# Patient Record
Sex: MALE | Race: BLACK OR AFRICAN AMERICAN | Employment: OTHER | URBAN - METROPOLITAN AREA
[De-identification: names, ages, dates, MRNs, and addresses within clinical notes are randomized per-mention and may not be internally consistent; named-entity substitution may affect disease eponyms.]

---

## 2017-01-13 DIAGNOSIS — R35.0 URINARY FREQUENCY: ICD-10-CM

## 2017-01-16 RX ORDER — OXYBUTYNIN CHLORIDE 5 MG/1
TABLET ORAL
Qty: 75 TAB | Refills: 10 | Status: SHIPPED | OUTPATIENT
Start: 2017-01-16 | End: 2018-02-09 | Stop reason: SDUPTHER

## 2017-04-11 ENCOUNTER — HOSPITAL ENCOUNTER (OUTPATIENT)
Dept: LAB | Age: 82
Discharge: HOME | End: 2017-04-11
Payer: MEDICARE

## 2017-04-11 ENCOUNTER — OFFICE VISIT (OUTPATIENT)
Dept: UROLOGY | Age: 82
End: 2017-04-11

## 2017-04-11 VITALS
DIASTOLIC BLOOD PRESSURE: 64 MMHG | SYSTOLIC BLOOD PRESSURE: 130 MMHG | HEART RATE: 84 BPM | OXYGEN SATURATION: 98 % | WEIGHT: 159 LBS | BODY MASS INDEX: 21.07 KG/M2 | HEIGHT: 73 IN

## 2017-04-11 DIAGNOSIS — C61 MALIGNANT NEOPLASM OF PROSTATE (HCC): ICD-10-CM

## 2017-04-11 DIAGNOSIS — C61 MALIGNANT NEOPLASM OF PROSTATE (HCC): Primary | ICD-10-CM

## 2017-04-11 DIAGNOSIS — R31.29 MICROSCOPIC HEMATURIA: ICD-10-CM

## 2017-04-11 LAB
BILIRUB UR QL STRIP: NORMAL
GLUCOSE UR-MCNC: NORMAL MG/DL
KETONES P FAST UR STRIP-MCNC: NEGATIVE MG/DL
PH UR STRIP: 6 [PH] (ref 4.6–8)
PROT UR QL STRIP: NORMAL MG/DL
PSA SERPL-MCNC: 0.2 NG/ML (ref 0–4)
SP GR UR STRIP: 1.02 (ref 1–1.03)
UA UROBILINOGEN AMB POC: NORMAL (ref 0.2–1)
URINALYSIS CLARITY POC: CLEAR
URINALYSIS COLOR POC: YELLOW
URINE BLOOD POC: NEGATIVE
URINE LEUKOCYTES POC: NEGATIVE
URINE NITRITES POC: NEGATIVE

## 2017-04-11 PROCEDURE — 88112 CYTOPATH CELL ENHANCE TECH: CPT | Performed by: UROLOGY

## 2017-04-11 PROCEDURE — 84153 ASSAY OF PSA TOTAL: CPT | Performed by: UROLOGY

## 2017-04-11 NOTE — PROGRESS NOTES
Swatisusannayun Lama    Chief Complaint   Patient presents with    Prostate Cancer    Microscopic Hematuria       History and Physical    The patient is an [de-identified] -American male who is post radiation therapy early 21 years ago for intracapsular prostate cancer. PSA has been followed and has been unremarkable. The patient did have microhematuria and underwent bladder biopsy previously which disclosed no evidence of malignancy. The patient has seen no blood in his urine and does not have any urinary  obstructive symptoms. The patient has had urinary frequency and takes oxybutynin 3 times a day. He does take 1 at bedtime but continues to have nocturia ×1. It is not an inconvenience to him. bowel function is good. The patient has sustained weight loss. This apparently has been quite variable but the patient weighed 180 pounds at last visit and today weighs 159 pounds this is not something the patient is trying to accomplish. The patient states that his appetite simply seems to be reduced. The patient does consume caffeinated soft drinks. The patient is a diabetic and does take insulin but the dose has come down significantly associated with weight loss.     Past Medical History:   Diagnosis Date    Diabetes ([address]     Hypertension     Malignant neoplasm of prostate ([address] 1998     Patient Active Problem List   Diagnosis Code    Malignant neoplasm of prostate ([address] C61    Malignant neoplasm of prostate ([address] C61    Hypoglycemia E16.2    History of prostate cancer Z85.46    Urinary frequency R35.0     Past Surgical History:   Procedure Laterality Date    BIOPSY OF PROSTATE,INCISIONAL  4/98    MA EXT BEAM RADIATION AS PRIMARY THERAPY TO PROSTATE ONLY  4/30-6/29/98     Current Outpatient Prescriptions   Medication Sig Dispense Refill    oxybutynin (DITROPAN) 5 mg tablet TAKE 1 TABLET BY MOUTH THREE TIMES DAILY (GENERIC FOR DITROPAN) 75 Tab 10    atorvastatin (LIPITOR) 40 mg tablet Take 1 Tab by mouth nightly.  hydrochlorothiazide 12.5 mg cap 12.5 mg, lisinopril 5 mg tab 10 mg Take  by mouth daily.  montelukast (SINGULAIR) 10 mg tablet Take 10 mg by mouth daily.  PRAVASTATIN SODIUM (PRAVASTATIN PO) Take  by mouth.  BRIMONIDINE TARTRATE/TIMOLOL (COMBIGAN OP) Apply  to eye.  BRINZOLAMIDE (AZOPT OP) Apply  to eye.  TRAVOPROST (TRAVATAN Z OP) Apply  to eye.  valsartan (DIOVAN) 80 mg tablet Take  by mouth daily.  SIMVASTATIN PO Take  by mouth.  aspirin delayed-release 81 mg tablet Take  by mouth daily.  insulin (NOVOLIN 70/30) 100 unit/mL (70-30) injection by SubCUTAneous route once. Allergies   Allergen Reactions    Iodinated Contrast Media - Oral And Iv Dye Hives     Faint, shaking     Social History     Social History    Marital status: SINGLE     Spouse name: N/A    Number of children: N/A    Years of education: N/A     Occupational History    Not on file.      Social History Main Topics    Smoking status: Current Some Day Smoker     Packs/day: 0.10     Years: 22.00     Types: Cigarettes    Smokeless tobacco: Never Used    Alcohol use No    Drug use: No    Sexual activity: Not on file     Other Topics Concern    Not on file     Social History Narrative    ** Merged History Encounter **           Family History   Problem Relation Age of Onset    Diabetes Other     Hypertension Other              Visit Vitals    Ht 6' 1\" (1.854 m)     Physical        Gen: WDWN adult NAD  Head  : normocephalic,  Normal ROM; eyes without normal pupils, EOMs, no masses;  conjunctiva normal  Neck: normal movement,  no evident mass,  No evident adenopathy, trachea midline,    Abd :bowel sounds normal, no masses, tenderness, organomegaly  Flanks     -    Extremities- no edema, arthritis, deformity, swelling  Psych- oriented, no evident anxiety, no cognitive impairment evident    Urine is negative except for 2+ protein  psa is drawn                  Impression/ PLAN  Distant history of prostate cancer with undetectable PSAs over an extended period of time following radiation  Unexplained weight loss    Plan:  The patient does continue to smoke perhaps one fourth of a pack of cigarettes per day and is advised that this can be an appetite suppressor  The patient has had no history of alcohol abuse in the past and is a current nondrinker but has consumed alcohol when he was a younger man. We have talked about a glass of wine or a small amount of mel prior to a meal in order to help with appetite. We have also talked about using Ensure or milk shakes and perhaps converting over to regular Pepsi instead of diet Pepsi. The patient is able to monitor his blood sugars. The present time, Megace is not a consideration. The patient does advise me that he did check with his primary care physician with the weight loss and did undergo a thorough evaluation for occult malignancy. I will do a urine cytology on today's specimen    The patient will continue with his oxybutynin                This visit exceeded 25 minutes and >50% was counselling  The patient understands the discussion and plan    PLEASE NOTE:      This document has been produced using voice recognition software.   Unrecognized errors in transcription may be present    Shahram Duque MD

## 2017-04-11 NOTE — PATIENT INSTRUCTIONS
Prostate-Specific Antigen (PSA) Test: About This Test  What is it? A prostate-specific antigen (PSA) test measures the amount of PSA in your blood. PSA is released by a man's prostate gland into his blood. A high PSA level may mean that you have an enlargement, infection, or cancer of the prostate. Why is this test done? You may have this test to:  · Check for prostate cancer. · Watch prostate cancer and see if treatment is working. How can you prepare for the test?  Do not ejaculate during the 2 days before your PSA blood test, either during sex or masturbation. What happens before the test?  Tell your doctor if you have had a:  · Test to look at your bladder (cystoscopy) in the past several weeks. · Prostate biopsy in the past several weeks. · Prostate infection or urinary tract infection that has not gone away. · Tube (catheter) inserted into your bladder to drain urine recently. What happens during the test?  A health professional takes a sample of your blood. What happens after the test?  You can go back to your usual activities right away. When should you call for help? Watch closely for changes in your health, and be sure to contact your doctor if you have any questions about this test.  Follow-up care is a key part of your treatment and safety. Be sure to make and go to all appointments, and call your doctor if you are having problems. It's also a good idea to keep a list of the medicines you take. Ask your doctor when you can expect to have your test results. Where can you learn more? Go to http://petty-timothy.info/. Enter Y339 in the search box to learn more about \"Prostate-Specific Antigen (PSA) Test: About This Test.\"  Current as of: July 26, 2016  Content Version: 11.2  © 8318-6593 [employer] Connect. Care instructions adapted under license by EverPresent (which disclaims liability or warranty for this information).  If you have questions about a medical condition or this instruction, always ask your healthcare professional. [place] [address] any warranty or liability for your use of this information.

## 2017-04-11 NOTE — PROGRESS NOTES
Mr. Kam Alaniz has a reminder for a \"due or due soon\" health maintenance. I have asked that he contact his primary care provider for follow-up on this health maintenance. RBV. Per Dr. Dedra Claude lab drawn in office today for PSA for prostate cancer.

## 2017-04-11 NOTE — MR AVS SNAPSHOT
Visit Information Date & Time Provider Department Dept. Phone Encounter #  
 4/11/2017  9:30 AM Shahram Duque, [facility] [phone] [id number] Upcoming Health Maintenance Date Due DTaP/Tdap/Td series (1 - Tdap) 1/7/1956 ZOSTER VACCINE AGE 60> 1/7/1997 GLAUCOMA SCREENING Q2Y 1/7/2002 Pneumococcal 65+ High/Highest Risk (1 of 2 - PCV13) 1/7/2002 MEDICARE YEARLY EXAM 1/7/2002 INFLUENZA AGE 9 TO ADULT 8/1/2016 Allergies as of 4/11/2017  Review Complete On: 4/11/2017 By: Ashanti Evans LPN Severity Noted Reaction Type Reactions Iodinated Contrast Media - Oral And Iv Dye   Side Effect Hives Faint, shaking Current Immunizations  Reviewed on 5/5/2016 Name Date Influenza Vaccine 10/1/2015 Not reviewed this visit You Were Diagnosed With   
  
 Codes Comments Malignant neoplasm of prostate ([facility]    -  Primary ICD-10-CM: Q56 ICD-9-CM: 069 Vitals BP Pulse Height(growth percentile) Weight(growth percentile) SpO2 BMI  
 130/64 (BP 1 Location: Left arm, BP Patient Position: Sitting) 84 6' 1\" (1.854 m) 159 lb (72.1 kg) 98% 20.98 kg/m2 Smoking Status Current Some Day Smoker Vitals History BMI and BSA Data Body Mass Index Body Surface Area  
 20.98 kg/m 2 1.93 m 2 Preferred Pharmacy Pharmacy Name Phone [id number] [address] [phone] Your Updated Medication List  
  
   
This list is accurate as of: 4/11/17  9:55 AM.  Always use your most recent med list.  
  
  
  
  
 aspirin delayed-release 81 mg tablet Take  by mouth daily. atorvastatin 40 mg tablet Commonly known as:  LIPITOR Take 1 Tab by mouth nightly. AZOPT OP Apply  to eye. COMBIGAN OP Apply  to eye. DIOVAN 80 mg tablet Generic drug:  valsartan Take  by mouth daily. hydrochlorothiazide 12.5 mg cap 12.5 mg, lisinopril 5 mg tab 10 mg Take  by mouth daily. montelukast 10 mg tablet Commonly known as:  SINGULAIR Take 10 mg by mouth daily. NovoLIN 70/30 100 unit/mL (70-30) injection Generic drug:  insulin NPH/insulin regular  
by SubCUTAneous route once. oxybutynin 5 mg tablet Commonly known as:  DITROPAN  
TAKE 1 TABLET BY MOUTH THREE TIMES DAILY ([facility]) PRAVASTATIN PO Take  by mouth. SIMVASTATIN PO Take  by mouth. TRAVATAN Z OP Apply  to eye. We Performed the Following AMB POC URINALYSIS DIP STICK AUTO W/O MICRO [48448 CPT(R)] [place] COLLECTION VENOUS BLOOD,VENIPUNCTURE [id number] CPT(R)] To-Do List   
 04/11/2017 Lab:  PROSTATE SPECIFIC AG (PSA) Patient Instructions Prostate-Specific Antigen (PSA) Test: About This Test 
What is it? A prostate-specific antigen (PSA) test measures the amount of PSA in your blood. PSA is released by a man's prostate gland into his blood. A high PSA level may mean that you have an enlargement, infection, or cancer of the prostate. Why is this test done? You may have this test to: · Check for prostate cancer. · Watch prostate cancer and see if treatment is working. How can you prepare for the test? 
Do not ejaculate during the 2 days before your PSA blood test, either during sex or masturbation. What happens before the test? 
Tell your doctor if you have had a: 
· Test to look at your bladder (cystoscopy) in the past several weeks. · Prostate biopsy in the past several weeks. · Prostate infection or urinary tract infection that has not gone away. · Tube (catheter) inserted into your bladder to drain urine recently. What happens during the test? 
A health professional takes a sample of your blood. What happens after the test? 
You can go back to your usual activities right away. When should you call for help? Watch closely for changes in your health, and be sure to contact your doctor if you have any questions about this test. 
Follow-up care is a key part of your treatment and safety. Be sure to make and go to all appointments, and call your doctor if you are having problems. It's also a good idea to keep a list of the medicines you take. Ask your doctor when you can expect to have your test results. Where can you learn more? Go to http://petty-timothy.info/. Enter W735 in the search box to learn more about \"Prostate-Specific Antigen (PSA) Test: About This Test.\" Current as of: July 26, 2016 Content Version: 11.2 © 5544-4820 Auto I.D.. Care instructions adapted under license by NeuralStem (which disclaims liability or warranty for this information). If you have questions about a medical condition or this instruction, always ask your healthcare professional. [address] any warranty or liability for your use of this information. Introducing [facility]! [employer] introduces [email or web address] patient portal. Now you can access parts of your medical record, email your doctor's office, and request medication refills online. 1. In your internet browser, go to [email or web address] 2. Click on the First Time User? Click Here link in the Sign In box. You will see the New Member Sign Up page. 3. Enter your [email or web address] Access Code exactly as it appears below. You will not need to use this code after youve completed the sign-up process. If you do not sign up before the expiration date, you must request a new code. · [email or web address] Access Code: [email or web address] Expires: 7/10/2017  9:16 AM 
 
4. Enter the last four digits of your Social Security Number (xxxx) and Date of Birth (mm/dd/yyyy) as indicated and click Submit. You will be taken to the next sign-up page. 5. Create a [email or web address] ID.  This will be your [email or web address] login ID and cannot be changed, so think of one that is secure and easy to remember. 6. Create a Admify password. You can change your password at any time. 7. Enter your Password Reset Question and Answer. This can be used at a later time if you forget your password. 8. Enter your e-mail address. You will receive e-mail notification when new information is available in [address] 9. Click Sign Up. You can now view and download portions of your medical record. 10. Click the Download Summary menu link to download a portable copy of your medical information. If you have questions, please visit the Frequently Asked Questions section of the Admify website. Remember, Admify is NOT to be used for urgent needs. For medical emergencies, dial 911. Now available from your iPhone and Android! Please provide this summary of care documentation to your next provider. Your primary care clinician is listed as Neha Lozano. If you have any questions after today's visit, please call [phone].

## 2017-07-19 ENCOUNTER — HOSPITAL ENCOUNTER (OUTPATIENT)
Dept: GENERAL RADIOLOGY | Age: 82
Discharge: HOME | End: 2017-07-19
Payer: MEDICARE

## 2017-07-19 DIAGNOSIS — R63.4 ABNORMAL WEIGHT LOSS: ICD-10-CM

## 2017-07-19 PROCEDURE — 71020 XR CHEST PA LAT: CPT

## 2017-07-25 ENCOUNTER — HOSPITAL ENCOUNTER (OUTPATIENT)
Dept: CT IMAGING | Age: 82
Discharge: HOME | End: 2017-07-25
Attending: FAMILY MEDICINE
Payer: MEDICARE

## 2017-07-25 DIAGNOSIS — R63.4 LOSS OF WEIGHT: ICD-10-CM

## 2017-07-25 PROCEDURE — 74176 CT ABD & PELVIS W/O CONTRAST: CPT

## 2017-08-29 ENCOUNTER — HOSPITAL ENCOUNTER (OUTPATIENT)
Dept: LAB | Age: 82
Discharge: HOME | End: 2017-08-29
Payer: MEDICARE

## 2017-08-29 ENCOUNTER — OFFICE VISIT (OUTPATIENT)
Dept: ONCOLOGY | Age: 82
End: 2017-08-29

## 2017-08-29 ENCOUNTER — HOSPITAL ENCOUNTER (OUTPATIENT)
Dept: ONCOLOGY | Age: 82
Discharge: HOME | End: 2017-08-29

## 2017-08-29 VITALS
TEMPERATURE: 98.1 F | HEART RATE: 91 BPM | BODY MASS INDEX: 19.13 KG/M2 | WEIGHT: 145 LBS | DIASTOLIC BLOOD PRESSURE: 68 MMHG | SYSTOLIC BLOOD PRESSURE: 133 MMHG

## 2017-08-29 DIAGNOSIS — D72.9 NEUTROPHILIC LEUKOCYTOSIS: ICD-10-CM

## 2017-08-29 DIAGNOSIS — C61 MALIGNANT NEOPLASM OF PROSTATE (HCC): ICD-10-CM

## 2017-08-29 DIAGNOSIS — D75.839 THROMBOCYTOSIS: ICD-10-CM

## 2017-08-29 DIAGNOSIS — D72.9 NEUTROPHILIC LEUKOCYTOSIS: Primary | ICD-10-CM

## 2017-08-29 LAB
BASO+EOS+MONOS # BLD AUTO: 1.4 K/UL (ref 0–2.3)
BASO+EOS+MONOS # BLD AUTO: 6 % (ref 0.1–17)
COMMENT , 490046: NORMAL
DIFFERENTIAL METHOD BLD: ABNORMAL
ERYTHROCYTE [DISTWIDTH] IN BLOOD BY AUTOMATED COUNT: 17.6 % (ref 11.5–14.5)
FLOW INTERPRETATION: NORMAL
HCT VFR BLD AUTO: 36.3 % (ref 36–48)
HGB BLD-MCNC: 11.7 G/DL (ref 12–16)
LYMPHOCYTES # BLD: 2.7 K/UL (ref 1.1–5.9)
LYMPHOCYTES NFR BLD: 10 % (ref 14–44)
MCH RBC QN AUTO: 25.1 PG (ref 25–35)
MCHC RBC AUTO-ENTMCNC: 32.2 G/DL (ref 31–37)
MCV RBC AUTO: 77.9 FL (ref 78–102)
NEUTS SEG # BLD: 22.2 K/UL (ref 1.8–9.5)
NEUTS SEG NFR BLD: 84 % (ref 40–70)
PATHOLOGIST NAME: NORMAL
PLATELET # BLD AUTO: 765 K/UL (ref 135–420)
PSA SERPL-MCNC: 0.2 NG/ML (ref 0–4)
RBC # BLD AUTO: 4.66 M/UL (ref 4.1–5.1)
SPECIMEN SOURCE: NORMAL
SPECIMEN SOURCE: NORMAL
WBC # BLD AUTO: 26.3 K/UL (ref 4.5–13)

## 2017-08-29 PROCEDURE — 80053 COMPREHEN METABOLIC PANEL: CPT | Performed by: INTERNAL MEDICINE

## 2017-08-29 PROCEDURE — 88185 FLOWCYTOMETRY/TC ADD-ON: CPT | Performed by: UROLOGY

## 2017-08-29 PROCEDURE — 84165 PROTEIN E-PHORESIS SERUM: CPT | Performed by: INTERNAL MEDICINE

## 2017-08-29 PROCEDURE — 84153 ASSAY OF PSA TOTAL: CPT | Performed by: INTERNAL MEDICINE

## 2017-08-29 PROCEDURE — 36415 COLL VENOUS BLD VENIPUNCTURE: CPT | Performed by: INTERNAL MEDICINE

## 2017-08-29 PROCEDURE — 88184 FLOWCYTOMETRY/ TC 1 MARKER: CPT | Performed by: UROLOGY

## 2017-08-29 NOTE — PATIENT INSTRUCTIONS
Prostate Cancer: Care Instructions  Your Care Instructions    The prostate gland is a small, walnut-shaped organ that lies just below a man's bladder. It surrounds the urethra, the tube that carries urine from the bladder out of the body through the penis. Prostate cancer is the abnormal growth of cells in the prostate gland. Prostate cancer cells can spread within the prostate, to nearby lymph nodes and other tissues, and to other parts of the body. When the cancer hasn't spread outside the prostate, it is called localized prostate cancer. With localized prostate cancer, your options depend on how likely it is that your cancer will grow. Tests will show if your cancer is likely to grow. · Low-risk cancer isn't likely to grow right away. If your cancer is low-risk, you can choose active surveillance. This means your cancer will be watched closely by your doctor with regular checkups and tests to see if the cancer grows. This choice allows you to delay having surgery or radiation, often for many years. If the cancer grows very slowly, you may never need treatment. · Medium-risk cancer is more likely to grow. Some men with this type of cancer may be able to choose active surveillance. Others may need to choose surgery or radiation. · High-risk cancer is most likely to grow. If you have high-risk cancer, you will likely need to choose surgery or radiation. If your cancer has already spread outside the prostate or to other parts of the body, then you may have other treatments, like chemotherapy or hormone therapy. If you are over age [de-identified] or have other serious health problems, like heart disease, you may choose not to have treatments to cure your cancer. Instead, you can just have treatments to manage your symptoms. This is called watchful waiting. Finding out that you have cancer is scary. You may feel many emotions and may need some help coping. Seek out family, friends, and counselors for support.  You also can do things at home to make yourself feel better while you go through treatment. Call the [facility]illa ([phone]) or visit its website at 7[address] Localocracy. AdNear for more information. Follow-up care is a key part of your treatment and safety. Be sure to make and go to all appointments, and call your doctor if you are having problems. It's also a good idea to know your test results and keep a list of the medicines you take. How can you care for yourself at home? · Your doctor will talk to you about your treatment options. You may need to learn more about each of them before you can decide which treatment is best for you. · Take your medicines exactly as prescribed. Call your doctor if you think you are having a problem with your medicine. You will get more details on the specific medicines your doctor prescribes. · Eat healthy food. If you do not feel like eating, try to eat food that has protein and extra calories to keep up your strength and prevent weight loss. Drink liquid meal replacements for extra calories and protein. Try to eat your main meal early. · Take steps to control your stress and workload. Learn relaxation techniques. ¨ Share your feelings. Stress and tension affect our emotions. By expressing your feelings to others, you may be able to understand and cope with them. ¨ Consider joining a support group. Talking about a problem with your spouse, a good friend, or other people with similar problems is a good way to reduce tension and stress. ¨ Express yourself through art. Try writing, crafts, dance, or art to relieve stress. Some dance, writing, or art groups may be available just for people who have cancer. ¨ Be kind to your body and mind. Getting enough sleep, eating a healthy diet, and taking time to do things you enjoy can contribute to an overall feeling of balance in your life and can help reduce stress. ¨ Get help if you need it.  Discuss your concerns with your doctor or counselor. · Get some physical activity every day, but do not get too tired. Keep doing the hobbies you enjoy as your energy allows. · If you are vomiting or have diarrhea:  ¨ Drink plenty of fluids (enough so that your urine is light yellow or clear like water) to prevent dehydration. Choose water and other caffeine-free clear liquids. If you have kidney, heart, or liver disease and have to limit fluids, talk with your doctor before you increase the amount of fluids you drink. ¨ When you are able to eat, try clear soups, mild foods, and liquids until all symptoms are gone for 12 to 48 hours. Jell-O, dry toast, crackers, and cooked cereal are also good choices. · If you have not already done so, prepare a list of advance directives. Advance directives are instructions to your doctor and family members about what kind of care you want if you become unable to speak or express yourself. When should you call for help? Call your doctor now or seek immediate medical care if:  · You cannot urinate. · You have symptoms of a urinary infection. For example:  ¨ You have blood or pus in your urine. ¨ You have pain in your back just below your rib cage. This is called flank pain. ¨ You have a fever, chills, or body aches. ¨ It hurts to urinate. ¨ You have groin or belly pain. · You have pain in your back or hips. · Your pain is not controlled. · You are vomiting or nauseated. Watch closely for changes in your health, and be sure to contact your doctor if:  · You have pain when you ejaculate. · You have trouble starting or controlling your urine. Where can you learn more? Go to http://petty-timothy.info/. Enter V141 in the search box to learn more about \"Prostate Cancer: Care Instructions. \"  Current as of: July 26, 2016  Content Version: 11.3  © 4151-6388 Problemsolutions24.  Care instructions adapted under license by RackWare (which disclaims liability or warranty for this information). If you have questions about a medical condition or this instruction, always ask your healthcare professional. [address] any warranty or liability for your use of this information. Complete Blood Count (CBC): About This Test  What is it? A complete blood count (CBC) is a blood test that gives important information about your blood cells, especially red blood cells, white blood cells, and platelets. Why is this test done? A CBC may be done as part of a regular physical exam. There are many other reasons that a doctor may want this blood test, including to:  · Find the cause of symptoms such as fatigue, weakness, fever, bruising, or weight loss. · Find anemia or an infection. · See how much blood has been lost if there is bleeding. · Diagnose diseases of the blood, such as leukemia or polycythemia. How can you prepare for the test?  You do not need to do anything before having this test.  What happens during the test?  The health professional taking a sample of your blood will:  · Wrap an elastic band around your upper arm. This makes the veins below the band larger so it is easier to put a needle into the vein. · Clean the needle site with alcohol. · Put the needle into the vein. · Attach a tube to the needle to fill it with blood. · Remove the band from your arm when enough blood is collected. · Put a gauze pad or cotton ball over the needle site as the needle is removed. · Put pressure on the site and then put on a bandage. If this blood test is done on a baby, a heel stick may be done instead of a blood draw from a vein. What happens after the test?  · You will probably be able to go home right away. · You can go back to your usual activities right away. Follow-up care is a key part of your treatment and safety. Be sure to make and go to all appointments, and call your doctor if you are having problems.  It's also a good idea to keep a list of the medicines you take. Ask your doctor when you can expect to have your test results. Where can you learn more? Go to http://petty-timothy.info/. Enter F768 in the search box to learn more about \"Complete Blood Count (CBC): About This Test.\"  Current as of: October 14, 2016  Content Version: 11.3  © 5224-2546 Theorem. Care instructions adapted under license by Movatu (which disclaims liability or warranty for this information). If you have questions about a medical condition or this instruction, always ask your healthcare professional. [place] [address] any warranty or liability for your use of this information.

## 2017-08-29 NOTE — PROGRESS NOTES
Hematology/Oncology Consultation Note    Name: Bryon Cap  Date: 2017  : 1935    PCP: Paula Yanes MD       Mr. Morena Carballo  is a [de-identified] y.o. -American man who was referred for evaluation of having an elevated WBC count. Subjective:     Chief complaint: Elevated WBC count    History of present illness:  Mr. Morena Carballo is an 65-year-old -American man who states that over the past few months he has lost over 10 pounds. He has not been trying to lose weight. He reports that he has not experienced any unexplained fevers or night sweats. On 2017 a CBC revealed that he had an elevated WBC count at 32.2 with a normal hemoglobin of 12.6 g/dL and hematocrit of 38%. His platelet count was elevated at 601,000 but is differential WBC count revealed that his neutrophils were markedly elevated at 86%. Based on these abnormal blood test results he was referred for evaluation. The patient denies having any unexplained bruising or bleeding. Additionally he has not experienced any evidence of blood clots in his lower extremities or lungs. He has no complaints of abdominal pain or discomfort. The patient reports that he does have a history of prostate cancer but has not experienced any evidence of recurrent disease. He reports that he was diagnosed in  and was treated with radiation therapy. .    Past Medical History:   Diagnosis Date    Diabetes ([facility]     Hypertension     Malignant neoplasm of prostate ([facility]        Allergies   Allergen Reactions    Iodinated Contrast- Oral And Iv Dye Hives     Faint, shaking       Past Surgical History:   Procedure Laterality Date    BIOPSY OF PROSTATE,INCISIONAL      UT EXT BEAM RADIATION AS PRIMARY THERAPY TO PROSTATE ONLY  -98       Social History     Social History    Marital status: SINGLE     Spouse name: N/A    Number of children: N/A    Years of education: N/A     Occupational History    Not on file.      Social History Main Topics    Smoking status: Current Some Day Smoker     Packs/day: 0.10     Years: 22.00     Types: Cigarettes    Smokeless tobacco: Never Used    Alcohol use No    Drug use: No    Sexual activity: Not on file     Other Topics Concern    Not on file     Social History Narrative    ** Merged History Encounter **            Family History   Problem Relation Age of Onset    Diabetes Other     Hypertension Other        Current Outpatient Prescriptions   Medication Sig Dispense Refill    oxybutynin (DITROPAN) 5 mg tablet TAKE 1 TABLET BY MOUTH THREE TIMES DAILY (GENERIC FOR DITROPAN) 75 Tab 10    atorvastatin (LIPITOR) 40 mg tablet Take 1 Tab by mouth nightly.  hydrochlorothiazide 12.5 mg cap 12.5 mg, lisinopril 5 mg tab 10 mg Take  by mouth daily.  montelukast (SINGULAIR) 10 mg tablet Take 10 mg by mouth daily.  PRAVASTATIN SODIUM (PRAVASTATIN PO) Take  by mouth.  BRIMONIDINE TARTRATE/TIMOLOL (COMBIGAN OP) Apply  to eye.  BRINZOLAMIDE (AZOPT OP) Apply  to eye.  TRAVOPROST (TRAVATAN Z OP) Apply  to eye.  valsartan (DIOVAN) 80 mg tablet Take  by mouth daily.  SIMVASTATIN PO Take  by mouth.  aspirin delayed-release 81 mg tablet Take  by mouth daily.  insulin (NOVOLIN 70/30) 100 unit/mL (70-30) injection by SubCUTAneous route once. Review of Systems    General ROS:The patient has no complaints and there is no physical distress evident. However, the patient reports losing at least 10 pounds or more in the past 3 months which was unintentional weight loss. Psychological ROS: patient denies having any psychological symptoms such as hallucinations, depression or anxiety. Ophthalmic ROS:the patient denies having any visual impairment or eye discomfort. ENT ROS: there are no abnormalities reported. Allergy and Immunology ROS:the patient denies having any seasonal allergies or allergies to medications other than those already outlined above.   Hematological and Lymphatic ROS: the patient denies having any bruising, bleeding or lymphadenopathy. Endocrine ROS: the patient denies having any heat or cold intolerance. There is no history of diabetes or thyroid disorders. Breast ROS: the patient denies having any history of breast mass, nipple discharge, or lumps. Respiratory ROS:the patient denies having any cough, shortness of breath, or dyspnea on exertion. Cardiovascular ROS: there are no complaints of chest pain, palpitations, chest pounding, or dyspnea on exertion. Gastrointestinal ROS: the patient denies having nausea, emesis, diarrhea, constipation, or blood in the stool. Genito-Urinary ROS: the patient denies having urinary urgency, frequency, or dysuria. Musculoskeletal ROS: with the exception of mild arthralgias the patient has no other musculoskeletal complaints. Neurological ROS: the patient denies having any numbness, tingling, or neurologic deficits. Dermatological ROS:patient denies having any unexplained rash, skin ulcerations, or hives. Objective:     Visit Vitals    /68 (BP 1 Location: Right arm, BP Patient Position: Sitting)    Pulse 91    Temp 98.1 °F (36.7 °C) (Oral)    Wt 65.8 kg (145 lb)    BMI 19.13 kg/m2        Physical Exam:   Gen. Appearance: the patient is in no acute distress. Skin: There is no evidence of bruise or rash. HEENT: The head is normocephalic and atraumatic. The conjunctiva and sclera are clear. Pupils are equal, round, reactive to light, and accommodation. The extraocular movements are intact. ENT reveals no oral mucosal lesions or ulcerations. Neck: Supple without lymphadenopathy or thyromegaly. Lungs: Clear to auscultation and percussion; there are no wheezes or rhonchi. Heart: Regular rate and rhythm; there are no murmurs, gallops, or rubs. Abdomen: Bowel sounds are present and normal.  There is no guarding, tenderness, or hepatosplenomegaly. Extremities:  There is no clubbing, cyanosis, or edema. Neurologic: There are no focal neurologic deficits. Lymphatics: There is no palpable peripheral lymphadenopathy. Lab data: The CBC dated 7/17/2017 showed a WBC count of 32.2, hemoglobin 12.6 g/dL, hematocrit 38%, platelet count 000,808, and the neutrophils were 86% with 5% lymphocytes. Assessment:   Neutrophilic leukocytosis: I have explained to the patient that the diagnostic differential includes a reactive leukocytosis with overproduction of neutrophils, chronic leukemoid reaction, or progressive development of chronic myeloid leukemia. Thrombocytosis: The platelet count was elevated at 601,000 and therefore I am concerned that he may have an underlying myeloproliferative disorder. The diagnostic differential includes an evolving essential thrombocytosis. Prostate cancer: The patient has a remote history of prostate cancer and at this point there is a remote possibility that he may have bone marrow involvement with prostate cancer cells contributing to his neutrophilic leukocytosis. Plan:   Neutrophilic leukocytosis: At this time I will order a comprehensive metabolic panel, immunophenotyping profile, and serum protein electrophoresis to have rule out any evidence of an evolving lympho-plasmacytic leukemia versus chronic myeloid leukemia. Thrombocytosis: At this time I will order a DNA analysis for mutations in the JAK2. Prostate cancer: At this time I will check his PSA level since he does have a history of prostate cancer to rule out any evidence that he may have progressive metastatic prostate cancer causing a reactive neutrophilic leukocytosis with bone involvement. I will have the patient return to clinic in 2 weeks to review lab data and to discuss potential options of management.     Orders Placed This Encounter    COMPLETE CBC & AUTO DIFF WBC    [place] CBC [place]     Standing Status:   Future     Number of Occurrences:   1     Standing Expiration Date: 7/9/7167    METABOLIC PANEL, COMPREHENSIVE     Standing Status:   Future     Standing Expiration Date:   8/30/2018    SPEP     Standing Status:   Future     Standing Expiration Date:   8/30/2018    PROSTATE SPECIFIC AG     Standing Status:   Future     Standing Expiration Date:   8/30/2018    IMMUNOPHENOTYPING PROFILE     Standing Status:   Future     Standing Expiration Date:   8/30/2018     Order Specific Question:   Specimen type     Answer:   Blood [2]           Nunu Najera MD  8/29/2017      Please note: This document has been produced using voice recognition software. Unrecognized errors in transcription may be present.

## 2017-08-29 NOTE — PROGRESS NOTES
Critical Results for WBC were noted and reviewed. The patient is new to the practice and is being evaluated for the elevated WBC count.  Will continue to monitor,

## 2017-08-29 NOTE — MR AVS SNAPSHOT
Visit Information Date & Time Provider Department Dept. Phone Encounter #  
 8/29/2017 10:00 AM [facility] Office [phone] [id number] Follow-up Instructions Return in about 2 weeks (around 9/12/2017). Your Appointments 9/12/2017  1:30 PM  
Office Visit with Chichi Bird MD  
[place] [address] [address]) Appt Note: RESULTS  
 [place] [address] [facility] [address] [facility] [address]  
[phone]  
  
   
 [place] [address] 4/10/2018  9:15 AM  
Office Visit with Yulissa Polk MD  
[facility] [address]) Appt Note: PSA  
 [address] [place] [address] [address]  
[phone] [address] [place] [address] Upcoming Health Maintenance Date Due DTaP/Tdap/Td series (1 - Tdap) 1/25/1958 ZOSTER VACCINE AGE 60> 11/25/1996 GLAUCOMA SCREENING Q2Y 1/25/2002 Pneumococcal 65+ High/Highest Risk (1 of 2 - PCV13) 1/25/2002 MEDICARE YEARLY EXAM 1/25/2002 INFLUENZA AGE 9 TO ADULT 8/1/2017 Allergies as of 8/29/2017  Review Complete On: 7/25/2017 By: Elvie Townsend Severity Noted Reaction Type Reactions Iodinated Contrast- Oral And Iv Dye   Side Effect Hives Faint, shaking Current Immunizations  Reviewed on 5/5/2016 Name Date Influenza Vaccine 10/1/2015 Not reviewed this visit You Were Diagnosed With   
  
 Codes Comments Neutrophilic leukocytosis    -  Primary ICD-10-CM: D72.9 ICD-9-CM: 288.8 Malignant neoplasm of prostate [facility])     ICD-10-CM: G03 ICD-9-CM: 707 Vitals Smoking Status Current Some Day Smoker Preferred Pharmacy Pharmacy Name Phone [id number] [address] [phone] Your Updated Medication List  
  
   
 This list is accurate as of: 8/29/17 10:44 AM.  Always use your most recent med list.  
  
  
  
  
 aspirin delayed-release 81 mg tablet Take  by mouth daily. atorvastatin 40 mg tablet Commonly known as:  LIPITOR Take 1 Tab by mouth nightly. AZOPT OP Apply  to eye. COMBIGAN OP Apply  to eye. DIOVAN 80 mg tablet Generic drug:  valsartan Take  by mouth daily. hydrochlorothiazide 12.5 mg cap 12.5 mg, lisinopril 5 mg tab 10 mg Take  by mouth daily. montelukast 10 mg tablet Commonly known as:  SINGULAIR Take 10 mg by mouth daily. NovoLIN 70/30 100 unit/mL (70-30) injection Generic drug:  insulin NPH/insulin regular  
by SubCUTAneous route once. oxybutynin 5 mg tablet Commonly known as:  DITROPAN  
TAKE 1 TABLET BY MOUTH THREE TIMES DAILY ([facility]) PRAVASTATIN PO Take  by mouth. SIMVASTATIN PO Take  by mouth. TRAVATAN Z OP Apply  to eye. We Performed the Following COMPLETE CBC & AUTO DIFF WBC [51334 CPT(R)] Follow-up Instructions Return in about 2 weeks (around 9/12/2017). To-Do List   
 08/29/2017 Lab:  CBC WITH 3 PART DIFF Patient Instructions Prostate Cancer: Care Instructions Your Care Instructions The prostate gland is a small, walnut-shaped organ that lies just below a man's bladder. It surrounds the urethra, the tube that carries urine from the bladder out of the body through the penis. Prostate cancer is the abnormal growth of cells in the prostate gland. Prostate cancer cells can spread within the prostate, to nearby lymph nodes and other tissues, and to other parts of the body. When the cancer hasn't spread outside the prostate, it is called localized prostate cancer. With localized prostate cancer, your options depend on how likely it is that your cancer will grow. Tests will show if your cancer is likely to grow. · Low-risk cancer isn't likely to grow right away. If your cancer is low-risk, you can choose active surveillance. This means your cancer will be watched closely by your doctor with regular checkups and tests to see if the cancer grows. This choice allows you to delay having surgery or radiation, often for many years. If the cancer grows very slowly, you may never need treatment. · Medium-risk cancer is more likely to grow. Some men with this type of cancer may be able to choose active surveillance. Others may need to choose surgery or radiation. · High-risk cancer is most likely to grow. If you have high-risk cancer, you will likely need to choose surgery or radiation. If your cancer has already spread outside the prostate or to other parts of the body, then you may have other treatments, like chemotherapy or hormone therapy. If you are over age [de-identified] or have other serious health problems, like heart disease, you may choose not to have treatments to cure your cancer. Instead, you can just have treatments to manage your symptoms. This is called watchful waiting. Finding out that you have cancer is scary. You may feel many emotions and may need some help coping. Seek out family, friends, and counselors for support. You also can do things at home to make yourself feel better while you go through treatment. Call the [employer] (6-141.269.1939) or visit its website at [employer] [employer]. org for more information. Follow-up care is a key part of your treatment and safety. Be sure to make and go to all appointments, and call your doctor if you are having problems. It's also a good idea to know your test results and keep a list of the medicines you take. How can you care for yourself at home? · Your doctor will talk to you about your treatment options. You may need to learn more about each of them before you can decide which treatment is best for you. · Take your medicines exactly as prescribed. Call your doctor if you think you are having a problem with your medicine. You will get more details on the specific medicines your doctor prescribes. · Eat healthy food. If you do not feel like eating, try to eat food that has protein and extra calories to keep up your strength and prevent weight loss. Drink liquid meal replacements for extra calories and protein. Try to eat your main meal early. · Take steps to control your stress and workload. Learn relaxation techniques. ¨ Share your feelings. Stress and tension affect our emotions. By expressing your feelings to others, you may be able to understand and cope with them. ¨ Consider joining a support group. Talking about a problem with your spouse, a good friend, or other people with similar problems is a good way to reduce tension and stress. ¨ Express yourself through art. Try writing, crafts, dance, or art to relieve stress. Some dance, writing, or art groups may be available just for people who have cancer. ¨ Be kind to your body and mind. Getting enough sleep, eating a healthy diet, and taking time to do things you enjoy can contribute to an overall feeling of balance in your life and can help reduce stress. ¨ Get help if you need it. Discuss your concerns with your doctor or counselor. · Get some physical activity every day, but do not get too tired. Keep doing the hobbies you enjoy as your energy allows. · If you are vomiting or have diarrhea: ¨ Drink plenty of fluids (enough so that your urine is light yellow or clear like water) to prevent dehydration. Choose water and other caffeine-free clear liquids. If you have kidney, heart, or liver disease and have to limit fluids, talk with your doctor before you increase the amount of fluids you drink. ¨ When you are able to eat, try clear soups, mild foods, and liquids until all symptoms are gone for 12 to 48 hours.  Jell-O, dry toast, crackers, and cooked cereal are also good choices. · If you have not already done so, prepare a list of advance directives. Advance directives are instructions to your doctor and family members about what kind of care you want if you become unable to speak or express yourself. When should you call for help? Call your doctor now or seek immediate medical care if: 
· You cannot urinate. · You have symptoms of a urinary infection. For example: ¨ You have blood or pus in your urine. ¨ You have pain in your back just below your rib cage. This is called flank pain. ¨ You have a fever, chills, or body aches. ¨ It hurts to urinate. ¨ You have groin or belly pain. · You have pain in your back or hips. · Your pain is not controlled. · You are vomiting or nauseated. Watch closely for changes in your health, and be sure to contact your doctor if: 
· You have pain when you ejaculate. · You have trouble starting or controlling your urine. Where can you learn more? Go to http://petty-timothy.info/. Enter V141 in the search box to learn more about \"Prostate Cancer: Care Instructions. \" Current as of: July 26, 2016 Content Version: 11.3 © 2890-4198 [employer]. Care instructions adapted under license by [employer] (which disclaims liability or warranty for this information). If you have questions about a medical condition or this instruction, always ask your healthcare professional. [place] [address] any warranty or liability for your use of this information. Complete Blood Count (CBC): About This Test 
What is it? A complete blood count (CBC) is a blood test that gives important information about your blood cells, especially red blood cells, white blood cells, and platelets. Why is this test done? A CBC may be done as part of a regular physical exam. There are many other reasons that a doctor may want this blood test, including to: · Find the cause of symptoms such as fatigue, weakness, fever, bruising, or weight loss. · Find anemia or an infection. · See how much blood has been lost if there is bleeding. · Diagnose diseases of the blood, such as leukemia or polycythemia. How can you prepare for the test? 
You do not need to do anything before having this test. 
What happens during the test? 
The health professional taking a sample of your blood will: · Wrap an elastic band around your upper arm. This makes the veins below the band larger so it is easier to put a needle into the vein. · Clean the needle site with alcohol. · Put the needle into the vein. · Attach a tube to the needle to fill it with blood. · Remove the band from your arm when enough blood is collected. · Put a gauze pad or cotton ball over the needle site as the needle is removed. · Put pressure on the site and then put on a bandage. If this blood test is done on a baby, a heel stick may be done instead of a blood draw from a vein. What happens after the test? 
· You will probably be able to go home right away. · You can go back to your usual activities right away. Follow-up care is a key part of your treatment and safety. Be sure to make and go to all appointments, and call your doctor if you are having problems. It's also a good idea to keep a list of the medicines you take. Ask your doctor when you can expect to have your test results. Where can you learn more? Go to http://petty-timothy.info/. Enter T676 in the search box to learn more about \"Complete Blood Count (CBC): About This Test.\" Current as of: October 14, 2016 Content Version: 11.3 © 6956-7150 [employer]. Care instructions adapted under license by [employer] (which disclaims liability or warranty for this information).  If you have questions about a medical condition or this instruction, always ask your healthcare professional. [employer] disclaims any warranty or liability for your use of this information. Introducing [facility] & HE[facility] SERVICES! Margie Bowie introduces [email or web address] patient portal. Now you can access parts of your medical record, email your doctor's office, and request medication refills online. 1. In your internet browser, go to [email or web address] 2. Click on the First Time User? Click Here link in the Sign In box. You will see the New Member Sign Up page. 3. Enter your [email or web address] Access Code exactly as it appears below. You will not need to use this code after youve completed the sign-up process. If you do not sign up before the expiration date, you must request a new code. · [email or web address] Access Code: [email or web address] Expires: 10/17/2017  9:32 AM 
 
4. Enter the last four digits of your Social Security Number (xxxx) and Date of Birth (mm/dd/yyyy) as indicated and click Submit. You will be taken to the next sign-up page. 5. Create a [email or web address] ID. This will be your [email or web address] login ID and cannot be changed, so think of one that is secure and easy to remember. 6. Create a [email or web address] password. You can change your password at any time. 7. Enter your Password Reset Question and Answer. This can be used at a later time if you forget your password. 8. Enter your e-mail address. You will receive e-mail notification when new information is available in [address] 9. Click Sign Up. You can now view and download portions of your medical record. 10. Click the Download Summary menu link to download a portable copy of your medical information. If you have questions, please visit the Frequently Asked Questions section of the [email or web address] website. Remember, [email or web address] is NOT to be used for urgent needs. For medical emergencies, dial 911. Now available from your iPhone and Android! Please provide this summary of care documentation to your next provider. Your primary care clinician is listed as Ever Bowens. If you have any questions after today's visit, please call [phone].

## 2017-08-30 LAB
ALBUMIN SERPL ELPH-MCNC: 3.1 G/DL (ref 2.9–4.4)
ALBUMIN SERPL-MCNC: 3.2 G/DL (ref 3.4–5)
ALBUMIN/GLOB SERPL: 0.7 {RATIO} (ref 0.7–1.7)
ALBUMIN/GLOB SERPL: 0.7 {RATIO} (ref 0.8–1.7)
ALP SERPL-CCNC: 95 U/L (ref 45–117)
ALPHA1 GLOB SERPL ELPH-MCNC: 0.3 G/DL (ref 0–0.4)
ALPHA2 GLOB SERPL ELPH-MCNC: 0.9 G/DL (ref 0.4–1)
ALT SERPL-CCNC: 12 U/L (ref 16–61)
ANION GAP SERPL CALC-SCNC: 8 MMOL/L (ref 3–18)
AST SERPL-CCNC: 18 U/L (ref 15–37)
B-GLOBULIN SERPL ELPH-MCNC: 1.4 G/DL (ref 0.7–1.3)
BILIRUB SERPL-MCNC: 1 MG/DL (ref 0.2–1)
BUN SERPL-MCNC: 16 MG/DL (ref 7–18)
BUN/CREAT SERPL: 15 (ref 12–20)
CALCIUM SERPL-MCNC: 9.1 MG/DL (ref 8.5–10.1)
CHLORIDE SERPL-SCNC: 102 MMOL/L (ref 100–108)
CO2 SERPL-SCNC: 31 MMOL/L (ref 21–32)
CREAT SERPL-MCNC: 1.04 MG/DL (ref 0.6–1.3)
GAMMA GLOB SERPL ELPH-MCNC: 1.5 G/DL (ref 0.4–1.8)
GLOBULIN SER CALC-MCNC: 4.2 G/DL (ref 2.2–3.9)
GLOBULIN SER CALC-MCNC: 4.6 G/DL (ref 2–4)
GLUCOSE SERPL-MCNC: 29 MG/DL (ref 50–100)
M PROTEIN SERPL ELPH-MCNC: 0.7 G/DL
POTASSIUM SERPL-SCNC: 3.9 MMOL/L (ref 3.5–5.5)
PROT SERPL-MCNC: 7.3 G/DL (ref 6–8.5)
PROT SERPL-MCNC: 7.8 G/DL (ref 6.4–8.2)
SODIUM SERPL-SCNC: 141 MMOL/L (ref 136–145)

## 2017-09-01 ENCOUNTER — HOSPITAL ENCOUNTER (OUTPATIENT)
Dept: LAB | Age: 82
Discharge: HOME | End: 2017-09-01
Payer: MEDICARE

## 2017-09-01 DIAGNOSIS — D75.839 THROMBOCYTOSIS: ICD-10-CM

## 2017-09-01 PROCEDURE — 81270 JAK2 GENE: CPT | Performed by: INTERNAL MEDICINE

## 2017-09-01 PROCEDURE — 36415 COLL VENOUS BLD VENIPUNCTURE: CPT | Performed by: INTERNAL MEDICINE

## 2017-09-05 LAB
BACKGROUND: 489207: NORMAL
DIRECTOR REVIEW: 489204: NORMAL
JAK2 P.V617F BLD/T QL: NORMAL

## 2017-09-12 ENCOUNTER — HOSPITAL ENCOUNTER (OUTPATIENT)
Dept: LAB | Age: 82
Discharge: HOME | End: 2017-09-12
Payer: MEDICARE

## 2017-09-12 ENCOUNTER — HOSPITAL ENCOUNTER (OUTPATIENT)
Dept: ONCOLOGY | Age: 82
Discharge: HOME | End: 2017-09-12

## 2017-09-12 ENCOUNTER — OFFICE VISIT (OUTPATIENT)
Dept: ONCOLOGY | Age: 82
End: 2017-09-12

## 2017-09-12 VITALS
BODY MASS INDEX: 19.32 KG/M2 | HEART RATE: 100 BPM | TEMPERATURE: 97.4 F | SYSTOLIC BLOOD PRESSURE: 152 MMHG | DIASTOLIC BLOOD PRESSURE: 76 MMHG | WEIGHT: 146.4 LBS

## 2017-09-12 DIAGNOSIS — D72.9 NEUTROPHILIC LEUKOCYTOSIS: ICD-10-CM

## 2017-09-12 DIAGNOSIS — D47.2 MONOCLONAL GAMMOPATHY: ICD-10-CM

## 2017-09-12 DIAGNOSIS — D72.9 NEUTROPHILIC LEUKOCYTOSIS: Primary | ICD-10-CM

## 2017-09-12 LAB
BASO+EOS+MONOS # BLD AUTO: 2.4 K/UL (ref 0–2.3)
BASO+EOS+MONOS # BLD AUTO: 9 % (ref 0.1–17)
DIFFERENTIAL METHOD BLD: ABNORMAL
ERYTHROCYTE [DISTWIDTH] IN BLOOD BY AUTOMATED COUNT: 18.4 % (ref 11.5–14.5)
HCT VFR BLD AUTO: 36.9 % (ref 36–48)
HGB BLD-MCNC: 12.1 G/DL (ref 12–16)
LYMPHOCYTES # BLD: 1.2 K/UL (ref 1.1–5.9)
LYMPHOCYTES NFR BLD: 5 % (ref 14–44)
MCH RBC QN AUTO: 25.9 PG (ref 25–35)
MCHC RBC AUTO-ENTMCNC: 32.8 G/DL (ref 31–37)
MCV RBC AUTO: 79 FL (ref 78–102)
NEUTS SEG # BLD: 21.8 K/UL (ref 1.8–9.5)
NEUTS SEG NFR BLD: 86 % (ref 40–70)
PLATELET # BLD AUTO: 669 K/UL (ref 135–420)
RBC # BLD AUTO: 4.67 M/UL (ref 4.1–5.1)
WBC # BLD AUTO: 25.4 K/UL (ref 4.5–13)

## 2017-09-12 PROCEDURE — 83883 ASSAY NEPHELOMETRY NOT SPEC: CPT | Performed by: INTERNAL MEDICINE

## 2017-09-12 PROCEDURE — 82784 ASSAY IGA/IGD/IGG/IGM EACH: CPT | Performed by: INTERNAL MEDICINE

## 2017-09-12 PROCEDURE — 82232 ASSAY OF BETA-2 PROTEIN: CPT | Performed by: INTERNAL MEDICINE

## 2017-09-12 PROCEDURE — 36415 COLL VENOUS BLD VENIPUNCTURE: CPT | Performed by: INTERNAL MEDICINE

## 2017-09-12 NOTE — PATIENT INSTRUCTIONS
Learning About Monoclonal Gammopathy of Unknown Significance (MGUS)  What is MGUS? Monoclonal gammopathy of unknown significance (MGUS) is a blood condition. The blood is made of many kinds of cells, including red blood cells, platelets, and white blood cells. With MGUS, a type of white blood cell called a plasma cell makes too much of the \"M\" (for \"monoclonal\") protein in the blood. Most people with MGUS are fine for many years. MGUS seldom causes symptoms or major health problems. In rare cases, MGUS may turn into multiple myeloma. This is a cancer of plasma cells in the blood that can cause bone weakness. Some people with MGUS may also have a higher risk for osteoporosis, in which bones become thin and weak. MGUS is sometimes seen in younger people, but is more common in people as they get older. It's seen most often in those over the age of 79. How is MGUS diagnosed? MGUS is often found by chance when blood tests are done for other reasons. If you have high levels of a certain protein in your blood, your doctor may order more tests. Blood tests can help identify the protein. The protein is sometimes found in the urine, so you may get a urine test too. Other tests may be done if your doctor thinks you might have a medical problem. In some cases, a bone marrow biopsy may be done to rule out a problem like multiple myeloma. How is it treated? Most people with MGUS don't need any treatment. But you may need regular physical exams, blood tests, and urine tests to make sure that MGUS isn't progressing to a medical problem. Follow-up care is a key part of your treatment and safety. Be sure to make and go to all appointments, and call your doctor if you are having problems. It's also a good idea to know your test results and keep a list of the medicines you take. Where can you learn more? Go to http://petty-timothy.info/.   Enter A917 in the search box to learn more about \"Learning About Monoclonal Gammopathy of Unknown Significance (MGUS). \"  Current as of: October 13, 2016  Content Version: 11.3  © 6893-3966 [employer], Incorporated. Care instructions adapted under license by [employer] (which disclaims liability or warranty for this information). If you have questions about a medical condition or this instruction, always ask your healthcare professional. [address] any warranty or liability for your use of this information.

## 2017-09-12 NOTE — MR AVS SNAPSHOT
Visit Information Date & Time Provider Department Dept. Phone Encounter #  
 9/12/2017  1:30 PM Dia Wein[address] Office [phone] [id number] Follow-up Instructions Return in about 3 weeks (around 10/3/2017). Your Appointments 10/3/2017  1:15 PM  
Office Visit with MD Maylin Weiner [address] [place] [address] [facility] Appt Note: 24 HR URINE & SKELETAL SURVEY [place] [id number] [address] [address]  
[phone]  
  
   
 [facility] [address] 4/10/2018  9:15 AM  
Office Visit with Thai Tarango MD  
[place] [facility]) Appt Note: PSA  
 [address] [address] [facility] [address]  
[phone] [address] 600 [facility] [address] Upcoming Health Maintenance Date Due DTaP/Tdap/Td series (1 - Tdap) 1/25/1958 ZOSTER VACCINE AGE 60> 11/25/1996 GLAUCOMA SCREENING Q2Y 1/25/2002 Pneumococcal 65+ High/Highest Risk (1 of 2 - PCV13) 1/25/2002 MEDICARE YEARLY EXAM 1/25/2002 INFLUENZA AGE 9 TO ADULT 8/1/2017 Allergies as of 9/12/2017  Review Complete On: 8/29/2017 By: Cheral Pap Severity Noted Reaction Type Reactions Iodinated Contrast- Oral And Iv Dye   Side Effect Hives Faint, shaking Current Immunizations  Reviewed on 5/5/2016 Name Date Influenza Vaccine 10/1/2015 Not reviewed this visit You Were Diagnosed With   
  
 Codes Comments Neutrophilic leukocytosis    -  Primary ICD-10-CM: D72.9 ICD-9-CM: 288.8 Monoclonal gammopathy     ICD-10-CM: D47.2 ICD-9-CM: 273.1 Vitals BP Pulse Temp Weight(growth percentile) BMI Smoking Status 152/76 (BP 1 Location: Left arm, BP Patient Position: Sitting) 100 97.4 °F (36.3 °C) (Oral) 146 lb 6.4 oz (66.4 kg) 19.32 kg/m2 Current Some Day Smoker BMI and BSA Data Body Mass Index Body Surface Area 19.32 kg/m 2 1.85 m 2 Preferred Pharmacy Pharmacy Name Phone [id number] - [place], [address] [place] [address] Uri Montenegro. [phone] Your Updated Medication List  
  
   
This list is accurate as of: 9/12/17  2:29 PM.  Always use your most recent med list.  
  
  
  
  
 aspirin delayed-release 81 mg tablet Take  by mouth daily. atorvastatin 40 mg tablet Commonly known as:  LIPITOR Take 1 Tab by mouth nightly. AZOPT OP Apply  to eye. COMBIGAN OP Apply  to eye. DIOVAN 80 mg tablet Generic drug:  valsartan Take  by mouth daily. hydrochlorothiazide 12.5 mg cap 12.5 mg, lisinopril 5 mg tab 10 mg Take  by mouth daily. montelukast 10 mg tablet Commonly known as:  SINGULAIR Take 10 mg by mouth daily. NovoLIN 70/30 100 unit/mL (70-30) injection Generic drug:  insulin NPH/insulin regular  
by SubCUTAneous route once. oxybutynin 5 mg tablet Commonly known as:  DITROPAN  
TAKE 1 TABLET BY MOUTH THREE TIMES DAILY ([address] [facility]) PRAVASTATIN PO Take  by mouth. SIMVASTATIN PO Take  by mouth. TRAVATAN Z OP Apply  to eye. We Performed the Following COMPLETE CBC & AUTO DIFF WBC [54146 CPT(R)] COMPLETE CBC & AUTO DIFF WBC [96498 CPT(R)] Follow-up Instructions Return in about 3 weeks (around 10/3/2017). To-Do List   
 09/12/2017 Lab:  CBC WITH 3 PART DIFF   
  
 09/12/2017 Lab:  CBC WITH 3 PART DIFF Patient Instructions Learning About Monoclonal Gammopathy of Unknown Significance (MGUS) What is MGUS? Monoclonal gammopathy of unknown significance (MGUS) is a blood condition. The blood is made of many kinds of cells, including red blood cells, platelets, and white blood cells. With MGUS, a type of white blood cell called a plasma cell makes too much of the \"M\" (for \"monoclonal\") protein in the blood. Most people with MGUS are fine for many years. MGUS seldom causes symptoms or major health problems. In rare cases, MGUS may turn into multiple myeloma. This is a cancer of plasma cells in the blood that can cause bone weakness. Some people with MGUS may also have a higher risk for osteoporosis, in which bones become thin and weak. MGUS is sometimes seen in younger people, but is more common in people as they get older. It's seen most often in those over the age of 79. How is MGUS diagnosed? MGUS is often found by chance when blood tests are done for other reasons. If you have high levels of a certain protein in your blood, your doctor may order more tests. Blood tests can help identify the protein. The protein is sometimes found in the urine, so you may get a urine test too. Other tests may be done if your doctor thinks you might have a medical problem. In some cases, a bone marrow biopsy may be done to rule out a problem like multiple myeloma. How is it treated? Most people with MGUS don't need any treatment. But you may need regular physical exams, blood tests, and urine tests to make sure that MGUS isn't progressing to a medical problem. Follow-up care is a key part of your treatment and safety. Be sure to make and go to all appointments, and call your doctor if you are having problems. It's also a good idea to know your test results and keep a list of the medicines you take. Where can you learn more? Go to http://petty-timothy.info/. Enter A917 in the search box to learn more about \"Learning About Monoclonal Gammopathy of Unknown Significance (MGUS). \" 
Current as of: October 13, 2016 Content Version: 11.3 © 5331-5452 [employer]. Care instructions adapted under license by [employer] (which disclaims liability or warranty for this information).  If you have questions about a medical condition or this instruction, always ask your healthcare professional. [address] any warranty or liability for your use of this information. Introducing [facility]! Irma Whitney introduces [employer] patient portal. Now you can access parts of your medical record, email your doctor's office, and request medication refills online. 1. In your internet browser, go to [email or web address] 2. Click on the First Time User? Click Here link in the Sign In box. You will see the New Member Sign Up page. 3. Enter your [employer] Access Code exactly as it appears below. You will not need to use this code after youve completed the sign-up process. If you do not sign up before the expiration date, you must request a new code. · [employer] Access Code: [email or web address] Expires: 10/17/2017  9:32 AM 
 
4. Enter the last four digits of your Social Security Number (xxxx) and Date of Birth (mm/dd/yyyy) as indicated and click Submit. You will be taken to the next sign-up page. 5. Create a [employer] ID. This will be your [employer] login ID and cannot be changed, so think of one that is secure and easy to remember. 6. Create a [employer] password. You can change your password at any time. 7. Enter your Password Reset Question and Answer. This can be used at a later time if you forget your password. 8. Enter your e-mail address. You will receive e-mail notification when new information is available in [address] 9. Click Sign Up. You can now view and download portions of your medical record. 10. Click the Download Summary menu link to download a portable copy of your medical information. If you have questions, please visit the Frequently Asked Questions section of the [employer] website. Remember, [employer] is NOT to be used for urgent needs. For medical emergencies, dial 911. Now available from your iPhone and Android! Please provide this summary of care documentation to your next provider. Your primary care clinician is listed as Jenifer Mishra. If you have any questions after today's visit, please call [phone].

## 2017-09-12 NOTE — PROGRESS NOTES
Critical Results for WBC were noted and reviewed. The patient is under evaluation for possible plasma cell dyscrasia. Will continue to monitor.

## 2017-09-12 NOTE — PROGRESS NOTES
Hematology/medical oncology progress note    9/12/2017  Sharif Patricia  YOB: 1935    PCP: Dr. Harshad Ledbetter    Diagnosis: Monoclonal gammopathy with associated leukocytosis, anemia,and thrombocytosis    Mr. Claudette Suggs is an [de-identified] man who is undergoing an evaluation for unexplained thrombocytosis. I have explained to the patient that his JA K2 mutation analysis was negative. On 8/29/2017 at his initial visit is WBC count was elevated at 26.3, he had anemia with hemoglobin of 11.7, and a platelet count of 326,197. Flow cytometry did not reveal any immunophenotypic abnormalities in his circulating leukocytes. However there was an inverted T-cell CD4/CD8 ratio. The serum protein electrophoresis revealed an abnormal monoclonal paraprotein measuring 0.7 g/dL. Based on these findings I have recommended that we schedule the patient for bone marrow biopsy to rule out any evidence of multiple myeloma or plasma cell leukemia. Additionally a skeletal survey will be ordered along with serum free light chains. A 24-hour urine collection for urine protein electrophoresis and urine immunofixation. A serum immunofixation will be requested as well. I have reviewed the risk, benefits, and side effects of the bone marrow biopsy procedure with the patient. He will be referred to the interventional radiologist to have a CT-guided bone marrow biopsy completed within the next 2-3 weeks. The patient had his questions answered to his satisfaction. I will plan to see him back in clinic in 3 weeks. Total time 25 minutes, greater than 50% of the time was in counseling and coordination of care. Jus Fernández MD, Olinda Larose

## 2017-09-13 LAB
B2 MICROGLOB SERPL-MCNC: 2.5 MG/L (ref 0.6–2.4)
KAPPA LC FREE SER-MCNC: 118.3 MG/L (ref 3.3–19.4)
KAPPA LC FREE/LAMBDA FREE SER: 4.73 {RATIO} (ref 0.26–1.65)
LAMBDA LC FREE SERPL-MCNC: 25 MG/L (ref 5.7–26.3)

## 2017-09-15 ENCOUNTER — HOSPITAL ENCOUNTER (OUTPATIENT)
Dept: LAB | Age: 82
Discharge: HOME | End: 2017-09-15
Payer: MEDICARE

## 2017-09-15 LAB
COLLECT DURATION TIME UR: 24 HR
CREAT 24H CL BSA ADJ PNL UR+SERPL: 61 ML/MIN
CREAT SERPL-MCNC: 1.03 MG/DL
CREAT SERPL-MCNC: 1.03 MG/DL (ref 0.6–1.3)
CREAT UR-MCNC: 151 MG/DL (ref 30–125)
IGA SERPL-MCNC: 692 MG/DL (ref 61–437)
IGG SERPL-MCNC: 1599 MG/DL (ref 700–1600)
IGM SERPL-MCNC: 85 MG/DL (ref 15–143)
PROT 24H UR-MRATE: 267 MG/24HR
PROT PATTERN SERPL IFE-IMP: ABNORMAL
SPECIMEN VOL ?TM UR: 650 ML
SPECIMEN VOL ?TM UR: 650 ML

## 2017-09-15 PROCEDURE — 82575 CREATININE CLEARANCE TEST: CPT | Performed by: INTERNAL MEDICINE

## 2017-09-15 PROCEDURE — 84156 ASSAY OF PROTEIN URINE: CPT | Performed by: INTERNAL MEDICINE

## 2017-09-15 PROCEDURE — 36415 COLL VENOUS BLD VENIPUNCTURE: CPT | Performed by: INTERNAL MEDICINE

## 2017-09-15 PROCEDURE — 86335 IMMUNFIX E-PHORSIS/URINE/CSF: CPT | Performed by: INTERNAL MEDICINE

## 2017-09-18 LAB
ALBUMIN 24H MFR UR ELPH: 23.9 %
ALPHA1 GLOB 24H MFR UR ELPH: 4.7 %
ALPHA2 GLOB 24H MFR UR ELPH: 17.4 %
B-GLOBULIN MFR UR ELPH: 26.7 %
COLLECT DURATION TIME UR: 24 HR
GAMMA GLOB 24H MFR UR ELPH: 27.3 %
INTERPRETATION UR IFE-IMP: ABNORMAL
M PROTEIN 24H MFR UR ELPH: 8.6 %
M PROTEIN 24H UR ELPH-MRATE: 22 MG/24 HR
NOTE, 149533: ABNORMAL
PROT 24H UR-MRATE: 258 MG/24 HR (ref 30–150)
PROT UR-MCNC: 39.7 MG/DL
SPECIMEN VOL ?TM UR: 650 ML

## 2017-09-19 ENCOUNTER — HOSPITAL ENCOUNTER (OUTPATIENT)
Dept: GENERAL RADIOLOGY | Age: 82
Discharge: HOME | End: 2017-09-19
Attending: INTERNAL MEDICINE
Payer: MEDICARE

## 2017-09-19 DIAGNOSIS — C90.00 MYELOMA KIDNEY (HCC): ICD-10-CM

## 2017-09-19 DIAGNOSIS — N08 MYELOMA KIDNEY (HCC): ICD-10-CM

## 2017-09-19 PROCEDURE — 77075 RADEX OSSEOUS SURVEY COMPL: CPT

## 2017-10-03 ENCOUNTER — OFFICE VISIT (OUTPATIENT)
Dept: ONCOLOGY | Age: 82
End: 2017-10-03

## 2017-10-03 ENCOUNTER — HOSPITAL ENCOUNTER (OUTPATIENT)
Dept: ONCOLOGY | Age: 82
Discharge: HOME | End: 2017-10-03

## 2017-10-03 VITALS
SYSTOLIC BLOOD PRESSURE: 132 MMHG | TEMPERATURE: 97.9 F | DIASTOLIC BLOOD PRESSURE: 70 MMHG | HEART RATE: 74 BPM | HEIGHT: 73 IN

## 2017-10-03 DIAGNOSIS — D72.9 NEUTROPHILIC LEUKOCYTOSIS: Primary | ICD-10-CM

## 2017-10-03 DIAGNOSIS — D72.9 NEUTROPHILIC LEUKOCYTOSIS: ICD-10-CM

## 2017-10-03 NOTE — PATIENT INSTRUCTIONS
Learning About Monoclonal Gammopathy of Unknown Significance (MGUS)  What is MGUS? Monoclonal gammopathy of unknown significance (MGUS) is a blood condition. The blood is made of many kinds of cells, including red blood cells, platelets, and white blood cells. With MGUS, a type of white blood cell called a plasma cell makes too much of the \"M\" (for \"monoclonal\") protein in the blood. Most people with MGUS are fine for many years. MGUS seldom causes symptoms or major health problems. In rare cases, MGUS may turn into multiple myeloma. This is a cancer of plasma cells in the blood that can cause bone weakness. Some people with MGUS may also have a higher risk for osteoporosis, in which bones become thin and weak. MGUS is sometimes seen in younger people, but is more common in people as they get older. It's seen most often in those over the age of 79. How is MGUS diagnosed? MGUS is often found by chance when blood tests are done for other reasons. If you have high levels of a certain protein in your blood, your doctor may order more tests. Blood tests can help identify the protein. The protein is sometimes found in the urine, so you may get a urine test too. Other tests may be done if your doctor thinks you might have a medical problem. In some cases, a bone marrow biopsy may be done to rule out a problem like multiple myeloma. How is it treated? Most people with MGUS don't need any treatment. But you may need regular physical exams, blood tests, and urine tests to make sure that MGUS isn't progressing to a medical problem. Follow-up care is a key part of your treatment and safety. Be sure to make and go to all appointments, and call your doctor if you are having problems. It's also a good idea to know your test results and keep a list of the medicines you take. Where can you learn more? Go to http://petty-timothy.info/.   Enter A917 in the search box to learn more about \"Learning About Monoclonal Gammopathy of Unknown Significance (MGUS). \"  Current as of: October 13, 2016  Content Version: 11.3  © 2497-6876 [employer], Incorporated. Care instructions adapted under license by [employer] (which disclaims liability or warranty for this information). If you have questions about a medical condition or this instruction, always ask your healthcare professional. [address] any warranty or liability for your use of this information.

## 2017-10-03 NOTE — MR AVS SNAPSHOT
Visit Information Date & Time Provider Department Dept. Phone Encounter #  
 10/3/2017  1:15 PM Maria Fernanda [address] Office [phone] [id number] Follow-up Instructions Return in about 3 weeks (around 10/24/2017). Your Appointments 10/24/2017  3:15 PM  
Office Visit with MD Maylin Tavarez 77 [facility] Appt Note: BONE MARROW RESULTS  
 [facility] 9938 [facility] [address] [facility] [address]  
[phone]  
  
   
 [facility] 9938 [facility] 83 Rosa Ramsey 4/10/2018  9:15 AM  
Office Visit with Jamilah Hoffman MD  
[facility] Appt Note: PSA  
 [address] [address]  
[phone] [address] 600 [facility] [address] Upcoming Health Maintenance Date Due DTaP/Tdap/Td series (1 - Tdap) 1/25/1958 ZOSTER VACCINE AGE 60> 11/25/1996 GLAUCOMA SCREENING Q2Y 1/25/2002 Pneumococcal 65+ High/Highest Risk (1 of 2 - PCV13) 1/25/2002 MEDICARE YEARLY EXAM 1/25/2002 INFLUENZA AGE 9 TO ADULT 8/1/2017 Allergies as of 10/3/2017  Review Complete On: 8/29/2017 By: Kash Hdz Severity Noted Reaction Type Reactions Iodinated Contrast- Oral And Iv Dye   Side Effect Hives Faint, shaking Current Immunizations  Reviewed on 5/5/2016 Name Date Influenza Vaccine 10/1/2015 Not reviewed this visit You Were Diagnosed With   
  
 Codes Comments Neutrophilic leukocytosis    -  Primary ICD-10-CM: D72.9 ICD-9-CM: 146. 8 Vitals BP Pulse Temp Height(growth percentile) Smoking Status 132/70 74 97.9 °F (36.6 °C) 6' 1\" (1.854 m) Current Some Day Smoker Vitals History Preferred Pharmacy Pharmacy Name Phone [id number] - [address] [place]. [phone] Your Updated Medication List  
  
   
 This list is accurate as of: 10/3/17  1:51 PM.  Always use your most recent med list.  
  
  
  
  
 aspirin delayed-release 81 mg tablet Take  by mouth daily. atorvastatin 40 mg tablet Commonly known as:  LIPITOR Take 1 Tab by mouth nightly. AZOPT OP Apply  to eye. COMBIGAN OP Apply  to eye. DIOVAN 80 mg tablet Generic drug:  valsartan Take  by mouth daily. hydrochlorothiazide 12.5 mg cap 12.5 mg, lisinopril 5 mg tab 10 mg Take  by mouth daily. montelukast 10 mg tablet Commonly known as:  SINGULAIR Take 10 mg by mouth daily. NovoLIN 70/30 100 unit/mL (70-30) injection Generic drug:  insulin NPH/insulin regular  
by SubCUTAneous route once. oxybutynin 5 mg tablet Commonly known as:  DITROPAN  
TAKE 1 TABLET BY MOUTH THREE TIMES DAILY ([address] [facility]) PRAVASTATIN PO Take  by mouth. SIMVASTATIN PO Take  by mouth. TRAVATAN Z OP Apply  to eye. We Performed the Following COMPLETE CBC & AUTO DIFF WBC [35873 CPT(R)] Follow-up Instructions Return in about 3 weeks (around 10/24/2017). To-Do List   
 10/03/2017 Lab:  CBC WITH 3 PART DIFF   
  
 10/18/2017 Imaging:  IR BX BONE MARROW Patient Instructions Learning About Monoclonal Gammopathy of Unknown Significance (MGUS) What is MGUS? Monoclonal gammopathy of unknown significance (MGUS) is a blood condition. The blood is made of many kinds of cells, including red blood cells, platelets, and white blood cells. With MGUS, a type of white blood cell called a plasma cell makes too much of the \"M\" (for \"monoclonal\") protein in the blood. Most people with MGUS are fine for many years. MGUS seldom causes symptoms or major health problems. In rare cases, MGUS may turn into multiple myeloma. This is a cancer of plasma cells in the blood that can cause bone weakness.  Some people with MGUS may also have a higher risk for osteoporosis, in which bones become thin and weak. MGUS is sometimes seen in younger people, but is more common in people as they get older. It's seen most often in those over the age of 79. How is MGUS diagnosed? MGUS is often found by chance when blood tests are done for other reasons. If you have high levels of a certain protein in your blood, your doctor may order more tests. Blood tests can help identify the protein. The protein is sometimes found in the urine, so you may get a urine test too. Other tests may be done if your doctor thinks you might have a medical problem. In some cases, a bone marrow biopsy may be done to rule out a problem like multiple myeloma. How is it treated? Most people with MGUS don't need any treatment. But you may need regular physical exams, blood tests, and urine tests to make sure that MGUS isn't progressing to a medical problem. Follow-up care is a key part of your treatment and safety. Be sure to make and go to all appointments, and call your doctor if you are having problems. It's also a good idea to know your test results and keep a list of the medicines you take. Where can you learn more? Go to http://petty-timothy.info/. Enter A917 in the search box to learn more about \"Learning About Monoclonal Gammopathy of Unknown Significance (MGUS). \" 
Current as of: October 13, 2016 Content Version: 11.3 © 5538-4231 [employer], Incorporated. Care instructions adapted under license by [employer] (which disclaims liability or warranty for this information). If you have questions about a medical condition or this instruction, always ask your healthcare professional. [place] [address] any warranty or liability for your use of this information. Please provide this summary of care documentation to your next provider. Your primary care clinician is listed as Mack Garcia.  If you have any questions after today's visit, please call [phone].

## 2017-10-03 NOTE — PROGRESS NOTES
Hematology/medical oncology progress note    10/3/2017  Heber Sanchez  YOB: 1935    PCP: Dr. Genoveva Moreno    Diagnosis: Monoclonal gammopathy with associated leukocytosis, anemia,and thrombocytosis    Mr. Nick Myrick is an 74-year-old man who is undergoing an evaluation for unexplained thrombocytosis. I have explained to the patient that his JA K2 mutation analysis was negative. On 8/29/2017 at his initial visit is WBC count was elevated at 26.3, he had anemia with hemoglobin of 11.7, and a platelet count of 898,194. Flow cytometry did not reveal any immunophenotypic abnormalities in his circulating leukocytes. However there was an inverted T-cell CD4/CD8 ratio. The serum protein electrophoresis revealed an abnormal monoclonal paraprotein measuring 0.7 g/dL. Based on these findings I have recommended that we schedule the patient for bone marrow biopsy to rule out any evidence of multiple myeloma or plasma cell leukemia. Additionally a skeletal survey was completed and this shows no evidence of myelomatous involvement of the bones. The 24-hour urine collection did show an 8.6% monoclonal spike with 22 mg of monoclonal paraprotein over the 24-hour period the immunofixation from the M spike of the urine shows IgG monoclonal protein with kappa light chain specificity and IgA monoclonal protein with kappa light chain specificity. The patient will now be referred to the interventional radiologist to have a bone marrow biopsy completed. I have reviewed the risk, benefits, and side effects of the bone marrow biopsy procedure with the patient. He will be referred to the interventional radiologist to have a CT-guided bone marrow biopsy completed within the next 2-3 weeks. The patient had his questions answered to his satisfaction. I will plan to see him back in clinic in 3 weeks. Total time 25 minutes, greater than 50% of the time was in counseling and coordination of care. Jus Correa MD, [place]

## 2017-10-11 ENCOUNTER — HOSPITAL ENCOUNTER (OUTPATIENT)
Dept: INTERVENTIONAL RADIOLOGY/VASCULAR | Age: 82
Discharge: HOME | End: 2017-10-11
Attending: INTERNAL MEDICINE | Admitting: RADIOLOGY
Payer: MEDICARE

## 2017-10-11 VITALS
WEIGHT: 143 LBS | HEART RATE: 74 BPM | DIASTOLIC BLOOD PRESSURE: 85 MMHG | SYSTOLIC BLOOD PRESSURE: 121 MMHG | OXYGEN SATURATION: 100 % | HEIGHT: 73 IN | RESPIRATION RATE: 22 BRPM | BODY MASS INDEX: 18.95 KG/M2 | TEMPERATURE: 98.5 F

## 2017-10-11 DIAGNOSIS — D72.9 NEUTROPHILIC LEUKOCYTOSIS: ICD-10-CM

## 2017-10-11 LAB
ANION GAP SERPL CALC-SCNC: 6 MMOL/L (ref 3–18)
APTT PPP: 30.8 SEC (ref 23–36.4)
BASOPHILS # BLD: 0 K/UL (ref 0–0.06)
BASOPHILS NFR BLD: 0 % (ref 0–3)
BUN SERPL-MCNC: 15 MG/DL (ref 7–18)
BUN/CREAT SERPL: 15 (ref 12–20)
CALCIUM SERPL-MCNC: 8.5 MG/DL (ref 8.5–10.1)
CHLORIDE SERPL-SCNC: 102 MMOL/L (ref 100–108)
CO2 SERPL-SCNC: 29 MMOL/L (ref 21–32)
CREAT SERPL-MCNC: 1.03 MG/DL (ref 0.6–1.3)
DIFFERENTIAL METHOD BLD: ABNORMAL
EOSINOPHIL # BLD: 0.2 K/UL (ref 0–0.4)
EOSINOPHIL NFR BLD: 1 % (ref 0–5)
ERYTHROCYTE [DISTWIDTH] IN BLOOD BY AUTOMATED COUNT: 17.5 % (ref 11.6–14.5)
GLUCOSE SERPL-MCNC: 270 MG/DL (ref 74–99)
HCT VFR BLD AUTO: 33.9 % (ref 36–48)
HGB BLD-MCNC: 11.1 G/DL (ref 13–16)
INR PPP: 1.2 (ref 0.8–1.2)
LYMPHOCYTES # BLD: 1 K/UL (ref 0.8–3.5)
LYMPHOCYTES NFR BLD: 5 % (ref 20–51)
MCH RBC QN AUTO: 25.2 PG (ref 24–34)
MCHC RBC AUTO-ENTMCNC: 32.7 G/DL (ref 31–37)
MCV RBC AUTO: 77 FL (ref 74–97)
METAMYELOCYTES NFR BLD MANUAL: 2 %
MONOCYTES # BLD: 0.4 K/UL (ref 0–1)
MONOCYTES NFR BLD: 2 % (ref 2–9)
NEUTS BAND NFR BLD MANUAL: 3 % (ref 0–5)
NEUTS SEG # BLD: 17.3 K/UL (ref 1.8–8)
NEUTS SEG NFR BLD: 87 % (ref 42–75)
NRBC BLD-RTO: 1 PER 100 WBC
PLATELET # BLD AUTO: 526 K/UL (ref 135–420)
PLATELET COMMENTS,PCOM: ABNORMAL
PMV BLD AUTO: 10.6 FL (ref 9.2–11.8)
POTASSIUM SERPL-SCNC: 3.4 MMOL/L (ref 3.5–5.5)
PROTHROMBIN TIME: 14.8 SEC (ref 11.5–15.2)
RBC # BLD AUTO: 4.4 M/UL (ref 4.7–5.5)
RBC MORPH BLD: ABNORMAL
SODIUM SERPL-SCNC: 137 MMOL/L (ref 136–145)
WBC # BLD AUTO: 19.2 K/UL (ref 4.6–13.2)

## 2017-10-11 PROCEDURE — 88342 IMHCHEM/IMCYTCHM 1ST ANTB: CPT | Performed by: INTERNAL MEDICINE

## 2017-10-11 PROCEDURE — 85610 PROTHROMBIN TIME: CPT | Performed by: INTERNAL MEDICINE

## 2017-10-11 PROCEDURE — 77030028872 HC BN BIOP NDL ON CNTRL TY TELE -C

## 2017-10-11 PROCEDURE — 74011250636 HC RX REV CODE- 250/636: Performed by: RADIOLOGY

## 2017-10-11 PROCEDURE — 88264 CHROMOSOME ANALYSIS 20-25: CPT | Performed by: INTERNAL MEDICINE

## 2017-10-11 PROCEDURE — 74011000250 HC RX REV CODE- 250

## 2017-10-11 PROCEDURE — 77030022017 HC DRSG HEMO QCLOT ZMED -A

## 2017-10-11 PROCEDURE — 88305 TISSUE EXAM BY PATHOLOGIST: CPT | Performed by: INTERNAL MEDICINE

## 2017-10-11 PROCEDURE — 38221 DX BONE MARROW BIOPSIES: CPT

## 2017-10-11 PROCEDURE — 88313 SPECIAL STAINS GROUP 2: CPT | Performed by: INTERNAL MEDICINE

## 2017-10-11 PROCEDURE — 85730 THROMBOPLASTIN TIME PARTIAL: CPT | Performed by: INTERNAL MEDICINE

## 2017-10-11 PROCEDURE — 88185 FLOWCYTOMETRY/TC ADD-ON: CPT | Performed by: INTERNAL MEDICINE

## 2017-10-11 PROCEDURE — 80048 BASIC METABOLIC PNL TOTAL CA: CPT | Performed by: INTERNAL MEDICINE

## 2017-10-11 PROCEDURE — 88184 FLOWCYTOMETRY/ TC 1 MARKER: CPT | Performed by: INTERNAL MEDICINE

## 2017-10-11 PROCEDURE — 85025 COMPLETE CBC W/AUTO DIFF WBC: CPT | Performed by: INTERNAL MEDICINE

## 2017-10-11 PROCEDURE — 77030003666 HC NDL SPINAL BD -A

## 2017-10-11 PROCEDURE — 88311 DECALCIFY TISSUE: CPT | Performed by: INTERNAL MEDICINE

## 2017-10-11 PROCEDURE — 88237 TISSUE CULTURE BONE MARROW: CPT | Performed by: INTERNAL MEDICINE

## 2017-10-11 RX ORDER — LIDOCAINE HYDROCHLORIDE 10 MG/ML
30 INJECTION, SOLUTION EPIDURAL; INFILTRATION; INTRACAUDAL; PERINEURAL ONCE
Status: COMPLETED | OUTPATIENT
Start: 2017-10-11 | End: 2017-10-11

## 2017-10-11 RX ORDER — LIDOCAINE HYDROCHLORIDE 10 MG/ML
INJECTION, SOLUTION EPIDURAL; INFILTRATION; INTRACAUDAL; PERINEURAL
Status: COMPLETED
Start: 2017-10-11 | End: 2017-10-11

## 2017-10-11 RX ORDER — FENTANYL CITRATE 50 UG/ML
50 INJECTION, SOLUTION INTRAMUSCULAR; INTRAVENOUS
Status: DISCONTINUED | OUTPATIENT
Start: 2017-10-11 | End: 2017-10-11

## 2017-10-11 RX ORDER — SODIUM CHLORIDE 0.9 % (FLUSH) 0.9 %
5-10 SYRINGE (ML) INJECTION AS NEEDED
Status: DISCONTINUED | OUTPATIENT
Start: 2017-10-11 | End: 2017-10-11 | Stop reason: HOSPADM

## 2017-10-11 RX ORDER — NALOXONE HYDROCHLORIDE 0.4 MG/ML
0.1 INJECTION, SOLUTION INTRAMUSCULAR; INTRAVENOUS; SUBCUTANEOUS
Status: DISCONTINUED | OUTPATIENT
Start: 2017-10-11 | End: 2017-10-11 | Stop reason: HOSPADM

## 2017-10-11 RX ORDER — FLUMAZENIL 0.1 MG/ML
0.2 INJECTION INTRAVENOUS
Status: DISCONTINUED | OUTPATIENT
Start: 2017-10-11 | End: 2017-10-11 | Stop reason: HOSPADM

## 2017-10-11 RX ORDER — SODIUM CHLORIDE 9 MG/ML
20 INJECTION, SOLUTION INTRAVENOUS CONTINUOUS
Status: DISCONTINUED | OUTPATIENT
Start: 2017-10-11 | End: 2017-10-11 | Stop reason: HOSPADM

## 2017-10-11 RX ORDER — MIDAZOLAM HYDROCHLORIDE 1 MG/ML
1 INJECTION, SOLUTION INTRAMUSCULAR; INTRAVENOUS
Status: DISCONTINUED | OUTPATIENT
Start: 2017-10-11 | End: 2017-10-11

## 2017-10-11 RX ORDER — SODIUM CHLORIDE 0.9 % (FLUSH) 0.9 %
5-10 SYRINGE (ML) INJECTION EVERY 8 HOURS
Status: DISCONTINUED | OUTPATIENT
Start: 2017-10-11 | End: 2017-10-11 | Stop reason: HOSPADM

## 2017-10-11 RX ADMIN — LIDOCAINE HYDROCHLORIDE 30 ML: 10 INJECTION, SOLUTION EPIDURAL; INFILTRATION; INTRACAUDAL; PERINEURAL at 11:48

## 2017-10-11 RX ADMIN — MIDAZOLAM HYDROCHLORIDE 1 MG: 1 INJECTION, SOLUTION INTRAMUSCULAR; INTRAVENOUS at 11:39

## 2017-10-11 RX ADMIN — FENTANYL CITRATE 50 MCG: 50 INJECTION INTRAMUSCULAR; INTRAVENOUS at 11:39

## 2017-10-11 RX ADMIN — FENTANYL CITRATE 50 MCG: 50 INJECTION INTRAMUSCULAR; INTRAVENOUS at 11:44

## 2017-10-11 RX ADMIN — MIDAZOLAM HYDROCHLORIDE 1 MG: 1 INJECTION, SOLUTION INTRAMUSCULAR; INTRAVENOUS at 11:44

## 2017-10-11 NOTE — PROGRESS NOTES
TRANSFER - OUT REPORT:    Verbal report given to Kian Diaz RN(name) on [place]  being transferred to [facility](unit) for routine post - op       Report consisted of patients Situation, Background, Assessment and   Recommendations(SBAR). Information from the following report(s) SBAR, Kardex and MAR was reviewed with the receiving nurse. Lines:       Opportunity for questions and clarification was provided.       Patient transported with:   Leartieste Boutique

## 2017-10-11 NOTE — DISCHARGE INSTRUCTIONS
Bone Marrow Aspiration and Biopsy: What to Expect at Home  Your Recovery  The biopsy site may feel sore for several days. It can help to walk, take pain medicine, and put ice packs on the site. You will probably be able to return to work and your usual activities the day after the procedure. Your doctor or nurse will call you with the results of your test.  This care sheet gives you a general idea about how long it will take for you to recover. But each person recovers at a different pace. Follow the steps below to get better as quickly as possible. How can you care for yourself at home? Activity  · Rest when you feel tired. Getting enough sleep will help you recover. · You may drive when you are no longer taking pain pills and can quickly move your foot from the gas pedal to the brake. You must also be able to sit comfortably for a long period of time, even if you do not plan to go far. You might get caught in traffic. · Most people are able to return to work the day after the procedure. Medicines  · Your doctor will tell you if and when you can restart your medicines. He or she will also give you instructions about taking any new medicines. · If you take blood thinners, such as warfarin (Coumadin), clopidogrel (Plavix), or aspirin, be sure to talk to your doctor. He or she will tell you if and when to start taking those medicines again. Make sure that you understand exactly what your doctor wants you to do. · Be safe with medicines. Take pain medicines exactly as directed. ¨ If the doctor gave you a prescription medicine for pain, take it as prescribed. ¨ If you are not taking a prescription pain medicine, take an over-the-counter medicine such as acetaminophen (Tylenol), ibuprofen (Advil, Motrin), or naproxen (Aleve). Read and follow all instructions on the label. ¨ Do not take two or more pain medicines at the same time unless the doctor told you to.  Many pain medicines have acetaminophen, which is Tylenol. Too much acetaminophen (Tylenol) can be harmful. · If you think your pain medicine is making you sick to your stomach:  ¨ Take your medicine after meals (unless your doctor has told you not to). ¨ Ask your doctor for a different pain medicine. · If your doctor prescribed antibiotics, take them as directed. Do not stop taking them just because you feel better. Ice  · Put ice or a cold pack on the biopsy site for 10 to 20 minutes at a time. Put a thin cloth between the ice and your skin. Follow-up care is a key part of your treatment and safety. Be sure to make and go to all appointments, and call your doctor if you are having problems. It's also a good idea to know your test results and keep a list of the medicines you take. When should you call for help? Call 911 anytime you think you may need emergency care. For example, call if:  · You passed out (lost consciousness). Call your doctor now or seek immediate medical care if:  · You have signs of infection, such as:  ¨ Increased pain, swelling, warmth, or redness. ¨ Red streaks leading from the biopsy site. ¨ Pus draining from the biopsy site. ¨ Swollen lymph nodes in your neck, armpits, or groin. ¨ A fever. Watch closely for any changes in your health, and be sure to contact your doctor if:  · You are not getting better as expected. Where can you learn more? Go to http://petty-timothy.info/. Enter E148 in the search box to learn more about \"Bone Marrow Aspiration and Biopsy: What to Expect at Home. \"  Current as of: October 14, 2016  Content Version: 11.3  © 6284-4366 [employer]. Care instructions adapted under license by [employer] (which disclaims liability or warranty for this information).  If you have questions about a medical condition or this instruction, always ask your healthcare professional. [place] [address] any warranty or liability for your use of this information.

## 2017-10-11 NOTE — IP AVS SNAPSHOT
Tom Jung 
 
 
 [address] Patient: Kenya Miramontes MRN: [id number] XLJ:[birth year] You are allergic to the following Allergen Reactions Iodinated Contrast- Oral And Iv Dye Hives Faint, shaking Recent Documentation Height Weight BMI Smoking Status 1.854 m 64.9 kg 18.87 kg/m2 Current Some Day Smoker Emergency Contacts Name Discharge Info Relation Home Work Mobile Marisel Bower DISCHARGE CAREGIVER [3] Daughter [21]   [phone] Marisel Bower DISCHARGE CAREGIVER [3] Child [2] [phone] About your hospitalization You were admitted on:  October 11, 2017 You last received care in the:  [facility] You were discharged on:  October 11, 2017 Unit phone number:  [phone] Why you were hospitalized Your primary diagnosis was:  Not on File Providers Seen During Your Hospitalizations Provider Role Specialty Primary office phone Madhavi Carson MD Attending Provider Oncology [phone] Your Primary Care Physician (PCP) Primary Care Physician Office Phone Office Fax Omer Landin [phone] Follow-up Information None Your Appointments Tuesday October 24, 2017  3:15 PM EDT Office Visit with Madhavi Carson MD  
[place] [address] ([address] [place] [address]  
[phone] Current Discharge Medication List  
  
ASK your doctor about these medications Dose & Instructions Dispensing Information Comments Morning Noon Evening Bedtime  
 aspirin delayed-release 81 mg tablet Your last dose was: Your next dose is: Take  by mouth daily. Refills:  0  
     
   
   
   
  
 atorvastatin 40 mg tablet Commonly known as:  LIPITOR Your last dose was: Your next dose is:    
   
   
 Dose:  1 Tab Take 1 Tab by mouth nightly. Refills:  0 AZOPT OP Your last dose was: Your next dose is:    
   
   
 Apply  to eye. Refills:  0  
     
   
   
   
  
 COMBIGAN OP Your last dose was: Your next dose is:    
   
   
 Apply  to eye. Refills:  0  
     
   
   
   
  
 DIOVAN 80 mg tablet Generic drug:  valsartan Your last dose was: Your next dose is: Take  by mouth daily. Refills:  0  
     
   
   
   
  
 hydrochlorothiazide 12.5 mg cap 12.5 mg, lisinopril 5 mg tab 10 mg Your last dose was: Your next dose is: Take  by mouth daily. Refills:  0  
     
   
   
   
  
 montelukast 10 mg tablet Commonly known as:  SINGULAIR Your last dose was: Your next dose is:    
   
   
 Dose:  10 mg Take 10 mg by mouth daily. Refills:  0 NovoLIN 70/30 100 unit/mL (70-30) injection Generic drug:  insulin NPH/insulin regular Your last dose was: Your next dose is:    
   
   
 by SubCUTAneous route once. Refills:  0  
     
   
   
   
  
 oxybutynin 5 mg tablet Commonly known as:  SQJRVHNH Your last dose was: Your next dose is: TAKE 1 TABLET BY MOUTH THREE TIMES DAILY ([facility]) Quantity:  75 Tab Refills:  10 PRAVASTATIN PO Your last dose was: Your next dose is: Take  by mouth. Refills:  0 SIMVASTATIN PO Your last dose was: Your next dose is: Take  by mouth. Refills:  0  
     
   
   
   
  
 TRAVATAN Z OP Your last dose was: Your next dose is:    
   
   
 Apply  to eye. Refills:  0 Discharge Instructions Bone Marrow Aspiration and Biopsy: What to Expect at [facility] Your Recovery The biopsy site may feel sore for several days. It can help to walk, take pain medicine, and put ice packs on the site. You will probably be able to return to work and your usual activities the day after the procedure. Your doctor or nurse will call you with the results of your test. 
This care sheet gives you a general idea about how long it will take for you to recover. But each person recovers at a different pace. Follow the steps below to get better as quickly as possible. How can you care for yourself at home? Activity · Rest when you feel tired. Getting enough sleep will help you recover. · You may drive when you are no longer taking pain pills and can quickly move your foot from the gas pedal to the brake. You must also be able to sit comfortably for a long period of time, even if you do not plan to go far. You might get caught in traffic. · Most people are able to return to work the day after the procedure. Medicines · Your doctor will tell you if and when you can restart your medicines. He or she will also give you instructions about taking any new medicines. · If you take blood thinners, such as warfarin (Coumadin), clopidogrel (Plavix), or aspirin, be sure to talk to your doctor. He or she will tell you if and when to start taking those medicines again. Make sure that you understand exactly what your doctor wants you to do. · Be safe with medicines. Take pain medicines exactly as directed. ¨ If the doctor gave you a prescription medicine for pain, take it as prescribed. ¨ If you are not taking a prescription pain medicine, take an over-the-counter medicine such as acetaminophen (Tylenol), ibuprofen (Advil, Motrin), or naproxen (Aleve). Read and follow all instructions on the label. ¨ Do not take two or more pain medicines at the same time unless the doctor told you to. Many pain medicines have acetaminophen, which is Tylenol. Too much acetaminophen (Tylenol) can be harmful. · If you think your pain medicine is making you sick to your stomach: 
¨ Take your medicine after meals (unless your doctor has told you not to). ¨ Ask your doctor for a different pain medicine. · If your doctor prescribed antibiotics, take them as directed. Do not stop taking them just because you feel better. Ice · Put ice or a cold pack on the biopsy site for 10 to 20 minutes at a time. Put a thin cloth between the ice and your skin. Follow-up care is a key part of your treatment and safety. Be sure to make and go to all appointments, and call your doctor if you are having problems. It's also a good idea to know your test results and keep a list of the medicines you take. When should you call for help? Call 911 anytime you think you may need emergency care. For example, call if: 
· You passed out (lost consciousness). Call your doctor now or seek immediate medical care if: 
· You have signs of infection, such as: 
¨ Increased pain, swelling, warmth, or redness. ¨ Red streaks leading from the biopsy site. ¨ Pus draining from the biopsy site. ¨ Swollen lymph nodes in your neck, armpits, or groin. ¨ A fever. Watch closely for any changes in your health, and be sure to contact your doctor if: 
· You are not getting better as expected. Where can you learn more? Go to http://petty-timothy.info/. Enter E148 in the search box to learn more about \"Bone Marrow Aspiration and Biopsy: What to Expect at Home. \" Current as of: October 14, 2016 Content Version: 11.3 © 5066-7672 [employer]. Care instructions adapted under license by [employer] (which disclaims liability or warranty for this information). If you have questions about a medical condition or this instruction, always ask your healthcare professional. [place] [address] any warranty or liability for your use of this information. Discharge Orders None General Information Please provide this summary of care documentation to your next provider. Patient Signature:  ____________________________________________________________ Date:  ____________________________________________________________  
  
Daryn Mais Provider Signature:  ____________________________________________________________ Date:  ____________________________________________________________

## 2017-10-11 NOTE — PROGRESS NOTES
Cath holding summary    Patient escorted to cath holding from waiting area ambulatory, alert and oriented x 4, voicing no complaints. Changed into gown and placed on monitor. NPO since MN. Lab results, med rec and H&P reviewed on chart. PIV x 1 inserted without difficulty. Family to bedside.

## 2017-10-11 NOTE — H&P
OUTPATIENT HISTORY AND PHYSICAL      Today 10/11/2017     Indication/Symptoms:   Sabiha Page is a [de-identified] y.o. male with a history of neutrophilic leukocytosis who presents for an image-guided bone marrow biopsy with moderate sedation. Patient has been NPO since midnight and takes no blood thinning medications (except ASA) that has been d/c'd for 5 days per patient. Current Meds:    Prior to Admission medications    Medication Sig Start Date End Date Taking? Authorizing Provider   oxybutynin (DITROPAN) 5 mg tablet TAKE 1 TABLET BY MOUTH THREE TIMES DAILY ([facility]) 1/16/17  Yes Wild Klein MD   atorvastatin (LIPITOR) 40 mg tablet Take 1 Tab by mouth nightly. 2/29/16  Yes Historical Provider   hydrochlorothiazide 12.5 mg cap 12.5 mg, lisinopril 5 mg tab 10 mg Take  by mouth daily. Yes Historical Provider   montelukast (SINGULAIR) 10 mg tablet Take 10 mg by mouth daily. Yes Historical Provider   PRAVASTATIN SODIUM (PRAVASTATIN PO) Take  by mouth. Yes Phys MD Cris   BRIMONIDINE TARTRATE/TIMOLOL (COMBIGAN OP) Apply  to eye. Yes Phys MD Cris   BRINZOLAMIDE (AZOPT OP) Apply  to eye. Yes Blanca Lynch MD   TRAVOPROST (TRAVATAN Z OP) Apply  to eye. Yes Blanca Lynch MD   valsartan (DIOVAN) 80 mg tablet Take  by mouth daily. Yes Historical Provider   SIMVASTATIN PO Take  by mouth. Yes Historical Provider   aspirin delayed-release 81 mg tablet Take  by mouth daily. Yes Historical Provider   insulin (NOVOLIN 70/30) 100 unit/mL (70-30) injection by SubCUTAneous route once. Yes Historical Provider       Allergies:       Allergies   Allergen Reactions    Iodinated Contrast- Oral And Iv Dye Hives     Faint, shaking       Comorbid Conditions:    Past Medical History:   Diagnosis Date    Diabetes ([address]     Hypertension     Malignant neoplasm of prostate ([facility] 1998          Past Surgical History:   Procedure Laterality Date    BIOPSY OF PROSTATE,INCISIONAL  4/98    ME EXT BEAM RADIATION AS PRIMARY THERAPY TO PROSTATE ONLY  4/30-6/29/98     Data:    Visit Vitals    /61    Pulse 74    Temp 98.5 °F (36.9 °C)    Resp 14    Ht 6' 1\" (1.854 m)    Wt 64.9 kg (143 lb)    SpO2 100%    BMI 18.87 kg/m2   :  Recent Labs      10/11/17   1050   PLT  526*     Recent Labs      10/11/17   1050   INR  1.2   APTT  30.8       The H & P and/or progress notes and any available imaging were reviewed. The risks, indications and possible alternatives to the procedure, including doing nothing, were discussed and informed consent was obtained. Physical Exam:      Mental status:   Alert and oriented. Examination specific to the procedure proposed to be performed and any co morbid conditions:   Mallampati classification 2 ,  ASA 2   Heart:   Regular rate. Lungs:   Normal respiratory effort. CTAB. No wheezes, rales or rhonchi. The patient is an appropriate candidate to undergo the planned procedure and sedation.     [place], [place]

## 2017-10-24 ENCOUNTER — OFFICE VISIT (OUTPATIENT)
Dept: ONCOLOGY | Age: 82
End: 2017-10-24

## 2017-10-24 VITALS
WEIGHT: 147 LBS | DIASTOLIC BLOOD PRESSURE: 73 MMHG | BODY MASS INDEX: 19.39 KG/M2 | TEMPERATURE: 98.1 F | HEART RATE: 99 BPM | SYSTOLIC BLOOD PRESSURE: 147 MMHG

## 2017-10-24 DIAGNOSIS — D47.2 MONOCLONAL GAMMOPATHY: Primary | ICD-10-CM

## 2017-10-24 DIAGNOSIS — D75.839 THROMBOCYTOSIS: ICD-10-CM

## 2017-10-24 NOTE — PROGRESS NOTES
Hematology/medical oncology progress note    10/24/2017  Umm Don  YOB: 1935    PCP: Dr. Chanelle Deshpande    Diagnosis: Monoclonal gammopathy with associated leukocytosis, anemia,and thrombocytosis    Mr. Keri Mueller is an 82-year-old man who is undergoing an evaluation for unexplained thrombocytosis. I have explained to the patient that his JA K2 mutation analysis was negative. On 8/29/2017 at his initial visit is WBC count was elevated at 26.3, he had anemia with hemoglobin of 11.7, and a platelet count of 643,663. Flow cytometry did not reveal any immunophenotypic abnormalities in his circulating leukocytes. However there was an inverted T-cell CD4/CD8 ratio. The serum protein electrophoresis revealed an abnormal monoclonal paraprotein measuring 0.7 g/dL. Based on these findings I have recommended that we schedule the patient for bone marrow biopsy to rule out any evidence of multiple myeloma or plasma cell leukemia. The bone marrow biopsy was completed on 10/11/2017 and this showed 45% monoclonal plasma cells. Iron was present in the bone marrow. There were no blast detected. He did have anemia, neutrophilia, and thrombocytosis appearing in the bone marrow due to the marked hypocellularity of the bone marrow with evidence of granulocytic hyperplasia and mild dysplasia carrier polices. Flow cytometry from the bone marrow detected monoclonal plasma cells and a mildly decreased T-cell ratio. The cytogenetic study from the bone marrow showed that the patient had a normal karyotype 55, XY. The 24-hour urine test which was done on 9/18/2017 showed both an IgG and IgA monoclonal paraprotein. The M spike in the urine showed that he produced 22 mg of monoclonal paraprotein over the 24 hour urine collection. On 913 his free kappa light chain was 118 mg/dL and the free lambda light chains were normal at 25 mg/dL. The ratio of kappa to lambda was 4.73 with a reference range of 0.26-1.65.   I have explained to the patient that the findings are consistent with a monoclonal gammopathy of undetermined significance. The skeletal survey that was done on 9/18/2017 showed no evidence of osteolytic lesions in the bone. The patient and his daughter had their questions answered to their satisfaction. No therapeutic intervention is warranted. I will see the patient back at 3 month intervals for reassessment. Total time 25 minutes, greater than 50% of the time was in counseling and coordination of care. Jus Plasencia MD, [address]

## 2017-10-24 NOTE — MR AVS SNAPSHOT
Visit Information Date & Time Provider Department Dept. Phone Encounter #  
 10/24/2017  3:15 PM Glendon Hammans, [address] Office [phone] [id number] Follow-up Instructions Return in about 3 months (around 1/24/2018). Your Appointments 1/23/2018  3:15 PM  
Office Visit with Glendon Hammans, MD  
Maylin Eli ([address]) Appt Note: 3 mo fu  
 [facility] [address] [facility] [address] [facility] [address]  
[phone]  
  
   
 [facility] [address] 4/10/2018  9:15 AM  
Office Visit with Quyen Russo MD  
[facility] [address]) Appt Note: PSA  
 [address] Christophe A [address] [address]  
[phone] [address] [place] [id number] Upcoming Health Maintenance Date Due DTaP/Tdap/Td series (1 - Tdap) 1/25/1958 ZOSTER VACCINE AGE 60> 11/25/1996 GLAUCOMA SCREENING Q2Y 1/25/2002 Pneumococcal 65+ High/Highest Risk (1 of 2 - PCV13) 1/25/2002 MEDICARE YEARLY EXAM 1/25/2002 INFLUENZA AGE 9 TO ADULT 8/1/2017 Allergies as of 10/24/2017  Review Complete On: 10/11/2017 By: Sneha Hare Severity Noted Reaction Type Reactions Iodinated Contrast- Oral And Iv Dye   Side Effect Hives Faint, shaking Current Immunizations  Reviewed on 5/5/2016 Name Date Influenza Vaccine 10/1/2015 Not reviewed this visit You Were Diagnosed With   
  
 Codes Comments Monoclonal gammopathy    -  Primary ICD-10-CM: H21.8 ICD-9-CM: 273.1 Thrombocytosis ([facility] [address]     ICD-10-CM: D47.3 ICD-9-CM: 238.71 Vitals BP Pulse Temp Weight(growth percentile) BMI Smoking Status 147/73 (BP 1 Location: Left arm, BP Patient Position: Sitting) 99 98.1 °F (36.7 °C) (Oral) 147 lb (66.7 kg) 19.39 kg/m2 Current Some Day Smoker BMI and BSA Data  Body Mass Index Body Surface Area  
 19.39 kg/m 2 1.85 m 2  
  
  
 Preferred Pharmacy Pharmacy Name Phone [id number] - [place], [address] [phone] Your Updated Medication List  
  
   
This list is accurate as of: 10/24/17  3:41 PM.  Always use your most recent med list.  
  
  
  
  
 aspirin delayed-release 81 mg tablet Take  by mouth daily. atorvastatin 40 mg tablet Commonly known as:  LIPITOR Take 1 Tab by mouth nightly. AZOPT OP Apply  to eye. COMBIGAN OP Apply  to eye. DIOVAN 80 mg tablet Generic drug:  valsartan Take  by mouth daily. hydrochlorothiazide 12.5 mg cap 12.5 mg, lisinopril 5 mg tab 10 mg Take  by mouth daily. montelukast 10 mg tablet Commonly known as:  SINGULAIR Take 10 mg by mouth daily. NovoLIN 70/30 100 unit/mL (70-30) injection Generic drug:  insulin NPH/insulin regular  
by SubCUTAneous route once. oxybutynin 5 mg tablet Commonly known as:  DITROPAN  
TAKE 1 TABLET BY MOUTH THREE TIMES DAILY ([facility]) PRAVASTATIN PO Take  by mouth. SIMVASTATIN PO Take  by mouth. TRAVATAN Z OP Apply  to eye. Follow-up Instructions Return in about 3 months (around 1/24/2018). Patient Instructions Learning About Monoclonal Gammopathy of Unknown Significance (MGUS) What is MGUS? Monoclonal gammopathy of unknown significance (MGUS) is a blood condition. The blood is made of many kinds of cells, including red blood cells, platelets, and white blood cells. With MGUS, a type of white blood cell called a plasma cell makes too much of the \"M\" (for \"monoclonal\") protein in the blood. Most people with MGUS are fine for many years. MGUS seldom causes symptoms or major health problems. In rare cases, MGUS may turn into multiple myeloma. This is a cancer of plasma cells in the blood that can cause bone weakness.  Some people with MGUS may also have a higher risk for osteoporosis, in which bones become thin and weak. MGUS is sometimes seen in younger people, but is more common in people as they get older. It's seen most often in those over the age of 79. How is MGUS diagnosed? MGUS is often found by chance when blood tests are done for other reasons. If you have high levels of a certain protein in your blood, your doctor may order more tests. Blood tests can help identify the protein. The protein is sometimes found in the urine, so you may get a urine test too. Other tests may be done if your doctor thinks you might have a medical problem. In some cases, a bone marrow biopsy may be done to rule out a problem like multiple myeloma. How is it treated? Most people with MGUS don't need any treatment. But you may need regular physical exams, blood tests, and urine tests to make sure that MGUS isn't progressing to a medical problem. Follow-up care is a key part of your treatment and safety. Be sure to make and go to all appointments, and call your doctor if you are having problems. It's also a good idea to know your test results and keep a list of the medicines you take. Where can you learn more? Go to http://petty-timothy.info/. Enter A917 in the search box to learn more about \"Learning About Monoclonal Gammopathy of Unknown Significance (MGUS). \" 
Current as of: October 13, 2016 Content Version: 11.3 © 2946-1721 [employer]. Care instructions adapted under license by [employer] (which disclaims liability or warranty for this information). If you have questions about a medical condition or this instruction, always ask your healthcare professional. [address] any warranty or liability for your use of this information. Please provide this summary of care documentation to your next provider. Your primary care clinician is listed as Lucio Goode.  If you have any questions after today's visit, please call [phone].

## 2017-10-24 NOTE — PATIENT INSTRUCTIONS
Learning About Monoclonal Gammopathy of Unknown Significance (MGUS)  What is MGUS? Monoclonal gammopathy of unknown significance (MGUS) is a blood condition. The blood is made of many kinds of cells, including red blood cells, platelets, and white blood cells. With MGUS, a type of white blood cell called a plasma cell makes too much of the \"M\" (for \"monoclonal\") protein in the blood. Most people with MGUS are fine for many years. MGUS seldom causes symptoms or major health problems. In rare cases, MGUS may turn into multiple myeloma. This is a cancer of plasma cells in the blood that can cause bone weakness. Some people with MGUS may also have a higher risk for osteoporosis, in which bones become thin and weak. MGUS is sometimes seen in younger people, but is more common in people as they get older. It's seen most often in those over the age of 79. How is MGUS diagnosed? MGUS is often found by chance when blood tests are done for other reasons. If you have high levels of a certain protein in your blood, your doctor may order more tests. Blood tests can help identify the protein. The protein is sometimes found in the urine, so you may get a urine test too. Other tests may be done if your doctor thinks you might have a medical problem. In some cases, a bone marrow biopsy may be done to rule out a problem like multiple myeloma. How is it treated? Most people with MGUS don't need any treatment. But you may need regular physical exams, blood tests, and urine tests to make sure that MGUS isn't progressing to a medical problem. Follow-up care is a key part of your treatment and safety. Be sure to make and go to all appointments, and call your doctor if you are having problems. It's also a good idea to know your test results and keep a list of the medicines you take. Where can you learn more? Go to http://petty-timothy.info/.   Enter A917 in the search box to learn more about \"Learning About Monoclonal Gammopathy of Unknown Significance (MGUS). \"  Current as of: October 13, 2016  Content Version: 11.3  © 1536-7243 [employer]. Care instructions adapted under license by [employer] (which disclaims liability or warranty for this information). If you have questions about a medical condition or this instruction, always ask your healthcare professional. [address] any warranty or liability for your use of this information.

## 2018-01-01 ENCOUNTER — HOSPITAL ENCOUNTER (OUTPATIENT)
Dept: LAB | Age: 83
Discharge: HOME | End: 2018-12-04
Payer: MEDICARE

## 2018-01-01 ENCOUNTER — HOSPITAL ENCOUNTER (OUTPATIENT)
Dept: LAB | Age: 83
Discharge: HOME | End: 2018-12-11
Payer: MEDICARE

## 2018-01-01 ENCOUNTER — HOSPITAL ENCOUNTER (OUTPATIENT)
Dept: LAB | Age: 83
Discharge: HOME | End: 2018-10-23
Payer: MEDICARE

## 2018-01-01 ENCOUNTER — HOSPITAL ENCOUNTER (OUTPATIENT)
Dept: INFUSION THERAPY | Age: 83
Discharge: HOME | End: 2018-12-13
Payer: MEDICARE

## 2018-01-01 ENCOUNTER — HOSPITAL ENCOUNTER (OUTPATIENT)
Dept: ONCOLOGY | Age: 83
Discharge: HOME | End: 2018-10-23

## 2018-01-01 ENCOUNTER — OFFICE VISIT (OUTPATIENT)
Dept: ONCOLOGY | Age: 83
End: 2018-01-01

## 2018-01-01 ENCOUNTER — CLINICAL SUPPORT (OUTPATIENT)
Dept: ONCOLOGY | Age: 83
End: 2018-01-01

## 2018-01-01 ENCOUNTER — HOSPITAL ENCOUNTER (OUTPATIENT)
Dept: ONCOLOGY | Age: 83
Discharge: HOME | End: 2018-12-04

## 2018-01-01 ENCOUNTER — HOSPITAL ENCOUNTER (OUTPATIENT)
Dept: INFUSION THERAPY | Age: 83
Discharge: HOME | End: 2018-12-14
Payer: MEDICARE

## 2018-01-01 ENCOUNTER — HOSPITAL ENCOUNTER (OUTPATIENT)
Dept: ONCOLOGY | Age: 83
Discharge: HOME | End: 2018-12-11

## 2018-01-01 VITALS
SYSTOLIC BLOOD PRESSURE: 115 MMHG | OXYGEN SATURATION: 100 % | TEMPERATURE: 98.3 F | RESPIRATION RATE: 18 BRPM | HEART RATE: 98 BPM | DIASTOLIC BLOOD PRESSURE: 63 MMHG

## 2018-01-01 VITALS
BODY MASS INDEX: 16.83 KG/M2 | WEIGHT: 127 LBS | HEIGHT: 73 IN | TEMPERATURE: 98.1 F | DIASTOLIC BLOOD PRESSURE: 79 MMHG | RESPIRATION RATE: 18 BRPM | SYSTOLIC BLOOD PRESSURE: 131 MMHG | HEART RATE: 113 BPM

## 2018-01-01 VITALS
DIASTOLIC BLOOD PRESSURE: 70 MMHG | TEMPERATURE: 98.7 F | HEART RATE: 75 BPM | OXYGEN SATURATION: 99 % | SYSTOLIC BLOOD PRESSURE: 121 MMHG | RESPIRATION RATE: 18 BRPM

## 2018-01-01 VITALS
SYSTOLIC BLOOD PRESSURE: 153 MMHG | BODY MASS INDEX: 17.55 KG/M2 | HEIGHT: 73 IN | OXYGEN SATURATION: 99 % | DIASTOLIC BLOOD PRESSURE: 80 MMHG | TEMPERATURE: 98.3 F | HEART RATE: 112 BPM | WEIGHT: 132.4 LBS

## 2018-01-01 DIAGNOSIS — D47.2 MONOCLONAL GAMMOPATHY: ICD-10-CM

## 2018-01-01 DIAGNOSIS — D75.839 THROMBOCYTOSIS: ICD-10-CM

## 2018-01-01 DIAGNOSIS — D50.8 IRON DEFICIENCY ANEMIA SECONDARY TO INADEQUATE DIETARY IRON INTAKE: ICD-10-CM

## 2018-01-01 DIAGNOSIS — D47.2 MONOCLONAL GAMMOPATHY: Primary | ICD-10-CM

## 2018-01-01 DIAGNOSIS — C61 MALIGNANT NEOPLASM OF PROSTATE (HCC): ICD-10-CM

## 2018-01-01 DIAGNOSIS — D72.9 NEUTROPHILIC LEUKOCYTOSIS: ICD-10-CM

## 2018-01-01 LAB
ABO + RH BLD: NORMAL
ALBUMIN SERPL ELPH-MCNC: 2.9 G/DL (ref 2.9–4.4)
ALBUMIN SERPL ELPH-MCNC: 2.9 G/DL (ref 2.9–4.4)
ALBUMIN SERPL-MCNC: 2.8 G/DL (ref 3.4–5)
ALBUMIN SERPL-MCNC: 2.9 G/DL (ref 3.4–5)
ALBUMIN/GLOB SERPL: 0.5 {RATIO} (ref 0.8–1.7)
ALBUMIN/GLOB SERPL: 0.6 {RATIO} (ref 0.7–1.7)
ALBUMIN/GLOB SERPL: 0.6 {RATIO} (ref 0.8–1.7)
ALBUMIN/GLOB SERPL: 0.7 {RATIO} (ref 0.7–1.7)
ALP SERPL-CCNC: 110 U/L (ref 45–117)
ALP SERPL-CCNC: 162 U/L (ref 45–117)
ALPHA1 GLOB SERPL ELPH-MCNC: 0.2 G/DL (ref 0–0.4)
ALPHA1 GLOB SERPL ELPH-MCNC: 0.3 G/DL (ref 0–0.4)
ALPHA2 GLOB SERPL ELPH-MCNC: 0.9 G/DL (ref 0.4–1)
ALPHA2 GLOB SERPL ELPH-MCNC: 1.1 G/DL (ref 0.4–1)
ALT SERPL-CCNC: 17 U/L (ref 16–61)
ALT SERPL-CCNC: 35 U/L (ref 16–61)
ANION GAP SERPL CALC-SCNC: 7 MMOL/L (ref 3–18)
ANION GAP SERPL CALC-SCNC: 8 MMOL/L (ref 3–18)
AST SERPL-CCNC: 23 U/L (ref 15–37)
AST SERPL-CCNC: 49 U/L (ref 15–37)
B-GLOBULIN SERPL ELPH-MCNC: 1.5 G/DL (ref 0.7–1.3)
B-GLOBULIN SERPL ELPH-MCNC: 1.7 G/DL (ref 0.7–1.3)
BACKGROUND: 489207: NORMAL
BASO+EOS+MONOS # BLD AUTO: 0.6 K/UL (ref 0–2.3)
BASO+EOS+MONOS # BLD AUTO: 14 % (ref 0.1–17)
BASOPHILS # BLD: 0 K/UL (ref 0–0.1)
BASOPHILS # BLD: 0.2 K/UL (ref 0–0.06)
BASOPHILS NFR BLD: 0 % (ref 0–2)
BASOPHILS NFR BLD: 1 % (ref 0–3)
BILIRUB SERPL-MCNC: 0.7 MG/DL (ref 0.2–1)
BILIRUB SERPL-MCNC: 0.8 MG/DL (ref 0.2–1)
BLD PROD TYP BPU: NORMAL
BLOOD GROUP ANTIBODIES SERPL: NORMAL
BPU ID: NORMAL
BUN SERPL-MCNC: 19 MG/DL (ref 7–18)
BUN SERPL-MCNC: 21 MG/DL (ref 7–18)
BUN/CREAT SERPL: 20 (ref 12–20)
BUN/CREAT SERPL: 26 (ref 12–20)
CALCIUM SERPL-MCNC: 8.2 MG/DL (ref 8.5–10.1)
CALCIUM SERPL-MCNC: 8.5 MG/DL (ref 8.5–10.1)
CHLORIDE SERPL-SCNC: 102 MMOL/L (ref 100–108)
CHLORIDE SERPL-SCNC: 104 MMOL/L (ref 100–108)
CO2 SERPL-SCNC: 27 MMOL/L (ref 21–32)
CO2 SERPL-SCNC: 31 MMOL/L (ref 21–32)
COMMENT , 490046: NORMAL
CREAT SERPL-MCNC: 0.8 MG/DL (ref 0.6–1.3)
CREAT SERPL-MCNC: 0.95 MG/DL (ref 0.6–1.3)
CROSSMATCH RESULT,%XM: NORMAL
CROSSMATCH RESULT,%XM: NORMAL
DIFFERENTIAL METHOD BLD: ABNORMAL
DIRECTOR REVIEW: 489204: NORMAL
EOSINOPHIL # BLD: 0.1 K/UL (ref 0–0.4)
EOSINOPHIL # BLD: 0.2 K/UL (ref 0–0.4)
EOSINOPHIL NFR BLD: 1 % (ref 0–5)
EOSINOPHIL NFR BLD: 4 % (ref 0–5)
ERYTHROCYTE [DISTWIDTH] IN BLOOD BY AUTOMATED COUNT: 18.7 % (ref 11.6–14.5)
ERYTHROCYTE [DISTWIDTH] IN BLOOD BY AUTOMATED COUNT: 25.4 % (ref 11.5–14.5)
ERYTHROCYTE [DISTWIDTH] IN BLOOD BY AUTOMATED COUNT: 25.7 % (ref 11.6–14.5)
FERRITIN SERPL-MCNC: 1044 NG/ML (ref 8–388)
FERRITIN SERPL-MCNC: 1712 NG/ML (ref 8–388)
FLOW INTERPRETATION: NORMAL
GAMMA GLOB SERPL ELPH-MCNC: 1.8 G/DL (ref 0.4–1.8)
GAMMA GLOB SERPL ELPH-MCNC: 2.1 G/DL (ref 0.4–1.8)
GLOBULIN SER CALC-MCNC: 4.4 G/DL (ref 2.2–3.9)
GLOBULIN SER CALC-MCNC: 4.9 G/DL (ref 2–4)
GLOBULIN SER CALC-MCNC: 5.1 G/DL (ref 2.2–3.9)
GLOBULIN SER CALC-MCNC: 5.3 G/DL (ref 2–4)
GLUCOSE SERPL-MCNC: 164 MG/DL (ref 74–99)
GLUCOSE SERPL-MCNC: 271 MG/DL (ref 74–99)
HCT VFR BLD AUTO: 27.6 % (ref 36–48)
HCT VFR BLD AUTO: 30.3 % (ref 36–48)
HCT VFR BLD AUTO: 37.4 % (ref 36–48)
HGB BLD-MCNC: 11.9 G/DL (ref 13–16)
HGB BLD-MCNC: 9 G/DL (ref 12–16)
HGB BLD-MCNC: 9.8 G/DL (ref 13–16)
IRON SATN MFR SERPL: 36 %
IRON SERPL-MCNC: 50 UG/DL (ref 50–175)
IRON SERPL-MCNC: 81 UG/DL (ref 50–175)
JAK2 P.V617F BLD/T QL: NORMAL
LYMPHOCYTES # BLD: 0.7 K/UL (ref 0.8–3.5)
LYMPHOCYTES # BLD: 1 K/UL (ref 1.1–5.9)
LYMPHOCYTES # BLD: 1.2 K/UL (ref 0.9–3.6)
LYMPHOCYTES NFR BLD: 24 % (ref 14–44)
LYMPHOCYTES NFR BLD: 3 % (ref 20–51)
LYMPHOCYTES NFR BLD: 34 % (ref 21–52)
M PROTEIN SERPL ELPH-MCNC: 0.6 G/DL
M PROTEIN SERPL ELPH-MCNC: 0.9 G/DL
MCH RBC QN AUTO: 26.1 PG (ref 24–34)
MCH RBC QN AUTO: 27.9 PG (ref 24–34)
MCH RBC QN AUTO: 28.8 PG (ref 25–35)
MCHC RBC AUTO-ENTMCNC: 31.8 G/DL (ref 31–37)
MCHC RBC AUTO-ENTMCNC: 32.3 G/DL (ref 31–37)
MCHC RBC AUTO-ENTMCNC: 32.6 G/DL (ref 31–37)
MCV RBC AUTO: 82 FL (ref 74–97)
MCV RBC AUTO: 86.3 FL (ref 74–97)
MCV RBC AUTO: 88.2 FL (ref 78–102)
MONOCYTES # BLD: 0 K/UL (ref 0–1)
MONOCYTES # BLD: 0.4 K/UL (ref 0.05–1.2)
MONOCYTES NFR BLD: 0 % (ref 2–9)
MONOCYTES NFR BLD: 10 % (ref 3–10)
NEUTS BAND NFR BLD MANUAL: 1 % (ref 0–5)
NEUTS SEG # BLD: 1.8 K/UL (ref 1.8–8)
NEUTS SEG # BLD: 2.7 K/UL (ref 1.8–9.5)
NEUTS SEG # BLD: 23.2 K/UL (ref 1.8–8)
NEUTS SEG NFR BLD: 52 % (ref 40–73)
NEUTS SEG NFR BLD: 63 % (ref 40–70)
NEUTS SEG NFR BLD: 94 % (ref 42–75)
PLATELET # BLD AUTO: 1027 K/UL (ref 135–420)
PLATELET # BLD AUTO: 14 K/UL (ref 135–420)
PLATELET # BLD AUTO: 17 K/UL (ref 135–420)
PLATELET # BLD AUTO: 20 K/UL (ref 135–420)
PLATELET # BLD AUTO: 30 K/UL (ref 135–420)
PLATELET COMMENTS,PCOM: ABNORMAL
PMV BLD AUTO: 9.6 FL (ref 9.2–11.8)
POTASSIUM SERPL-SCNC: 3.6 MMOL/L (ref 3.5–5.5)
POTASSIUM SERPL-SCNC: 3.9 MMOL/L (ref 3.5–5.5)
PROT SERPL-MCNC: 7.3 G/DL (ref 6–8.5)
PROT SERPL-MCNC: 7.7 G/DL (ref 6.4–8.2)
PROT SERPL-MCNC: 8 G/DL (ref 6–8.5)
PROT SERPL-MCNC: 8.2 G/DL (ref 6.4–8.2)
PSA SERPL-MCNC: 0.1 NG/ML (ref 0–4)
RBC # BLD AUTO: 3.13 M/UL (ref 4.1–5.1)
RBC # BLD AUTO: 3.51 M/UL (ref 4.7–5.5)
RBC # BLD AUTO: 4.56 M/UL (ref 4.7–5.5)
RBC MORPH BLD: ABNORMAL
SODIUM SERPL-SCNC: 139 MMOL/L (ref 136–145)
SODIUM SERPL-SCNC: 140 MMOL/L (ref 136–145)
SPECIMEN EXP DATE BLD: NORMAL
SPECIMEN SOURCE: NORMAL
STATUS OF UNIT,%ST: NORMAL
TIBC SERPL-MCNC: 228 UG/DL (ref 250–450)
UNIT DIVISION, %UDIV: 0
WBC # BLD AUTO: 24.3 K/UL (ref 4.6–13.2)
WBC # BLD AUTO: 3.4 K/UL (ref 4.6–13.2)
WBC # BLD AUTO: 4.3 K/UL (ref 4.5–13)

## 2018-01-01 PROCEDURE — 80053 COMPREHEN METABOLIC PANEL: CPT

## 2018-01-01 PROCEDURE — 82728 ASSAY OF FERRITIN: CPT | Performed by: INTERNAL MEDICINE

## 2018-01-01 PROCEDURE — 88185 FLOWCYTOMETRY/TC ADD-ON: CPT

## 2018-01-01 PROCEDURE — 83540 ASSAY OF IRON: CPT

## 2018-01-01 PROCEDURE — 84165 PROTEIN E-PHORESIS SERUM: CPT

## 2018-01-01 PROCEDURE — 74011250637 HC RX REV CODE- 250/637: Performed by: INTERNAL MEDICINE

## 2018-01-01 PROCEDURE — 85049 AUTOMATED PLATELET COUNT: CPT

## 2018-01-01 PROCEDURE — 82728 ASSAY OF FERRITIN: CPT

## 2018-01-01 PROCEDURE — 36430 TRANSFUSION BLD/BLD COMPNT: CPT

## 2018-01-01 PROCEDURE — 36415 COLL VENOUS BLD VENIPUNCTURE: CPT

## 2018-01-01 PROCEDURE — 84165 PROTEIN E-PHORESIS SERUM: CPT | Performed by: INTERNAL MEDICINE

## 2018-01-01 PROCEDURE — 86900 BLOOD TYPING SEROLOGIC ABO: CPT

## 2018-01-01 PROCEDURE — 84153 ASSAY OF PSA TOTAL: CPT | Performed by: INTERNAL MEDICINE

## 2018-01-01 PROCEDURE — 83540 ASSAY OF IRON: CPT | Performed by: INTERNAL MEDICINE

## 2018-01-01 PROCEDURE — P9035 PLATELET PHERES LEUKOREDUCED: HCPCS

## 2018-01-01 PROCEDURE — 88184 FLOWCYTOMETRY/ TC 1 MARKER: CPT

## 2018-01-01 PROCEDURE — 86920 COMPATIBILITY TEST SPIN: CPT

## 2018-01-01 PROCEDURE — 80053 COMPREHEN METABOLIC PANEL: CPT | Performed by: INTERNAL MEDICINE

## 2018-01-01 PROCEDURE — 36415 COLL VENOUS BLD VENIPUNCTURE: CPT | Performed by: INTERNAL MEDICINE

## 2018-01-01 RX ORDER — SODIUM CHLORIDE 0.9 % (FLUSH) 0.9 %
10-40 SYRINGE (ML) INJECTION AS NEEDED
Status: DISCONTINUED | OUTPATIENT
Start: 2018-01-01 | End: 2018-01-01 | Stop reason: HOSPADM

## 2018-01-01 RX ORDER — SODIUM CHLORIDE 9 MG/ML
250 INJECTION, SOLUTION INTRAVENOUS AS NEEDED
Status: DISCONTINUED | OUTPATIENT
Start: 2018-01-01 | End: 2018-01-01 | Stop reason: HOSPADM

## 2018-01-01 RX ORDER — ACETAMINOPHEN 325 MG/1
650 TABLET ORAL ONCE
Status: COMPLETED | OUTPATIENT
Start: 2018-01-01 | End: 2018-01-01

## 2018-01-01 RX ORDER — DIPHENHYDRAMINE HCL 25 MG
25 CAPSULE ORAL ONCE
Status: COMPLETED | OUTPATIENT
Start: 2018-01-01 | End: 2018-01-01

## 2018-01-01 RX ORDER — HYDROXYUREA 500 MG/1
500 CAPSULE ORAL 2 TIMES DAILY
Qty: 60 CAP | Refills: 6 | Status: SHIPPED | OUTPATIENT
Start: 2018-01-01 | End: 2019-01-01 | Stop reason: SDUPTHER

## 2018-01-01 RX ORDER — SODIUM CHLORIDE 9 MG/ML
250 INJECTION, SOLUTION INTRAVENOUS AS NEEDED
Status: CANCELLED | OUTPATIENT
Start: 2018-01-01

## 2018-01-01 RX ADMIN — DIPHENHYDRAMINE HYDROCHLORIDE 25 MG: 25 CAPSULE ORAL at 10:28

## 2018-01-01 RX ADMIN — ACETAMINOPHEN 650 MG: 325 TABLET ORAL at 10:28

## 2018-01-01 RX ADMIN — Medication 10 ML: at 12:41

## 2018-01-23 ENCOUNTER — HOSPITAL ENCOUNTER (OUTPATIENT)
Dept: ONCOLOGY | Age: 83
Discharge: HOME | End: 2018-01-23

## 2018-01-23 ENCOUNTER — OFFICE VISIT (OUTPATIENT)
Dept: ONCOLOGY | Age: 83
End: 2018-01-23

## 2018-01-23 ENCOUNTER — HOSPITAL ENCOUNTER (OUTPATIENT)
Dept: LAB | Age: 83
Discharge: HOME | End: 2018-01-23
Payer: MEDICARE

## 2018-01-23 VITALS
BODY MASS INDEX: 19.13 KG/M2 | SYSTOLIC BLOOD PRESSURE: 147 MMHG | WEIGHT: 145 LBS | DIASTOLIC BLOOD PRESSURE: 73 MMHG | TEMPERATURE: 98.3 F | HEART RATE: 97 BPM

## 2018-01-23 DIAGNOSIS — D50.8 IRON DEFICIENCY ANEMIA SECONDARY TO INADEQUATE DIETARY IRON INTAKE: ICD-10-CM

## 2018-01-23 DIAGNOSIS — D47.2 MONOCLONAL GAMMOPATHY: ICD-10-CM

## 2018-01-23 DIAGNOSIS — C61 MALIGNANT NEOPLASM OF PROSTATE (HCC): ICD-10-CM

## 2018-01-23 DIAGNOSIS — D72.9 NEUTROPHILIC LEUKOCYTOSIS: Primary | ICD-10-CM

## 2018-01-23 DIAGNOSIS — D75.839 THROMBOCYTOSIS: ICD-10-CM

## 2018-01-23 DIAGNOSIS — D72.9 NEUTROPHILIC LEUKOCYTOSIS: ICD-10-CM

## 2018-01-23 LAB — PLATELET # BLD AUTO: 652 K/UL (ref 135–420)

## 2018-01-23 PROCEDURE — 84153 ASSAY OF PSA TOTAL: CPT | Performed by: INTERNAL MEDICINE

## 2018-01-23 PROCEDURE — 36415 COLL VENOUS BLD VENIPUNCTURE: CPT | Performed by: INTERNAL MEDICINE

## 2018-01-23 PROCEDURE — 83540 ASSAY OF IRON: CPT | Performed by: INTERNAL MEDICINE

## 2018-01-23 PROCEDURE — 82728 ASSAY OF FERRITIN: CPT | Performed by: INTERNAL MEDICINE

## 2018-01-23 PROCEDURE — 80053 COMPREHEN METABOLIC PANEL: CPT | Performed by: INTERNAL MEDICINE

## 2018-01-23 PROCEDURE — 84165 PROTEIN E-PHORESIS SERUM: CPT | Performed by: INTERNAL MEDICINE

## 2018-01-23 NOTE — PROGRESS NOTES
Hematology/Oncology  Progress Note    Name: Radha Patricia  Date: 2018  : 1935    Vincent Camejo MD     Mr. Gary Currie is a [de-identified] y.o.  male who was seen for follow-up relating to his MGUS, neutrophilic leukocytosis and anemia. Current therapy: Active surveillance    Subjective:     Mr. Gary Currie is an 61-year-old -American man who has MGUS. He also has chronic neutrophilic leukocytosis with flow cytometry revealing no immunophenotypic aberrancy in the leukocyte populations. The patient has not experienced any unexplained fevers, night sweats, weight loss. Overall, the patient reports that he has been doing reasonably well since he was last seen in clinic. Past medical history, family history, and social history: these were reviewed and remains unchanged. Past Medical History:   Diagnosis Date    Diabetes ([facility] [address]     Hypertension     Malignant neoplasm of prostate ([facility] [address]      Past Surgical History:   Procedure Laterality Date    BIOPSY OF PROSTATE,INCISIONAL      [place] EXT BEAM RADIATION AS PRIMARY THERAPY TO PROSTATE ONLY  -98     Social History     Social History    Marital status: SINGLE     Spouse name: N/A    Number of children: N/A    Years of education: N/A     Occupational History    Not on file.      Social History Main Topics    Smoking status: Current Some Day Smoker     Packs/day: 0.10     Years: 22.00     Types: Cigarettes    Smokeless tobacco: Never Used    Alcohol use No    Drug use: No    Sexual activity: Not on file     Other Topics Concern    Not on file     Social History Narrative    ** Merged History Encounter **          Family History   Problem Relation Age of Onset    Diabetes Other     Hypertension Other      Current Outpatient Prescriptions   Medication Sig Dispense Refill    oxybutynin (DITROPAN) 5 mg tablet TAKE 1 TABLET BY MOUTH THREE TIMES DAILY (GENERIC FOR DITROPAN) 75 Tab 10    atorvastatin (LIPITOR) 40 mg tablet Take 1 Tab by mouth nightly.  hydrochlorothiazide 12.5 mg cap 12.5 mg, lisinopril 5 mg tab 10 mg Take  by mouth daily.  montelukast (SINGULAIR) 10 mg tablet Take 10 mg by mouth daily.  PRAVASTATIN SODIUM (PRAVASTATIN PO) Take  by mouth.  BRIMONIDINE TARTRATE/TIMOLOL (COMBIGAN OP) Apply  to eye.  BRINZOLAMIDE (AZOPT OP) Apply  to eye.  TRAVOPROST (TRAVATAN Z OP) Apply  to eye.  valsartan (DIOVAN) 80 mg tablet Take  by mouth daily.  SIMVASTATIN PO Take  by mouth.  aspirin delayed-release 81 mg tablet Take  by mouth daily.  insulin (NOVOLIN 70/30) 100 unit/mL (70-30) injection by SubCUTAneous route once. Review of Systems  Constitutional: The patient has no acute distress or discomfort. HEENT: The patient denies recent head trauma, eye pain, blurred vision,  hearing deficit, oropharyngeal mucosal pain or lesions, and the patient denies throat pain or discomfort. Lymphatics: The patient denies palpable peripheral lymphadenopathy. Hematologic: The patient denies having bruising, bleeding, or progressive fatigue. Respiratory: Patient denies having shortness of breath, cough, sputum production, fever, or dyspnea on exertion. Cardiovascular: The patient denies having leg pain, leg swelling, heart palpitations, chest permit, chest pain, or lightheadedness. The patient denies having dyspnea on exertion. Gastrointestinal: The patient denies having nausea, emesis, or diarrhea. The patient denies having any hematemesis or blood in the stool. Genitourinary: Patient denies having urinary urgency, frequency, or dysuria. The patient denies having blood in the urine. Psychological: The patient denies having symptoms of nervousness, anxiety, depression, or thoughts of harming self. Skin: Patient denies having skin rashes, skin, ulcerations, or unexplained itching or pruritus. Musculoskeletal: The patient denies having pain in the joints or bones.       Objective:     Visit Vitals    BP 147/73    Pulse 97    Temp 98.3 °F (36.8 °C)    Wt 65.8 kg (145 lb)    BMI 19.13 kg/m2     ECOG PS=0  Physical Exam:   Gen. Appearance: The patient is in no acute distress. Skin: There is no bruise or rash. HEENT: The exam is unremarkable. Neck: Supple without lymphadenopathy or thyromegaly. Lungs: Clear to auscultation and percussion; there are no wheezes or rhonchi. Heart: Regular rate and rhythm; there are no murmurs, gallops, or rubs. Abdomen: Bowel sounds are present and normal.  There is no guarding, tenderness, or hepatosplenomegaly. Extremities: There is no clubbing, cyanosis, or edema. Neurologic: There are no focal neurologic deficits. Lymphatics: There is no palpable peripheral lymphadenopathy. Musculoskeletal: The patient has full range of motion at all joints. There is no evidence of joint deformity or effusions. There is no focal joint tenderness. Psychological/psychiatric: There is no clinical evidence of anxiety, depression, or melancholy. Lab data:      Results for orders placed or performed during the hospital encounter of 09/12/17   CBC WITH 3 PART DIFF     Status: Abnormal   Result Value Ref Range Status    WBC 25.4 (HH) 4.5 - 13.0 K/uL Final     Comment: CALLED TO AND CORRECTLY REPEATED BY:  CRITICAL RESULTS VERIFIED AND GIVEN TO ALYSIA AT 1433. GI      RBC 4.67 4.10 - 5.10 M/uL Final    HGB 12.1 12.0 - 16.0 g/dL Final    HCT 36.9 36 - 48 % Final    MCV 79.0 78 - 102 FL Final    MCH 25.9 25.0 - 35.0 PG Final    MCHC 32.8 31 - 37 g/dL Final    RDW 18.4 (H) 11.5 - 14.5 % Final    NEUTROPHILS 86 (H) 40 - 70 % Final    MIXED CELLS 9 0.1 - 17 % Final    LYMPHOCYTES 5 (L) 14 - 44 % Final    ABS. NEUTROPHILS 21.8 (H) 1.8 - 9.5 K/UL Final    ABS. MIXED CELLS 2.4 (H) 0.0 - 2.3 K/uL Final    ABS. LYMPHOCYTES 1.2 1.1 - 5.9 K/UL Final     Comment: Test performed at [address]. Results Reviewed by Medical Director.     DF AUTOMATED   Final           Assessment: 1. Neutrophilic leukocytosis    2. Monoclonal gammopathy    3. Thrombocytosis ([address]    4. Malignant neoplasm of prostate ([facility] [address]      Plan:   Neutrophilic leukocytosis: A prior immunophenotyping profile showed no immunophenotypic aberrancy in the leukocyte populations. The test did show evidence of a monoclonal plasma cell population which was subsequently confirmed by bone marrow biopsy and SPEP. Monoclonal gammopathy of undetermined significance: The bone marrow biopsy done on 10/11/2017 showed 45% monoclonal plasma cells consistent with MGUS. The omental fixation confirmed an IgG a kappa M spike production. We will continue to monitor this at 3 month intervals. Thrombocytosis: The current platelet count is 050,225. I have explained to the patient that the [address] [place] [address] mutation analysis was negative. If his platelet count exceeds 800,000 he will be started on hydroxyurea or anagrelide in the future. Malignant neoplasm of the prostate: The patient has a history of prostate cancer. His PSA test as of 8/29/2017 was 0.2 ng/mL. I will recheck his PSA level at this time. Follow-up in 3 months  Orders Placed This Encounter    COMPLETE CBC & AUTO DIFF WBC    InHouse CBC (DP7 Digital)     Standing Status:   Future     Number of Occurrences:   1     Standing Expiration Date:   1/30/2018       Karissa Caceres MD  1/23/2018      Please note: This document has been produced using voice recognition software. Unrecognized errors in transcription may be present.

## 2018-01-23 NOTE — PATIENT INSTRUCTIONS
Learning About Monoclonal Gammopathy of Unknown Significance (MGUS)  What is MGUS? Monoclonal gammopathy of unknown significance (MGUS) is a blood condition. The blood is made of many kinds of cells, including red blood cells, platelets, and white blood cells. With MGUS, a type of white blood cell called a plasma cell makes too much of the \"M\" (for \"monoclonal\") protein in the blood. Most people with MGUS are fine for many years. MGUS seldom causes symptoms or major health problems. In rare cases, MGUS may turn into multiple myeloma. This is a cancer of plasma cells in the blood that can cause bone weakness. Some people with MGUS may also have a higher risk for osteoporosis, in which bones become thin and weak. MGUS is sometimes seen in younger people, but is more common in people as they get older. It's seen most often in those over the age of 79. How is MGUS diagnosed? MGUS is often found by chance when blood tests are done for other reasons. If you have high levels of a certain protein in your blood, your doctor may order more tests. Blood tests can help identify the protein. The protein is sometimes found in the urine, so you may get a urine test too. Other tests may be done if your doctor thinks you might have a medical problem. In some cases, a bone marrow biopsy may be done to rule out a problem like multiple myeloma. How is it treated? Most people with MGUS don't need any treatment. But you may need regular physical exams, blood tests, and urine tests to make sure that MGUS isn't progressing to a medical problem. Follow-up care is a key part of your treatment and safety. Be sure to make and go to all appointments, and call your doctor if you are having problems. It's also a good idea to know your test results and keep a list of the medicines you take. Where can you learn more? Go to http://petty-timothy.info/.   Enter A917 in the search box to learn more about \"Learning About Monoclonal Gammopathy of Unknown Significance (MGUS). \"  Current as of: October 13, 2016  Content Version: 11.4  © 5525-0475 Healthwise, Incorporated. Care instructions adapted under license by [employer] (which disclaims liability or warranty for this information). If you have questions about a medical condition or this instruction, always ask your healthcare professional. [address] any warranty or liability for your use of this information.

## 2018-01-23 NOTE — MR AVS SNAPSHOT
[address] [facility] [address] [facility] [address] 
[phone] Patient: Darwin Palacio MRN: [id number] FQW:[id number] Visit Information Date & Time Provider Department Dept. Phone Encounter #  
 1/23/2018  3:15 PM [facility] Office [phone] [id number] Follow-up Instructions Return in about 3 months (around 4/23/2018). Your Appointments 4/10/2018  9:15 AM  
Office Visit with Lawyer Radu MD  
[facility] [address] [facility]) Appt Note: PSA  
 [address] [address]  
[phone] [address] [address]  
  
    
 4/24/2018  1:00 PM  
Office Visit with Cami Garces MD  
[facility] ([address] [facility]) Appt Note: 3 mo fu  
 [facility] [address] [facility] [address]  
[phone]  
  
   
 [facility] [address] Upcoming Health Maintenance Date Due DTaP/Tdap/Td series (1 - Tdap) 1/25/1956 ZOSTER VACCINE AGE 60> 11/25/1996 GLAUCOMA SCREENING Q2Y 1/25/2002 Pneumococcal 65+ High/Highest Risk (1 of 2 - PCV13) 1/25/2002 MEDICARE YEARLY EXAM 1/25/2002 Influenza Age 5 to Adult 8/1/2017 Allergies as of 1/23/2018  Review Complete On: 10/25/2017 By: Cami Garces MD  
  
 Severity Noted Reaction Type Reactions Iodinated Contrast- Oral And Iv Dye   Side Effect Hives Faint, shaking Current Immunizations  Reviewed on 5/5/2016 Name Date Influenza Vaccine 10/1/2015 Not reviewed this visit You Were Diagnosed With   
  
 Codes Comments Neutrophilic leukocytosis    -  Primary ICD-10-CM: D72.9 ICD-9-CM: 288.8 Monoclonal gammopathy     ICD-10-CM: D47.2 ICD-9-CM: 273.1 Thrombocytosis ([facility] [address]     ICD-10-CM: D47.3 ICD-9-CM: 238.71 Malignant neoplasm of prostate [facility])     ICD-10-CM: Z33 ICD-9-CM: 237 Iron deficiency anemia secondary to inadequate dietary iron intake     ICD-10-CM: D50.8 ICD-9-CM: 280.1 Vitals BP Pulse Temp Weight(growth percentile) BMI Smoking Status 147/73 97 98.3 °F (36.8 °C) 145 lb (65.8 kg) 19.13 kg/m2 Current Some Day Smoker BMI and BSA Data Body Mass Index Body Surface Area  
 19.13 kg/m 2 1.84 m 2 Preferred Pharmacy Pharmacy Name Phone [id number], [address] [place], [place] [address]. [phone] Your Updated Medication List  
  
   
This list is accurate as of: 1/23/18  4:07 PM.  Always use your most recent med list.  
  
  
  
  
 aspirin delayed-release 81 mg tablet Take  by mouth daily. atorvastatin 40 mg tablet Commonly known as:  LIPITOR Take 1 Tab by mouth nightly. AZOPT OP Apply  to eye. COMBIGAN OP Apply  to eye. DIOVAN 80 mg tablet Generic drug:  valsartan Take  by mouth daily. hydrochlorothiazide 12.5 mg cap 12.5 mg, lisinopril 5 mg tab 10 mg Take  by mouth daily. montelukast 10 mg tablet Commonly known as:  SINGULAIR Take 10 mg by mouth daily. NovoLIN 70/30 100 unit/mL (70-30) injection Generic drug:  insulin NPH/insulin regular  
by SubCUTAneous route once. oxybutynin 5 mg tablet Commonly known as:  DITROPAN  
TAKE 1 TABLET BY MOUTH THREE TIMES DAILY ([facility]) PRAVASTATIN PO Take  by mouth. SIMVASTATIN PO Take  by mouth. TRAVATAN Z OP Apply  to eye. We Performed the Following COMPLETE CBC & AUTO DIFF WBC [68131 CPT(R)] Follow-up Instructions Return in about 3 months (around 4/23/2018). To-Do List   
 01/23/2018 Lab:  CBC WITH 3 PART DIFF Patient Instructions Learning About Monoclonal Gammopathy of Unknown Significance (MGUS) What is MGUS? Monoclonal gammopathy of unknown significance (MGUS) is a blood condition. The blood is made of many kinds of cells, including red blood cells, platelets, and white blood cells. With MGUS, a type of white blood cell called a plasma cell makes too much of the \"M\" (for \"monoclonal\") protein in the blood. Most people with MGUS are fine for many years. MGUS seldom causes symptoms or major health problems. In rare cases, MGUS may turn into multiple myeloma. This is a cancer of plasma cells in the blood that can cause bone weakness. Some people with MGUS may also have a higher risk for osteoporosis, in which bones become thin and weak. MGUS is sometimes seen in younger people, but is more common in people as they get older. It's seen most often in those over the age of 79. How is MGUS diagnosed? MGUS is often found by chance when blood tests are done for other reasons. If you have high levels of a certain protein in your blood, your doctor may order more tests. Blood tests can help identify the protein. The protein is sometimes found in the urine, so you may get a urine test too. Other tests may be done if your doctor thinks you might have a medical problem. In some cases, a bone marrow biopsy may be done to rule out a problem like multiple myeloma. How is it treated? Most people with MGUS don't need any treatment. But you may need regular physical exams, blood tests, and urine tests to make sure that MGUS isn't progressing to a medical problem. Follow-up care is a key part of your treatment and safety. Be sure to make and go to all appointments, and call your doctor if you are having problems. It's also a good idea to know your test results and keep a list of the medicines you take. Where can you learn more? Go to http://petty-timothy.info/. Enter A917 in the search box to learn more about \"Learning About Monoclonal Gammopathy of Unknown Significance (MGUS). \" 
Current as of: October 13, 2016 Content Version: 11.4 © 5524-8076 Healthwise, Incorporated. Care instructions adapted under license by [employer] (which disclaims liability or warranty for this information). If you have questions about a medical condition or this instruction, always ask your healthcare professional. [address] any warranty or liability for your use of this information. Please provide this summary of care documentation to your next provider. Your primary care clinician is listed as Nolberto Fountain. If you have any questions after today's visit, please call [phone].

## 2018-01-24 LAB
ALBUMIN SERPL-MCNC: 3.1 G/DL (ref 3.4–5)
ALBUMIN/GLOB SERPL: 0.8 {RATIO} (ref 0.8–1.7)
ALP SERPL-CCNC: 76 U/L (ref 45–117)
ALT SERPL-CCNC: 13 U/L (ref 16–61)
ANION GAP SERPL CALC-SCNC: 13 MMOL/L (ref 3–18)
AST SERPL-CCNC: 21 U/L (ref 15–37)
BASO+EOS+MONOS # BLD AUTO: 1.4 K/UL (ref 0–2.3)
BASO+EOS+MONOS # BLD AUTO: 5 % (ref 0.1–17)
BILIRUB SERPL-MCNC: 0.9 MG/DL (ref 0.2–1)
BUN SERPL-MCNC: 21 MG/DL (ref 7–18)
BUN/CREAT SERPL: 22 (ref 12–20)
CALCIUM SERPL-MCNC: 8.6 MG/DL (ref 8.5–10.1)
CHLORIDE SERPL-SCNC: 104 MMOL/L (ref 100–108)
CO2 SERPL-SCNC: 23 MMOL/L (ref 21–32)
CREAT SERPL-MCNC: 0.95 MG/DL (ref 0.6–1.3)
DIFFERENTIAL METHOD BLD: ABNORMAL
ERYTHROCYTE [DISTWIDTH] IN BLOOD BY AUTOMATED COUNT: 18.3 % (ref 11.5–14.5)
FERRITIN SERPL-MCNC: 855 NG/ML (ref 8–388)
GLOBULIN SER CALC-MCNC: 3.9 G/DL (ref 2–4)
GLUCOSE SERPL-MCNC: 231 MG/DL (ref 74–99)
HCT VFR BLD AUTO: 33.6 % (ref 36–48)
HGB BLD-MCNC: 10.8 G/DL (ref 12–16)
IRON SATN MFR SERPL: 23 %
IRON SERPL-MCNC: 54 UG/DL (ref 50–175)
LYMPHOCYTES # BLD: 2.2 K/UL (ref 1.1–5.9)
LYMPHOCYTES NFR BLD: 8 % (ref 14–44)
MCH RBC QN AUTO: 25.3 PG (ref 25–35)
MCHC RBC AUTO-ENTMCNC: 32.1 G/DL (ref 31–37)
MCV RBC AUTO: 78.7 FL (ref 78–102)
NEUTS SEG # BLD: 22.8 K/UL (ref 1.8–9.5)
NEUTS SEG NFR BLD: 86 % (ref 40–70)
POTASSIUM SERPL-SCNC: 3.6 MMOL/L (ref 3.5–5.5)
PROT SERPL-MCNC: 7 G/DL (ref 6.4–8.2)
PSA SERPL-MCNC: 0.2 NG/ML (ref 0–4)
RBC # BLD AUTO: 4.27 M/UL (ref 4.1–5.1)
SODIUM SERPL-SCNC: 140 MMOL/L (ref 136–145)
TIBC SERPL-MCNC: 230 UG/DL (ref 250–450)
WBC # BLD AUTO: 26.4 K/UL (ref 4.5–13)

## 2018-01-25 LAB
ALBUMIN SERPL ELPH-MCNC: 3.2 G/DL (ref 2.9–4.4)
ALBUMIN/GLOB SERPL: 0.8 {RATIO} (ref 0.7–1.7)
ALPHA1 GLOB SERPL ELPH-MCNC: 0.3 G/DL (ref 0–0.4)
ALPHA2 GLOB SERPL ELPH-MCNC: 0.8 G/DL (ref 0.4–1)
B-GLOBULIN SERPL ELPH-MCNC: 1.3 G/DL (ref 0.7–1.3)
GAMMA GLOB SERPL ELPH-MCNC: 1.4 G/DL (ref 0.4–1.8)
GLOBULIN SER CALC-MCNC: 3.8 G/DL (ref 2.2–3.9)
M PROTEIN SERPL ELPH-MCNC: 0.7 G/DL
PROT SERPL-MCNC: 7 G/DL (ref 6–8.5)

## 2018-02-09 DIAGNOSIS — R35.0 URINARY FREQUENCY: ICD-10-CM

## 2018-02-09 RX ORDER — OXYBUTYNIN CHLORIDE 5 MG/1
5 TABLET ORAL 3 TIMES DAILY
Qty: 90 TAB | Refills: 10 | Status: SHIPPED | OUTPATIENT
Start: 2018-02-09 | End: 2019-01-01 | Stop reason: SDUPTHER

## 2018-02-09 NOTE — TELEPHONE ENCOUNTER
[facility] Per Dr. Alberto Frausto Oxybutynin 5 mg one three times daily #90 with 10 refills sent to pharmacy.

## 2018-04-09 ENCOUNTER — OFFICE VISIT (OUTPATIENT)
Dept: UROLOGY | Age: 83
End: 2018-04-09

## 2018-04-09 VITALS
DIASTOLIC BLOOD PRESSURE: 66 MMHG | HEIGHT: 73 IN | OXYGEN SATURATION: 98 % | BODY MASS INDEX: 18.16 KG/M2 | SYSTOLIC BLOOD PRESSURE: 127 MMHG | HEART RATE: 80 BPM | WEIGHT: 137 LBS

## 2018-04-09 DIAGNOSIS — D72.829 LEUKOCYTOSIS, UNSPECIFIED TYPE: ICD-10-CM

## 2018-04-09 DIAGNOSIS — C61 MALIGNANT NEOPLASM OF PROSTATE (HCC): Primary | ICD-10-CM

## 2018-04-09 DIAGNOSIS — R63.4 WEIGHT LOSS: ICD-10-CM

## 2018-04-09 LAB
BILIRUB UR QL STRIP: NORMAL
GLUCOSE UR-MCNC: NEGATIVE MG/DL
KETONES P FAST UR STRIP-MCNC: NEGATIVE MG/DL
PH UR STRIP: 5.5 [PH] (ref 4.6–8)
PROT UR QL STRIP: NORMAL
SP GR UR STRIP: 1.03 (ref 1–1.03)
UA UROBILINOGEN AMB POC: NORMAL (ref 0.2–1)
URINALYSIS CLARITY POC: CLEAR
URINALYSIS COLOR POC: YELLOW
URINE BLOOD POC: NORMAL
URINE LEUKOCYTES POC: NEGATIVE
URINE NITRITES POC: NEGATIVE

## 2018-04-09 NOTE — PROGRESS NOTES
Mr. Anand Gant has a reminder for a \"due or due soon\" health maintenance. I have asked that he contact his primary care provider for follow-up on this health maintenance.

## 2018-04-09 NOTE — PROGRESS NOTES
Chief Complaint   Patient presents with    Prostate Cancer       HISTORY OF PRESENT ILLNESS:  Hermes Holt is a 82 y.o. male who presents today in follow-up to prostate cancer which she is at least 20 years post radiation therapy. His PSAs have all been low in the PSA this year and Dr. Jim Gardner office was 0.2. He is urinating fine and having no significant issues with that. His biggest problem is an elevated white count and weight loss which is being evaluated by Dr. Jim Gardner. At this point in time there is no clear-cut diagnosis for the either the  weight loss or the elevated white count. A bone scan did not show any lytic lesion so there is no worry for prostate cancer and a bone marrow biopsy was performed by Dr. Jim Gardner and show no no evidence of either leukemia or multiple myeloma. ROS noted on the chart. Past Medical History:   Diagnosis Date    Diabetes ([facility] [address]     Hypertension     Malignant neoplasm of prostate ([facility] [address] 1998       Past Surgical History:   Procedure Laterality Date    BIOPSY OF PROSTATE,INCISIONAL  4/98    CT EXT BEAM RADIATION AS PRIMARY THERAPY TO PROSTATE ONLY  4/30-6/29/98       Social History   Substance Use Topics    Smoking status: Current Some Day Smoker     Packs/day: 0.10     Years: 22.00     Types: Cigarettes    Smokeless tobacco: Never Used    Alcohol use No       Allergies   Allergen Reactions    Iodinated Contrast- Oral And Iv Dye Hives     Faint, shaking       Family History   Problem Relation Age of Onset    Diabetes Other     Hypertension Other        Current Outpatient Prescriptions   Medication Sig Dispense Refill    oxybutynin (DITROPAN) 5 mg tablet Take 1 Tab by mouth three (3) times daily. 90 Tab 10    atorvastatin (LIPITOR) 40 mg tablet Take 1 Tab by mouth nightly.  hydrochlorothiazide 12.5 mg cap 12.5 mg, lisinopril 5 mg tab 10 mg Take  by mouth daily.  BRIMONIDINE TARTRATE/TIMOLOL (COMBIGAN OP) Apply  to eye.       BRINZOLAMIDE (AZOPT OP) Apply  to eye.  TRAVOPROST (TRAVATAN Z OP) Apply  to eye.  aspirin delayed-release 81 mg tablet Take  by mouth daily.  insulin (NOVOLIN 70/30) 100 unit/mL (70-30) injection by SubCUTAneous route once.  montelukast (SINGULAIR) 10 mg tablet Take 10 mg by mouth daily.  PRAVASTATIN SODIUM (PRAVASTATIN PO) Take  by mouth.  valsartan (DIOVAN) 80 mg tablet Take  by mouth daily.  SIMVASTATIN PO Take  by mouth. PHYSICAL EXAMINATION:   Visit Vitals    /66 (BP 1 Location: Right arm, BP Patient Position: Sitting)    Pulse 80    Ht 6' 1\" (1.854 m)    Wt 137 lb (62.1 kg)    SpO2 98%    BMI 18.07 kg/m2     Constitutional: WDWN, Pleasant and appropriate affect, No acute distress. Last year in this office his weight was recorded as 159 pounds and today is 137. CV:  No peripheral swelling noted  Respiratory: No respiratory distress or difficulties  Abdomen:  No abdominal masses or tenderness. No CVA tenderness. No inguinal hernias noted.  Male:    Deferred today. I am still reading that material gave but I only put in 7 hours of overtime on so skin: No jaundice. Neuro/Psych:  Alert and oriented x 3, affect appropriate. Lymphatic:   No enlarged inguinal lymph nodes. Results for orders placed or performed in visit on 04/09/18   AMB POC URINALYSIS DIP STICK AUTO W/O MICRO   Result Value Ref Range    Color (UA POC) Yellow     Clarity (UA POC) Clear     Glucose (UA POC) Negative Negative    Bilirubin (UA POC) 1+ Negative    Ketones (UA POC) Negative Negative    Specific gravity (UA POC) 1.030 1.001 - 1.035    Blood (UA POC) Trace Negative    pH (UA POC) 5.5 4.6 - 8.0    Protein (UA POC) 1+ Negative    Urobilinogen (UA POC) 1 mg/dL 0.2 - 1    Nitrites (UA POC) Negative Negative    Leukocyte esterase (UA POC) Negative Negative         REVIEW OF LABS AND IMAGING:     Imaging Report Reviewed? YES     Images Reviewed?       NO           Other Lab Data Reviewed? YES         ASSESSMENT:     ICD-10-CM ICD-9-CM    1. Malignant neoplasm of prostate (HCC) C61 185 AMB POC URINALYSIS DIP STICK AUTO W/O MICRO   2. Leukocytosis, unspecified type D72.829 288.60    3. Weight loss R63.4 783.21             PLAN / DISCUSSION: : From a healthcare situation, I think the PSA and the prostate cancer are inactive at this time. I told him that I thought what ever was causing his weight loss and elevated white count would be something that Dr. Earl Reyes hopefully will be able to figure out. We will see him back in a year. The patient expresses understanding and agreement of the discussion and plan. Reid Espinoza MD on 4/9/2018         Please note: This document has been produced using voice recognition software. Unrecognized errors in transcription may be present.

## 2018-04-09 NOTE — MR AVS SNAPSHOT
[address] [facility] A [address] [address] 
[phone] Patient: Kaylan Smalls MRN: [id number] Visit Information Date & Time Provider Department Dept. Phone Encounter #  
 4/9/2018  9:30 AM Leonarda Valles [place] [facility] [phone] [id number] Your Appointments 4/24/2018  1:00 PM  
Office Visit with MD Maylin Weiner [facility] Appt Note: 3 mo fu  
 [place] [address] [address]  
[phone]  
  
   
 [place] [address] [facility] [address]  
  
    
 4/8/2019 10:00 AM  
[facility] Appt Note: check up [address] [facility] A [address] [address]  
[phone] [address] [address] Upcoming Health Maintenance Date Due DTaP/Tdap/Td series (1 - Tdap) 1/25/1958 ZOSTER VACCINE AGE 60> 11/25/1996 GLAUCOMA SCREENING Q2Y 1/25/2002 Pneumococcal 65+ High/Highest Risk (1 of 2 - PCV13) 1/25/2002 Influenza Age 5 to Adult 8/1/2017 MEDICARE YEARLY EXAM 3/14/2018 Allergies as of 4/9/2018  Review Complete On: 4/9/2018 By: Thai Tarango MD  
  
 Severity Noted Reaction Type Reactions Iodinated Contrast- Oral And Iv Dye   Side Effect Hives Faint, shaking Current Immunizations  Reviewed on 5/5/2016 Name Date Influenza Vaccine 10/1/2015 Not reviewed this visit You Were Diagnosed With   
  
 Codes Comments Malignant neoplasm of prostate ([facility] [address]    -  Primary ICD-10-CM: Z05 ICD-9-CM: 136 Vitals BP Pulse Height(growth percentile) Weight(growth percentile) SpO2 BMI  
 127/66 (BP 1 Location: Right arm, BP Patient Position: Sitting) 80 6' 1\" (1.854 m) 137 lb (62.1 kg) 98% 18.07 kg/m2 Smoking Status Current Some Day Smoker Vitals History BMI and BSA Data Body Mass Index Body Surface Area 18.07 kg/m 2 1.79 m 2 Preferred Pharmacy Pharmacy Name Phone [id number] [phone] Your Updated Medication List  
  
   
This list is accurate as of 4/9/18  9:56 AM.  Always use your most recent med list.  
  
  
  
  
 aspirin delayed-release 81 mg tablet Take  by mouth daily. atorvastatin 40 mg tablet Commonly known as:  LIPITOR Take 1 Tab by mouth nightly. AZOPT OP Apply  to eye. COMBIGAN OP Apply  to eye. DIOVAN 80 mg tablet Generic drug:  valsartan Take  by mouth daily. hydrochlorothiazide 12.5 mg cap 12.5 mg, lisinopril 5 mg tab 10 mg Take  by mouth daily. montelukast 10 mg tablet Commonly known as:  SINGULAIR Take 10 mg by mouth daily. NovoLIN 70/30 U-100 Insulin 100 unit/mL (70-30) injection Generic drug:  insulin NPH/insulin regular  
by SubCUTAneous route once. oxybutynin 5 mg tablet Commonly known as:  BOEDSHNT Take 1 Tab by mouth three (3) times daily. PRAVASTATIN PO Take  by mouth. SIMVASTATIN PO Take  by mouth. TRAVATAN Z OP Apply  to eye. We Performed the Following AMB POC URINALYSIS DIP STICK AUTO W/O MICRO [47996 CPT(R)] Patient Instructions Prostate-Specific Antigen (PSA) Test: About This Test 
What is it? A prostate-specific antigen (PSA) test measures the amount of PSA in your blood. PSA is released by a man's prostate gland into his blood. A high PSA level may mean that you have an enlargement, infection, or cancer of the prostate. Why is this test done? You may have this test to: · Check for prostate cancer. · Watch prostate cancer and see if treatment is working. How can you prepare for the test? 
Do not ejaculate during the 2 days before your PSA blood test, either during sex or masturbation. What happens before the test? 
Tell your doctor if you have had a: 
· Test to look at your bladder (cystoscopy) in the past several weeks. · Prostate biopsy in the past several weeks. · Prostate infection or urinary tract infection that has not gone away. · Tube (catheter) inserted into your bladder to drain urine recently. What happens during the test? 
A health professional takes a sample of your blood. What happens after the test? 
You can go back to your usual activities right away. When should you call for help? Watch closely for changes in your health, and be sure to contact your doctor if you have any questions about this test. 
Follow-up care is a key part of your treatment and safety. Be sure to make and go to all appointments, and call your doctor if you are having problems. It's also a good idea to keep a list of the medicines you take. Ask your doctor when you can expect to have your test results. Where can you learn more? Go to http://petty-timothy.info/. Enter H650 in the search box to learn more about \"Prostate-Specific Antigen (PSA) Test: About This Test.\" Current as of: May 12, 2017 Content Version: 11.4 © 5646-3379 Healthwise, Inc[employer]. Care instructions adapted under license by [employer] (which disclaims liability or warranty for this information). If you have questions about a medical condition or this instruction, always ask your healthcare professional. [address] any warranty or liability for your use of this information. Please provide this summary of care documentation to your next provider. Your primary care clinician is listed as Estephania Winn. If you have any questions after today's visit, please call [phone].

## 2018-04-09 NOTE — PATIENT INSTRUCTIONS
Prostate-Specific Antigen (PSA) Test: About This Test  What is it? A prostate-specific antigen (PSA) test measures the amount of PSA in your blood. PSA is released by a man's prostate gland into his blood. A high PSA level may mean that you have an enlargement, infection, or cancer of the prostate. Why is this test done? You may have this test to:  · Check for prostate cancer. · Watch prostate cancer and see if treatment is working. How can you prepare for the test?  Do not ejaculate during the 2 days before your PSA blood test, either during sex or masturbation. What happens before the test?  Tell your doctor if you have had a:  · Test to look at your bladder (cystoscopy) in the past several weeks. · Prostate biopsy in the past several weeks. · Prostate infection or urinary tract infection that has not gone away. · Tube (catheter) inserted into your bladder to drain urine recently. What happens during the test?  A health professional takes a sample of your blood. What happens after the test?  You can go back to your usual activities right away. When should you call for help? Watch closely for changes in your health, and be sure to contact your doctor if you have any questions about this test.  Follow-up care is a key part of your treatment and safety. Be sure to make and go to all appointments, and call your doctor if you are having problems. It's also a good idea to keep a list of the medicines you take. Ask your doctor when you can expect to have your test results. Where can you learn more? Go to http://petty-timothy.info/. Enter X504 in the search box to learn more about \"Prostate-Specific Antigen (PSA) Test: About This Test.\"  Current as of: May 12, 2017  Content Version: 11.4  © 8125-6502 [employer]. Care instructions adapted under license by [employer] (which disclaims liability or warranty for this information).  If you have questions about a medical condition or this instruction, always ask your healthcare professional. [place] [address] any warranty or liability for your use of this information.

## 2018-04-24 ENCOUNTER — OFFICE VISIT (OUTPATIENT)
Dept: ONCOLOGY | Age: 83
End: 2018-04-24

## 2018-04-24 ENCOUNTER — HOSPITAL ENCOUNTER (OUTPATIENT)
Dept: LAB | Age: 83
Discharge: HOME | End: 2018-04-24
Payer: MEDICARE

## 2018-04-24 ENCOUNTER — HOSPITAL ENCOUNTER (OUTPATIENT)
Dept: ONCOLOGY | Age: 83
Discharge: HOME | End: 2018-04-24

## 2018-04-24 VITALS
HEART RATE: 88 BPM | BODY MASS INDEX: 18.34 KG/M2 | SYSTOLIC BLOOD PRESSURE: 129 MMHG | TEMPERATURE: 98.1 F | WEIGHT: 139 LBS | DIASTOLIC BLOOD PRESSURE: 72 MMHG

## 2018-04-24 DIAGNOSIS — D72.9 NEUTROPHILIC LEUKOCYTOSIS: ICD-10-CM

## 2018-04-24 DIAGNOSIS — D47.2 MONOCLONAL GAMMOPATHY: ICD-10-CM

## 2018-04-24 DIAGNOSIS — C61 MALIGNANT NEOPLASM OF PROSTATE (HCC): ICD-10-CM

## 2018-04-24 DIAGNOSIS — D75.839 THROMBOCYTOSIS: ICD-10-CM

## 2018-04-24 DIAGNOSIS — D47.2 MONOCLONAL GAMMOPATHY: Primary | ICD-10-CM

## 2018-04-24 LAB
ALBUMIN SERPL-MCNC: 3 G/DL (ref 3.4–5)
ALBUMIN/GLOB SERPL: 0.7 {RATIO} (ref 0.8–1.7)
ALP SERPL-CCNC: 91 U/L (ref 45–117)
ALT SERPL-CCNC: 20 U/L (ref 16–61)
ANION GAP SERPL CALC-SCNC: 7 MMOL/L (ref 3–18)
AST SERPL-CCNC: 34 U/L (ref 15–37)
BASO+EOS+MONOS # BLD AUTO: 1.1 K/UL (ref 0–2.3)
BASO+EOS+MONOS # BLD AUTO: 6 % (ref 0.1–17)
BILIRUB SERPL-MCNC: 0.8 MG/DL (ref 0.2–1)
BUN SERPL-MCNC: 14 MG/DL (ref 7–18)
BUN/CREAT SERPL: 18 (ref 12–20)
CALCIUM SERPL-MCNC: 8.1 MG/DL (ref 8.5–10.1)
CHLORIDE SERPL-SCNC: 103 MMOL/L (ref 100–108)
CO2 SERPL-SCNC: 30 MMOL/L (ref 21–32)
CREAT SERPL-MCNC: 0.8 MG/DL (ref 0.6–1.3)
DIFFERENTIAL METHOD BLD: ABNORMAL
ERYTHROCYTE [DISTWIDTH] IN BLOOD BY AUTOMATED COUNT: 18 % (ref 11.5–14.5)
GLOBULIN SER CALC-MCNC: 4.6 G/DL (ref 2–4)
GLUCOSE SERPL-MCNC: 117 MG/DL (ref 74–99)
HCT VFR BLD AUTO: 36.8 % (ref 36–48)
HGB BLD-MCNC: 11.7 G/DL (ref 12–16)
LYMPHOCYTES # BLD: 1.5 K/UL (ref 1.1–5.9)
LYMPHOCYTES NFR BLD: 8 % (ref 14–44)
MCH RBC QN AUTO: 24.6 PG (ref 25–35)
MCHC RBC AUTO-ENTMCNC: 31.8 G/DL (ref 31–37)
MCV RBC AUTO: 77.5 FL (ref 78–102)
NEUTS SEG # BLD: 15.9 K/UL (ref 1.8–9.5)
NEUTS SEG NFR BLD: 86 % (ref 40–70)
PLATELET # BLD AUTO: 545 K/UL (ref 135–420)
POTASSIUM SERPL-SCNC: 3.5 MMOL/L (ref 3.5–5.5)
PROT SERPL-MCNC: 7.6 G/DL (ref 6.4–8.2)
PSA SERPL-MCNC: 0.1 NG/ML (ref 0–4)
RBC # BLD AUTO: 4.75 M/UL (ref 4.1–5.1)
SODIUM SERPL-SCNC: 140 MMOL/L (ref 136–145)
WBC # BLD AUTO: 18.5 K/UL (ref 4.5–13)

## 2018-04-24 PROCEDURE — 36415 COLL VENOUS BLD VENIPUNCTURE: CPT | Performed by: INTERNAL MEDICINE

## 2018-04-24 PROCEDURE — 84165 PROTEIN E-PHORESIS SERUM: CPT | Performed by: INTERNAL MEDICINE

## 2018-04-24 PROCEDURE — 80053 COMPREHEN METABOLIC PANEL: CPT | Performed by: INTERNAL MEDICINE

## 2018-04-24 PROCEDURE — 84153 ASSAY OF PSA TOTAL: CPT | Performed by: INTERNAL MEDICINE

## 2018-04-24 PROCEDURE — 82784 ASSAY IGA/IGD/IGG/IGM EACH: CPT | Performed by: INTERNAL MEDICINE

## 2018-04-24 NOTE — PATIENT INSTRUCTIONS
Learning About Monoclonal Gammopathy of Unknown Significance (MGUS)  What is MGUS? Monoclonal gammopathy of unknown significance (MGUS) is a blood condition. The blood is made of many kinds of cells, including red blood cells, platelets, and white blood cells. With MGUS, a type of white blood cell called a plasma cell makes too much of the \"M\" (for \"monoclonal\") protein in the blood. Most people with MGUS are fine for many years. MGUS seldom causes symptoms or major health problems. In rare cases, MGUS may turn into multiple myeloma. This is a cancer of plasma cells in the blood that can cause bone weakness. Some people with MGUS may also have a higher risk for osteoporosis, in which bones become thin and weak. MGUS is sometimes seen in younger people, but is more common in people as they get older. It's seen most often in those over the age of 79. How is MGUS diagnosed? MGUS is often found by chance when blood tests are done for other reasons. If you have high levels of a certain protein in your blood, your doctor may order more tests. Blood tests can help identify the protein. The protein is sometimes found in the urine, so you may get a urine test too. Other tests may be done if your doctor thinks you might have a medical problem. In some cases, a bone marrow biopsy may be done to rule out a problem like multiple myeloma. How is it treated? Most people with MGUS don't need any treatment. But you may need regular physical exams, blood tests, and urine tests to make sure that MGUS isn't progressing to a medical problem. Follow-up care is a key part of your treatment and safety. Be sure to make and go to all appointments, and call your doctor if you are having problems. It's also a good idea to know your test results and keep a list of the medicines you take. Where can you learn more? Go to http://petty-timothy.info/.   Enter A917 in the search box to learn more about \"Learning About Monoclonal Gammopathy of Unknown Significance (MGUS). \"  Current as of: October 13, 2016  Content Version: 11.4  © 1110-1023 [employer]. Care instructions adapted under license by [employer] (which disclaims liability or warranty for this information). If you have questions about a medical condition or this instruction, always ask your healthcare professional. [address] any warranty or liability for your use of this information. Prostate Cancer: Care Instructions  Your Care Instructions    The prostate gland is a small, walnut-shaped organ that lies just below a man's bladder. It surrounds the urethra, the tube that carries urine from the bladder out of the body through the penis. Prostate cancer is the abnormal growth of cells in the prostate gland. Prostate cancer cells can spread within the prostate, to nearby lymph nodes and other tissues, and to other parts of the body. When the cancer hasn't spread outside the prostate, it is called localized prostate cancer. With localized prostate cancer, your options depend on how likely it is that your cancer will grow. Tests will show if your cancer is likely to grow. · Low-risk cancer isn't likely to grow right away. If your cancer is low-risk, you can choose active surveillance. This means your cancer will be watched closely by your doctor with regular checkups and tests to see if the cancer grows. This choice allows you to delay having surgery or radiation, often for many years. If the cancer grows very slowly, you may never need treatment. · Medium-risk cancer is more likely to grow. Some men with this type of cancer may be able to choose active surveillance. Others may need to choose surgery or radiation. · High-risk cancer is most likely to grow. If you have high-risk cancer, you will likely need to choose surgery or radiation.   If your cancer has already spread outside the prostate or to other parts of the body, then you may have other treatments, like chemotherapy or hormone therapy. If you are over age [de-identified] or have other serious health problems, like heart disease, you may choose not to have treatments to cure your cancer. Instead, you can just have treatments to manage your symptoms. This is called watchful waiting. Finding out that you have cancer is scary. You may feel many emotions and may need some help coping. Seek out family, friends, and counselors for support. You also can do things at home to make yourself feel better while you go through treatment. Call the [employer] [place] [facility] ([phone]) or visit its website at [address]ader for more information. Follow-up care is a key part of your treatment and safety. Be sure to make and go to all appointments, and call your doctor if you are having problems. It's also a good idea to know your test results and keep a list of the medicines you take. How can you care for yourself at home? · Your doctor will talk to you about your treatment options. You may need to learn more about each of them before you can decide which treatment is best for you. · Take your medicines exactly as prescribed. Call your doctor if you think you are having a problem with your medicine. You will get more details on the specific medicines your doctor prescribes. · Eat healthy food. If you do not feel like eating, try to eat food that has protein and extra calories to keep up your strength and prevent weight loss. Drink liquid meal replacements for extra calories and protein. Try to eat your main meal early. · Take steps to control your stress and workload. Learn relaxation techniques. ¨ Share your feelings. Stress and tension affect our emotions. By expressing your feelings to others, you may be able to understand and cope with them. ¨ Consider joining a support group.  Talking about a problem with your spouse, a good friend, or other people with similar problems is a good way to reduce tension and stress. ¨ Express yourself through art. Try writing, crafts, dance, or art to relieve stress. Some dance, writing, or art groups may be available just for people who have cancer. ¨ Be kind to your body and mind. Getting enough sleep, eating a healthy diet, and taking time to do things you enjoy can contribute to an overall feeling of balance in your life and can help reduce stress. ¨ Get help if you need it. Discuss your concerns with your doctor or counselor. · Get some physical activity every day, but do not get too tired. Keep doing the hobbies you enjoy as your energy allows. · If you are vomiting or have diarrhea:  ¨ Drink plenty of fluids (enough so that your urine is light yellow or clear like water) to prevent dehydration. Choose water and other caffeine-free clear liquids. If you have kidney, heart, or liver disease and have to limit fluids, talk with your doctor before you increase the amount of fluids you drink. ¨ When you are able to eat, try clear soups, mild foods, and liquids until all symptoms are gone for 12 to 48 hours. Jell-O, dry toast, crackers, and cooked cereal are also good choices. · If you have not already done so, prepare a list of advance directives. Advance directives are instructions to your doctor and family members about what kind of care you want if you become unable to speak or express yourself. When should you call for help? Call 911 anytime you think you may need emergency care. For example, call if:  ? · You passed out (lost consciousness). ?Call your doctor now or seek immediate medical care if:  ? · You have new or worse pain. ? · You have new symptoms, such as a cough, belly pain, vomiting, diarrhea, or a rash. ? · You have symptoms of a urinary tract infection. For example:  ¨ You have blood or pus in your urine. ¨ You have pain in your back just below your rib cage. This is called flank pain.   ¨ You have a fever, chills, or body aches.  ¨ It hurts to urinate. ¨ You have groin or belly pain. ? Watch closely for changes in your health, and be sure to contact your doctor if:  ? · You have swollen glands in your armpits, groin, or neck. ? · You have trouble controlling your urine. ? · You do not get better as expected. Where can you learn more? Go to http://petty-timothy.info/. Enter V141 in the search box to learn more about \"Prostate Cancer: Care Instructions. \"  Current as of: May 12, 2017  Content Version: 11.4  © 7273-4091 [employer]. Care instructions adapted under license by [employer] (which disclaims liability or warranty for this information). If you have questions about a medical condition or this instruction, always ask your healthcare professional. [address] any warranty or liability for your use of this information.

## 2018-04-24 NOTE — MR AVS SNAPSHOT
[address] [facility] [address] [facility] [address] 
[phone] Patient: Kehinde Salmon MRN: [id number] WMU:[birth year] Visit Information Date & Time Provider Department Dept. Phone Encounter #  
 4/24/2018  1:00 PM Christian Wright 71 Office [id number] Follow-up Instructions Return in about 3 months (around 7/24/2018). Your Appointments 7/24/2018 10:30 AM  
Office Visit with MD Maylin Wright 77 [address] [facility]) Appt Note: 3 month check [facility] [address] [facility] [address]  
[phone]  
  
   
 [facility] [address] [facility] [address]  
  
    
 4/8/2019 10:00 AM  
Office Visit with MD DAIJA Hubbard [facility] 39 [address] [facility]) Appt Note: check up [address] [facility] [address] [facility] [address] [phone] [address] [facility] [address] Upcoming Health Maintenance Date Due DTaP/Tdap/Td series (1 - Tdap) 1/25/1958 ZOSTER VACCINE AGE 60> 11/25/1996 GLAUCOMA SCREENING Q2Y 1/25/2002 Pneumococcal 65+ High/Highest Risk (1 of 2 - PCV13) 1/25/2002 Influenza Age 5 to Adult 8/1/2017 MEDICARE YEARLY EXAM 3/14/2018 Allergies as of 4/24/2018  Review Complete On: 4/24/2018 By: Linwood Luna MD  
  
 Severity Noted Reaction Type Reactions Iodinated Contrast- Oral And Iv Dye   Side Effect Hives Faint, shaking Current Immunizations  Reviewed on 5/5/2016 Name Date Influenza Vaccine 10/1/2015 Not reviewed this visit You Were Diagnosed With   
  
 Codes Comments Monoclonal gammopathy    -  Primary ICD-10-CM: M21.3 ICD-9-CM: 273.1 Neutrophilic leukocytosis     ICD-10-CM: D72.9 ICD-9-CM: 225. 8 Thrombocytosis ([facility] [address]     ICD-10-CM: D47.3 ICD-9-CM: 238.71 Malignant neoplasm of prostate [facility])     ICD-10-CM: J93 ICD-9-CM: 543 Vitals BP Pulse Temp Weight(growth percentile) BMI Smoking Status 129/72 88 98.1 °F (36.7 °C) (Oral) 139 lb (63 kg) 18.34 kg/m2 Current Some Day Smoker Vitals History BMI and BSA Data Body Mass Index Body Surface Area  
 18.34 kg/m 2 1.8 m 2 Preferred Pharmacy Pharmacy Name Phone [facility][id number] Gonzales [phone] Your Updated Medication List  
  
   
This list is accurate as of 4/24/18  1:50 PM.  Always use your most recent med list.  
  
  
  
  
 aspirin delayed-release 81 mg tablet Take  by mouth daily. atorvastatin 40 mg tablet Commonly known as:  LIPITOR Take 1 Tab by mouth nightly. AZOPT OP Apply  to eye. COMBIGAN OP Apply  to eye. DIOVAN 80 mg tablet Generic drug:  valsartan Take  by mouth daily. hydrochlorothiazide 12.5 mg cap 12.5 mg, lisinopril 5 mg tab 10 mg Take  by mouth daily. montelukast 10 mg tablet Commonly known as:  SINGULAIR Take 10 mg by mouth daily. NovoLIN 70/30 U-100 Insulin 100 unit/mL (70-30) injection Generic drug:  insulin NPH/insulin regular  
by SubCUTAneous route once. oxybutynin 5 mg tablet Commonly known as:  DHDHYFQD Take 1 Tab by mouth three (3) times daily. PRAVASTATIN PO Take  by mouth. SIMVASTATIN PO Take  by mouth. TRAVATAN Z OP Apply  to eye. We Performed the Following COMPLETE CBC & AUTO DIFF WBC [25018 CPT(R)] Follow-up Instructions Return in about 3 months (around 7/24/2018). To-Do List   
 04/24/2018 Lab:  CBC WITH 3 PART DIFF Patient Instructions Learning About Monoclonal Gammopathy of Unknown Significance (MGUS) What is MGUS? Monoclonal gammopathy of unknown significance (MGUS) is a blood condition. The blood is made of many kinds of cells, including red blood cells, platelets, and white blood cells.  With MGUS, a type of white blood cell called a plasma cell makes too much of the \"M\" (for \"monoclonal\") protein in the blood. Most people with MGUS are fine for many years. MGUS seldom causes symptoms or major health problems. In rare cases, MGUS may turn into multiple myeloma. This is a cancer of plasma cells in the blood that can cause bone weakness. Some people with MGUS may also have a higher risk for osteoporosis, in which bones become thin and weak. MGUS is sometimes seen in younger people, but is more common in people as they get older. It's seen most often in those over the age of 79. How is MGUS diagnosed? MGUS is often found by chance when blood tests are done for other reasons. If you have high levels of a certain protein in your blood, your doctor may order more tests. Blood tests can help identify the protein. The protein is sometimes found in the urine, so you may get a urine test too. Other tests may be done if your doctor thinks you might have a medical problem. In some cases, a bone marrow biopsy may be done to rule out a problem like multiple myeloma. How is it treated? Most people with MGUS don't need any treatment. But you may need regular physical exams, blood tests, and urine tests to make sure that MGUS isn't progressing to a medical problem. Follow-up care is a key part of your treatment and safety. Be sure to make and go to all appointments, and call your doctor if you are having problems. It's also a good idea to know your test results and keep a list of the medicines you take. Where can you learn more? Go to http://petty-timothy.info/. Enter A917 in the search box to learn more about \"Learning About Monoclonal Gammopathy of Unknown Significance (MGUS). \" 
Current as of: October 13, 2016 Content Version: 11.4 © 1617-1644 [employer]wise, [employer].  Care instructions adapted under license by [employer] (which disclaims liability or warranty for this information). If you have questions about a medical condition or this instruction, always ask your healthcare professional. [address] any warranty or liability for your use of this information. Prostate Cancer: Care Instructions Your Care Instructions The prostate gland is a small, walnut-shaped organ that lies just below a man's bladder. It surrounds the urethra, the tube that carries urine from the bladder out of the body through the penis. Prostate cancer is the abnormal growth of cells in the prostate gland. Prostate cancer cells can spread within the prostate, to nearby lymph nodes and other tissues, and to other parts of the body. When the cancer hasn't spread outside the prostate, it is called localized prostate cancer. With localized prostate cancer, your options depend on how likely it is that your cancer will grow. Tests will show if your cancer is likely to grow. · Low-risk cancer isn't likely to grow right away. If your cancer is low-risk, you can choose active surveillance. This means your cancer will be watched closely by your doctor with regular checkups and tests to see if the cancer grows. This choice allows you to delay having surgery or radiation, often for many years. If the cancer grows very slowly, you may never need treatment. · Medium-risk cancer is more likely to grow. Some men with this type of cancer may be able to choose active surveillance. Others may need to choose surgery or radiation. · High-risk cancer is most likely to grow. If you have high-risk cancer, you will likely need to choose surgery or radiation. If your cancer has already spread outside the prostate or to other parts of the body, then you may have other treatments, like chemotherapy or hormone therapy. If you are over age [de-identified] or have other serious health problems, like heart disease, you may choose not to have treatments to cure your cancer. Instead, you can just have treatments to manage your symptoms. This is called watchful waiting. Finding out that you have cancer is scary. You may feel many emotions and may need some help coping. Seek out family, friends, and counselors for support. You also can do things at home to make yourself feel better while you go through treatment. Call the [employer] [facility] [facility] ([phone]) or visit its website at [address] for more information. Follow-up care is a key part of your treatment and safety. Be sure to make and go to all appointments, and call your doctor if you are having problems. It's also a good idea to know your test results and keep a list of the medicines you take. How can you care for yourself at home? · Your doctor will talk to you about your treatment options. You may need to learn more about each of them before you can decide which treatment is best for you. · Take your medicines exactly as prescribed. Call your doctor if you think you are having a problem with your medicine. You will get more details on the specific medicines your doctor prescribes. · Eat healthy food. If you do not feel like eating, try to eat food that has protein and extra calories to keep up your strength and prevent weight loss. Drink liquid meal replacements for extra calories and protein. Try to eat your main meal early. · Take steps to control your stress and workload. Learn relaxation techniques. ¨ Share your feelings. Stress and tension affect our emotions. By expressing your feelings to others, you may be able to understand and cope with them. ¨ Consider joining a support group. Talking about a problem with your spouse, a good friend, or other people with similar problems is a good way to reduce tension and stress. ¨ Express yourself through art. Try writing, crafts, dance, or art to relieve stress. Some dance, writing, or art groups may be available just for people who have cancer. ¨ Be kind to your body and mind. Getting enough sleep, eating a healthy diet, and taking time to do things you enjoy can contribute to an overall feeling of balance in your life and can help reduce stress. ¨ Get help if you need it. Discuss your concerns with your doctor or counselor. · Get some physical activity every day, but do not get too tired. Keep doing the hobbies you enjoy as your energy allows. · If you are vomiting or have diarrhea: ¨ Drink plenty of fluids (enough so that your urine is light yellow or clear like water) to prevent dehydration. Choose water and other caffeine-free clear liquids. If you have kidney, heart, or liver disease and have to limit fluids, talk with your doctor before you increase the amount of fluids you drink. ¨ When you are able to eat, try clear soups, mild foods, and liquids until all symptoms are gone for 12 to 48 hours. Jell-O, dry toast, crackers, and cooked cereal are also good choices. · If you have not already done so, prepare a list of advance directives. Advance directives are instructions to your doctor and family members about what kind of care you want if you become unable to speak or express yourself. When should you call for help? Call 911 anytime you think you may need emergency care. For example, call if: 
? · You passed out (lost consciousness). ?Call your doctor now or seek immediate medical care if: 
? · You have new or worse pain. ? · You have new symptoms, such as a cough, belly pain, vomiting, diarrhea, or a rash. ? · You have symptoms of a urinary tract infection. For example: ¨ You have blood or pus in your urine. ¨ You have pain in your back just below your rib cage. This is called flank pain. ¨ You have a fever, chills, or body aches. ¨ It hurts to urinate. ¨ You have groin or belly pain. ? Watch closely for changes in your health, and be sure to contact your doctor if: 
? · You have swollen glands in your armpits, groin, or neck. ? · You have trouble controlling your urine. ? · You do not get better as expected. Where can you learn more? Go to http://petty-timothy.info/. Enter V141 in the search box to learn more about \"Prostate Cancer: Care Instructions. \" Current as of: May 12, 2017 Content Version: 11.4 © 8250-7203 [employer]. Care instructions adapted under license by [employer] (which disclaims liability or warranty for this information). If you have questions about a medical condition or this instruction, always ask your healthcare professional. [address] any warranty or liability for your use of this information. Please provide this summary of care documentation to your next provider. Your primary care clinician is listed as Evan Tong. If you have any questions after today's visit, please call [phone].

## 2018-04-24 NOTE — PROGRESS NOTES
Hematology/Oncology  Progress Note    Name: Milvia Hutchins  Date: 2018  : 1935    Arabella Cano MD     Mr. Colin Sierra is a 82 y.o.  male who was seen for follow-up relating to his MGUS, neutrophilic leukocytosis and anemia. Current therapy: Active surveillance    Subjective:     Mr. Colin Sierra is an 82-year-old -American man who has MGUS. He also has chronic neutrophilic leukocytosis with flow cytometry revealing no immunophenotypic aberrancy in the leukocyte populations. The patient has not experienced any unexplained fevers, night sweats, weight loss. Overall, the patient reports that he has been doing reasonably well since he was last seen in clinic. Past medical history, family history, and social history: these were reviewed and remains unchanged. Past Medical History:   Diagnosis Date    Diabetes ([facility]     Hypertension     Malignant neoplasm of prostate ([facility]      Past Surgical History:   Procedure Laterality Date    BIOPSY OF PROSTATE,INCISIONAL      FL EXT BEAM RADIATION AS PRIMARY THERAPY TO PROSTATE ONLY  -98     Social History     Social History    Marital status: SINGLE     Spouse name: N/A    Number of children: N/A    Years of education: N/A     Occupational History    Not on file. Social History Main Topics    Smoking status: Current Some Day Smoker     Packs/day: 0.10     Years: 22.00     Types: Cigarettes    Smokeless tobacco: Never Used    Alcohol use No    Drug use: No    Sexual activity: Not on file     Other Topics Concern    Not on file     Social History Narrative    ** Merged History Encounter **          Family History   Problem Relation Age of Onset    Diabetes Other     Hypertension Other      Current Outpatient Prescriptions   Medication Sig Dispense Refill    oxybutynin (DITROPAN) 5 mg tablet Take 1 Tab by mouth three (3) times daily. 90 Tab 10    atorvastatin (LIPITOR) 40 mg tablet Take 1 Tab by mouth nightly.       hydrochlorothiazide 12.5 mg cap 12.5 mg, lisinopril 5 mg tab 10 mg Take  by mouth daily.  montelukast (SINGULAIR) 10 mg tablet Take 10 mg by mouth daily.  PRAVASTATIN SODIUM (PRAVASTATIN PO) Take  by mouth.  BRIMONIDINE TARTRATE/TIMOLOL (COMBIGAN OP) Apply  to eye.  BRINZOLAMIDE (AZOPT OP) Apply  to eye.  TRAVOPROST (TRAVATAN Z OP) Apply  to eye.  valsartan (DIOVAN) 80 mg tablet Take  by mouth daily.  SIMVASTATIN PO Take  by mouth.  aspirin delayed-release 81 mg tablet Take  by mouth daily.  insulin (NOVOLIN 70/30) 100 unit/mL (70-30) injection by SubCUTAneous route once. Review of Systems  Constitutional: The patient has no acute distress or discomfort. HEENT: The patient denies recent head trauma, eye pain, blurred vision,  hearing deficit, oropharyngeal mucosal pain or lesions, and the patient denies throat pain or discomfort. Lymphatics: The patient denies palpable peripheral lymphadenopathy. Hematologic: The patient denies having bruising, bleeding, or progressive fatigue. Respiratory: Patient denies having shortness of breath, cough, sputum production, fever, or dyspnea on exertion. Cardiovascular: The patient denies having leg pain, leg swelling, heart palpitations, chest permit, chest pain, or lightheadedness. The patient denies having dyspnea on exertion. Gastrointestinal: The patient denies having nausea, emesis, or diarrhea. The patient denies having any hematemesis or blood in the stool. Genitourinary: Patient denies having urinary urgency, frequency, or dysuria. The patient denies having blood in the urine. Psychological: The patient denies having symptoms of nervousness, anxiety, depression, or thoughts of harming self. Skin: Patient denies having skin rashes, skin, ulcerations, or unexplained itching or pruritus. Musculoskeletal: The patient denies having pain in the joints or bones. Objective:      There were no vitals taken for this visit. ECOG PS=0  Physical Exam:   Gen. Appearance: The patient is in no acute distress. Skin: There is no bruise or rash. HEENT: The exam is unremarkable. Neck: Supple without lymphadenopathy or thyromegaly. Lungs: Clear to auscultation and percussion; there are no wheezes or rhonchi. Heart: Regular rate and rhythm; there are no murmurs, gallops, or rubs. Abdomen: Bowel sounds are present and normal.  There is no guarding, tenderness, or hepatosplenomegaly. Extremities: There is no clubbing, cyanosis, or edema. Neurologic: There are no focal neurologic deficits. Lymphatics: There is no palpable peripheral lymphadenopathy. Musculoskeletal: The patient has full range of motion at all joints. There is no evidence of joint deformity or effusions. There is no focal joint tenderness. Psychological/psychiatric: There is no clinical evidence of anxiety, depression, or melancholy. Lab data:      Results for orders placed or performed during the hospital encounter of 01/23/18   CBC WITH 3 PART DIFF     Status: Abnormal   Result Value Ref Range Status    WBC 26.4 (HH) 4.5 - 13.0 K/uL Final     Comment: CALLED TO AND CORRECTLY REPEATED BY:  CRITICAL RESULT VERIFIED AND GIVEN TO MAYRBETH AT 1640. GI      RBC 4.27 4. 10 - 5.10 M/uL Final    HGB 10.8 (L) 12.0 - 16.0 g/dL Final    HCT 33.6 (L) 36 - 48 % Final    MCV 78.7 78 - 102 FL Final    MCH 25.3 25.0 - 35.0 PG Final    MCHC 32.1 31 - 37 g/dL Final    RDW 18.3 (H) 11.5 - 14.5 % Final    NEUTROPHILS 86 (H) 40 - 70 % Final    MIXED CELLS 5 0.1 - 17 % Final    LYMPHOCYTES 8 (L) 14 - 44 % Final    ABS. NEUTROPHILS 22.8 (H) 1.8 - 9.5 K/UL Final    ABS. MIXED CELLS 1.4 0.0 - 2.3 K/uL Final    ABS. LYMPHOCYTES 2.2 1.1 - 5.9 K/UL Final     Comment: Test performed at [address]. Results Reviewed by Medical Director. DF AUTOMATED   Final           Assessment:     1. Monoclonal gammopathy    2. Neutrophilic leukocytosis    3.  Thrombocytosis ([facility]    4. Malignant neoplasm of [facility] ([facility]      Plan:   Neutrophilic leukocytosis: A prior immunophenotyping profile showed no immunophenotypic aberrancy in the leukocyte populations. The test did show evidence of a monoclonal plasma cell population which was subsequently confirmed by bone marrow biopsy and SPEP. Monoclonal gammopathy of undetermined significance: The bone marrow biopsy done on 10/11/2017 showed 45% monoclonal plasma cells consistent with MGUS. The omental fixation confirmed an IgG a kappa M spike production. We will continue to monitor this at 3 month intervals. Thrombocytosis: The current platelet count is 321,599. I have explained to the patient that the [address] [place] [address] mutation analysis was negative. If his platelet count exceeds 800,000 he will be started on hydroxyurea or anagrelide in the future. Malignant neoplasm of the prostate: The patient has a history of prostate cancer. His PSA test as of 8/29/2017 was 0.2 ng/mL. I will recheck his PSA level at this time. Follow-up in 3 months  Orders Placed This Encounter    METABOLIC PANEL, COMPREHENSIVE     Standing Status:   Future     Standing Expiration Date:   4/25/2019    SPEP     Standing Status:   Future     Standing Expiration Date:   4/25/2019    PROSTATE SPECIFIC AG     Standing Status:   Future     Standing Expiration Date:   4/25/2019    IMMUNOGLOBULINS, G/A/M, QT. Standing Status:   Future     Standing Expiration Date:   4/25/2019       Ivonne Proctor MD  4/24/2018      Please note: This document has been produced using voice recognition software. Unrecognized errors in transcription may be present.

## 2018-04-25 LAB
ALBUMIN SERPL ELPH-MCNC: 3.1 G/DL (ref 2.9–4.4)
ALBUMIN/GLOB SERPL: 0.8 {RATIO} (ref 0.7–1.7)
ALPHA1 GLOB SERPL ELPH-MCNC: 0.2 G/DL (ref 0–0.4)
ALPHA2 GLOB SERPL ELPH-MCNC: 0.8 G/DL (ref 0.4–1)
B-GLOBULIN SERPL ELPH-MCNC: 1.4 G/DL (ref 0.7–1.3)
GAMMA GLOB SERPL ELPH-MCNC: 1.6 G/DL (ref 0.4–1.8)
GLOBULIN SER CALC-MCNC: 4 G/DL (ref 2.2–3.9)
IGA SERPL-MCNC: 748 MG/DL (ref 61–437)
IGG SERPL-MCNC: 1632 MG/DL (ref 700–1600)
IGM SERPL-MCNC: 91 MG/DL (ref 15–143)
M PROTEIN SERPL ELPH-MCNC: 0.8 G/DL
PROT SERPL-MCNC: 7.1 G/DL (ref 6–8.5)

## 2018-04-26 ENCOUNTER — HOSPITAL ENCOUNTER (OUTPATIENT)
Dept: LAB | Age: 83
Discharge: HOME | End: 2018-04-26
Payer: MEDICARE

## 2018-04-26 DIAGNOSIS — I10 ESSENTIAL HYPERTENSION, BENIGN: ICD-10-CM

## 2018-04-26 DIAGNOSIS — Z00.00 ROUTINE GENERAL MEDICAL EXAMINATION AT A HEALTH CARE FACILITY: ICD-10-CM

## 2018-04-26 DIAGNOSIS — E78.00 PURE HYPERCHOLESTEROLEMIA: ICD-10-CM

## 2018-04-26 LAB
ALBUMIN SERPL-MCNC: 2.9 G/DL (ref 3.4–5)
ALBUMIN/GLOB SERPL: 0.7 {RATIO} (ref 0.8–1.7)
ALP SERPL-CCNC: 94 U/L (ref 45–117)
ALT SERPL-CCNC: 21 U/L (ref 16–61)
ANION GAP SERPL CALC-SCNC: 8 MMOL/L (ref 3–18)
AST SERPL-CCNC: 32 U/L (ref 15–37)
BASOPHILS # BLD: 0 K/UL (ref 0–0.06)
BASOPHILS NFR BLD: 0 % (ref 0–3)
BILIRUB SERPL-MCNC: 1 MG/DL (ref 0.2–1)
BUN SERPL-MCNC: 13 MG/DL (ref 7–18)
BUN/CREAT SERPL: 14 (ref 12–20)
CALCIUM SERPL-MCNC: 8.4 MG/DL (ref 8.5–10.1)
CHLORIDE SERPL-SCNC: 100 MMOL/L (ref 100–108)
CHOLEST SERPL-MCNC: 81 MG/DL
CO2 SERPL-SCNC: 29 MMOL/L (ref 21–32)
CREAT SERPL-MCNC: 0.9 MG/DL (ref 0.6–1.3)
DIFFERENTIAL METHOD BLD: ABNORMAL
EOSINOPHIL # BLD: 0.2 K/UL (ref 0–0.4)
EOSINOPHIL NFR BLD: 1 % (ref 0–5)
ERYTHROCYTE [DISTWIDTH] IN BLOOD BY AUTOMATED COUNT: 18.4 % (ref 11.6–14.5)
GLOBULIN SER CALC-MCNC: 4.4 G/DL (ref 2–4)
GLUCOSE SERPL-MCNC: 232 MG/DL (ref 74–99)
HCT VFR BLD AUTO: 35.4 % (ref 36–48)
HDLC SERPL-MCNC: 30 MG/DL (ref 40–60)
HDLC SERPL: 2.7 {RATIO} (ref 0–5)
HGB BLD-MCNC: 11.8 G/DL (ref 13–16)
LDLC SERPL CALC-MCNC: 38.2 MG/DL (ref 0–100)
LIPID PROFILE,FLP: ABNORMAL
LYMPHOCYTES # BLD: 2 K/UL (ref 0.8–3.5)
LYMPHOCYTES NFR BLD: 9 % (ref 20–51)
MCH RBC QN AUTO: 24.6 PG (ref 24–34)
MCHC RBC AUTO-ENTMCNC: 33.3 G/DL (ref 31–37)
MCV RBC AUTO: 73.8 FL (ref 74–97)
MONOCYTES # BLD: 0.4 K/UL (ref 0–1)
MONOCYTES NFR BLD: 2 % (ref 2–9)
NEUTS BAND NFR BLD MANUAL: 2 % (ref 0–5)
NEUTS SEG # BLD: 19.3 K/UL (ref 1.8–8)
NEUTS SEG NFR BLD: 86 % (ref 42–75)
NRBC BLD-RTO: 2 PER 100 WBC
PLATELET # BLD AUTO: 586 K/UL (ref 135–420)
PLATELET COMMENTS,PCOM: ABNORMAL
PMV BLD AUTO: 10.1 FL (ref 9.2–11.8)
POTASSIUM SERPL-SCNC: 3.7 MMOL/L (ref 3.5–5.5)
PROT SERPL-MCNC: 7.3 G/DL (ref 6.4–8.2)
RBC # BLD AUTO: 4.8 M/UL (ref 4.7–5.5)
RBC MORPH BLD: ABNORMAL
SODIUM SERPL-SCNC: 137 MMOL/L (ref 136–145)
TRIGL SERPL-MCNC: 64 MG/DL (ref ?–150)
VLDLC SERPL CALC-MCNC: 12.8 MG/DL
WBC # BLD AUTO: 21.9 K/UL (ref 4.6–13.2)

## 2018-04-26 PROCEDURE — 36415 COLL VENOUS BLD VENIPUNCTURE: CPT | Performed by: FAMILY MEDICINE

## 2018-04-26 PROCEDURE — 80061 LIPID PANEL: CPT | Performed by: FAMILY MEDICINE

## 2018-04-26 PROCEDURE — 80053 COMPREHEN METABOLIC PANEL: CPT | Performed by: FAMILY MEDICINE

## 2018-04-26 PROCEDURE — 85025 COMPLETE CBC W/AUTO DIFF WBC: CPT | Performed by: FAMILY MEDICINE

## 2018-07-16 ENCOUNTER — HOSPITAL ENCOUNTER (EMERGENCY)
Age: 83
Discharge: HOME | End: 2018-07-16
Attending: EMERGENCY MEDICINE
Payer: MEDICARE

## 2018-07-16 ENCOUNTER — APPOINTMENT (OUTPATIENT)
Dept: CT IMAGING | Age: 83
End: 2018-07-16
Attending: EMERGENCY MEDICINE
Payer: MEDICARE

## 2018-07-16 VITALS
RESPIRATION RATE: 18 BRPM | BODY MASS INDEX: 18.95 KG/M2 | SYSTOLIC BLOOD PRESSURE: 125 MMHG | HEART RATE: 80 BPM | DIASTOLIC BLOOD PRESSURE: 70 MMHG | OXYGEN SATURATION: 100 % | HEIGHT: 73 IN | TEMPERATURE: 98 F | WEIGHT: 143 LBS

## 2018-07-16 DIAGNOSIS — E16.2 HYPOGLYCEMIA: Primary | ICD-10-CM

## 2018-07-16 DIAGNOSIS — N30.01 ACUTE CYSTITIS WITH HEMATURIA: ICD-10-CM

## 2018-07-16 LAB
ANION GAP SERPL CALC-SCNC: 8 MMOL/L (ref 3–18)
APPEARANCE UR: ABNORMAL
BACTERIA URNS QL MICRO: ABNORMAL /HPF
BASOPHILS # BLD: 1.2 K/UL (ref 0–0.06)
BASOPHILS NFR BLD: 4 % (ref 0–3)
BILIRUB UR QL: NEGATIVE
BUN SERPL-MCNC: 17 MG/DL (ref 7–18)
BUN/CREAT SERPL: 19 (ref 12–20)
CALCIUM SERPL-MCNC: 6.7 MG/DL (ref 8.5–10.1)
CHLORIDE SERPL-SCNC: 101 MMOL/L (ref 100–108)
CO2 SERPL-SCNC: 28 MMOL/L (ref 21–32)
COLOR UR: YELLOW
CREAT SERPL-MCNC: 0.91 MG/DL (ref 0.6–1.3)
DIFFERENTIAL METHOD BLD: ABNORMAL
EOSINOPHIL # BLD: 0.3 K/UL (ref 0–0.4)
EOSINOPHIL NFR BLD: 1 % (ref 0–5)
EPITH CASTS URNS QL MICRO: ABNORMAL /LPF (ref 0–5)
ERYTHROCYTE [DISTWIDTH] IN BLOOD BY AUTOMATED COUNT: 18.3 % (ref 11.6–14.5)
GLUCOSE BLD STRIP.AUTO-MCNC: 109 MG/DL (ref 70–110)
GLUCOSE BLD STRIP.AUTO-MCNC: 126 MG/DL (ref 70–110)
GLUCOSE BLD STRIP.AUTO-MCNC: 304 MG/DL (ref 70–110)
GLUCOSE SERPL-MCNC: 137 MG/DL (ref 74–99)
GLUCOSE UR STRIP.AUTO-MCNC: NEGATIVE MG/DL
HCT VFR BLD AUTO: 32.4 % (ref 36–48)
HGB BLD-MCNC: 10.8 G/DL (ref 13–16)
HGB UR QL STRIP: ABNORMAL
HYALINE CASTS URNS QL MICRO: ABNORMAL /LPF (ref 0–2)
KETONES UR QL STRIP.AUTO: NEGATIVE MG/DL
LEUKOCYTE ESTERASE UR QL STRIP.AUTO: ABNORMAL
LYMPHOCYTES # BLD: 1.8 K/UL (ref 0.8–3.5)
LYMPHOCYTES NFR BLD: 6 % (ref 20–51)
MCH RBC QN AUTO: 24.5 PG (ref 24–34)
MCHC RBC AUTO-ENTMCNC: 33.3 G/DL (ref 31–37)
MCV RBC AUTO: 73.6 FL (ref 74–97)
METAMYELOCYTES NFR BLD MANUAL: 1 %
MONOCYTES # BLD: 0.9 K/UL (ref 0–1)
MONOCYTES NFR BLD: 3 % (ref 2–9)
NEUTS BAND NFR BLD MANUAL: 3 % (ref 0–5)
NEUTS SEG # BLD: 25.7 K/UL (ref 1.8–8)
NEUTS SEG NFR BLD: 82 % (ref 42–75)
NITRITE UR QL STRIP.AUTO: NEGATIVE
PH UR STRIP: 5.5 [PH] (ref 5–8)
PLATELET # BLD AUTO: 562 K/UL (ref 135–420)
PLATELET COMMENTS,PCOM: ABNORMAL
PMV BLD AUTO: 9.3 FL (ref 9.2–11.8)
POTASSIUM SERPL-SCNC: 3.3 MMOL/L (ref 3.5–5.5)
PROT UR STRIP-MCNC: 30 MG/DL
RBC # BLD AUTO: 4.4 M/UL (ref 4.7–5.5)
RBC #/AREA URNS HPF: ABNORMAL /HPF (ref 0–5)
RBC MORPH BLD: ABNORMAL
SODIUM SERPL-SCNC: 137 MMOL/L (ref 136–145)
SP GR UR REFRACTOMETRY: 1.02 (ref 1–1.03)
UROBILINOGEN UR QL STRIP.AUTO: 1 EU/DL (ref 0.2–1)
WBC # BLD AUTO: 30.2 K/UL (ref 4.6–13.2)
WBC URNS QL MICRO: ABNORMAL /HPF (ref 0–4)

## 2018-07-16 PROCEDURE — 99284 EMERGENCY DEPT VISIT MOD MDM: CPT

## 2018-07-16 PROCEDURE — 85025 COMPLETE CBC W/AUTO DIFF WBC: CPT | Performed by: EMERGENCY MEDICINE

## 2018-07-16 PROCEDURE — 80048 BASIC METABOLIC PNL TOTAL CA: CPT | Performed by: EMERGENCY MEDICINE

## 2018-07-16 PROCEDURE — 81001 URINALYSIS AUTO W/SCOPE: CPT | Performed by: EMERGENCY MEDICINE

## 2018-07-16 PROCEDURE — 82962 GLUCOSE BLOOD TEST: CPT

## 2018-07-16 PROCEDURE — 70450 CT HEAD/BRAIN W/O DYE: CPT

## 2018-07-16 RX ORDER — CEPHALEXIN 500 MG/1
500 CAPSULE ORAL 3 TIMES DAILY
Qty: 30 CAP | Refills: 0 | Status: SHIPPED | OUTPATIENT
Start: 2018-07-16 | End: 2018-07-26

## 2018-07-16 NOTE — DISCHARGE INSTRUCTIONS
Hypoglycemia: Care Instructions  Your Care Instructions    Hypoglycemia means that your blood sugar is low and your body is not getting enough fuel. Some people get low blood sugar from not eating often enough. Some medicines to treat diabetes can cause low blood sugar. People who have had surgery on their stomachs or intestines may get hypoglycemia. Problems with the pancreas, kidneys, or liver also can cause low blood sugar. A snack or drink with sugar in it will raise your blood sugar and should ease your symptoms right away. Your doctor may recommend that you change or stop your medicines until you can get your blood sugar levels under control. In the long run, you may need to change your diet and eating habits so that you get enough fuel for your body throughout the day. Follow-up care is a key part of your treatment and safety. Be sure to make and go to all appointments, and call your doctor if you are having problems. It's also a good idea to know your test results and keep a list of the medicines you take. How can you care for yourself at home? · Learn to recognize the early signs of low blood sugar. Signs include:  ¨ Nausea. ¨ Hunger. ¨ Feeling nervous, irritable, or shaky. ¨ Cold, clammy, wet skin. ¨ Sweating (when you are not exercising). ¨ A fast heartbeat. ¨ Numbness or tingling of the fingertips or lips. · If you feel an episode of low blood sugar coming on, drink fruit juice or sugared (not diet) soda, or eat sugar in the form of candy, cubes, or tablets. [place] are another [employer]. · Eat small, frequent meals so that you do not get too hungry between meals. · Balance extra exercise with eating more. · Keep a written record of your low blood sugar episodes, including when you last ate and what you ate, so that you can learn what causes your blood sugar to drop.   · Make sure your family, friends, and coworkers know the symptoms of low blood sugar and know what to do to get your sugar level up. · Wear medical alert jewelry that lists your condition. You can buy this at most drugstores. When should you call for help? Call 911 anytime you think you may need emergency care. For example, call if:    · You passed out (lost consciousness).     · You are confused or cannot think clearly.     · Your blood sugar is very high or very low.    Watch closely for changes in your health, and be sure to contact your doctor if:    · Your blood sugar stays outside the level your doctor set for you.     · You have any problems. Where can you learn more? Go to http://petty-timothy.info/. Enter L832 in the search box to learn more about \"Hypoglycemia: Care Instructions. \"  Current as of: December 7, 2017  Content Version: 11.7  © 9778-1017 [employer]. Care instructions adapted under license by [employer] (which disclaims liability or warranty for this information). If you have questions about a medical condition or this instruction, always ask your healthcare professional. [place] [address] any warranty or liability for your use of this information. Urinary Tract Infections in Men: Care Instructions  Your Care Instructions    A urinary tract infection, or UTI, is a general term for an infection anywhere between the kidneys and the tip of the penis. UTIs can also be a result of a prostate problem. Most cause pain or burning when you urinate. Most UTIs are caused by bacteria and can be cured with antibiotics. It is important to complete your treatment so that the infection does not get worse. Follow-up care is a key part of your treatment and safety. Be sure to make and go to all appointments, and call your doctor if you are having problems. It's also a good idea to know your test results and keep a list of the medicines you take. How can you care for yourself at home? · Take your antibiotics as prescribed.  Do not stop taking them just because you feel better. You need to take the full course of antibiotics. · Take your medicines exactly as prescribed. Your doctor may have prescribed a medicine, such as phenazopyridine (Pyridium), to help relieve pain when you urinate. This turns your urine orange. You may stop taking it when your symptoms get better. But be sure to take all of your antibiotics, which treat the infection. · Drink extra water for the next day or two. This will help make the urine less concentrated and help wash out the bacteria causing the infection. (If you have kidney, heart, or liver disease and have to limit your fluids, talk with your doctor before you increase your fluid intake.)  · Avoid drinks that are carbonated or have caffeine. They can irritate the bladder. · Urinate often. Try to empty your bladder each time. · To relieve pain, take a hot bath or lay a heating pad (set on low) over your lower belly or genital area. Never go to sleep with a heating pad in place. To help prevent UTIs  · Drink plenty of fluids, enough so that your urine is light yellow or clear like water. If you have kidney, heart, or liver disease and have to limit fluids, talk with your doctor before you increase the amount of fluids you drink. · Urinate when you have the urge. Do not hold your urine for a long time. Urinate before you go to sleep. · Keep your penis clean. Catheter care  If you have a drainage tube (catheter) in place, the following steps will help you care for it. · Always wash your hands before and after touching your catheter. · Check the area around the urethra for inflammation or signs of infection. Signs of infection include irritated, swollen, red, or tender skin, or pus around the catheter. · Clean the area around the catheter with soap and water two times a day. Dry with a clean towel afterward. · Do not apply powder or lotion to the skin around the catheter.   To empty the urine collection bag  · Wash your hands with soap and water. · Without touching the drain spout, remove the spout from its sleeve at the bottom of the collection bag. Open the valve on the spout. · Let the urine flow out of the bag and into the toilet or a container. Do not let the tubing or drain spout touch anything. · After you empty the bag, clean the end of the drain spout with tissue and water. Close the valve and put the drain spout back into its sleeve at the bottom of the collection bag. · Wash your hands with soap and water. When should you call for help? Call your doctor now or seek immediate medical care if:    · Symptoms such as a fever, chills, nausea, or vomiting get worse or happen for the first time.     · You have new pain in your back just below your rib cage. This is called flank pain.     · There is new blood or pus in your urine.     · You are not able to take or keep down your antibiotics.    Watch closely for changes in your health, and be sure to contact your doctor if:    · You are not getting better after taking an antibiotic for 2 days.     · Your symptoms go away but then come back. Where can you learn more? Go to http://petty-timothy.info/. Enter G473 in the search box to learn more about \"Urinary Tract Infections in Men: Care Instructions. \"  Current as of: May 12, 2017  Content Version: 11.7  © 8567-4663 [employer]. Care instructions adapted under license by [employer] (which disclaims liability or warranty for this information). If you have questions about a medical condition or this instruction, always ask your healthcare professional. [place] [address] any warranty or liability for your use of this information.

## 2018-07-16 NOTE — ED TRIAGE NOTES
Received patient into a hallway bed. Patient had a hypogylcemic event. EMS administer 25gm 50% dextrose via IV.

## 2018-07-16 NOTE — ED PROVIDER NOTES
EMERGENCY DEPARTMENT HISTORY AND PHYSICAL EXAM    3:03 AM      Date: 7/16/2018  Patient Name: Ed Kohli    History of Presenting Illness     Chief Complaint   Patient presents with    Low Blood Sugar         History Provided By: Patient    Chief Complaint: fall and head injury  Duration:  Minutes  Timing:  Acute  Location: head  Quality: n/a  Severity: N/A  Modifying Factors: none  Associated Symptoms: loss of appetite and weight change      Additional History (Context): Ed Kohli is a 82 y.o. male with diabetes and hypertension who presents with his daughter c/o low blood sugar at home and fall with head injury tonight. Pt states he fell out of his chair hitting his head. There was no LOC. Takes aspirin daily. Takes novolin 70/30. Per the daughter he has been eating less and she has noticed he has been losing weight. While presenting the pt states he feels fine. Denies CP, SOB, nausea, vomiting, dysuria, frequency, fever, chills, diarrhea, weakness, numbness, or any other associated sx. No other complaints or concerns. PCP: Gonzales Caicedo MD    Current Outpatient Prescriptions   Medication Sig Dispense Refill    cephALEXin (KEFLEX) 500 mg capsule Take 1 Cap by mouth three (3) times daily for 10 days. 30 Cap 0    oxybutynin (DITROPAN) 5 mg tablet Take 1 Tab by mouth three (3) times daily. 90 Tab 10    atorvastatin (LIPITOR) 40 mg tablet Take 1 Tab by mouth nightly.  hydrochlorothiazide 12.5 mg cap 12.5 mg, lisinopril 5 mg tab 10 mg Take  by mouth daily.  montelukast (SINGULAIR) 10 mg tablet Take 10 mg by mouth daily.  PRAVASTATIN SODIUM (PRAVASTATIN PO) Take  by mouth.  BRIMONIDINE TARTRATE/TIMOLOL (COMBIGAN OP) Apply  to eye.  BRINZOLAMIDE (AZOPT OP) Apply  to eye.  TRAVOPROST (TRAVATAN Z OP) Apply  to eye.  valsartan (DIOVAN) 80 mg tablet Take  by mouth daily.  SIMVASTATIN PO Take  by mouth.       aspirin delayed-release 81 mg tablet Take  by mouth daily.      insulin (NOVOLIN 70/30) 100 unit/mL (70-30) injection by SubCUTAneous route once. Past History     Past Medical History:  Past Medical History:   Diagnosis Date    [facility] [address]     Hypertension     Malignant neoplasm of prostate ([facility] [address] 1998       Past Surgical History:  Past Surgical History:   Procedure Laterality Date    BIOPSY OF PROSTATE,INCISIONAL  4/98    MT EXT BEAM RADIATION AS PRIMARY THERAPY TO PROSTATE ONLY  4/30-6/29/98       Family History:  Family History   Problem Relation Age of Onset    Diabetes Other     Hypertension Other        Social History:  Social History   Substance Use Topics    Smoking status: Current Some Day Smoker     Packs/day: 0.10     Years: 22.00     Types: Cigarettes    Smokeless tobacco: Never Used    Alcohol use No       Allergies: Allergies   Allergen Reactions    Iodinated Contrast- Oral And Iv Dye Hives     Faint, shaking         Review of Systems     Review of Systems   Constitutional: Positive for appetite change and unexpected weight change. Negative for chills, fatigue and fever. HENT: Negative. Negative for sore throat. Eyes: Negative. Respiratory: Negative for cough and shortness of breath. Cardiovascular: Negative for chest pain and palpitations. Gastrointestinal: Negative for abdominal pain, nausea and vomiting. Genitourinary: Negative for dysuria. Musculoskeletal: Negative. Skin: Negative. Neurological: Negative for dizziness, weakness, light-headedness, numbness and headaches. Psychiatric/Behavioral: Negative. All other systems reviewed and are negative. Visit Vitals    /74 (BP 1 Location: Right arm, BP Patient Position: At rest)    Pulse 67    Temp 96.3 °F (35.7 °C)    Resp 18    Ht 6' 1\" (1.854 m)    Wt 64.9 kg (143 lb)    SpO2 98%    BMI 18.87 kg/m2           Physical Exam / Medical Decision Making   I am the first provider for this patient.     I reviewed the vital signs, available nursing notes, past medical history, past surgical history, family history and social history. Vital Signs-Reviewed the patient's vital signs. Physical exam:  General:  Well-developed, well-nourished, very thin male strong smell of urine nontoxic nondiaphoretic. Head:  Normocephalic ecchymosis and contusion of RIGHT upper eyebrow, RIGHT inferior orbital margin no laceration . Eyes:  Pupils midrange extraocular movements intact. No pallor or conjunctival injection. Nose:  No rhinorrhea, inspection grossly normal.  Abrasion without septal hematoma or pain on palpation nasal bridge  Ears:  Grossly normal to inspection, no discharge. Mouth:  Mucous membranes moist, no appreciable intraoral lesion. Neck/Back:  Trachea midline, no asymmetry. Chest:  Grossly normal inspection, symmetric chest rise. Pulmonary:  Clear to auscultation bilaterally no wheezes rhonchi or rales. Cardiovascular:  S1-S2 no murmurs rubs or gallops. Abdomen: Soft, nontender, nondistended no guarding rebound or peritoneal signs. No suprapubic tenderness no CVA tenderness  Extremities:  Grossly normal to inspection, peripheral pulses intact    Neurologic:  Alert and oriented no appreciable focal neurologic deficit     Skin:  Warm and dry  Psychiatric:  Grossly normal mood and affect  Nursing note reviewed, vital signs reviewed. ED course:  Patient presents with low blood sugar, admits that he does not eat very well, he takes 7030 fairly large dose in the morning, he was fed here    Here he is afebrile and not tachycardic saturation normal on room air    Labs unremarkable    Urinalysis consistent with infection    Repeat blood sugar acceptable    CT head negative    Patient's presentation, history, physical exam and laboratory evaluations were reviewed.   At this time patient was felt to be stable for outpatient management and follow with primary care/specialist.  Patient was instructed to return to the emergency department with any concerns. Disposition:    Discharged home      Portions of this chart were created with Dragon medical speech to text program.   Unrecognized errors may be present. Diagnostic Study Results     Labs -  Recent Results (from the past 12 hour(s))   GLUCOSE, POC    Collection Time: 07/16/18  1:23 AM   Result Value Ref Range    Glucose (POC) 109 70 - 110 mg/dL   GLUCOSE, POC    Collection Time: 07/16/18  2:30 AM   Result Value Ref Range    Glucose (POC) 126 (H) 70 - 110 mg/dL   CBC WITH AUTOMATED DIFF    Collection Time: 07/16/18  2:38 AM   Result Value Ref Range    WBC 30.2 (H) 4.6 - 13.2 K/uL    RBC 4.40 (L) 4.70 - 5.50 M/uL    HGB 10.8 (L) 13.0 - 16.0 g/dL    HCT 32.4 (L) 36.0 - 48.0 %    MCV 73.6 (L) 74.0 - 97.0 FL    MCH 24.5 24.0 - 34.0 PG    MCHC 33.3 31.0 - 37.0 g/dL    RDW 18.3 (H) 11.6 - 14.5 %    PLATELET 424 (H) 882 - 420 K/uL    MPV 9.3 9.2 - 11.8 FL    NEUTROPHILS 82 (H) 42 - 75 %    BAND NEUTROPHILS 3 0 - 5 %    LYMPHOCYTES 6 (L) 20 - 51 %    MONOCYTES 3 2 - 9 %    EOSINOPHILS 1 0 - 5 %    BASOPHILS 4 (H) 0 - 3 %    METAMYELOCYTES 1 (H) 0 %    ABS. NEUTROPHILS 25.7 (H) 1.8 - 8.0 K/UL    ABS. LYMPHOCYTES 1.8 0.8 - 3.5 K/UL    ABS. MONOCYTES 0.9 0 - 1.0 K/UL    ABS. EOSINOPHILS 0.3 0.0 - 0.4 K/UL    ABS.  BASOPHILS 1.2 (H) 0.0 - 0.06 K/UL    DF MANUAL      PLATELET COMMENTS Increased Platelets      RBC COMMENTS ANISOCYTOSIS  1+        RBC COMMENTS POLYCHROMASIA  1+        RBC COMMENTS MICROCYTOSIS  1+       METABOLIC PANEL, BASIC    Collection Time: 07/16/18  2:38 AM   Result Value Ref Range    Sodium 137 136 - 145 mmol/L    Potassium 3.3 (L) 3.5 - 5.5 mmol/L    Chloride 101 100 - 108 mmol/L    CO2 28 21 - 32 mmol/L    Anion gap 8 3.0 - 18 mmol/L    Glucose 137 (H) 74 - 99 mg/dL    BUN 17 7.0 - 18 MG/DL    Creatinine 0.91 0.6 - 1.3 MG/DL    BUN/Creatinine ratio 19 12 - 20      GFR est AA >60 >60 ml/min/1.73m2    GFR est non-AA >60 >60 ml/min/1.73m2    Calcium 6.7 (L) 8.5 - 10.1 MG/DL   URINALYSIS W/ RFLX MICROSCOPIC    Collection Time: 07/16/18  3:35 AM   Result Value Ref Range    Color YELLOW      Appearance CLOUDY      Specific gravity 1.017 1.005 - 1.030      pH (UA) 5.5 5.0 - 8.0      Protein 30 (A) NEG mg/dL    Glucose NEGATIVE  NEG mg/dL    Ketone NEGATIVE  NEG mg/dL    Bilirubin NEGATIVE  NEG      Blood TRACE (A) NEG      Urobilinogen 1.0 0.2 - 1.0 EU/dL    Nitrites NEGATIVE  NEG      Leukocyte Esterase MODERATE (A) NEG     URINE MICROSCOPIC ONLY    Collection Time: 07/16/18  3:35 AM   Result Value Ref Range    WBC TOO NUMEROUS TO COUNT 0 - 4 /hpf    RBC 0 to 3 0 - 5 /hpf    Epithelial cells FEW 0 - 5 /lpf    Bacteria 2+ (A) NEG /hpf    Hyaline cast 0 to 3 0 - 2 /lpf   GLUCOSE, POC    Collection Time: 07/16/18  4:40 AM   Result Value Ref Range    Glucose (POC) 304 (H) 70 - 110 mg/dL       Radiologic Studies -   CT HEAD WO CONT   Final Result          Diagnosis     Clinical Impression:   1. Hypoglycemia    2. Acute cystitis with hematuria            Follow-up Information     Follow up With Details Comments Contact Wendy Aguilar MD Call Follow up. [address] [facility] EM[facility]ENC[facility]PT  If symptoms worsen, As needed [address] [place] [id number]  [phone]           Patient's Medications   Start Taking    CEPHALEXIN (KEFLEX) 500 MG CAPSULE    Take 1 Cap by mouth three (3) times daily for 10 days. Continue Taking    ASPIRIN DELAYED-RELEASE 81 MG TABLET    Take  by mouth daily. ATORVASTATIN (LIPITOR) 40 MG TABLET    Take 1 Tab by mouth nightly. BRIMONIDINE TARTRATE/TIMOLOL (COMBIGAN OP)    Apply  to eye. BRINZOLAMIDE (AZOPT OP)    Apply  to eye. HYDROCHLOROTHIAZIDE 12.5 MG CAP 12.5 MG, LISINOPRIL 5 MG TAB 10 MG    Take  by mouth daily. INSULIN (NOVOLIN 70/30) 100 UNIT/ML (70-30) INJECTION    by SubCUTAneous route once. MONTELUKAST (SINGULAIR) 10 MG TABLET    Take 10 mg by mouth daily. OXYBUTYNIN (DITROPAN) 5 MG TABLET    Take 1 Tab by mouth three (3) times daily. PRAVASTATIN SODIUM (PRAVASTATIN PO)    Take  by mouth. SIMVASTATIN PO    Take  by mouth. TRAVOPROST (TRAVATAN Z OP)    Apply  to eye. VALSARTAN (DIOVAN) 80 MG TABLET    Take  by mouth daily. These Medications have changed    No medications on file   Stop Taking    No medications on file     _______________________________    Attestations:  [address] acting as a scribe for and in the presence of Edwyna Lesches, MD      July 16, 2018 at 3:03 AM       Provider Attestation:      I personally performed the services described in the documentation, reviewed the documentation, as recorded by the scribe in my presence, and it accurately and completely records my words and actions.  July 16, 2018 at 3:03 AM - Edwyna Lesches, MD    _______________________________

## 2018-07-17 ENCOUNTER — HOME HEALTH ADMISSION (OUTPATIENT)
Dept: HOME HEALTH SERVICES | Facility: HOME HEALTH | Age: 83
End: 2018-07-17
Payer: MEDICARE

## 2018-07-17 ENCOUNTER — HOSPITAL ENCOUNTER (EMERGENCY)
Age: 83
Discharge: HOME | End: 2018-07-17
Attending: EMERGENCY MEDICINE
Payer: MEDICARE

## 2018-07-17 VITALS
OXYGEN SATURATION: 100 % | HEART RATE: 72 BPM | DIASTOLIC BLOOD PRESSURE: 65 MMHG | TEMPERATURE: 97.5 F | SYSTOLIC BLOOD PRESSURE: 135 MMHG | WEIGHT: 133 LBS | BODY MASS INDEX: 17.55 KG/M2 | RESPIRATION RATE: 14 BRPM

## 2018-07-17 DIAGNOSIS — R41.3 MEMORY LOSS: ICD-10-CM

## 2018-07-17 DIAGNOSIS — E16.2 HYPOGLYCEMIA: ICD-10-CM

## 2018-07-17 DIAGNOSIS — D72.829 LEUKOCYTOSIS, UNSPECIFIED TYPE: Primary | ICD-10-CM

## 2018-07-17 LAB
ALBUMIN SERPL-MCNC: 2.7 G/DL (ref 3.4–5)
ALBUMIN/GLOB SERPL: 0.6 {RATIO} (ref 0.8–1.7)
ALP SERPL-CCNC: 95 U/L (ref 45–117)
ALT SERPL-CCNC: 18 U/L (ref 16–61)
ANION GAP SERPL CALC-SCNC: 7 MMOL/L (ref 3–18)
AST SERPL-CCNC: 38 U/L (ref 15–37)
BASOPHILS # BLD: 0 K/UL (ref 0–0.06)
BASOPHILS NFR BLD: 0 % (ref 0–3)
BILIRUB SERPL-MCNC: 0.7 MG/DL (ref 0.2–1)
BUN SERPL-MCNC: 20 MG/DL (ref 7–18)
BUN/CREAT SERPL: 20 (ref 12–20)
CALCIUM SERPL-MCNC: 6.7 MG/DL (ref 8.5–10.1)
CHLORIDE SERPL-SCNC: 103 MMOL/L (ref 100–108)
CO2 SERPL-SCNC: 29 MMOL/L (ref 21–32)
CREAT SERPL-MCNC: 1 MG/DL (ref 0.6–1.3)
DIFFERENTIAL METHOD BLD: ABNORMAL
EOSINOPHIL # BLD: 0.3 K/UL (ref 0–0.4)
EOSINOPHIL NFR BLD: 1 % (ref 0–5)
ERYTHROCYTE [DISTWIDTH] IN BLOOD BY AUTOMATED COUNT: 18.6 % (ref 11.6–14.5)
GLOBULIN SER CALC-MCNC: 4.6 G/DL (ref 2–4)
GLUCOSE BLD STRIP.AUTO-MCNC: 155 MG/DL (ref 70–110)
GLUCOSE BLD STRIP.AUTO-MCNC: 66 MG/DL (ref 70–110)
GLUCOSE BLD STRIP.AUTO-MCNC: 67 MG/DL (ref 70–110)
GLUCOSE SERPL-MCNC: 56 MG/DL (ref 74–99)
HCT VFR BLD AUTO: 30.2 % (ref 36–48)
HGB BLD-MCNC: 9.9 G/DL (ref 13–16)
LACTATE BLD-SCNC: 1.7 MMOL/L (ref 0.4–2)
LYMPHOCYTES # BLD: 1.5 K/UL (ref 0.8–3.5)
LYMPHOCYTES NFR BLD: 5 % (ref 20–51)
MCH RBC QN AUTO: 24.4 PG (ref 24–34)
MCHC RBC AUTO-ENTMCNC: 32.8 G/DL (ref 31–37)
MCV RBC AUTO: 74.4 FL (ref 74–97)
METAMYELOCYTES NFR BLD MANUAL: 3 %
MONOCYTES # BLD: 0.9 K/UL (ref 0–1)
MONOCYTES NFR BLD: 3 % (ref 2–9)
MYELOCYTES NFR BLD MANUAL: 1 %
NEUTS BAND NFR BLD MANUAL: 3 % (ref 0–5)
NEUTS SEG # BLD: 25.7 K/UL (ref 1.8–8)
NEUTS SEG NFR BLD: 84 % (ref 42–75)
PLATELET # BLD AUTO: 503 K/UL (ref 135–420)
PLATELET COMMENTS,PCOM: ABNORMAL
PMV BLD AUTO: 9.3 FL (ref 9.2–11.8)
POTASSIUM SERPL-SCNC: 3.3 MMOL/L (ref 3.5–5.5)
PROT SERPL-MCNC: 7.3 G/DL (ref 6.4–8.2)
RBC # BLD AUTO: 4.06 M/UL (ref 4.7–5.5)
RBC MORPH BLD: ABNORMAL
SODIUM SERPL-SCNC: 139 MMOL/L (ref 136–145)
WBC # BLD AUTO: 29.5 K/UL (ref 4.6–13.2)

## 2018-07-17 PROCEDURE — 85025 COMPLETE CBC W/AUTO DIFF WBC: CPT | Performed by: EMERGENCY MEDICINE

## 2018-07-17 PROCEDURE — 80053 COMPREHEN METABOLIC PANEL: CPT | Performed by: EMERGENCY MEDICINE

## 2018-07-17 PROCEDURE — 99285 EMERGENCY DEPT VISIT HI MDM: CPT

## 2018-07-17 PROCEDURE — 87040 BLOOD CULTURE FOR BACTERIA: CPT | Performed by: EMERGENCY MEDICINE

## 2018-07-17 PROCEDURE — 82962 GLUCOSE BLOOD TEST: CPT

## 2018-07-17 PROCEDURE — 83605 ASSAY OF LACTIC ACID: CPT

## 2018-07-17 NOTE — DIABETES MGMT
Diabetes Patient/Family Education Record    Factors That  May Influence Patients Ability  to Learn or  Comply with Recommendations   []   Language barrier    []   Cultural needs   []   Motivation    []   Cognitive limitation    []   Physical   []   Education    []   Physiological factors   []   Hearing/vision/speaking impairment   []   Mu-ism beliefs    []   Financial factors   []  Other:   [x]  No factors identified at this time. Person Instructed:   [x]   Patient   []   Family   []  Other     Preference for Learning:   [x]   Verbal   []   Written   []  Demonstration     Level of Comprehension & Competence:    []  Good                                      [x] Fair                                     []  Poor                             []  Needs Reinforcement   [x]  Teachback completed    Education Component: Pt has had multiple episodes of hypoglycemia recently. Pt reports he did not eat enough while taking his 70/30 insulin. Pt reports he has contacted his PCP to make any needed insulin adjustments. Pt encouraged to check glucose more often as he may not feel the symptoms of hypoglycemia.     [x]  Medication management, including how to administer insulin (if appropriate) and potential medication interactions    [x]  Nutritional management    []  Exercise   [x]  Signs, symptoms, and treatment of hyperglycemia and hypoglycemia   [x] Prevention, recognition and treatment of hyperglycemia and hypoglycemia   [x]  Importance of blood glucose monitoring and how to obtain a blood glucose meter    []  Instruction on use of the blood glucose meter   []  Discuss the importance of HbA1C monitoring    []  Sick day guidelines   []  Proper use and disposal of lancets, needles, syringes or insulin pens (if appropriate)   []  Potential long-term complications (retinopathy, kidney disease, neuropathy, foot care)   [x] Information about whom to contact in case of emergency or for more information [x]  Goal:  Patient/family will demonstrate understanding of Diabetes Self Management Skills by: 7/24/18  Plan for post-discharge education or self-management support:    [] Outpatient class schedule provided            [] Patient Declined    [] Scheduled for outpatient classes (date) _______     Nolberto Tesfaye, [address], CDE  pgr [phone]

## 2018-07-17 NOTE — ED PROVIDER NOTES
EMERGENCY DEPARTMENT HISTORY AND PHYSICAL EXAM    11:10 AM      Date: 7/17/2018  Patient Name: Kenya Miramontes    History of Presenting Illness     Chief Complaint   Patient presents with    Low Blood Sugar         History Provided By: Patient and Patient's Daughter    Chief Complaint: hypoglycemia  Duration: measured today  Timing:  Improving  Location: endocrine  Quality: low  Severity: measured at 17 by EMS  Modifying Factors: improved with D50  Associated Symptoms: none reported      Additional History (Context): Kenya Miramontes is a 82 y.o. male with diabetes and hypertension who presents with hypoglycemia after he took his insulin at about 6:30 AM and did not eat. He was seen for he same complaint 2 days ago and received a CT of his head and antibiotics for a UTI. EMS reports glucose of 17 when they arrived at his home and gave D50. Daughter reports recent weight loss and reduced appetite. No other symptoms or concerns were expressed. PCP: Niyah Arambula MD    Current Outpatient Prescriptions   Medication Sig Dispense Refill    cephALEXin (KEFLEX) 500 mg capsule Take 1 Cap by mouth three (3) times daily for 10 days. 30 Cap 0    oxybutynin (DITROPAN) 5 mg tablet Take 1 Tab by mouth three (3) times daily. 90 Tab 10    atorvastatin (LIPITOR) 40 mg tablet Take 1 Tab by mouth nightly.  hydrochlorothiazide 12.5 mg cap 12.5 mg, lisinopril 5 mg tab 10 mg Take  by mouth daily.  montelukast (SINGULAIR) 10 mg tablet Take 10 mg by mouth daily.  PRAVASTATIN SODIUM (PRAVASTATIN PO) Take  by mouth.  BRIMONIDINE TARTRATE/TIMOLOL (COMBIGAN OP) Apply  to eye.  BRINZOLAMIDE (AZOPT OP) Apply  to eye.  TRAVOPROST (TRAVATAN Z OP) Apply  to eye.  valsartan (DIOVAN) 80 mg tablet Take  by mouth daily.  SIMVASTATIN PO Take  by mouth.  aspirin delayed-release 81 mg tablet Take  by mouth daily.          Past History     Past Medical History:  Past Medical History:   Diagnosis Date    [facility]     Hypertension     Malignant neoplasm of prostate ([facility] 1998       Past Surgical History:  Past Surgical History:   Procedure Laterality Date    BIOPSY OF PROSTATE,INCISIONAL  4/98    [place] EXT BEAM RADIATION AS PRIMARY THERAPY TO PROSTATE ONLY  4/30-6/29/98       Family History:  Family History   Problem Relation Age of Onset    Diabetes Other     Hypertension Other        Social History:  Social History   Substance Use Topics    Smoking status: Current Some Day Smoker     Packs/day: 0.10     Years: 22.00     Types: Cigarettes    Smokeless tobacco: Never Used    Alcohol use No       Allergies: Allergies   Allergen Reactions    Iodinated Contrast- Oral And Iv Dye Hives     Faint, shaking         Review of Systems       Review of Systems   Constitutional: Negative for activity change, fatigue and fever. HENT: Negative for congestion and rhinorrhea. Eyes: Negative for visual disturbance. Respiratory: Negative for shortness of breath. Cardiovascular: Negative for chest pain and palpitations. Gastrointestinal: Negative for abdominal pain, diarrhea, nausea and vomiting. Endocrine:        Positive for hypoglycemia   Genitourinary: Negative for dysuria and hematuria. Musculoskeletal: Negative for back pain. Skin: Negative for rash. Neurological: Positive for syncope. Negative for dizziness, weakness and light-headedness. All other systems reviewed and are negative. Physical Exam     Visit Vitals    /65    Pulse 72    Temp 97.5 °F (36.4 °C)    Resp 14    Wt 60.3 kg (133 lb)    SpO2 100%    BMI 17.55 kg/m2         Physical Exam   Constitutional: He is oriented to person, place, and time. He appears well-developed. No distress. Eating comfortably during exam  thin   HENT:   Head: Normocephalic. Head is with abrasion (right side of face).    Right Ear: External ear normal.   Left Ear: External ear normal.   Nose: Nose normal.   Mouth/Throat: Oropharynx is clear and moist.   Eyes: Conjunctivae and EOM are normal. Pupils are equal, round, and reactive to light. No scleral icterus. Neck: Normal range of motion. Neck supple. No JVD present. No tracheal deviation present. No thyromegaly present. Cardiovascular: Normal rate, regular rhythm, normal heart sounds and intact distal pulses. Exam reveals no gallop and no friction rub. No murmur heard. Pulmonary/Chest: Effort normal and breath sounds normal. He exhibits no tenderness. Abdominal: Soft. Bowel sounds are normal. He exhibits no distension. There is no tenderness. There is no rebound and no guarding. Musculoskeletal: Normal range of motion. He exhibits no edema or tenderness. Lymphadenopathy:     He has no cervical adenopathy. Neurological: He is alert and oriented to person, place, and time. No cranial nerve deficit. Coordination normal.   No sensory loss, Gait normal, Motor 5/5   Skin: Skin is warm and dry. Psychiatric: He has a normal mood and affect. His behavior is normal. Judgment and thought content normal.   Nursing note and vitals reviewed.         Diagnostic Study Results     Labs -  Recent Results (from the past 12 hour(s))   GLUCOSE, POC    Collection Time: 07/17/18  9:51 AM   Result Value Ref Range    Glucose (POC) 67 (L) 70 - 110 mg/dL   GLUCOSE, POC    Collection Time: 07/17/18 10:47 AM   Result Value Ref Range    Glucose (POC) 66 (L) 70 - 110 mg/dL   CBC WITH AUTOMATED DIFF    Collection Time: 07/17/18 11:40 AM   Result Value Ref Range    WBC 29.5 (H) 4.6 - 13.2 K/uL    RBC 4.06 (L) 4.70 - 5.50 M/uL    HGB 9.9 (L) 13.0 - 16.0 g/dL    HCT 30.2 (L) 36.0 - 48.0 %    MCV 74.4 74.0 - 97.0 FL    MCH 24.4 24.0 - 34.0 PG    MCHC 32.8 31.0 - 37.0 g/dL    RDW 18.6 (H) 11.6 - 14.5 %    PLATELET 819 (H) 725 - 420 K/uL    MPV 9.3 9.2 - 11.8 FL    NEUTROPHILS 84 (H) 42 - 75 %    BAND NEUTROPHILS 3 0 - 5 %    LYMPHOCYTES 5 (L) 20 - 51 %    MONOCYTES 3 2 - 9 %    EOSINOPHILS 1 0 - 5 %    BASOPHILS 0 0 - 3 %    METAMYELOCYTES 3 (H) 0 %    MYELOCYTES 1 (H) 0 %    ABS. NEUTROPHILS 25.7 (H) 1.8 - 8.0 K/UL    ABS. LYMPHOCYTES 1.5 0.8 - 3.5 K/UL    ABS. MONOCYTES 0.9 0 - 1.0 K/UL    ABS. EOSINOPHILS 0.3 0.0 - 0.4 K/UL    ABS. BASOPHILS 0.0 0.0 - 0.06 K/UL    DF MANUAL      PLATELET COMMENTS Increased Platelets      RBC COMMENTS ANISOCYTOSIS  2+        RBC COMMENTS MICROCYTOSIS  1+        RBC COMMENTS BASOPHILIC STIPPLING  OCCASIONAL       METABOLIC PANEL, COMPREHENSIVE    Collection Time: 07/17/18 11:40 AM   Result Value Ref Range    Sodium 139 136 - 145 mmol/L    Potassium 3.3 (L) 3.5 - 5.5 mmol/L    Chloride 103 100 - 108 mmol/L    CO2 29 21 - 32 mmol/L    Anion gap 7 3.0 - 18 mmol/L    Glucose 56 (L) 74 - 99 mg/dL    BUN 20 (H) 7.0 - 18 MG/DL    Creatinine 1.00 0.6 - 1.3 MG/DL    BUN/Creatinine ratio 20 12 - 20      GFR est AA >60 >60 ml/min/1.73m2    GFR est non-AA >60 >60 ml/min/1.73m2    Calcium 6.7 (L) 8.5 - 10.1 MG/DL    Bilirubin, total 0.7 0.2 - 1.0 MG/DL    ALT (SGPT) 18 16 - 61 U/L    AST (SGOT) 38 (H) 15 - 37 U/L    Alk. phosphatase 95 45 - 117 U/L    Protein, total 7.3 6.4 - 8.2 g/dL    Albumin 2.7 (L) 3.4 - 5.0 g/dL    Globulin 4.6 (H) 2.0 - 4.0 g/dL    A-G Ratio 0.6 (L) 0.8 - 1.7     GLUCOSE, POC    Collection Time: 07/17/18  2:34 PM   Result Value Ref Range    Glucose (POC) 155 (H) 70 - 110 mg/dL   POC LACTIC ACID    Collection Time: 07/17/18  2:49 PM   Result Value Ref Range    Lactic Acid (POC) 1.7 0.4 - 2.0 mmol/L       Radiologic Studies -   No orders to display         Medical Decision Making   I am the first provider for this patient. I reviewed the vital signs, available nursing notes, past medical history, past surgical history, family history and social history. Vital Signs-Reviewed the patient's vital signs.     Pulse Oximetry Analysis -  100% on room air (Interpretation)     Records Reviewed: Nursing Notes and Old Medical Records (Time of Review: 11:10 AM)    ED Course: Progress Notes, Reevaluation, and Consults:    Provider Notes (Medical Decision Making):   82year old male with history of prostate cancer, hypertension and diabetes who presents with recent recurrent episodes of hypoglycemia. He took his morning insulin and was unresponsive this morning with blood glucose of 17. He fell Sunday and was seen here with reassuring CT and UTI, for which he was prescribed Keflex. Daughter notes recent weight loss and reduced appetite. Concern that patient may be overdosing his insulin at home.     1:21 PM  Diabetes educator in ED speaking to patient in the ED.     2:31 PM  Spoke with patient and daughters. His WBC is elevated, but patient has chronic blood disorder, notes that he missed his last appointment. Daughters are concerned that patient has been unable to use insulin appropriately and is resistant to leaving his home. Family would prefer admission. 4:02 PM    has spoken with family and provided referral and arranged for a home health visit tomorrow to evaluate his home situation. They have been advised to discontinue insulin entirely and check blood sugar twice per day. Diagnosis     Clinical Impression:   1. Leukocytosis, unspecified type    2. Hypoglycemia    3. Memory loss        Disposition: Discharge    Follow-up Information     Follow up With Details Comments Contact Info    Rosa Casas MD Call in 2 days ED visit follow-up [address] [facility] EMERGENCY DEPT Go to As needed, If symptoms worsen [address] [address]  [phone]           Discharge Medication List as of 7/17/2018  4:30 PM      CONTINUE these medications which have NOT CHANGED    Details   cephALEXin (KEFLEX) 500 mg capsule Take 1 Cap by mouth three (3) times daily for 10 days. , Print, Disp-30 Cap, R-0      oxybutynin (DITROPAN) 5 mg tablet Take 1 Tab by mouth three (3) times daily. , Normal, Disp-90 Tab, R-10 atorvastatin (LIPITOR) 40 mg tablet Take 1 Tab by mouth nightly., Historical Med      hydrochlorothiazide 12.5 mg cap 12.5 mg, lisinopril 5 mg tab 10 mg Take  by mouth daily. , Historical Med      montelukast (SINGULAIR) 10 mg tablet Take 10 mg by mouth daily. , Historical Med      PRAVASTATIN SODIUM (PRAVASTATIN PO) Take  by mouth., Historical Med      BRIMONIDINE TARTRATE/TIMOLOL (COMBIGAN OP) Apply  to eye., Historical Med      BRINZOLAMIDE (AZOPT OP) Apply  to eye., Historical Med      TRAVOPROST (TRAVATAN Z OP) Apply  to eye., Historical Med      valsartan (DIOVAN) 80 mg tablet Take  by mouth daily. , Historical Med      SIMVASTATIN PO Take  by mouth., Historical Med      aspirin delayed-release 81 mg tablet Take  by mouth daily. , Historical Med         STOP taking these medications       insulin (NOVOLIN 70/30) 100 unit/mL (70-30) injection Comments:   Reason for Stopping:             _______________________________    Attestations:  Scribe Attestation     Morteza Blackmon acting as a scribe for and in the presence of Chani Urena MD      July 17, 2018 at 11:10 AM       Provider Attestation:      I personally performed the services described in the documentation, reviewed the documentation, as recorded by the scribe in my presence, and it accurately and completely records my words and actions.  July 17, 2018 at 11:10 AM - Chani Urena MD    _______________________________

## 2018-07-17 NOTE — HOME CARE
[facility] received [facility] referral for DM - noted patient has discharged home. Processing referral - MARIA R Vega LPN

## 2018-07-17 NOTE — ED TRIAGE NOTES
Patient arrived from home c/o hypoglycemia. Patient initial blood sugar 17 per medics. Blood sugar after D50 went up to 260. Upon arrival in ER patient blood sugar 62. Patient alert and oriented x4 Patient unsure how much insulin he took.

## 2018-07-17 NOTE — PROGRESS NOTES
This  spoke with daughters Tacos Ablert [phone] and Wm Pederson [phone] concerning their father who has had a couple of hypoglycemic reactions. He still drives his own car. There is a roommate who lives with the patient. [facility] signed for [facility] for diabetes. Discussed some of the care that may need to happen in the near future and steps for assisted living. [facility] signed and on chart. Referral in system and staff called for [facility].

## 2018-07-23 ENCOUNTER — HOME CARE VISIT (OUTPATIENT)
Dept: HOME HEALTH SERVICES | Facility: HOME HEALTH | Age: 83
End: 2018-07-23

## 2018-07-23 LAB
BACTERIA SPEC CULT: NORMAL
BACTERIA SPEC CULT: NORMAL
SERVICE CMNT-IMP: NORMAL
SERVICE CMNT-IMP: NORMAL

## 2018-07-24 ENCOUNTER — HOSPITAL ENCOUNTER (OUTPATIENT)
Dept: LAB | Age: 83
Discharge: HOME | End: 2018-07-24
Payer: MEDICARE

## 2018-07-24 ENCOUNTER — OFFICE VISIT (OUTPATIENT)
Dept: ONCOLOGY | Age: 83
End: 2018-07-24

## 2018-07-24 ENCOUNTER — HOME CARE VISIT (OUTPATIENT)
Dept: HOME HEALTH SERVICES | Facility: HOME HEALTH | Age: 83
End: 2018-07-24

## 2018-07-24 ENCOUNTER — HOSPITAL ENCOUNTER (OUTPATIENT)
Dept: ONCOLOGY | Age: 83
Discharge: HOME | End: 2018-07-24

## 2018-07-24 VITALS
HEART RATE: 80 BPM | BODY MASS INDEX: 18.73 KG/M2 | WEIGHT: 142 LBS | DIASTOLIC BLOOD PRESSURE: 65 MMHG | SYSTOLIC BLOOD PRESSURE: 128 MMHG | TEMPERATURE: 98.3 F

## 2018-07-24 DIAGNOSIS — C61 MALIGNANT NEOPLASM OF PROSTATE (HCC): ICD-10-CM

## 2018-07-24 DIAGNOSIS — D47.2 MONOCLONAL GAMMOPATHY: Primary | ICD-10-CM

## 2018-07-24 DIAGNOSIS — D50.8 IRON DEFICIENCY ANEMIA SECONDARY TO INADEQUATE DIETARY IRON INTAKE: ICD-10-CM

## 2018-07-24 DIAGNOSIS — D72.9 NEUTROPHILIC LEUKOCYTOSIS: ICD-10-CM

## 2018-07-24 DIAGNOSIS — D75.839 THROMBOCYTOSIS: ICD-10-CM

## 2018-07-24 DIAGNOSIS — D47.2 MONOCLONAL GAMMOPATHY: ICD-10-CM

## 2018-07-24 LAB
ALBUMIN SERPL-MCNC: 2.7 G/DL (ref 3.4–5)
ALBUMIN/GLOB SERPL: 0.6 {RATIO} (ref 0.8–1.7)
ALP SERPL-CCNC: 93 U/L (ref 45–117)
ALT SERPL-CCNC: 18 U/L (ref 16–61)
ANION GAP SERPL CALC-SCNC: 8 MMOL/L (ref 3–18)
AST SERPL-CCNC: 27 U/L (ref 15–37)
BASO+EOS+MONOS # BLD AUTO: 13 % (ref 0.1–17)
BASO+EOS+MONOS # BLD AUTO: 2.5 K/UL (ref 0–2.3)
BILIRUB SERPL-MCNC: 0.5 MG/DL (ref 0.2–1)
BUN SERPL-MCNC: 19 MG/DL (ref 7–18)
BUN/CREAT SERPL: 18 (ref 12–20)
CALCIUM SERPL-MCNC: 6.6 MG/DL (ref 8.5–10.1)
CHLORIDE SERPL-SCNC: 102 MMOL/L (ref 100–108)
CO2 SERPL-SCNC: 28 MMOL/L (ref 21–32)
CREAT SERPL-MCNC: 1.08 MG/DL (ref 0.6–1.3)
DIFFERENTIAL METHOD BLD: ABNORMAL
ERYTHROCYTE [DISTWIDTH] IN BLOOD BY AUTOMATED COUNT: 19 % (ref 11.5–14.5)
ERYTHROCYTE [SEDIMENTATION RATE] IN BLOOD: 68 MM/HR (ref 0–20)
FERRITIN SERPL-MCNC: 1299 NG/ML (ref 8–388)
GLOBULIN SER CALC-MCNC: 4.6 G/DL (ref 2–4)
GLUCOSE SERPL-MCNC: 186 MG/DL (ref 74–99)
HCT VFR BLD AUTO: 30.8 % (ref 36–48)
HGB BLD-MCNC: 10.3 G/DL (ref 12–16)
IRON SATN MFR SERPL: 29 %
IRON SERPL-MCNC: 61 UG/DL (ref 50–175)
LYMPHOCYTES # BLD: 1.3 K/UL (ref 1.1–5.9)
LYMPHOCYTES NFR BLD: 6 % (ref 14–44)
MCH RBC QN AUTO: 25.7 PG (ref 25–35)
MCHC RBC AUTO-ENTMCNC: 33.4 G/DL (ref 31–37)
MCV RBC AUTO: 76.8 FL (ref 78–102)
NEUTS SEG # BLD: 15.8 K/UL (ref 1.8–9.5)
NEUTS SEG NFR BLD: 81 % (ref 40–70)
PLATELET # BLD AUTO: 548 K/UL (ref 135–420)
POTASSIUM SERPL-SCNC: 3.8 MMOL/L (ref 3.5–5.5)
PROT SERPL-MCNC: 7.3 G/DL (ref 6.4–8.2)
PSA SERPL-MCNC: 0.1 NG/ML (ref 0–4)
RBC # BLD AUTO: 4.01 M/UL (ref 4.1–5.1)
SODIUM SERPL-SCNC: 138 MMOL/L (ref 136–145)
TIBC SERPL-MCNC: 210 UG/DL (ref 250–450)
WBC # BLD AUTO: 19.6 K/UL (ref 4.5–13)

## 2018-07-24 PROCEDURE — 80053 COMPREHEN METABOLIC PANEL: CPT | Performed by: INTERNAL MEDICINE

## 2018-07-24 PROCEDURE — 85652 RBC SED RATE AUTOMATED: CPT | Performed by: INTERNAL MEDICINE

## 2018-07-24 PROCEDURE — 84165 PROTEIN E-PHORESIS SERUM: CPT | Performed by: INTERNAL MEDICINE

## 2018-07-24 PROCEDURE — 83540 ASSAY OF IRON: CPT | Performed by: INTERNAL MEDICINE

## 2018-07-24 PROCEDURE — 84153 ASSAY OF PSA TOTAL: CPT | Performed by: INTERNAL MEDICINE

## 2018-07-24 PROCEDURE — 82728 ASSAY OF FERRITIN: CPT | Performed by: INTERNAL MEDICINE

## 2018-07-24 NOTE — PROGRESS NOTES
There  Hematology/Oncology  Progress Note    Name: Jenny Junior  Date: 2018  : 1935    PCP: Lawson Wong MD     Mr. Liz Myrick is a 82 y.o.  male who was seen for follow-up relating to his MGUS, neutrophilic leukocytosis and anemia. Current therapy: Active surveillance    Subjective:     Mr. Liz Myrick is an 79-year-old -American man who has MGUS. He also has chronic neutrophilic leukocytosis with flow cytometry revealing no immunophenotypic aberrancy in the leukocyte populations. The patient has not experienced any unexplained fevers, night sweats, weight loss. Overall, the patient reports that he has been doing reasonably well since he was last seen in clinic. Past medical history, family history, and social history: these were reviewed and remains unchanged. Past Medical History:   Diagnosis Date    [facility] ([facility]     Hypertension     Malignant neoplasm of prostate ([facility]      Past Surgical History:   Procedure Laterality Date    BIOPSY OF PROSTATE,INCISIONAL      [place] EXT BEAM RADIATION AS PRIMARY THERAPY TO PROSTATE ONLY  -98     Social History     Social History    Marital status: SINGLE     Spouse name: N/A    Number of children: N/A    Years of education: N/A     Occupational History    Not on file. Social History Main Topics    Smoking status: Current Some Day Smoker     Packs/day: 0.10     Years: 22.00     Types: Cigarettes    Smokeless tobacco: Never Used    Alcohol use No    Drug use: No    Sexual activity: Not on file     Other Topics Concern    Not on file     Social History Narrative    ** Merged History Encounter **          Family History   Problem Relation Age of Onset    Diabetes Other     Hypertension Other      Current Outpatient Prescriptions   Medication Sig Dispense Refill    cephALEXin (KEFLEX) 500 mg capsule Take 1 Cap by mouth three (3) times daily for 10 days.  30 Cap 0    oxybutynin (DITROPAN) 5 mg tablet Take 1 Tab by mouth three (3) times daily. 90 Tab 10    atorvastatin (LIPITOR) 40 mg tablet Take 1 Tab by mouth nightly.  hydrochlorothiazide 12.5 mg cap 12.5 mg, lisinopril 5 mg tab 10 mg Take  by mouth daily.  montelukast (SINGULAIR) 10 mg tablet Take 10 mg by mouth daily.  PRAVASTATIN SODIUM (PRAVASTATIN PO) Take  by mouth.  BRIMONIDINE TARTRATE/TIMOLOL (COMBIGAN OP) Apply  to eye.  BRINZOLAMIDE (AZOPT OP) Apply  to eye.  TRAVOPROST (TRAVATAN Z OP) Apply  to eye.  valsartan (DIOVAN) 80 mg tablet Take  by mouth daily.  SIMVASTATIN PO Take  by mouth.  aspirin delayed-release 81 mg tablet Take  by mouth daily. Review of Systems  Constitutional: The patient has no acute distress or discomfort. HEENT: The patient denies recent head trauma, eye pain, blurred vision,  hearing deficit, oropharyngeal mucosal pain or lesions, and the patient denies throat pain or discomfort. Lymphatics: The patient denies palpable peripheral lymphadenopathy. Hematologic: The patient denies having bruising, bleeding, or progressive fatigue. Respiratory: Patient denies having shortness of breath, cough, sputum production, fever, or dyspnea on exertion. Cardiovascular: The patient denies having leg pain, leg swelling, heart palpitations, chest permit, chest pain, or lightheadedness. The patient denies having dyspnea on exertion. Gastrointestinal: The patient denies having nausea, emesis, or diarrhea. The patient denies having any hematemesis or blood in the stool. Genitourinary: Patient denies having urinary urgency, frequency, or dysuria. The patient denies having blood in the urine. Psychological: The patient denies having symptoms of nervousness, anxiety, depression, or thoughts of harming self. Skin: Patient denies having skin rashes, skin, ulcerations, or unexplained itching or pruritus. Musculoskeletal: The patient denies having pain in the joints or bones.       Objective:     Visit Vitals    /65    Pulse 80    Temp 98.3 °F (36.8 °C) (Oral)    Wt 64.4 kg (142 lb)    BMI 18.73 kg/m2     ECOG PS=0  Physical Exam:   Gen. Appearance: The patient is in no acute distress. Skin: There is no bruise or rash. HEENT: The exam is unremarkable. Neck: Supple without lymphadenopathy or thyromegaly. Lungs: Clear to auscultation and percussion; there are no wheezes or rhonchi. Heart: Regular rate and rhythm; there are no murmurs, gallops, or rubs. Abdomen: Bowel sounds are present and normal.  There is no guarding, tenderness, or hepatosplenomegaly. Extremities: There is no clubbing, cyanosis, or edema. Neurologic: There are no focal neurologic deficits. Lymphatics: There is no palpable peripheral lymphadenopathy. Musculoskeletal: The patient has full range of motion at all joints. There is no evidence of joint deformity or effusions. There is no focal joint tenderness. Psychological/psychiatric: There is no clinical evidence of anxiety, depression, or melancholy. Lab data:      Results for orders placed or performed during the hospital encounter of 07/24/18   CBC WITH 3 PART DIFF     Status: Abnormal   Result Value Ref Range Status    WBC 19.6 (H) 4.5 - 13.0 K/uL Final    RBC 4.01 (L) 4.10 - 5.10 M/uL Final    HGB 10.3 (L) 12.0 - 16.0 g/dL Final    HCT 30.8 (L) 36 - 48 % Final    MCV 76.8 (L) 78 - 102 FL Final    MCH 25.7 25.0 - 35.0 PG Final    MCHC 33.4 31 - 37 g/dL Final    RDW 19.0 (H) 11.5 - 14.5 % Final    NEUTROPHILS 81 (H) 40 - 70 % Final    MIXED CELLS 13 0.1 - 17 % Final    LYMPHOCYTES 6 (L) 14 - 44 % Final    ABS. NEUTROPHILS 15.8 (H) 1.8 - 9.5 K/UL Final    ABS. MIXED CELLS 2.5 (H) 0.0 - 2.3 K/uL Final    ABS. LYMPHOCYTES 1.3 1.1 - 5.9 K/UL Final     Comment: Test performed at [address]. Results Reviewed by Medical Director. DF AUTOMATED   Final           Assessment:     1. Monoclonal gammopathy    2. Malignant neoplasm of prostate ([facility]    3. Iron deficiency anemia secondary to inadequate dietary iron intake    4. Neutrophilic leukocytosis    5. Thrombocytosis ([address]      Plan:   Neutrophilic leukocytosis: A prior immunophenotyping profile showed no immunophenotypic aberrancy in the leukocyte populations. The test did show evidence of a monoclonal plasma cell population which was subsequently confirmed by bone marrow biopsy and SPEP. The neutrophil count remains elevated at 81%. The total WBC count is 19.6    Monoclonal gammopathy of undetermined significance: The bone marrow biopsy done on 10/11/2017 showed 4.5% monoclonal plasma cells consistent with MGUS. The immunofixation confirmed an IgG a kappa M spike production. The most recent M spike from 4/24/2018 was 0.8 g/dL. We will continue to monitor this at 3 month intervals. Thrombocytosis: The current platelet count is 288,758. I have explained to the patient that the [address] [place] [address] mutation analysis was negative. If his platelet count exceeds 800,000 he will be started on hydroxyurea or anagrelide in the future. Malignant neoplasm of the prostate: The patient has a history of prostate cancer. His PSA test as of 4/24/2018 was 0.1 ng/mL. I will recheck his PSA level at this time.     Follow-up in 3 months  Orders Placed This Encounter    COMPLETE CBC & AUTO DIFF WBC    InHouse CBC ([place])     Standing Status:   Future     Number of Occurrences:   1     Standing Expiration Date:   5/82/6503    METABOLIC PANEL, COMPREHENSIVE     Standing Status:   Future     Standing Expiration Date:   7/25/2019    FERRITIN     Standing Status:   Future     Standing Expiration Date:   7/25/2019    IRON PROFILE     Standing Status:   Future     Standing Expiration Date:   7/25/2019    SED RATE (ESR)     Standing Status:   Future     Standing Expiration Date:   7/25/2019    SPEP     Standing Status:   Future     Standing Expiration Date:   7/25/2019    PROSTATE SPECIFIC AG     Standing Status:   Future Standing Expiration Date:   7/25/2019       Jamar Mon MD  7/24/2018      Please note: This document has been produced using voice recognition software. Unrecognized errors in transcription may be present.

## 2018-07-24 NOTE — PATIENT INSTRUCTIONS
Learning About Monoclonal Gammopathy of Unknown Significance (MGUS) What is MGUS? Monoclonal gammopathy of unknown significance (MGUS) is a blood condition. The blood is made of many kinds of cells, including red blood cells, platelets, and white blood cells. With MGUS, a type of white blood cell called a plasma cell makes too much of the \"M\" (for \"monoclonal\") protein in the blood. Most people with MGUS are fine for many years. MGUS seldom causes symptoms or major health problems. In rare cases, MGUS may turn into multiple myeloma. This is a cancer of plasma cells in the blood that can cause bone weakness. Some people with MGUS may also have a higher risk for osteoporosis, in which bones become thin and weak. MGUS is sometimes seen in younger people, but is more common in people as they get older. It's seen most often in those over the age of 79. How is MGUS diagnosed? MGUS is often found by chance when blood tests are done for other reasons. If you have high levels of a certain protein in your blood, your doctor may order more tests. Blood tests can help identify the protein. The protein is sometimes found in the urine, so you may get a urine test too. Other tests may be done if your doctor thinks you might have a medical problem. In some cases, a bone marrow biopsy may be done to rule out a problem like multiple myeloma. How is it treated? Most people with MGUS don't need any treatment. But you may need regular physical exams, blood tests, and urine tests to make sure that MGUS isn't progressing to a medical problem. Follow-up care is a key part of your treatment and safety. Be sure to make and go to all appointments, and call your doctor if you are having problems. It's also a good idea to know your test results and keep a list of the medicines you take. Where can you learn more? Go to http://petty-timothy.info/.  
Enter A917 in the search box to learn more about \"Learning About Monoclonal Gammopathy of Unknown Significance (MGUS). \" 
Current as of: October 9, 2017 Content Version: 11.7 © 7657-3312 [employer]. Care instructions adapted under license by [employer] (which disclaims liability or warranty for this information). If you have questions about a medical condition or this instruction, always ask your healthcare professional. [address] any warranty or liability for your use of this information.

## 2018-07-24 NOTE — MR AVS SNAPSHOT
[address] [facility] [address] [facility] [address] 
[phone] Patient: Brittany Palma MRN: [id number] YNA:[birth year] Visit Information Date & Time Provider Department Dept. Phone Encounter #  
 7/24/2018 10:30 AM MD [facility]  [phone] [id number] Follow-up Instructions Return in about 3 months (around 10/24/2018). Your Appointments 10/23/2018 10:30 AM  
Office Visit with MD Pal [facility]  [facility]) Appt Note: OV  
 [facility] [address] [facility] [address]  
[phone]  
  
   
 [facility] [address] [facility] [address]  
  
    
 4/8/2019 10:00 AM  
Office Visit with Leopoldo Bue, MD [address] [facility] Appt Note: check up [address] [address] [facility] [id number] [phone] [address] [facility] [address] Upcoming Health Maintenance Date Due DTaP/Tdap/Td series (1 - Tdap) 1/25/1958 ZOSTER VACCINE AGE 60> 11/25/1996 GLAUCOMA SCREENING Q2Y 1/25/2002 Pneumococcal 65+ High/Highest Risk (1 of 2 - PCV13) 1/25/2002 MEDICARE YEARLY EXAM 3/14/2018 Influenza Age 5 to Adult 8/1/2018 Allergies as of 7/24/2018  Review Complete On: 7/24/2018 By: Tommy Kimble MD  
  
 Severity Noted Reaction Type Reactions Iodinated Contrast- Oral And Iv Dye   Side Effect Hives Faint, shaking Current Immunizations  Reviewed on 5/5/2016 Name Date Influenza Vaccine 10/1/2015 Not reviewed this visit You Were Diagnosed With   
  
 Codes Comments Monoclonal gammopathy    -  Primary ICD-10-CM: M36.5 ICD-9-CM: 273.1 Malignant neoplasm of prostate [facility])     ICD-10-CM: C09 ICD-9-CM: 938 Iron deficiency anemia secondary to inadequate dietary iron intake     ICD-10-CM: D50.8 ICD-9-CM: 280.1 Neutrophilic leukocytosis     ICD-10-CM: D72.9 ICD-9-CM: 759. 8 Thrombocytosis ([address]     ICD-10-CM: D47.3 ICD-9-CM: 238.71 Vitals BP Pulse Temp Weight(growth percentile) BMI Smoking Status 128/65 80 98.3 °F (36.8 °C) (Oral) 142 lb (64.4 kg) 18.73 kg/m2 Current Some Day Smoker BMI and BSA Data Body Mass Index Body Surface Area 18.73 kg/m 2 1.82 m 2 Preferred Pharmacy Pharmacy Name Phone [id number] [phone] Your Updated Medication List  
  
   
This list is accurate as of 7/24/18 11:06 AM.  Always use your most recent med list.  
  
  
  
  
 aspirin delayed-release 81 mg tablet Take  by mouth daily. atorvastatin 40 mg tablet Commonly known as:  LIPITOR Take 1 Tab by mouth nightly. AZOPT OP Apply  to eye. cephALEXin 500 mg capsule Commonly known as:  Alena Charles Take 1 Cap by mouth three (3) times daily for 10 days. COMBIGAN OP Apply  to eye. DIOVAN 80 mg tablet Generic drug:  valsartan Take  by mouth daily. hydrochlorothiazide 12.5 mg cap 12.5 mg, lisinopril 5 mg tab 10 mg Take  by mouth daily. montelukast 10 mg tablet Commonly known as:  SINGULAIR Take 10 mg by mouth daily. oxybutynin 5 mg tablet Commonly known as:  MFWJUBSJ Take 1 Tab by mouth three (3) times daily. PRAVASTATIN PO Take  by mouth. SIMVASTATIN PO Take  by mouth. TRAVATAN Z OP Apply  to eye. We Performed the Following COMPLETE CBC & AUTO DIFF WBC [67472 CPT(R)] Follow-up Instructions Return in about 3 months (around 10/24/2018). To-Do List   
 07/24/2018 Lab:  CBC WITH 3 PART DIFF Patient Instructions Learning About Monoclonal Gammopathy of Unknown Significance (MGUS) What is MGUS? Monoclonal gammopathy of unknown significance (MGUS) is a blood condition. The blood is made of many kinds of cells, including red blood cells, platelets, and white blood cells. With MGUS, a type of white blood cell called a plasma cell makes too much of the \"M\" (for \"monoclonal\") protein in the blood. Most people with MGUS are fine for many years. MGUS seldom causes symptoms or major health problems. In rare cases, MGUS may turn into multiple myeloma. This is a cancer of plasma cells in the blood that can cause bone weakness. Some people with MGUS may also have a higher risk for osteoporosis, in which bones become thin and weak. MGUS is sometimes seen in younger people, but is more common in people as they get older. It's seen most often in those over the age of 79. How is MGUS diagnosed? MGUS is often found by chance when blood tests are done for other reasons. If you have high levels of a certain protein in your blood, your doctor may order more tests. Blood tests can help identify the protein. The protein is sometimes found in the urine, so you may get a urine test too. Other tests may be done if your doctor thinks you might have a medical problem. In some cases, a bone marrow biopsy may be done to rule out a problem like multiple myeloma. How is it treated? Most people with MGUS don't need any treatment. But you may need regular physical exams, blood tests, and urine tests to make sure that MGUS isn't progressing to a medical problem. Follow-up care is a key part of your treatment and safety. Be sure to make and go to all appointments, and call your doctor if you are having problems. It's also a good idea to know your test results and keep a list of the medicines you take. Where can you learn more? Go to http://petty-timothy.info/. Enter A917 in the search box to learn more about \"Learning About Monoclonal Gammopathy of Unknown Significance (MGUS). \" 
Current as of: October 9, 2017 Content Version: 11.7 © 6192-8472 [employer]. Care instructions adapted under license by [employer] (which disclaims liability or warranty for this information). If you have questions about a medical condition or this instruction, always ask your healthcare professional. [address] any warranty or liability for your use of this information. Please provide this summary of care documentation to your next provider. Your primary care clinician is listed as Milvia Poe. If you have any questions after today's visit, please call [phone].

## 2018-07-25 LAB
ALBUMIN SERPL ELPH-MCNC: 2.8 G/DL (ref 2.9–4.4)
ALBUMIN/GLOB SERPL: 0.7 {RATIO} (ref 0.7–1.7)
ALPHA1 GLOB SERPL ELPH-MCNC: 0.3 G/DL (ref 0–0.4)
ALPHA2 GLOB SERPL ELPH-MCNC: 0.9 G/DL (ref 0.4–1)
B-GLOBULIN SERPL ELPH-MCNC: 1.3 G/DL (ref 0.7–1.3)
GAMMA GLOB SERPL ELPH-MCNC: 1.6 G/DL (ref 0.4–1.8)
GLOBULIN SER CALC-MCNC: 4.1 G/DL (ref 2.2–3.9)
M PROTEIN SERPL ELPH-MCNC: 0.8 G/DL
PROT SERPL-MCNC: 6.9 G/DL (ref 6–8.5)

## 2018-07-27 DIAGNOSIS — C61 MALIGNANT NEOPLASM OF PROSTATE (HCC): Primary | ICD-10-CM

## 2018-07-27 DIAGNOSIS — D47.2 MONOCLONAL GAMMOPATHY: ICD-10-CM

## 2018-07-27 RX ORDER — CALCIUM CARBONATE 500(1250)
1 TABLET ORAL DAILY
Qty: 90 TAB | Refills: 2 | Status: SHIPPED | OUTPATIENT
Start: 2018-07-27 | End: 2019-01-01 | Stop reason: SDUPTHER

## 2018-10-23 NOTE — PATIENT INSTRUCTIONS
Complete Blood Count (CBC): About This Test 
What is it? A complete blood count (CBC) is a blood test that gives important information about your blood cells, especially red blood cells, white blood cells, and platelets. Why is this test done? A CBC may be done as part of a regular physical exam. There are many other reasons that a doctor may want this blood test, including to: · Find the cause of symptoms such as fatigue, weakness, fever, bruising, or weight loss. · Find anemia or an infection. · See how much blood has been lost if there is bleeding. · Diagnose diseases of the blood, such as leukemia or polycythemia. How can you prepare for the test? 
You do not need to do anything before having this test. 
What happens during the test? 
The health professional taking a sample of your blood will: · Wrap an elastic band around your upper arm. This makes the veins below the band larger so it is easier to put a needle into the vein. · Clean the needle site with alcohol. · Put the needle into the vein. · Attach a tube to the needle to fill it with blood. · Remove the band from your arm when enough blood is collected. · Put a gauze pad or cotton ball over the needle site as the needle is removed. · Put pressure on the site and then put on a bandage. If this blood test is done on a baby, a heel stick may be done instead of a blood draw from a vein. What happens after the test? 
· You will probably be able to go home right away. · You can go back to your usual activities right away. Follow-up care is a key part of your treatment and safety. Be sure to make and go to all appointments, and call your doctor if you are having problems. It's also a good idea to keep a list of the medicines you take. Ask your doctor when you can expect to have your test results. Where can you learn more? Go to http://petty-timothy.info/. Enter W284 in the search box to learn more about \"Complete Blood Count (CBC): About This Test.\" Current as of: June 26, 2018 Content Version: 11.8 © 9774-5210 [employer]. Care instructions adapted under license by [employer] (which disclaims liability or warranty for this information). If you have questions about a medical condition or this instruction, always ask your healthcare professional. [place] [address] any warranty or liability for your use of this information.

## 2018-10-23 NOTE — PROGRESS NOTES
There Hematology/Oncology  Progress Note Name: Rut Heller Date: 2018 : 1935 PCP: Bhupendra Silva MD  
 
Mr. Patrick Armstrong is a 82 y.o.  male who was seen for follow-up relating to his MGUS, neutrophilic leukocytosis and anemia. Current therapy: Active surveillance Subjective:  
 
Mr. Patrick Armstrong is an 82-year-old -American man who has MGUS. He also has chronic neutrophilic leukocytosis with flow cytometry revealing no immunophenotypic aberrancy in the leukocyte populations. The patient has not experienced any unexplained fevers, night sweats, weight loss. Overall, the patient reports that he has been doing reasonably well since he was last seen in clinic. Past medical history, family history, and social history: these were reviewed and remains unchanged. Past Medical History:  
Diagnosis Date  Diabetes ([facility]  Hypertension  Malignant neoplasm of prostate ([facility]  Past Surgical History:  
Procedure Laterality Date  BIOPSY OF PROSTATE,INCISIONAL    MO EXT BEAM RADIATION AS PRIMARY THERAPY TO PROSTATE ONLY  -98 Social History Socioeconomic History  Marital status: SINGLE Spouse name: Not on file  Number of children: Not on file  Years of education: Not on file  Highest education level: Not on file Social Needs  Financial resource strain: Not on file  Food insecurity - worry: Not on file  Food insecurity - inability: Not on file  Transportation needs - medical: Not on file  Transportation needs - non-medical: Not on file Occupational History  Not on file Tobacco Use  Smoking status: Current Some Day Smoker Packs/day: 0.10 Years: 22.00 Pack years: 2.20 Types: Cigarettes  Smokeless tobacco: Never Used Substance and Sexual Activity  Alcohol use: No  
  Alcohol/week: 0.0 oz  Drug use: No  
 Sexual activity: Not on file Other Topics Concern  Not on file Social History Narrative ** Merged History Encounter ** Family History Problem Relation Age of Onset  Diabetes Other  Hypertension Other Current Outpatient Medications Medication Sig Dispense Refill  calcium carbonate (CALCIUM 500) 500 mg calcium (1,250 mg) tablet Take 1 Tab by mouth daily. 90 Tab 2  
 oxybutynin (DITROPAN) 5 mg tablet Take 1 Tab by mouth three (3) times daily. 90 Tab 10  
 atorvastatin (LIPITOR) 40 mg tablet Take 1 Tab by mouth nightly.  hydrochlorothiazide 12.5 mg cap 12.5 mg, lisinopril 5 mg tab 10 mg Take  by mouth daily.  montelukast (SINGULAIR) 10 mg tablet Take 10 mg by mouth daily.  PRAVASTATIN SODIUM (PRAVASTATIN PO) Take  by mouth.  BRIMONIDINE TARTRATE/TIMOLOL (COMBIGAN OP) Apply  to eye.  BRINZOLAMIDE (AZOPT OP) Apply  to eye.  TRAVOPROST (TRAVATAN Z OP) Apply  to eye.  valsartan (DIOVAN) 80 mg tablet Take  by mouth daily.  SIMVASTATIN PO Take  by mouth.  aspirin delayed-release 81 mg tablet Take  by mouth daily. Review of Systems Constitutional: The patient has no acute distress or discomfort. HEENT: The patient denies recent head trauma, eye pain, blurred vision,  hearing deficit, oropharyngeal mucosal pain or lesions, and the patient denies throat pain or discomfort. Lymphatics: The patient denies palpable peripheral lymphadenopathy. Hematologic: The patient denies having bruising, bleeding, or progressive fatigue. Respiratory: Patient denies having shortness of breath, cough, sputum production, fever, or dyspnea on exertion. Cardiovascular: The patient denies having leg pain, leg swelling, heart palpitations, chest permit, chest pain, or lightheadedness. The patient denies having dyspnea on exertion. Gastrointestinal: The patient denies having nausea, emesis, or diarrhea. The patient denies having any hematemesis or blood in the stool. Genitourinary: Patient denies having urinary urgency, frequency, or dysuria. The patient denies having blood in the urine. Psychological: The patient denies having symptoms of nervousness, anxiety, depression, or thoughts of harming self. Skin: Patient denies having skin rashes, skin, ulcerations, or unexplained itching or pruritus. Musculoskeletal: The patient denies having pain in the joints or bones. Objective:  
 
Visit Vitals /79 Pulse (!) 113 Temp 98.1 °F (36.7 °C) (Oral) Resp 18 Ht 6' 1\" (1.854 m) Wt 57.6 kg (127 lb) BMI 16.76 kg/m² ECOG PS=0 Physical Exam:  
Gen. Appearance: The patient is in no acute distress. Skin: There is no bruise or rash. HEENT: The exam is unremarkable. Neck: Supple without lymphadenopathy or thyromegaly. Lungs: Clear to auscultation and percussion; there are no wheezes or rhonchi. Heart: Regular rate and rhythm; there are no murmurs, gallops, or rubs. Abdomen: Bowel sounds are present and normal.  There is no guarding, tenderness, or hepatosplenomegaly. Extremities: There is no clubbing, cyanosis, or edema. Neurologic: There are no focal neurologic deficits. Lymphatics: There is no palpable peripheral lymphadenopathy. Musculoskeletal: The patient has full range of motion at all joints. There is no evidence of joint deformity or effusions. There is no focal joint tenderness. Psychological/psychiatric: There is no clinical evidence of anxiety, depression, or melancholy. Lab data: 
   
Results for orders placed or performed during the hospital encounter of 07/24/18 CBC WITH 3 PART DIFF     Status: Abnormal  
Result Value Ref Range Status  WBC 19.6 (H) 4.5 - 13.0 K/uL Final  
 RBC 4.01 (L) 4.10 - 5.10 M/uL Final  
 HGB 10.3 (L) 12.0 - 16.0 g/dL Final  
 HCT 30.8 (L) 36 - 48 % Final  
 MCV 76.8 (L) 78 - 102 FL Final  
 MCH 25.7 25.0 - 35.0 PG Final  
 MCHC 33.4 31 - 37 g/dL Final  
 RDW 19.0 (H) 11.5 - 14.5 % Final  
 NEUTROPHILS 81 (H) 40 - 70 % Final  
 MIXED CELLS 13 0.1 - 17 % Final  
 LYMPHOCYTES 6 (L) 14 - 44 % Final  
 ABS. NEUTROPHILS 15.8 (H) 1.8 - 9.5 K/UL Final  
 ABS. MIXED CELLS 2.5 (H) 0.0 - 2.3 K/uL Final  
 ABS. LYMPHOCYTES 1.3 1.1 - 5.9 K/UL Final  
  Comment: Test performed at [place] [address] Location. Results Reviewed by Medical Director. DF AUTOMATED   Final  
 
 
   
Assessment:  
 
1. Malignant neoplasm of prostate ([facility] [address] 2. Iron deficiency anemia secondary to inadequate dietary iron intake Plan:  
Neutrophilic leukocytosis: A prior immunophenotyping profile showed no immunophenotypic aberrancy in the leukocyte populations. The test did show evidence of a monoclonal plasma cell population which was subsequently confirmed by bone marrow biopsy and SPEP. The neutrophil count remains elevated at 81%. The total WBC count is 19.6 Monoclonal gammopathy of undetermined significance: The bone marrow biopsy done on 10/11/2017 showed 4.5% monoclonal plasma cells consistent with MGUS. The immunofixation confirmed an IgG a kappa M spike production. The most recent M spike from 7/24/2018 was 0.8 g/dL. We will continue to monitor this at 3 month intervals. Thrombocytosis: The current platelet count is [phone]. I have explained to the patient that the [address] [place] [address] mutation analysis was negative. Since his platelet count exceeds 800,000 we will start him on hydroxyurea 500 mg 1 tablet p.o. twice daily. I will recheck his numbers again in 6 weeks. Malignant neoplasm of the prostate: The patient has a history of prostate cancer. His PSA test as of 4/24/2018 was 0.1 ng/mL. I will recheck his PSA level at this time. Follow-up in 3 months Orders Placed This Encounter  COMPLETE CBC & AUTO DIFF WBC  InHouse CBC ([employer]) Standing Status:   Future Number of Occurrences:   1 Standing Expiration Date:   10/30/2018 Rosie Ramirez MD 
7/24/2018 Please note: This document has been produced using voice recognition software. Unrecognized errors in transcription may be present.

## 2018-12-04 NOTE — PROGRESS NOTES
There Hematology/Oncology  Progress Note Name: Thor Bowens Date: 2018 : 1935 PCP: Altagracia España MD  
 
Mr. Jenni Velazquez is a 82 y.o.  male who was seen for follow-up relating to his MGUS, neutrophilic leukocytosis and anemia. Current therapy: Active surveillance Subjective:  
 
Mr. Jenni Velazquez is an 68-year-old -American man who has MGUS. He also has chronic neutrophilic leukocytosis with flow cytometry revealing no immunophenotypic aberrancy in the leukocyte populations. The patient has not experienced any unexplained fevers, night sweats, weight loss. Overall, the patient reports that he has been doing reasonably well since he was last seen in clinic. Past medical history, family history, and social history: these were reviewed and remains unchanged. Past Medical History:  
Diagnosis Date  Diabetes ([facility]  Hypertension  Malignant neoplasm of prostate ([facility] [address]  Past Surgical History:  
Procedure Laterality Date  BIOPSY OF PROSTATE,INCISIONAL    ID EXT BEAM RADIATION AS PRIMARY THERAPY TO PROSTATE ONLY  -98 Social History Socioeconomic History  Marital status: SINGLE Spouse name: Not on file  Number of children: Not on file  Years of education: Not on file  Highest education level: Not on file Social Needs  Financial resource strain: Not on file  Food insecurity - worry: Not on file  Food insecurity - inability: Not on file  Transportation needs - medical: Not on file  Transportation needs - non-medical: Not on file Occupational History  Not on file Tobacco Use  Smoking status: Current Some Day Smoker Packs/day: 0.10 Years: 22.00 Pack years: 2.20 Types: Cigarettes  Smokeless tobacco: Never Used Substance and Sexual Activity  Alcohol use: No  
  Alcohol/week: 0.0 oz  Drug use: No  
 Sexual activity: Not on file Other Topics Concern  Not on file Social History Narrative ** Merged History Encounter ** Family History Problem Relation Age of Onset  Diabetes Other  Hypertension Other Current Outpatient Medications Medication Sig Dispense Refill  hydroxyurea (HYDREA) 500 mg capsule Take 1 Cap by mouth two (2) times a day for 90 days. 60 Cap 6  calcium carbonate (CALCIUM 500) 500 mg calcium (1,250 mg) tablet Take 1 Tab by mouth daily. 90 Tab 2  
 oxybutynin (DITROPAN) 5 mg tablet Take 1 Tab by mouth three (3) times daily. 90 Tab 10  
 atorvastatin (LIPITOR) 40 mg tablet Take 1 Tab by mouth nightly.  hydrochlorothiazide 12.5 mg cap 12.5 mg, lisinopril 5 mg tab 10 mg Take  by mouth daily.  montelukast (SINGULAIR) 10 mg tablet Take 10 mg by mouth daily.  PRAVASTATIN SODIUM (PRAVASTATIN PO) Take  by mouth.  BRIMONIDINE TARTRATE/TIMOLOL (COMBIGAN OP) Apply  to eye.  BRINZOLAMIDE (AZOPT OP) Apply  to eye.  TRAVOPROST (TRAVATAN Z OP) Apply  to eye.  valsartan (DIOVAN) 80 mg tablet Take  by mouth daily.  SIMVASTATIN PO Take  by mouth.  aspirin delayed-release 81 mg tablet Take  by mouth daily. Review of Systems Constitutional: The patient has no acute distress or discomfort. HEENT: The patient denies recent head trauma, eye pain, blurred vision,  hearing deficit, oropharyngeal mucosal pain or lesions, and the patient denies throat pain or discomfort. Lymphatics: The patient denies palpable peripheral lymphadenopathy. Hematologic: The patient denies having bruising, bleeding, or progressive fatigue. Respiratory: Patient denies having shortness of breath, cough, sputum production, fever, or dyspnea on exertion. Cardiovascular: The patient denies having leg pain, leg swelling, heart palpitations, chest permit, chest pain, or lightheadedness. The patient denies having dyspnea on exertion. Gastrointestinal: The patient denies having nausea, emesis, or diarrhea. The patient denies having any hematemesis or blood in the stool. Genitourinary: Patient denies having urinary urgency, frequency, or dysuria. The patient denies having blood in the urine. Psychological: The patient denies having symptoms of nervousness, anxiety, depression, or thoughts of harming self. Skin: Patient denies having skin rashes, skin, ulcerations, or unexplained itching or pruritus. Musculoskeletal: The patient denies having pain in the joints or bones. Objective:  
 
Visit Vitals /80 Pulse (!) 112 Temp 98.3 °F (36.8 °C) Ht 6' 1\" (1.854 m) Wt 60.1 kg (132 lb 6.4 oz) SpO2 99% BMI 17.47 kg/m² ECOG PS=0 Physical Exam:  
Gen. Appearance: The patient is in no acute distress. Skin: There is no bruise or rash. HEENT: The exam is unremarkable. Neck: Supple without lymphadenopathy or thyromegaly. Lungs: Clear to auscultation and percussion; there are no wheezes or rhonchi. Heart: Regular rate and rhythm; there are no murmurs, gallops, or rubs. Abdomen: Bowel sounds are present and normal.  There is no guarding, tenderness, or hepatosplenomegaly. Extremities: There is no clubbing, cyanosis, or edema. Neurologic: There are no focal neurologic deficits. Lymphatics: There is no palpable peripheral lymphadenopathy. Musculoskeletal: The patient has full range of motion at all joints. There is no evidence of joint deformity or effusions. There is no focal joint tenderness. Psychological/psychiatric: There is no clinical evidence of anxiety, depression, or melancholy. Lab data: 
   
Results for orders placed or performed during the hospital encounter of 07/24/18 CBC WITH 3 PART DIFF     Status: Abnormal  
Result Value Ref Range Status  WBC 19.6 (H) 4.5 - 13.0 K/uL Final  
 RBC 4.01 (L) 4.10 - 5.10 M/uL Final  
 HGB 10.3 (L) 12.0 - 16.0 g/dL Final  
 HCT 30.8 (L) 36 - 48 % Final  
 MCV 76.8 (L) 78 - 102 FL Final  
 MCH 25.7 25.0 - 35.0 PG Final  
 MCHC 33.4 31 - 37 g/dL Final  
 RDW 19.0 (H) 11.5 - 14.5 % Final  
 NEUTROPHILS 81 (H) 40 - 70 % Final  
 MIXED CELLS 13 0.1 - 17 % Final  
 LYMPHOCYTES 6 (L) 14 - 44 % Final  
 ABS. NEUTROPHILS 15.8 (H) 1.8 - 9.5 K/UL Final  
 ABS. MIXED CELLS 2.5 (H) 0.0 - 2.3 K/uL Final  
 ABS. LYMPHOCYTES 1.3 1.1 - 5.9 K/UL Final  
  Comment: Test performed at [place] [address] Location. Results Reviewed by Medical Director. DF AUTOMATED   Final  
 
 
   
Assessment:  
 
1. Monoclonal gammopathy 2. Malignant neoplasm of prostate ([facility] [address] 3. Iron deficiency anemia secondary to inadequate dietary iron intake 4. Neutrophilic leukocytosis 5. [facility] Plan:  
Neutrophilic leukocytosis: A prior immunophenotyping profile showed no immunophenotypic aberrancy in the leukocyte populations. The test did show evidence of a monoclonal plasma cell population which was subsequently confirmed by bone marrow biopsy and SPEP. The neutrophil count is currently 35.3% of the total WBC count. Monoclonal gammopathy of undetermined significance: The bone marrow biopsy done on 10/11/2017 showed 4.5% monoclonal plasma cells consistent with MGUS. The immunofixation confirmed an IgG a kappa M spike production. The most recent M spike from 7/24/2018 was 0.8 g/dL. A follow-up SPEP on 10/23/2018 showed a value of 0.9 g/dL for the monoclonal paraprotein level we will continue to monitor this at 3 month intervals. Thrombocytosis: The current platelet count is 16,908. However the previous count from October 2018 was 1,132,000. I have explained to the patient that the [address] [place] [address] mutation analysis was negative. At this time we will discontinue the hydroxyurea. We will recheck the platelets in the presence of citrate and EDTA. Malignant neoplasm of the prostate: The patient has a history of prostate cancer. His PSA test as of 10/23/2018 was 0.1 ng/mL. I will recheck his PSA level at this time. Follow-up in 3 months Orders Placed This Encounter  COMPLETE CBC & AUTO DIFF WBC  InHouse CBC ([employer]) Standing Status:   Future Number of Occurrences:   1 Standing Expiration Date:   12/11/2018  METABOLIC PANEL, COMPREHENSIVE Standing Status:   Future Standing Expiration Date:   12/5/2019  
 IRON PROFILE Standing Status:   Future Standing Expiration Date:   12/5/2019  FERRITIN Standing Status:   Future Standing Expiration Date:   12/5/2019  SPEP Standing Status:   Future Standing Expiration Date:   12/5/2019  PLATELET COUNT Standing Status:   Future Standing Expiration Date:   12/5/2019 Jamie Lopez MD 
12/4/2018 Please note: This document has been produced using voice recognition software. Unrecognized errors in transcription may be present.

## 2018-12-04 NOTE — PATIENT INSTRUCTIONS
Learning About Monoclonal Gammopathy of Unknown Significance (MGUS) What is MGUS? Monoclonal gammopathy of unknown significance (MGUS) is a blood condition. The blood is made of many kinds of cells, including red blood cells, platelets, and white blood cells. With MGUS, a type of white blood cell called a plasma cell makes too much of the \"M\" (for \"monoclonal\") protein in the blood. Most people with MGUS are fine for many years. MGUS seldom causes symptoms or major health problems. In rare cases, MGUS may turn into multiple myeloma. This is a cancer of plasma cells in the blood that can cause bone weakness. Some people with MGUS may also have a higher risk for osteoporosis, in which bones become thin and weak. MGUS is sometimes seen in younger people, but is more common in people as they get older. It's seen most often in those over the age of 79. How is MGUS diagnosed? MGUS is often found by chance when blood tests are done for other reasons. If you have high levels of a certain protein in your blood, your doctor may order more tests. Blood tests can help identify the protein. The protein is sometimes found in the urine, so you may get a urine test too. Other tests may be done if your doctor thinks you might have a medical problem. In some cases, a bone marrow biopsy may be done to rule out a problem like multiple myeloma. How is it treated? Most people with MGUS don't need any treatment. But you may need regular physical exams, blood tests, and urine tests to make sure that MGUS isn't progressing to a medical problem. Follow-up care is a key part of your treatment and safety. Be sure to make and go to all appointments, and call your doctor if you are having problems. It's also a good idea to know your test results and keep a list of the medicines you take. Where can you learn more? Go to http://petty-timothy.info/. Enter A917 in the search box to learn more about \"Learning About Monoclonal Gammopathy of Unknown Significance (MGUS). \" 
Current as of: May 7, 2018 Content Version: 11.8 © 8275-7393 [employer]. Care instructions adapted under license by [employer] (which disclaims liability or warranty for this information). If you have questions about a medical condition or this instruction, always ask your healthcare professional. [place] [address] any warranty or liability for your use of this information.

## 2018-12-05 NOTE — PROGRESS NOTES
Results reviewed. Patient was advised to stop taking hydroxyurea. Platelets being rechecked using Citrate and EDTA.

## 2018-12-13 NOTE — PROGRESS NOTES
[facility] Lab Visit:    Donell Taveras  1935  [id number]    1056:  Pt arrived ambulatory. Labs drawn peripherally and sent for processing. Departed [facility] ambulatory and in no distress.     Visit Vitals  /63 (BP 1 Location: Left arm, BP Patient Position: Sitting)   Pulse 98   Temp 98.3 °F (36.8 °C)   Resp 18   SpO2 100%

## 2018-12-14 NOTE — PROGRESS NOTES
[facility] Progress Note    Date: 2018    Name: Isac Orozco    MRN: [id number]         : 1935    2 units PLTs    Mr. Lizbeth Weir arrived to [facility] at 1010 accompanied by two family memebers. Mr. Lizbeth Weir was assessed and education was provided. Discussed risks and benefits of blood product transfusion with patient, including risk of transfusion reaction and disease transmission. Patient verbalized understanding and signed consent placed on chart. Mr. Trinidad Benedict vitals were reviewed. Visit Vitals  /70 (BP 1 Location: Left arm, BP Patient Position: Sitting)   Pulse 75   Temp 98.7 °F (37.1 °C)   Resp 18   SpO2 99%       22g IV inserted in patient's Right forearm  x1 attempt. Positive for blood return and flushes without difficulty. Normal saline initiated at [facility]. Pre-medications (Tylenol 650 mg and Benadryl 25 mg) were administered as ordered. First unit of two ordered units of PLTs initiated @ 1105. Fifteen minutes into infusion, VS stable and pt denied c/o SOB, itching/hives, lip/tongue/facial swelling, CP or other complaints and units was complete at 1120. Second unit of two ordered units of PRBCs initiated at 1210  Fifteen minutes into infusion, VS stable and pt denied c/o SOB, itching/hives, lip/tongue/facial swelling, CP or other complaints. . Fifteen minutes later, VS stable and pt denied complaints and unit was completed at 1240. Patient Vitals for the past 12 hrs:   Temp Pulse Resp BP SpO2   18 1240 98.7 °F (37.1 °C) 75 18 121/70 99 %   18 1225 98.6 °F (37 °C) 96 18 117/64 99 %   18 1207 97.8 °F (36.6 °C) 81 18 113/61 99 %   18 1120 98.6 °F (37 °C) 82 18 117/56 99 %   18 1059 98.7 °F (37.1 °C) 76 18 116/64 100 %   18 1011 98 °F (36.7 °C) 92 18 121/70 100 %       Mr. Lizbeth Weir tolerated infusion without complaints. IV removed. No irritation, bleeding, or hematoma noted at site. Gauze and coban applied to site.     Patient declined to stay for observation. No signs of allergic reaction noted. Discharge instructions reviewed with pt. Pt instructed to report SOB, CP, elevated temp, back pain, or other symptoms of transfusion reaction to MD or ED. Pt verbalized understanding. Patient armband removed and shredded    Mr. Laith Macias was discharged from [place] [address] in stable condition at [phone]. He has no further appointments with [facility] at this time.      Thor Courser, RN  December 14, 2018

## 2019-01-01 ENCOUNTER — HOSPITAL ENCOUNTER (OUTPATIENT)
Dept: LAB | Age: 84
Discharge: HOME | End: 2019-07-01

## 2019-01-01 ENCOUNTER — HOME CARE VISIT (OUTPATIENT)
Dept: HOME HEALTH SERVICES | Facility: HOME HEALTH | Age: 84
End: 2019-01-01
Payer: MEDICARE

## 2019-01-01 ENCOUNTER — TELEPHONE (OUTPATIENT)
Dept: PALLATIVE CARE | Age: 84
End: 2019-01-01

## 2019-01-01 ENCOUNTER — HOME CARE VISIT (OUTPATIENT)
Dept: SCHEDULING | Facility: HOME HEALTH | Age: 84
End: 2019-01-01
Payer: MEDICARE

## 2019-01-01 ENCOUNTER — OFFICE VISIT (OUTPATIENT)
Dept: CARDIOLOGY CLINIC | Age: 84
End: 2019-01-01

## 2019-01-01 ENCOUNTER — APPOINTMENT (OUTPATIENT)
Dept: GENERAL RADIOLOGY | Age: 84
DRG: 812 | End: 2019-01-01
Attending: EMERGENCY MEDICINE
Payer: MEDICARE

## 2019-01-01 ENCOUNTER — HOSPITAL ENCOUNTER (INPATIENT)
Age: 84
LOS: 3 days | Discharge: HOME | DRG: 812 | End: 2019-09-12
Attending: EMERGENCY MEDICINE | Admitting: INTERNAL MEDICINE
Payer: MEDICARE

## 2019-01-01 ENCOUNTER — APPOINTMENT (OUTPATIENT)
Dept: GENERAL RADIOLOGY | Age: 84
DRG: 378 | End: 2019-01-01
Attending: PHYSICIAN ASSISTANT
Payer: MEDICARE

## 2019-01-01 ENCOUNTER — HOME CARE VISIT (OUTPATIENT)
Dept: HOSPICE | Facility: HOSPICE | Age: 84
End: 2019-01-01
Payer: MEDICARE

## 2019-01-01 ENCOUNTER — APPOINTMENT (OUTPATIENT)
Dept: ULTRASOUND IMAGING | Age: 84
DRG: 871 | End: 2019-01-01
Attending: INTERNAL MEDICINE
Payer: MEDICARE

## 2019-01-01 ENCOUNTER — APPOINTMENT (OUTPATIENT)
Dept: NON INVASIVE DIAGNOSTICS | Age: 84
DRG: 871 | End: 2019-01-01
Attending: INTERNAL MEDICINE
Payer: MEDICARE

## 2019-01-01 ENCOUNTER — OFFICE VISIT (OUTPATIENT)
Dept: ONCOLOGY | Age: 84
End: 2019-01-01

## 2019-01-01 ENCOUNTER — APPOINTMENT (OUTPATIENT)
Dept: GENERAL RADIOLOGY | Age: 84
DRG: 637 | End: 2019-01-01
Attending: EMERGENCY MEDICINE
Payer: MEDICARE

## 2019-01-01 ENCOUNTER — HOME VISIT (OUTPATIENT)
Dept: PALLATIVE CARE | Age: 84
End: 2019-01-01

## 2019-01-01 ENCOUNTER — PATIENT OUTREACH (OUTPATIENT)
Dept: CASE MANAGEMENT | Age: 84
End: 2019-01-01

## 2019-01-01 ENCOUNTER — HOSPITAL ENCOUNTER (INPATIENT)
Age: 84
LOS: 2 days | Discharge: HOME HEALTH | DRG: 812 | End: 2019-08-29
Attending: EMERGENCY MEDICINE | Admitting: INTERNAL MEDICINE
Payer: MEDICARE

## 2019-01-01 ENCOUNTER — APPOINTMENT (OUTPATIENT)
Dept: NON INVASIVE DIAGNOSTICS | Age: 84
DRG: 637 | End: 2019-01-01
Attending: INTERNAL MEDICINE
Payer: MEDICARE

## 2019-01-01 ENCOUNTER — HOSPITAL ENCOUNTER (OUTPATIENT)
Dept: LAB | Age: 84
Discharge: HOME | End: 2019-03-27
Payer: MEDICARE

## 2019-01-01 ENCOUNTER — HOSPITAL ENCOUNTER (OUTPATIENT)
Dept: LAB | Age: 84
Discharge: HOME | End: 2019-06-29
Payer: MEDICARE

## 2019-01-01 ENCOUNTER — TELEPHONE (OUTPATIENT)
Dept: ONCOLOGY | Age: 84
End: 2019-01-01

## 2019-01-01 ENCOUNTER — HOSPITAL ENCOUNTER (OUTPATIENT)
Dept: ONCOLOGY | Age: 84
Discharge: HOME | End: 2019-03-27

## 2019-01-01 ENCOUNTER — HOME HEALTH ADMISSION (OUTPATIENT)
Dept: HOME HEALTH SERVICES | Facility: HOME HEALTH | Age: 84
End: 2019-01-01
Payer: MEDICARE

## 2019-01-01 ENCOUNTER — HOSPITAL ENCOUNTER (OUTPATIENT)
Dept: LAB | Age: 84
Discharge: HOME | End: 2019-06-24
Payer: MEDICARE

## 2019-01-01 ENCOUNTER — HOSPITAL ENCOUNTER (OUTPATIENT)
Dept: LAB | Age: 84
Discharge: HOME | End: 2019-06-30
Payer: COMMERCIAL

## 2019-01-01 ENCOUNTER — HOSPITAL ENCOUNTER (OUTPATIENT)
Dept: INFUSION THERAPY | Age: 84
Discharge: HOME | End: 2019-07-09
Payer: COMMERCIAL

## 2019-01-01 ENCOUNTER — HOSPITAL ENCOUNTER (INPATIENT)
Age: 84
LOS: 2 days | Discharge: HOME | DRG: 378 | End: 2019-05-15
Attending: EMERGENCY MEDICINE | Admitting: INTERNAL MEDICINE
Payer: MEDICARE

## 2019-01-01 ENCOUNTER — HOSPITAL ENCOUNTER (OUTPATIENT)
Dept: INFUSION THERAPY | Age: 84
Discharge: HOME | End: 2019-07-10
Payer: COMMERCIAL

## 2019-01-01 ENCOUNTER — HOSPITAL ENCOUNTER (INPATIENT)
Age: 84
LOS: 9 days | Discharge: HOME HEALTH | DRG: 871 | End: 2019-06-18
Attending: EMERGENCY MEDICINE | Admitting: HOSPITALIST
Payer: MEDICARE

## 2019-01-01 ENCOUNTER — HOSPITAL ENCOUNTER (EMERGENCY)
Age: 84
Discharge: HOME | End: 2019-06-04
Attending: EMERGENCY MEDICINE
Payer: MEDICARE

## 2019-01-01 ENCOUNTER — HOSPITAL ENCOUNTER (OUTPATIENT)
Dept: LAB | Age: 84
Discharge: HOME | End: 2019-07-05
Payer: MEDICARE

## 2019-01-01 ENCOUNTER — HOSPITAL ENCOUNTER (EMERGENCY)
Age: 84
Discharge: HOME | End: 2019-03-22
Attending: EMERGENCY MEDICINE
Payer: MEDICARE

## 2019-01-01 ENCOUNTER — HOSPITAL ENCOUNTER (OUTPATIENT)
Dept: INFUSION THERAPY | Age: 84
Discharge: HOME | End: 2019-09-06
Payer: MEDICARE

## 2019-01-01 ENCOUNTER — HOSPICE ADMISSION (OUTPATIENT)
Dept: HOSPICE | Facility: HOSPICE | Age: 84
End: 2019-01-01
Payer: MEDICARE

## 2019-01-01 ENCOUNTER — APPOINTMENT (OUTPATIENT)
Dept: GENERAL RADIOLOGY | Age: 84
DRG: 871 | End: 2019-01-01
Attending: HOSPITALIST
Payer: MEDICARE

## 2019-01-01 ENCOUNTER — HOSPITAL ENCOUNTER (OUTPATIENT)
Dept: LAB | Age: 84
Discharge: HOME | End: 2019-07-08
Payer: MEDICARE

## 2019-01-01 ENCOUNTER — HOSPITAL ENCOUNTER (OUTPATIENT)
Dept: ONCOLOGY | Age: 84
Discharge: HOME | End: 2019-07-16

## 2019-01-01 ENCOUNTER — HOSPITAL ENCOUNTER (OUTPATIENT)
Dept: INFUSION THERAPY | Age: 84
Discharge: HOME | End: 2019-09-17
Payer: MEDICARE

## 2019-01-01 ENCOUNTER — APPOINTMENT (OUTPATIENT)
Dept: GENERAL RADIOLOGY | Age: 84
DRG: 812 | End: 2019-01-01
Attending: PHYSICIAN ASSISTANT
Payer: MEDICARE

## 2019-01-01 ENCOUNTER — HOSPITAL ENCOUNTER (OUTPATIENT)
Dept: LAB | Age: 84
Discharge: HOME | End: 2019-07-12
Payer: MEDICARE

## 2019-01-01 ENCOUNTER — HOSPITAL ENCOUNTER (OUTPATIENT)
Dept: LAB | Age: 84
Discharge: HOME | End: 2019-07-16
Payer: MEDICARE

## 2019-01-01 ENCOUNTER — HOSPITAL ENCOUNTER (OUTPATIENT)
Dept: LAB | Age: 84
Discharge: HOME | End: 2019-06-26
Payer: MEDICARE

## 2019-01-01 ENCOUNTER — HOSPICE ADMISSION (OUTPATIENT)
Dept: HOSPICE | Facility: HOSPICE | Age: 84
End: 2019-01-01

## 2019-01-01 ENCOUNTER — APPOINTMENT (OUTPATIENT)
Dept: VASCULAR SURGERY | Age: 84
DRG: 812 | End: 2019-01-01
Attending: INTERNAL MEDICINE
Payer: MEDICARE

## 2019-01-01 ENCOUNTER — HOSPITAL ENCOUNTER (OUTPATIENT)
Dept: LAB | Age: 84
Discharge: HOME | End: 2019-07-03
Payer: MEDICARE

## 2019-01-01 ENCOUNTER — APPOINTMENT (OUTPATIENT)
Dept: CT IMAGING | Age: 84
DRG: 871 | End: 2019-01-01
Attending: NURSE PRACTITIONER
Payer: MEDICARE

## 2019-01-01 ENCOUNTER — HOSPITAL ENCOUNTER (OUTPATIENT)
Dept: INFUSION THERAPY | Age: 84
Discharge: HOME | End: 2019-09-26
Payer: MEDICARE

## 2019-01-01 ENCOUNTER — HOSPITAL ENCOUNTER (INPATIENT)
Age: 84
LOS: 3 days | Discharge: NURSING, REHAB OR LONG-TERM CARE | DRG: 637 | End: 2019-02-13
Attending: EMERGENCY MEDICINE | Admitting: INTERNAL MEDICINE
Payer: MEDICARE

## 2019-01-01 ENCOUNTER — DOCUMENTATION ONLY (OUTPATIENT)
Dept: ONCOLOGY | Age: 84
End: 2019-01-01

## 2019-01-01 ENCOUNTER — APPOINTMENT (OUTPATIENT)
Dept: NUCLEAR MEDICINE | Age: 84
DRG: 871 | End: 2019-01-01
Attending: INTERNAL MEDICINE
Payer: MEDICARE

## 2019-01-01 ENCOUNTER — HOME CARE VISIT (OUTPATIENT)
Dept: HOME HEALTH SERVICES | Facility: HOME HEALTH | Age: 84
End: 2019-01-01

## 2019-01-01 ENCOUNTER — APPOINTMENT (OUTPATIENT)
Dept: CT IMAGING | Age: 84
DRG: 812 | End: 2019-01-01
Attending: NURSE PRACTITIONER
Payer: MEDICARE

## 2019-01-01 ENCOUNTER — DOCUMENTATION ONLY (OUTPATIENT)
Dept: PALLATIVE CARE | Age: 84
End: 2019-01-01

## 2019-01-01 VITALS
SYSTOLIC BLOOD PRESSURE: 104 MMHG | DIASTOLIC BLOOD PRESSURE: 60 MMHG | HEART RATE: 76 BPM | OXYGEN SATURATION: 95 % | RESPIRATION RATE: 12 BRPM

## 2019-01-01 VITALS
HEART RATE: 75 BPM | RESPIRATION RATE: 18 BRPM | OXYGEN SATURATION: 99 % | SYSTOLIC BLOOD PRESSURE: 118 MMHG | DIASTOLIC BLOOD PRESSURE: 70 MMHG | TEMPERATURE: 98.4 F

## 2019-01-01 VITALS
TEMPERATURE: 98.7 F | HEART RATE: 92 BPM | SYSTOLIC BLOOD PRESSURE: 104 MMHG | DIASTOLIC BLOOD PRESSURE: 60 MMHG | RESPIRATION RATE: 16 BRPM

## 2019-01-01 VITALS
OXYGEN SATURATION: 97 % | HEART RATE: 98 BPM | SYSTOLIC BLOOD PRESSURE: 120 MMHG | TEMPERATURE: 98.5 F | DIASTOLIC BLOOD PRESSURE: 60 MMHG

## 2019-01-01 VITALS
TEMPERATURE: 98.9 F | RESPIRATION RATE: 16 BRPM | DIASTOLIC BLOOD PRESSURE: 78 MMHG | OXYGEN SATURATION: 98 % | HEART RATE: 79 BPM | SYSTOLIC BLOOD PRESSURE: 134 MMHG

## 2019-01-01 VITALS
RESPIRATION RATE: 14 BRPM | TEMPERATURE: 98.3 F | SYSTOLIC BLOOD PRESSURE: 118 MMHG | DIASTOLIC BLOOD PRESSURE: 70 MMHG | OXYGEN SATURATION: 91 % | HEART RATE: 83 BPM

## 2019-01-01 VITALS
SYSTOLIC BLOOD PRESSURE: 140 MMHG | HEART RATE: 79 BPM | OXYGEN SATURATION: 98 % | DIASTOLIC BLOOD PRESSURE: 83 MMHG | TEMPERATURE: 98.2 F

## 2019-01-01 VITALS
DIASTOLIC BLOOD PRESSURE: 68 MMHG | RESPIRATION RATE: 22 BRPM | SYSTOLIC BLOOD PRESSURE: 125 MMHG | OXYGEN SATURATION: 96 % | HEART RATE: 81 BPM | TEMPERATURE: 97.3 F

## 2019-01-01 VITALS
OXYGEN SATURATION: 98 % | TEMPERATURE: 98.2 F | SYSTOLIC BLOOD PRESSURE: 116 MMHG | DIASTOLIC BLOOD PRESSURE: 78 MMHG | RESPIRATION RATE: 18 BRPM | HEART RATE: 70 BPM

## 2019-01-01 VITALS
DIASTOLIC BLOOD PRESSURE: 50 MMHG | SYSTOLIC BLOOD PRESSURE: 100 MMHG | OXYGEN SATURATION: 97 % | TEMPERATURE: 97 F | RESPIRATION RATE: 20 BRPM | HEART RATE: 113 BPM

## 2019-01-01 VITALS
RESPIRATION RATE: 18 BRPM | SYSTOLIC BLOOD PRESSURE: 114 MMHG | HEART RATE: 100 BPM | DIASTOLIC BLOOD PRESSURE: 60 MMHG | TEMPERATURE: 100.2 F

## 2019-01-01 VITALS
RESPIRATION RATE: 18 BRPM | OXYGEN SATURATION: 97 % | DIASTOLIC BLOOD PRESSURE: 64 MMHG | SYSTOLIC BLOOD PRESSURE: 126 MMHG | HEART RATE: 72 BPM | TEMPERATURE: 98.4 F

## 2019-01-01 VITALS
DIASTOLIC BLOOD PRESSURE: 76 MMHG | RESPIRATION RATE: 17 BRPM | HEART RATE: 88 BPM | TEMPERATURE: 97.8 F | SYSTOLIC BLOOD PRESSURE: 110 MMHG | OXYGEN SATURATION: 96 %

## 2019-01-01 VITALS
OXYGEN SATURATION: 98 % | HEART RATE: 89 BPM | DIASTOLIC BLOOD PRESSURE: 62 MMHG | TEMPERATURE: 97.3 F | RESPIRATION RATE: 17 BRPM | SYSTOLIC BLOOD PRESSURE: 125 MMHG

## 2019-01-01 VITALS — SYSTOLIC BLOOD PRESSURE: 142 MMHG | DIASTOLIC BLOOD PRESSURE: 88 MMHG | OXYGEN SATURATION: 96 % | HEART RATE: 79 BPM

## 2019-01-01 VITALS
HEART RATE: 101 BPM | DIASTOLIC BLOOD PRESSURE: 79 MMHG | SYSTOLIC BLOOD PRESSURE: 151 MMHG | RESPIRATION RATE: 16 BRPM | TEMPERATURE: 97.4 F | OXYGEN SATURATION: 100 %

## 2019-01-01 VITALS
OXYGEN SATURATION: 97 % | DIASTOLIC BLOOD PRESSURE: 60 MMHG | HEART RATE: 82 BPM | SYSTOLIC BLOOD PRESSURE: 105 MMHG | TEMPERATURE: 98.8 F

## 2019-01-01 VITALS
HEIGHT: 73 IN | HEART RATE: 107 BPM | TEMPERATURE: 99.2 F | DIASTOLIC BLOOD PRESSURE: 83 MMHG | BODY MASS INDEX: 20.41 KG/M2 | OXYGEN SATURATION: 100 % | RESPIRATION RATE: 14 BRPM | WEIGHT: 154 LBS | SYSTOLIC BLOOD PRESSURE: 143 MMHG

## 2019-01-01 VITALS
SYSTOLIC BLOOD PRESSURE: 169 MMHG | BODY MASS INDEX: 23.22 KG/M2 | WEIGHT: 175.2 LBS | RESPIRATION RATE: 17 BRPM | DIASTOLIC BLOOD PRESSURE: 76 MMHG | TEMPERATURE: 96.5 F | OXYGEN SATURATION: 95 % | HEIGHT: 73 IN | HEART RATE: 90 BPM

## 2019-01-01 VITALS
TEMPERATURE: 98.7 F | OXYGEN SATURATION: 95 % | DIASTOLIC BLOOD PRESSURE: 81 MMHG | SYSTOLIC BLOOD PRESSURE: 126 MMHG | RESPIRATION RATE: 16 BRPM | HEART RATE: 79 BPM

## 2019-01-01 VITALS
OXYGEN SATURATION: 97 % | SYSTOLIC BLOOD PRESSURE: 117 MMHG | DIASTOLIC BLOOD PRESSURE: 60 MMHG | RESPIRATION RATE: 18 BRPM | HEART RATE: 96 BPM

## 2019-01-01 VITALS — SYSTOLIC BLOOD PRESSURE: 100 MMHG | DIASTOLIC BLOOD PRESSURE: 59 MMHG | TEMPERATURE: 98.6 F | HEART RATE: 76 BPM

## 2019-01-01 VITALS
RESPIRATION RATE: 16 BRPM | HEART RATE: 71 BPM | DIASTOLIC BLOOD PRESSURE: 68 MMHG | TEMPERATURE: 96.8 F | SYSTOLIC BLOOD PRESSURE: 116 MMHG | OXYGEN SATURATION: 99 %

## 2019-01-01 VITALS
OXYGEN SATURATION: 98 % | TEMPERATURE: 98.3 F | RESPIRATION RATE: 18 BRPM | HEART RATE: 67 BPM | SYSTOLIC BLOOD PRESSURE: 132 MMHG | DIASTOLIC BLOOD PRESSURE: 78 MMHG

## 2019-01-01 VITALS
RESPIRATION RATE: 18 BRPM | SYSTOLIC BLOOD PRESSURE: 90 MMHG | TEMPERATURE: 98.5 F | HEART RATE: 99 BPM | OXYGEN SATURATION: 93 % | DIASTOLIC BLOOD PRESSURE: 50 MMHG

## 2019-01-01 VITALS
HEART RATE: 58 BPM | OXYGEN SATURATION: 100 % | TEMPERATURE: 97.8 F | DIASTOLIC BLOOD PRESSURE: 67 MMHG | SYSTOLIC BLOOD PRESSURE: 104 MMHG

## 2019-01-01 VITALS
SYSTOLIC BLOOD PRESSURE: 123 MMHG | HEIGHT: 73 IN | OXYGEN SATURATION: 95 % | TEMPERATURE: 97.7 F | HEART RATE: 92 BPM | WEIGHT: 127.4 LBS | BODY MASS INDEX: 16.89 KG/M2 | RESPIRATION RATE: 18 BRPM | DIASTOLIC BLOOD PRESSURE: 79 MMHG

## 2019-01-01 VITALS
HEART RATE: 86 BPM | RESPIRATION RATE: 18 BRPM | OXYGEN SATURATION: 99 % | TEMPERATURE: 97.8 F | DIASTOLIC BLOOD PRESSURE: 70 MMHG | SYSTOLIC BLOOD PRESSURE: 106 MMHG

## 2019-01-01 VITALS
SYSTOLIC BLOOD PRESSURE: 118 MMHG | TEMPERATURE: 99.5 F | HEART RATE: 71 BPM | DIASTOLIC BLOOD PRESSURE: 70 MMHG | OXYGEN SATURATION: 98 %

## 2019-01-01 VITALS
SYSTOLIC BLOOD PRESSURE: 158 MMHG | OXYGEN SATURATION: 93 % | DIASTOLIC BLOOD PRESSURE: 66 MMHG | HEART RATE: 113 BPM | TEMPERATURE: 99.7 F | RESPIRATION RATE: 18 BRPM

## 2019-01-01 VITALS — TEMPERATURE: 98.2 F

## 2019-01-01 VITALS
HEART RATE: 75 BPM | SYSTOLIC BLOOD PRESSURE: 129 MMHG | OXYGEN SATURATION: 97 % | DIASTOLIC BLOOD PRESSURE: 79 MMHG | RESPIRATION RATE: 20 BRPM | TEMPERATURE: 99.5 F

## 2019-01-01 VITALS
TEMPERATURE: 97.3 F | DIASTOLIC BLOOD PRESSURE: 60 MMHG | HEIGHT: 73 IN | RESPIRATION RATE: 20 BRPM | SYSTOLIC BLOOD PRESSURE: 134 MMHG | BODY MASS INDEX: 19.75 KG/M2 | OXYGEN SATURATION: 99 % | WEIGHT: 149 LBS | HEART RATE: 99 BPM

## 2019-01-01 VITALS
SYSTOLIC BLOOD PRESSURE: 104 MMHG | HEART RATE: 80 BPM | HEIGHT: 73 IN | WEIGHT: 144 LBS | BODY MASS INDEX: 19.09 KG/M2 | OXYGEN SATURATION: 98 % | DIASTOLIC BLOOD PRESSURE: 62 MMHG

## 2019-01-01 VITALS
HEART RATE: 79 BPM | OXYGEN SATURATION: 98 % | DIASTOLIC BLOOD PRESSURE: 80 MMHG | SYSTOLIC BLOOD PRESSURE: 140 MMHG | TEMPERATURE: 98.2 F

## 2019-01-01 VITALS
RESPIRATION RATE: 18 BRPM | SYSTOLIC BLOOD PRESSURE: 90 MMHG | HEART RATE: 99 BPM | HEART RATE: 70 BPM | SYSTOLIC BLOOD PRESSURE: 114 MMHG | DIASTOLIC BLOOD PRESSURE: 66 MMHG | DIASTOLIC BLOOD PRESSURE: 50 MMHG | OXYGEN SATURATION: 93 % | RESPIRATION RATE: 20 BRPM | OXYGEN SATURATION: 98 % | TEMPERATURE: 98 F | TEMPERATURE: 98.5 F

## 2019-01-01 VITALS
DIASTOLIC BLOOD PRESSURE: 51 MMHG | HEART RATE: 83 BPM | SYSTOLIC BLOOD PRESSURE: 102 MMHG | TEMPERATURE: 98.3 F | RESPIRATION RATE: 20 BRPM

## 2019-01-01 VITALS
OXYGEN SATURATION: 99 % | HEART RATE: 61 BPM | TEMPERATURE: 97.7 F | DIASTOLIC BLOOD PRESSURE: 60 MMHG | SYSTOLIC BLOOD PRESSURE: 118 MMHG | RESPIRATION RATE: 16 BRPM

## 2019-01-01 VITALS
OXYGEN SATURATION: 96 % | SYSTOLIC BLOOD PRESSURE: 90 MMHG | BODY MASS INDEX: 18.02 KG/M2 | WEIGHT: 136 LBS | HEART RATE: 87 BPM | DIASTOLIC BLOOD PRESSURE: 50 MMHG | HEIGHT: 73 IN

## 2019-01-01 VITALS
OXYGEN SATURATION: 96 % | DIASTOLIC BLOOD PRESSURE: 76 MMHG | SYSTOLIC BLOOD PRESSURE: 133 MMHG | TEMPERATURE: 97.4 F | RESPIRATION RATE: 18 BRPM | HEART RATE: 81 BPM

## 2019-01-01 VITALS
TEMPERATURE: 97.8 F | SYSTOLIC BLOOD PRESSURE: 118 MMHG | DIASTOLIC BLOOD PRESSURE: 64 MMHG | OXYGEN SATURATION: 97 % | HEART RATE: 72 BPM | RESPIRATION RATE: 16 BRPM

## 2019-01-01 VITALS
HEART RATE: 78 BPM | TEMPERATURE: 97.8 F | OXYGEN SATURATION: 93 % | RESPIRATION RATE: 18 BRPM | SYSTOLIC BLOOD PRESSURE: 112 MMHG | DIASTOLIC BLOOD PRESSURE: 65 MMHG

## 2019-01-01 VITALS
DIASTOLIC BLOOD PRESSURE: 60 MMHG | SYSTOLIC BLOOD PRESSURE: 100 MMHG | OXYGEN SATURATION: 98 % | HEART RATE: 65 BPM | TEMPERATURE: 98.1 F | RESPIRATION RATE: 18 BRPM

## 2019-01-01 VITALS
DIASTOLIC BLOOD PRESSURE: 86 MMHG | TEMPERATURE: 96.8 F | BODY MASS INDEX: 18.69 KG/M2 | HEART RATE: 78 BPM | HEIGHT: 73 IN | WEIGHT: 141 LBS | RESPIRATION RATE: 18 BRPM | SYSTOLIC BLOOD PRESSURE: 132 MMHG

## 2019-01-01 VITALS
TEMPERATURE: 98.9 F | DIASTOLIC BLOOD PRESSURE: 57 MMHG | WEIGHT: 154 LBS | OXYGEN SATURATION: 96 % | RESPIRATION RATE: 16 BRPM | HEART RATE: 81 BPM | SYSTOLIC BLOOD PRESSURE: 115 MMHG | BODY MASS INDEX: 20.32 KG/M2

## 2019-01-01 VITALS
DIASTOLIC BLOOD PRESSURE: 62 MMHG | OXYGEN SATURATION: 93 % | HEART RATE: 72 BPM | TEMPERATURE: 97.7 F | SYSTOLIC BLOOD PRESSURE: 100 MMHG | RESPIRATION RATE: 14 BRPM

## 2019-01-01 VITALS — OXYGEN SATURATION: 97 % | SYSTOLIC BLOOD PRESSURE: 103 MMHG | HEART RATE: 92 BPM | DIASTOLIC BLOOD PRESSURE: 70 MMHG

## 2019-01-01 VITALS
SYSTOLIC BLOOD PRESSURE: 99 MMHG | HEART RATE: 100 BPM | TEMPERATURE: 97.9 F | DIASTOLIC BLOOD PRESSURE: 58 MMHG | OXYGEN SATURATION: 95 %

## 2019-01-01 VITALS
HEART RATE: 62 BPM | TEMPERATURE: 98 F | SYSTOLIC BLOOD PRESSURE: 118 MMHG | RESPIRATION RATE: 18 BRPM | DIASTOLIC BLOOD PRESSURE: 70 MMHG | OXYGEN SATURATION: 99 %

## 2019-01-01 VITALS
OXYGEN SATURATION: 97 % | HEART RATE: 72 BPM | SYSTOLIC BLOOD PRESSURE: 126 MMHG | TEMPERATURE: 97.2 F | RESPIRATION RATE: 18 BRPM | DIASTOLIC BLOOD PRESSURE: 64 MMHG

## 2019-01-01 VITALS
OXYGEN SATURATION: 99 % | DIASTOLIC BLOOD PRESSURE: 74 MMHG | RESPIRATION RATE: 16 BRPM | SYSTOLIC BLOOD PRESSURE: 128 MMHG | HEART RATE: 109 BPM | TEMPERATURE: 97.7 F

## 2019-01-01 VITALS
DIASTOLIC BLOOD PRESSURE: 60 MMHG | SYSTOLIC BLOOD PRESSURE: 110 MMHG | TEMPERATURE: 98 F | HEART RATE: 80 BPM | RESPIRATION RATE: 16 BRPM

## 2019-01-01 VITALS
OXYGEN SATURATION: 96 % | HEART RATE: 93 BPM | DIASTOLIC BLOOD PRESSURE: 72 MMHG | TEMPERATURE: 98.9 F | RESPIRATION RATE: 18 BRPM | SYSTOLIC BLOOD PRESSURE: 128 MMHG

## 2019-01-01 VITALS
HEART RATE: 74 BPM | OXYGEN SATURATION: 98 % | DIASTOLIC BLOOD PRESSURE: 72 MMHG | RESPIRATION RATE: 16 BRPM | SYSTOLIC BLOOD PRESSURE: 120 MMHG

## 2019-01-01 VITALS
HEART RATE: 81 BPM | SYSTOLIC BLOOD PRESSURE: 116 MMHG | TEMPERATURE: 98.6 F | DIASTOLIC BLOOD PRESSURE: 68 MMHG | OXYGEN SATURATION: 100 %

## 2019-01-01 VITALS
TEMPERATURE: 96.3 F | HEART RATE: 78 BPM | DIASTOLIC BLOOD PRESSURE: 70 MMHG | RESPIRATION RATE: 16 BRPM | SYSTOLIC BLOOD PRESSURE: 122 MMHG

## 2019-01-01 VITALS
DIASTOLIC BLOOD PRESSURE: 60 MMHG | RESPIRATION RATE: 16 BRPM | OXYGEN SATURATION: 99 % | SYSTOLIC BLOOD PRESSURE: 116 MMHG | HEART RATE: 84 BPM | TEMPERATURE: 97.9 F

## 2019-01-01 VITALS
TEMPERATURE: 97.5 F | DIASTOLIC BLOOD PRESSURE: 60 MMHG | SYSTOLIC BLOOD PRESSURE: 118 MMHG | OXYGEN SATURATION: 98 % | RESPIRATION RATE: 18 BRPM | HEART RATE: 72 BPM

## 2019-01-01 VITALS
SYSTOLIC BLOOD PRESSURE: 120 MMHG | TEMPERATURE: 98.3 F | RESPIRATION RATE: 16 BRPM | WEIGHT: 154 LBS | OXYGEN SATURATION: 99 % | DIASTOLIC BLOOD PRESSURE: 68 MMHG | BODY MASS INDEX: 20.32 KG/M2 | HEART RATE: 79 BPM

## 2019-01-01 VITALS
RESPIRATION RATE: 18 BRPM | DIASTOLIC BLOOD PRESSURE: 68 MMHG | OXYGEN SATURATION: 98 % | HEART RATE: 70 BPM | SYSTOLIC BLOOD PRESSURE: 118 MMHG | TEMPERATURE: 97.8 F

## 2019-01-01 VITALS — TEMPERATURE: 98.2 F | HEART RATE: 82 BPM | DIASTOLIC BLOOD PRESSURE: 52 MMHG | SYSTOLIC BLOOD PRESSURE: 82 MMHG

## 2019-01-01 VITALS
RESPIRATION RATE: 16 BRPM | OXYGEN SATURATION: 98 % | HEART RATE: 70 BPM | DIASTOLIC BLOOD PRESSURE: 88 MMHG | SYSTOLIC BLOOD PRESSURE: 145 MMHG | TEMPERATURE: 98.5 F

## 2019-01-01 VITALS
TEMPERATURE: 97.8 F | RESPIRATION RATE: 18 BRPM | HEART RATE: 92 BPM | DIASTOLIC BLOOD PRESSURE: 60 MMHG | SYSTOLIC BLOOD PRESSURE: 112 MMHG

## 2019-01-01 VITALS
HEART RATE: 88 BPM | SYSTOLIC BLOOD PRESSURE: 130 MMHG | RESPIRATION RATE: 18 BRPM | TEMPERATURE: 98.3 F | OXYGEN SATURATION: 97 % | DIASTOLIC BLOOD PRESSURE: 80 MMHG

## 2019-01-01 VITALS
DIASTOLIC BLOOD PRESSURE: 70 MMHG | RESPIRATION RATE: 18 BRPM | OXYGEN SATURATION: 91 % | SYSTOLIC BLOOD PRESSURE: 118 MMHG | TEMPERATURE: 98.3 F | HEART RATE: 83 BPM

## 2019-01-01 VITALS
HEART RATE: 88 BPM | DIASTOLIC BLOOD PRESSURE: 60 MMHG | RESPIRATION RATE: 18 BRPM | SYSTOLIC BLOOD PRESSURE: 105 MMHG | TEMPERATURE: 98.3 F

## 2019-01-01 VITALS
DIASTOLIC BLOOD PRESSURE: 65 MMHG | TEMPERATURE: 98.6 F | SYSTOLIC BLOOD PRESSURE: 112 MMHG | OXYGEN SATURATION: 91 % | RESPIRATION RATE: 18 BRPM | HEART RATE: 67 BPM

## 2019-01-01 VITALS
TEMPERATURE: 97.9 F | SYSTOLIC BLOOD PRESSURE: 105 MMHG | OXYGEN SATURATION: 98 % | HEART RATE: 85 BPM | DIASTOLIC BLOOD PRESSURE: 60 MMHG

## 2019-01-01 VITALS
SYSTOLIC BLOOD PRESSURE: 112 MMHG | TEMPERATURE: 98 F | HEART RATE: 76 BPM | DIASTOLIC BLOOD PRESSURE: 86 MMHG | OXYGEN SATURATION: 98 % | RESPIRATION RATE: 20 BRPM

## 2019-01-01 VITALS
SYSTOLIC BLOOD PRESSURE: 112 MMHG | TEMPERATURE: 98.7 F | DIASTOLIC BLOOD PRESSURE: 60 MMHG | HEART RATE: 71 BPM | RESPIRATION RATE: 21 BRPM | OXYGEN SATURATION: 92 %

## 2019-01-01 VITALS
HEART RATE: 64 BPM | OXYGEN SATURATION: 100 % | TEMPERATURE: 97.8 F | SYSTOLIC BLOOD PRESSURE: 118 MMHG | BODY MASS INDEX: 23.11 KG/M2 | HEIGHT: 73 IN | DIASTOLIC BLOOD PRESSURE: 69 MMHG

## 2019-01-01 VITALS — SYSTOLIC BLOOD PRESSURE: 140 MMHG | DIASTOLIC BLOOD PRESSURE: 8 MMHG | HEART RATE: 80 BPM | OXYGEN SATURATION: 98 %

## 2019-01-01 VITALS
RESPIRATION RATE: 18 BRPM | TEMPERATURE: 97.7 F | OXYGEN SATURATION: 93 % | HEART RATE: 72 BPM | DIASTOLIC BLOOD PRESSURE: 62 MMHG | SYSTOLIC BLOOD PRESSURE: 100 MMHG

## 2019-01-01 VITALS
DIASTOLIC BLOOD PRESSURE: 77 MMHG | OXYGEN SATURATION: 97 % | SYSTOLIC BLOOD PRESSURE: 134 MMHG | HEART RATE: 103 BPM | TEMPERATURE: 99.1 F | RESPIRATION RATE: 18 BRPM

## 2019-01-01 VITALS
HEART RATE: 63 BPM | TEMPERATURE: 98.7 F | SYSTOLIC BLOOD PRESSURE: 118 MMHG | DIASTOLIC BLOOD PRESSURE: 60 MMHG | OXYGEN SATURATION: 98 %

## 2019-01-01 VITALS
RESPIRATION RATE: 18 BRPM | OXYGEN SATURATION: 97 % | SYSTOLIC BLOOD PRESSURE: 130 MMHG | TEMPERATURE: 98 F | HEART RATE: 76 BPM | DIASTOLIC BLOOD PRESSURE: 68 MMHG

## 2019-01-01 VITALS
HEIGHT: 73 IN | BODY MASS INDEX: 20.28 KG/M2 | RESPIRATION RATE: 19 BRPM | OXYGEN SATURATION: 98 % | TEMPERATURE: 99.6 F | WEIGHT: 153 LBS | HEART RATE: 73 BPM | DIASTOLIC BLOOD PRESSURE: 72 MMHG | SYSTOLIC BLOOD PRESSURE: 142 MMHG

## 2019-01-01 VITALS
OXYGEN SATURATION: 99 % | TEMPERATURE: 98.8 F | RESPIRATION RATE: 16 BRPM | DIASTOLIC BLOOD PRESSURE: 64 MMHG | HEART RATE: 88 BPM | SYSTOLIC BLOOD PRESSURE: 108 MMHG

## 2019-01-01 VITALS
OXYGEN SATURATION: 96 % | HEART RATE: 107 BPM | TEMPERATURE: 100.5 F | RESPIRATION RATE: 16 BRPM | DIASTOLIC BLOOD PRESSURE: 76 MMHG | SYSTOLIC BLOOD PRESSURE: 130 MMHG

## 2019-01-01 VITALS
RESPIRATION RATE: 16 BRPM | HEART RATE: 107 BPM | SYSTOLIC BLOOD PRESSURE: 130 MMHG | DIASTOLIC BLOOD PRESSURE: 76 MMHG | TEMPERATURE: 100.5 F | OXYGEN SATURATION: 96 %

## 2019-01-01 VITALS
TEMPERATURE: 98.8 F | SYSTOLIC BLOOD PRESSURE: 140 MMHG | HEART RATE: 130 BPM | RESPIRATION RATE: 20 BRPM | DIASTOLIC BLOOD PRESSURE: 58 MMHG | OXYGEN SATURATION: 88 %

## 2019-01-01 VITALS
SYSTOLIC BLOOD PRESSURE: 118 MMHG | RESPIRATION RATE: 16 BRPM | TEMPERATURE: 96.7 F | DIASTOLIC BLOOD PRESSURE: 62 MMHG | HEART RATE: 64 BPM

## 2019-01-01 VITALS
RESPIRATION RATE: 16 BRPM | DIASTOLIC BLOOD PRESSURE: 67 MMHG | HEART RATE: 90 BPM | OXYGEN SATURATION: 97 % | TEMPERATURE: 99.1 F | SYSTOLIC BLOOD PRESSURE: 144 MMHG

## 2019-01-01 VITALS
TEMPERATURE: 98 F | HEART RATE: 72 BPM | RESPIRATION RATE: 18 BRPM | OXYGEN SATURATION: 98 % | SYSTOLIC BLOOD PRESSURE: 110 MMHG | DIASTOLIC BLOOD PRESSURE: 84 MMHG

## 2019-01-01 VITALS
TEMPERATURE: 98.6 F | HEART RATE: 87 BPM | RESPIRATION RATE: 18 BRPM | DIASTOLIC BLOOD PRESSURE: 62 MMHG | SYSTOLIC BLOOD PRESSURE: 130 MMHG | OXYGEN SATURATION: 87 %

## 2019-01-01 VITALS
DIASTOLIC BLOOD PRESSURE: 74 MMHG | TEMPERATURE: 98.9 F | OXYGEN SATURATION: 98 % | SYSTOLIC BLOOD PRESSURE: 122 MMHG | HEART RATE: 118 BPM | RESPIRATION RATE: 18 BRPM

## 2019-01-01 VITALS — DIASTOLIC BLOOD PRESSURE: 60 MMHG | OXYGEN SATURATION: 98 % | SYSTOLIC BLOOD PRESSURE: 100 MMHG | HEART RATE: 66 BPM

## 2019-01-01 VITALS
DIASTOLIC BLOOD PRESSURE: 82 MMHG | SYSTOLIC BLOOD PRESSURE: 139 MMHG | TEMPERATURE: 98.2 F | OXYGEN SATURATION: 98 % | HEART RATE: 85 BPM

## 2019-01-01 VITALS
HEART RATE: 94 BPM | SYSTOLIC BLOOD PRESSURE: 100 MMHG | TEMPERATURE: 98 F | OXYGEN SATURATION: 94 % | DIASTOLIC BLOOD PRESSURE: 80 MMHG

## 2019-01-01 VITALS
TEMPERATURE: 98.7 F | RESPIRATION RATE: 16 BRPM | SYSTOLIC BLOOD PRESSURE: 104 MMHG | HEART RATE: 92 BPM | DIASTOLIC BLOOD PRESSURE: 60 MMHG

## 2019-01-01 VITALS
BODY MASS INDEX: 18.61 KG/M2 | WEIGHT: 140.4 LBS | OXYGEN SATURATION: 93 % | SYSTOLIC BLOOD PRESSURE: 116 MMHG | HEIGHT: 73 IN | DIASTOLIC BLOOD PRESSURE: 70 MMHG | RESPIRATION RATE: 18 BRPM | HEART RATE: 98 BPM | TEMPERATURE: 96.2 F

## 2019-01-01 VITALS
HEART RATE: 87 BPM | OXYGEN SATURATION: 94 % | TEMPERATURE: 97.6 F | SYSTOLIC BLOOD PRESSURE: 140 MMHG | RESPIRATION RATE: 18 BRPM | DIASTOLIC BLOOD PRESSURE: 70 MMHG

## 2019-01-01 VITALS
OXYGEN SATURATION: 95 % | TEMPERATURE: 98 F | HEART RATE: 91 BPM | SYSTOLIC BLOOD PRESSURE: 136 MMHG | DIASTOLIC BLOOD PRESSURE: 70 MMHG | RESPIRATION RATE: 16 BRPM

## 2019-01-01 VITALS
DIASTOLIC BLOOD PRESSURE: 65 MMHG | OXYGEN SATURATION: 97 % | TEMPERATURE: 98.4 F | RESPIRATION RATE: 16 BRPM | HEART RATE: 64 BPM | SYSTOLIC BLOOD PRESSURE: 135 MMHG

## 2019-01-01 VITALS
SYSTOLIC BLOOD PRESSURE: 109 MMHG | OXYGEN SATURATION: 96 % | DIASTOLIC BLOOD PRESSURE: 74 MMHG | TEMPERATURE: 46.4 F | HEART RATE: 113 BPM

## 2019-01-01 VITALS
TEMPERATURE: 98.5 F | RESPIRATION RATE: 16 BRPM | OXYGEN SATURATION: 96 % | HEART RATE: 92 BPM | DIASTOLIC BLOOD PRESSURE: 80 MMHG | SYSTOLIC BLOOD PRESSURE: 121 MMHG

## 2019-01-01 VITALS
TEMPERATURE: 98.9 F | SYSTOLIC BLOOD PRESSURE: 122 MMHG | OXYGEN SATURATION: 98 % | DIASTOLIC BLOOD PRESSURE: 74 MMHG | RESPIRATION RATE: 16 BRPM | HEART RATE: 118 BPM

## 2019-01-01 DIAGNOSIS — D47.2 MGUS (MONOCLONAL GAMMOPATHY OF UNKNOWN SIGNIFICANCE): Chronic | ICD-10-CM

## 2019-01-01 DIAGNOSIS — Z71.89 GOALS OF CARE, COUNSELING/DISCUSSION: ICD-10-CM

## 2019-01-01 DIAGNOSIS — I48.0 PAF (PAROXYSMAL ATRIAL FIBRILLATION) (HCC): Primary | ICD-10-CM

## 2019-01-01 DIAGNOSIS — L97.409: ICD-10-CM

## 2019-01-01 DIAGNOSIS — Z63.6 CAREGIVER BURDEN: Primary | ICD-10-CM
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
## 2019-01-01 DIAGNOSIS — D64.9 ANEMIA, UNSPECIFIED TYPE: Primary | ICD-10-CM

## 2019-01-01 DIAGNOSIS — D75.839 THROMBOCYTOSIS: ICD-10-CM

## 2019-01-01 DIAGNOSIS — I10 ESSENTIAL HYPERTENSION: ICD-10-CM

## 2019-01-01 DIAGNOSIS — E11.00 DIABETIC HYPEROSMOLAR NON-KETOTIC STATE (HCC): Primary | ICD-10-CM

## 2019-01-01 DIAGNOSIS — D46.9 MYELODYSPLASTIC SYNDROME (HCC): Primary | ICD-10-CM

## 2019-01-01 DIAGNOSIS — C61 MALIGNANT NEOPLASM OF PROSTATE (HCC): ICD-10-CM

## 2019-01-01 DIAGNOSIS — Z63.6 CAREGIVER BURDEN: ICD-10-CM

## 2019-01-01 DIAGNOSIS — T14.8XXA ABRASION: Primary | ICD-10-CM

## 2019-01-01 DIAGNOSIS — R23.3 EASY BRUISABILITY: ICD-10-CM

## 2019-01-01 DIAGNOSIS — D47.2 MGUS (MONOCLONAL GAMMOPATHY OF UNKNOWN SIGNIFICANCE): Primary | ICD-10-CM

## 2019-01-01 DIAGNOSIS — R50.9 FEVER OF UNKNOWN ORIGIN: ICD-10-CM

## 2019-01-01 DIAGNOSIS — D50.8 IRON DEFICIENCY ANEMIA SECONDARY TO INADEQUATE DIETARY IRON INTAKE: ICD-10-CM

## 2019-01-01 DIAGNOSIS — D69.6 THROMBOCYTOPENIA (HCC): ICD-10-CM

## 2019-01-01 DIAGNOSIS — R53.81 DEBILITY: ICD-10-CM

## 2019-01-01 DIAGNOSIS — I48.91 ATRIAL FIBRILLATION, UNSPECIFIED TYPE (HCC): ICD-10-CM

## 2019-01-01 DIAGNOSIS — E16.2 HYPOGLYCEMIA: ICD-10-CM

## 2019-01-01 DIAGNOSIS — D72.9 NEUTROPHILIC LEUKOCYTOSIS: Primary | ICD-10-CM

## 2019-01-01 DIAGNOSIS — R35.0 URINARY FREQUENCY: ICD-10-CM

## 2019-01-01 DIAGNOSIS — R41.82 ALTERED MENTAL STATUS, UNSPECIFIED ALTERED MENTAL STATUS TYPE: ICD-10-CM

## 2019-01-01 DIAGNOSIS — Z51.81 MONITORING FOR ANTICOAGULANT USE: ICD-10-CM

## 2019-01-01 DIAGNOSIS — I21.4 NSTEMI (NON-ST ELEVATED MYOCARDIAL INFARCTION) (HCC): Primary | ICD-10-CM

## 2019-01-01 DIAGNOSIS — C61 MALIGNANT NEOPLASM OF PROSTATE (HCC): Chronic | ICD-10-CM

## 2019-01-01 DIAGNOSIS — E88.09 HYPOALBUMINEMIA: ICD-10-CM

## 2019-01-01 DIAGNOSIS — D46.9 MDS (MYELODYSPLASTIC SYNDROME) (HCC): Primary | ICD-10-CM

## 2019-01-01 DIAGNOSIS — D47.2 MONOCLONAL GAMMOPATHY: Primary | ICD-10-CM

## 2019-01-01 DIAGNOSIS — R60.0 EDEMA OF UPPER EXTREMITY: Primary | ICD-10-CM

## 2019-01-01 DIAGNOSIS — D47.2 MGUS (MONOCLONAL GAMMOPATHY OF UNKNOWN SIGNIFICANCE): ICD-10-CM

## 2019-01-01 DIAGNOSIS — D69.6 THROMBOCYTOPENIA (HCC): Primary | ICD-10-CM

## 2019-01-01 DIAGNOSIS — R53.83 LETHARGY: ICD-10-CM

## 2019-01-01 DIAGNOSIS — D72.9 NEUTROPHILIC LEUKOCYTOSIS: ICD-10-CM

## 2019-01-01 DIAGNOSIS — R60.0 EDEMA OF UPPER EXTREMITY: ICD-10-CM

## 2019-01-01 DIAGNOSIS — D64.9 ACUTE ON CHRONIC ANEMIA: Primary | ICD-10-CM

## 2019-01-01 DIAGNOSIS — R53.82 CHRONIC FATIGUE: Primary | ICD-10-CM

## 2019-01-01 DIAGNOSIS — D47.2 MONOCLONAL GAMMOPATHY: ICD-10-CM

## 2019-01-01 DIAGNOSIS — D64.9 SYMPTOMATIC ANEMIA: ICD-10-CM

## 2019-01-01 DIAGNOSIS — A41.9 SEPSIS, DUE TO UNSPECIFIED ORGANISM: ICD-10-CM

## 2019-01-01 DIAGNOSIS — E88.09 HYPOALBUMINEMIA: Chronic | ICD-10-CM

## 2019-01-01 DIAGNOSIS — D46.9 MYELODYSPLASTIC SYNDROME (HCC): ICD-10-CM

## 2019-01-01 DIAGNOSIS — R41.89 COGNITIVE IMPAIRMENT: ICD-10-CM

## 2019-01-01 DIAGNOSIS — C61 MALIGNANT NEOPLASM OF PROSTATE (HCC): Primary | ICD-10-CM

## 2019-01-01 DIAGNOSIS — D64.9 ANEMIA, UNSPECIFIED TYPE: ICD-10-CM

## 2019-01-01 DIAGNOSIS — D64.9 CHRONIC ANEMIA: ICD-10-CM

## 2019-01-01 DIAGNOSIS — E78.5 DYSLIPIDEMIA: ICD-10-CM

## 2019-01-01 DIAGNOSIS — E11.8 CONTROLLED TYPE 2 DIABETES MELLITUS WITH COMPLICATION, WITH LONG-TERM CURRENT USE OF INSULIN (HCC): ICD-10-CM

## 2019-01-01 DIAGNOSIS — R06.00 DYSPNEA, UNSPECIFIED TYPE: ICD-10-CM

## 2019-01-01 DIAGNOSIS — L89.159 PRESSURE INJURY OF SKIN OF SACRAL REGION, UNSPECIFIED INJURY STAGE: ICD-10-CM

## 2019-01-01 DIAGNOSIS — L98.429 ULCER OF SACRAL REGION, STAGE 4 (HCC): Primary | ICD-10-CM

## 2019-01-01 DIAGNOSIS — S81.809A OPEN WOUND OF LOWER EXTREMITY, UNSPECIFIED LATERALITY, INITIAL ENCOUNTER: ICD-10-CM

## 2019-01-01 DIAGNOSIS — C61 PROSTATE CANCER (HCC): ICD-10-CM

## 2019-01-01 DIAGNOSIS — I48.91 NEW ONSET A-FIB (HCC): Primary | ICD-10-CM

## 2019-01-01 DIAGNOSIS — Z79.4 CONTROLLED TYPE 2 DIABETES MELLITUS WITH COMPLICATION, WITH LONG-TERM CURRENT USE OF INSULIN (HCC): ICD-10-CM

## 2019-01-01 DIAGNOSIS — R73.9 HYPERGLYCEMIA: Primary | ICD-10-CM

## 2019-01-01 DIAGNOSIS — J18.9 COMMUNITY ACQUIRED PNEUMONIA, UNSPECIFIED LATERALITY: ICD-10-CM

## 2019-01-01 DIAGNOSIS — Z79.01 MONITORING FOR ANTICOAGULANT USE: ICD-10-CM

## 2019-01-01 DIAGNOSIS — R53.83 FATIGUE, UNSPECIFIED TYPE: ICD-10-CM

## 2019-01-01 DIAGNOSIS — R55 NEAR SYNCOPE: ICD-10-CM

## 2019-01-01 LAB
ABO + RH BLD: NORMAL
ALBUMIN SERPL ELPH-MCNC: 2 G/DL (ref 2.9–4.4)
ALBUMIN SERPL ELPH-MCNC: 2.1 G/DL (ref 2.9–4.4)
ALBUMIN SERPL ELPH-MCNC: 2.2 G/DL (ref 2.9–4.4)
ALBUMIN SERPL-MCNC: 1.4 G/DL (ref 3.5–5)
ALBUMIN SERPL-MCNC: 1.7 G/DL (ref 3.4–5)
ALBUMIN SERPL-MCNC: 1.7 G/DL (ref 3.4–5)
ALBUMIN SERPL-MCNC: 1.7 G/DL (ref 3.5–5)
ALBUMIN SERPL-MCNC: 1.8 G/DL (ref 3.5–5)
ALBUMIN SERPL-MCNC: 1.9 G/DL (ref 3.4–5)
ALBUMIN SERPL-MCNC: 1.9 G/DL (ref 3.5–5)
ALBUMIN SERPL-MCNC: 2 G/DL (ref 3.4–5)
ALBUMIN SERPL-MCNC: 2 G/DL (ref 3.4–5)
ALBUMIN/GLOB SERPL: 0.3 {RATIO} (ref 0.8–1.7)
ALBUMIN/GLOB SERPL: 0.3 {RATIO} (ref 0.8–1.7)
ALBUMIN/GLOB SERPL: 0.3 {RATIO} (ref 1.1–2.2)
ALBUMIN/GLOB SERPL: 0.3 {RATIO} (ref 1.1–2.2)
ALBUMIN/GLOB SERPL: 0.4 {RATIO} (ref 0.7–1.7)
ALBUMIN/GLOB SERPL: 0.4 {RATIO} (ref 0.8–1.7)
ALBUMIN/GLOB SERPL: 0.4 {RATIO} (ref 1.1–2.2)
ALBUMIN/GLOB SERPL: 0.4 {RATIO} (ref 1.1–2.2)
ALBUMIN/GLOB SERPL: 0.5 {RATIO} (ref 0.7–1.7)
ALBUMIN/GLOB SERPL: 0.5 {RATIO} (ref 0.7–1.7)
ALP BONE CFR SERPL: 38 % (ref 12–68)
ALP INTEST CFR SERPL: 4 % (ref 0–18)
ALP LIVER CFR SERPL: 58 % (ref 13–88)
ALP SERPL-CCNC: 103 U/L (ref 45–117)
ALP SERPL-CCNC: 103 U/L (ref 45–117)
ALP SERPL-CCNC: 119 U/L (ref 45–117)
ALP SERPL-CCNC: 68 U/L (ref 45–117)
ALP SERPL-CCNC: 69 U/L (ref 45–117)
ALP SERPL-CCNC: 76 U/L (ref 45–117)
ALP SERPL-CCNC: 82 U/L (ref 45–117)
ALP SERPL-CCNC: 90 U/L (ref 45–117)
ALP SERPL-CCNC: 91 U/L (ref 45–117)
ALP SERPL-CCNC: 98 U/L (ref 45–117)
ALP SERPL-CCNC: 99 IU/L (ref 39–117)
ALPHA1 GLOB SERPL ELPH-MCNC: 0.2 G/DL (ref 0–0.4)
ALPHA1 GLOB SERPL ELPH-MCNC: 0.2 G/DL (ref 0–0.4)
ALPHA1 GLOB SERPL ELPH-MCNC: 0.3 G/DL (ref 0–0.4)
ALPHA2 GLOB SERPL ELPH-MCNC: 0.7 G/DL (ref 0.4–1)
ALPHA2 GLOB SERPL ELPH-MCNC: 0.8 G/DL (ref 0.4–1)
ALPHA2 GLOB SERPL ELPH-MCNC: 0.9 G/DL (ref 0.4–1)
ALT SERPL-CCNC: 10 U/L (ref 16–61)
ALT SERPL-CCNC: 15 U/L (ref 16–61)
ALT SERPL-CCNC: 20 U/L (ref 12–78)
ALT SERPL-CCNC: 20 U/L (ref 16–61)
ALT SERPL-CCNC: 20 U/L (ref 16–61)
ALT SERPL-CCNC: 25 U/L (ref 12–78)
ALT SERPL-CCNC: 25 U/L (ref 12–78)
ALT SERPL-CCNC: 25 U/L (ref 16–61)
ALT SERPL-CCNC: 29 U/L (ref 12–78)
ALT SERPL-CCNC: 40 U/L (ref 16–61)
ANA SER QL: POSITIVE
ANION GAP SERPL CALC-SCNC: 11 MMOL/L (ref 3–18)
ANION GAP SERPL CALC-SCNC: 16 MMOL/L (ref 3–18)
ANION GAP SERPL CALC-SCNC: 22 MMOL/L (ref 3–18)
ANION GAP SERPL CALC-SCNC: 22 MMOL/L (ref 3–18)
ANION GAP SERPL CALC-SCNC: 3 MMOL/L (ref 3–18)
ANION GAP SERPL CALC-SCNC: 3 MMOL/L (ref 3–18)
ANION GAP SERPL CALC-SCNC: 3 MMOL/L (ref 5–15)
ANION GAP SERPL CALC-SCNC: 4 MMOL/L (ref 5–15)
ANION GAP SERPL CALC-SCNC: 5 MMOL/L (ref 3–18)
ANION GAP SERPL CALC-SCNC: 5 MMOL/L (ref 5–15)
ANION GAP SERPL CALC-SCNC: 5 MMOL/L (ref 5–15)
ANION GAP SERPL CALC-SCNC: 6 MMOL/L (ref 3–18)
ANION GAP SERPL CALC-SCNC: 6 MMOL/L (ref 5–15)
ANION GAP SERPL CALC-SCNC: 6 MMOL/L (ref 5–15)
ANION GAP SERPL CALC-SCNC: 9 MMOL/L (ref 3–18)
ANTI-COMPLEMENT (C3B,C3D): NORMAL
APPEARANCE UR: ABNORMAL
APPEARANCE UR: ABNORMAL
APPEARANCE UR: CLEAR
APPEARANCE UR: CLEAR
APTT PPP: 102.5 SEC (ref 23–36.4)
APTT PPP: 29.2 SEC (ref 23–36.4)
APTT PPP: 33.2 SEC (ref 23–36.4)
AST SERPL-CCNC: 19 U/L (ref 15–37)
AST SERPL-CCNC: 22 U/L (ref 15–37)
AST SERPL-CCNC: 32 U/L (ref 15–37)
AST SERPL-CCNC: 35 U/L (ref 15–37)
AST SERPL-CCNC: 43 U/L (ref 15–37)
AST SERPL-CCNC: 44 U/L (ref 15–37)
AST SERPL-CCNC: 45 U/L (ref 15–37)
AST SERPL-CCNC: 46 U/L (ref 15–37)
AST SERPL-CCNC: 52 U/L (ref 15–37)
AST SERPL-CCNC: 71 U/L (ref 15–37)
ATRIAL RATE: 102 BPM
ATRIAL RATE: 105 BPM
ATRIAL RATE: 105 BPM
ATRIAL RATE: 111 BPM
ATRIAL RATE: 227 BPM
ATRIAL RATE: 278 BPM
ATRIAL RATE: 94 BPM
ATRIAL RATE: 98 BPM
B-GLOBULIN SERPL ELPH-MCNC: 1.3 G/DL (ref 0.7–1.3)
B-GLOBULIN SERPL ELPH-MCNC: 1.5 G/DL (ref 0.7–1.3)
B-GLOBULIN SERPL ELPH-MCNC: 1.5 G/DL (ref 0.7–1.3)
BACTERIA SPEC CULT: ABNORMAL
BACTERIA SPEC CULT: NORMAL
BACTERIA UR CULT: ABNORMAL
BACTERIA URNS QL MICRO: ABNORMAL /HPF
BACTERIA URNS QL MICRO: NEGATIVE /HPF
BASO+EOS+MONOS # BLD AUTO: 0.4 K/UL (ref 0–2.3)
BASO+EOS+MONOS # BLD AUTO: 0.5 K/UL (ref 0–2.3)
BASO+EOS+MONOS NFR BLD AUTO: 2 % (ref 0.1–17)
BASO+EOS+MONOS NFR BLD AUTO: 6 % (ref 0.1–17)
BASOPHILS # BLD: 0 K/UL (ref 0–0.06)
BASOPHILS # BLD: 0 K/UL (ref 0–0.1)
BASOPHILS # BLD: 0.1 K/UL (ref 0–0.06)
BASOPHILS # BLD: 0.1 K/UL (ref 0–0.06)
BASOPHILS # BLD: 0.1 K/UL (ref 0–0.1)
BASOPHILS # BLD: 0.2 K/UL (ref 0–0.06)
BASOPHILS # BLD: 0.5 K/UL (ref 0–0.06)
BASOPHILS NFR BLD: 0 % (ref 0–1)
BASOPHILS NFR BLD: 0 % (ref 0–1)
BASOPHILS NFR BLD: 0 % (ref 0–2)
BASOPHILS NFR BLD: 0 % (ref 0–3)
BASOPHILS NFR BLD: 1 % (ref 0–1)
BASOPHILS NFR BLD: 1 % (ref 0–3)
BASOPHILS NFR BLD: 1 % (ref 0–3)
BASOPHILS NFR BLD: 2 % (ref 0–3)
BASOPHILS NFR BLD: 2 % (ref 0–3)
BASOPHILS NFR BLD: 3 % (ref 0–2)
BASOPHILS NFR BLD: 6 % (ref 0–3)
BILIRUB SERPL-MCNC: 0.2 MG/DL (ref 0.2–1)
BILIRUB SERPL-MCNC: 0.5 MG/DL (ref 0.2–1)
BILIRUB SERPL-MCNC: 0.6 MG/DL (ref 0.2–1)
BILIRUB SERPL-MCNC: 0.7 MG/DL (ref 0.2–1)
BILIRUB SERPL-MCNC: 1.3 MG/DL (ref 0.2–1)
BILIRUB SERPL-MCNC: 1.4 MG/DL (ref 0.2–1)
BILIRUB SERPL-MCNC: 1.5 MG/DL (ref 0.2–1)
BILIRUB SERPL-MCNC: 1.7 MG/DL (ref 0.2–1)
BILIRUB UR QL: NEGATIVE
BLD GP AB SCN SERPL QL ELUTION: NORMAL
BLD PROD TYP BPU: NORMAL
BLOOD GROUP ANTIBODIES SERPL: NORMAL
BNP SERPL-MCNC: 1519 PG/ML (ref 0–1800)
BPU ID: NORMAL
BUN SERPL-MCNC: 19 MG/DL (ref 7–18)
BUN SERPL-MCNC: 21 MG/DL (ref 7–18)
BUN SERPL-MCNC: 22 MG/DL (ref 6–20)
BUN SERPL-MCNC: 22 MG/DL (ref 7–18)
BUN SERPL-MCNC: 23 MG/DL (ref 7–18)
BUN SERPL-MCNC: 24 MG/DL (ref 6–20)
BUN SERPL-MCNC: 24 MG/DL (ref 7–18)
BUN SERPL-MCNC: 25 MG/DL (ref 6–20)
BUN SERPL-MCNC: 25 MG/DL (ref 7–18)
BUN SERPL-MCNC: 25 MG/DL (ref 7–18)
BUN SERPL-MCNC: 26 MG/DL (ref 7–18)
BUN SERPL-MCNC: 27 MG/DL (ref 6–20)
BUN SERPL-MCNC: 28 MG/DL (ref 7–18)
BUN SERPL-MCNC: 28 MG/DL (ref 7–18)
BUN SERPL-MCNC: 30 MG/DL (ref 6–20)
BUN SERPL-MCNC: 34 MG/DL (ref 7–18)
BUN SERPL-MCNC: 34 MG/DL (ref 7–18)
BUN SERPL-MCNC: 36 MG/DL (ref 7–18)
BUN SERPL-MCNC: 38 MG/DL (ref 6–20)
BUN SERPL-MCNC: 38 MG/DL (ref 7–18)
BUN SERPL-MCNC: 41 MG/DL (ref 7–18)
BUN SERPL-MCNC: 41 MG/DL (ref 7–18)
BUN SERPL-MCNC: 43 MG/DL (ref 7–18)
BUN/CREAT SERPL: 20 (ref 12–20)
BUN/CREAT SERPL: 23 (ref 12–20)
BUN/CREAT SERPL: 23 (ref 12–20)
BUN/CREAT SERPL: 24 (ref 12–20)
BUN/CREAT SERPL: 26 (ref 12–20)
BUN/CREAT SERPL: 26 (ref 12–20)
BUN/CREAT SERPL: 27 (ref 12–20)
BUN/CREAT SERPL: 28 (ref 12–20)
BUN/CREAT SERPL: 29 (ref 12–20)
BUN/CREAT SERPL: 34 (ref 12–20)
BUN/CREAT SERPL: 36 (ref 12–20)
BUN/CREAT SERPL: 37 (ref 12–20)
BUN/CREAT SERPL: 38 (ref 12–20)
BUN/CREAT SERPL: 39 (ref 12–20)
BUN/CREAT SERPL: 40 (ref 12–20)
BUN/CREAT SERPL: 43 (ref 12–20)
BUN/CREAT SERPL: 44 (ref 12–20)
BUN/CREAT SERPL: 44 (ref 12–20)
BUN/CREAT SERPL: 45 (ref 12–20)
BUN/CREAT SERPL: 47 (ref 12–20)
BUN/CREAT SERPL: 54 (ref 12–20)
CALCIUM SERPL-MCNC: 7.1 MG/DL (ref 8.5–10.1)
CALCIUM SERPL-MCNC: 7.3 MG/DL (ref 8.5–10.1)
CALCIUM SERPL-MCNC: 7.5 MG/DL (ref 8.5–10.1)
CALCIUM SERPL-MCNC: 7.6 MG/DL (ref 8.5–10.1)
CALCIUM SERPL-MCNC: 7.7 MG/DL (ref 8.5–10.1)
CALCIUM SERPL-MCNC: 7.8 MG/DL (ref 8.5–10.1)
CALCIUM SERPL-MCNC: 7.8 MG/DL (ref 8.5–10.1)
CALCIUM SERPL-MCNC: 7.9 MG/DL (ref 8.5–10.1)
CALCIUM SERPL-MCNC: 8 MG/DL (ref 8.5–10.1)
CALCIUM SERPL-MCNC: 8.2 MG/DL (ref 8.5–10.1)
CALCIUM SERPL-MCNC: 8.4 MG/DL (ref 8.5–10.1)
CALCIUM SERPL-MCNC: 8.5 MG/DL (ref 8.5–10.1)
CALCIUM SERPL-MCNC: 8.8 MG/DL (ref 8.5–10.1)
CALCIUM SERPL-MCNC: 8.8 MG/DL (ref 8.5–10.1)
CALCIUM SERPL-MCNC: 8.9 MG/DL (ref 8.5–10.1)
CALCULATED P AXIS, ECG09: 82 DEGREES
CALCULATED R AXIS, ECG10: 50 DEGREES
CALCULATED R AXIS, ECG10: 52 DEGREES
CALCULATED R AXIS, ECG10: 58 DEGREES
CALCULATED R AXIS, ECG10: 62 DEGREES
CALCULATED R AXIS, ECG10: 70 DEGREES
CALCULATED R AXIS, ECG10: 80 DEGREES
CALCULATED R AXIS, ECG10: 81 DEGREES
CALCULATED R AXIS, ECG10: 83 DEGREES
CALCULATED T AXIS, ECG11: 12 DEGREES
CALCULATED T AXIS, ECG11: 66 DEGREES
CALCULATED T AXIS, ECG11: 66 DEGREES
CALCULATED T AXIS, ECG11: 74 DEGREES
CALCULATED T AXIS, ECG11: 78 DEGREES
CALCULATED T AXIS, ECG11: 80 DEGREES
CALCULATED T AXIS, ECG11: 83 DEGREES
CALCULATED T AXIS, ECG11: 93 DEGREES
CALLED TO:,BCALL1: NORMAL
CALLED TO:,BCALL2: NORMAL
CC UR VC: NORMAL
CHLORIDE SERPL-SCNC: 100 MMOL/L (ref 100–108)
CHLORIDE SERPL-SCNC: 104 MMOL/L (ref 100–108)
CHLORIDE SERPL-SCNC: 105 MMOL/L (ref 100–108)
CHLORIDE SERPL-SCNC: 105 MMOL/L (ref 97–108)
CHLORIDE SERPL-SCNC: 106 MMOL/L (ref 100–108)
CHLORIDE SERPL-SCNC: 106 MMOL/L (ref 97–108)
CHLORIDE SERPL-SCNC: 107 MMOL/L (ref 100–108)
CHLORIDE SERPL-SCNC: 107 MMOL/L (ref 100–108)
CHLORIDE SERPL-SCNC: 107 MMOL/L (ref 97–108)
CHLORIDE SERPL-SCNC: 109 MMOL/L (ref 97–108)
CHLORIDE SERPL-SCNC: 109 MMOL/L (ref 97–108)
CHLORIDE SERPL-SCNC: 110 MMOL/L (ref 100–108)
CHLORIDE SERPL-SCNC: 110 MMOL/L (ref 97–108)
CHLORIDE SERPL-SCNC: 111 MMOL/L (ref 100–108)
CHLORIDE SERPL-SCNC: 112 MMOL/L (ref 100–108)
CHLORIDE SERPL-SCNC: 112 MMOL/L (ref 100–108)
CHOLEST SERPL-MCNC: 108 MG/DL
CK MB CFR SERPL CALC: 1.3 % (ref 0–4)
CK MB CFR SERPL CALC: 2.7 % (ref 0–4)
CK MB CFR SERPL CALC: ABNORMAL % (ref 0–4)
CK MB SERPL-MCNC: 1.6 NG/ML (ref 5–25)
CK MB SERPL-MCNC: 2.6 NG/ML (ref 5–25)
CK MB SERPL-MCNC: <1 NG/ML (ref 5–25)
CK SERPL-CCNC: 119 U/L (ref 39–308)
CK SERPL-CCNC: 38 U/L (ref 39–308)
CK SERPL-CCNC: 98 U/L (ref 39–308)
CO2 SERPL-SCNC: 16 MMOL/L (ref 21–32)
CO2 SERPL-SCNC: 18 MMOL/L (ref 21–32)
CO2 SERPL-SCNC: 21 MMOL/L (ref 21–32)
CO2 SERPL-SCNC: 23 MMOL/L (ref 21–32)
CO2 SERPL-SCNC: 24 MMOL/L (ref 21–32)
CO2 SERPL-SCNC: 25 MMOL/L (ref 21–32)
CO2 SERPL-SCNC: 26 MMOL/L (ref 21–32)
CO2 SERPL-SCNC: 26 MMOL/L (ref 21–32)
CO2 SERPL-SCNC: 27 MMOL/L (ref 21–32)
CO2 SERPL-SCNC: 28 MMOL/L (ref 21–32)
CO2 SERPL-SCNC: 29 MMOL/L (ref 21–32)
CO2 SERPL-SCNC: 31 MMOL/L (ref 21–32)
CO2 SERPL-SCNC: 33 MMOL/L (ref 21–32)
COLOR UR: ABNORMAL
COLOR UR: ABNORMAL
COLOR UR: YELLOW
COLOR UR: YELLOW
COMMENT, HOLDF: NORMAL
CREAT SERPL-MCNC: 0.54 MG/DL (ref 0.7–1.3)
CREAT SERPL-MCNC: 0.58 MG/DL (ref 0.6–1.3)
CREAT SERPL-MCNC: 0.59 MG/DL (ref 0.6–1.3)
CREAT SERPL-MCNC: 0.62 MG/DL (ref 0.7–1.3)
CREAT SERPL-MCNC: 0.68 MG/DL (ref 0.7–1.3)
CREAT SERPL-MCNC: 0.7 MG/DL (ref 0.6–1.3)
CREAT SERPL-MCNC: 0.76 MG/DL (ref 0.6–1.3)
CREAT SERPL-MCNC: 0.8 MG/DL (ref 0.6–1.3)
CREAT SERPL-MCNC: 0.81 MG/DL (ref 0.7–1.3)
CREAT SERPL-MCNC: 0.81 MG/DL (ref 0.7–1.3)
CREAT SERPL-MCNC: 0.84 MG/DL (ref 0.7–1.3)
CREAT SERPL-MCNC: 0.9 MG/DL (ref 0.6–1.3)
CREAT SERPL-MCNC: 0.9 MG/DL (ref 0.6–1.3)
CREAT SERPL-MCNC: 0.91 MG/DL (ref 0.6–1.3)
CREAT SERPL-MCNC: 0.96 MG/DL (ref 0.6–1.3)
CREAT SERPL-MCNC: 1 MG/DL (ref 0.6–1.3)
CREAT SERPL-MCNC: 1 MG/DL (ref 0.6–1.3)
CREAT SERPL-MCNC: 1.03 MG/DL (ref 0.6–1.3)
CREAT SERPL-MCNC: 1.04 MG/DL (ref 0.6–1.3)
CREAT SERPL-MCNC: 1.07 MG/DL (ref 0.6–1.3)
CREAT SERPL-MCNC: 1.07 MG/DL (ref 0.6–1.3)
CREAT SERPL-MCNC: 1.09 MG/DL (ref 0.6–1.3)
CREAT SERPL-MCNC: 1.19 MG/DL (ref 0.6–1.3)
CROSSMATCH RESULT,%XM: NORMAL
DAT IGG-SP REAG RBC QL: NORMAL
DAT POLY-SP REAG RBC QL: NORMAL
DIAGNOSIS, 93000: NORMAL
DIFFERENTIAL METHOD BLD: ABNORMAL
ECHO AO ROOT DIAM: 3.18 CM
ECHO LA AREA 4C: 15.8 CM2
ECHO LA AREA 4C: 21.2 CM2
ECHO LA VOL 2C: 34.79 ML (ref 18–58)
ECHO LA VOL 2C: 38.58 ML (ref 18–58)
ECHO LA VOL 4C: 41.69 ML (ref 18–58)
ECHO LA VOL 4C: 59.21 ML (ref 18–58)
ECHO LA VOL BP: 42.69 ML (ref 18–58)
ECHO LA VOL BP: 60.7 ML (ref 18–58)
ECHO LA VOL/BSA BIPLANE: 24.82 ML/M2 (ref 16–28)
ECHO LA VOL/BSA BIPLANE: 32.25 ML/M2 (ref 16–28)
ECHO LA VOLUME INDEX A2C: 20.23 ML/M2 (ref 16–28)
ECHO LA VOLUME INDEX A2C: 20.5 ML/M2 (ref 16–28)
ECHO LA VOLUME INDEX A4C: 24.24 ML/M2 (ref 16–28)
ECHO LA VOLUME INDEX A4C: 31.45 ML/M2 (ref 16–28)
ECHO LV INTERNAL DIMENSION DIASTOLIC: 3.56 CM (ref 4.2–5.9)
ECHO LV INTERNAL DIMENSION DIASTOLIC: 3.83 CM (ref 4.2–5.9)
ECHO LV INTERNAL DIMENSION SYSTOLIC: 2.42 CM
ECHO LV INTERNAL DIMENSION SYSTOLIC: 2.9 CM
ECHO LV IVSD: 0.95 CM (ref 0.6–1)
ECHO LV IVSD: 0.96 CM (ref 0.6–1)
ECHO LV MASS 2D: 123.3 G (ref 88–224)
ECHO LV MASS 2D: 148.6 G (ref 88–224)
ECHO LV MASS INDEX 2D: 71.7 G/M2 (ref 49–115)
ECHO LV MASS INDEX 2D: 78.9 G/M2 (ref 49–115)
ECHO LV POSTERIOR WALL DIASTOLIC: 1.09 CM (ref 0.6–1)
ECHO LV POSTERIOR WALL DIASTOLIC: 1.18 CM (ref 0.6–1)
ECHO LVOT DIAM: 1.98 CM
ECHO LVOT PEAK GRADIENT: 1.5 MMHG
ECHO LVOT PEAK VELOCITY: 60.32 CM/S
ECHO LVOT VTI: 13.15 CM
ECHO MV AREA PISA: 0 CM2
ECHO MV EROA PISA: 0 CM2
ECHO MV REGURGITANT PEAK GRADIENT: 89.8 MMHG
ECHO MV REGURGITANT PEAK VELOCITY: 473.71 CM/S
ECHO MV REGURGITANT RADIUS PISA: 0.31 CM
ECHO MV REGURGITANT VOLUME: 6.52 CC
ECHO MV REGURGITANT VTIA: 148.58 CM
ECHO PULMONARY ARTERY SYSTOLIC PRESSURE (PASP): 25 MMHG
ECHO PULMONARY ARTERY SYSTOLIC PRESSURE (PASP): 33 MMHG
ECHO TV REGURGITANT MAX VELOCITY: 236.02 CM/S
ECHO TV REGURGITANT MAX VELOCITY: 251.11 CM/S
ECHO TV REGURGITANT PEAK GRADIENT: 22.3 MMHG
ECHO TV REGURGITANT PEAK GRADIENT: 25.2 MMHG
EOSINOPHIL # BLD: 0 K/UL (ref 0–0.4)
EOSINOPHIL # BLD: 0.1 K/UL (ref 0–0.4)
EOSINOPHIL # BLD: 0.2 K/UL (ref 0–0.4)
EOSINOPHIL # BLD: 0.3 K/UL (ref 0–0.4)
EOSINOPHIL # BLD: 0.4 K/UL (ref 0–0.4)
EOSINOPHIL # BLD: 0.4 K/UL (ref 0–0.4)
EOSINOPHIL NFR BLD: 0 % (ref 0–5)
EOSINOPHIL NFR BLD: 0 % (ref 0–7)
EOSINOPHIL NFR BLD: 1 % (ref 0–5)
EOSINOPHIL NFR BLD: 1 % (ref 0–7)
EOSINOPHIL NFR BLD: 1 % (ref 0–7)
EOSINOPHIL NFR BLD: 2 % (ref 0–5)
EOSINOPHIL NFR BLD: 2 % (ref 0–7)
EOSINOPHIL NFR BLD: 3 % (ref 0–5)
EOSINOPHIL NFR BLD: 3 % (ref 0–5)
EOSINOPHIL NFR BLD: 5 % (ref 0–5)
EPITH CASTS URNS QL MICRO: ABNORMAL /LPF
EPITH CASTS URNS QL MICRO: ABNORMAL /LPF (ref 0–5)
EPITH CASTS URNS QL MICRO: ABNORMAL /LPF (ref 0–5)
ERYTHROCYTE [DISTWIDTH] IN BLOOD BY AUTOMATED COUNT: 17.3 % (ref 11.6–14.5)
ERYTHROCYTE [DISTWIDTH] IN BLOOD BY AUTOMATED COUNT: 17.4 % (ref 11.6–14.5)
ERYTHROCYTE [DISTWIDTH] IN BLOOD BY AUTOMATED COUNT: 17.5 % (ref 11.6–14.5)
ERYTHROCYTE [DISTWIDTH] IN BLOOD BY AUTOMATED COUNT: 17.5 % (ref 11.6–14.5)
ERYTHROCYTE [DISTWIDTH] IN BLOOD BY AUTOMATED COUNT: 19.6 % (ref 11.5–14.5)
ERYTHROCYTE [DISTWIDTH] IN BLOOD BY AUTOMATED COUNT: 20 % (ref 11.5–14.5)
ERYTHROCYTE [DISTWIDTH] IN BLOOD BY AUTOMATED COUNT: 20.7 % (ref 11.5–14.5)
ERYTHROCYTE [DISTWIDTH] IN BLOOD BY AUTOMATED COUNT: 21 % (ref 11.5–14.5)
ERYTHROCYTE [DISTWIDTH] IN BLOOD BY AUTOMATED COUNT: 21.1 % (ref 11.5–14.5)
ERYTHROCYTE [DISTWIDTH] IN BLOOD BY AUTOMATED COUNT: 21.2 % (ref 11.5–14.5)
ERYTHROCYTE [DISTWIDTH] IN BLOOD BY AUTOMATED COUNT: 21.5 % (ref 11.5–14.5)
ERYTHROCYTE [DISTWIDTH] IN BLOOD BY AUTOMATED COUNT: 21.5 % (ref 11.6–14.5)
ERYTHROCYTE [DISTWIDTH] IN BLOOD BY AUTOMATED COUNT: 21.6 % (ref 11.5–14.5)
ERYTHROCYTE [DISTWIDTH] IN BLOOD BY AUTOMATED COUNT: 21.6 % (ref 11.5–14.5)
ERYTHROCYTE [DISTWIDTH] IN BLOOD BY AUTOMATED COUNT: 21.7 % (ref 11.5–14.5)
ERYTHROCYTE [DISTWIDTH] IN BLOOD BY AUTOMATED COUNT: 21.7 % (ref 11.5–14.5)
ERYTHROCYTE [DISTWIDTH] IN BLOOD BY AUTOMATED COUNT: 21.9 % (ref 11.5–14.5)
ERYTHROCYTE [DISTWIDTH] IN BLOOD BY AUTOMATED COUNT: 22.6 % (ref 11.5–14.5)
ERYTHROCYTE [DISTWIDTH] IN BLOOD BY AUTOMATED COUNT: 23.6 % (ref 11.6–14.5)
ERYTHROCYTE [DISTWIDTH] IN BLOOD BY AUTOMATED COUNT: 24.5 % (ref 11.6–14.5)
ERYTHROCYTE [DISTWIDTH] IN BLOOD BY AUTOMATED COUNT: 25 % (ref 11.6–14.5)
ERYTHROCYTE [DISTWIDTH] IN BLOOD BY AUTOMATED COUNT: 25.5 % (ref 11.6–14.5)
ERYTHROCYTE [DISTWIDTH] IN BLOOD BY AUTOMATED COUNT: 25.9 % (ref 11.6–14.5)
ERYTHROCYTE [DISTWIDTH] IN BLOOD BY AUTOMATED COUNT: 26.6 % (ref 11.6–14.5)
ERYTHROCYTE [DISTWIDTH] IN BLOOD BY AUTOMATED COUNT: 26.8 % (ref 11.6–14.5)
ERYTHROCYTE [DISTWIDTH] IN BLOOD BY AUTOMATED COUNT: 27.1 % (ref 11.6–14.5)
ERYTHROCYTE [DISTWIDTH] IN BLOOD BY AUTOMATED COUNT: 27.3 % (ref 11.6–14.5)
ERYTHROCYTE [DISTWIDTH] IN BLOOD BY AUTOMATED COUNT: 27.8 % (ref 11.6–14.5)
ERYTHROCYTE [DISTWIDTH] IN BLOOD BY AUTOMATED COUNT: 28 % (ref 11.6–14.5)
ERYTHROCYTE [DISTWIDTH] IN BLOOD BY AUTOMATED COUNT: 28.4 % (ref 11.6–14.5)
ERYTHROCYTE [DISTWIDTH] IN BLOOD BY AUTOMATED COUNT: 28.5 % (ref 11.6–14.5)
ERYTHROCYTE [DISTWIDTH] IN BLOOD BY AUTOMATED COUNT: 28.6 % (ref 11.6–14.5)
ERYTHROCYTE [DISTWIDTH] IN BLOOD BY AUTOMATED COUNT: 28.6 % (ref 11.6–14.5)
ERYTHROCYTE [DISTWIDTH] IN BLOOD BY AUTOMATED COUNT: 28.7 % (ref 11.6–14.5)
ERYTHROCYTE [DISTWIDTH] IN BLOOD BY AUTOMATED COUNT: 28.7 % (ref 11.6–14.5)
ERYTHROCYTE [DISTWIDTH] IN BLOOD BY AUTOMATED COUNT: 29.4 % (ref 11.6–14.5)
EST. AVERAGE GLUCOSE BLD GHB EST-MCNC: 108 MG/DL
EST. AVERAGE GLUCOSE BLD GHB EST-MCNC: 151 MG/DL
EST. AVERAGE GLUCOSE BLD GHB EST-MCNC: 189 MG/DL
FERRITIN SERPL-MCNC: 2797 NG/ML (ref 26–388)
FERRITIN SERPL-MCNC: 492 NG/ML (ref 8–388)
FERRITIN SERPL-MCNC: 923 NG/ML (ref 8–388)
FERRITIN SERPL-MCNC: 942 NG/ML (ref 8–388)
FLUAV AG NPH QL IA: NEGATIVE
FLUBV AG NOSE QL IA: NEGATIVE
FOLATE SERPL-MCNC: 40.1 NG/ML (ref 5–21)
FOLATE SERPL-MCNC: >20 NG/ML (ref 3.1–17.5)
FOLATE SERPL-MCNC: >20 NG/ML (ref 3.1–17.5)
GAMMA GLOB SERPL ELPH-MCNC: 1.6 G/DL (ref 0.4–1.8)
GAMMA GLOB SERPL ELPH-MCNC: 1.8 G/DL (ref 0.4–1.8)
GAMMA GLOB SERPL ELPH-MCNC: 2.5 G/DL (ref 0.4–1.8)
GLOBULIN SER CALC-MCNC: 4 G/DL (ref 2.2–3.9)
GLOBULIN SER CALC-MCNC: 4.4 G/DL (ref 2.2–3.9)
GLOBULIN SER CALC-MCNC: 4.6 G/DL (ref 2–4)
GLOBULIN SER CALC-MCNC: 4.7 G/DL (ref 2–4)
GLOBULIN SER CALC-MCNC: 4.9 G/DL (ref 2.2–3.9)
GLOBULIN SER CALC-MCNC: 4.9 G/DL (ref 2–4)
GLOBULIN SER CALC-MCNC: 5 G/DL (ref 2–4)
GLOBULIN SER CALC-MCNC: 5 G/DL (ref 2–4)
GLOBULIN SER CALC-MCNC: 5.1 G/DL (ref 2–4)
GLOBULIN SER CALC-MCNC: 5.3 G/DL (ref 2–4)
GLOBULIN SER CALC-MCNC: 5.5 G/DL (ref 2–4)
GLOBULIN SER CALC-MCNC: 5.5 G/DL (ref 2–4)
GLOBULIN SER CALC-MCNC: 5.7 G/DL (ref 2–4)
GLUCOSE BLD STRIP.AUTO-MCNC: 100 MG/DL (ref 65–100)
GLUCOSE BLD STRIP.AUTO-MCNC: 107 MG/DL (ref 70–110)
GLUCOSE BLD STRIP.AUTO-MCNC: 112 MG/DL (ref 65–100)
GLUCOSE BLD STRIP.AUTO-MCNC: 112 MG/DL (ref 65–100)
GLUCOSE BLD STRIP.AUTO-MCNC: 116 MG/DL (ref 70–110)
GLUCOSE BLD STRIP.AUTO-MCNC: 116 MG/DL (ref 70–110)
GLUCOSE BLD STRIP.AUTO-MCNC: 118 MG/DL (ref 65–100)
GLUCOSE BLD STRIP.AUTO-MCNC: 126 MG/DL (ref 70–110)
GLUCOSE BLD STRIP.AUTO-MCNC: 129 MG/DL (ref 70–110)
GLUCOSE BLD STRIP.AUTO-MCNC: 131 MG/DL (ref 70–110)
GLUCOSE BLD STRIP.AUTO-MCNC: 134 MG/DL (ref 70–110)
GLUCOSE BLD STRIP.AUTO-MCNC: 138 MG/DL (ref 70–110)
GLUCOSE BLD STRIP.AUTO-MCNC: 146 MG/DL (ref 65–100)
GLUCOSE BLD STRIP.AUTO-MCNC: 146 MG/DL (ref 65–100)
GLUCOSE BLD STRIP.AUTO-MCNC: 152 MG/DL (ref 70–110)
GLUCOSE BLD STRIP.AUTO-MCNC: 153 MG/DL (ref 65–100)
GLUCOSE BLD STRIP.AUTO-MCNC: 162 MG/DL (ref 70–110)
GLUCOSE BLD STRIP.AUTO-MCNC: 162 MG/DL (ref 70–110)
GLUCOSE BLD STRIP.AUTO-MCNC: 163 MG/DL (ref 70–110)
GLUCOSE BLD STRIP.AUTO-MCNC: 166 MG/DL (ref 65–100)
GLUCOSE BLD STRIP.AUTO-MCNC: 166 MG/DL (ref 70–110)
GLUCOSE BLD STRIP.AUTO-MCNC: 167 MG/DL (ref 70–110)
GLUCOSE BLD STRIP.AUTO-MCNC: 168 MG/DL (ref 70–110)
GLUCOSE BLD STRIP.AUTO-MCNC: 174 MG/DL (ref 65–100)
GLUCOSE BLD STRIP.AUTO-MCNC: 186 MG/DL (ref 70–110)
GLUCOSE BLD STRIP.AUTO-MCNC: 188 MG/DL (ref 70–110)
GLUCOSE BLD STRIP.AUTO-MCNC: 191 MG/DL (ref 70–110)
GLUCOSE BLD STRIP.AUTO-MCNC: 192 MG/DL (ref 70–110)
GLUCOSE BLD STRIP.AUTO-MCNC: 195 MG/DL (ref 70–110)
GLUCOSE BLD STRIP.AUTO-MCNC: 195 MG/DL (ref 70–110)
GLUCOSE BLD STRIP.AUTO-MCNC: 202 MG/DL (ref 65–100)
GLUCOSE BLD STRIP.AUTO-MCNC: 208 MG/DL (ref 70–110)
GLUCOSE BLD STRIP.AUTO-MCNC: 208 MG/DL (ref 70–110)
GLUCOSE BLD STRIP.AUTO-MCNC: 210 MG/DL (ref 70–110)
GLUCOSE BLD STRIP.AUTO-MCNC: 213 MG/DL (ref 65–100)
GLUCOSE BLD STRIP.AUTO-MCNC: 213 MG/DL (ref 70–110)
GLUCOSE BLD STRIP.AUTO-MCNC: 214 MG/DL (ref 70–110)
GLUCOSE BLD STRIP.AUTO-MCNC: 215 MG/DL (ref 70–110)
GLUCOSE BLD STRIP.AUTO-MCNC: 219 MG/DL (ref 70–110)
GLUCOSE BLD STRIP.AUTO-MCNC: 219 MG/DL (ref 70–110)
GLUCOSE BLD STRIP.AUTO-MCNC: 234 MG/DL (ref 65–100)
GLUCOSE BLD STRIP.AUTO-MCNC: 236 MG/DL (ref 70–110)
GLUCOSE BLD STRIP.AUTO-MCNC: 239 MG/DL (ref 65–100)
GLUCOSE BLD STRIP.AUTO-MCNC: 245 MG/DL (ref 70–110)
GLUCOSE BLD STRIP.AUTO-MCNC: 246 MG/DL (ref 70–110)
GLUCOSE BLD STRIP.AUTO-MCNC: 248 MG/DL (ref 70–110)
GLUCOSE BLD STRIP.AUTO-MCNC: 251 MG/DL (ref 70–110)
GLUCOSE BLD STRIP.AUTO-MCNC: 255 MG/DL (ref 70–110)
GLUCOSE BLD STRIP.AUTO-MCNC: 262 MG/DL (ref 70–110)
GLUCOSE BLD STRIP.AUTO-MCNC: 266 MG/DL (ref 70–110)
GLUCOSE BLD STRIP.AUTO-MCNC: 266 MG/DL (ref 70–110)
GLUCOSE BLD STRIP.AUTO-MCNC: 270 MG/DL (ref 70–110)
GLUCOSE BLD STRIP.AUTO-MCNC: 272 MG/DL (ref 70–110)
GLUCOSE BLD STRIP.AUTO-MCNC: 282 MG/DL (ref 70–110)
GLUCOSE BLD STRIP.AUTO-MCNC: 284 MG/DL (ref 70–110)
GLUCOSE BLD STRIP.AUTO-MCNC: 284 MG/DL (ref 70–110)
GLUCOSE BLD STRIP.AUTO-MCNC: 285 MG/DL (ref 70–110)
GLUCOSE BLD STRIP.AUTO-MCNC: 288 MG/DL (ref 70–110)
GLUCOSE BLD STRIP.AUTO-MCNC: 305 MG/DL (ref 70–110)
GLUCOSE BLD STRIP.AUTO-MCNC: 307 MG/DL (ref 70–110)
GLUCOSE BLD STRIP.AUTO-MCNC: 310 MG/DL (ref 65–100)
GLUCOSE BLD STRIP.AUTO-MCNC: 314 MG/DL (ref 70–110)
GLUCOSE BLD STRIP.AUTO-MCNC: 322 MG/DL (ref 70–110)
GLUCOSE BLD STRIP.AUTO-MCNC: 326 MG/DL (ref 65–100)
GLUCOSE BLD STRIP.AUTO-MCNC: 327 MG/DL (ref 70–110)
GLUCOSE BLD STRIP.AUTO-MCNC: 331 MG/DL (ref 70–110)
GLUCOSE BLD STRIP.AUTO-MCNC: 338 MG/DL (ref 70–110)
GLUCOSE BLD STRIP.AUTO-MCNC: 35 MG/DL (ref 70–110)
GLUCOSE BLD STRIP.AUTO-MCNC: 353 MG/DL (ref 65–100)
GLUCOSE BLD STRIP.AUTO-MCNC: 355 MG/DL (ref 70–110)
GLUCOSE BLD STRIP.AUTO-MCNC: 359 MG/DL (ref 70–110)
GLUCOSE BLD STRIP.AUTO-MCNC: 368 MG/DL (ref 70–110)
GLUCOSE BLD STRIP.AUTO-MCNC: 37 MG/DL (ref 70–110)
GLUCOSE BLD STRIP.AUTO-MCNC: 372 MG/DL (ref 65–100)
GLUCOSE BLD STRIP.AUTO-MCNC: 375 MG/DL (ref 65–100)
GLUCOSE BLD STRIP.AUTO-MCNC: 394 MG/DL (ref 70–110)
GLUCOSE BLD STRIP.AUTO-MCNC: 407 MG/DL (ref 70–110)
GLUCOSE BLD STRIP.AUTO-MCNC: 411 MG/DL (ref 70–110)
GLUCOSE BLD STRIP.AUTO-MCNC: 419 MG/DL (ref 70–110)
GLUCOSE BLD STRIP.AUTO-MCNC: 422 MG/DL (ref 70–110)
GLUCOSE BLD STRIP.AUTO-MCNC: 428 MG/DL (ref 70–110)
GLUCOSE BLD STRIP.AUTO-MCNC: 434 MG/DL (ref 65–100)
GLUCOSE BLD STRIP.AUTO-MCNC: 434 MG/DL (ref 70–110)
GLUCOSE BLD STRIP.AUTO-MCNC: 435 MG/DL (ref 70–110)
GLUCOSE BLD STRIP.AUTO-MCNC: 435 MG/DL (ref 70–110)
GLUCOSE BLD STRIP.AUTO-MCNC: 441 MG/DL (ref 70–110)
GLUCOSE BLD STRIP.AUTO-MCNC: 451 MG/DL (ref 65–100)
GLUCOSE BLD STRIP.AUTO-MCNC: 474 MG/DL (ref 70–110)
GLUCOSE BLD STRIP.AUTO-MCNC: 474 MG/DL (ref 70–110)
GLUCOSE BLD STRIP.AUTO-MCNC: 513 MG/DL (ref 65–100)
GLUCOSE BLD STRIP.AUTO-MCNC: 518 MG/DL (ref 65–100)
GLUCOSE BLD STRIP.AUTO-MCNC: 529 MG/DL (ref 65–100)
GLUCOSE BLD STRIP.AUTO-MCNC: 529 MG/DL (ref 65–100)
GLUCOSE BLD STRIP.AUTO-MCNC: 70 MG/DL (ref 70–110)
GLUCOSE BLD STRIP.AUTO-MCNC: 82 MG/DL (ref 65–100)
GLUCOSE BLD STRIP.AUTO-MCNC: 84 MG/DL (ref 70–110)
GLUCOSE BLD STRIP.AUTO-MCNC: 91 MG/DL (ref 65–100)
GLUCOSE BLD STRIP.AUTO-MCNC: 98 MG/DL (ref 70–110)
GLUCOSE BLD STRIP.AUTO-MCNC: <30 MG/DL (ref 70–110)
GLUCOSE BLD STRIP.AUTO-MCNC: >600 MG/DL (ref 70–110)
GLUCOSE BLD STRIP.AUTO-MCNC: >600 MG/DL (ref 70–110)
GLUCOSE SERPL-MCNC: 108 MG/DL (ref 74–99)
GLUCOSE SERPL-MCNC: 133 MG/DL (ref 65–100)
GLUCOSE SERPL-MCNC: 152 MG/DL (ref 74–99)
GLUCOSE SERPL-MCNC: 169 MG/DL (ref 65–100)
GLUCOSE SERPL-MCNC: 169 MG/DL (ref 74–99)
GLUCOSE SERPL-MCNC: 174 MG/DL (ref 74–99)
GLUCOSE SERPL-MCNC: 182 MG/DL (ref 74–99)
GLUCOSE SERPL-MCNC: 185 MG/DL (ref 74–99)
GLUCOSE SERPL-MCNC: 195 MG/DL (ref 74–99)
GLUCOSE SERPL-MCNC: 208 MG/DL (ref 65–100)
GLUCOSE SERPL-MCNC: 231 MG/DL (ref 74–99)
GLUCOSE SERPL-MCNC: 236 MG/DL (ref 74–99)
GLUCOSE SERPL-MCNC: 244 MG/DL (ref 74–99)
GLUCOSE SERPL-MCNC: 245 MG/DL (ref 65–100)
GLUCOSE SERPL-MCNC: 253 MG/DL (ref 65–100)
GLUCOSE SERPL-MCNC: 277 MG/DL (ref 74–99)
GLUCOSE SERPL-MCNC: 421 MG/DL (ref 74–99)
GLUCOSE SERPL-MCNC: 423 MG/DL (ref 74–99)
GLUCOSE SERPL-MCNC: 636 MG/DL (ref 74–99)
GLUCOSE SERPL-MCNC: 641 MG/DL (ref 74–99)
GLUCOSE SERPL-MCNC: 88 MG/DL (ref 74–99)
GLUCOSE SERPL-MCNC: 90 MG/DL (ref 65–100)
GLUCOSE SERPL-MCNC: 97 MG/DL (ref 74–99)
GLUCOSE UR STRIP.AUTO-MCNC: 100 MG/DL
GLUCOSE UR STRIP.AUTO-MCNC: 100 MG/DL
GLUCOSE UR STRIP.AUTO-MCNC: 250 MG/DL
GLUCOSE UR STRIP.AUTO-MCNC: NEGATIVE MG/DL
GRAM STN SPEC: ABNORMAL
HAPTOGLOB SERPL-MCNC: 192 MG/DL (ref 30–200)
HBA1C MFR BLD: 5.4 % (ref 4.2–5.6)
HBA1C MFR BLD: 6.9 % (ref 4.2–5.6)
HBA1C MFR BLD: 8.2 % (ref 4.2–6.3)
HCT VFR BLD AUTO: 15.9 % (ref 36.6–50.3)
HCT VFR BLD AUTO: 17.8 % (ref 36–48)
HCT VFR BLD AUTO: 19.7 % (ref 36.6–50.3)
HCT VFR BLD AUTO: 19.7 % (ref 36–48)
HCT VFR BLD AUTO: 20 % (ref 36–48)
HCT VFR BLD AUTO: 20.4 % (ref 36–48)
HCT VFR BLD AUTO: 20.4 % (ref 36–48)
HCT VFR BLD AUTO: 20.5 % (ref 36–48)
HCT VFR BLD AUTO: 21.6 % (ref 36–48)
HCT VFR BLD AUTO: 22 % (ref 36.6–50.3)
HCT VFR BLD AUTO: 22.5 % (ref 36–48)
HCT VFR BLD AUTO: 22.8 % (ref 36–48)
HCT VFR BLD AUTO: 22.9 % (ref 36–48)
HCT VFR BLD AUTO: 23 % (ref 36.6–50.3)
HCT VFR BLD AUTO: 23.1 % (ref 36–48)
HCT VFR BLD AUTO: 23.3 % (ref 36.6–50.3)
HCT VFR BLD AUTO: 23.6 % (ref 36–48)
HCT VFR BLD AUTO: 23.9 % (ref 36.6–50.3)
HCT VFR BLD AUTO: 23.9 % (ref 36–48)
HCT VFR BLD AUTO: 24.4 % (ref 36–48)
HCT VFR BLD AUTO: 24.5 % (ref 36.6–50.3)
HCT VFR BLD AUTO: 24.5 % (ref 36–48)
HCT VFR BLD AUTO: 24.5 % (ref 36–48)
HCT VFR BLD AUTO: 25 % (ref 36.6–50.3)
HCT VFR BLD AUTO: 25 % (ref 36.6–50.3)
HCT VFR BLD AUTO: 25.2 % (ref 36–48)
HCT VFR BLD AUTO: 26 % (ref 36–48)
HCT VFR BLD AUTO: 26.4 % (ref 36.6–50.3)
HCT VFR BLD AUTO: 26.5 % (ref 36–48)
HCT VFR BLD AUTO: 26.6 % (ref 36–48)
HCT VFR BLD AUTO: 27.1 % (ref 36–48)
HCT VFR BLD AUTO: 27.2 % (ref 36.6–50.3)
HCT VFR BLD AUTO: 27.6 % (ref 36.6–50.3)
HCT VFR BLD AUTO: 29 % (ref 36–48)
HCT VFR BLD AUTO: 29.7 % (ref 36–48)
HCT VFR BLD AUTO: 30.7 % (ref 36–48)
HCT VFR BLD AUTO: 32 % (ref 36–48)
HCT VFR BLD AUTO: 32.1 % (ref 36–48)
HCT VFR BLD AUTO: 32.4 % (ref 36–48)
HCT VFR BLD AUTO: 32.4 % (ref 36–48)
HCT VFR BLD AUTO: 33.3 % (ref 36–48)
HCT VFR BLD AUTO: 33.5 % (ref 36–48)
HCT VFR BLD AUTO: 33.6 % (ref 36–48)
HCT VFR BLD AUTO: 35.7 % (ref 36–48)
HDLC SERPL-MCNC: 48 MG/DL
HDLC SERPL: 2.3 {RATIO} (ref 0–5)
HEMOCCULT STL QL: NEGATIVE
HEMOCCULT STL QL: NEGATIVE
HEMOCCULT STL QL: POSITIVE
HGB BLD-MCNC: 10 G/DL (ref 12–16)
HGB BLD-MCNC: 10 G/DL (ref 13–16)
HGB BLD-MCNC: 10.4 G/DL (ref 13–16)
HGB BLD-MCNC: 10.6 G/DL (ref 12–16)
HGB BLD-MCNC: 10.6 G/DL (ref 13–16)
HGB BLD-MCNC: 10.6 G/DL (ref 13–16)
HGB BLD-MCNC: 11.2 G/DL (ref 13–16)
HGB BLD-MCNC: 5 G/DL (ref 12.1–17)
HGB BLD-MCNC: 5.5 G/DL (ref 13–16)
HGB BLD-MCNC: 6.2 G/DL (ref 12.1–17)
HGB BLD-MCNC: 6.3 G/DL (ref 13–16)
HGB BLD-MCNC: 6.4 G/DL (ref 13–16)
HGB BLD-MCNC: 6.5 G/DL (ref 13–16)
HGB BLD-MCNC: 6.6 G/DL (ref 13–16)
HGB BLD-MCNC: 6.7 G/DL (ref 13–16)
HGB BLD-MCNC: 6.8 G/DL (ref 13–16)
HGB BLD-MCNC: 7 G/DL (ref 13–16)
HGB BLD-MCNC: 7.1 G/DL (ref 12.1–17)
HGB BLD-MCNC: 7.2 G/DL (ref 12.1–17)
HGB BLD-MCNC: 7.2 G/DL (ref 12.1–17)
HGB BLD-MCNC: 7.2 G/DL (ref 13–16)
HGB BLD-MCNC: 7.2 G/DL (ref 13–16)
HGB BLD-MCNC: 7.3 G/DL (ref 13–16)
HGB BLD-MCNC: 7.4 G/DL (ref 13–16)
HGB BLD-MCNC: 7.5 G/DL (ref 13–16)
HGB BLD-MCNC: 7.6 G/DL (ref 12.1–17)
HGB BLD-MCNC: 7.6 G/DL (ref 13–16)
HGB BLD-MCNC: 7.6 G/DL (ref 13–16)
HGB BLD-MCNC: 7.7 G/DL (ref 12.1–17)
HGB BLD-MCNC: 7.7 G/DL (ref 12.1–17)
HGB BLD-MCNC: 7.8 G/DL (ref 13–16)
HGB BLD-MCNC: 7.9 G/DL (ref 12.1–17)
HGB BLD-MCNC: 8 G/DL (ref 13–16)
HGB BLD-MCNC: 8.2 G/DL (ref 12.1–17)
HGB BLD-MCNC: 8.3 G/DL (ref 13–16)
HGB BLD-MCNC: 8.4 G/DL (ref 12.1–17)
HGB BLD-MCNC: 8.5 G/DL (ref 12.1–17)
HGB BLD-MCNC: 8.5 G/DL (ref 13–16)
HGB BLD-MCNC: 8.6 G/DL (ref 13–16)
HGB BLD-MCNC: 8.8 G/DL (ref 13–16)
HGB BLD-MCNC: 9.3 G/DL (ref 13–16)
HGB BLD-MCNC: 9.4 G/DL (ref 13–16)
HGB BLD-MCNC: 9.4 G/DL (ref 13–16)
HGB BLD-MCNC: 9.8 G/DL (ref 13–16)
HGB UR QL STRIP: ABNORMAL
HGB UR QL STRIP: ABNORMAL
HGB UR QL STRIP: NEGATIVE
HGB UR QL STRIP: NEGATIVE
HYALINE CASTS URNS QL MICRO: ABNORMAL /LPF (ref 0–2)
HYALINE CASTS URNS QL MICRO: ABNORMAL /LPF (ref 0–5)
IMM GRANULOCYTES # BLD AUTO: 0 K/UL
IMM GRANULOCYTES # BLD AUTO: 0 K/UL (ref 0–0.04)
IMM GRANULOCYTES # BLD AUTO: 0.1 K/UL (ref 0–0.04)
IMM GRANULOCYTES # BLD AUTO: 0.5 K/UL (ref 0–0.04)
IMM GRANULOCYTES NFR BLD AUTO: 0 %
IMM GRANULOCYTES NFR BLD AUTO: 0 % (ref 0–0.5)
IMM GRANULOCYTES NFR BLD AUTO: 1 % (ref 0–0.5)
IMM GRANULOCYTES NFR BLD AUTO: 5 % (ref 0–0.5)
INR PPP: 1.2 (ref 0.8–1.2)
IRON SATN MFR SERPL: 11 %
IRON SATN MFR SERPL: 20 %
IRON SATN MFR SERPL: 21 %
IRON SATN MFR SERPL: 22 % (ref 20–50)
IRON SATN MFR SERPL: 26 %
IRON SERPL-MCNC: 14 UG/DL (ref 50–175)
IRON SERPL-MCNC: 25 UG/DL (ref 50–175)
IRON SERPL-MCNC: 30 UG/DL (ref 50–175)
IRON SERPL-MCNC: 31 UG/DL (ref 35–150)
IRON SERPL-MCNC: 48 UG/DL (ref 50–175)
KETONES UR QL STRIP.AUTO: 15 MG/DL
KETONES UR QL STRIP.AUTO: ABNORMAL MG/DL
KETONES UR QL STRIP.AUTO: NEGATIVE MG/DL
KETONES UR QL STRIP.AUTO: NEGATIVE MG/DL
LACTATE BLD-SCNC: 2.92 MMOL/L (ref 0.4–2)
LACTATE SERPL-SCNC: 1.6 MMOL/L (ref 0.4–2)
LACTATE SERPL-SCNC: 3 MMOL/L (ref 0.4–2)
LACTATE SERPL-SCNC: 3.9 MMOL/L (ref 0.4–2)
LACTATE SERPL-SCNC: 6.6 MMOL/L (ref 0.4–2)
LDH SERPL L TO P-CCNC: 397 U/L (ref 85–241)
LDLC SERPL CALC-MCNC: 45.6 MG/DL (ref 0–100)
LEUKOCYTE ESTERASE UR QL STRIP.AUTO: ABNORMAL
LEUKOCYTE ESTERASE UR QL STRIP.AUTO: NEGATIVE
LIPID PROFILE,FLP: NORMAL
LYMPHOCYTES # BLD: 0 K/UL (ref 0.8–3.5)
LYMPHOCYTES # BLD: 0.1 K/UL (ref 0.8–3.5)
LYMPHOCYTES # BLD: 0.3 K/UL (ref 0.8–3.5)
LYMPHOCYTES # BLD: 0.3 K/UL (ref 0.9–3.6)
LYMPHOCYTES # BLD: 0.3 K/UL (ref 0.9–3.6)
LYMPHOCYTES # BLD: 0.4 K/UL (ref 0.8–3.5)
LYMPHOCYTES # BLD: 0.4 K/UL (ref 0.8–3.5)
LYMPHOCYTES # BLD: 0.4 K/UL (ref 0.9–3.6)
LYMPHOCYTES # BLD: 0.5 K/UL (ref 0.8–3.5)
LYMPHOCYTES # BLD: 0.5 K/UL (ref 0.8–3.5)
LYMPHOCYTES # BLD: 0.5 K/UL (ref 0.9–3.6)
LYMPHOCYTES # BLD: 0.5 K/UL (ref 1.1–5.9)
LYMPHOCYTES # BLD: 0.6 K/UL (ref 0.8–3.5)
LYMPHOCYTES # BLD: 0.6 K/UL (ref 0.9–3.6)
LYMPHOCYTES # BLD: 0.7 K/UL (ref 0.8–3.5)
LYMPHOCYTES # BLD: 0.7 K/UL (ref 0.9–3.6)
LYMPHOCYTES # BLD: 0.8 K/UL (ref 0.8–3.5)
LYMPHOCYTES # BLD: 0.8 K/UL (ref 0.8–3.5)
LYMPHOCYTES # BLD: 0.8 K/UL (ref 0.9–3.6)
LYMPHOCYTES # BLD: 0.9 K/UL (ref 0.8–3.5)
LYMPHOCYTES # BLD: 1 K/UL (ref 0.8–3.5)
LYMPHOCYTES # BLD: 1 K/UL (ref 1.1–5.9)
LYMPHOCYTES NFR BLD: 0 % (ref 20–51)
LYMPHOCYTES NFR BLD: 1 % (ref 20–51)
LYMPHOCYTES NFR BLD: 11 % (ref 21–52)
LYMPHOCYTES NFR BLD: 12 % (ref 20–51)
LYMPHOCYTES NFR BLD: 13 % (ref 12–49)
LYMPHOCYTES NFR BLD: 13 % (ref 21–52)
LYMPHOCYTES NFR BLD: 2 % (ref 12–49)
LYMPHOCYTES NFR BLD: 3 % (ref 12–49)
LYMPHOCYTES NFR BLD: 3 % (ref 12–49)
LYMPHOCYTES NFR BLD: 3 % (ref 20–51)
LYMPHOCYTES NFR BLD: 3 % (ref 21–52)
LYMPHOCYTES NFR BLD: 4 % (ref 12–49)
LYMPHOCYTES NFR BLD: 4 % (ref 12–49)
LYMPHOCYTES NFR BLD: 4 % (ref 20–51)
LYMPHOCYTES NFR BLD: 4 % (ref 21–52)
LYMPHOCYTES NFR BLD: 5 % (ref 20–51)
LYMPHOCYTES NFR BLD: 5 % (ref 21–52)
LYMPHOCYTES NFR BLD: 6 % (ref 21–52)
LYMPHOCYTES NFR BLD: 7 % (ref 12–49)
LYMPHOCYTES NFR BLD: 7 % (ref 14–44)
LYMPHOCYTES NFR BLD: 7 % (ref 14–44)
LYMPHOCYTES NFR BLD: 7 % (ref 20–51)
LYMPHOCYTES NFR BLD: 8 % (ref 12–49)
LYMPHOCYTES NFR BLD: 8 % (ref 21–52)
LYMPHOCYTES NFR BLD: 9 % (ref 20–51)
M PROTEIN SERPL ELPH-MCNC: 0.6 G/DL
M PROTEIN SERPL ELPH-MCNC: 0.7 G/DL
M PROTEIN SERPL ELPH-MCNC: ABNORMAL G/DL
MAGNESIUM SERPL-MCNC: 1.5 MG/DL (ref 1.6–2.6)
MAGNESIUM SERPL-MCNC: 1.6 MG/DL (ref 1.6–2.4)
MAGNESIUM SERPL-MCNC: 1.7 MG/DL (ref 1.6–2.4)
MAGNESIUM SERPL-MCNC: 1.9 MG/DL (ref 1.6–2.4)
MAGNESIUM SERPL-MCNC: 2 MG/DL (ref 1.6–2.4)
MAGNESIUM SERPL-MCNC: 2 MG/DL (ref 1.6–2.6)
MAGNESIUM SERPL-MCNC: 2 MG/DL (ref 1.6–2.6)
MAGNESIUM SERPL-MCNC: 2.1 MG/DL (ref 1.6–2.4)
MAGNESIUM SERPL-MCNC: 2.2 MG/DL (ref 1.6–2.6)
MCH RBC QN AUTO: 27 PG (ref 24–34)
MCH RBC QN AUTO: 27.2 PG (ref 24–34)
MCH RBC QN AUTO: 27.7 PG (ref 24–34)
MCH RBC QN AUTO: 27.9 PG (ref 24–34)
MCH RBC QN AUTO: 28.2 PG (ref 24–34)
MCH RBC QN AUTO: 28.4 PG (ref 25–35)
MCH RBC QN AUTO: 31.9 PG (ref 26–34)
MCH RBC QN AUTO: 32 PG (ref 26–34)
MCH RBC QN AUTO: 32.4 PG (ref 26–34)
MCH RBC QN AUTO: 32.7 PG (ref 26–34)
MCH RBC QN AUTO: 32.8 PG (ref 24–34)
MCH RBC QN AUTO: 32.8 PG (ref 24–34)
MCH RBC QN AUTO: 32.9 PG (ref 24–34)
MCH RBC QN AUTO: 33.1 PG (ref 24–34)
MCH RBC QN AUTO: 33.2 PG (ref 24–34)
MCH RBC QN AUTO: 33.3 PG (ref 26–34)
MCH RBC QN AUTO: 33.4 PG (ref 25–35)
MCH RBC QN AUTO: 33.5 PG (ref 24–34)
MCH RBC QN AUTO: 33.5 PG (ref 26–34)
MCH RBC QN AUTO: 33.5 PG (ref 26–34)
MCH RBC QN AUTO: 33.6 PG (ref 24–34)
MCH RBC QN AUTO: 33.6 PG (ref 26–34)
MCH RBC QN AUTO: 33.7 PG (ref 24–34)
MCH RBC QN AUTO: 33.8 PG (ref 24–34)
MCH RBC QN AUTO: 33.8 PG (ref 24–34)
MCH RBC QN AUTO: 33.9 PG (ref 24–34)
MCH RBC QN AUTO: 34.1 PG (ref 24–34)
MCH RBC QN AUTO: 34.2 PG (ref 26–34)
MCH RBC QN AUTO: 34.8 PG (ref 24–34)
MCH RBC QN AUTO: 35 PG (ref 24–34)
MCH RBC QN AUTO: 35 PG (ref 24–34)
MCH RBC QN AUTO: 35.1 PG (ref 24–34)
MCH RBC QN AUTO: 35.1 PG (ref 24–34)
MCH RBC QN AUTO: 35.4 PG (ref 24–34)
MCH RBC QN AUTO: 35.6 PG (ref 24–34)
MCHC RBC AUTO-ENTMCNC: 29.4 G/DL (ref 31–37)
MCHC RBC AUTO-ENTMCNC: 30.2 G/DL (ref 31–37)
MCHC RBC AUTO-ENTMCNC: 30.4 G/DL (ref 30–36.5)
MCHC RBC AUTO-ENTMCNC: 30.7 G/DL (ref 31–37)
MCHC RBC AUTO-ENTMCNC: 30.8 G/DL (ref 30–36.5)
MCHC RBC AUTO-ENTMCNC: 30.9 G/DL (ref 30–36.5)
MCHC RBC AUTO-ENTMCNC: 30.9 G/DL (ref 30–36.5)
MCHC RBC AUTO-ENTMCNC: 30.9 G/DL (ref 31–37)
MCHC RBC AUTO-ENTMCNC: 31 G/DL (ref 31–37)
MCHC RBC AUTO-ENTMCNC: 31.1 G/DL (ref 30–36.5)
MCHC RBC AUTO-ENTMCNC: 31.1 G/DL (ref 31–37)
MCHC RBC AUTO-ENTMCNC: 31.2 G/DL (ref 31–37)
MCHC RBC AUTO-ENTMCNC: 31.3 G/DL (ref 30–36.5)
MCHC RBC AUTO-ENTMCNC: 31.4 G/DL (ref 30–36.5)
MCHC RBC AUTO-ENTMCNC: 31.4 G/DL (ref 31–37)
MCHC RBC AUTO-ENTMCNC: 31.4 G/DL (ref 31–37)
MCHC RBC AUTO-ENTMCNC: 31.5 G/DL (ref 30–36.5)
MCHC RBC AUTO-ENTMCNC: 31.5 G/DL (ref 31–37)
MCHC RBC AUTO-ENTMCNC: 31.6 G/DL (ref 31–37)
MCHC RBC AUTO-ENTMCNC: 31.6 G/DL (ref 31–37)
MCHC RBC AUTO-ENTMCNC: 31.7 G/DL (ref 31–37)
MCHC RBC AUTO-ENTMCNC: 31.8 G/DL (ref 31–37)
MCHC RBC AUTO-ENTMCNC: 31.8 G/DL (ref 31–37)
MCHC RBC AUTO-ENTMCNC: 31.9 G/DL (ref 31–37)
MCHC RBC AUTO-ENTMCNC: 31.9 G/DL (ref 31–37)
MCHC RBC AUTO-ENTMCNC: 32 G/DL (ref 31–37)
MCHC RBC AUTO-ENTMCNC: 32.1 G/DL (ref 31–37)
MCHC RBC AUTO-ENTMCNC: 32.1 G/DL (ref 31–37)
MCHC RBC AUTO-ENTMCNC: 32.2 G/DL (ref 30–36.5)
MCHC RBC AUTO-ENTMCNC: 32.2 G/DL (ref 30–36.5)
MCHC RBC AUTO-ENTMCNC: 32.3 G/DL (ref 30–36.5)
MCHC RBC AUTO-ENTMCNC: 32.4 G/DL (ref 31–37)
MCHC RBC AUTO-ENTMCNC: 32.4 G/DL (ref 31–37)
MCHC RBC AUTO-ENTMCNC: 32.7 G/DL (ref 31–37)
MCHC RBC AUTO-ENTMCNC: 33.1 G/DL (ref 31–37)
MCHC RBC AUTO-ENTMCNC: 33.2 G/DL (ref 31–37)
MCHC RBC AUTO-ENTMCNC: 33.3 G/DL (ref 31–37)
MCV RBC AUTO: 100.5 FL (ref 80–99)
MCV RBC AUTO: 101.3 FL (ref 74–97)
MCV RBC AUTO: 102.2 FL (ref 74–97)
MCV RBC AUTO: 102.3 FL (ref 74–97)
MCV RBC AUTO: 102.7 FL (ref 74–97)
MCV RBC AUTO: 103.2 FL (ref 74–97)
MCV RBC AUTO: 103.4 FL (ref 80–99)
MCV RBC AUTO: 103.6 FL (ref 74–97)
MCV RBC AUTO: 104.2 FL (ref 74–97)
MCV RBC AUTO: 104.7 FL (ref 74–97)
MCV RBC AUTO: 105.9 FL (ref 80–99)
MCV RBC AUTO: 105.9 FL (ref 80–99)
MCV RBC AUTO: 106 FL (ref 78–102)
MCV RBC AUTO: 106 FL (ref 80–99)
MCV RBC AUTO: 106.2 FL (ref 80–99)
MCV RBC AUTO: 106.3 FL (ref 74–97)
MCV RBC AUTO: 107 FL (ref 74–97)
MCV RBC AUTO: 107.5 FL (ref 74–97)
MCV RBC AUTO: 107.5 FL (ref 80–99)
MCV RBC AUTO: 107.8 FL (ref 80–99)
MCV RBC AUTO: 108 FL (ref 74–97)
MCV RBC AUTO: 108.6 FL (ref 74–97)
MCV RBC AUTO: 108.7 FL (ref 80–99)
MCV RBC AUTO: 108.8 FL (ref 74–97)
MCV RBC AUTO: 109.1 FL (ref 74–97)
MCV RBC AUTO: 109.2 FL (ref 74–97)
MCV RBC AUTO: 109.4 FL (ref 74–97)
MCV RBC AUTO: 109.6 FL (ref 80–99)
MCV RBC AUTO: 112.5 FL (ref 74–97)
MCV RBC AUTO: 112.5 FL (ref 74–97)
MCV RBC AUTO: 112.7 FL (ref 74–97)
MCV RBC AUTO: 113 FL (ref 74–97)
MCV RBC AUTO: 86.9 FL (ref 74–97)
MCV RBC AUTO: 88 FL (ref 74–97)
MCV RBC AUTO: 88.2 FL (ref 74–97)
MCV RBC AUTO: 89.9 FL (ref 74–97)
MCV RBC AUTO: 92 FL (ref 74–97)
MCV RBC AUTO: 92 FL (ref 78–102)
MCV RBC AUTO: 99.2 FL (ref 80–99)
METAMYELOCYTES NFR BLD MANUAL: 1 %
MONOCYTES # BLD: 0 K/UL (ref 0.05–1.2)
MONOCYTES # BLD: 0.1 K/UL (ref 0.05–1.2)
MONOCYTES # BLD: 0.1 K/UL (ref 0–1)
MONOCYTES # BLD: 0.1 K/UL (ref 0–1)
MONOCYTES # BLD: 0.2 K/UL (ref 0.05–1.2)
MONOCYTES # BLD: 0.2 K/UL (ref 0–1)
MONOCYTES # BLD: 0.3 K/UL (ref 0.05–1.2)
MONOCYTES # BLD: 0.3 K/UL (ref 0.05–1.2)
MONOCYTES # BLD: 0.3 K/UL (ref 0–1)
MONOCYTES # BLD: 0.3 K/UL (ref 0–1)
MONOCYTES # BLD: 0.4 K/UL (ref 0–1)
MONOCYTES # BLD: 0.5 K/UL (ref 0–1)
MONOCYTES # BLD: 0.5 K/UL (ref 0–1)
MONOCYTES # BLD: 0.6 K/UL (ref 0–1)
MONOCYTES # BLD: 1.3 K/UL (ref 0–1)
MONOCYTES NFR BLD: 1 % (ref 2–9)
MONOCYTES NFR BLD: 1 % (ref 2–9)
MONOCYTES NFR BLD: 1 % (ref 3–10)
MONOCYTES NFR BLD: 2 % (ref 2–9)
MONOCYTES NFR BLD: 2 % (ref 2–9)
MONOCYTES NFR BLD: 2 % (ref 3–10)
MONOCYTES NFR BLD: 2 % (ref 5–13)
MONOCYTES NFR BLD: 3 % (ref 2–9)
MONOCYTES NFR BLD: 3 % (ref 3–10)
MONOCYTES NFR BLD: 3 % (ref 5–13)
MONOCYTES NFR BLD: 4 % (ref 2–9)
MONOCYTES NFR BLD: 4 % (ref 2–9)
MONOCYTES NFR BLD: 4 % (ref 5–13)
MONOCYTES NFR BLD: 5 % (ref 2–9)
MONOCYTES NFR BLD: 5 % (ref 5–13)
MONOCYTES NFR BLD: 7 % (ref 2–9)
MONOCYTES NFR BLD: 8 % (ref 2–9)
MUCOUS THREADS URNS QL MICRO: ABNORMAL /LPF
NEUTS BAND NFR BLD MANUAL: 1 % (ref 0–5)
NEUTS BAND NFR BLD MANUAL: 12 % (ref 0–5)
NEUTS BAND NFR BLD MANUAL: 2 % (ref 0–5)
NEUTS BAND NFR BLD MANUAL: 4 % (ref 0–5)
NEUTS BAND NFR BLD MANUAL: 4 % (ref 0–5)
NEUTS BAND NFR BLD MANUAL: 6 % (ref 0–5)
NEUTS BAND NFR BLD MANUAL: 8 % (ref 0–5)
NEUTS SEG # BLD: 10.4 K/UL (ref 1.8–8)
NEUTS SEG # BLD: 10.4 K/UL (ref 1.8–8)
NEUTS SEG # BLD: 10.6 K/UL (ref 1.8–8)
NEUTS SEG # BLD: 11.3 K/UL (ref 1.8–8)
NEUTS SEG # BLD: 11.5 K/UL (ref 1.8–8)
NEUTS SEG # BLD: 12.1 K/UL (ref 1.8–8)
NEUTS SEG # BLD: 12.2 K/UL (ref 1.8–8)
NEUTS SEG # BLD: 13.4 K/UL (ref 1.8–8)
NEUTS SEG # BLD: 13.4 K/UL (ref 1.8–8)
NEUTS SEG # BLD: 14 K/UL (ref 1.8–8)
NEUTS SEG # BLD: 14.2 K/UL (ref 1.8–9.5)
NEUTS SEG # BLD: 3.3 K/UL (ref 1.8–8)
NEUTS SEG # BLD: 4.8 K/UL (ref 1.8–8)
NEUTS SEG # BLD: 6 K/UL (ref 1.8–8)
NEUTS SEG # BLD: 6.2 K/UL (ref 1.8–8)
NEUTS SEG # BLD: 6.4 K/UL (ref 1.8–8)
NEUTS SEG # BLD: 6.4 K/UL (ref 1.8–8)
NEUTS SEG # BLD: 6.6 K/UL (ref 1.8–8)
NEUTS SEG # BLD: 6.7 K/UL (ref 1.8–9.5)
NEUTS SEG # BLD: 6.8 K/UL (ref 1.8–8)
NEUTS SEG # BLD: 7.1 K/UL (ref 1.8–8)
NEUTS SEG # BLD: 7.4 K/UL (ref 1.8–8)
NEUTS SEG # BLD: 7.8 K/UL (ref 1.8–8)
NEUTS SEG # BLD: 8 K/UL (ref 1.8–8)
NEUTS SEG # BLD: 9.2 K/UL (ref 1.8–8)
NEUTS SEG # BLD: 9.3 K/UL (ref 1.8–8)
NEUTS SEG NFR BLD: 75 % (ref 42–75)
NEUTS SEG NFR BLD: 76 % (ref 40–73)
NEUTS SEG NFR BLD: 78 % (ref 42–75)
NEUTS SEG NFR BLD: 79 % (ref 42–75)
NEUTS SEG NFR BLD: 80 % (ref 42–75)
NEUTS SEG NFR BLD: 81 % (ref 32–75)
NEUTS SEG NFR BLD: 81 % (ref 42–75)
NEUTS SEG NFR BLD: 83 % (ref 32–75)
NEUTS SEG NFR BLD: 85 % (ref 40–73)
NEUTS SEG NFR BLD: 86 % (ref 32–75)
NEUTS SEG NFR BLD: 87 % (ref 40–70)
NEUTS SEG NFR BLD: 88 % (ref 32–75)
NEUTS SEG NFR BLD: 88 % (ref 42–75)
NEUTS SEG NFR BLD: 90 % (ref 40–73)
NEUTS SEG NFR BLD: 91 % (ref 40–70)
NEUTS SEG NFR BLD: 91 % (ref 40–73)
NEUTS SEG NFR BLD: 92 % (ref 40–73)
NEUTS SEG NFR BLD: 92 % (ref 42–75)
NEUTS SEG NFR BLD: 92 % (ref 42–75)
NEUTS SEG NFR BLD: 93 % (ref 32–75)
NEUTS SEG NFR BLD: 93 % (ref 40–73)
NEUTS SEG NFR BLD: 93 % (ref 40–73)
NEUTS SEG NFR BLD: 93 % (ref 42–75)
NEUTS SEG NFR BLD: 94 % (ref 32–75)
NEUTS SEG NFR BLD: 94 % (ref 32–75)
NEUTS SEG NFR BLD: 94 % (ref 40–73)
NEUTS SEG NFR BLD: 95 % (ref 32–75)
NEUTS SEG NFR BLD: 96 % (ref 42–75)
NITRITE UR QL STRIP.AUTO: NEGATIVE
NRBC # BLD: 0 K/UL (ref 0–0.01)
NRBC # BLD: 0.02 K/UL (ref 0–0.01)
NRBC # BLD: 0.03 K/UL (ref 0–0.01)
NRBC # BLD: 0.04 K/UL (ref 0–0.01)
NRBC # BLD: 0.06 K/UL (ref 0–0.01)
NRBC BLD-RTO: 0 PER 100 WBC
NRBC BLD-RTO: 0.2 PER 100 WBC
NRBC BLD-RTO: 0.3 PER 100 WBC
NRBC BLD-RTO: 0.4 PER 100 WBC
NRBC BLD-RTO: 0.5 PER 100 WBC
P-R INTERVAL, ECG05: 132 MS
PH UR STRIP: 5 [PH] (ref 5–8)
PH UR STRIP: 6 [PH] (ref 5–8)
PH UR STRIP: 6 [PH] (ref 5–8)
PH UR STRIP: 7 [PH] (ref 5–8)
PHOSPHATE SERPL-MCNC: 1.7 MG/DL (ref 2.6–4.7)
PHOSPHATE SERPL-MCNC: 1.8 MG/DL (ref 2.6–4.7)
PHOSPHATE SERPL-MCNC: 2 MG/DL (ref 2.6–4.7)
PHOSPHATE SERPL-MCNC: 2.1 MG/DL (ref 2.5–4.9)
PHOSPHATE SERPL-MCNC: 2.4 MG/DL (ref 2.5–4.9)
PHOSPHATE SERPL-MCNC: 2.4 MG/DL (ref 2.6–4.7)
PLATELET # BLD AUTO: 117 K/UL (ref 150–400)
PLATELET # BLD AUTO: 12 K/UL (ref 135–420)
PLATELET # BLD AUTO: 124 K/UL (ref 150–400)
PLATELET # BLD AUTO: 128 K/UL (ref 150–400)
PLATELET # BLD AUTO: 133 K/UL (ref 150–400)
PLATELET # BLD AUTO: 139 K/UL (ref 150–400)
PLATELET # BLD AUTO: 139 K/UL (ref 150–400)
PLATELET # BLD AUTO: 142 K/UL (ref 150–400)
PLATELET # BLD AUTO: 146 K/UL (ref 150–400)
PLATELET # BLD AUTO: 150 K/UL (ref 150–400)
PLATELET # BLD AUTO: 179 K/UL (ref 150–400)
PLATELET # BLD AUTO: 18 K/UL (ref 135–420)
PLATELET # BLD AUTO: 18 K/UL (ref 135–420)
PLATELET # BLD AUTO: 19 K/UL (ref 135–420)
PLATELET # BLD AUTO: 19 K/UL (ref 135–420)
PLATELET # BLD AUTO: 20 K/UL (ref 135–420)
PLATELET # BLD AUTO: 207 K/UL (ref 150–400)
PLATELET # BLD AUTO: 21 K/UL (ref 135–420)
PLATELET # BLD AUTO: 21 K/UL (ref 135–420)
PLATELET # BLD AUTO: 23 K/UL (ref 135–420)
PLATELET # BLD AUTO: 237 K/UL (ref 135–420)
PLATELET # BLD AUTO: 24 K/UL (ref 135–420)
PLATELET # BLD AUTO: 24 K/UL (ref 135–420)
PLATELET # BLD AUTO: 275 K/UL (ref 135–420)
PLATELET # BLD AUTO: 30 K/UL (ref 135–420)
PLATELET # BLD AUTO: 32 K/UL (ref 135–420)
PLATELET # BLD AUTO: 32 K/UL (ref 135–420)
PLATELET # BLD AUTO: 330 K/UL (ref 135–420)
PLATELET # BLD AUTO: 343 K/UL (ref 135–420)
PLATELET # BLD AUTO: 35 K/UL (ref 135–420)
PLATELET # BLD AUTO: 37 K/UL (ref 135–420)
PLATELET # BLD AUTO: 41 K/UL (ref 135–420)
PLATELET # BLD AUTO: 42 K/UL (ref 135–420)
PLATELET # BLD AUTO: 42 K/UL (ref 135–420)
PLATELET # BLD AUTO: 472 K/UL (ref 135–420)
PLATELET # BLD AUTO: 52 K/UL (ref 135–420)
PLATELET # BLD AUTO: 588 K/UL (ref 140–440)
PLATELET # BLD AUTO: 79 K/UL (ref 135–420)
PLATELET # BLD AUTO: 99 K/UL (ref 140–440)
PLATELET COMMENTS,PCOM: ABNORMAL
PMV BLD AUTO: 10.2 FL (ref 8.9–12.9)
PMV BLD AUTO: 10.3 FL (ref 8.9–12.9)
PMV BLD AUTO: 10.4 FL (ref 8.9–12.9)
PMV BLD AUTO: 10.6 FL (ref 8.9–12.9)
PMV BLD AUTO: 12.4 FL (ref 9.2–11.8)
PMV BLD AUTO: 9.2 FL (ref 8.9–12.9)
PMV BLD AUTO: 9.2 FL (ref 9.2–11.8)
PMV BLD AUTO: 9.2 FL (ref 9.2–11.8)
PMV BLD AUTO: 9.3 FL (ref 9.2–11.8)
PMV BLD AUTO: 9.4 FL (ref 9.2–11.8)
PMV BLD AUTO: 9.5 FL (ref 8.9–12.9)
PMV BLD AUTO: 9.5 FL (ref 9.2–11.8)
PMV BLD AUTO: 9.7 FL (ref 8.9–12.9)
PMV BLD AUTO: 9.7 FL (ref 8.9–12.9)
POTASSIUM SERPL-SCNC: 3.2 MMOL/L (ref 3.5–5.5)
POTASSIUM SERPL-SCNC: 3.4 MMOL/L (ref 3.5–5.5)
POTASSIUM SERPL-SCNC: 3.4 MMOL/L (ref 3.5–5.5)
POTASSIUM SERPL-SCNC: 3.5 MMOL/L (ref 3.5–5.5)
POTASSIUM SERPL-SCNC: 3.5 MMOL/L (ref 3.5–5.5)
POTASSIUM SERPL-SCNC: 3.6 MMOL/L (ref 3.5–5.5)
POTASSIUM SERPL-SCNC: 3.7 MMOL/L (ref 3.5–5.1)
POTASSIUM SERPL-SCNC: 3.7 MMOL/L (ref 3.5–5.1)
POTASSIUM SERPL-SCNC: 3.8 MMOL/L (ref 3.5–5.1)
POTASSIUM SERPL-SCNC: 3.8 MMOL/L (ref 3.5–5.5)
POTASSIUM SERPL-SCNC: 3.8 MMOL/L (ref 3.5–5.5)
POTASSIUM SERPL-SCNC: 3.9 MMOL/L (ref 3.5–5.5)
POTASSIUM SERPL-SCNC: 4 MMOL/L (ref 3.5–5.1)
POTASSIUM SERPL-SCNC: 4 MMOL/L (ref 3.5–5.5)
POTASSIUM SERPL-SCNC: 4.1 MMOL/L (ref 3.5–5.1)
POTASSIUM SERPL-SCNC: 4.1 MMOL/L (ref 3.5–5.5)
POTASSIUM SERPL-SCNC: 4.3 MMOL/L (ref 3.5–5.5)
POTASSIUM SERPL-SCNC: 4.5 MMOL/L (ref 3.5–5.5)
POTASSIUM SERPL-SCNC: 4.7 MMOL/L (ref 3.5–5.5)
POTASSIUM SERPL-SCNC: 5 MMOL/L (ref 3.5–5.5)
POTASSIUM SERPL-SCNC: 5.2 MMOL/L (ref 3.5–5.1)
PREALB SERPL-MCNC: 10.9 MG/DL (ref 20–40)
PROT SERPL-MCNC: 6 G/DL (ref 6.4–8.2)
PROT SERPL-MCNC: 6 G/DL (ref 6–8.5)
PROT SERPL-MCNC: 6.4 G/DL (ref 6.4–8.2)
PROT SERPL-MCNC: 6.5 G/DL (ref 6–8.5)
PROT SERPL-MCNC: 6.8 G/DL (ref 6.4–8.2)
PROT SERPL-MCNC: 6.9 G/DL (ref 6.4–8.2)
PROT SERPL-MCNC: 6.9 G/DL (ref 6.4–8.2)
PROT SERPL-MCNC: 7 G/DL (ref 6.4–8.2)
PROT SERPL-MCNC: 7 G/DL (ref 6.4–8.2)
PROT SERPL-MCNC: 7.1 G/DL (ref 6–8.5)
PROT SERPL-MCNC: 7.4 G/DL (ref 6.4–8.2)
PROT SERPL-MCNC: 7.5 G/DL (ref 6.4–8.2)
PROT SERPL-MCNC: 7.6 G/DL (ref 6.4–8.2)
PROT UR STRIP-MCNC: 100 MG/DL
PROT UR STRIP-MCNC: 30 MG/DL
PROT UR STRIP-MCNC: ABNORMAL MG/DL
PROT UR STRIP-MCNC: ABNORMAL MG/DL
PROTHROMBIN TIME: 15.1 SEC (ref 11.5–15.2)
PSA SERPL-MCNC: 0 NG/ML (ref 0.01–4)
PSA SERPL-MCNC: 0.1 NG/ML (ref 0–4)
Q-T INTERVAL, ECG07: 300 MS
Q-T INTERVAL, ECG07: 314 MS
Q-T INTERVAL, ECG07: 340 MS
Q-T INTERVAL, ECG07: 358 MS
Q-T INTERVAL, ECG07: 364 MS
Q-T INTERVAL, ECG07: 370 MS
Q-T INTERVAL, ECG07: 382 MS
Q-T INTERVAL, ECG07: 398 MS
QRS DURATION, ECG06: 56 MS
QRS DURATION, ECG06: 70 MS
QRS DURATION, ECG06: 74 MS
QRS DURATION, ECG06: 74 MS
QRS DURATION, ECG06: 80 MS
QRS DURATION, ECG06: 86 MS
QTC CALCULATION (BEZET), ECG08: 404 MS
QTC CALCULATION (BEZET), ECG08: 440 MS
QTC CALCULATION (BEZET), ECG08: 442 MS
QTC CALCULATION (BEZET), ECG08: 443 MS
QTC CALCULATION (BEZET), ECG08: 445 MS
QTC CALCULATION (BEZET), ECG08: 447 MS
QTC CALCULATION (BEZET), ECG08: 450 MS
QTC CALCULATION (BEZET), ECG08: 455 MS
RBC # BLD AUTO: 1.5 M/UL (ref 4.1–5.7)
RBC # BLD AUTO: 1.63 M/UL (ref 4.7–5.5)
RBC # BLD AUTO: 1.8 M/UL (ref 4.7–5.5)
RBC # BLD AUTO: 1.86 M/UL (ref 4.1–5.7)
RBC # BLD AUTO: 1.92 M/UL (ref 4.7–5.5)
RBC # BLD AUTO: 1.97 M/UL (ref 4.7–5.5)
RBC # BLD AUTO: 1.98 M/UL (ref 4.7–5.5)
RBC # BLD AUTO: 2 M/UL (ref 4.7–5.5)
RBC # BLD AUTO: 2.05 M/UL (ref 4.7–5.5)
RBC # BLD AUTO: 2.14 M/UL (ref 4.1–5.7)
RBC # BLD AUTO: 2.17 M/UL (ref 4.7–5.5)
RBC # BLD AUTO: 2.19 M/UL (ref 4.1–5.7)
RBC # BLD AUTO: 2.2 M/UL (ref 4.1–5.7)
RBC # BLD AUTO: 2.23 M/UL (ref 4.7–5.5)
RBC # BLD AUTO: 2.24 M/UL (ref 4.7–5.5)
RBC # BLD AUTO: 2.25 M/UL (ref 4.1–5.7)
RBC # BLD AUTO: 2.26 M/UL (ref 4.7–5.5)
RBC # BLD AUTO: 2.28 M/UL (ref 4.1–5.7)
RBC # BLD AUTO: 2.28 M/UL (ref 4.7–5.5)
RBC # BLD AUTO: 2.29 M/UL (ref 4.7–5.5)
RBC # BLD AUTO: 2.3 M/UL (ref 4.1–5.7)
RBC # BLD AUTO: 2.45 M/UL (ref 4.1–5.7)
RBC # BLD AUTO: 2.47 M/UL (ref 4.1–5.7)
RBC # BLD AUTO: 2.52 M/UL (ref 4.7–5.5)
RBC # BLD AUTO: 2.53 M/UL (ref 4.7–5.5)
RBC # BLD AUTO: 2.6 M/UL (ref 4.7–5.5)
RBC # BLD AUTO: 2.6 M/UL (ref 4.7–5.5)
RBC # BLD AUTO: 2.63 M/UL (ref 4.1–5.7)
RBC # BLD AUTO: 2.64 M/UL (ref 4.7–5.5)
RBC # BLD AUTO: 2.67 M/UL (ref 4.7–5.5)
RBC # BLD AUTO: 2.99 M/UL (ref 4.7–5.5)
RBC # BLD AUTO: 3.17 M/UL (ref 4.1–5.1)
RBC # BLD AUTO: 3.17 M/UL (ref 4.7–5.5)
RBC # BLD AUTO: 3.48 M/UL (ref 4.7–5.5)
RBC # BLD AUTO: 3.52 M/UL (ref 4.1–5.1)
RBC # BLD AUTO: 3.68 M/UL (ref 4.7–5.5)
RBC # BLD AUTO: 3.8 M/UL (ref 4.7–5.5)
RBC # BLD AUTO: 3.83 M/UL (ref 4.7–5.5)
RBC # BLD AUTO: 3.97 M/UL (ref 4.7–5.5)
RBC #/AREA URNS HPF: ABNORMAL /HPF (ref 0–5)
RBC #/AREA URNS HPF: NEGATIVE /HPF (ref 0–5)
RBC MORPH BLD: ABNORMAL
RETICS # AUTO: 0.02 M/UL (ref 0.03–0.1)
RETICS # AUTO: 0.04 M/UL (ref 0.03–0.1)
RETICS/RBC NFR AUTO: 1.5 % (ref 0.7–2.1)
RETICS/RBC NFR AUTO: 1.7 % (ref 0.7–2.1)
RETICS/RBC NFR AUTO: 7.6 % (ref 0.5–2.3)
SAMPLES BEING HELD,HOLD: NORMAL
SERVICE CMNT-IMP: ABNORMAL
SERVICE CMNT-IMP: NORMAL
SODIUM SERPL-SCNC: 136 MMOL/L (ref 136–145)
SODIUM SERPL-SCNC: 137 MMOL/L (ref 136–145)
SODIUM SERPL-SCNC: 138 MMOL/L (ref 136–145)
SODIUM SERPL-SCNC: 139 MMOL/L (ref 136–145)
SODIUM SERPL-SCNC: 140 MMOL/L (ref 136–145)
SODIUM SERPL-SCNC: 141 MMOL/L (ref 136–145)
SODIUM SERPL-SCNC: 142 MMOL/L (ref 136–145)
SODIUM SERPL-SCNC: 143 MMOL/L (ref 136–145)
SODIUM SERPL-SCNC: 144 MMOL/L (ref 136–145)
SP GR UR REFRACTOMETRY: 1.02 (ref 1–1.03)
SP GR UR REFRACTOMETRY: 1.03 (ref 1–1.03)
SPECIMEN EXP DATE BLD: NORMAL
STATUS OF UNIT,%ST: NORMAL
T3FREE SERPL-MCNC: 0.7 PG/ML (ref 2.18–3.98)
T4 FREE SERPL-MCNC: 1 NG/DL (ref 0.7–1.5)
T4 FREE SERPL-MCNC: 1.1 NG/DL (ref 0.7–1.5)
TIBC SERPL-MCNC: 118 UG/DL (ref 250–450)
TIBC SERPL-MCNC: 132 UG/DL (ref 250–450)
TIBC SERPL-MCNC: 140 UG/DL (ref 250–450)
TIBC SERPL-MCNC: 149 UG/DL (ref 250–450)
TIBC SERPL-MCNC: 182 UG/DL (ref 250–450)
TRIGL SERPL-MCNC: 72 MG/DL (ref ?–150)
TROPONIN I SERPL-MCNC: 0.02 NG/ML (ref 0–0.04)
TROPONIN I SERPL-MCNC: 0.02 NG/ML (ref 0–0.04)
TROPONIN I SERPL-MCNC: 0.05 NG/ML
TROPONIN I SERPL-MCNC: 0.45 NG/ML (ref 0–0.04)
TROPONIN I SERPL-MCNC: 0.52 NG/ML (ref 0–0.04)
TROPONIN I SERPL-MCNC: <0.05 NG/ML
TSH SERPL DL<=0.05 MIU/L-ACNC: 0.15 UIU/ML (ref 0.36–3.74)
TSH SERPL DL<=0.05 MIU/L-ACNC: 0.27 UIU/ML (ref 0.36–3.74)
TSH SERPL DL<=0.05 MIU/L-ACNC: 1.91 UIU/ML (ref 0.36–3.74)
UA: UC IF INDICATED,UAUC: ABNORMAL
UNIT DIVISION, %UDIV: 0
UROBILINOGEN UR QL STRIP.AUTO: 1 EU/DL (ref 0.2–1)
VANCOMYCIN TROUGH SERPL-MCNC: 14.5 UG/ML (ref 10–20)
VENTRICULAR RATE, ECG03: 102 BPM
VENTRICULAR RATE, ECG03: 109 BPM
VENTRICULAR RATE, ECG03: 121 BPM
VENTRICULAR RATE, ECG03: 76 BPM
VENTRICULAR RATE, ECG03: 80 BPM
VENTRICULAR RATE, ECG03: 91 BPM
VENTRICULAR RATE, ECG03: 92 BPM
VENTRICULAR RATE, ECG03: 92 BPM
VIT B12 SERPL-MCNC: 1252 PG/ML (ref 193–986)
VIT B12 SERPL-MCNC: 1366 PG/ML (ref 211–911)
VIT B12 SERPL-MCNC: 1503 PG/ML (ref 211–911)
VLDLC SERPL CALC-MCNC: 14.4 MG/DL
WBC # BLD AUTO: 10 K/UL (ref 4.1–11.1)
WBC # BLD AUTO: 10 K/UL (ref 4.6–13.2)
WBC # BLD AUTO: 10.8 K/UL (ref 4.1–11.1)
WBC # BLD AUTO: 11 K/UL (ref 4.1–11.1)
WBC # BLD AUTO: 11 K/UL (ref 4.1–11.1)
WBC # BLD AUTO: 11.8 K/UL (ref 4.6–13.2)
WBC # BLD AUTO: 11.8 K/UL (ref 4.6–13.2)
WBC # BLD AUTO: 12.5 K/UL (ref 4.6–13.2)
WBC # BLD AUTO: 12.6 K/UL (ref 4.6–13.2)
WBC # BLD AUTO: 12.8 K/UL (ref 4.1–11.1)
WBC # BLD AUTO: 12.9 K/UL (ref 4.6–13.2)
WBC # BLD AUTO: 13.2 K/UL (ref 4.1–11.1)
WBC # BLD AUTO: 13.4 K/UL (ref 4.6–13.2)
WBC # BLD AUTO: 13.8 K/UL (ref 4.6–13.2)
WBC # BLD AUTO: 14.7 K/UL (ref 4.6–13.2)
WBC # BLD AUTO: 15.6 K/UL (ref 4.5–13)
WBC # BLD AUTO: 15.8 K/UL (ref 4.6–13.2)
WBC # BLD AUTO: 4.2 K/UL (ref 4.6–13.2)
WBC # BLD AUTO: 6 K/UL (ref 4.6–13.2)
WBC # BLD AUTO: 6.1 K/UL (ref 4.6–13.2)
WBC # BLD AUTO: 6.7 K/UL (ref 4.6–13.2)
WBC # BLD AUTO: 7 K/UL (ref 4.6–13.2)
WBC # BLD AUTO: 7.2 K/UL (ref 4.1–11.1)
WBC # BLD AUTO: 7.2 K/UL (ref 4.6–13.2)
WBC # BLD AUTO: 7.3 K/UL (ref 4.6–13.2)
WBC # BLD AUTO: 7.5 K/UL (ref 4.6–13.2)
WBC # BLD AUTO: 7.7 K/UL (ref 4.5–13)
WBC # BLD AUTO: 7.8 K/UL (ref 4.1–11.1)
WBC # BLD AUTO: 7.8 K/UL (ref 4.6–13.2)
WBC # BLD AUTO: 7.9 K/UL (ref 4.1–11.1)
WBC # BLD AUTO: 8 K/UL (ref 4.6–13.2)
WBC # BLD AUTO: 8 K/UL (ref 4.6–13.2)
WBC # BLD AUTO: 8.8 K/UL (ref 4.6–13.2)
WBC # BLD AUTO: 8.8 K/UL (ref 4.6–13.2)
WBC # BLD AUTO: 9 K/UL (ref 4.1–11.1)
WBC # BLD AUTO: 9.7 K/UL (ref 4.1–11.1)
WBC # BLD AUTO: 9.8 K/UL (ref 4.6–13.2)
WBC MORPH BLD: ABNORMAL
WBC MORPH BLD: ABNORMAL
WBC URNS QL MICRO: ABNORMAL /HPF (ref 0–4)
WBC URNS QL MICRO: ABNORMAL /HPF (ref 0–5)

## 2019-01-01 PROCEDURE — G0152 HHCP-SERV OF OT,EA 15 MIN: HCPCS

## 2019-01-01 PROCEDURE — 3331090002 HH PPS REVENUE DEBIT

## 2019-01-01 PROCEDURE — 74011250637 HC RX REV CODE- 250/637: Performed by: INTERNAL MEDICINE

## 2019-01-01 PROCEDURE — 85025 COMPLETE CBC W/AUTO DIFF WBC: CPT

## 2019-01-01 PROCEDURE — A4216 STERILE WATER/SALINE, 10 ML: HCPCS

## 2019-01-01 PROCEDURE — 36415 COLL VENOUS BLD VENIPUNCTURE: CPT

## 2019-01-01 PROCEDURE — 3331090003 HH PPS REVENUE ADJ

## 2019-01-01 PROCEDURE — 74011250636 HC RX REV CODE- 250/636: Performed by: INTERNAL MEDICINE

## 2019-01-01 PROCEDURE — 3331090001 HH PPS REVENUE CREDIT

## 2019-01-01 PROCEDURE — A6260 WOUND CLEANSER ANY TYPE/SIZE: HCPCS

## 2019-01-01 PROCEDURE — 97116 GAIT TRAINING THERAPY: CPT

## 2019-01-01 PROCEDURE — 85045 AUTOMATED RETICULOCYTE COUNT: CPT

## 2019-01-01 PROCEDURE — P9016 RBC LEUKOCYTES REDUCED: HCPCS

## 2019-01-01 PROCEDURE — A6258 TRANSPARENT FILM >16<=48 IN: HCPCS

## 2019-01-01 PROCEDURE — G0158 HHC OT ASSISTANT EA 15: HCPCS

## 2019-01-01 PROCEDURE — 80048 BASIC METABOLIC PNL TOTAL CA: CPT

## 2019-01-01 PROCEDURE — 74011000250 HC RX REV CODE- 250: Performed by: INTERNAL MEDICINE

## 2019-01-01 PROCEDURE — A4452 WATERPROOF TAPE: HCPCS

## 2019-01-01 PROCEDURE — 74011250637 HC RX REV CODE- 250/637: Performed by: HOSPITALIST

## 2019-01-01 PROCEDURE — G0299 HHS/HOSPICE OF RN EA 15 MIN: HCPCS

## 2019-01-01 PROCEDURE — 83036 HEMOGLOBIN GLYCOSYLATED A1C: CPT

## 2019-01-01 PROCEDURE — 97530 THERAPEUTIC ACTIVITIES: CPT

## 2019-01-01 PROCEDURE — 93306 TTE W/DOPPLER COMPLETE: CPT

## 2019-01-01 PROCEDURE — 74011636637 HC RX REV CODE- 636/637: Performed by: INTERNAL MEDICINE

## 2019-01-01 PROCEDURE — 83735 ASSAY OF MAGNESIUM: CPT

## 2019-01-01 PROCEDURE — 30233N1 TRANSFUSION OF NONAUTOLOGOUS RED BLOOD CELLS INTO PERIPHERAL VEIN, PERCUTANEOUS APPROACH: ICD-10-PCS | Performed by: INTERNAL MEDICINE

## 2019-01-01 PROCEDURE — 36430 TRANSFUSION BLD/BLD COMPNT: CPT

## 2019-01-01 PROCEDURE — A6446 CONFORM BAND S W>=3" <5"/YD: HCPCS

## 2019-01-01 PROCEDURE — 80053 COMPREHEN METABOLIC PANEL: CPT

## 2019-01-01 PROCEDURE — 65660000000 HC RM CCU STEPDOWN

## 2019-01-01 PROCEDURE — 74011250636 HC RX REV CODE- 250/636: Performed by: EMERGENCY MEDICINE

## 2019-01-01 PROCEDURE — 77030029211 HC GEL MEDIH TU INLC -B

## 2019-01-01 PROCEDURE — 77030011256 HC DRSG MEPILEX <16IN NO BORD MOLN -A

## 2019-01-01 PROCEDURE — 83880 ASSAY OF NATRIURETIC PEPTIDE: CPT

## 2019-01-01 PROCEDURE — G0300 HHS/HOSPICE OF LPN EA 15 MIN: HCPCS

## 2019-01-01 PROCEDURE — 77030010545

## 2019-01-01 PROCEDURE — 77030019934 HC DRSG VAC ASST KCON -B

## 2019-01-01 PROCEDURE — 82962 GLUCOSE BLOOD TEST: CPT

## 2019-01-01 PROCEDURE — 84134 ASSAY OF PREALBUMIN: CPT

## 2019-01-01 PROCEDURE — 84481 FREE ASSAY (FT-3): CPT

## 2019-01-01 PROCEDURE — 74011000258 HC RX REV CODE- 258: Performed by: HOSPITALIST

## 2019-01-01 PROCEDURE — 85027 COMPLETE CBC AUTOMATED: CPT

## 2019-01-01 PROCEDURE — 86923 COMPATIBILITY TEST ELECTRIC: CPT

## 2019-01-01 PROCEDURE — 81001 URINALYSIS AUTO W/SCOPE: CPT

## 2019-01-01 PROCEDURE — 77030037878 HC DRSG MEPILEX >48IN BORD MOLN -B

## 2019-01-01 PROCEDURE — 74011000258 HC RX REV CODE- 258: Performed by: PHYSICIAN ASSISTANT

## 2019-01-01 PROCEDURE — 94761 N-INVAS EAR/PLS OXIMETRY MLT: CPT

## 2019-01-01 PROCEDURE — 99284 EMERGENCY DEPT VISIT MOD MDM: CPT

## 2019-01-01 PROCEDURE — 82728 ASSAY OF FERRITIN: CPT

## 2019-01-01 PROCEDURE — 74011250637 HC RX REV CODE- 250/637: Performed by: EMERGENCY MEDICINE

## 2019-01-01 PROCEDURE — A6216 NON-STERILE GAUZE<=16 SQ IN: HCPCS

## 2019-01-01 PROCEDURE — 88365 INSITU HYBRIDIZATION (FISH): CPT

## 2019-01-01 PROCEDURE — 74011250636 HC RX REV CODE- 250/636: Performed by: NURSE PRACTITIONER

## 2019-01-01 PROCEDURE — A6212 FOAM DRG <=16 SQ IN W/BORDER: HCPCS

## 2019-01-01 PROCEDURE — 74011250636 HC RX REV CODE- 250/636: Performed by: RADIOLOGY

## 2019-01-01 PROCEDURE — G0155 HHCP-SVS OF CSW,EA 15 MIN: HCPCS

## 2019-01-01 PROCEDURE — 76770 US EXAM ABDO BACK WALL COMP: CPT

## 2019-01-01 PROCEDURE — 93005 ELECTROCARDIOGRAM TRACING: CPT

## 2019-01-01 PROCEDURE — 82550 ASSAY OF CK (CPK): CPT

## 2019-01-01 PROCEDURE — 85730 THROMBOPLASTIN TIME PARTIAL: CPT

## 2019-01-01 PROCEDURE — 30233R1 TRANSFUSION OF NONAUTOLOGOUS PLATELETS INTO PERIPHERAL VEIN, PERCUTANEOUS APPROACH: ICD-10-PCS | Performed by: INTERNAL MEDICINE

## 2019-01-01 PROCEDURE — 77030037875 HC DRSG MEPILEX <16IN BORD MOLN -A

## 2019-01-01 PROCEDURE — G0156 HHCP-SVS OF AIDE,EA 15 MIN: HCPCS

## 2019-01-01 PROCEDURE — 74011000250 HC RX REV CODE- 250: Performed by: PHYSICIAN ASSISTANT

## 2019-01-01 PROCEDURE — 84484 ASSAY OF TROPONIN QUANT: CPT

## 2019-01-01 PROCEDURE — 88184 FLOWCYTOMETRY/ TC 1 MARKER: CPT

## 2019-01-01 PROCEDURE — 97110 THERAPEUTIC EXERCISES: CPT

## 2019-01-01 PROCEDURE — 88311 DECALCIFY TISSUE: CPT

## 2019-01-01 PROCEDURE — 74011250637 HC RX REV CODE- 250/637: Performed by: NURSE PRACTITIONER

## 2019-01-01 PROCEDURE — 96360 HYDRATION IV INFUSION INIT: CPT

## 2019-01-01 PROCEDURE — 76450000000

## 2019-01-01 PROCEDURE — 86900 BLOOD TYPING SEROLOGIC ABO: CPT

## 2019-01-01 PROCEDURE — 74011000250 HC RX REV CODE- 250

## 2019-01-01 PROCEDURE — 96374 THER/PROPH/DIAG INJ IV PUSH: CPT

## 2019-01-01 PROCEDURE — 85018 HEMOGLOBIN: CPT

## 2019-01-01 PROCEDURE — 86880 COOMBS TEST DIRECT: CPT

## 2019-01-01 PROCEDURE — 80202 ASSAY OF VANCOMYCIN: CPT

## 2019-01-01 PROCEDURE — 83605 ASSAY OF LACTIC ACID: CPT

## 2019-01-01 PROCEDURE — 80061 LIPID PANEL: CPT

## 2019-01-01 PROCEDURE — 94760 N-INVAS EAR/PLS OXIMETRY 1: CPT

## 2019-01-01 PROCEDURE — 88341 IMHCHEM/IMCYTCHM EA ADD ANTB: CPT

## 2019-01-01 PROCEDURE — 84155 ASSAY OF PROTEIN SERUM: CPT

## 2019-01-01 PROCEDURE — 84439 ASSAY OF FREE THYROXINE: CPT

## 2019-01-01 PROCEDURE — 74011636637 HC RX REV CODE- 636/637: Performed by: HOSPITALIST

## 2019-01-01 PROCEDURE — 97165 OT EVAL LOW COMPLEX 30 MIN: CPT

## 2019-01-01 PROCEDURE — 51798 US URINE CAPACITY MEASURE: CPT

## 2019-01-01 PROCEDURE — 74011636637 HC RX REV CODE- 636/637: Performed by: NURSE PRACTITIONER

## 2019-01-01 PROCEDURE — E0700 SAFETY EQUIPMENT: HCPCS

## 2019-01-01 PROCEDURE — 87070 CULTURE OTHR SPECIMN AEROBIC: CPT

## 2019-01-01 PROCEDURE — 87040 BLOOD CULTURE FOR BACTERIA: CPT

## 2019-01-01 PROCEDURE — 3331090004 HSPC SERVICE INTENSITY ADD-ON

## 2019-01-01 PROCEDURE — 77030011255 HC DSG AQUACEL AG BMS -A

## 2019-01-01 PROCEDURE — 99218 HC RM OBSERVATION: CPT

## 2019-01-01 PROCEDURE — 74011250636 HC RX REV CODE- 250/636: Performed by: PHYSICIAN ASSISTANT

## 2019-01-01 PROCEDURE — 74011250636 HC RX REV CODE- 250/636: Performed by: HOSPITALIST

## 2019-01-01 PROCEDURE — A6223 GAUZE >16<=48 NO W/SAL W/O B: HCPCS

## 2019-01-01 PROCEDURE — G0151 HHCP-SERV OF PT,EA 15 MIN: HCPCS

## 2019-01-01 PROCEDURE — 77030012935 HC DRSG AQUACEL BMS -B

## 2019-01-01 PROCEDURE — 84443 ASSAY THYROID STIM HORMONE: CPT

## 2019-01-01 PROCEDURE — 99283 EMERGENCY DEPT VISIT LOW MDM: CPT

## 2019-01-01 PROCEDURE — 84100 ASSAY OF PHOSPHORUS: CPT

## 2019-01-01 PROCEDURE — G0157 HHC PT ASSISTANT EA 15: HCPCS

## 2019-01-01 PROCEDURE — A6250 SKIN SEAL PROTECT MOISTURIZR: HCPCS

## 2019-01-01 PROCEDURE — 82040 ASSAY OF SERUM ALBUMIN: CPT

## 2019-01-01 PROCEDURE — 83010 ASSAY OF HAPTOGLOBIN QUANT: CPT

## 2019-01-01 PROCEDURE — 65660000004 HC RM CVT STEPDOWN

## 2019-01-01 PROCEDURE — 77030010547 HC BG URIN W/UMETER -A

## 2019-01-01 PROCEDURE — 87086 URINE CULTURE/COLONY COUNT: CPT

## 2019-01-01 PROCEDURE — 86850 RBC ANTIBODY SCREEN: CPT

## 2019-01-01 PROCEDURE — 0651 HSPC ROUTINE HOME CARE

## 2019-01-01 PROCEDURE — A4349 DISPOSABLE MALE EXTERNAL CAT: HCPCS

## 2019-01-01 PROCEDURE — 88374 M/PHMTRC ALYS ISHQUANT/SEMIQ: CPT

## 2019-01-01 PROCEDURE — A6248 HYDROGEL DRSG GEL FILLER: HCPCS

## 2019-01-01 PROCEDURE — 83540 ASSAY OF IRON: CPT

## 2019-01-01 PROCEDURE — A6196 ALGINATE DRESSING <=16 SQ IN: HCPCS

## 2019-01-01 PROCEDURE — 97161 PT EVAL LOW COMPLEX 20 MIN: CPT

## 2019-01-01 PROCEDURE — A6237 HYDROCOLLD DRG <=16 IN W/BDR: HCPCS

## 2019-01-01 PROCEDURE — 77030013169 SET IV BLD ICUM -A

## 2019-01-01 PROCEDURE — 74011000250 HC RX REV CODE- 250: Performed by: EMERGENCY MEDICINE

## 2019-01-01 PROCEDURE — 81479 UNLISTED MOLECULAR PATHOLOGY: CPT

## 2019-01-01 PROCEDURE — 92610 EVALUATE SWALLOWING FUNCTION: CPT

## 2019-01-01 PROCEDURE — 97162 PT EVAL MOD COMPLEX 30 MIN: CPT

## 2019-01-01 PROCEDURE — 82272 OCCULT BLD FECES 1-3 TESTS: CPT

## 2019-01-01 PROCEDURE — 88364 INSITU HYBRIDIZATION (FISH): CPT

## 2019-01-01 PROCEDURE — A6197 ALGINATE DRSG >16 <=48 SQ IN: HCPCS

## 2019-01-01 PROCEDURE — 88313 SPECIAL STAINS GROUP 2: CPT

## 2019-01-01 PROCEDURE — 82607 VITAMIN B-12: CPT

## 2019-01-01 PROCEDURE — A6457 TUBULAR DRESSING: HCPCS

## 2019-01-01 PROCEDURE — 77030020186 HC BOOT HL PROTCT SAGE -B

## 2019-01-01 PROCEDURE — 84165 PROTEIN E-PHORESIS SERUM: CPT

## 2019-01-01 PROCEDURE — 77030028872 HC BN BIOP NDL ON CNTRL TY TELE -C

## 2019-01-01 PROCEDURE — A6413 ADHESIVE BANDAGE, FIRST-AID: HCPCS

## 2019-01-01 PROCEDURE — 96361 HYDRATE IV INFUSION ADD-ON: CPT

## 2019-01-01 PROCEDURE — 74011250637 HC RX REV CODE- 250/637: Performed by: PHYSICIAN ASSISTANT

## 2019-01-01 PROCEDURE — 85610 PROTHROMBIN TIME: CPT

## 2019-01-01 PROCEDURE — 71045 X-RAY EXAM CHEST 1 VIEW: CPT

## 2019-01-01 PROCEDURE — 97166 OT EVAL MOD COMPLEX 45 MIN: CPT

## 2019-01-01 PROCEDURE — 3336500001 HSPC ELECTION

## 2019-01-01 PROCEDURE — 86860 RBC ANTIBODY ELUTION: CPT

## 2019-01-01 PROCEDURE — 77012 CT SCAN FOR NEEDLE BIOPSY: CPT

## 2019-01-01 PROCEDURE — 71250 CT THORAX DX C-: CPT

## 2019-01-01 PROCEDURE — 74011000250 HC RX REV CODE- 250: Performed by: HOSPITALIST

## 2019-01-01 PROCEDURE — 99285 EMERGENCY DEPT VISIT HI MDM: CPT

## 2019-01-01 PROCEDURE — 400013 HH SOC

## 2019-01-01 PROCEDURE — A6402 STERILE GAUZE <= 16 SQ IN: HCPCS

## 2019-01-01 PROCEDURE — 84153 ASSAY OF PSA TOTAL: CPT

## 2019-01-01 PROCEDURE — 88305 TISSUE EXAM BY PATHOLOGIST: CPT

## 2019-01-01 PROCEDURE — 87804 INFLUENZA ASSAY W/OPTIC: CPT

## 2019-01-01 PROCEDURE — A5120 SKIN BARRIER, WIPE OR SWAB: HCPCS

## 2019-01-01 PROCEDURE — 87077 CULTURE AEROBIC IDENTIFY: CPT

## 2019-01-01 PROCEDURE — A4927 NON-STERILE GLOVES: HCPCS

## 2019-01-01 PROCEDURE — 88185 FLOWCYTOMETRY/TC ADD-ON: CPT

## 2019-01-01 PROCEDURE — 93970 EXTREMITY STUDY: CPT

## 2019-01-01 PROCEDURE — A4357 BEDSIDE DRAINAGE BAG: HCPCS

## 2019-01-01 PROCEDURE — 88342 IMHCHEM/IMCYTCHM 1ST ANTB: CPT

## 2019-01-01 PROCEDURE — 73060999999 HC MISC LAB CHARGE

## 2019-01-01 PROCEDURE — 77030014115

## 2019-01-01 PROCEDURE — 65270000029 HC RM PRIVATE

## 2019-01-01 PROCEDURE — A9537 TC99M MEBROFENIN: HCPCS

## 2019-01-01 PROCEDURE — 71046 X-RAY EXAM CHEST 2 VIEWS: CPT

## 2019-01-01 PROCEDURE — A6252 ABSORPT DRG >16 <=48 W/O BDR: HCPCS

## 2019-01-01 PROCEDURE — 07DR3ZX EXTRACTION OF ILIAC BONE MARROW, PERCUTANEOUS APPROACH, DIAGNOSTIC: ICD-10-PCS | Performed by: RADIOLOGY

## 2019-01-01 PROCEDURE — C9113 INJ PANTOPRAZOLE SODIUM, VIA: HCPCS | Performed by: INTERNAL MEDICINE

## 2019-01-01 PROCEDURE — 87186 SC STD MICRODIL/AGAR DIL: CPT

## 2019-01-01 PROCEDURE — 82746 ASSAY OF FOLIC ACID SERUM: CPT

## 2019-01-01 PROCEDURE — 74011636637 HC RX REV CODE- 636/637: Performed by: EMERGENCY MEDICINE

## 2019-01-01 PROCEDURE — 86038 ANTINUCLEAR ANTIBODIES: CPT

## 2019-01-01 PROCEDURE — 92526 ORAL FUNCTION THERAPY: CPT

## 2019-01-01 PROCEDURE — G0153 HHCP-SVS OF S/L PATH,EA 15MN: HCPCS

## 2019-01-01 PROCEDURE — 99152 MOD SED SAME PHYS/QHP 5/>YRS: CPT

## 2019-01-01 PROCEDURE — 99211 OFF/OP EST MAY X REQ PHY/QHP: CPT

## 2019-01-01 PROCEDURE — 70450 CT HEAD/BRAIN W/O DYE: CPT

## 2019-01-01 PROCEDURE — 77030037870 HC GLD SHT PREVALON SAGE -B

## 2019-01-01 PROCEDURE — 97535 SELF CARE MNGMENT TRAINING: CPT

## 2019-01-01 PROCEDURE — 96372 THER/PROPH/DIAG INJ SC/IM: CPT

## 2019-01-01 PROCEDURE — 84080 ASSAY ALKALINE PHOSPHATASES: CPT

## 2019-01-01 PROCEDURE — 74011000258 HC RX REV CODE- 258: Performed by: INTERNAL MEDICINE

## 2019-01-01 PROCEDURE — A9270 NON-COVERED ITEM OR SERVICE: HCPCS

## 2019-01-01 PROCEDURE — 400016 HH ROC

## 2019-01-01 PROCEDURE — 74011636637 HC RX REV CODE- 636/637: Performed by: PHYSICIAN ASSISTANT

## 2019-01-01 PROCEDURE — 77010033678 HC OXYGEN DAILY

## 2019-01-01 PROCEDURE — 83615 LACTATE (LD) (LDH) ENZYME: CPT

## 2019-01-01 PROCEDURE — HOSPICE MEDICATION HC HH HOSPICE MEDICATION

## 2019-01-01 PROCEDURE — 93308 TTE F-UP OR LMTD: CPT

## 2019-01-01 PROCEDURE — P9035 PLATELET PHERES LEUKOREDUCED: HCPCS

## 2019-01-01 RX ORDER — BRIMONIDINE TARTRATE 2 MG/ML
1 SOLUTION/ DROPS OPHTHALMIC 2 TIMES DAILY
Status: DISCONTINUED | OUTPATIENT
Start: 2019-01-01 | End: 2019-01-01 | Stop reason: HOSPADM

## 2019-01-01 RX ORDER — DEXTROSE 50 % IN WATER (D50W) INTRAVENOUS SYRINGE
25-50 AS NEEDED
Status: DISCONTINUED | OUTPATIENT
Start: 2019-01-01 | End: 2019-01-01 | Stop reason: HOSPADM

## 2019-01-01 RX ORDER — SODIUM CHLORIDE 9 MG/ML
250 INJECTION, SOLUTION INTRAVENOUS AS NEEDED
Status: DISPENSED | OUTPATIENT
Start: 2019-01-01 | End: 2019-01-01

## 2019-01-01 RX ORDER — LANOLIN ALCOHOL/MO/W.PET/CERES
325 CREAM (GRAM) TOPICAL
Qty: 30 TAB | Refills: 3 | Status: SHIPPED | OUTPATIENT
Start: 2019-01-01

## 2019-01-01 RX ORDER — INSULIN GLARGINE 100 [IU]/ML
10 INJECTION, SOLUTION SUBCUTANEOUS DAILY
Status: DISCONTINUED | OUTPATIENT
Start: 2019-01-01 | End: 2019-01-01

## 2019-01-01 RX ORDER — DORZOLAMIDE HCL 20 MG/ML
1 SOLUTION/ DROPS OPHTHALMIC 3 TIMES DAILY
Status: DISCONTINUED | OUTPATIENT
Start: 2019-01-01 | End: 2019-01-01 | Stop reason: HOSPADM

## 2019-01-01 RX ORDER — INSULIN LISPRO 100 [IU]/ML
INJECTION, SOLUTION INTRAVENOUS; SUBCUTANEOUS
Qty: 1 VIAL | Refills: 0 | Status: SHIPPED
Start: 2019-01-01 | End: 2019-01-01

## 2019-01-01 RX ORDER — SODIUM CHLORIDE 9 MG/ML
50 INJECTION, SOLUTION INTRAVENOUS CONTINUOUS
Status: DISCONTINUED | OUTPATIENT
Start: 2019-01-01 | End: 2019-01-01 | Stop reason: SDUPTHER

## 2019-01-01 RX ORDER — ACETAMINOPHEN 325 MG/1
650 TABLET ORAL ONCE
Status: COMPLETED | OUTPATIENT
Start: 2019-01-01 | End: 2019-01-01

## 2019-01-01 RX ORDER — INSULIN LISPRO 100 [IU]/ML
INJECTION, SOLUTION INTRAVENOUS; SUBCUTANEOUS
Status: DISCONTINUED | OUTPATIENT
Start: 2019-01-01 | End: 2019-01-01 | Stop reason: HOSPADM

## 2019-01-01 RX ORDER — INSULIN GLARGINE 100 [IU]/ML
20 INJECTION, SOLUTION SUBCUTANEOUS DAILY
Status: DISCONTINUED | OUTPATIENT
Start: 2019-01-01 | End: 2019-01-01

## 2019-01-01 RX ORDER — INSULIN GLARGINE 100 [IU]/ML
5 INJECTION, SOLUTION SUBCUTANEOUS DAILY
Status: DISCONTINUED | OUTPATIENT
Start: 2019-01-01 | End: 2019-01-01 | Stop reason: HOSPADM

## 2019-01-01 RX ORDER — INSULIN GLARGINE 100 [IU]/ML
10 INJECTION, SOLUTION SUBCUTANEOUS DAILY
Qty: 10 ML | Refills: 0 | Status: SHIPPED
Start: 2019-01-01 | End: 2019-01-01 | Stop reason: SDUPTHER

## 2019-01-01 RX ORDER — SODIUM CHLORIDE 0.9 % (FLUSH) 0.9 %
5-40 SYRINGE (ML) INJECTION AS NEEDED
Status: DISCONTINUED | OUTPATIENT
Start: 2019-01-01 | End: 2019-01-01 | Stop reason: HOSPADM

## 2019-01-01 RX ORDER — LATANOPROST 50 UG/ML
1 SOLUTION/ DROPS OPHTHALMIC 2 TIMES DAILY
Status: DISCONTINUED | OUTPATIENT
Start: 2019-01-01 | End: 2019-01-01 | Stop reason: HOSPADM

## 2019-01-01 RX ORDER — ATORVASTATIN CALCIUM 40 MG/1
40 TABLET, FILM COATED ORAL
Status: DISCONTINUED | OUTPATIENT
Start: 2019-01-01 | End: 2019-01-01 | Stop reason: HOSPADM

## 2019-01-01 RX ORDER — ONDANSETRON 2 MG/ML
4 INJECTION INTRAMUSCULAR; INTRAVENOUS
Status: DISCONTINUED | OUTPATIENT
Start: 2019-01-01 | End: 2019-01-01 | Stop reason: HOSPADM

## 2019-01-01 RX ORDER — VANCOMYCIN/0.9 % SOD CHLORIDE 1.5G/250ML
1500 PLASTIC BAG, INJECTION (ML) INTRAVENOUS ONCE
Status: COMPLETED | OUTPATIENT
Start: 2019-01-01 | End: 2019-01-01

## 2019-01-01 RX ORDER — INSULIN LISPRO 100 [IU]/ML
12 INJECTION, SOLUTION INTRAVENOUS; SUBCUTANEOUS ONCE
Status: COMPLETED | OUTPATIENT
Start: 2019-01-01 | End: 2019-01-01

## 2019-01-01 RX ORDER — THERA TABS 400 MCG
1 TAB ORAL DAILY
Status: DISCONTINUED | OUTPATIENT
Start: 2019-01-01 | End: 2019-01-01 | Stop reason: HOSPADM

## 2019-01-01 RX ORDER — MAGNESIUM SULFATE 100 %
4 CRYSTALS MISCELLANEOUS AS NEEDED
Status: DISCONTINUED | OUTPATIENT
Start: 2019-01-01 | End: 2019-01-01 | Stop reason: HOSPADM

## 2019-01-01 RX ORDER — OXYBUTYNIN CHLORIDE 5 MG/1
5 TABLET ORAL 3 TIMES DAILY
Status: DISCONTINUED | OUTPATIENT
Start: 2019-01-01 | End: 2019-01-01 | Stop reason: HOSPADM

## 2019-01-01 RX ORDER — MAGNESIUM SULFATE 100 %
16 CRYSTALS MISCELLANEOUS AS NEEDED
Status: DISCONTINUED | OUTPATIENT
Start: 2019-01-01 | End: 2019-01-01 | Stop reason: HOSPADM

## 2019-01-01 RX ORDER — INSULIN LISPRO 100 [IU]/ML
INJECTION, SOLUTION INTRAVENOUS; SUBCUTANEOUS
Qty: 1 VIAL | Refills: 1 | Status: SHIPPED
Start: 2019-01-01 | End: 2019-01-01 | Stop reason: SDUPTHER

## 2019-01-01 RX ORDER — INSULIN LISPRO 100 [IU]/ML
INJECTION, SOLUTION INTRAVENOUS; SUBCUTANEOUS
Status: DISCONTINUED | OUTPATIENT
Start: 2019-01-01 | End: 2019-01-01

## 2019-01-01 RX ORDER — ASPIRIN 81 MG/1
81 TABLET ORAL DAILY
Status: DISCONTINUED | OUTPATIENT
Start: 2019-01-01 | End: 2019-01-01 | Stop reason: HOSPADM

## 2019-01-01 RX ORDER — SODIUM CHLORIDE 9 MG/ML
250 INJECTION, SOLUTION INTRAVENOUS AS NEEDED
Status: DISCONTINUED | OUTPATIENT
Start: 2019-01-01 | End: 2019-01-01 | Stop reason: HOSPADM

## 2019-01-01 RX ORDER — SODIUM CHLORIDE 0.9 % (FLUSH) 0.9 %
5-40 SYRINGE (ML) INJECTION EVERY 8 HOURS
Status: DISCONTINUED | OUTPATIENT
Start: 2019-01-01 | End: 2019-01-01 | Stop reason: HOSPADM

## 2019-01-01 RX ORDER — SULFAMETHOXAZOLE AND TRIMETHOPRIM 800; 160 MG/1; MG/1
2 TABLET ORAL
Status: COMPLETED | OUTPATIENT
Start: 2019-01-01 | End: 2019-01-01

## 2019-01-01 RX ORDER — INSULIN GLARGINE 100 [IU]/ML
13 INJECTION, SOLUTION SUBCUTANEOUS DAILY
Qty: 10 ML | Refills: 0 | Status: SHIPPED | OUTPATIENT
Start: 2019-01-01 | End: 2019-01-01 | Stop reason: SDUPTHER

## 2019-01-01 RX ORDER — SULFAMETHOXAZOLE AND TRIMETHOPRIM 800; 160 MG/1; MG/1
TABLET ORAL
Status: DISCONTINUED
Start: 2019-01-01 | End: 2019-01-01 | Stop reason: HOSPADM

## 2019-01-01 RX ORDER — SODIUM CHLORIDE 9 MG/ML
250 INJECTION, SOLUTION INTRAVENOUS AS NEEDED
Status: DISCONTINUED | OUTPATIENT
Start: 2019-01-01 | End: 2019-01-01

## 2019-01-01 RX ORDER — TIMOLOL MALEATE 5 MG/ML
1 SOLUTION/ DROPS OPHTHALMIC 2 TIMES DAILY
Status: DISCONTINUED | OUTPATIENT
Start: 2019-01-01 | End: 2019-01-01 | Stop reason: HOSPADM

## 2019-01-01 RX ORDER — METOPROLOL TARTRATE 25 MG/1
25 TABLET, FILM COATED ORAL 2 TIMES DAILY
Status: DISCONTINUED | OUTPATIENT
Start: 2019-01-01 | End: 2019-01-01 | Stop reason: HOSPADM

## 2019-01-01 RX ORDER — SODIUM CHLORIDE 0.9 % (FLUSH) 0.9 %
5-10 SYRINGE (ML) INJECTION AS NEEDED
Status: DISCONTINUED | OUTPATIENT
Start: 2019-01-01 | End: 2019-01-01 | Stop reason: SDUPTHER

## 2019-01-01 RX ORDER — INSULIN LISPRO 100 [IU]/ML
10 INJECTION, SOLUTION INTRAVENOUS; SUBCUTANEOUS ONCE
Status: COMPLETED | OUTPATIENT
Start: 2019-01-01 | End: 2019-01-01

## 2019-01-01 RX ORDER — POTASSIUM CHLORIDE 20 MEQ/1
40 TABLET, EXTENDED RELEASE ORAL 3 TIMES DAILY
Status: COMPLETED | OUTPATIENT
Start: 2019-01-01 | End: 2019-01-01

## 2019-01-01 RX ORDER — INSULIN GLARGINE 100 [IU]/ML
5 INJECTION, SOLUTION SUBCUTANEOUS DAILY
Status: DISCONTINUED | OUTPATIENT
Start: 2019-01-01 | End: 2019-01-01

## 2019-01-01 RX ORDER — BRINZOLAMIDE 10 MG/ML
1 SUSPENSION/ DROPS OPHTHALMIC 2 TIMES DAILY
Qty: 5 ML | Refills: 0 | Status: SHIPPED
Start: 2019-01-01

## 2019-01-01 RX ORDER — METOPROLOL TARTRATE 50 MG/1
50 TABLET ORAL EVERY 12 HOURS
Status: DISCONTINUED | OUTPATIENT
Start: 2019-01-01 | End: 2019-01-01

## 2019-01-01 RX ORDER — ASPIRIN 325 MG
325 TABLET ORAL
Status: COMPLETED | OUTPATIENT
Start: 2019-01-01 | End: 2019-01-01

## 2019-01-01 RX ORDER — LORAZEPAM 2 MG/ML
1 INJECTION INTRAMUSCULAR
Status: DISCONTINUED | OUTPATIENT
Start: 2019-01-01 | End: 2019-01-01

## 2019-01-01 RX ORDER — INSULIN GLARGINE 100 [IU]/ML
15 INJECTION, SOLUTION SUBCUTANEOUS DAILY
Qty: 10 ML | Refills: 0 | Status: SHIPPED | OUTPATIENT
Start: 2019-01-01 | End: 2019-01-01

## 2019-01-01 RX ORDER — INSULIN GLARGINE 100 [IU]/ML
15 INJECTION, SOLUTION SUBCUTANEOUS DAILY
Status: DISCONTINUED | OUTPATIENT
Start: 2019-01-01 | End: 2019-01-01 | Stop reason: HOSPADM

## 2019-01-01 RX ORDER — INSULIN GLARGINE 100 [IU]/ML
2 INJECTION, SOLUTION SUBCUTANEOUS ONCE
Status: COMPLETED | OUTPATIENT
Start: 2019-01-01 | End: 2019-01-01

## 2019-01-01 RX ORDER — CALCIUM CARBONATE 500(1250)
500 TABLET ORAL DAILY
Status: DISCONTINUED | OUTPATIENT
Start: 2019-01-01 | End: 2019-01-01 | Stop reason: HOSPADM

## 2019-01-01 RX ORDER — INSULIN LISPRO 100 [IU]/ML
INJECTION, SOLUTION INTRAVENOUS; SUBCUTANEOUS
Qty: 1 VIAL | Refills: 0 | Status: SHIPPED | OUTPATIENT
Start: 2019-01-01 | End: 2019-01-01

## 2019-01-01 RX ORDER — METOPROLOL TARTRATE 25 MG/1
25 TABLET, FILM COATED ORAL EVERY 12 HOURS
Status: DISCONTINUED | OUTPATIENT
Start: 2019-01-01 | End: 2019-01-01 | Stop reason: HOSPADM

## 2019-01-01 RX ORDER — LEVOFLOXACIN 5 MG/ML
750 INJECTION, SOLUTION INTRAVENOUS EVERY 24 HOURS
Status: DISCONTINUED | OUTPATIENT
Start: 2019-01-01 | End: 2019-01-01

## 2019-01-01 RX ORDER — NALOXONE HYDROCHLORIDE 0.4 MG/ML
0.4 INJECTION, SOLUTION INTRAMUSCULAR; INTRAVENOUS; SUBCUTANEOUS AS NEEDED
Status: DISCONTINUED | OUTPATIENT
Start: 2019-01-01 | End: 2019-01-01 | Stop reason: HOSPADM

## 2019-01-01 RX ORDER — DORZOLAMIDE HCL 20 MG/ML
1 SOLUTION/ DROPS OPHTHALMIC 2 TIMES DAILY
Status: DISCONTINUED | OUTPATIENT
Start: 2019-01-01 | End: 2019-01-01 | Stop reason: HOSPADM

## 2019-01-01 RX ORDER — OXYBUTYNIN CHLORIDE 5 MG/1
TABLET ORAL
Qty: 90 TAB | Refills: 8 | Status: SHIPPED | OUTPATIENT
Start: 2019-01-01 | End: 2019-01-01

## 2019-01-01 RX ORDER — MIDAZOLAM HYDROCHLORIDE 1 MG/ML
5 INJECTION, SOLUTION INTRAMUSCULAR; INTRAVENOUS
Status: DISPENSED | OUTPATIENT
Start: 2019-01-01 | End: 2019-01-01

## 2019-01-01 RX ORDER — METOPROLOL TARTRATE 50 MG/1
50 TABLET ORAL EVERY 12 HOURS
Qty: 60 TAB | Refills: 0 | Status: SHIPPED
Start: 2019-01-01 | End: 2019-01-01

## 2019-01-01 RX ORDER — HYDROXYUREA 500 MG/1
CAPSULE ORAL
Qty: 180 CAP | Refills: 2 | Status: SHIPPED | OUTPATIENT
Start: 2019-01-01 | End: 2019-01-01

## 2019-01-01 RX ORDER — POTASSIUM CHLORIDE 20 MEQ/1
20 TABLET, EXTENDED RELEASE ORAL
Status: COMPLETED | OUTPATIENT
Start: 2019-01-01 | End: 2019-01-01

## 2019-01-01 RX ORDER — ACETAMINOPHEN 325 MG/1
650 TABLET ORAL ONCE
Status: ACTIVE | OUTPATIENT
Start: 2019-01-01 | End: 2019-01-01

## 2019-01-01 RX ORDER — BRINZOLAMIDE 10 MG/ML
1 SUSPENSION/ DROPS OPHTHALMIC 2 TIMES DAILY
Status: DISCONTINUED | OUTPATIENT
Start: 2019-01-01 | End: 2019-01-01

## 2019-01-01 RX ORDER — METOPROLOL TARTRATE 50 MG/1
50 TABLET ORAL EVERY 12 HOURS
Status: DISCONTINUED | OUTPATIENT
Start: 2019-01-01 | End: 2019-01-01 | Stop reason: HOSPADM

## 2019-01-01 RX ORDER — VALSARTAN 40 MG/1
40 TABLET ORAL ONCE
Status: COMPLETED | OUTPATIENT
Start: 2019-01-01 | End: 2019-01-01

## 2019-01-01 RX ORDER — INSULIN ASPART 100 [IU]/ML
INJECTION, SOLUTION INTRAVENOUS; SUBCUTANEOUS
COMMUNITY
Start: 2019-01-01

## 2019-01-01 RX ORDER — INSULIN LISPRO 100 [IU]/ML
15 INJECTION, SOLUTION INTRAVENOUS; SUBCUTANEOUS ONCE
Status: COMPLETED | OUTPATIENT
Start: 2019-01-01 | End: 2019-01-01

## 2019-01-01 RX ORDER — FENTANYL CITRATE 50 UG/ML
100 INJECTION, SOLUTION INTRAMUSCULAR; INTRAVENOUS
Status: DISPENSED | OUTPATIENT
Start: 2019-01-01 | End: 2019-01-01

## 2019-01-01 RX ORDER — VALSARTAN 160 MG/1
80 TABLET ORAL DAILY
Status: DISCONTINUED | OUTPATIENT
Start: 2019-01-01 | End: 2019-01-01 | Stop reason: HOSPADM

## 2019-01-01 RX ORDER — ACETAMINOPHEN 325 MG/1
650 TABLET ORAL
Status: DISCONTINUED | OUTPATIENT
Start: 2019-01-01 | End: 2019-01-01 | Stop reason: HOSPADM

## 2019-01-01 RX ORDER — DEXTROSE 50 % IN WATER (D50W) INTRAVENOUS SYRINGE
25
Status: COMPLETED | OUTPATIENT
Start: 2019-01-01 | End: 2019-01-01

## 2019-01-01 RX ORDER — INSULIN LISPRO 100 [IU]/ML
INJECTION, SOLUTION INTRAVENOUS; SUBCUTANEOUS ONCE
Status: COMPLETED | OUTPATIENT
Start: 2019-01-01 | End: 2019-01-01

## 2019-01-01 RX ORDER — ATORVASTATIN CALCIUM 20 MG/1
40 TABLET, FILM COATED ORAL
Status: DISCONTINUED | OUTPATIENT
Start: 2019-01-01 | End: 2019-01-01 | Stop reason: HOSPADM

## 2019-01-01 RX ORDER — SODIUM CHLORIDE 9 MG/ML
250 INJECTION, SOLUTION INTRAVENOUS AS NEEDED
Status: DISCONTINUED | OUTPATIENT
Start: 2019-01-01 | End: 2019-01-01 | Stop reason: SDUPTHER

## 2019-01-01 RX ORDER — MAGNESIUM SULFATE HEPTAHYDRATE 40 MG/ML
4 INJECTION, SOLUTION INTRAVENOUS ONCE
Status: COMPLETED | OUTPATIENT
Start: 2019-01-01 | End: 2019-01-01

## 2019-01-01 RX ORDER — INSULIN GLARGINE 100 [IU]/ML
15 INJECTION, SOLUTION SUBCUTANEOUS
Qty: 1 VIAL | Refills: 1 | Status: SHIPPED | OUTPATIENT
Start: 2019-01-01 | End: 2019-01-01

## 2019-01-01 RX ORDER — METOPROLOL TARTRATE 50 MG/1
50 TABLET ORAL ONCE
Status: COMPLETED | OUTPATIENT
Start: 2019-01-01 | End: 2019-01-01

## 2019-01-01 RX ORDER — INSULIN GLARGINE 100 [IU]/ML
5 INJECTION, SOLUTION SUBCUTANEOUS
COMMUNITY
Start: 2019-01-01

## 2019-01-01 RX ORDER — VANCOMYCIN/0.9 % SOD CHLORIDE 1 G/100 ML
1000 PLASTIC BAG, INJECTION (ML) INTRAVENOUS ONCE
Status: DISCONTINUED | OUTPATIENT
Start: 2019-01-01 | End: 2019-01-01

## 2019-01-01 RX ORDER — OXYCODONE HCL 20 MG/ML
10 CONCENTRATE, ORAL ORAL
Qty: 30 ML | Refills: 0 | Status: SHIPPED | OUTPATIENT
Start: 2019-01-01 | End: 2019-11-02

## 2019-01-01 RX ORDER — DIPHENHYDRAMINE HCL 25 MG
25 CAPSULE ORAL ONCE
Status: COMPLETED | OUTPATIENT
Start: 2019-01-01 | End: 2019-01-01

## 2019-01-01 RX ORDER — INSULIN GLARGINE 100 [IU]/ML
3 INJECTION, SOLUTION SUBCUTANEOUS DAILY
Status: DISCONTINUED | OUTPATIENT
Start: 2019-01-01 | End: 2019-01-01

## 2019-01-01 RX ORDER — CALCIUM CARBONATE 500(1250)
1 TABLET ORAL DAILY
Qty: 30 TAB | Refills: 3 | Status: SHIPPED | OUTPATIENT
Start: 2019-01-01 | End: 2019-01-01 | Stop reason: ALTCHOICE

## 2019-01-01 RX ORDER — BRIMONIDINE TARTRATE, TIMOLOL MALEATE 2; 5 MG/ML; MG/ML
1 SOLUTION/ DROPS OPHTHALMIC EVERY 12 HOURS
Qty: 5 ML | Refills: 0 | Status: SHIPPED
Start: 2019-01-01

## 2019-01-01 RX ORDER — METOPROLOL TARTRATE 50 MG/1
25 TABLET ORAL 2 TIMES DAILY
COMMUNITY

## 2019-01-01 RX ORDER — LANOLIN ALCOHOL/MO/W.PET/CERES
1 CREAM (GRAM) TOPICAL
Status: DISCONTINUED | OUTPATIENT
Start: 2019-01-01 | End: 2019-01-01 | Stop reason: HOSPADM

## 2019-01-01 RX ORDER — DOCUSATE SODIUM 100 MG/1
100 CAPSULE, LIQUID FILLED ORAL
Status: DISCONTINUED | OUTPATIENT
Start: 2019-01-01 | End: 2019-01-01 | Stop reason: HOSPADM

## 2019-01-01 RX ORDER — AZITHROMYCIN 500 MG/1
500 TABLET, FILM COATED ORAL DAILY
Qty: 3 TAB | Refills: 0 | Status: SHIPPED | OUTPATIENT
Start: 2019-01-01 | End: 2019-01-01

## 2019-01-01 RX ORDER — INSULIN LISPRO 100 [IU]/ML
INJECTION, SOLUTION INTRAVENOUS; SUBCUTANEOUS
Qty: 1 VIAL | Refills: 1 | Status: SHIPPED | OUTPATIENT
Start: 2019-01-01 | End: 2019-01-01

## 2019-01-01 RX ORDER — VALSARTAN 40 MG/1
40 TABLET ORAL DAILY
Status: DISCONTINUED | OUTPATIENT
Start: 2019-01-01 | End: 2019-01-01 | Stop reason: HOSPADM

## 2019-01-01 RX ORDER — AMINO ACIDS/PROTEIN HYDROLYS 15G-100/30
LIQUID (ML) ORAL
Refills: 0 | COMMUNITY
Start: 2019-01-01

## 2019-01-01 RX ORDER — VANCOMYCIN HYDROCHLORIDE
1250 EVERY 24 HOURS
Status: DISCONTINUED | OUTPATIENT
Start: 2019-01-01 | End: 2019-01-01

## 2019-01-01 RX ORDER — METOPROLOL TARTRATE 25 MG/1
25 TABLET, FILM COATED ORAL ONCE
Status: COMPLETED | OUTPATIENT
Start: 2019-01-01 | End: 2019-01-01

## 2019-01-01 RX ORDER — INSULIN LISPRO 100 [IU]/ML
INJECTION, SOLUTION INTRAVENOUS; SUBCUTANEOUS EVERY 4 HOURS
Status: DISCONTINUED | OUTPATIENT
Start: 2019-01-01 | End: 2019-01-01

## 2019-01-01 RX ORDER — VALSARTAN 40 MG/1
40 TABLET ORAL DAILY
Qty: 30 TAB | Refills: 6 | Status: SHIPPED | OUTPATIENT
Start: 2019-01-01

## 2019-01-01 RX ORDER — METOPROLOL TARTRATE 25 MG/1
25 TABLET, FILM COATED ORAL EVERY 12 HOURS
Status: DISCONTINUED | OUTPATIENT
Start: 2019-01-01 | End: 2019-01-01

## 2019-01-01 RX ORDER — SODIUM CHLORIDE 9 MG/ML
1000 INJECTION, SOLUTION INTRAVENOUS ONCE
Status: COMPLETED | OUTPATIENT
Start: 2019-01-01 | End: 2019-01-01

## 2019-01-01 RX ORDER — HYDROXYUREA 500 MG/1
500 CAPSULE ORAL 2 TIMES DAILY
Status: DISCONTINUED | OUTPATIENT
Start: 2019-01-01 | End: 2019-01-01 | Stop reason: HOSPADM

## 2019-01-01 RX ORDER — VANCOMYCIN/0.9 % SOD CHLORIDE 1 G/100 ML
1000 PLASTIC BAG, INJECTION (ML) INTRAVENOUS
Status: DISCONTINUED | OUTPATIENT
Start: 2019-01-01 | End: 2019-01-01 | Stop reason: ALTCHOICE

## 2019-01-01 RX ORDER — CEPHALEXIN 250 MG/1
500 CAPSULE ORAL 4 TIMES DAILY
Status: DISCONTINUED | OUTPATIENT
Start: 2019-01-01 | End: 2019-01-01 | Stop reason: HOSPADM

## 2019-01-01 RX ORDER — POTASSIUM CHLORIDE 7.45 MG/ML
10 INJECTION INTRAVENOUS
Status: COMPLETED | OUTPATIENT
Start: 2019-01-01 | End: 2019-01-01

## 2019-01-01 RX ORDER — INSULIN GLARGINE 100 [IU]/ML
10 INJECTION, SOLUTION SUBCUTANEOUS
Status: DISCONTINUED | OUTPATIENT
Start: 2019-01-01 | End: 2019-01-01 | Stop reason: HOSPADM

## 2019-01-01 RX ORDER — INSULIN GLARGINE 100 [IU]/ML
5 INJECTION, SOLUTION SUBCUTANEOUS
Status: COMPLETED | OUTPATIENT
Start: 2019-01-01 | End: 2019-01-01

## 2019-01-01 RX ORDER — HEPARIN SODIUM 5000 [USP'U]/ML
5000 INJECTION, SOLUTION INTRAVENOUS; SUBCUTANEOUS EVERY 8 HOURS
Status: DISCONTINUED | OUTPATIENT
Start: 2019-01-01 | End: 2019-01-01

## 2019-01-01 RX ORDER — CEFPODOXIME PROXETIL 200 MG/1
200 TABLET, FILM COATED ORAL 2 TIMES DAILY
Qty: 22 TAB | Refills: 0 | Status: SHIPPED
Start: 2019-01-01 | End: 2019-01-01

## 2019-01-01 RX ORDER — SODIUM CHLORIDE 9 MG/ML
50 INJECTION, SOLUTION INTRAVENOUS CONTINUOUS
Status: DISCONTINUED | OUTPATIENT
Start: 2019-01-01 | End: 2019-01-01

## 2019-01-01 RX ORDER — PEN NEEDLE, DIABETIC 30 GX3/16"
NEEDLE, DISPOSABLE MISCELLANEOUS
Qty: 1 PACKAGE | Refills: 11 | Status: SHIPPED | OUTPATIENT
Start: 2019-01-01

## 2019-01-01 RX ORDER — POTASSIUM CHLORIDE 20 MEQ/1
40 TABLET, EXTENDED RELEASE ORAL
Status: COMPLETED | OUTPATIENT
Start: 2019-01-01 | End: 2019-01-01

## 2019-01-01 RX ORDER — LANOLIN ALCOHOL/MO/W.PET/CERES
3 CREAM (GRAM) TOPICAL
Status: DISCONTINUED | OUTPATIENT
Start: 2019-01-01 | End: 2019-01-01 | Stop reason: HOSPADM

## 2019-01-01 RX ORDER — BRIMONIDINE TARTRATE 2 MG/ML
1 SOLUTION/ DROPS OPHTHALMIC EVERY 12 HOURS
Status: DISCONTINUED | OUTPATIENT
Start: 2019-01-01 | End: 2019-01-01 | Stop reason: HOSPADM

## 2019-01-01 RX ORDER — ACETAMINOPHEN 325 MG/1
650 TABLET ORAL
Status: DISCONTINUED | OUTPATIENT
Start: 2019-01-01 | End: 2019-01-01

## 2019-01-01 RX ORDER — INSULIN GLARGINE 100 [IU]/ML
5 INJECTION, SOLUTION SUBCUTANEOUS ONCE
Status: COMPLETED | OUTPATIENT
Start: 2019-01-01 | End: 2019-01-01

## 2019-01-01 RX ORDER — INSULIN GLARGINE 100 [IU]/ML
5 INJECTION, SOLUTION SUBCUTANEOUS DAILY
Qty: 1 VIAL | Refills: 0 | Status: SHIPPED
Start: 2019-01-01 | End: 2019-01-01 | Stop reason: SDUPTHER

## 2019-01-01 RX ORDER — SODIUM CHLORIDE 9 MG/ML
100 INJECTION, SOLUTION INTRAVENOUS CONTINUOUS
Status: DISPENSED | OUTPATIENT
Start: 2019-01-01 | End: 2019-01-01

## 2019-01-01 RX ORDER — INSULIN LISPRO 100 [IU]/ML
INJECTION, SOLUTION INTRAVENOUS; SUBCUTANEOUS
Qty: 1 VIAL | Refills: 0 | Status: SHIPPED
Start: 2019-01-01 | End: 2019-01-01 | Stop reason: SDUPTHER

## 2019-01-01 RX ORDER — INSULIN LISPRO 100 [IU]/ML
4 INJECTION, SOLUTION INTRAVENOUS; SUBCUTANEOUS ONCE
Status: COMPLETED | OUTPATIENT
Start: 2019-01-01 | End: 2019-01-01

## 2019-01-01 RX ORDER — DEXTROSE MONOHYDRATE 100 MG/ML
125-250 INJECTION, SOLUTION INTRAVENOUS AS NEEDED
Status: DISCONTINUED | OUTPATIENT
Start: 2019-01-01 | End: 2019-01-01 | Stop reason: HOSPADM

## 2019-01-01 RX ORDER — INSULIN LISPRO 100 [IU]/ML
INJECTION, SOLUTION INTRAVENOUS; SUBCUTANEOUS
Status: DISCONTINUED
Start: 2019-01-01 | End: 2019-01-01 | Stop reason: WASHOUT

## 2019-01-01 RX ORDER — INSULIN GLARGINE 100 [IU]/ML
5 INJECTION, SOLUTION SUBCUTANEOUS
Status: DISCONTINUED | OUTPATIENT
Start: 2019-01-01 | End: 2019-01-01

## 2019-01-01 RX ORDER — SODIUM CHLORIDE 0.9 % (FLUSH) 0.9 %
5-10 SYRINGE (ML) INJECTION AS NEEDED
Status: DISCONTINUED | OUTPATIENT
Start: 2019-01-01 | End: 2019-01-01 | Stop reason: HOSPADM

## 2019-01-01 RX ORDER — PREDNISONE 10 MG/1
TABLET ORAL
Qty: 60 TAB | Refills: 0 | Status: SHIPPED
Start: 2019-01-01 | End: 2019-01-01

## 2019-01-01 RX ORDER — LATANOPROST 50 UG/ML
1 SOLUTION/ DROPS OPHTHALMIC EVERY EVENING
Status: DISCONTINUED | OUTPATIENT
Start: 2019-01-01 | End: 2019-01-01 | Stop reason: HOSPADM

## 2019-01-01 RX ORDER — ACETAMINOPHEN 325 MG/1
650 TABLET ORAL
Qty: 30 TAB | Refills: 0 | Status: SHIPPED | OUTPATIENT
Start: 2019-01-01 | End: 2019-01-01

## 2019-01-01 RX ORDER — CIPROFLOXACIN 500 MG/1
500 TABLET ORAL 2 TIMES DAILY
Qty: 12 TAB | Refills: 0 | Status: SHIPPED
Start: 2019-01-01 | End: 2019-01-01

## 2019-01-01 RX ORDER — DEXTROSE MONOHYDRATE 100 MG/ML
0-250 INJECTION, SOLUTION INTRAVENOUS AS NEEDED
Status: DISCONTINUED | OUTPATIENT
Start: 2019-01-01 | End: 2019-01-01 | Stop reason: HOSPADM

## 2019-01-01 RX ORDER — INSULIN GLARGINE 100 [IU]/ML
5 INJECTION, SOLUTION SUBCUTANEOUS
Status: ON HOLD | COMMUNITY
End: 2019-01-01 | Stop reason: SDUPTHER

## 2019-01-01 RX ORDER — SODIUM CHLORIDE 0.9 % (FLUSH) 0.9 %
10-40 SYRINGE (ML) INJECTION AS NEEDED
Status: DISCONTINUED | OUTPATIENT
Start: 2019-01-01 | End: 2019-01-01 | Stop reason: HOSPADM

## 2019-01-01 RX ORDER — INSULIN GLARGINE 100 [IU]/ML
5 INJECTION, SOLUTION SUBCUTANEOUS DAILY
Qty: 1 VIAL | Refills: 0 | Status: ON HOLD | OUTPATIENT
Start: 2019-01-01 | End: 2019-01-01 | Stop reason: SDUPTHER

## 2019-01-01 RX ORDER — INSULIN ASPART 100 [IU]/ML
INJECTION, SUSPENSION SUBCUTANEOUS
COMMUNITY
End: 2019-01-01

## 2019-01-01 RX ORDER — ASCORBIC ACID 250 MG
250 TABLET ORAL 2 TIMES DAILY
Qty: 60 TAB | Refills: 3 | Status: SHIPPED | OUTPATIENT
Start: 2019-01-01 | End: 2019-01-01 | Stop reason: ALTCHOICE

## 2019-01-01 RX ORDER — TIMOLOL MALEATE 2.5 MG/ML
1 SOLUTION/ DROPS OPHTHALMIC 2 TIMES DAILY
Status: DISCONTINUED | OUTPATIENT
Start: 2019-01-01 | End: 2019-01-01 | Stop reason: HOSPADM

## 2019-01-01 RX ORDER — INSULIN GLARGINE 100 [IU]/ML
12 INJECTION, SOLUTION SUBCUTANEOUS DAILY
Status: DISCONTINUED | OUTPATIENT
Start: 2019-01-01 | End: 2019-01-01

## 2019-01-01 RX ORDER — TRAVOPROST OPHTHALMIC SOLUTION 0.04 MG/ML
1 SOLUTION OPHTHALMIC 2 TIMES DAILY
Qty: 5 ML | Refills: 0 | Status: SHIPPED
Start: 2019-01-01

## 2019-01-01 RX ORDER — DEXTROSE 50 % IN WATER (D50W) INTRAVENOUS SYRINGE
25-50 AS NEEDED
Status: DISCONTINUED | OUTPATIENT
Start: 2019-01-01 | End: 2019-01-01 | Stop reason: RX

## 2019-01-01 RX ORDER — SULFAMETHOXAZOLE AND TRIMETHOPRIM 800; 160 MG/1; MG/1
1 TABLET ORAL 2 TIMES DAILY
Qty: 20 TAB | Refills: 0 | Status: SHIPPED | OUTPATIENT
Start: 2019-01-01 | End: 2019-01-01

## 2019-01-01 RX ORDER — MAGNESIUM SULFATE 100 %
4 CRYSTALS MISCELLANEOUS AS NEEDED
Qty: 10 TAB | Refills: 0 | Status: SHIPPED
Start: 2019-01-01

## 2019-01-01 RX ORDER — INSULIN LISPRO 100 [IU]/ML
18 INJECTION, SOLUTION INTRAVENOUS; SUBCUTANEOUS ONCE
Status: ACTIVE | OUTPATIENT
Start: 2019-01-01 | End: 2019-01-01

## 2019-01-01 RX ORDER — PANTOPRAZOLE SODIUM 40 MG/10ML
40 INJECTION, POWDER, LYOPHILIZED, FOR SOLUTION INTRAVENOUS EVERY 12 HOURS
Status: DISCONTINUED | OUTPATIENT
Start: 2019-01-01 | End: 2019-01-01 | Stop reason: HOSPADM

## 2019-01-01 RX ORDER — HEPARIN SODIUM 1000 [USP'U]/ML
60 INJECTION, SOLUTION INTRAVENOUS; SUBCUTANEOUS ONCE
Status: COMPLETED | OUTPATIENT
Start: 2019-01-01 | End: 2019-01-01

## 2019-01-01 RX ORDER — HEPARIN SODIUM 10000 [USP'U]/100ML
12-25 INJECTION, SOLUTION INTRAVENOUS
Status: DISCONTINUED | OUTPATIENT
Start: 2019-01-01 | End: 2019-01-01

## 2019-01-01 RX ORDER — THERA TABS 400 MCG
1 TAB ORAL DAILY
Qty: 30 TAB | Refills: 0 | Status: SHIPPED
Start: 2019-01-01

## 2019-01-01 RX ORDER — INSULIN ASPART 100 [IU]/ML
INJECTION, SOLUTION INTRAVENOUS; SUBCUTANEOUS
Refills: 0 | COMMUNITY
Start: 2019-01-01 | End: 2019-01-01

## 2019-01-01 RX ORDER — INSULIN GLARGINE 100 [IU]/ML
12 INJECTION, SOLUTION SUBCUTANEOUS
Status: DISCONTINUED | OUTPATIENT
Start: 2019-01-01 | End: 2019-01-01 | Stop reason: HOSPADM

## 2019-01-01 RX ORDER — INSULIN LISPRO 100 [IU]/ML
4 INJECTION, SOLUTION INTRAVENOUS; SUBCUTANEOUS
Status: DISCONTINUED | OUTPATIENT
Start: 2019-01-01 | End: 2019-01-01

## 2019-01-01 RX ORDER — OXYCODONE HCL 5 MG/5 ML
2.5 SOLUTION, ORAL ORAL
Qty: 105 ML | Refills: 0 | Status: SHIPPED | OUTPATIENT
Start: 2019-01-01 | End: 2019-01-01

## 2019-01-01 RX ORDER — INSULIN GLARGINE 100 [IU]/ML
INJECTION, SOLUTION SUBCUTANEOUS
Status: DISCONTINUED
Start: 2019-01-01 | End: 2019-01-01 | Stop reason: WASHOUT

## 2019-01-01 RX ORDER — POTASSIUM CHLORIDE 20 MEQ/1
40 TABLET, EXTENDED RELEASE ORAL EVERY 4 HOURS
Status: COMPLETED | OUTPATIENT
Start: 2019-01-01 | End: 2019-01-01

## 2019-01-01 RX ORDER — LANOLIN ALCOHOL/MO/W.PET/CERES
325 CREAM (GRAM) TOPICAL
Qty: 30 TAB | Refills: 0 | Status: SHIPPED
Start: 2019-01-01 | End: 2019-01-01 | Stop reason: SDUPTHER

## 2019-01-01 RX ORDER — DOCUSATE SODIUM 100 MG/1
100 CAPSULE, LIQUID FILLED ORAL
Qty: 60 CAP | Refills: 0 | Status: SHIPPED
Start: 2019-01-01 | End: 2019-01-01

## 2019-01-01 RX ORDER — OXYBUTYNIN CHLORIDE 5 MG/1
5 TABLET ORAL 3 TIMES DAILY
Status: DISCONTINUED | OUTPATIENT
Start: 2019-01-01 | End: 2019-01-01

## 2019-01-01 RX ORDER — ASCORBIC ACID 500 MG
250 TABLET ORAL 2 TIMES DAILY
Status: DISCONTINUED | OUTPATIENT
Start: 2019-01-01 | End: 2019-01-01 | Stop reason: HOSPADM

## 2019-01-01 RX ORDER — DIPHENHYDRAMINE HCL 25 MG
25 CAPSULE ORAL
Status: DISCONTINUED | OUTPATIENT
Start: 2019-01-01 | End: 2019-01-01 | Stop reason: HOSPADM

## 2019-01-01 RX ADMIN — LATANOPROST 1 DROP: 50 SOLUTION/ DROPS OPHTHALMIC at 17:39

## 2019-01-01 RX ADMIN — METOPROLOL TARTRATE 25 MG: 25 TABLET, FILM COATED ORAL at 22:42

## 2019-01-01 RX ADMIN — Medication 10 ML: at 16:44

## 2019-01-01 RX ADMIN — BRIMONIDINE TARTRATE 1 DROP: 2 SOLUTION/ DROPS OPHTHALMIC at 09:50

## 2019-01-01 RX ADMIN — DORZOLAMIDE HYDROCHLORIDE 1 DROP: 20 SOLUTION/ DROPS OPHTHALMIC at 21:45

## 2019-01-01 RX ADMIN — INSULIN GLARGINE 5 UNITS: 100 INJECTION, SOLUTION SUBCUTANEOUS at 12:21

## 2019-01-01 RX ADMIN — SODIUM CHLORIDE 5 MG/HR: 900 INJECTION, SOLUTION INTRAVENOUS at 08:15

## 2019-01-01 RX ADMIN — THERA TABS 1 TABLET: TAB at 08:49

## 2019-01-01 RX ADMIN — INSULIN LISPRO 4 UNITS: 100 INJECTION, SOLUTION INTRAVENOUS; SUBCUTANEOUS at 12:19

## 2019-01-01 RX ADMIN — INSULIN LISPRO 4 UNITS: 100 INJECTION, SOLUTION INTRAVENOUS; SUBCUTANEOUS at 16:48

## 2019-01-01 RX ADMIN — CEPHALEXIN 500 MG: 250 CAPSULE ORAL at 10:01

## 2019-01-01 RX ADMIN — THERA TABS 1 TABLET: TAB at 10:01

## 2019-01-01 RX ADMIN — OXYBUTYNIN CHLORIDE 5 MG: 5 TABLET ORAL at 21:42

## 2019-01-01 RX ADMIN — OXYCODONE HYDROCHLORIDE AND ACETAMINOPHEN 250 MG: 500 TABLET ORAL at 10:25

## 2019-01-01 RX ADMIN — DORZOLAMIDE HYDROCHLORIDE 1 DROP: 20 SOLUTION/ DROPS OPHTHALMIC at 20:24

## 2019-01-01 RX ADMIN — TIMOLOL MALEATE 1 DROP: 2.5 SOLUTION/ DROPS OPHTHALMIC at 08:55

## 2019-01-01 RX ADMIN — Medication 10 ML: at 22:00

## 2019-01-01 RX ADMIN — INSULIN HUMAN 10 UNITS: 100 INJECTION, SOLUTION PARENTERAL at 17:55

## 2019-01-01 RX ADMIN — LATANOPROST 1 DROP: 50 SOLUTION OPHTHALMIC at 17:02

## 2019-01-01 RX ADMIN — BRIMONIDINE TARTRATE 1 DROP: 2 SOLUTION/ DROPS OPHTHALMIC at 10:41

## 2019-01-01 RX ADMIN — ATORVASTATIN CALCIUM 40 MG: 40 TABLET, FILM COATED ORAL at 21:47

## 2019-01-01 RX ADMIN — ASPIRIN 325 MG ORAL TABLET 325 MG: 325 PILL ORAL at 10:17

## 2019-01-01 RX ADMIN — ATORVASTATIN CALCIUM 40 MG: 20 TABLET, FILM COATED ORAL at 22:37

## 2019-01-01 RX ADMIN — Medication 10 ML: at 06:09

## 2019-01-01 RX ADMIN — WATER 2 G: 1 INJECTION INTRAMUSCULAR; INTRAVENOUS; SUBCUTANEOUS at 16:53

## 2019-01-01 RX ADMIN — METOPROLOL TARTRATE 25 MG: 25 TABLET, FILM COATED ORAL at 20:22

## 2019-01-01 RX ADMIN — INSULIN LISPRO 4 UNITS: 100 INJECTION, SOLUTION INTRAVENOUS; SUBCUTANEOUS at 09:56

## 2019-01-01 RX ADMIN — DORZOLAMIDE HYDROCHLORIDE 1 DROP: 20 SOLUTION/ DROPS OPHTHALMIC at 17:33

## 2019-01-01 RX ADMIN — PREDNISONE 30 MG: 20 TABLET ORAL at 16:12

## 2019-01-01 RX ADMIN — INSULIN LISPRO 6 UNITS: 100 INJECTION, SOLUTION INTRAVENOUS; SUBCUTANEOUS at 12:42

## 2019-01-01 RX ADMIN — LATANOPROST 1 DROP: 50 SOLUTION/ DROPS OPHTHALMIC at 08:15

## 2019-01-01 RX ADMIN — PIPERACILLIN SODIUM,TAZOBACTAM SODIUM 4.5 G: 4; .5 INJECTION, POWDER, FOR SOLUTION INTRAVENOUS at 22:28

## 2019-01-01 RX ADMIN — DORZOLAMIDE HYDROCHLORIDE 1 DROP: 20 SOLUTION/ DROPS OPHTHALMIC at 08:50

## 2019-01-01 RX ADMIN — TIMOLOL MALEATE 1 DROP: 2.5 SOLUTION/ DROPS OPHTHALMIC at 22:30

## 2019-01-01 RX ADMIN — INSULIN LISPRO 4 UNITS: 100 INJECTION, SOLUTION INTRAVENOUS; SUBCUTANEOUS at 17:27

## 2019-01-01 RX ADMIN — SODIUM CHLORIDE 100 ML/HR: 900 INJECTION, SOLUTION INTRAVENOUS at 00:09

## 2019-01-01 RX ADMIN — INSULIN LISPRO 10 UNITS: 100 INJECTION, SOLUTION INTRAVENOUS; SUBCUTANEOUS at 23:28

## 2019-01-01 RX ADMIN — INSULIN LISPRO 3 UNITS: 100 INJECTION, SOLUTION INTRAVENOUS; SUBCUTANEOUS at 17:47

## 2019-01-01 RX ADMIN — DORZOLAMIDE HYDROCHLORIDE 1 DROP: 20 SOLUTION/ DROPS OPHTHALMIC at 22:56

## 2019-01-01 RX ADMIN — INSULIN GLARGINE 3 UNITS: 100 INJECTION, SOLUTION SUBCUTANEOUS at 11:39

## 2019-01-01 RX ADMIN — BRIMONIDINE TARTRATE 1 DROP: 2 SOLUTION/ DROPS OPHTHALMIC at 22:30

## 2019-01-01 RX ADMIN — DORZOLAMIDE HYDROCHLORIDE 1 DROP: 20 SOLUTION/ DROPS OPHTHALMIC at 10:14

## 2019-01-01 RX ADMIN — INSULIN GLARGINE 5 UNITS: 100 INJECTION, SOLUTION SUBCUTANEOUS at 08:25

## 2019-01-01 RX ADMIN — BRIMONIDINE TARTRATE 1 DROP: 2 SOLUTION/ DROPS OPHTHALMIC at 10:14

## 2019-01-01 RX ADMIN — ATORVASTATIN CALCIUM 40 MG: 20 TABLET, FILM COATED ORAL at 22:12

## 2019-01-01 RX ADMIN — APIXABAN 5 MG: 5 TABLET, FILM COATED ORAL at 08:25

## 2019-01-01 RX ADMIN — SODIUM CHLORIDE 1000 ML: 900 INJECTION, SOLUTION INTRAVENOUS at 18:39

## 2019-01-01 RX ADMIN — LATANOPROST 1 DROP: 50 SOLUTION OPHTHALMIC at 22:44

## 2019-01-01 RX ADMIN — INSULIN LISPRO 15 UNITS: 100 INJECTION, SOLUTION INTRAVENOUS; SUBCUTANEOUS at 12:27

## 2019-01-01 RX ADMIN — LATANOPROST 1 DROP: 50 SOLUTION OPHTHALMIC at 18:08

## 2019-01-01 RX ADMIN — BRIMONIDINE TARTRATE 1 DROP: 2 SOLUTION/ DROPS OPHTHALMIC at 08:20

## 2019-01-01 RX ADMIN — BRIMONIDINE TARTRATE 1 DROP: 2 SOLUTION/ DROPS OPHTHALMIC at 20:24

## 2019-01-01 RX ADMIN — METOPROLOL TARTRATE 25 MG: 25 TABLET, FILM COATED ORAL at 23:00

## 2019-01-01 RX ADMIN — METOPROLOL TARTRATE 25 MG: 25 TABLET, FILM COATED ORAL at 08:24

## 2019-01-01 RX ADMIN — INSULIN GLARGINE 5 UNITS: 100 INJECTION, SOLUTION SUBCUTANEOUS at 13:19

## 2019-01-01 RX ADMIN — SODIUM CHLORIDE 1000 ML: 900 INJECTION, SOLUTION INTRAVENOUS at 17:56

## 2019-01-01 RX ADMIN — THERA TABS 1 TABLET: TAB at 08:50

## 2019-01-01 RX ADMIN — PREDNISONE 30 MG: 20 TABLET ORAL at 08:51

## 2019-01-01 RX ADMIN — INSULIN LISPRO 4 UNITS: 100 INJECTION, SOLUTION INTRAVENOUS; SUBCUTANEOUS at 16:28

## 2019-01-01 RX ADMIN — FERROUS SULFATE TAB 325 MG (65 MG ELEMENTAL FE) 325 MG: 325 (65 FE) TAB at 08:24

## 2019-01-01 RX ADMIN — FERROUS SULFATE TAB 325 MG (65 MG ELEMENTAL FE) 325 MG: 325 (65 FE) TAB at 10:06

## 2019-01-01 RX ADMIN — OXYCODONE HYDROCHLORIDE AND ACETAMINOPHEN 250 MG: 500 TABLET ORAL at 17:24

## 2019-01-01 RX ADMIN — ATORVASTATIN CALCIUM 40 MG: 40 TABLET, FILM COATED ORAL at 22:31

## 2019-01-01 RX ADMIN — DORZOLAMIDE HYDROCHLORIDE 1 DROP: 20 SOLUTION/ DROPS OPHTHALMIC at 21:52

## 2019-01-01 RX ADMIN — METOPROLOL TARTRATE 25 MG: 25 TABLET, FILM COATED ORAL at 08:39

## 2019-01-01 RX ADMIN — INSULIN LISPRO 15 UNITS: 100 INJECTION, SOLUTION INTRAVENOUS; SUBCUTANEOUS at 12:40

## 2019-01-01 RX ADMIN — TIMOLOL MALEATE 1 DROP: 2.5 SOLUTION/ DROPS OPHTHALMIC at 08:21

## 2019-01-01 RX ADMIN — INSULIN LISPRO 6 UNITS: 100 INJECTION, SOLUTION INTRAVENOUS; SUBCUTANEOUS at 16:51

## 2019-01-01 RX ADMIN — INSULIN LISPRO 20 UNITS: 100 INJECTION, SOLUTION INTRAVENOUS; SUBCUTANEOUS at 22:38

## 2019-01-01 RX ADMIN — DORZOLAMIDE HCL 1 DROP: 20 SOLUTION/ DROPS OPHTHALMIC at 22:00

## 2019-01-01 RX ADMIN — WATER 2 G: 1 INJECTION INTRAMUSCULAR; INTRAVENOUS; SUBCUTANEOUS at 16:59

## 2019-01-01 RX ADMIN — MIDAZOLAM 1 MG: 1 INJECTION INTRAMUSCULAR; INTRAVENOUS at 10:15

## 2019-01-01 RX ADMIN — INSULIN LISPRO 2 UNITS: 100 INJECTION, SOLUTION INTRAVENOUS; SUBCUTANEOUS at 13:29

## 2019-01-01 RX ADMIN — Medication 10 ML: at 14:00

## 2019-01-01 RX ADMIN — FERROUS SULFATE TAB 325 MG (65 MG ELEMENTAL FE) 325 MG: 325 (65 FE) TAB at 09:46

## 2019-01-01 RX ADMIN — BRIMONIDINE TARTRATE 1 DROP: 2 SOLUTION/ DROPS OPHTHALMIC at 09:48

## 2019-01-01 RX ADMIN — IMMUNE GLOBULIN (HUMAN) 80 G: 10 INJECTION INTRAVENOUS; SUBCUTANEOUS at 15:52

## 2019-01-01 RX ADMIN — BRIMONIDINE TARTRATE 1 DROP: 2 SOLUTION/ DROPS OPHTHALMIC at 11:51

## 2019-01-01 RX ADMIN — METOPROLOL TARTRATE 25 MG: 25 TABLET ORAL at 08:25

## 2019-01-01 RX ADMIN — BRIMONIDINE TARTRATE 1 DROP: 2 SOLUTION/ DROPS OPHTHALMIC at 22:37

## 2019-01-01 RX ADMIN — INSULIN LISPRO 4 UNITS: 100 INJECTION, SOLUTION INTRAVENOUS; SUBCUTANEOUS at 08:16

## 2019-01-01 RX ADMIN — THERA TABS 1 TABLET: TAB at 09:35

## 2019-01-01 RX ADMIN — LATANOPROST 1 DROP: 50 SOLUTION/ DROPS OPHTHALMIC at 09:48

## 2019-01-01 RX ADMIN — TIMOLOL MALEATE 1 DROP: 2.5 SOLUTION/ DROPS OPHTHALMIC at 21:45

## 2019-01-01 RX ADMIN — INSULIN LISPRO 15 UNITS: 100 INJECTION, SOLUTION INTRAVENOUS; SUBCUTANEOUS at 22:30

## 2019-01-01 RX ADMIN — DORZOLAMIDE HYDROCHLORIDE 1 DROP: 20 SOLUTION/ DROPS OPHTHALMIC at 15:00

## 2019-01-01 RX ADMIN — Medication 10 ML: at 08:19

## 2019-01-01 RX ADMIN — IMMUNE GLOBULIN (HUMAN) 80 G: 10 INJECTION INTRAVENOUS; SUBCUTANEOUS at 09:19

## 2019-01-01 RX ADMIN — DORZOLAMIDE HYDROCHLORIDE 1 DROP: 20 SOLUTION/ DROPS OPHTHALMIC at 18:07

## 2019-01-01 RX ADMIN — PREDNISONE 30 MG: 20 TABLET ORAL at 08:23

## 2019-01-01 RX ADMIN — BRIMONIDINE TARTRATE 1 DROP: 2 SOLUTION/ DROPS OPHTHALMIC at 17:07

## 2019-01-01 RX ADMIN — AZITHROMYCIN MONOHYDRATE 500 MG: 500 INJECTION, POWDER, LYOPHILIZED, FOR SOLUTION INTRAVENOUS at 18:39

## 2019-01-01 RX ADMIN — THERA TABS 1 TABLET: TAB at 08:24

## 2019-01-01 RX ADMIN — METOPROLOL TARTRATE 25 MG: 25 TABLET, FILM COATED ORAL at 10:00

## 2019-01-01 RX ADMIN — IMMUNE GLOBULIN (HUMAN) 80 G: 10 INJECTION INTRAVENOUS; SUBCUTANEOUS at 21:32

## 2019-01-01 RX ADMIN — INSULIN LISPRO 4 UNITS: 100 INJECTION, SOLUTION INTRAVENOUS; SUBCUTANEOUS at 17:58

## 2019-01-01 RX ADMIN — SODIUM CHLORIDE: 900 INJECTION, SOLUTION INTRAVENOUS at 08:17

## 2019-01-01 RX ADMIN — Medication 10 ML: at 08:24

## 2019-01-01 RX ADMIN — SODIUM CHLORIDE 1986 ML: 900 INJECTION, SOLUTION INTRAVENOUS at 16:45

## 2019-01-01 RX ADMIN — METOPROLOL TARTRATE 25 MG: 25 TABLET, FILM COATED ORAL at 08:15

## 2019-01-01 RX ADMIN — POTASSIUM CHLORIDE 10 MEQ: 7.46 INJECTION, SOLUTION INTRAVENOUS at 17:54

## 2019-01-01 RX ADMIN — BRIMONIDINE TARTRATE 1 DROP: 2 SOLUTION/ DROPS OPHTHALMIC at 22:50

## 2019-01-01 RX ADMIN — THERA TABS 1 TABLET: TAB at 09:03

## 2019-01-01 RX ADMIN — VALSARTAN 40 MG: 40 TABLET, FILM COATED ORAL at 08:48

## 2019-01-01 RX ADMIN — INSULIN LISPRO 8 UNITS: 100 INJECTION, SOLUTION INTRAVENOUS; SUBCUTANEOUS at 17:00

## 2019-01-01 RX ADMIN — FERROUS SULFATE TAB 325 MG (65 MG ELEMENTAL FE) 325 MG: 325 (65 FE) TAB at 08:47

## 2019-01-01 RX ADMIN — HEPARIN SODIUM AND DEXTROSE 12 UNITS/KG/HR: 10000; 5 INJECTION INTRAVENOUS at 11:01

## 2019-01-01 RX ADMIN — TIMOLOL MALEATE 1 DROP: 2.5 SOLUTION/ DROPS OPHTHALMIC at 21:51

## 2019-01-01 RX ADMIN — WATER 2 G: 1 INJECTION INTRAMUSCULAR; INTRAVENOUS; SUBCUTANEOUS at 16:28

## 2019-01-01 RX ADMIN — TIMOLOL MALEATE 1 DROP: 5 SOLUTION/ DROPS OPHTHALMIC at 11:51

## 2019-01-01 RX ADMIN — Medication 10 ML: at 07:05

## 2019-01-01 RX ADMIN — CALCIUM 500 MG: 500 TABLET ORAL at 10:24

## 2019-01-01 RX ADMIN — LATANOPROST 1 DROP: 50 SOLUTION/ DROPS OPHTHALMIC at 11:51

## 2019-01-01 RX ADMIN — SODIUM CHLORIDE 75 ML/HR: 900 INJECTION, SOLUTION INTRAVENOUS at 19:06

## 2019-01-01 RX ADMIN — INSULIN LISPRO 4 UNITS: 100 INJECTION, SOLUTION INTRAVENOUS; SUBCUTANEOUS at 13:00

## 2019-01-01 RX ADMIN — INSULIN LISPRO 3 UNITS: 100 INJECTION, SOLUTION INTRAVENOUS; SUBCUTANEOUS at 11:21

## 2019-01-01 RX ADMIN — BRIMONIDINE TARTRATE 1 DROP: 2 SOLUTION/ DROPS OPHTHALMIC at 12:12

## 2019-01-01 RX ADMIN — Medication 10 ML: at 13:57

## 2019-01-01 RX ADMIN — VALSARTAN 80 MG: 160 TABLET, FILM COATED ORAL at 08:08

## 2019-01-01 RX ADMIN — INSULIN LISPRO 6 UNITS: 100 INJECTION, SOLUTION INTRAVENOUS; SUBCUTANEOUS at 08:53

## 2019-01-01 RX ADMIN — INSULIN GLARGINE 2 UNITS: 100 INJECTION, SOLUTION SUBCUTANEOUS at 13:00

## 2019-01-01 RX ADMIN — VANCOMYCIN HYDROCHLORIDE 1500 MG: 10 INJECTION, POWDER, LYOPHILIZED, FOR SOLUTION INTRAVENOUS at 17:00

## 2019-01-01 RX ADMIN — VALSARTAN 40 MG: 40 TABLET, FILM COATED ORAL at 08:50

## 2019-01-01 RX ADMIN — SODIUM CHLORIDE 250 ML: 9 INJECTION, SOLUTION INTRAVENOUS at 09:30

## 2019-01-01 RX ADMIN — BRIMONIDINE TARTRATE 1 DROP: 2 SOLUTION/ DROPS OPHTHALMIC at 21:45

## 2019-01-01 RX ADMIN — DEXTROSE MONOHYDRATE 25 G: 25 INJECTION, SOLUTION INTRAVENOUS at 07:35

## 2019-01-01 RX ADMIN — DORZOLAMIDE HCL 1 DROP: 20 SOLUTION/ DROPS OPHTHALMIC at 12:11

## 2019-01-01 RX ADMIN — INSULIN GLARGINE 10 UNITS: 100 INJECTION, SOLUTION SUBCUTANEOUS at 08:53

## 2019-01-01 RX ADMIN — ACETAMINOPHEN 650 MG: 325 TABLET ORAL at 09:35

## 2019-01-01 RX ADMIN — LATANOPROST 1 DROP: 50 SOLUTION/ DROPS OPHTHALMIC at 23:46

## 2019-01-01 RX ADMIN — MIDAZOLAM 0.5 MG: 1 INJECTION INTRAMUSCULAR; INTRAVENOUS at 10:24

## 2019-01-01 RX ADMIN — LATANOPROST 1 DROP: 50 SOLUTION/ DROPS OPHTHALMIC at 22:30

## 2019-01-01 RX ADMIN — INSULIN GLARGINE 10 UNITS: 100 INJECTION, SOLUTION SUBCUTANEOUS at 22:38

## 2019-01-01 RX ADMIN — TIMOLOL MALEATE 1 DROP: 2.5 SOLUTION/ DROPS OPHTHALMIC at 22:50

## 2019-01-01 RX ADMIN — DORZOLAMIDE HYDROCHLORIDE 1 DROP: 20 SOLUTION/ DROPS OPHTHALMIC at 08:49

## 2019-01-01 RX ADMIN — ATORVASTATIN CALCIUM 40 MG: 20 TABLET, FILM COATED ORAL at 23:00

## 2019-01-01 RX ADMIN — POTASSIUM CHLORIDE 40 MEQ: 20 TABLET, EXTENDED RELEASE ORAL at 17:54

## 2019-01-01 RX ADMIN — DORZOLAMIDE HCL 1 DROP: 20 SOLUTION/ DROPS OPHTHALMIC at 08:15

## 2019-01-01 RX ADMIN — METOPROLOL TARTRATE 50 MG: 50 TABLET ORAL at 09:15

## 2019-01-01 RX ADMIN — LORAZEPAM 1 MG: 2 INJECTION INTRAMUSCULAR at 20:05

## 2019-01-01 RX ADMIN — METOPROLOL TARTRATE 50 MG: 50 TABLET ORAL at 21:14

## 2019-01-01 RX ADMIN — TIMOLOL MALEATE 1 DROP: 5 SOLUTION/ DROPS OPHTHALMIC at 22:37

## 2019-01-01 RX ADMIN — INSULIN LISPRO 8 UNITS: 100 INJECTION, SOLUTION INTRAVENOUS; SUBCUTANEOUS at 08:09

## 2019-01-01 RX ADMIN — Medication 10 ML: at 09:18

## 2019-01-01 RX ADMIN — DIPHENHYDRAMINE HYDROCHLORIDE 25 MG: 25 CAPSULE ORAL at 13:51

## 2019-01-01 RX ADMIN — BRIMONIDINE TARTRATE 1 DROP: 2 SOLUTION/ DROPS OPHTHALMIC at 08:41

## 2019-01-01 RX ADMIN — POTASSIUM CHLORIDE 40 MEQ: 20 TABLET, EXTENDED RELEASE ORAL at 18:05

## 2019-01-01 RX ADMIN — METOPROLOL TARTRATE 25 MG: 25 TABLET, FILM COATED ORAL at 22:31

## 2019-01-01 RX ADMIN — METOPROLOL TARTRATE 25 MG: 25 TABLET, FILM COATED ORAL at 09:55

## 2019-01-01 RX ADMIN — METOPROLOL TARTRATE 25 MG: 25 TABLET ORAL at 10:01

## 2019-01-01 RX ADMIN — INSULIN LISPRO 15 UNITS: 100 INJECTION, SOLUTION INTRAVENOUS; SUBCUTANEOUS at 16:28

## 2019-01-01 RX ADMIN — ASPIRIN 81 MG: 81 TABLET, COATED ORAL at 08:09

## 2019-01-01 RX ADMIN — SODIUM CHLORIDE 50 ML/HR: 900 INJECTION, SOLUTION INTRAVENOUS at 08:48

## 2019-01-01 RX ADMIN — INSULIN LISPRO 12 UNITS: 100 INJECTION, SOLUTION INTRAVENOUS; SUBCUTANEOUS at 22:02

## 2019-01-01 RX ADMIN — INSULIN LISPRO 4 UNITS: 100 INJECTION, SOLUTION INTRAVENOUS; SUBCUTANEOUS at 11:06

## 2019-01-01 RX ADMIN — VALSARTAN 40 MG: 40 TABLET, FILM COATED ORAL at 09:52

## 2019-01-01 RX ADMIN — METOPROLOL TARTRATE 25 MG: 25 TABLET, FILM COATED ORAL at 21:00

## 2019-01-01 RX ADMIN — INSULIN LISPRO 15 UNITS: 100 INJECTION, SOLUTION INTRAVENOUS; SUBCUTANEOUS at 16:43

## 2019-01-01 RX ADMIN — INSULIN LISPRO 10 UNITS: 100 INJECTION, SOLUTION INTRAVENOUS; SUBCUTANEOUS at 18:47

## 2019-01-01 RX ADMIN — INSULIN LISPRO 4 UNITS: 100 INJECTION, SOLUTION INTRAVENOUS; SUBCUTANEOUS at 08:52

## 2019-01-01 RX ADMIN — CEPHALEXIN 500 MG: 250 CAPSULE ORAL at 10:24

## 2019-01-01 RX ADMIN — DORZOLAMIDE HCL 1 DROP: 20 SOLUTION/ DROPS OPHTHALMIC at 22:05

## 2019-01-01 RX ADMIN — INSULIN LISPRO 15 UNITS: 100 INJECTION, SOLUTION INTRAVENOUS; SUBCUTANEOUS at 17:32

## 2019-01-01 RX ADMIN — DORZOLAMIDE HYDROCHLORIDE 1 DROP: 20 SOLUTION/ DROPS OPHTHALMIC at 22:50

## 2019-01-01 RX ADMIN — SODIUM CHLORIDE 50 ML/HR: 900 INJECTION, SOLUTION INTRAVENOUS at 08:44

## 2019-01-01 RX ADMIN — METOPROLOL TARTRATE 50 MG: 50 TABLET ORAL at 09:25

## 2019-01-01 RX ADMIN — DORZOLAMIDE HYDROCHLORIDE 1 DROP: 20 SOLUTION/ DROPS OPHTHALMIC at 08:28

## 2019-01-01 RX ADMIN — OXYBUTYNIN CHLORIDE 5 MG: 5 TABLET ORAL at 21:15

## 2019-01-01 RX ADMIN — OXYCODONE HYDROCHLORIDE AND ACETAMINOPHEN 250 MG: 500 TABLET ORAL at 17:00

## 2019-01-01 RX ADMIN — SODIUM CHLORIDE 75 ML/HR: 900 INJECTION, SOLUTION INTRAVENOUS at 08:48

## 2019-01-01 RX ADMIN — BRIMONIDINE TARTRATE 1 DROP: 2 SOLUTION/ DROPS OPHTHALMIC at 22:44

## 2019-01-01 RX ADMIN — METOPROLOL TARTRATE 25 MG: 25 TABLET, FILM COATED ORAL at 22:44

## 2019-01-01 RX ADMIN — DILTIAZEM HYDROCHLORIDE 5 MG/HR: 5 INJECTION INTRAVENOUS at 10:37

## 2019-01-01 RX ADMIN — INSULIN LISPRO 6 UNITS: 100 INJECTION, SOLUTION INTRAVENOUS; SUBCUTANEOUS at 21:54

## 2019-01-01 RX ADMIN — Medication 10 ML: at 21:56

## 2019-01-01 RX ADMIN — VALSARTAN 40 MG: 40 TABLET, FILM COATED ORAL at 08:38

## 2019-01-01 RX ADMIN — TIMOLOL MALEATE 1 DROP: 5 SOLUTION/ DROPS OPHTHALMIC at 10:41

## 2019-01-01 RX ADMIN — AZITHROMYCIN MONOHYDRATE 500 MG: 500 INJECTION, POWDER, LYOPHILIZED, FOR SOLUTION INTRAVENOUS at 17:53

## 2019-01-01 RX ADMIN — DORZOLAMIDE HCL 1 DROP: 20 SOLUTION/ DROPS OPHTHALMIC at 23:28

## 2019-01-01 RX ADMIN — PREDNISONE 30 MG: 20 TABLET ORAL at 17:41

## 2019-01-01 RX ADMIN — BRIMONIDINE TARTRATE 1 DROP: 2 SOLUTION/ DROPS OPHTHALMIC at 21:35

## 2019-01-01 RX ADMIN — INSULIN LISPRO 3 UNITS: 100 INJECTION, SOLUTION INTRAVENOUS; SUBCUTANEOUS at 08:25

## 2019-01-01 RX ADMIN — INSULIN LISPRO 9 UNITS: 100 INJECTION, SOLUTION INTRAVENOUS; SUBCUTANEOUS at 11:30

## 2019-01-01 RX ADMIN — VANCOMYCIN HYDROCHLORIDE 1000 MG: 10 INJECTION, POWDER, LYOPHILIZED, FOR SOLUTION INTRAVENOUS at 06:20

## 2019-01-01 RX ADMIN — CEPHALEXIN 500 MG: 250 CAPSULE ORAL at 22:14

## 2019-01-01 RX ADMIN — INSULIN LISPRO 4 UNITS: 100 INJECTION, SOLUTION INTRAVENOUS; SUBCUTANEOUS at 17:32

## 2019-01-01 RX ADMIN — LATANOPROST 1 DROP: 50 SOLUTION OPHTHALMIC at 18:00

## 2019-01-01 RX ADMIN — METOPROLOL TARTRATE 50 MG: 50 TABLET ORAL at 08:08

## 2019-01-01 RX ADMIN — INSULIN GLARGINE 10 UNITS: 100 INJECTION, SOLUTION SUBCUTANEOUS at 08:45

## 2019-01-01 RX ADMIN — INSULIN GLARGINE 5 UNITS: 100 INJECTION, SOLUTION SUBCUTANEOUS at 23:00

## 2019-01-01 RX ADMIN — INSULIN LISPRO 2 UNITS: 100 INJECTION, SOLUTION INTRAVENOUS; SUBCUTANEOUS at 18:56

## 2019-01-01 RX ADMIN — INSULIN LISPRO 9 UNITS: 100 INJECTION, SOLUTION INTRAVENOUS; SUBCUTANEOUS at 21:50

## 2019-01-01 RX ADMIN — LEVOFLOXACIN 750 MG: 5 INJECTION, SOLUTION INTRAVENOUS at 16:13

## 2019-01-01 RX ADMIN — CEPHALEXIN 500 MG: 250 CAPSULE ORAL at 17:24

## 2019-01-01 RX ADMIN — HYDROXYUREA 500 MG: 500 CAPSULE ORAL at 18:13

## 2019-01-01 RX ADMIN — PREDNISONE 30 MG: 20 TABLET ORAL at 08:48

## 2019-01-01 RX ADMIN — SODIUM CHLORIDE 50 ML/HR: 900 INJECTION, SOLUTION INTRAVENOUS at 19:10

## 2019-01-01 RX ADMIN — DORZOLAMIDE HYDROCHLORIDE 1 DROP: 20 SOLUTION/ DROPS OPHTHALMIC at 08:46

## 2019-01-01 RX ADMIN — OXYBUTYNIN CHLORIDE 5 MG: 5 TABLET ORAL at 16:43

## 2019-01-01 RX ADMIN — PANTOPRAZOLE SODIUM 40 MG: 40 INJECTION, POWDER, FOR SOLUTION INTRAVENOUS at 09:03

## 2019-01-01 RX ADMIN — DORZOLAMIDE HYDROCHLORIDE 1 DROP: 20 SOLUTION OPHTHALMIC at 09:50

## 2019-01-01 RX ADMIN — DIPHENHYDRAMINE HYDROCHLORIDE 25 MG: 25 CAPSULE ORAL at 09:24

## 2019-01-01 RX ADMIN — INSULIN LISPRO 2 UNITS: 100 INJECTION, SOLUTION INTRAVENOUS; SUBCUTANEOUS at 17:34

## 2019-01-01 RX ADMIN — ATORVASTATIN CALCIUM 40 MG: 20 TABLET, FILM COATED ORAL at 22:05

## 2019-01-01 RX ADMIN — CALCIUM 500 MG: 500 TABLET ORAL at 09:35

## 2019-01-01 RX ADMIN — ACETAMINOPHEN 650 MG: 325 TABLET ORAL at 09:24

## 2019-01-01 RX ADMIN — Medication 10 ML: at 09:15

## 2019-01-01 RX ADMIN — SODIUM CHLORIDE 986 ML: 900 INJECTION, SOLUTION INTRAVENOUS at 16:45

## 2019-01-01 RX ADMIN — DORZOLAMIDE HCL 1 DROP: 20 SOLUTION/ DROPS OPHTHALMIC at 23:38

## 2019-01-01 RX ADMIN — CEPHALEXIN 500 MG: 250 CAPSULE ORAL at 23:37

## 2019-01-01 RX ADMIN — PANTOPRAZOLE SODIUM 40 MG: 40 INJECTION, POWDER, FOR SOLUTION INTRAVENOUS at 22:34

## 2019-01-01 RX ADMIN — OXYCODONE HYDROCHLORIDE AND ACETAMINOPHEN 250 MG: 500 TABLET ORAL at 09:35

## 2019-01-01 RX ADMIN — DORZOLAMIDE HCL 1 DROP: 20 SOLUTION/ DROPS OPHTHALMIC at 17:26

## 2019-01-01 RX ADMIN — BRIMONIDINE TARTRATE 1 DROP: 2 SOLUTION/ DROPS OPHTHALMIC at 21:19

## 2019-01-01 RX ADMIN — INSULIN GLARGINE 5 UNITS: 100 INJECTION, SOLUTION SUBCUTANEOUS at 09:11

## 2019-01-01 RX ADMIN — LATANOPROST 1 DROP: 50 SOLUTION/ DROPS OPHTHALMIC at 21:19

## 2019-01-01 RX ADMIN — TIMOLOL MALEATE 1 DROP: 5 SOLUTION OPHTHALMIC at 22:30

## 2019-01-01 RX ADMIN — TIMOLOL MALEATE 1 DROP: 2.5 SOLUTION/ DROPS OPHTHALMIC at 22:44

## 2019-01-01 RX ADMIN — Medication 10 ML: at 13:20

## 2019-01-01 RX ADMIN — SODIUM CHLORIDE 1000 ML: 900 INJECTION, SOLUTION INTRAVENOUS at 01:37

## 2019-01-01 RX ADMIN — INSULIN GLARGINE 20 UNITS: 100 INJECTION, SOLUTION SUBCUTANEOUS at 08:48

## 2019-01-01 RX ADMIN — BRIMONIDINE TARTRATE 1 DROP: 2 SOLUTION/ DROPS OPHTHALMIC at 23:37

## 2019-01-01 RX ADMIN — OXYBUTYNIN CHLORIDE 5 MG: 5 TABLET ORAL at 09:26

## 2019-01-01 RX ADMIN — TIMOLOL MALEATE 1 DROP: 5 SOLUTION/ DROPS OPHTHALMIC at 09:29

## 2019-01-01 RX ADMIN — INSULIN LISPRO 3 UNITS: 100 INJECTION, SOLUTION INTRAVENOUS; SUBCUTANEOUS at 17:27

## 2019-01-01 RX ADMIN — SODIUM CHLORIDE 75 ML/HR: 900 INJECTION, SOLUTION INTRAVENOUS at 10:58

## 2019-01-01 RX ADMIN — INSULIN LISPRO 4 UNITS: 100 INJECTION, SOLUTION INTRAVENOUS; SUBCUTANEOUS at 08:47

## 2019-01-01 RX ADMIN — OXYBUTYNIN CHLORIDE 5 MG: 5 TABLET ORAL at 08:25

## 2019-01-01 RX ADMIN — INSULIN GLARGINE 5 UNITS: 100 INJECTION, SOLUTION SUBCUTANEOUS at 22:12

## 2019-01-01 RX ADMIN — Medication 10 ML: at 22:31

## 2019-01-01 RX ADMIN — SODIUM CHLORIDE 250 ML: 900 INJECTION, SOLUTION INTRAVENOUS at 11:00

## 2019-01-01 RX ADMIN — INSULIN LISPRO 4 UNITS: 100 INJECTION, SOLUTION INTRAVENOUS; SUBCUTANEOUS at 11:29

## 2019-01-01 RX ADMIN — INSULIN LISPRO 3 UNITS: 100 INJECTION, SOLUTION INTRAVENOUS; SUBCUTANEOUS at 22:16

## 2019-01-01 RX ADMIN — TIMOLOL MALEATE 1 DROP: 5 SOLUTION OPHTHALMIC at 09:13

## 2019-01-01 RX ADMIN — ATORVASTATIN CALCIUM 40 MG: 40 TABLET, FILM COATED ORAL at 22:14

## 2019-01-01 RX ADMIN — Medication 10 ML: at 22:51

## 2019-01-01 RX ADMIN — THERA TABS 1 TABLET: TAB at 09:44

## 2019-01-01 RX ADMIN — TIMOLOL MALEATE 1 DROP: 5 SOLUTION/ DROPS OPHTHALMIC at 21:35

## 2019-01-01 RX ADMIN — PREDNISONE 30 MG: 20 TABLET ORAL at 08:24

## 2019-01-01 RX ADMIN — LATANOPROST 1 DROP: 50 SOLUTION/ DROPS OPHTHALMIC at 17:07

## 2019-01-01 RX ADMIN — THERA TABS 1 TABLET: TAB at 08:25

## 2019-01-01 RX ADMIN — BRIMONIDINE TARTRATE 1 DROP: 2 SOLUTION/ DROPS OPHTHALMIC at 09:30

## 2019-01-01 RX ADMIN — APIXABAN 5 MG: 5 TABLET, FILM COATED ORAL at 17:00

## 2019-01-01 RX ADMIN — PANTOPRAZOLE SODIUM 40 MG: 40 INJECTION, POWDER, FOR SOLUTION INTRAVENOUS at 09:28

## 2019-01-01 RX ADMIN — INSULIN LISPRO 4 UNITS: 100 INJECTION, SOLUTION INTRAVENOUS; SUBCUTANEOUS at 12:56

## 2019-01-01 RX ADMIN — TIMOLOL MALEATE 1 DROP: 2.5 SOLUTION/ DROPS OPHTHALMIC at 20:23

## 2019-01-01 RX ADMIN — POTASSIUM CHLORIDE 40 MEQ: 20 TABLET, EXTENDED RELEASE ORAL at 11:43

## 2019-01-01 RX ADMIN — TIMOLOL MALEATE 1 DROP: 5 SOLUTION/ DROPS OPHTHALMIC at 12:11

## 2019-01-01 RX ADMIN — VALSARTAN 40 MG: 40 TABLET ORAL at 12:11

## 2019-01-01 RX ADMIN — METOPROLOL TARTRATE 25 MG: 25 TABLET, FILM COATED ORAL at 22:37

## 2019-01-01 RX ADMIN — Medication 10 ML: at 10:10

## 2019-01-01 RX ADMIN — INSULIN LISPRO 4 UNITS: 100 INJECTION, SOLUTION INTRAVENOUS; SUBCUTANEOUS at 17:46

## 2019-01-01 RX ADMIN — BRIMONIDINE TARTRATE 1 DROP: 2 SOLUTION/ DROPS OPHTHALMIC at 10:01

## 2019-01-01 RX ADMIN — Medication 10 ML: at 15:43

## 2019-01-01 RX ADMIN — HYDROXYUREA 500 MG: 500 CAPSULE ORAL at 09:48

## 2019-01-01 RX ADMIN — PREDNISONE 30 MG: 20 TABLET ORAL at 08:47

## 2019-01-01 RX ADMIN — THERA TABS 1 TABLET: TAB at 09:25

## 2019-01-01 RX ADMIN — PREDNISONE 30 MG: 20 TABLET ORAL at 17:27

## 2019-01-01 RX ADMIN — TIMOLOL MALEATE 1 DROP: 5 SOLUTION OPHTHALMIC at 21:18

## 2019-01-01 RX ADMIN — Medication 10 ML: at 21:59

## 2019-01-01 RX ADMIN — TIMOLOL MALEATE 1 DROP: 5 SOLUTION/ DROPS OPHTHALMIC at 23:45

## 2019-01-01 RX ADMIN — BRIMONIDINE TARTRATE 1 DROP: 2 SOLUTION/ DROPS OPHTHALMIC at 21:49

## 2019-01-01 RX ADMIN — POTASSIUM CHLORIDE 40 MEQ: 20 TABLET, EXTENDED RELEASE ORAL at 17:04

## 2019-01-01 RX ADMIN — CEPHALEXIN 500 MG: 250 CAPSULE ORAL at 13:29

## 2019-01-01 RX ADMIN — METOPROLOL TARTRATE 25 MG: 25 TABLET, FILM COATED ORAL at 21:42

## 2019-01-01 RX ADMIN — DORZOLAMIDE HCL 1 DROP: 20 SOLUTION/ DROPS OPHTHALMIC at 17:37

## 2019-01-01 RX ADMIN — OXYBUTYNIN CHLORIDE 5 MG: 5 TABLET ORAL at 22:42

## 2019-01-01 RX ADMIN — PIPERACILLIN SODIUM,TAZOBACTAM SODIUM 4.5 G: 4; .5 INJECTION, POWDER, FOR SOLUTION INTRAVENOUS at 11:38

## 2019-01-01 RX ADMIN — ASPIRIN 81 MG: 81 TABLET, COATED ORAL at 08:25

## 2019-01-01 RX ADMIN — INSULIN GLARGINE 5 UNITS: 100 INJECTION, SOLUTION SUBCUTANEOUS at 01:21

## 2019-01-01 RX ADMIN — ATORVASTATIN CALCIUM 40 MG: 40 TABLET, FILM COATED ORAL at 21:42

## 2019-01-01 RX ADMIN — Medication 10 ML: at 05:38

## 2019-01-01 RX ADMIN — IMMUNE GLOBULIN (HUMAN) 80 G: 10 INJECTION INTRAVENOUS; SUBCUTANEOUS at 11:57

## 2019-01-01 RX ADMIN — LATANOPROST 1 DROP: 50 SOLUTION/ DROPS OPHTHALMIC at 09:14

## 2019-01-01 RX ADMIN — LATANOPROST 1 DROP: 50 SOLUTION/ DROPS OPHTHALMIC at 22:37

## 2019-01-01 RX ADMIN — THERA TABS 1 TABLET: TAB at 08:38

## 2019-01-01 RX ADMIN — HEPARIN SODIUM 3920 UNITS: 1000 INJECTION INTRAVENOUS; SUBCUTANEOUS at 11:00

## 2019-01-01 RX ADMIN — METOPROLOL TARTRATE 25 MG: 25 TABLET ORAL at 09:35

## 2019-01-01 RX ADMIN — ATORVASTATIN CALCIUM 40 MG: 40 TABLET, FILM COATED ORAL at 22:44

## 2019-01-01 RX ADMIN — INSULIN LISPRO 2 UNITS: 100 INJECTION, SOLUTION INTRAVENOUS; SUBCUTANEOUS at 10:19

## 2019-01-01 RX ADMIN — INSULIN LISPRO 7 UNITS: 100 INJECTION, SOLUTION INTRAVENOUS; SUBCUTANEOUS at 16:59

## 2019-01-01 RX ADMIN — INSULIN LISPRO 2 UNITS: 100 INJECTION, SOLUTION INTRAVENOUS; SUBCUTANEOUS at 21:46

## 2019-01-01 RX ADMIN — INSULIN LISPRO 8 UNITS: 100 INJECTION, SOLUTION INTRAVENOUS; SUBCUTANEOUS at 17:27

## 2019-01-01 RX ADMIN — OXYBUTYNIN CHLORIDE 5 MG: 5 TABLET ORAL at 15:33

## 2019-01-01 RX ADMIN — CEPHALEXIN 500 MG: 250 CAPSULE ORAL at 09:35

## 2019-01-01 RX ADMIN — SULFAMETHOXAZOLE AND TRIMETHOPRIM 2 TABLET: 800; 160 TABLET ORAL at 01:36

## 2019-01-01 RX ADMIN — HYDROXYUREA 500 MG: 500 CAPSULE ORAL at 11:22

## 2019-01-01 RX ADMIN — ATORVASTATIN CALCIUM 40 MG: 40 TABLET, FILM COATED ORAL at 22:42

## 2019-01-01 RX ADMIN — INSULIN LISPRO 12 UNITS: 100 INJECTION, SOLUTION INTRAVENOUS; SUBCUTANEOUS at 17:23

## 2019-01-01 RX ADMIN — DORZOLAMIDE HYDROCHLORIDE 1 DROP: 20 SOLUTION OPHTHALMIC at 21:19

## 2019-01-01 RX ADMIN — OXYBUTYNIN CHLORIDE 5 MG: 5 TABLET ORAL at 17:25

## 2019-01-01 RX ADMIN — TIMOLOL MALEATE 1 DROP: 5 SOLUTION/ DROPS OPHTHALMIC at 09:48

## 2019-01-01 RX ADMIN — DORZOLAMIDE HYDROCHLORIDE 1 DROP: 20 SOLUTION/ DROPS OPHTHALMIC at 17:00

## 2019-01-01 RX ADMIN — INSULIN LISPRO 9 UNITS: 100 INJECTION, SOLUTION INTRAVENOUS; SUBCUTANEOUS at 09:56

## 2019-01-01 RX ADMIN — PREDNISONE 30 MG: 20 TABLET ORAL at 16:53

## 2019-01-01 RX ADMIN — METOPROLOL TARTRATE 25 MG: 25 TABLET ORAL at 08:46

## 2019-01-01 RX ADMIN — INSULIN LISPRO 12 UNITS: 100 INJECTION, SOLUTION INTRAVENOUS; SUBCUTANEOUS at 17:44

## 2019-01-01 RX ADMIN — DORZOLAMIDE HCL 1 DROP: 20 SOLUTION/ DROPS OPHTHALMIC at 17:07

## 2019-01-01 RX ADMIN — PIPERACILLIN SODIUM,TAZOBACTAM SODIUM 4.5 G: 4; .5 INJECTION, POWDER, FOR SOLUTION INTRAVENOUS at 05:25

## 2019-01-01 RX ADMIN — INSULIN LISPRO 2 UNITS: 100 INJECTION, SOLUTION INTRAVENOUS; SUBCUTANEOUS at 18:05

## 2019-01-01 RX ADMIN — METOPROLOL TARTRATE 25 MG: 25 TABLET ORAL at 12:52

## 2019-01-01 RX ADMIN — WATER 2 G: 1 INJECTION INTRAMUSCULAR; INTRAVENOUS; SUBCUTANEOUS at 15:49

## 2019-01-01 RX ADMIN — SODIUM CHLORIDE 75 ML/HR: 900 INJECTION, SOLUTION INTRAVENOUS at 03:48

## 2019-01-01 RX ADMIN — Medication 10 ML: at 15:01

## 2019-01-01 RX ADMIN — INSULIN GLARGINE 10 UNITS: 100 INJECTION, SOLUTION SUBCUTANEOUS at 18:47

## 2019-01-01 RX ADMIN — VALSARTAN 80 MG: 160 TABLET, FILM COATED ORAL at 08:47

## 2019-01-01 RX ADMIN — PIPERACILLIN SODIUM,TAZOBACTAM SODIUM 4.5 G: 4; .5 INJECTION, POWDER, FOR SOLUTION INTRAVENOUS at 03:48

## 2019-01-01 RX ADMIN — TIMOLOL MALEATE 1 DROP: 2.5 SOLUTION/ DROPS OPHTHALMIC at 10:15

## 2019-01-01 RX ADMIN — ATORVASTATIN CALCIUM 40 MG: 40 TABLET, FILM COATED ORAL at 21:58

## 2019-01-01 RX ADMIN — Medication 20 ML: at 14:00

## 2019-01-01 RX ADMIN — INSULIN LISPRO 3 UNITS: 100 INJECTION, SOLUTION INTRAVENOUS; SUBCUTANEOUS at 12:44

## 2019-01-01 RX ADMIN — ATORVASTATIN CALCIUM 40 MG: 20 TABLET, FILM COATED ORAL at 21:56

## 2019-01-01 RX ADMIN — INSULIN GLARGINE 5 UNITS: 100 INJECTION, SOLUTION SUBCUTANEOUS at 13:00

## 2019-01-01 RX ADMIN — INSULIN LISPRO 4 UNITS: 100 INJECTION, SOLUTION INTRAVENOUS; SUBCUTANEOUS at 12:42

## 2019-01-01 RX ADMIN — VANCOMYCIN HYDROCHLORIDE 1000 MG: 10 INJECTION, POWDER, LYOPHILIZED, FOR SOLUTION INTRAVENOUS at 12:38

## 2019-01-01 RX ADMIN — PREDNISONE 30 MG: 20 TABLET ORAL at 09:44

## 2019-01-01 RX ADMIN — OXYBUTYNIN CHLORIDE 5 MG: 5 TABLET ORAL at 16:48

## 2019-01-01 RX ADMIN — INSULIN LISPRO 6 UNITS: 100 INJECTION, SOLUTION INTRAVENOUS; SUBCUTANEOUS at 10:59

## 2019-01-01 RX ADMIN — SODIUM CHLORIDE 1000 ML: 900 INJECTION, SOLUTION INTRAVENOUS at 16:45

## 2019-01-01 RX ADMIN — Medication 10 ML: at 18:05

## 2019-01-01 RX ADMIN — TIMOLOL MALEATE 1 DROP: 2.5 SOLUTION/ DROPS OPHTHALMIC at 08:41

## 2019-01-01 RX ADMIN — DORZOLAMIDE HYDROCHLORIDE 1 DROP: 20 SOLUTION/ DROPS OPHTHALMIC at 08:22

## 2019-01-01 RX ADMIN — DORZOLAMIDE HCL 1 DROP: 20 SOLUTION/ DROPS OPHTHALMIC at 09:00

## 2019-01-01 RX ADMIN — POTASSIUM CHLORIDE 20 MEQ: 20 TABLET, EXTENDED RELEASE ORAL at 16:06

## 2019-01-01 RX ADMIN — TIMOLOL MALEATE 1 DROP: 2.5 SOLUTION/ DROPS OPHTHALMIC at 20:57

## 2019-01-01 RX ADMIN — TIMOLOL MALEATE 1 DROP: 2.5 SOLUTION/ DROPS OPHTHALMIC at 22:57

## 2019-01-01 RX ADMIN — TIMOLOL MALEATE 1 DROP: 5 SOLUTION/ DROPS OPHTHALMIC at 17:39

## 2019-01-01 RX ADMIN — DORZOLAMIDE HCL 1 DROP: 20 SOLUTION/ DROPS OPHTHALMIC at 17:55

## 2019-01-01 RX ADMIN — VALSARTAN 40 MG: 40 TABLET, FILM COATED ORAL at 09:55

## 2019-01-01 RX ADMIN — VALSARTAN 40 MG: 40 TABLET, FILM COATED ORAL at 08:23

## 2019-01-01 RX ADMIN — Medication 10 ML: at 21:43

## 2019-01-01 RX ADMIN — INSULIN GLARGINE 10 UNITS: 100 INJECTION, SOLUTION SUBCUTANEOUS at 09:51

## 2019-01-01 RX ADMIN — VALSARTAN 40 MG: 40 TABLET, FILM COATED ORAL at 09:25

## 2019-01-01 RX ADMIN — FENTANYL CITRATE 25 MCG: 50 INJECTION, SOLUTION INTRAMUSCULAR; INTRAVENOUS at 10:15

## 2019-01-01 RX ADMIN — ATORVASTATIN CALCIUM 40 MG: 40 TABLET, FILM COATED ORAL at 22:48

## 2019-01-01 RX ADMIN — INSULIN LISPRO 4 UNITS: 100 INJECTION, SOLUTION INTRAVENOUS; SUBCUTANEOUS at 16:51

## 2019-01-01 RX ADMIN — INSULIN GLARGINE 5 UNITS: 100 INJECTION, SOLUTION SUBCUTANEOUS at 09:26

## 2019-01-01 RX ADMIN — INSULIN LISPRO 9 UNITS: 100 INJECTION, SOLUTION INTRAVENOUS; SUBCUTANEOUS at 22:43

## 2019-01-01 RX ADMIN — TIMOLOL MALEATE 1 DROP: 5 SOLUTION OPHTHALMIC at 09:50

## 2019-01-01 RX ADMIN — BRIMONIDINE TARTRATE 1 DROP: 2 SOLUTION/ DROPS OPHTHALMIC at 08:52

## 2019-01-01 RX ADMIN — INSULIN LISPRO 6 UNITS: 100 INJECTION, SOLUTION INTRAVENOUS; SUBCUTANEOUS at 22:46

## 2019-01-01 RX ADMIN — PIPERACILLIN SODIUM,TAZOBACTAM SODIUM 4.5 G: 4; .5 INJECTION, POWDER, FOR SOLUTION INTRAVENOUS at 10:58

## 2019-01-01 RX ADMIN — INSULIN LISPRO 3 UNITS: 100 INJECTION, SOLUTION INTRAVENOUS; SUBCUTANEOUS at 08:47

## 2019-01-01 RX ADMIN — ATORVASTATIN CALCIUM 40 MG: 40 TABLET, FILM COATED ORAL at 21:14

## 2019-01-01 RX ADMIN — PREDNISONE 30 MG: 20 TABLET ORAL at 17:29

## 2019-01-01 RX ADMIN — LATANOPROST 1 DROP: 50 SOLUTION/ DROPS OPHTHALMIC at 10:42

## 2019-01-01 RX ADMIN — WATER 2 G: 1 INJECTION INTRAMUSCULAR; INTRAVENOUS; SUBCUTANEOUS at 17:54

## 2019-01-01 RX ADMIN — METOPROLOL TARTRATE 50 MG: 50 TABLET ORAL at 22:27

## 2019-01-01 RX ADMIN — Medication 10 ML: at 22:15

## 2019-01-01 RX ADMIN — VALSARTAN 40 MG: 40 TABLET, FILM COATED ORAL at 09:03

## 2019-01-01 RX ADMIN — INSULIN LISPRO 6 UNITS: 100 INJECTION, SOLUTION INTRAVENOUS; SUBCUTANEOUS at 16:30

## 2019-01-01 RX ADMIN — POTASSIUM CHLORIDE 40 MEQ: 20 TABLET, EXTENDED RELEASE ORAL at 08:55

## 2019-01-01 RX ADMIN — METOPROLOL TARTRATE 50 MG: 50 TABLET ORAL at 08:25

## 2019-01-01 RX ADMIN — LATANOPROST 1 DROP: 50 SOLUTION/ DROPS OPHTHALMIC at 09:50

## 2019-01-01 RX ADMIN — METOPROLOL TARTRATE 25 MG: 25 TABLET ORAL at 17:00

## 2019-01-01 RX ADMIN — ASPIRIN 81 MG: 81 TABLET, COATED ORAL at 08:46

## 2019-01-01 RX ADMIN — THERA TABS 1 TABLET: TAB at 10:24

## 2019-01-01 RX ADMIN — DORZOLAMIDE HCL 1 DROP: 20 SOLUTION/ DROPS OPHTHALMIC at 10:19

## 2019-01-01 RX ADMIN — LATANOPROST 1 DROP: 50 SOLUTION OPHTHALMIC at 17:35

## 2019-01-01 RX ADMIN — VALSARTAN 80 MG: 160 TABLET, FILM COATED ORAL at 08:25

## 2019-01-01 RX ADMIN — FERROUS SULFATE TAB 325 MG (65 MG ELEMENTAL FE) 325 MG: 325 (65 FE) TAB at 08:50

## 2019-01-01 RX ADMIN — INSULIN LISPRO 4 UNITS: 100 INJECTION, SOLUTION INTRAVENOUS; SUBCUTANEOUS at 22:05

## 2019-01-01 RX ADMIN — DORZOLAMIDE HYDROCHLORIDE 1 DROP: 20 SOLUTION/ DROPS OPHTHALMIC at 10:01

## 2019-01-01 RX ADMIN — INSULIN LISPRO 9 UNITS: 100 INJECTION, SOLUTION INTRAVENOUS; SUBCUTANEOUS at 08:39

## 2019-01-01 RX ADMIN — APIXABAN 5 MG: 5 TABLET, FILM COATED ORAL at 22:48

## 2019-01-01 RX ADMIN — Medication 10 ML: at 06:43

## 2019-01-01 RX ADMIN — PREDNISONE 30 MG: 20 TABLET ORAL at 16:28

## 2019-01-01 RX ADMIN — VALSARTAN 40 MG: 40 TABLET, FILM COATED ORAL at 08:26

## 2019-01-01 RX ADMIN — APIXABAN 5 MG: 5 TABLET, FILM COATED ORAL at 10:15

## 2019-01-01 RX ADMIN — CEPHALEXIN 500 MG: 250 CAPSULE ORAL at 12:09

## 2019-01-01 RX ADMIN — INSULIN LISPRO 9 UNITS: 100 INJECTION, SOLUTION INTRAVENOUS; SUBCUTANEOUS at 07:30

## 2019-01-01 RX ADMIN — METOPROLOL TARTRATE 25 MG: 25 TABLET, FILM COATED ORAL at 12:11

## 2019-01-01 RX ADMIN — DEXTROSE MONOHYDRATE 25 G: 25 INJECTION, SOLUTION INTRAVENOUS at 08:42

## 2019-01-01 RX ADMIN — FERROUS SULFATE TAB 325 MG (65 MG ELEMENTAL FE) 325 MG: 325 (65 FE) TAB at 08:49

## 2019-01-01 RX ADMIN — LATANOPROST 1 DROP: 50 SOLUTION/ DROPS OPHTHALMIC at 23:37

## 2019-01-01 RX ADMIN — METOPROLOL TARTRATE 25 MG: 25 TABLET, FILM COATED ORAL at 21:47

## 2019-01-01 RX ADMIN — INSULIN LISPRO 3 UNITS: 100 INJECTION, SOLUTION INTRAVENOUS; SUBCUTANEOUS at 22:47

## 2019-01-01 RX ADMIN — LEVOFLOXACIN 750 MG: 5 INJECTION, SOLUTION INTRAVENOUS at 15:33

## 2019-01-01 RX ADMIN — CALCIUM 500 MG: 500 TABLET ORAL at 10:01

## 2019-01-01 RX ADMIN — IMMUNE GLOBULIN (HUMAN) 80 G: 10 INJECTION INTRAVENOUS; SUBCUTANEOUS at 15:43

## 2019-01-01 RX ADMIN — TIMOLOL MALEATE 1 DROP: 5 SOLUTION/ DROPS OPHTHALMIC at 23:37

## 2019-01-01 RX ADMIN — INSULIN GLARGINE 10 UNITS: 100 INJECTION, SOLUTION SUBCUTANEOUS at 08:51

## 2019-01-01 RX ADMIN — ACETAMINOPHEN 650 MG: 325 TABLET ORAL at 14:44

## 2019-01-01 RX ADMIN — Medication 10 ML: at 18:06

## 2019-01-01 RX ADMIN — BRIMONIDINE TARTRATE 1 DROP: 2 SOLUTION/ DROPS OPHTHALMIC at 08:15

## 2019-01-01 RX ADMIN — ATORVASTATIN CALCIUM 40 MG: 40 TABLET, FILM COATED ORAL at 22:27

## 2019-01-01 RX ADMIN — WATER 2 G: 1 INJECTION INTRAMUSCULAR; INTRAVENOUS; SUBCUTANEOUS at 17:37

## 2019-01-01 RX ADMIN — INSULIN LISPRO 18 UNITS: 100 INJECTION, SOLUTION INTRAVENOUS; SUBCUTANEOUS at 23:10

## 2019-01-01 RX ADMIN — BRIMONIDINE TARTRATE 1 DROP: 2 SOLUTION/ DROPS OPHTHALMIC at 22:57

## 2019-01-01 RX ADMIN — INSULIN GLARGINE 10 UNITS: 100 INJECTION, SOLUTION SUBCUTANEOUS at 21:56

## 2019-01-01 RX ADMIN — Medication 10 ML: at 22:05

## 2019-01-01 RX ADMIN — METOPROLOL TARTRATE 25 MG: 25 TABLET, FILM COATED ORAL at 08:51

## 2019-01-01 RX ADMIN — INSULIN LISPRO 4 UNITS: 100 INJECTION, SOLUTION INTRAVENOUS; SUBCUTANEOUS at 08:24

## 2019-01-01 RX ADMIN — SODIUM CHLORIDE 250 ML: 900 INJECTION, SOLUTION INTRAVENOUS at 14:50

## 2019-01-01 RX ADMIN — CEPHALEXIN 500 MG: 250 CAPSULE ORAL at 14:37

## 2019-01-01 RX ADMIN — VALSARTAN 40 MG: 40 TABLET, FILM COATED ORAL at 13:49

## 2019-01-01 RX ADMIN — DORZOLAMIDE HCL 1 DROP: 20 SOLUTION/ DROPS OPHTHALMIC at 21:35

## 2019-01-01 RX ADMIN — VALSARTAN 40 MG: 40 TABLET, FILM COATED ORAL at 09:18

## 2019-01-01 RX ADMIN — CEPHALEXIN 500 MG: 250 CAPSULE ORAL at 22:04

## 2019-01-01 RX ADMIN — LEVOFLOXACIN 750 MG: 5 INJECTION, SOLUTION INTRAVENOUS at 16:06

## 2019-01-01 RX ADMIN — INSULIN LISPRO 15 UNITS: 100 INJECTION, SOLUTION INTRAVENOUS; SUBCUTANEOUS at 01:20

## 2019-01-01 RX ADMIN — DORZOLAMIDE HYDROCHLORIDE 1 DROP: 20 SOLUTION OPHTHALMIC at 09:14

## 2019-01-01 RX ADMIN — BRIMONIDINE TARTRATE 1 DROP: 2 SOLUTION/ DROPS OPHTHALMIC at 23:46

## 2019-01-01 RX ADMIN — Medication 10 ML: at 06:00

## 2019-01-01 RX ADMIN — PIPERACILLIN SODIUM,TAZOBACTAM SODIUM 4.5 G: 4; .5 INJECTION, POWDER, FOR SOLUTION INTRAVENOUS at 18:04

## 2019-01-01 RX ADMIN — WATER 2 G: 1 INJECTION INTRAMUSCULAR; INTRAVENOUS; SUBCUTANEOUS at 16:12

## 2019-01-01 RX ADMIN — BRIMONIDINE TARTRATE 1 DROP: 2 SOLUTION/ DROPS OPHTHALMIC at 20:57

## 2019-01-01 RX ADMIN — INSULIN LISPRO 12 UNITS: 100 INJECTION, SOLUTION INTRAVENOUS; SUBCUTANEOUS at 17:59

## 2019-01-01 RX ADMIN — TIMOLOL MALEATE 1 DROP: 5 SOLUTION/ DROPS OPHTHALMIC at 08:15

## 2019-01-01 RX ADMIN — DORZOLAMIDE HCL 1 DROP: 20 SOLUTION/ DROPS OPHTHALMIC at 10:42

## 2019-01-01 RX ADMIN — OXYBUTYNIN CHLORIDE 5 MG: 5 TABLET ORAL at 09:03

## 2019-01-01 RX ADMIN — DARBEPOETIN ALFA 100 MCG: 100 INJECTION, SOLUTION INTRAVENOUS; SUBCUTANEOUS at 14:02

## 2019-01-01 RX ADMIN — METOPROLOL TARTRATE 25 MG: 25 TABLET, FILM COATED ORAL at 21:56

## 2019-01-01 RX ADMIN — METOPROLOL TARTRATE 50 MG: 50 TABLET ORAL at 09:03

## 2019-01-01 RX ADMIN — INSULIN GLARGINE 5 UNITS: 100 INJECTION, SOLUTION SUBCUTANEOUS at 23:36

## 2019-01-01 RX ADMIN — TIMOLOL MALEATE 1 DROP: 2.5 SOLUTION/ DROPS OPHTHALMIC at 08:28

## 2019-01-01 RX ADMIN — VALSARTAN 40 MG: 40 TABLET ORAL at 08:16

## 2019-01-01 RX ADMIN — OXYBUTYNIN CHLORIDE 5 MG: 5 TABLET ORAL at 18:05

## 2019-01-01 RX ADMIN — CEPHALEXIN 500 MG: 250 CAPSULE ORAL at 17:00

## 2019-01-01 RX ADMIN — AZITHROMYCIN MONOHYDRATE 500 MG: 500 INJECTION, POWDER, LYOPHILIZED, FOR SOLUTION INTRAVENOUS at 17:00

## 2019-01-01 RX ADMIN — INSULIN LISPRO 4 UNITS: 100 INJECTION, SOLUTION INTRAVENOUS; SUBCUTANEOUS at 21:20

## 2019-01-01 RX ADMIN — Medication 10 ML: at 09:46

## 2019-01-01 RX ADMIN — DORZOLAMIDE HYDROCHLORIDE 1 DROP: 20 SOLUTION/ DROPS OPHTHALMIC at 22:31

## 2019-01-01 RX ADMIN — OXYCODONE HYDROCHLORIDE AND ACETAMINOPHEN 250 MG: 500 TABLET ORAL at 10:01

## 2019-01-01 RX ADMIN — SODIUM CHLORIDE: 900 INJECTION, SOLUTION INTRAVENOUS at 12:12

## 2019-01-01 RX ADMIN — APIXABAN 5 MG: 5 TABLET, FILM COATED ORAL at 08:09

## 2019-01-01 RX ADMIN — Medication 10 ML: at 15:50

## 2019-01-01 RX ADMIN — TIMOLOL MALEATE 1 DROP: 5 SOLUTION/ DROPS OPHTHALMIC at 17:07

## 2019-01-01 RX ADMIN — METOPROLOL TARTRATE 25 MG: 25 TABLET ORAL at 10:34

## 2019-01-01 RX ADMIN — POTASSIUM CHLORIDE 40 MEQ: 20 TABLET, EXTENDED RELEASE ORAL at 21:14

## 2019-01-01 RX ADMIN — ACETAMINOPHEN 650 MG: 325 TABLET ORAL at 17:34

## 2019-01-01 RX ADMIN — BRIMONIDINE TARTRATE 1 DROP: 2 SOLUTION/ DROPS OPHTHALMIC at 08:27

## 2019-01-01 RX ADMIN — EPOETIN ALFA-EPBX 20000 UNITS: 10000 INJECTION, SOLUTION INTRAVENOUS; SUBCUTANEOUS at 11:08

## 2019-01-01 RX ADMIN — POTASSIUM CHLORIDE 40 MEQ: 20 TABLET, EXTENDED RELEASE ORAL at 09:03

## 2019-01-01 RX ADMIN — PREDNISONE 30 MG: 20 TABLET ORAL at 09:55

## 2019-01-01 RX ADMIN — METOPROLOL TARTRATE 25 MG: 25 TABLET ORAL at 17:24

## 2019-01-01 RX ADMIN — INSULIN LISPRO 12 UNITS: 100 INJECTION, SOLUTION INTRAVENOUS; SUBCUTANEOUS at 21:56

## 2019-01-01 RX ADMIN — INSULIN LISPRO 15 UNITS: 100 INJECTION, SOLUTION INTRAVENOUS; SUBCUTANEOUS at 12:21

## 2019-01-01 RX ADMIN — INSULIN LISPRO 19 UNITS: 100 INJECTION, SOLUTION INTRAVENOUS; SUBCUTANEOUS at 11:30

## 2019-01-01 RX ADMIN — DORZOLAMIDE HYDROCHLORIDE 1 DROP: 20 SOLUTION/ DROPS OPHTHALMIC at 16:51

## 2019-01-01 RX ADMIN — SODIUM CHLORIDE 75 ML/HR: 900 INJECTION, SOLUTION INTRAVENOUS at 18:03

## 2019-01-01 RX ADMIN — DORZOLAMIDE HYDROCHLORIDE 1 DROP: 20 SOLUTION/ DROPS OPHTHALMIC at 22:45

## 2019-01-01 RX ADMIN — DORZOLAMIDE HYDROCHLORIDE 1 DROP: 20 SOLUTION/ DROPS OPHTHALMIC at 15:43

## 2019-01-01 RX ADMIN — SODIUM CHLORIDE 1000 ML: 900 INJECTION, SOLUTION INTRAVENOUS at 15:18

## 2019-01-01 RX ADMIN — Medication 10 ML: at 21:44

## 2019-01-01 RX ADMIN — BRIMONIDINE TARTRATE 1 DROP: 2 SOLUTION/ DROPS OPHTHALMIC at 09:13

## 2019-01-01 RX ADMIN — PANTOPRAZOLE SODIUM 40 MG: 40 INJECTION, POWDER, FOR SOLUTION INTRAVENOUS at 21:20

## 2019-01-01 RX ADMIN — INSULIN GLARGINE 5 UNITS: 100 INJECTION, SOLUTION SUBCUTANEOUS at 22:05

## 2019-01-01 RX ADMIN — THERA TABS 1 TABLET: TAB at 08:08

## 2019-01-01 RX ADMIN — WATER 2 G: 1 INJECTION INTRAMUSCULAR; INTRAVENOUS; SUBCUTANEOUS at 17:42

## 2019-01-01 RX ADMIN — TIMOLOL MALEATE 1 DROP: 2.5 SOLUTION/ DROPS OPHTHALMIC at 08:51

## 2019-01-01 RX ADMIN — FERROUS SULFATE TAB 325 MG (65 MG ELEMENTAL FE) 325 MG: 325 (65 FE) TAB at 09:55

## 2019-01-01 RX ADMIN — DORZOLAMIDE HCL 1 DROP: 20 SOLUTION/ DROPS OPHTHALMIC at 11:49

## 2019-01-01 RX ADMIN — FENTANYL CITRATE 25 MCG: 50 INJECTION, SOLUTION INTRAMUSCULAR; INTRAVENOUS at 10:24

## 2019-01-01 RX ADMIN — WATER 2 G: 1 INJECTION INTRAMUSCULAR; INTRAVENOUS; SUBCUTANEOUS at 17:27

## 2019-01-01 RX ADMIN — INSULIN LISPRO 3 UNITS: 100 INJECTION, SOLUTION INTRAVENOUS; SUBCUTANEOUS at 21:57

## 2019-01-01 RX ADMIN — INSULIN LISPRO 4 UNITS: 100 INJECTION, SOLUTION INTRAVENOUS; SUBCUTANEOUS at 12:41

## 2019-01-01 RX ADMIN — THERA TABS 1 TABLET: TAB at 08:48

## 2019-01-01 RX ADMIN — OXYBUTYNIN CHLORIDE 5 MG: 5 TABLET ORAL at 23:00

## 2019-01-01 RX ADMIN — METOPROLOL TARTRATE 25 MG: 25 TABLET, FILM COATED ORAL at 08:49

## 2019-01-01 RX ADMIN — OXYBUTYNIN CHLORIDE 5 MG: 5 TABLET ORAL at 08:16

## 2019-01-01 RX ADMIN — Medication 10 ML: at 05:27

## 2019-01-01 RX ADMIN — MAGNESIUM SULFATE HEPTAHYDRATE 4 G: 40 INJECTION, SOLUTION INTRAVENOUS at 19:11

## 2019-01-01 RX ADMIN — SINCALIDE 1.4 MCG: 5 INJECTION, POWDER, LYOPHILIZED, FOR SOLUTION INTRAVENOUS at 19:09

## 2019-01-01 RX ADMIN — Medication 10 ML: at 16:12

## 2019-01-01 RX ADMIN — METOPROLOL TARTRATE 50 MG: 50 TABLET ORAL at 21:42

## 2019-01-01 RX ADMIN — DORZOLAMIDE HYDROCHLORIDE 1 DROP: 20 SOLUTION/ DROPS OPHTHALMIC at 20:57

## 2019-01-01 RX ADMIN — OXYBUTYNIN CHLORIDE 5 MG: 5 TABLET ORAL at 22:27

## 2019-01-01 RX ADMIN — Medication 10 ML: at 16:29

## 2019-01-01 RX ADMIN — LATANOPROST 1 DROP: 50 SOLUTION/ DROPS OPHTHALMIC at 12:11

## 2019-01-01 RX ADMIN — ATORVASTATIN CALCIUM 40 MG: 20 TABLET, FILM COATED ORAL at 23:37

## 2019-01-01 RX ADMIN — INSULIN GLARGINE 10 UNITS: 100 INJECTION, SOLUTION SUBCUTANEOUS at 09:55

## 2019-01-01 RX ADMIN — PIPERACILLIN SODIUM,TAZOBACTAM SODIUM 4.5 G: 4; .5 INJECTION, POWDER, FOR SOLUTION INTRAVENOUS at 21:43

## 2019-01-01 RX ADMIN — ACETAMINOPHEN 650 MG: 325 TABLET ORAL at 17:25

## 2019-01-01 RX ADMIN — BRIMONIDINE TARTRATE 1 DROP: 2 SOLUTION/ DROPS OPHTHALMIC at 17:39

## 2019-01-01 RX ADMIN — LATANOPROST 1 DROP: 50 SOLUTION/ DROPS OPHTHALMIC at 09:29

## 2019-01-01 RX ADMIN — OXYBUTYNIN CHLORIDE 5 MG: 5 TABLET ORAL at 21:56

## 2019-01-01 RX ADMIN — DORZOLAMIDE HYDROCHLORIDE 1 DROP: 20 SOLUTION OPHTHALMIC at 22:30

## 2019-01-01 RX ADMIN — PIPERACILLIN SODIUM,TAZOBACTAM SODIUM 4.5 G: 4; .5 INJECTION, POWDER, FOR SOLUTION INTRAVENOUS at 16:00

## 2019-01-01 RX ADMIN — INSULIN LISPRO 6 UNITS: 100 INJECTION, SOLUTION INTRAVENOUS; SUBCUTANEOUS at 12:20

## 2019-01-01 RX ADMIN — METOPROLOL TARTRATE 50 MG: 50 TABLET ORAL at 15:33

## 2019-01-01 RX ADMIN — LATANOPROST 1 DROP: 50 SOLUTION/ DROPS OPHTHALMIC at 21:35

## 2019-01-01 RX ADMIN — INSULIN GLARGINE 15 UNITS: 100 INJECTION, SOLUTION SUBCUTANEOUS at 08:25

## 2019-01-01 RX ADMIN — METOPROLOL TARTRATE 25 MG: 25 TABLET, FILM COATED ORAL at 08:47

## 2019-01-01 RX ADMIN — PREDNISONE 30 MG: 20 TABLET ORAL at 18:11

## 2019-01-01 RX ADMIN — THERA TABS 1 TABLET: TAB at 09:55

## 2019-01-01 RX ADMIN — INSULIN LISPRO 3 UNITS: 100 INJECTION, SOLUTION INTRAVENOUS; SUBCUTANEOUS at 12:09

## 2019-01-01 RX ADMIN — MIDAZOLAM 0.5 MG: 1 INJECTION INTRAMUSCULAR; INTRAVENOUS at 10:27

## 2019-01-01 RX ADMIN — TIMOLOL MALEATE 1 DROP: 2.5 SOLUTION/ DROPS OPHTHALMIC at 10:01

## 2019-01-01 RX ADMIN — Medication 10 ML: at 17:33

## 2019-01-01 RX ADMIN — Medication 10 ML: at 23:22

## 2019-01-01 SDOH — SOCIAL STABILITY - SOCIAL INSECURITY: DEPENDENT RELATIVE NEEDING CARE AT HOME: Z63.6

## 2019-02-10 PROBLEM — N39.0 ACUTE UTI: Status: ACTIVE | Noted: 2019-01-01

## 2019-02-10 PROBLEM — J18.9 PNEUMONIA: Status: ACTIVE | Noted: 2019-01-01

## 2019-02-10 PROBLEM — I48.91 NEW ONSET A-FIB (HCC): Status: ACTIVE | Noted: 2019-01-01

## 2019-02-10 PROBLEM — E11.00 DIABETIC HYPEROSMOLAR NON-KETOTIC STATE (HCC): Status: ACTIVE | Noted: 2019-01-01

## 2019-02-10 NOTE — H&P
History & Physical 
Patient: Hermes Holt MRN: [id number]  CSN: [id number] YOB: 1935  Age: 82 y.o. Sex: male DOA: 2/10/2019 Chief Complaint:  
Chief Complaint Patient presents with  Weight Loss HPI:  
 
Hermes Holt is a 82 y.o.  male who has PMH of DM on 70/30 and SSI, HTN and HLD. Pt presented to ER wit generalized weakness, weight loss associated with malnutrition, loss of appetite and poor ambulation. states that he has been having difficulties maintaining his balance with LE weakness that led to multiple falls. As per daughter, Pt currently stays in bed most of the time,eats poorly and refuses t have any physical activities. In Er, Pt was found to have multiple active problems including S with hyperosmolar status as pt stopped taking his insulin In the 24 h +, ew onset a-fib with RVR, PNA on CXE with productive cough and low grade fever and Positive UA. Looks moderately malnourished and unable to ambulate w/out support. HR in the 130's range and looks frail in general. 
Poor historian and most of HPI is collected from family at bedside Past Medical History:  
Diagnosis Date  Diabetes ([facility] [address]  Hypertension  Malignant neoplasm of prostate ([facility] [address] 1998 Past Surgical History:  
Procedure Laterality Date  BIOPSY OF PROSTATE,INCISIONAL  4/98  NE EXT BEAM RADIATION AS PRIMARY THERAPY TO PROSTATE ONLY  4/30-6/29/98 Family History Problem Relation Age of Onset  Diabetes Other  Hypertension Other Social History Socioeconomic History  Marital status: SINGLE Spouse name: Not on file  Number of children: Not on file  Years of education: Not on file  Highest education level: Not on file Tobacco Use  Smoking status: Current Some Day Smoker Packs/day: 0.10 Years: 22.00 Pack years: 2.20 Types: Cigarettes  Smokeless tobacco: Never Used Substance and Sexual Activity  Alcohol use: No  
  Alcohol/week: 0.0 oz  Drug use: No  
Social History Narrative ** Merged History Encounter **  
    
 
 
Prior to Admission medications Medication Sig Start Date End Date Taking? Authorizing Provider  
calcium carbonate (CALCIUM 500) 500 mg calcium (1,250 mg) tablet Take 1 Tab by mouth daily. 7/27/18   Lior Cruz NP  
oxybutynin (DITROPAN) 5 mg tablet Take 1 Tab by mouth three (3) times daily. 2/9/18   Nico Rubio MD  
atorvastatin (LIPITOR) 40 mg tablet Take 1 Tab by mouth nightly. 2/29/16   Provider, Historical  
hydrochlorothiazide 12.5 mg cap 12.5 mg, lisinopril 5 mg tab 10 mg Take  by mouth daily. Provider, Historical  
montelukast (SINGULAIR) 10 mg tablet Take 10 mg by mouth daily. Provider, Historical  
PRAVASTATIN SODIUM (PRAVASTATIN PO) Take  by mouth. Other, MD Blanca  
BRIMONIDINE TARTRATE/TIMOLOL (COMBIGAN OP) Apply  to eye. Other, MD Blanca  
BRINZOLAMIDE (AZOPT OP) Apply  to eye. Other, MD Blanca  
TRAVOPROST (TRAVATAN Z OP) Apply  to eye. Other, MD Blanca  
valsartan (DIOVAN) 80 mg tablet Take  by mouth daily. Provider, Historical  
SIMVASTATIN PO Take  by mouth. Provider, Historical  
aspirin delayed-release 81 mg tablet Take  by mouth daily. Provider, Historical  
 
 
Allergies Allergen Reactions  Iodinated Contrast- Oral And Iv Dye Hives Faint, shaking Review of Systems GENERAL: Patient alert, awake and oriented times 3, able to communicate full sentences and not in distress. Anxious HEENT: No change in vision, no earache, tinnitus, sore throat or sinus congestion. NECK: No pain or stiffness. PULMONARY: No shortness of breath, +ve cough/ wheeze. Cardiovascular: no pnd / orthopnea, no CP GASTROINTESTINAL: No abdominal pain, nausea, vomiting or diarrhea, melena or bright red blood per rectum. GENITOURINARY: No urinary frequency, urgency, hesitancy or dysuria. MUSCULOSKELETAL: +ve joint / muscle pain, no back pain, no recent trauma. DERMATOLOGIC: No rash, no itching, no lesions. ENDOCRINE: No polyuria, polydipsia, no heat or cold intolerance. No recent change in weight. HEMATOLOGICAL: No anemia or easy bruising or bleeding. NEUROLOGIC: No headache, seizures, numbness, tingling +ve LE weakness. Physical Exam:  
 
Physical Exam: 
Visit Vitals /72 Pulse (!) 101 Temp 97.4 °F (36.3 °C) Resp 16 Ht 5' 9\" (1.753 m) Wt 59 kg (130 lb) SpO2 99% BMI 19.20 kg/m² O2 Device: Room air Temp (24hrs), Av.4 °F (36.3 °C), Min:97.4 °F (36.3 °C), Max:97.4 °F (36.3 °C) No intake/output data recorded. No intake/output data recorded. General:  Alert, cooperative, no distress, appears stated age. Head: Normocephalic, without obvious abnormality, atraumatic. Eyes:  Conjunctivae/corneas clear. PERRL, EOMs intact. Nose: Nares normal. No drainage or sinus tenderness. Neck: Supple, symmetrical, trachea midline, no adenopathy, thyroid: no enlargement, no carotid bruit and no JVD. Lungs:   Clear to auscultation bilaterally. Heart:  Irregular rate and rhythm, S1, S2 tachy Abdomen: Soft, non-tender. Bowel sounds normal.   
Extremities: Extremities normal, atraumatic, no cyanosis or edema. Pulses: 2+ and symmetric all extremities. Skin:  No rashes or lesions Neurologic: AAOx3, No focal motor or sensory deficit. weak LE with decrease strength Labs Reviewed: 
 
Lab results reviewed. For significant abnormal values and values requiring intervention, see assessment and plan. CXR and EKG Procedures/imaging: see electronic medical records for all procedures/Xrays and details which were not copied into this note but were reviewed prior to creation of Plan Assessment/Plan Principal Problem: 
  Diabetic hyperosmolar non-ketotic state ([facility] [address] (2/10/2019) Active Problems: 
  Acute UTI (2/10/2019) Pneumonia (2/10/2019) New onset a-fib ([address] (2/10/2019) Moderate protein malnutrition ([address] (2/11/2019) Pt is admitted for generalized weakness 2 ry to ultiple etiologies including 1-Hyperosmolar status, elevated AG and dehydration >> IVF and started on lantus with SSI Q4 
2--New onset A-fib wiith RVR >> echo, cardiology consult ASA, eliquis, BB  
3- PN >> Broad spectrum Abx and blood Cx 4- UTI >> covered by Abx 5- moderate protein malnutrition >> nutrition consult Hx of HTN >> Continue Valsatan and hold HCTZ  
 
 
DVT/GI Prophylaxis: Eliquis Plan of care is discussed in details with Patient/Family at bedside and agreed upon Avani Rueda MD 
2/11/2019 6:16 PM

## 2019-02-10 NOTE — ED TRIAGE NOTES
Patient arrived via EMS c/o failure to thrive. Per medics, family is concerned because he has been loosing weight and moving and eating less. Patient has a history of Diabeties and Dementia.

## 2019-02-10 NOTE — ED PROVIDER NOTES
[employer] [facility] EMERGENCY DEPT 
 
 
5:22 PM 
 
Date: 2/10/2019 Patient Name: Sharif Patricia History of Presenting Illness Chief Complaint Patient presents with  Weight Loss 82 y.o. male with IDDM, HTN, and Prostate CA presents to the ED via EMS for c/o failure to thrive for the past few months. Daughter states the patient only eats peanut butter crackers and drinks water. He states it is too hard for him to walk to the kitchen to make food so that is why he eats this. He is very thirsty now. She notes that he is becoming so weak he can barely walk and falls frequently. Pt did not fall today. His only symptom currently is thirst.  Denies fever, chills, numbness, weakness, chest pain, SOB, or other symptoms at this time. No other complaints. Nursing notes regarding the HPI and triage nursing notes were reviewed. Prior medical records were reviewed. Current Facility-Administered Medications Medication Dose Route Frequency Provider Last Rate Last Dose  potassium chloride 10 mEq in 100 ml IVPB  10 mEq IntraVENous NOW HARRY Graham 100 mL/hr at 02/10/19 1754 10 mEq at 02/10/19 1754  cefTRIAXone (ROCEPHIN) 2 g in sterile water (preservative free) 20 mL IV syringe  2 g IntraVENous Q24H Jessica Gomez PA   2 g at 02/10/19 1754  azithromycin (ZITHROMAX) 500 mg in 0.9% sodium chloride (MBP/ADV) 250 mL adv  500 mg IntraVENous Q24H HARRY Herrera Current Outpatient Medications Medication Sig Dispense Refill  calcium carbonate (CALCIUM 500) 500 mg calcium (1,250 mg) tablet Take 1 Tab by mouth daily. 90 Tab 2  
 oxybutynin (DITROPAN) 5 mg tablet Take 1 Tab by mouth three (3) times daily. 90 Tab 10  
 atorvastatin (LIPITOR) 40 mg tablet Take 1 Tab by mouth nightly.  hydrochlorothiazide 12.5 mg cap 12.5 mg, lisinopril 5 mg tab 10 mg Take  by mouth daily.  montelukast (SINGULAIR) 10 mg tablet Take 10 mg by mouth daily.  PRAVASTATIN SODIUM (PRAVASTATIN PO) Take  by mouth.  BRIMONIDINE TARTRATE/TIMOLOL (COMBIGAN OP) Apply  to eye.  BRINZOLAMIDE (AZOPT OP) Apply  to eye.  TRAVOPROST (TRAVATAN Z OP) Apply  to eye.  valsartan (DIOVAN) 80 mg tablet Take  by mouth daily.  SIMVASTATIN PO Take  by mouth.  aspirin delayed-release 81 mg tablet Take  by mouth daily. Past History Past Medical History: 
Past Medical History:  
Diagnosis Date  Diabetes ([facility] [address]  Hypertension  Malignant neoplasm of prostate ([facility] [address] 1998 Past Surgical History: 
Past Surgical History:  
Procedure Laterality Date  BIOPSY OF PROSTATE,INCISIONAL  4/98  MN EXT BEAM RADIATION AS PRIMARY THERAPY TO PROSTATE ONLY  4/30-6/29/98 Family History: 
Family History Problem Relation Age of Onset  Diabetes Other  Hypertension Other Social History: 
Social History Tobacco Use  Smoking status: Current Some Day Smoker Packs/day: 0.10 Years: 22.00 Pack years: 2.20 Types: Cigarettes  Smokeless tobacco: Never Used Substance Use Topics  Alcohol use: No  
  Alcohol/week: 0.0 oz  Drug use: No  
 
 
Allergies: Allergies Allergen Reactions  Iodinated Contrast- Oral And Iv Dye Hives Faint, shaking Patient's primary care provider (as noted in EPIC):  Tessie Phipps MD 
 
Review of Systems Constitutional:  Denies malaise, fever, chills. Head:  Denies injury. Cardiac:  Denies chest pain or palpitations. Respiratory: + cough. Denies wheezing, difficulty breathing, shortness of breath. GI/ABD: + decreased appetite. Denies injury, pain, distention, nausea, vomiting, diarrhea. :  Denies injury, pain, dysuria or urgency. Back:  Denies injury or pain. Extremity/MS:  Denies injury or pain. Neuro: + generalized weakness. Denies headache, LOC, dizziness, neurologic symptoms/deficits/paresthesias. Skin: Denies injury, rash, itching or skin changes. All other systems negative as reviewed. Visit Vitals /67 (BP 1 Location: Right arm, BP Patient Position: At rest;Head of bed elevated (Comment degrees)) Pulse 87 Temp 97.4 °F (36.3 °C) Resp 16 Ht 5' 9\" (1.753 m) Wt 59 kg (130 lb) SpO2 100% BMI 19.20 kg/m² PHYSICAL EXAM: 
 
CONSTITUTIONAL:  Alert, in no apparent distress; cachectic appearing. HEAD:  Normocephalic, atraumatic. EYES:  EOMI. Non-icteric sclera. Normal conjunctiva. ENTM:  Mouth: mucous membranes dry. NECK:  Supple RESPIRATORY:  Chest clear, equal breath sounds, good air movement. CARDIOVASCULAR:  Regular rate and rhythm. No murmurs, rubs, or gallops. GI:  Normal bowel sounds, abdomen soft and non-tender. No rebound or guarding. UPPER EXT:  Thin, normal movements; decreased strength; unable to pull himself up. LOWER EXT:  Thin, able to move them; decreased strength. NEURO:  Moves all four extremities. Normal sensation in all four extremities. SKIN:  No rashes;  Normal for age. PSYCH:  Alert and normal affect. DIFFERENTIAL DIAGNOSES/ MEDICAL DECISION MAKING:  
Dehydration, hyperglycemia-induced weakness, electrolyte and/or endocrine imbalance, sepsis, cardiac arrhythmia, central versus peripheral vertigo, illicit drug intoxication, alcohol intoxication, prescribed drug toxicity, pregnancy in females patients, anxiety disorder, versus other etiologies or a combination of the above. ED COURSE:   
 
Recent Results (from the past 12 hour(s)) METABOLIC PANEL, COMPREHENSIVE Collection Time: 02/10/19  3:36 PM  
Result Value Ref Range Sodium 140 136 - 145 mmol/L Potassium 3.6 3.5 - 5.5 mmol/L Chloride 100 100 - 108 mmol/L  
 CO2 18 (L) 21 - 32 mmol/L Anion gap 22 (H) 3.0 - 18 mmol/L Glucose 421 (HH) 74 - 99 mg/dL BUN 43 (H) 7.0 - 18 MG/DL  Creatinine 1.00 0.6 - 1.3 MG/DL  
 BUN/Creatinine ratio 43 (H) 12 - 20    
 GFR est AA >60 >60 ml/min/1.73m2 GFR est non-AA >60 >60 ml/min/1.73m2 Calcium 8.8 8.5 - 10.1 MG/DL Bilirubin, total 1.4 (H) 0.2 - 1.0 MG/DL  
 ALT (SGPT) 10 (L) 16 - 61 U/L  
 AST (SGOT) 19 15 - 37 U/L Alk. phosphatase 103 45 - 117 U/L Protein, total 7.5 6.4 - 8.2 g/dL Albumin 2.0 (L) 3.4 - 5.0 g/dL Globulin 5.5 (H) 2.0 - 4.0 g/dL A-G Ratio 0.4 (L) 0.8 - 1.7    
CBC WITH AUTOMATED DIFF Collection Time: 02/10/19  3:36 PM  
Result Value Ref Range WBC 12.9 4.6 - 13.2 K/uL  
 RBC 3.97 (L) 4.70 - 5.50 M/uL  
 HGB 11.2 (L) 13.0 - 16.0 g/dL HCT 35.7 (L) 36.0 - 48.0 % MCV 89.9 74.0 - 97.0 FL  
 MCH 28.2 24.0 - 34.0 PG  
 MCHC 31.4 31.0 - 37.0 g/dL  
 RDW 17.5 (H) 11.6 - 14.5 % PLATELET 255 309 - 351 K/uL MPV 9.5 9.2 - 11.8 FL  
 NEUTROPHILS 93 (H) 42 - 75 % BAND NEUTROPHILS 1 0 - 5 % LYMPHOCYTES 1 (L) 20 - 51 % MONOCYTES 5 2 - 9 % EOSINOPHILS 0 0 - 5 % BASOPHILS 0 0 - 3 %  
 ABS. NEUTROPHILS 12.2 (H) 1.8 - 8.0 K/UL  
 ABS. LYMPHOCYTES 0.1 (L) 0.8 - 3.5 K/UL  
 ABS. MONOCYTES 0.6 0 - 1.0 K/UL  
 ABS. EOSINOPHILS 0.0 0.0 - 0.4 K/UL  
 ABS. BASOPHILS 0.0 0.0 - 0.06 K/UL PLATELET COMMENTS ADEQUATE PLATELETS    
 RBC COMMENTS NORMOCYTIC, NORMOCHROMIC    
 DF MANUAL CARDIAC PANEL,(CK, CKMB & TROPONIN) Collection Time: 02/10/19  3:36 PM  
Result Value Ref Range CK 38 (L) 39 - 308 U/L  
 CK - MB <1.0 <3.6 ng/ml CK-MB Index  0.0 - 4.0 % CALCULATION NOT PERFORMED WHEN RESULT IS BELOW LINEAR LIMIT Troponin-I, QT 0.02 0.0 - 0.045 NG/ML  
EKG, 12 LEAD, INITIAL Collection Time: 02/10/19  5:37 PM  
Result Value Ref Range Ventricular Rate 109 BPM  
 Atrial Rate 98 BPM  
 QRS Duration 70 ms Q-T Interval 300 ms QTC Calculation (Bezet) 404 ms Calculated R Axis 81 degrees Calculated T Axis 80 degrees Diagnosis Atrial fibrillation with rapid ventricular response Septal infarct , age undetermined Abnormal ECG 
 When compared with ECG of 28-MAR-2016 10:21, Atrial fibrillation has replaced Sinus rhythm Vent. rate has increased BY  37 BPM 
  
EKG, 12 LEAD, SUBSEQUENT Collection Time: 02/10/19  5:58 PM  
Result Value Ref Range Ventricular Rate 92 BPM  
 Atrial Rate 227 BPM  
 QRS Duration 86 ms  
 Q-T Interval 364 ms QTC Calculation (Bezet) 450 ms Calculated R Axis 83 degrees Calculated T Axis 93 degrees Diagnosis Atrial fibrillation Abnormal ECG When compared with ECG of 10-FEB-2019 17:37, No significant change was found Xr Chest [facility] Result Date: 2/10/2019 EXAM: XR CHEST PORT CLINICAL INDICATION/HISTORY : failure to thrive. COMPARISON: July 19, 2017 TECHNIQUE: One view FINDINGS: Lungs are hyperinflated. There are patchy opacities in the medial lungs bilaterally, new since prior. . The heart and mediastinum are unremarkable. No evidence of pleural effusion. Degenerative changes in the shoulders bilaterally. IMPRESSION:  1.  New patchy pulmonary opacities in the medial lungs bilaterally which may represent pneumonia. Suggest follow-up to resolution. 2.   Hyperinflation. IMPRESSION AND MEDICAL DECISION MAKING: 
Based upon the patient's presentation with noted HPI and PE, along with the work up done in the emergency department, I believe that the patient is having weakness likely from hyperglycemia/DKA and pneumonia, as well as decreased PO intake. Consult 4:30 PM: I have discussed case with Dr. Brant Hensley. Standard discussion regarding this patient's presenting symptoms, PMHX, exam, vital signs, available ancillary and diagnostic studies. Consulting MD states: he will accept the patient to step down. States this is likely hyperglycemic hyperosmolar state, requesting 10u IV insulin. As well as 10meq K IV and 40meq Kdur for K of 3.6.  
 
4:50PM Dr. Brant Hensley has seen and evaluated the patient. EKG indicates Afib rate of 109bpm; No STEMI. Will repeat Repeat EKG with Afib, rate of 109bpm.  
 
Dr. Bernice Zamora informed of Afib. Critical care time Excluding all other billable procedures. 35 minutes for managing HHS and afib. Diagnosis: 1. Diabetic hyperosmolar non-ketotic state ([facility] [address] 2. Community acquired pneumonia, unspecified laterality 3. Atrial fibrillation, unspecified type ([facility] Disposition: Admission Medication List  
  
ASK your doctor about these medications   
aspirin delayed-release 81 mg tablet 
  
atorvastatin 40 mg tablet Commonly known as:  LIPITOR AZOPT OP 
  
calcium carbonate 500 mg calcium (1,250 mg) tablet Commonly known as:  CALCIUM 500 Take 1 Tab by mouth daily. COMBIGAN OP 
  
DIOVAN 80 mg tablet Generic drug:  valsartan 
  
hydrochlorothiazide 12.5 mg cap 12.5 mg, lisinopril 5 mg tab 10 mg 
  
montelukast 10 mg tablet Commonly known as:  SINGULAIR 
  
oxybutynin 5 mg tablet Commonly known as:  ZACCWCRG Take 1 Tab by mouth three (3) times daily. PRAVASTATIN PO 
  
SIMVASTATIN PO 
  
TRAVATAN Z OP HARRY Farah

## 2019-02-11 PROBLEM — E44.0 MODERATE PROTEIN MALNUTRITION (HCC): Status: ACTIVE | Noted: 2019-01-01

## 2019-02-11 NOTE — ED NOTES
Bedside, Verbal and Written shift change report given to KRISTINA Guzman RN (oncoming nurse) by Avelino VERDUZCO(offgoing nurse). Report included the following information SBAR, Kardex, and MAR. Hourly rounding and  chart checks completed.

## 2019-02-11 NOTE — PROGRESS NOTES
Echocardiogram completed. Patient returned to room with armband in place. Report to follow.  
 
[address] [facility]

## 2019-02-11 NOTE — PROGRESS NOTES
[facility] Hospitalist Group Progress Note Patient: Alex Nesbitt Age: 82 y.o. : 1935 MR#: [id number] SSN: [id number] Date: 2019 Subjective:  
 
Pt reports feeling better. No chest pain, SOB, abd pain, fevers, or chills. D/w patient and daughters. States patient lives at home. Uses a cane. Daughters hope he can go to rehab/SNF at discharge. Assessment/Plan: 1. Diabetic Ketoacidosis: , A, CO2: 16, ketouria present: resolved with lantus and SSI. AG closed. Acidosis resolved. 2. Acute UTI: symptomatic, add urine cx. Cont abx. 3. Community Acquired Pneumonia: cont abx. 4. New onset a-fib: cardiology ffg. May cont Eliquis. Cont BB. Follow echo results. - noted cards notes on MGUS- plt wnl at this admission. Attempted to call hematologist, placed on tx team to eval in the am. Will trend cbc daily. 5. Moderate protein malnutrition: nutrition consult 6. Hypertension: cont BP meds 7. 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 Liliam Woodard 9. Low TSH levels: low FT3, normal FT4, d/w endocrine - Dr. Carlos Rhoades, states low TSH likely d/t acute illness. Can check TSH levels in 48 hrs to see improvement. 10. Tobacco Abuse: advised on cessation Goals of care: full code Disposition:  [x]PT/OT ordered - probable discharge in 2-3 days  [x] Case management referral 
 
Case discussed with:  [x]Patient  [x]Family  [x]Nursing  []Case Management DVT Prophylaxis:  []Lovenox  [x]Eliquis  []SCDs  []Coumadin   []On Heparin gtt Objective:  
VS:  
Visit Vitals /84 Pulse 89 Temp 97.4 °F (36.3 °C) Resp 18 Ht 5' 9\" (1.753 m) Wt 59 kg (130 lb) SpO2 92% BMI 19.20 kg/m² Tmax/24hrs: Temp (24hrs), Av.4 °F (36.3 °C), Min:97.4 °F (36.3 °C), Max:97.4 °F (36.3 °C) No intake or output data in the 24 hours ending 19 1510 General:  Awake, alert, NAD 
 Cardiovascular: irregular irregular Pulmonary: CTA  
GI:  NT, normal BS Extremities:  No edema or cyanosis Neuro: AAOx2 Labs:   
Recent Results (from the past 24 hour(s)) METABOLIC PANEL, COMPREHENSIVE Collection Time: 02/10/19  3:36 PM  
Result Value Ref Range Sodium 140 136 - 145 mmol/L Potassium 3.6 3.5 - 5.5 mmol/L Chloride 100 100 - 108 mmol/L  
 CO2 18 (L) 21 - 32 mmol/L Anion gap 22 (H) 3.0 - 18 mmol/L Glucose 421 (HH) 74 - 99 mg/dL BUN 43 (H) 7.0 - 18 MG/DL Creatinine 1.00 0.6 - 1.3 MG/DL  
 BUN/Creatinine ratio 43 (H) 12 - 20 GFR est AA >60 >60 ml/min/1.73m2 GFR est non-AA >60 >60 ml/min/1.73m2 Calcium 8.8 8.5 - 10.1 MG/DL Bilirubin, total 1.4 (H) 0.2 - 1.0 MG/DL  
 ALT (SGPT) 10 (L) 16 - 61 U/L  
 AST (SGOT) 19 15 - 37 U/L Alk. phosphatase 103 45 - 117 U/L Protein, total 7.5 6.4 - 8.2 g/dL Albumin 2.0 (L) 3.4 - 5.0 g/dL Globulin 5.5 (H) 2.0 - 4.0 g/dL A-G Ratio 0.4 (L) 0.8 - 1.7    
CBC WITH AUTOMATED DIFF Collection Time: 02/10/19  3:36 PM  
Result Value Ref Range WBC 12.9 4.6 - 13.2 K/uL  
 RBC 3.97 (L) 4.70 - 5.50 M/uL  
 HGB 11.2 (L) 13.0 - 16.0 g/dL HCT 35.7 (L) 36.0 - 48.0 % MCV 89.9 74.0 - 97.0 FL  
 MCH 28.2 24.0 - 34.0 PG  
 MCHC 31.4 31.0 - 37.0 g/dL  
 RDW 17.5 (H) 11.6 - 14.5 % PLATELET 551 197 - 658 K/uL MPV 9.5 9.2 - 11.8 FL  
 NEUTROPHILS 93 (H) 42 - 75 % BAND NEUTROPHILS 1 0 - 5 % LYMPHOCYTES 1 (L) 20 - 51 % MONOCYTES 5 2 - 9 % EOSINOPHILS 0 0 - 5 % BASOPHILS 0 0 - 3 %  
 ABS. NEUTROPHILS 12.2 (H) 1.8 - 8.0 K/UL  
 ABS. LYMPHOCYTES 0.1 (L) 0.8 - 3.5 K/UL  
 ABS. MONOCYTES 0.6 0 - 1.0 K/UL  
 ABS. EOSINOPHILS 0.0 0.0 - 0.4 K/UL  
 ABS. BASOPHILS 0.0 0.0 - 0.06 K/UL PLATELET COMMENTS ADEQUATE PLATELETS    
 RBC COMMENTS NORMOCYTIC, NORMOCHROMIC    
 DF MANUAL CARDIAC PANEL,(CK, CKMB & TROPONIN) Collection Time: 02/10/19  3:36 PM  
Result Value Ref Range  CK 38 (L) 39 - 308 U/L  
 CK - MB <1.0 <3.6 ng/ml CK-MB Index  0.0 - 4.0 % CALCULATION NOT PERFORMED WHEN RESULT IS BELOW LINEAR LIMIT Troponin-I, QT 0.02 0.0 - 0.045 NG/ML  
HEMOGLOBIN A1C WITH EAG Collection Time: 02/10/19  3:36 PM  
Result Value Ref Range Hemoglobin A1c 6.9 (H) 4.2 - 5.6 % Est. average glucose 151 mg/dL EKG, 12 LEAD, INITIAL Collection Time: 02/10/19  5:37 PM  
Result Value Ref Range Ventricular Rate 109 BPM  
 Atrial Rate 98 BPM  
 QRS Duration 70 ms Q-T Interval 300 ms QTC Calculation (Bezet) 404 ms Calculated R Axis 81 degrees Calculated T Axis 80 degrees Diagnosis Atrial fibrillation with rapid ventricular response Abnormal ECG When compared with ECG of 28-MAR-2016 10:21, Atrial fibrillation has replaced Sinus rhythm Vent. rate has increased BY  37 BPM 
Confirmed by Marimar Yepez ([id number]) on 2/11/2019 8:00:13 AM 
  
EKG, 12 LEAD, SUBSEQUENT Collection Time: 02/10/19  5:58 PM  
Result Value Ref Range Ventricular Rate 92 BPM  
 Atrial Rate 227 BPM  
 QRS Duration 86 ms  
 Q-T Interval 364 ms QTC Calculation (Bezet) 450 ms Calculated R Axis 83 degrees Calculated T Axis 93 degrees Diagnosis Atrial fibrillation Abnormal ECG When compared with ECG of 10-FEB-2019 17:37, No significant change was found Confirmed by Marimar Yepez ([id number]) on 2/11/2019 8:02:12 AM 
  
GLUCOSE, POC Collection Time: 02/10/19  6:26 PM  
Result Value Ref Range Glucose (POC) 422 (HH) 70 - 110 mg/dL METABOLIC PANEL, BASIC Collection Time: 02/10/19  6:50 PM  
Result Value Ref Range Sodium 138 136 - 145 mmol/L Potassium 3.8 3.5 - 5.5 mmol/L Chloride 100 100 - 108 mmol/L  
 CO2 16 (L) 21 - 32 mmol/L Anion gap 22 (H) 3.0 - 18 mmol/L Glucose 423 (HH) 74 - 99 mg/dL BUN 41 (H) 7.0 - 18 MG/DL Creatinine 1.04 0.6 - 1.3 MG/DL  
 BUN/Creatinine ratio 39 (H) 12 - 20 GFR est AA >60 >60 ml/min/1.73m2 GFR est non-AA >60 >60 ml/min/1.73m2 Calcium 8.8 8.5 - 10.1 MG/DL INFLUENZA A & B AG (RAPID TEST) Collection Time: 02/10/19  6:50 PM  
Result Value Ref Range Influenza A Antigen NEGATIVE  NEG Influenza B Antigen NEGATIVE  NEG    
TSH 3RD GENERATION Collection Time: 02/10/19  6:50 PM  
Result Value Ref Range TSH 0.15 (L) 0.36 - 3.74 uIU/mL GLUCOSE, POC Collection Time: 02/10/19 10:45 PM  
Result Value Ref Range Glucose (POC) 282 (H) 70 - 110 mg/dL MAGNESIUM Collection Time: 02/11/19  4:52 AM  
Result Value Ref Range Magnesium 2.0 1.6 - 2.6 mg/dL PHOSPHORUS Collection Time: 02/11/19  4:52 AM  
Result Value Ref Range Phosphorus 2.1 (L) 2.5 - 4.9 MG/DL  
CBC WITH AUTOMATED DIFF Collection Time: 02/11/19  4:52 AM  
Result Value Ref Range WBC 11.8 4.6 - 13.2 K/uL  
 RBC 3.80 (L) 4.70 - 5.50 M/uL  
 HGB 10.6 (L) 13.0 - 16.0 g/dL HCT 33.5 (L) 36.0 - 48.0 % MCV 88.2 74.0 - 97.0 FL  
 MCH 27.9 24.0 - 34.0 PG  
 MCHC 31.6 31.0 - 37.0 g/dL  
 RDW 17.3 (H) 11.6 - 14.5 % PLATELET 655 822 - 901 K/uL MPV 9.3 9.2 - 11.8 FL  
 NEUTROPHILS 92 (H) 42 - 75 % BAND NEUTROPHILS 4 0 - 5 % LYMPHOCYTES 1 (L) 20 - 51 % MONOCYTES 3 2 - 9 % EOSINOPHILS 0 0 - 5 % BASOPHILS 0 0 - 3 %  
 ABS. NEUTROPHILS 11.3 (H) 1.8 - 8.0 K/UL  
 ABS. LYMPHOCYTES 0.1 (L) 0.8 - 3.5 K/UL  
 ABS. MONOCYTES 0.4 0 - 1.0 K/UL  
 ABS. EOSINOPHILS 0.0 0.0 - 0.4 K/UL  
 ABS. BASOPHILS 0.0 0.0 - 0.06 K/UL  
 DF MANUAL PLATELET COMMENTS ADEQUATE PLATELETS    
 RBC COMMENTS ANISOCYTOSIS 
1+ METABOLIC PANEL, BASIC Collection Time: 02/11/19  4:52 AM  
Result Value Ref Range Sodium 144 136 - 145 mmol/L Potassium 3.4 (L) 3.5 - 5.5 mmol/L Chloride 107 100 - 108 mmol/L  
 CO2 31 21 - 32 mmol/L Anion gap 6 3.0 - 18 mmol/L Glucose 152 (H) 74 - 99 mg/dL BUN 34 (H) 7.0 - 18 MG/DL Creatinine 0.90 0.6 - 1.3 MG/DL  
 BUN/Creatinine ratio 38 (H) 12 - 20 GFR est AA >60 >60 ml/min/1.73m2 GFR est non-AA >60 >60 ml/min/1.73m2 Calcium 8.9 8.5 - 10.1 MG/DL  
T4, FREE Collection Time: 02/11/19  4:52 AM  
Result Value Ref Range T4, Free 1.1 0.7 - 1.5 NG/DL  
T3, FREE Collection Time: 02/11/19  4:52 AM  
Result Value Ref Range Triiodothyronine (T3), free 0.7 (L) 2.18 - 3.98 PG/ML  
GLUCOSE, POC Collection Time: 02/11/19  7:34 AM  
Result Value Ref Range Glucose (POC) 131 (H) 70 - 110 mg/dL GLUCOSE, POC Collection Time: 02/11/19  8:41 AM  
Result Value Ref Range Glucose (POC) 116 (H) 70 - 110 mg/dL ECHO ADULT COMPLETE Collection Time: 02/11/19 11:42 AM  
Result Value Ref Range LA Volume 42.69 18 - 58 mL Ao Root D 3.18 cm LVIDd 3.56 (A) 4.2 - 5.9 cm  
 LVPWd 1.09 (A) 0.6 - 1.0 cm LVIDs 2.90 cm IVSd 0.96 0.6 - 1.0 cm  
 LVOT d 1.98 cm  
 LVOT Peak Velocity 60.32 cm/s LVOT Peak Gradient 1.5 mmHg LVOT VTI 13.15 cm  
 LA Vol 4C 41.69 18 - 58 mL  
 LA Vol 2C 34.79 18 - 58 mL  
 LA Area 4C 15.8 cm2 LV Mass .3 88 - 224 g LV Mass AL Index 65.0 49 - 115 g/m2 Triscuspid Valve Regurgitation Peak Gradient 22.3 mmHg  
 TR Max Velocity 236.02 cm/s  
 LA Vol Index 24.82 16 - 28 ml/m2 LA Vol Index 20.23 16 - 28 ml/m2 LA Vol Index 24.24 16 - 28 ml/m2 PASP 25.0 mmHg URINALYSIS W/ RFLX MICROSCOPIC Collection Time: 02/11/19 12:00 PM  
Result Value Ref Range Color YELLOW Appearance CLOUDY Specific gravity 1.027 1.005 - 1.030    
 pH (UA) 6.0 5.0 - 8.0 Protein 30 (A) NEG mg/dL Glucose 250 (A) NEG mg/dL Ketone 15 (A) NEG mg/dL Bilirubin NEGATIVE  NEG Blood TRACE (A) NEG Urobilinogen 1.0 0.2 - 1.0 EU/dL Nitrites NEGATIVE  NEG Leukocyte Esterase NEGATIVE  NEG    
URINE MICROSCOPIC ONLY Collection Time: 02/11/19 12:00 PM  
Result Value Ref Range WBC 0 to 3 0 - 4 /hpf  
 RBC 2 to 4 0 - 5 /hpf Bacteria 2+ (A) NEG /hpf Mucus FEW (A) NEG /lpf Hyaline cast 0 to 2 0 - 2 /lpf Signed By: Jannet Roche PA-C  February 11, 2019 3:10 PM

## 2019-02-11 NOTE — CONSULTS
[facility] - Consult Note Consultation request by Kellee Tapia MD for advice/opinion related to evaluating Diabetic hyperosmolar non-ketotic state ([facility] [E11.00] Date of  Admission: 2/10/2019  2:51 PM  
Primary Care Physician:  Javad Carlos MD 
 
 Assessment:  
 
Patient Active Problem List  
Diagnosis Code  Malignant neoplasm of prostate ([facility] C61  Malignant neoplasm of prostate ([facility] C61  Hypoglycemia E16.2  History of prostate cancer Z85.46  
 Urinary frequency R35.0  Neutrophilic leukocytosis K91.1  Thrombocytosis (HCC) D47.3  Monoclonal gammopathy D47.2  Iron deficiency anemia secondary to inadequate dietary iron intake D50.8  Diabetic hyperosmolar non-ketotic state ([facility] E11.00  Acute UTI N39.0  Pneumonia J18.9  New onset a-fib (HCC) I48.91  
 Moderate protein malnutrition (HCC) E44.0  
 
-New onset atrial fibrillation with RVR- on Bb orally and will start on diltiazem drip 
-History of monoclonal gammopathy- followed by Dr. Iris Oshea 
-Iron deficient anemia 
-Neutrophilic leukocytosis -Thombocytopenia- chronic and last count at 30,000 at office; 343,000 today Atrial Fibrillation CHADSVASC2 Score Stroke Risk:  
82 y.o. > 76        +4   
male Male     +0  
CHF HX: No    + 0 HTN HX: No    + 0 Stroke/TIA/Thromboembolism No    +0 Vascular Disease HX: No    + 0 Diabetes Mellitus Yes    +1 CHADSVASC 2 Score 3      Annual Stroke Risk 3.2% - moderate-high   
 
 
 
-No primary cardiologist. Sees Dr. Iris Oshea for heme/oncology Plan:  
 
-HR still remaining in the 120-150 range and will place on Cardizem drip for rate control 
-Echo to assess wall function, EF 
-Given his history of thrombocytopenia he would not be a good candidate for long term oral anticoagulation. Would prefer to have his hematologist on board to offer his opinion on this. -More recommendations pending echo and clinical course. 
-Will follow. History of Present Illness: This is a 82 y.o. male admitted for Diabetic hyperosmolar non-ketotic state ([facility] [E11.00]. Patient complains of:  Patient admitted to the hospital for weakness to his legs per his history which is thought to not be as reliable. The patient is not able to give enough information related to any illnesses or symptoms. He is repeating things and history and does not appear to be consistent with his thoughts. He denies any chest pain, shortness of breath, but is not able to give enough of other information. Cardiac risk factors: sedentary life style, male gender, age Review of Symptoms:  Except as stated above include: 
Constitutional:  negative Respiratory:  negative Cardiovascular:  negative Gastrointestinal: negative Genitourinary:  negative Musculoskeletal:  Negative Neurological:  Negative Dermatological:  Negative Endocrinological: Negative Psychological:  Negative Review of systems not obtained due to patient factors. Past Medical History:  
 
Past Medical History:  
Diagnosis Date  Diabetes ([facility]  Hypertension  Malignant neoplasm of prostate ([facility] 1998 Social History:  
 
Social History Socioeconomic History  Marital status: SINGLE Spouse name: Not on file  Number of children: Not on file  Years of education: Not on file  Highest education level: Not on file Tobacco Use  Smoking status: Current Some Day Smoker Packs/day: 0.10 Years: 22.00 Pack years: 2.20 Types: Cigarettes  Smokeless tobacco: Never Used Substance and Sexual Activity  Alcohol use: No  
  Alcohol/week: 0.0 oz  Drug use: No  
Social History Narrative ** Merged History Encounter ** Family History:  
 
Family History Problem Relation Age of Onset  Diabetes Other  Hypertension Other Medications: Allergies Allergen Reactions  Iodinated Contrast- Oral And Iv Dye Hives Faint, shaking Current Facility-Administered Medications Medication Dose Route Frequency  metoprolol tartrate (LOPRESSOR) tablet 25 mg  25 mg Oral Q12H  cefTRIAXone (ROCEPHIN) 2 g in sterile water (preservative free) 20 mL IV syringe  2 g IntraVENous Q24H  
 azithromycin (ZITHROMAX) 500 mg in 0.9% sodium chloride (MBP/ADV) 250 mL adv  500 mg IntraVENous Q24H  
 insulin lispro (HUMALOG) injection   SubCUTAneous Q4H  
 glucose chewable tablet 16 g  4 Tab Oral PRN  
 glucagon (GLUCAGEN) injection 1 mg  1 mg IntraMUSCular PRN  
 dextrose (D50W) injection syrg 12.5-25 g  25-50 mL IntraVENous PRN  
 0.9% sodium chloride infusion  100 mL/hr IntraVENous CONTINUOUS  
 aspirin delayed-release tablet 81 mg  81 mg Oral DAILY  atorvastatin (LIPITOR) tablet 40 mg  40 mg Oral QHS  valsartan (DIOVAN) tablet 80 mg  80 mg Oral DAILY  acetaminophen (TYLENOL) tablet 650 mg  650 mg Oral Q6H PRN  
 naloxone (NARCAN) injection 0.4 mg  0.4 mg IntraVENous PRN  
 ondansetron (ZOFRAN) injection 4 mg  4 mg IntraVENous Q6H PRN  
 docusate sodium (COLACE) capsule 100 mg  100 mg Oral BID PRN  
 apixaban (ELIQUIS) tablet 5 mg  5 mg Oral BID  insulin glargine (LANTUS) injection 20 Units  20 Units SubCUTAneous DAILY Current Outpatient Medications Medication Sig  
 calcium carbonate (CALCIUM 500) 500 mg calcium (1,250 mg) tablet Take 1 Tab by mouth daily.  oxybutynin (DITROPAN) 5 mg tablet Take 1 Tab by mouth three (3) times daily.  atorvastatin (LIPITOR) 40 mg tablet Take 1 Tab by mouth nightly.  hydrochlorothiazide 12.5 mg cap 12.5 mg, lisinopril 5 mg tab 10 mg Take  by mouth daily.  montelukast (SINGULAIR) 10 mg tablet Take 10 mg by mouth daily.  PRAVASTATIN SODIUM (PRAVASTATIN PO) Take  by mouth.  BRIMONIDINE TARTRATE/TIMOLOL (COMBIGAN OP) Apply  to eye.  BRINZOLAMIDE (AZOPT OP) Apply  to eye.  TRAVOPROST (TRAVATAN Z OP) Apply  to eye.  valsartan (DIOVAN) 80 mg tablet Take  by mouth daily.  SIMVASTATIN PO Take  by mouth.  aspirin delayed-release 81 mg tablet Take  by mouth daily. Physical Exam:  
 
Visit Vitals /82 Pulse (!) 107 Temp 97.4 °F (36.3 °C) Resp 16 Ht 5' 9\" (1.753 m) Wt 59 kg (130 lb) SpO2 99% BMI 19.20 kg/m² BP Readings from Last 3 Encounters:  
02/11/19 146/82  
12/14/18 121/70  
12/13/18 115/63 Pulse Readings from Last 3 Encounters:  
02/11/19 (!) 107  
12/14/18 75  
12/13/18 98 Wt Readings from Last 3 Encounters:  
02/10/19 59 kg (130 lb) 12/04/18 60.1 kg (132 lb 6.4 oz) 10/23/18 57.6 kg (127 lb) General:  alert, cooperative, no distress, appears stated age, confused Neck:  nontender, no carotid bruit, no JVD Lungs:  clear to auscultation bilaterally Heart:  irregularly irregular rhythm Abdomen:  abdomen is soft without significant tenderness, masses, organomegaly or guarding Extremities:  extremities normal, atraumatic, no cyanosis or edema Skin: Warm and dry. no hyperpigmentation, vitiligo, or suspicious lesions Neuro: alert, oriented x3, affect appropriate, no focal neurological deficits, moves all extremities well, no involuntary movements Psych: confusion Data Review:  
 
Recent Labs  
  02/11/19 
0452 02/10/19 
1536 WBC 11.8 12.9 HGB 10.6* 11.2* HCT 33.5* 35.7*  
 330 Recent Labs  
  02/11/19 
0452 02/10/19 
1850 02/10/19 
1536  138 140  
K 3.4* 3.8 3.6  100 100 CO2 31 16* 18* * 423* 421* BUN 34* 41* 43* CREA 0.90 1.04 1.00  
CA 8.9 8.8 8.8 MG 2.0  --   --   
PHOS 2.1*  --   --   
ALB  --   --  2.0*  
SGOT  --   --  19 ALT  --   --  10* Results for orders placed or performed during the hospital encounter of 02/10/19 EKG, 12 LEAD, INITIAL Result Value Ref Range Ventricular Rate 109 BPM  
 Atrial Rate 98 BPM  
 QRS Duration 70 ms Q-T Interval 300 ms QTC Calculation (Bezet) 404 ms Calculated R Axis 81 degrees Calculated T Axis 80 degrees Diagnosis Atrial fibrillation with rapid ventricular response Abnormal ECG When compared with ECG of 28-MAR-2016 10:21, Atrial fibrillation has replaced Sinus rhythm Vent. rate has increased BY  37 BPM 
Confirmed by Izzy Meneses ([id number]) on 2/11/2019 8:00:13 AM 
  
 
 
All Cardiac Markers in the last 24 hours:   
Lab Results Component Value Date/Time CPK 38 (L) 02/10/2019 03:36 PM  
 CKMB <1.0 02/10/2019 03:36 PM  
 CKND1  02/10/2019 03:36 PM  
  CALCULATION NOT PERFORMED WHEN RESULT IS BELOW LINEAR LIMIT  
 TROIQ 0.02 02/10/2019 03:36 PM  
 
 
Last Lipid:   
Lab Results Component Value Date/Time Cholesterol, total 81 04/26/2018 07:51 AM  
 HDL Cholesterol 30 (L) 04/26/2018 07:51 AM  
 LDL, calculated 38.2 04/26/2018 07:51 AM  
 Triglyceride 64 04/26/2018 07:51 AM  
 CHOL/HDL Ratio 2.7 04/26/2018 07:51 AM  
 
 
Signed By: HARRY Dsouza February 11, 2019

## 2019-02-11 NOTE — ED NOTES
Bedside shift change report given to Zaid Jefferson RN (oncoming nurse) by Patricia Boswell RN (offgoing nurse). Report included the following information SBAR, ED Summary, MAR and Recent Results. Pt in NAD at this time resting on stretcher.

## 2019-02-12 NOTE — DIABETES MGMT
Diabetes Patient/Family Education RecordFactors That  May Influence Patients Ability  to Learn or  Comply with Recommendations []   Language barrier    []   Cultural needs   []   Motivation  
 []   Cognitive limitation    []   Physical   [x]   Education  
 []   Physiological factors   []   Hearing/vision/speaking impairment   []   Restorationism beliefs []   Financial factors   []  Other:   []  No factors identified at this time. Person Instructed: [x]   Patient   [x]   Family: Two supportive daughters visiting at bedside. []  Other Preference for Learning: 
 [x]   Verbal   [x]   Written   []  Demonstration Level of Comprehension & Competence:   
[x]  Good                                      [] Fair                                     []  Poor                             []  Needs Reinforcement  
[x]  Teachback completed Education Component:  
[x]  Medication management, including how to administer insulin (if appropriate) and potential medication interactions: Yes. Patient reported home diabetes medication: 
Novolog 70/30 mix insulin: 10-15 units 3x daily before meals but reported not eating enough which is causing frequent episode of low blood sugar. Educated patient about other insulin option: sliding scale fast acting insulin. Educated patient and he's willing to do this if this would prevent episodes of low blood sugar because he is not eating enough food. He verbalized understanding to start eating within 15 minutes after taking a fast acting insulin. Discussed above with Dr. Majo Noriega. Discharge plan for home diabetes med is for sliding scale fast acting insulin only. [x]  Nutritional management -obtain usual meal pattern: Yes. Patient is not eating enough at home. Educated. []  Exercise [x]  Signs, symptoms, and treatment of hyperglycemia and hypoglycemia: Yes.  Patient is able to recognize symptoms of low blood sugar and able to verbalize how to treat and when to call 911 for emergency medical help if he is unsuccessful in treating low blood sugar above 70. [x] Prevention, recognition and treatment of hyperglycemia and hypoglycemia [x]  Importance of blood glucose monitoring and how to obtain a blood glucose meter: Yes.   
[]  Instruction on use of the blood glucose meter [x]  Discuss the importance of HbA1C monitoring: Yes. Patient's current A1c level is 6.9% (2/10/2019) which is equivalent to estimated average blood glucose of 151 mg/dL during the past 2-3 months.   
[]  Sick day guidelines  
[x]  Proper use and disposal of lancets, needles, syringes or insulin pens (if appropriate): Yes. [x]  Potential long-term complications (retinopathy, kidney disease, neuropathy, foot care): Yes. [x] Information about whom to contact in case of emergency or for more information: Yes. [x]  Goal:  Patient/family will demonstrate understanding of Diabetes Self Management Skills by: 2/19/2019 Plan for post-discharge education or self-management support: 
  [x] Outpatient class schedule provided            [] Patient Declined 
  [] Scheduled for outpatient classes (date) _______ Verify: 
Does patient understand how diabetes medications work? No, he did not understand the Novolog 70/30 mix insulin. Educated. Does patient know what their most recent A1c is? Yes. Does patient monitor glucose at home? Yes. Does patient have difficulty obtaining diabetes medications and testing supplies? No. 
  
Oneida Valladares RN 
pgr [phone]

## 2019-02-12 NOTE — PROGRESS NOTES
Patient has been accepted to [address]- only SNF choice. Family aware and are grateful. He will need medical transport at discharge. Wali Taylor NP aware. Deysi Eaton RN 
[phone]

## 2019-02-12 NOTE — PROGRESS NOTES
Reason for Admission:  Diabetic hyperosmolar non-ketotic state ([facility] [E11.00] RRAT Score:    10 Plan for utilizing home health:    After his SNF stay Likelihood of Readmission:   Moderate Do you (patient/family) have any concerns for transition/discharge?  no 
 
Transition of Care Plan:    
 
Initial assessment completed with patient and daughters, Kenrick ([phone]) and Ramona Chapman ([phone]). Cognitive status of patient: oriented to time, place, person and situation. Face sheet information confirmed:  yes. The patient designates daughters, Kenrick and Ramona Chapman to participate in his/her discharge plan and to receive any needed information. This patient lives in a single family home with patient and other:  Roommate, who works during the day. Patient is able to navigate steps as needed. Prior to hospitalization, patient was considered to be independent with ADLs/IADLS : yes . Per daughters, he still drives. His daughter, Ramona Chapman lives 5 minutes away from the patient. If needed after SNF, daughter will move in with patient. He will have 24 hour care arranged by family if needed. Patient has a current ACP document on file: no The patient will need medical transport to SNF at discharge. The patient already has [employer] equipment available in the home. Patient is not currently active with home health. Patient has not stayed in a skilled nursing facility or rehab. This patient is on dialysis :no 
 
List of available SNF agencies were provided and reviewed with the patient prior to discharge. Freedom of choice signed: yes, for [address]. Currently, the discharge plan is SNF. The patient states that he can obtain his medications from the pharmacy, and take his medications as directed. Patient's current insurance is: [facility]/ [employer] Care Management Interventions PCP Verified by CM:  Yes 
 Palliative Care Criteria Met (RRAT>21 & CHF Dx)?: No 
Mode of Transport at Discharge: S Transition of Care Consult (CM Consult): SNF Partner SNF: Yes Discharge Durable Medical Equipment: No 
Physical Therapy Consult: Yes Occupational Therapy Consult: Yes Current Support Network: Own Home(Lives w/ roommate in his own home. Roommate works during the day) Confirm Follow Up Transport: Self Plan discussed with Pt/Family/Caregiver: Yes Freedom of Choice Offered: Yes Discharge Location Discharge Placement: Skilled nursing facility Idris Wang RN 
[phone]

## 2019-02-12 NOTE — PROGRESS NOTES
Problem: Self Care Deficits Care Plan (Adult) Goal: *Acute Goals and Plan of Care (Insert Text) Occupational Therapy Goals Initiated 2/12/2019 within 7 day(s). 1.  Patient will perform lower body dressing with supervision/set-up 2. Patient will perform toileting with minimal assistance/contact guard assist. 
3.  Patient will perform toilet transfer with minimal assistance/contact guard assist. 
4.  Patient will participate in upper extremity therapeutic exercise/activities with supervision/set-up for 5-7 minutes. 5.  Patient will utilize energy conservation techniques during functional activities with minimal verbal cues. Outcome: Progressing Towards Goal 
Occupational Therapy EVALUATION Patient: Efrain Hinkle (81 y.o. male) Date: 2/12/2019 Primary Diagnosis: Diabetic hyperosmolar non-ketotic state ([facility] [E11.00] Precautions: Falls ASSESSMENT : 
Pt cleared to participate in OT evaluation by RN. Upon entering room, pt supine with HOB elevated, drowsy, but agreeable to therapy session with daughters present. Based on the objective data described below, the patient presents with muscle weakness, decreased strength, decreased endurance, decreased functional balance, and decreased functional mobility, affecting his performance in basic self-care/ADL tasks. Pt requires max assist for bed mobility to participate in self-care. Pt requires min assist to perform LB dressing while sitting EOB, demonstrating poor dynamic sitting balance. Pt requires mod assist for sit-stand with use of a RW with max verbal cues for correct hand placement in preparation for safe participation in toilet/toilet transfers. Issued pt a red theraband and instructed pt to perform 10 repetitions of shoulder horizontal abduction and 10 reps of R/L bicep curls to increase UB strength for pt to don LB garments. Pt instructed to perform pursed-lip breathing between sets.  Educated pt and his daughters on the role of OT, evaluation process, therapeutic exercises, and energy conservation/pursed-lip breathing, and goals for therapy with pt demonstrating fair understanding. The pt will benefit from further OT services, in order to maximize his ADL performance and decrease the risk for complications associated with decreased functional activity. At the end of the session, pt left sitting at the edge of the bed, call-bell in reach, with all needs met, with PT present. Patient will benefit from skilled intervention to address the above impairments. Patients rehabilitation potential is considered to be Good Factors which may influence rehabilitation potential include:  
[]             None noted []             Mental ability/status [x]             Medical condition []             Home/family situation and support systems []             Safety awareness []             Pain tolerance/management 
[]             Other: PLAN : 
Recommendations and Planned Interventions:  
[x]               Self Care Training                  [x]        Therapeutic Activities [x]               Functional Mobility Training    []        Cognitive Retraining 
[x]               Therapeutic Exercises           [x]        Endurance Activities [x]               Balance Training                   []        Neuromuscular Re-Education []               Visual/Perceptual Training     [x]   Home Safety Training 
[x]               Patient Education                 [x]        Family Training/Education []               Other (comment): Frequency/Duration: Patient will be followed by occupational therapy 1-2 times per day/4-7 days per week to address goals. Discharge Recommendations: Elliot Garcia Further Equipment Recommendations for Discharge: Bedside commode Barriers to Learning/Limitations: yes;  altered mental status (i.e.Confusion) Compensate with: visual, verbal, tactile, kinesthetic cues/model PATIENT COMPLEXITY Eval Complexity: History: MEDIUM Complexity : Expanded review of history including physical, cognitive and psychosocial  history ; Examination: MEDIUM Complexity : 3-5 performance deficits relating to physical, cognitive , or psychosocial skils that result in activity limitations and / or participation restrictions; Decision Making:MEDIUM Complexity : Patient may present with comorbidities that affect occupational performnce. Miniml to moderate modification of tasks or assistance (eg, physical or verbal ) with assesment(s) is necessary to enable patient to complete evaluation  Assessment: Moderate Complexity SUBJECTIVE:  
Patient stated Amada Echavarria are my daughters\" When pt was asked who are present in the room. OBJECTIVE DATA SUMMARY:  
 
Past Medical History:  
Diagnosis Date  Diabetes ([facility]  Hypertension  Malignant neoplasm of prostate ([facility] 1998 Past Surgical History:  
Procedure Laterality Date  BIOPSY OF PROSTATE,INCISIONAL  4/98  IN EXT BEAM RADIATION AS PRIMARY THERAPY TO PROSTATE ONLY  4/30-6/29/98 Prior Level of Function/Home Situation: Pt reports he lives with a friend and was (I) with ADLs and IADLs, including cooking, cleaning, and driving PTA. The pt's daughters were present when pt reported this information. Home Situation Home Environment: Private residence One/Two Story Residence: One story Living Alone: No 
Support Systems: Friends \ neighbors, Child(monroe) Patient Expects to be Discharged to[de-identified] Rehabilitation facility Current DME Used/Available at Home: Cane, straight, Shower chair, grab bars  
[x]  Right hand dominant   []  Left hand dominantCognitive/Behavioral Status: 
Neurologic State: Alert;Confused;Drowsy Cognition: Follows commands Safety/Judgement: Decreased awareness of need for safety Skin: Visible skin appeared intact Edema: None noted Coordination: 
Coordination: Generally decreased, functional(BUEs) Balance: 
Sitting: Impaired Sitting - Static: Good (unsupported) Sitting - Dynamic: Poor (constant support) Standing: Impaired; With support Standing - Static: Poor Standing - Dynamic: Not assesed Strength: 
Strength: Generally decreased, functional(BUEs) Tone & Sensation: 
Tone: Normal(BUEs) Range of Motion: 
AROM: Generally decreased, functional(BUEs) Functional Mobility and Transfers for ADLs: 
Bed Mobility: 
Rolling: Moderate assistance;Maximum assistance Supine to Sit: Max assistance (HOB elevated) Transfers: 
Sit to Stand: Moderate assistance(max verbal cues for pushing up from bed instead of holding onto the RW before standing) ADL Assessment: 
Feeding: Setup Oral Facial Hygiene/Grooming: Setup Bathing: Minimum assistance Upper Body Dressing: Stand-by assistance;Setup Lower Body Dressing: Minimum assistance Toileting: Moderate assistance(d/t decreased standing balance for safe transfers) ADL Intervention: 
Cognitive Retraining Safety/Judgement: Decreased awareness of need for safety Therapeutic Exercise: 
Pt issued a red theraband and instructed to perform 10 repeitions of shoulder horizontal abduction and 10 reps of R/L bicep curls to increase UB strength for pt to don LB garments. Pt instructed to perform pursed-lip breathing between sets. Pain: 
Pt reports 0/10 pain or discomfort prior to treatment.   
Pt reports 0/10 pain or discomfort post treatment. Activity Tolerance:  
Poor Please refer to the flowsheet for vital signs taken during this treatment. After treatment:  
[] Patient left in no apparent distress sitting up in chair 
[x] Patient left in no apparent distress sitting edge of the bed, left with PT present. [x] Call bell left within reach 
[] Nursing notified 
[x] Daughter present 
[] Bed alarm activated COMMUNICATION/EDUCATION:  
[x] Home safety education was provided and the patient/caregiver indicated understanding. [x] Patient/family have participated as able in goal setting and plan of care. [x] Patient/family agree to work toward stated goals and plan of care. [] Patient understands intent and goals of therapy, but is neutral about his/her participation. [] Patient is unable to participate in goal setting and plan of care. Thank you for this referral. 
Leslie Aguirre, OT Time Calculation: 37 mins

## 2019-02-12 NOTE — PROGRESS NOTES
Problem: Mobility Impaired (Adult and Pediatric) Goal: *Acute Goals and Plan of Care (Insert Text) Physical Therapy Goals Initiated 2/12/2019 and to be accomplished within 7 day(s) 1. Patient will move from supine to sit and sit to supine  in bed with modified independence. 2.  Patient will transfer from bed to chair and chair to bed with supervision/set-up using the least restrictive device. 3.  Patient will perform sit to stand with supervision/set-up. 4.  Patient will ambulate with supervision/set-up for 200 feet with the least restrictive device. 5.  Patient will ascend/descend 3 stairs with 1 handrail(s) with minimal assistance/contact guard assist. 
physical Therapy EVALUATION Patient: Rachana Hua (89 y.o. male) Date: 2/12/2019 Primary Diagnosis: Diabetic hyperosmolar non-ketotic state ([facility] [address] [E11.00] Precautions: fall, decreased safety awareness ASSESSMENT : 
Based on the objective data described below, the patient presents with decreased strength, balance and activity tolerance resulting in decreased independence with functional mobility. Pt required mod A for sit to stand transfer due to difficulty following commands for sequencing about pushing up from the bed and not pulling on the walker. Pt transferred to the bedside commode with RW and min A. After toileting patient ambulated 150 feet with RW and min A. Patient's heart rate increased to 160 bpm during ambulation. Pt was returned to supine in bed with supervision. Pt was left in bed with needs in reach, family present and nursing notified. Patient will benefit from skilled intervention to address the above impairments. Patients rehabilitation potential is considered to be Good Factors which may influence rehabilitation potential include:  
[]         None noted 
[]         Mental ability/status [x]         Medical condition 
[]         Home/family situation and support systems 
[x]         Safety awareness []         Pain tolerance/management 
[]         Other: PLAN : 
Recommendations and Planned Interventions: 
[x]           Bed Mobility Training             [x]    Neuromuscular Re-Education 
[x]           Transfer Training                   []    Orthotic/Prosthetic Training 
[x]           Gait Training                          []    Modalities [x]           Therapeutic Exercises          []    Edema Management/Control 
[x]           Therapeutic Activities            [x]    Patient and Family Training/Education 
[]           Other (comment): Frequency/Duration: Patient will be followed by physical therapy 1-2 times per day/4-7 days per week to address goals. Discharge Recommendations: Elliot Garcia Further Equipment Recommendations for Discharge: N/A Eval Complexity: History: HIGH Complexity :3+ comorbidities / personal factors will impact the outcome/ POC Exam:MEDIUM Complexity : 3 Standardized tests and measures addressing body structure, function, activity limitation and / or participation in recreation  Presentation: MEDIUM Complexity : Evolving with changing characteristics  Clinical Decision Making:Medium Complexity , Overall Complexity:MEDIUM SUBJECTIVE:  
Patient stated I'm worn out.  OBJECTIVE DATA SUMMARY:  
 
Past Medical History:  
Diagnosis Date  Diabetes ([facility] [address]  Hypertension  Malignant neoplasm of prostate ([facility] [address] 1998 Past Surgical History:  
Procedure Laterality Date  BIOPSY OF PROSTATE,INCISIONAL  4/98  [place] EXT BEAM RADIATION AS PRIMARY THERAPY TO PROSTATE ONLY  4/30-6/29/98 Prior Level of Function/Home Situation: patient lives alone, ambulated with a cane prior to admission Home Situation Home Environment: Private residence One/Two Story Residence: One story Living Alone: No 
Support Systems: Friends \ neighbors, Child(monroe) Patient Expects to be Discharged to[de-identified] Rehabilitation facility Current DME Used/Available at Home: Cane, straightCritical Behavior: 
 calm and cooperative Strength:   
Strength: Generally decreased, functional(B LEs) Range Of Motion: 
AROM: Within functional limits(B LEs) Functional Mobility: 
Bed Mobility: 
Rolling: Moderate assistance;Maximum assistance Supine to Sit: Moderate assistance(HOB elevated) Transfers: 
Sit to Stand: Moderate assistance(due to difficulty following commands for sequencing) Stand to Sit: Moderate assistance Balance:  
Sitting: Impaired Sitting - Static: Good (unsupported) Sitting - Dynamic: Poor (constant support) Standing: With support Standing - Static: Fair Standing - Dynamic : (fair-)Ambulation/Gait Training: 
Distance (ft): 150 Feet (ft) Assistive Device: Walker, rolling Ambulation - Level of Assistance: Minimal assistance Gait Abnormalities: Decreased step clearance Pain: 
Pt reports 0/10 pain or discomfort prior to treatment.   
Pt reports 0/10 pain or discomfort post treatment. Activity Tolerance:  
Fair- 
Please refer to the flowsheet for vital signs taken during this treatment. After treatment:  
[]         Patient left in no apparent distress sitting up in chair 
[x]         Patient left in no apparent distress in bed 
[x]         Call bell left within reach [x]         Nursing notified 
[x]         Caregiver present 
[]         Bed alarm activated COMMUNICATION/EDUCATION:  
[x]         Fall prevention education was provided and the patient/caregiver indicated understanding. [x]         Patient/family have participated as able in goal setting and plan of care. [x]         Patient/family agree to work toward stated goals and plan of care. []         Patient understands intent and goals of therapy, but is neutral about his/her participation. []         Patient is unable to participate in goal setting and plan of care. Educated patient on transfer techniques and calling for assistance with mobility Thank you for this referral. 
Selena Reyes, PT Time Calculation: 40 mins

## 2019-02-12 NOTE — DIABETES MGMT
Glycemic Control Plan of Care 
 
T2DM with current A1c of 6.9% (2/10/2019). See separate notes, 02/12/2019, for assessment of home diabetes management and education in the presence of his two daughters visiting at bedside. They are concern about their father's irregular eating pattern, not eating enough, frequent episodes of low blood sugar, and falling frequently. Home diabetes medication: Novolog 70/30 mix insulin 10-15 units three times daily before meals but he often forget to eat meals after taking insulin. POC BG range on 02/11/2019: 116-166 mg/dL. Patient received 20 units of lantus and 2 units of correctional lispro insulin. POC BG report on 02/12/2019 at time of review: 35, 126 mg/dL. Noted patient was treated with 25 gm D50 for hypoglycemia. Patient reported that he felt shaky, weak, and sweaty. Discontinued lantus insulin. Patient is willing to switch to fast acting sliding scale insulin at home instead of taking 70/30 mix insulin. Educated patient on sliding scale insulin, eat meals within 15 minutes after taking this type of insulin. Daughters reported that their father like numbers and sliding scale insulin will be better option for him because he will not require insulin coverage if his blood sugar is <150. Recommendation(s): 
1.) Consider discontinue daily lantus insulin and place patient on correctional lispro insulin. Discussed with Dr. Rosie Patton and obtained order. 2.) Consider sliding scale insulin at discharge. Assessment: 
Patient is 82year old with past medical history including type 2 diabetes mellitus, prostate cancer, iron deficiency anemia, and hypertension - was admitted on 02/10/2019 with report of weight loss, generalized weakness, poor po intake and appetite. Noted: 
DKA, resolved. Acute UTI. 
CAP. New onset atrial fibrillation.  
 
Most recent blood glucose values: 
 
Results for Allen Styles (MRN [id number]) as of 2/12/2019 10:23 
 Ref. Range 2/11/2019 07:34 2/11/2019 08:41 2/11/2019 17:54 2/11/2019 22:30  
GLUCOSE,FAST - POC Latest Ref Range: 70 - 110 mg/dL 131 (H) 116 (H) 166 (H) 129 (H) Results for Carolyn Dodson (MRN [id number]) as of 2/12/2019 10:23 Ref. Range 2/12/2019 07:25 2/12/2019 07:36 GLUCOSE,FAST - POC Latest Ref Range: 70 - 110 mg/dL 35 (LL) 126 (H) Current A1C: 6.9% (2/10/2019) which is equivalent to estimated average blood glucose of 151 mg/dL during the past 2-3 months. Current hospital diabetes medications: 
Correctional lispro insulin ACHS. Normal sensitivity dose. Total daily dose insulin requirement previous day: 02/11/2019 Lantus: 20 units Lispro: 2 units TDD insulin: 22 units Home diabetes medications: Patient reported on 02/12/2019: 
Novolog 70/30 mix insulin 10-15 units three times daily before meals (breakfast, lunch, dinner). Diet: Cardiac regular; diabetic. Goals:  Blood glucose will be within target range of  mg/dL by 2/15/2019. Education:  _X__  Refer to Diabetes Education Record: 2/12/2019 
           ___  Education not indicated at this time Serafin Gtz RN [facility] Pager: [phone]

## 2019-02-12 NOTE — CONSULTS
Hematology / Oncology Consult Note Reason for Consultation: 
Milvia Hutchins is a 82 y.o. male who I've been asked to consult for assistance with the management of  the patients monoclonal gammopathy of undetermined significance. Subjective: HPI:  Mr. Colin Sierra is an 62-year-old -American man who has MGUS and a PMH of DM, HTN and HLD. He also has chronic neutrophilic leukocytosis with flow cytometry revealing no immunophenotypic aberrancy in the leukocyte populations. Pt presented to ED wit generalized weakness, weight loss associated with malnutrition, loss of appetite and poor ambulation. In the ED, the patient was found to have multiple active problems including S with hyperosmolar status as pt stopped taking his insulin X ~24h, new onset a-fib with RVR, PNA on CXE with productive cough and low grade fever and Positive UA. The patient was admitted on 2/10/19 for generalized weaness secondary to multiple etiologies including hyperosmolar staus, new onset A-fib with RVR, PNA and UTI. On admission, WBC 12.9, Hgb 11.2, Hct 35.7, Platelet count 342, Sodium 140, Potassium 3.6, Glucose 421, Crreatinie 1.00, BUN 43, Calcium 8.8, ALT/AST 10/19 respectively, CK-MB <1.0, Troponin-I 0.02, Hgb A1C 6.9 Echo 2/11/19 · Estimated left ventricular ejection fraction is 56 - 60%. Visually measured ejection fraction. Normal left ventricular wall motion, no regional wall motion abnormality noted. XR Chest 2/10/19 IMPRESSION:   
1.  New patchy pulmonary opacities in the medial lungs bilaterally which may 
represent pneumonia. Suggest follow-up to resolution. 2.   Hyperinflation. Today the patient states that he feels \" whole lot better\", he adds that his appetite is better and he ate a good breakfast. 
 
 
 
Review of Systems:  
Constitutional: Positive for fatigue,activity change, decrease appetite decrease.   
HENT: Negative for nosebleeds, congestion, facial swelling, mouth sores, trouble swallowing, neck pain, neck stiffness, voice change and postnasal drip. Eyes: Negative for photophobia, pain, discharge and itching. Respiratory: Positive for cough. Negative for apnea,  choking, chest tightness, wheezing and stridor. Cardiovascular: Negative for chest pain, palpitations and leg swelling. Gastrointestinal: Negative for abdominal pain. Negative for nausea, diarrhea, constipation, blood in stool and rectal pain. Genitourinary: Negative for dysuria, urgency, hematuria, flank pain and difficulty urinating. Musculoskeletal: Negative for back pain, joint swelling, arthralgias and gait problem. Skin: Negative for color change, pallor, rash and wound. Neurological: Positive for weakness. Negative for dizziness, facial asymmetry, speech difficulty, light-headedness and headaches. Hematological: Negative for adenopathy. Does not bruise/bleed easily. Psychiatric/Behavioral: Negative for hallucinations, confusion, disturbed wake/sleep cycle and agitation. Physical Assessment:  
 
Visit Vitals /69 Pulse (!) 101 Temp 97.4 °F (36.3 °C) Resp 18 Ht 5' 9\" (1.753 m) Wt 59 kg (130 lb) SpO2 94% BMI 19.20 kg/m² Temp (24hrs), Av °F (36.7 °C), Min:97.4 °F (36.3 °C), Max:98.4 °F (36.9 °C) Physical Exam: 
 
General: Appears comfortable, NAD. Cachetic,frail appearance. HEENT:  Anicteric sclerae; pink palpebral conjunctivae; mucosa moist 
Resp:  KOO, diffuse rhonchi. CV:  S1, S2 present; regular rate and rhythm GI:  Abdomen soft, non-tender; (+) active bowel sounds Extremities:  +2 pulses on all extremities; no edema/ cyanosis/ clubbing noted Skin:  Warm; no rashes/ lesions noted Neurologic:  Non-focal 
 
 
 
Assessment: · MGUS 
· Diabetic Ketoacidosis: Acidosis,resolved · Acute UTI · CAP · New Onset of A-Fib · Moderate protein malnutrition · H//O Intracapsular prostate cancer s/p radiation therapy over 20 yrs ago Plan: · MGUS: A follow-up SPEP on 10/23/2018 showed a value of 0.9 g/dL for the monoclonal paraprotein level, I will check again at this time. · H/H today 10.6/33.3, platelet count  200,668 · Acute UTI: abx per primary · A-Fib: cardiology following. On Eliquis and Aspirin 81mg daily, Cardizem · CAP: abx per primary · Nutrition following · Full Code · Continue current care Past Medical History:  
Diagnosis Date  Diabetes ([facility] [address]  Hypertension  Malignant neoplasm of prostate ([facility] [address] 1998 Past Surgical History:  
Procedure Laterality Date  BIOPSY OF PROSTATE,INCISIONAL  4/98  [place] EXT BEAM RADIATION AS PRIMARY THERAPY TO PROSTATE ONLY  4/30-6/29/98 Family History Problem Relation Age of Onset  Diabetes Other  Hypertension Other Allergies Allergen Reactions  Iodinated Contrast- Oral And Iv Dye Hives Faint, shaking Home Medications:  
Home Meds reviewed with patient Hospital Medications:  
Current hospital medications reviewed Labs: 
Recent Labs  
  02/12/19 
0555 02/11/19 
0452 02/10/19 
1536 WBC 9.8 11.8 12.9 RBC 3.83* 3.80* 3.97* HCT 33.3* 33.5* 35.7* MCV 86.9 88.2 89.9 MCH 27.7 27.9 28.2 MCHC 31.8 31.6 31.4  
RDW 17.4* 17.3* 17.5* Recent Labs  
  02/11/19 
0452 02/10/19 
1850 02/10/19 
1536 CO2 31 16* 18* BUN 34* 41* 43* No results for input(s): ALBUMIN in the last 72 hours. No lab exists for component: TOTPR, [facility] Thank you for requesting us to consult on your patient. We appreciate the opportunity to participate in your patient's care. Please call if you have questions.  
 
 
Gabi Paz NP

## 2019-02-12 NOTE — PROGRESS NOTES
[facility] - Progress Note Admit Date: 2/10/2019 Assessment:  
 
Hospital Problems  Date Reviewed: 12/4/2018 Codes Class Noted POA Moderate protein malnutrition (HCC) ICD-10-CM: E44.0 ICD-9-CM: 263.0  2/11/2019 Unknown * (Principal) Diabetic hyperosmolar non-ketotic state ([facility] [address] ICD-10-CM: E11.00 ICD-9-CM: 250.20  2/10/2019 Unknown Acute UTI ICD-10-CM: N39.0 ICD-9-CM: 599.0  2/10/2019 Unknown Pneumonia ICD-10-CM: J18.9 ICD-9-CM: 542  2/10/2019 Unknown New onset a-fib [facility]) ICD-10-CM: I48.91 
ICD-9-CM: 427.31  2/10/2019 Unknown  
   
  
 
 
 
-New onset atrial fibrillation with RVR- on Bb orally and will start on diltiazem drip. 
-Pneumonia on abx. 
-History of monoclonal gammopathy- followed by Dr. Jai Mosqueda 
-Iron deficient anemia 
-Neutrophilic leukocytosis -Thombocytopenia- chronic and last count at 30,000 at office; 343,000 today 
  
Atrial Fibrillation CHADSVASC2 Score Stroke Risk:  
82 y.o. > 70        +2   
male Male     +0  
CHF HX: No    + 0 HTN HX: No    + 0 Stroke/TIA/Thromboembolism No    +0 Vascular Disease HX: No    + 0 Diabetes Mellitus Yes    +1 CHADSVASC 2 Score 3      Annual Stroke Risk 3.2% - moderate-high   
  
  
  
-No primary cardiologist. Calixto Mosqueda for heme/oncology 
  
 
Plan:  
 
HR currently low 100s, will increase lopressor and wean off cardizem drip. Can stop diovan if low BP becomes an issue. Continued on Eliquis for [address]. Continue electrolyte replacement as needed. Subjective: Up working with PT.  
 
Objective:  
  
Patient Vitals for the past 8 hrs: 
 Temp Pulse Resp BP SpO2  
02/12/19 1032 98.3 °F (36.8 °C) 80 18 112/60 93 % 02/12/19 0722 97.4 °F (36.3 °C) (!) 101 18 133/69 94 % 02/12/19 0418 98.1 °F (36.7 °C) 95 18 124/65 96 % Patient Vitals for the past 96 hrs: 
 Weight  
02/11/19 1115 59 kg (130 lb) 02/10/19 1526 59 kg (130 lb) No intake or output data in the 24 hours ending 02/12/19 1110 Physical Exam: 
General:  alert, cooperative, no distress, appears stated age Neck:  no JVD Lungs:  clear to auscultation bilaterally Heart:  irregularly irregular rhythm Abdomen:  abdomen is soft without significant tenderness, masses, organomegaly or guarding Extremities:  extremities normal, atraumatic, no cyanosis or edema Data Review:  
 
Labs: Results:  
   
Chemistry Recent Labs  
  02/11/19 
0452 02/10/19 
1850 02/10/19 
1536 * 423* 421*  138 140  
K 3.4* 3.8 3.6  100 100 CO2 31 16* 18* BUN 34* 41* 43* CREA 0.90 1.04 1.00  
CA 8.9 8.8 8.8 MG 2.0  --   --   
PHOS 2.1*  --   --   
AGAP 6 22* 22* BUCR 38* 39* 43* AP  --   --  103 TP  --   --  7.5 ALB  --   --  2.0*  
GLOB  --   --  5.5* AGRAT  --   --  0.4* CBC w/Diff Recent Labs  
  02/12/19 
0555 02/11/19 
0452 02/10/19 
1536 WBC 9.8 11.8 12.9 RBC 3.83* 3.80* 3.97* HGB 10.6* 10.6* 11.2* HCT 33.3* 33.5* 35.7*  343 330 GRANS 94* 92* 93* LYMPH 3* 1* 1* EOS 1 0 0 Cardiac Enzymes No results found for: CPK, CK, CKMMB, CKMB, RCK3, CKMBT, CKNDX, CKND1, STEPHENIE, TROPT, TROIQ, MIKE, TROPT, TNIPOC, BNP, BNPP Coagulation No results for input(s): PTP, INR, APTT in the last 72 hours. No lab exists for component: INREXT Lipid Panel Lab Results Component Value Date/Time Cholesterol, total 81 04/26/2018 07:51 AM  
 HDL Cholesterol 30 (L) 04/26/2018 07:51 AM  
 LDL, calculated 38.2 04/26/2018 07:51 AM  
 VLDL, calculated 12.8 04/26/2018 07:51 AM  
 Triglyceride 64 04/26/2018 07:51 AM  
 CHOL/HDL Ratio 2.7 04/26/2018 07:51 AM  
  
BNP No results found for: BNP, BNPP, XBNPT Liver Enzymes Recent Labs  
  02/10/19 
1536 TP 7.5 ALB 2.0*  
 SGOT 19  
  
Digoxin Thyroid Studies Lab Results Component Value Date/Time TSH 0.15 (L) 02/10/2019 06:50 PM  
    
 
Signed By: HARRY Bai February 12, 2019

## 2019-02-12 NOTE — PROGRESS NOTES
NUTRITION Nursing Referral: [facility] Nutrition Consult: General Nutrition Management & Supplements RECOMMENDATIONS / PLAN:  
 
- Add supplements: Glucerna Shake TID. - Add MVI. - Monitor and encourage po intake. - Continue RD inpatient monitoring and evaluation. NUTRITION INTERVENTIONS & DIAGNOSIS:  
 
[x] Meals/snacks: modify composition 
[x] Medical food supplement therapy: initiate Nutrition Diagnosis: Unintended weight loss related to inadequate energy intake as evidenced by 60 lb, 32% weight loss x 6-8 months PTA. Patient meets criteria for Severe Protein Calorie Malnutrition as evidenced by:  
ASPEN Malnutrition Criteria Acute Illness, Chronic Illness, or Social/Enviornmental: Chronic illness Energy Intake: Less than/equal to 75% of est energy req for greater than/equal to 1 month Weight Loss: Greater than 20% x 1 yr Body Fat: Severe(orbital, tricep, thoracic and lumbar regions) Muscle Mass: Severe(temple, clavicle, scapular, patellar, anterior thigh regions) ASPEN Malnutrition Score - Chronic Illness: 24 Chronic Illness - Malnutrition Diagnosis: Severe malnutrition. ASSESSMENT:  
 
Pt with significant weight loss PTA and not eating at home, fearful of hyperglycemia per family report. Fair appetite, agreeable to supplements Completed NFPE. Average po intake adequate to meet patients estimated nutritional needs:   [] Yes     [x] No   [] Unable to determine at this time Diet: DIET CARDIAC Regular DIET NUTRITIONAL SUPPLEMENTS Breakfast, Lunch, Dinner; [address] Food Allergies: NKFA Current Appetite:   [] Good     [] Fair     [x] Poor     [] Other: 
Appetite/meal intake prior to admission:   [] Good     [] Fair     [x] Poor x past several months    [] Other: 
Feeding Limitations:  [] Swallowing difficulty    [] Chewing difficulty    [] Other: 
Current Meal Intake: No data found. BM: PTA Skin Integrity: WDL Edema:   [x] No     [] Yes Pertinent Medications: Reviewed: colace, SSI, zofran Recent Labs  
  02/11/19 
0452 02/10/19 
1850 02/10/19 
1536  138 140  
K 3.4* 3.8 3.6  100 100 CO2 31 16* 18* * 423* 421* BUN 34* 41* 43* CREA 0.90 1.04 1.00  
CA 8.9 8.8 8.8 MG 2.0  --   --   
PHOS 2.1*  --   --   
ALB  --   --  2.0*  
SGOT  --   --  19 ALT  --   --  10* Intake/Output Summary (Last 24 hours) at 2/12/2019 1341 Last data filed at 2/12/2019 1200 Gross per 24 hour Intake 126.92 ml Output  Net 126.92 ml Anthropometrics: 
Ht Readings from Last 1 Encounters:  
02/12/19 6' 1\" (1.854 m) Last 3 Recorded Weights in this Encounter 02/10/19 1526 02/11/19 1115 Weight: 59 kg (130 lb) 59 kg (130 lb) Body mass index is 17.15 kg/m². Underweight Weight History:  Pt has lost 60 lb (32%) in the past 6-8 months PTA per daughter report Weight Metrics 2/11/2019 12/4/2018 10/23/2018 7/24/2018 7/17/2018 7/16/2018 4/24/2018 Weight 130 lb 132 lb 6.4 oz 127 lb 142 lb 133 lb 143 lb 139 lb BMI 17.15 kg/m2 17.47 kg/m2 16.76 kg/m2 18.73 kg/m2 17.55 kg/m2 18.87 kg/m2 18.34 kg/m2 Admitting Diagnosis: Diabetic hyperosmolar non-ketotic state ([facility] [address] [E11.00] Pertinent PMHx: DM, HTN, HLD Education Needs:        [x] None identified  [] Identified - Not appropriate at this time  []  Identified and addressed - refer to education log Learning Limitations:   [x] None identified  [] Identified Cultural, Alevism & ethnic food preferences:  [x] None identified    [] Identified and addressed ESTIMATED NUTRITION NEEDS:  
 
Calories: 7219-2172 kcal (MSJx1.2-1.5) based on  [x] Actual BW 59 kg     [] IBW Protein: 47-71 gm (0.8-1.2 gm/kg) based on  [x] Actual BW      [] IBW Fluid: 1 mL/kcal 
  
MONITORING & EVALUATION:  
 
Nutrition Goal(s): 1. Po intake of meals will meet >75% of patient estimated nutritional needs within the next 7 days.   Outcome:  [] Met/Ongoing    []  Not Met [x] New/Initial Goal  
 
Monitoring:   [x] Food and beverage intake   [x] Diet order   [x] Nutrition-focused physical findings   [x] Treatment/therapy   [] Weight   [] Enteral nutrition intake Previous Recommendations (for follow-up assessments only):     []   Implemented       []   Not Implemented (RD to address)      [] No Longer Appropriate     [] No Recommendation Made Discharge Planning: diabetic diet [x] Participated in care planning, discharge planning, & interdisciplinary rounds as appropriate Bridget Dove RD, [facility] Pager: [phone]

## 2019-02-12 NOTE — PROGRESS NOTES
[facility] Hospitalist Group Progress Note Patient: Efrain Hinkle Age: 82 y.o. : 1935 MR#: [id number] SSN: [id number] Date: 2019 Subjective: Pt without any current complaints including no chest pain, SOB, abd pain, fevers, or chills. Assessment/Plan: 1. Diabetic Ketoacidosis: , A, CO2: 16, ketouria present: resolved with lantus and SSI. AG closed. Episode of hypoglycemia noted, SSI only and lantus dced 2. Acute UTI: symptomatic, add urine cx. Cont abx. 3. Community Acquired Pneumonia: cont abx. 4.  New onset a-fib: cardiology ffg. Cont Eliquis and monitor closely - if any evidence of bleeding, thrombocytopenia or recurrent falls at SNF should dc NOAC. Cont BB. Hematology input noted 5. Moderate protein malnutrition: nutrition consult 6. Hypertension: cont BP meds 7. 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 Gee Gallagher; SPEP on 10/23/2018 showed a value of 0.9 g/dL, recheck ordered per heme 9. Low TSH levels: low FT3, normal FT4, d/w endocrine - Dr. Franco Chaudhry, states low TSH likely d/t acute illness. Can check TSH levels in 48 hrs to see improvement. 10. Tobacco Abuse: advised on cessation 11. Dispo - discussed w/ pt, dtr, CM - pt has been accepted to SNF when medically stable Goals of care: full code Disposition:  [x]PT/OT ordered - probable discharge in 1-2 days  [x] Case management referral 
 
Case discussed with:  [x]Patient  [x]Family  [x]Nursing  []Case Management DVT Prophylaxis:  []Lovenox  [x]Eliquis  []SCDs  []Coumadin   []On Heparin gtt Objective:  
VS:  
Visit Vitals /60 Pulse 80 Temp 98.3 °F (36.8 °C) Resp 18 Ht 6' 1\" (1.854 m) Wt 59 kg (130 lb) SpO2 93% BMI 17.15 kg/m² Tmax/24hrs: Temp (24hrs), Av.1 °F (36.7 °C), Min:97.4 °F (36.3 °C), Max:98.4 °F (36.9 °C) Intake/Output Summary (Last 24 hours) at 2019 145 Last data filed at 2/12/2019 1431 Gross per 24 hour Intake 486.92 ml Output  Net 486.92 ml General:  Awake, alert, NAD Cardiovascular: irregular irregular Pulmonary: CTA  
GI:  NT, normal BS Extremities:  No edema or cyanosis Neuro: AAOx2 Labs:   
Recent Results (from the past 24 hour(s)) GLUCOSE, POC Collection Time: 02/11/19  5:54 PM  
Result Value Ref Range Glucose (POC) 166 (H) 70 - 110 mg/dL GLUCOSE, POC Collection Time: 02/11/19 10:30 PM  
Result Value Ref Range Glucose (POC) 129 (H) 70 - 110 mg/dL CBC WITH AUTOMATED DIFF Collection Time: 02/12/19  5:55 AM  
Result Value Ref Range WBC 9.8 4.6 - 13.2 K/uL  
 RBC 3.83 (L) 4.70 - 5.50 M/uL  
 HGB 10.6 (L) 13.0 - 16.0 g/dL HCT 33.3 (L) 36.0 - 48.0 % MCV 86.9 74.0 - 97.0 FL  
 MCH 27.7 24.0 - 34.0 PG  
 MCHC 31.8 31.0 - 37.0 g/dL  
 RDW 17.4 (H) 11.6 - 14.5 % PLATELET 938 693 - 035 K/uL MPV 9.2 9.2 - 11.8 FL  
 NEUTROPHILS 94 (H) 40 - 73 % LYMPHOCYTES 3 (L) 21 - 52 % MONOCYTES 2 (L) 3 - 10 % EOSINOPHILS 1 0 - 5 % BASOPHILS 0 0 - 2 %  
 ABS. NEUTROPHILS 9.3 (H) 1.8 - 8.0 K/UL  
 ABS. LYMPHOCYTES 0.3 (L) 0.9 - 3.6 K/UL  
 ABS. MONOCYTES 0.2 0.05 - 1.2 K/UL  
 ABS. EOSINOPHILS 0.1 0.0 - 0.4 K/UL  
 ABS. BASOPHILS 0.0 0.0 - 0.1 K/UL  
 DF AUTOMATED    
GLUCOSE, POC Collection Time: 02/12/19  7:25 AM  
Result Value Ref Range Glucose (POC) 35 (LL) 70 - 110 mg/dL GLUCOSE, POC Collection Time: 02/12/19  7:36 AM  
Result Value Ref Range Glucose (POC) 126 (H) 70 - 110 mg/dL GLUCOSE, POC Collection Time: 02/12/19 10:39 AM  
Result Value Ref Range Glucose (POC) 107 70 - 110 mg/dL Signed By: Remington Magallanes NP February 12, 2019

## 2019-02-13 NOTE — PROGRESS NOTES
Hematology/Medical Oncology Progress Note Name: Matha Severin : 1935 MRN: [id number] Date: 2019 9:36 AM 
  
[x]I have reviewed the flowsheet and previous days notes. Events overnight reviewed and discussed with nursing staff. Vital signs and records reviewed. Mr. Aditya Cool is an 61-year-old -American man who has MGUS and a PMHx of DM, HTN and HLD. The patient was admitted on 2/10/19 for generalized weaness secondary to multiple etiologies including hyperosmolar staus, new onset A-fib with RVR, PNA and UTI. 
 
 
 
 
           
ROS: 
Constitutional: Positive for fatigue,activity change, decrease appetite decrease. HENT: Negative for nosebleeds, congestion, facial swelling, mouth sores, trouble swallowing, neck pain, neck stiffness, voice change and postnasal drip. Eyes: Negative for photophobia, pain, discharge and itching. Respiratory: Positive for cough. Negative for apnea,  choking, chest tightness, wheezing and stridor. Cardiovascular: Negative for chest pain, palpitations and leg swelling. Gastrointestinal: Negative for abdominal pain. Negative for nausea, diarrhea, constipation, blood in stool and rectal pain. Genitourinary: Negative for dysuria, urgency, hematuria, flank pain and difficulty urinating. Musculoskeletal: Negative for back pain, joint swelling, arthralgias and gait problem. Skin: Negative for color change, pallor, rash and wound. Neurological: Positive for weakness. Negative for dizziness, facial asymmetry, speech difficulty, light-headedness and headaches. Hematological: Negative for adenopathy. Does not bruise/bleed easily. Psychiatric/Behavioral: Negative for hallucinations, confusion, disturbed wake/sleep cycle and agitation. Vital Signs:   
Visit Vitals /75 Pulse 86 Temp 97.7 °F (36.5 °C) Resp 18 Ht 6' 1\" (1.854 m) Wt 57.8 kg (127 lb 6.4 oz) SpO2 94% BMI 16.81 kg/m² O2 Device: Room air Temp (24hrs), Av.3 °F (36.8 °C), Min:97.7 °F (36.5 °C), Max:99.2 °F (37.3 °C) Intake/Output:  
Last shift:      701 - 1900 In: 240 [P.O.:240] Out: - Last 3 shifts: 1901 -  0700 In: 1461.9 [P.O.:560; I.V.:901.9] Out: 700 [Urine:700] Intake/Output Summary (Last 24 hours) at 2019 7917 Last data filed at 2019 5196 Gross per 24 hour Intake 1581.92 ml Output 700 ml Net 881.92 ml Physical Exam: 
 
General: Appears comfortable, NAD. Frail appearance. HEENT:  Anicteric sclerae; pink palpebral conjunctivae; mucosa moist 
Resp:  KOO, diffuse rhonchi. CV:  S1, S2 present; regular rate and rhythm GI:  Abdomen soft, non-tender; (+) active bowel sounds Extremities:  +2 pulses on all extremities; no edema/ cyanosis/ clubbing noted Skin:  Warm; no rashes/ lesions noted Neurologic:  Non-focal 
  
 
 
DATA:  
Current Facility-Administered Medications Medication Dose Route Frequency  insulin lispro (HUMALOG) injection   SubCUTAneous AC&HS  
 dextrose (D50W) injection syrg 12.5-25 g  25-50 mL IntraVENous PRN  
 metoprolol tartrate (LOPRESSOR) tablet 50 mg  50 mg Oral Q12H  therapeutic multivitamin (THERAGRAN) tablet 1 Tab  1 Tab Oral DAILY  cefTRIAXone (ROCEPHIN) 2 g in sterile water (preservative free) 20 mL IV syringe  2 g IntraVENous Q24H  
 azithromycin (ZITHROMAX) 500 mg in 0.9% sodium chloride (MBP/ADV) 250 mL adv  500 mg IntraVENous Q24H  
 glucose chewable tablet 16 g  4 Tab Oral PRN  
 glucagon (GLUCAGEN) injection 1 mg  1 mg IntraMUSCular PRN  
 dextrose (D50W) injection syrg 12.5-25 g  25-50 mL IntraVENous PRN  
 aspirin delayed-release tablet 81 mg  81 mg Oral DAILY  atorvastatin (LIPITOR) tablet 40 mg  40 mg Oral QHS  valsartan (DIOVAN) tablet 80 mg  80 mg Oral DAILY  acetaminophen (TYLENOL) tablet 650 mg  650 mg Oral Q6H PRN  
 naloxone (NARCAN) injection 0.4 mg  0.4 mg IntraVENous PRN  
  ondansetron (ZOFRAN) injection 4 mg  4 mg IntraVENous Q6H PRN  
 docusate sodium (COLACE) capsule 100 mg  100 mg Oral BID PRN  
 apixaban (ELIQUIS) tablet 5 mg  5 mg Oral BID Labs: 
Recent Labs  
  02/13/19 
0510 02/12/19 
0555 02/11/19 
6824 WBC 8.0 9.8 11.8 HGB 10.0* 10.6* 10.6* HCT 32.4* 33.3* 33.5*  
 275 343 Recent Labs  
  02/13/19 
0510 02/11/19 
0452 02/10/19 
1850 02/10/19 
1536  144 138 140  
K 3.5 3.4* 3.8 3.6  107 100 100 CO2 28 31 16* 18* * 152* 423* 421* BUN 21* 34* 41* 43* CREA 0.59* 0.90 1.04 1.00  
CA 8.4* 8.9 8.8 8.8 MG  --  2.0  --   --   
PHOS  --  2.1*  --   --   
ALB  --   --   --  2.0*  
SGOT  --   --   --  19 ALT  --   --   --  10* No results for input(s): PH, PCO2, PO2, HCO3, FIO2 in the last 72 hours. IMPRESSION:  
· MGUS 
· Diabetic Ketoacidosis: Acidosis,resolved · Acute UTI · CAP · New Onset of A-Fib · Moderate protein malnutrition · H//O Intracapsular prostate cancer s/p radiation therapy over 20 yrs ago · PLAN:  
· MGUS: A follow-up SPEP on 10/23/2018 showed a value of 0.9 g/dL for the monoclonal paraprotein level, SPEP recent lab result is pending. · H/H today 10.0/32.4, platelet count  331,722 · Acute UTI: abx per primary · A-Fib: cardiology following. On Eliquis and Aspirin 81mg daily, Cardizem · CAP: abx per primary · Nutrition following · Full Code · Continue current care, possible discharge to SNF on today. Pt to follow up with Dr. Jeanine Schmidt as OP within 7-10 days of discharge. 
  
 
·  · The patient is: [] acutely ill Risk of deterioration: [] moderate  
 [] critically ill  [] high My assessment/plan was discussed with: 
[x]nursing []PT/OT   
[]respiratory therapy [x]Dr.Lloyd Lacy Padilla MD  
  
[]family [] Yoana De Leon NP

## 2019-02-13 NOTE — PROGRESS NOTES
I have reviewed discharge instructions with the patient. The patient verbalized understanding. Pt is unable to sign for discharge. Dual signed by Sandy Patton RN to verify.

## 2019-02-13 NOTE — PROGRESS NOTES
[facility] - Progress Note Admit Date: 2/10/2019 Assessment:  
 
Hospital Problems  Date Reviewed: 12/4/2018 Codes Class Noted POA Moderate protein malnutrition (HCC) ICD-10-CM: E44.0 ICD-9-CM: 263.0  2/11/2019 Unknown * (Principal) Diabetic hyperosmolar non-ketotic state ([facility] [address] ICD-10-CM: E11.00 ICD-9-CM: 250.20  2/10/2019 Unknown Acute UTI ICD-10-CM: N39.0 ICD-9-CM: 599.0  2/10/2019 Unknown Pneumonia ICD-10-CM: J18.9 ICD-9-CM: 339  2/10/2019 Unknown New onset a-fib [facility]) ICD-10-CM: I48.91 
ICD-9-CM: 427.31  2/10/2019 Unknown  
   
  
 
 
 
-New onset atrial fibrillation with RVR- on Bb orally and will start on diltiazem drip. 
-Pneumonia on abx. 
-History of monoclonal gammopathy- followed by Dr. Nel David 
-Iron deficient anemia 
-Neutrophilic leukocytosis -Thombocytopenia- chronic and last count at 30,000 at office; 343,000 today 
  
Atrial Fibrillation CHADSVASC2 Score Stroke Risk:  
82 y.o. > 71        +5   
male Male     +0  
CHF HX: No    + 0 HTN HX: No    + 0 Stroke/TIA/Thromboembolism No    +0 Vascular Disease HX: No    + 0 Diabetes Mellitus Yes    +1  
CHADSVASC 2 Score 3      Annual Stroke Risk 3.2% - moderate-high   
  
  
  
-No primary cardiologist. Cathleen David for heme/oncology 
  
 
Plan:  
 
Currently appears sinus with PACS, stable BP, will continue lopressor. Continued on Eliquis as tolerates. Plan for transfer to SNF. Subjective: No CP. No SOB. Objective:  
  
Patient Vitals for the past 8 hrs: 
 Temp Pulse Resp BP SpO2  
02/13/19 0713 97.7 °F (36.5 °C) 86 18 136/75 94 % 02/13/19 0500 98 °F (36.7 °C) 95 18 138/75 95 % Patient Vitals for the past 96 hrs: 
 Weight  
02/13/19 0500 57.8 kg (127 lb 6.4 oz) 02/11/19 1115 59 kg (130 lb) 02/10/19 1526 59 kg (130 lb) Intake/Output Summary (Last 24 hours) at 2/13/2019 0677 Last data filed at 2/13/2019 2671 Gross per 24 hour Intake 1581.92 ml Output 700 ml Net 881.92 ml Physical Exam: 
General:  alert, cooperative, no distress, appears stated age Neck:  no JVD Lungs:  clear to auscultation bilaterally Heart:  regular rate and rhythm Abdomen:  abdomen is soft without significant tenderness, masses, organomegaly or guarding Extremities:  extremities normal, atraumatic, no cyanosis or edema Data Review:  
 
Labs: Results:  
   
Chemistry Recent Labs  
  02/13/19 
0510 02/11/19 
0452 02/10/19 
1850 02/10/19 
1536 * 152* 423* 421*  144 138 140  
K 3.5 3.4* 3.8 3.6  107 100 100 CO2 28 31 16* 18*  
BUN 21* 34* 41* 43* CREA 0.59* 0.90 1.04 1.00  
CA 8.4* 8.9 8.8 8.8 MG  --  2.0  --   --   
PHOS  --  2.1*  --   --   
AGAP 6 6 22* 22* BUCR 36* 38* 39* 43* AP  --   --   --  103 TP  --   --   --  7.5 ALB  --   --   --  2.0*  
GLOB  --   --   --  5.5* AGRAT  --   --   --  0.4* CBC w/Diff Recent Labs  
  02/13/19 
0510 02/12/19 
0555 02/11/19 
3451 WBC 8.0 9.8 11.8  
RBC 3.68* 3.83* 3.80* HGB 10.0* 10.6* 10.6* HCT 32.4* 33.3* 33.5*  
 275 343 GRANS 93* 94* 92* LYMPH 4* 3* 1* EOS 1 1 0 Cardiac Enzymes No results found for: CPK, CK, CKMMB, CKMB, RCK3, CKMBT, CKNDX, CKND1, STEPHENIE, TROPT, TROIQ, MIKE, TROPT, TNIPOC, BNP, BNPP Coagulation No results for input(s): PTP, INR, APTT in the last 72 hours. No lab exists for component: INREXT Lipid Panel Lab Results Component Value Date/Time Cholesterol, total 81 04/26/2018 07:51 AM  
 HDL Cholesterol 30 (L) 04/26/2018 07:51 AM  
 LDL, calculated 38.2 04/26/2018 07:51 AM  
 VLDL, calculated 12.8 04/26/2018 07:51 AM  
 Triglyceride 64 04/26/2018 07:51 AM  
 CHOL/HDL Ratio 2.7 04/26/2018 07:51 AM  
  
BNP No results found for: BNP, BNPP, XBNPT Liver Enzymes Recent Labs  
  02/10/19 
1536 TP 7.5 ALB 2.0*  
 SGOT 19  
  
Digoxin Thyroid Studies Lab Results Component Value Date/Time TSH 0.27 (L) 02/13/2019 05:10 AM  
    
 
Signed By: HARRY Jaramillo February 13, 2019

## 2019-02-13 NOTE — PROGRESS NOTES
conducted an initial consultation and Spiritual Assessment for [facility], who is a 82 y. o.,male. Patients Primary Language is: [place]. According to the patients EMR Gnosticism Affiliation is: [place].  
 
The reason the Patient came to the hospital is:  
Patient Active Problem List  
 Diagnosis Date Noted  Moderate protein malnutrition ([facility] [address] 02/11/2019  Diabetic hyperosmolar non-ketotic state ([facility] [address] 02/10/2019  Acute UTI 02/10/2019  Pneumonia 02/10/2019  New onset a-fib ([facility] [address] 02/10/2019  Iron deficiency anemia secondary to inadequate dietary iron intake 01/23/2018  Monoclonal gammopathy 09/12/2017  Neutrophilic leukocytosis 16/26/2019  Thrombocytosis ([facility] [address] 08/29/2017  History of prostate cancer 02/13/2015  Urinary frequency 02/13/2015  Hypoglycemia 01/04/2014  Malignant neoplasm of prostate ([facility] [address] 11/15/2011  Malignant neoplasm of prostate ([facility] The  provided the following Interventions: 
Initiated a relationship of care and support during visit in room 2302. Explored issues of nathalie, spirituality and/or Scientology needs while hospitalized. Listened empathically. Provided information about Spiritual Care Services. Offered prayer and assurance of continued prayers on patient's behalf. The following outcomes were achieved: 
Patient shared some information about his medical narrative and spiritual journey/beliefs. Patient processed feeling about current hospitalization. Patient expressed gratitude for family support and the 's visit. Assessment: 
Patient did not indicate any spiritual or Scientology issues which require Spiritual Care Services interventions at this time. Patient does not have any Scientology/cultural needs that will affect patients preferences in health care. Plan: 
Chaplains will continue to follow and will provide pastoral care on an as needed or requested basis.  recommends bedside caregivers page  on duty if patient shows signs of acute spiritual or emotional distress. [facility] Spiritual [facility]  
[phone]

## 2019-02-13 NOTE — DISCHARGE INSTRUCTIONS
Discharge Instructions    Patient: Joey Falcon MRN: [id number]  CSN: [id number]    YOB: 1935  Age: 82 y.o. Sex: male    DOA: 2/10/2019 LOS:  LOS: 3 days   Discharge Date:      DIET:  Cardiac Diet    ACTIVITY: Activity as tolerated    ·    PT/OT consult  ·     Accuchecks  QAC and QHS    ADDITIONAL INFORMATION: If you experience any of the following symptoms but not limited to Fever, chills, nausea, vomiting, diarrhea, change in mentation, falling, bleeding, shortness of breath, chest pain, please call your primary care physician or return to the emergency room if you cannot get hold of your doctor:     FOLLOW UP CARE:  Cardiology [facility] in 3-4 weeks   Dr. Denia Linares in 7-10 days    Will need follow up with PCP Dr. Leonel Jaime, Roxy Negron MD in 5-7 days upon discharge from [facility]      Collin De León NP  2/13/2019 10:42 AM          DISCHARGE SUMMARY from Nurse    PATIENT INSTRUCTIONS:    These are general instructions for a healthy lifestyle:    No smoking/ No tobacco products/ Avoid exposure to second hand smoke  Surgeon General's Warning:  Quitting smoking now greatly reduces serious risk to your health. Obesity, smoking, and sedentary lifestyle greatly increases your risk for illness    A healthy diet, regular physical exercise & weight monitoring are important for maintaining a healthy lifestyle    You may be retaining fluid if you have a history of heart failure or if you experience any of the following symptoms:  Weight gain of 3 pounds or more overnight or 5 pounds in a week, increased swelling in our hands or feet or shortness of breath while lying flat in bed. Please call your doctor as soon as you notice any of these symptoms; do not wait until your next office visit.     Recognize signs and symptoms of STROKE:    F-face looks uneven    A-arms unable to move or move unevenly    S-speech slurred or non-existent    T-time-call 911 as soon as signs and symptoms begin-DO NOT go       Back to bed or wait to see if you get better-TIME IS BRAIN. Warning Signs of HEART ATTACK     Call 911 if you have these symptoms:   Chest discomfort. Most heart attacks involve discomfort in the center of the chest that lasts more than a few minutes, or that goes away and comes back. It can feel like uncomfortable pressure, squeezing, fullness, or pain.  Discomfort in other areas of the upper body. Symptoms can include pain or discomfort in one or both arms, the back, neck, jaw, or stomach.  Shortness of breath with or without chest discomfort.  Other signs may include breaking out in a cold sweat, nausea, or lightheadedness. Don't wait more than five minutes to call 911 - MINUTES MATTER! Fast action can save your life. Calling 911 is almost always the fastest way to get lifesaving treatment. Emergency Medical Services staff can begin treatment when they arrive -- up to an hour sooner than if someone gets to the hospital by car.

## 2019-02-13 NOTE — PROGRESS NOTES
D/C order noted for today. Pt has been accepted to [facility]. Confirmed with April that bed is available today. Met with pt and daughter, Jay Oswald (via voicemail) and are agreeable to the transition plan today. Transport to facility has been arranged through [address] [place] [address],[address] at 3pm time. Pt's discharge summary has been forwarded to SNF via [address]. Bedside RN has been updated to the plan. Discharge information has been updated on the AVS.  Please call report to [phone]. Krzysztof Patterson RN 
[phone]

## 2019-02-13 NOTE — CDMP QUERY
Pt admitted with diabetic hyperosmolar non-ketotic state and has malnutrition documented as moderate protein. Please further specify type of malnutrition. 2/12/19 Dietician notes:  Patient meets criteria for Severe Protein Calorie Malnutrition  Severe protein calorie malnutrition  Other (please specify)  Unable to determine The medical record reflects the following: 
  Risk Factors: Chronic illness Clinical Indicators: Energy Intake: Less than/equal to 75% of est energy req for greater than/equal to 1 month Weight Loss: Greater than 20% x 1 yr Body Fat: Severe(orbital, tricep, thoracic and lumbar regions) Muscle Mass: Severe(temple, clavicle, scapular, patellar, anterior thigh regions) Treatment: - Add supplements: Glucerna Shake TID. - Add MVI. - Monitor and encourage po intake. - Continue RD inpatient monitoring and evaluation. If you DECLINE this query or would like to communicate with [facility], please utilize the \"[employer] message box\" at the TOP of the Progress Note on the right. Thank you, Geoffrey Martinez RN/CCDS 
[phone]

## 2019-02-13 NOTE — PROGRESS NOTES
Per Naty Rizo (MARISA) called [facility] scheduled transport for 3:00 pm to [facility] with [address]. Informed Naty Rizo of transportation arrangements.

## 2019-02-13 NOTE — DIABETES MGMT
Glycemic Control Plan of Care 
 
T2DM with current A1c of 6.9% (2/10/2019). See separate notes, 02/12/2019, for assessment of home diabetes management and education in the presence of his two daughters visiting at bedside. They are concern about their father's irregular eating pattern, not eating enough, frequent episodes of low blood sugar, and falling frequently. Home diabetes medication: Novolog 70/30 mix insulin 10-15 units three times daily before meals but he often forget to eat meals after taking insulin. Patient is willing to switch to fast acting sliding scale insulin at disch instead of taking 70/30 mix insulin. Educated patient on sliding scale insulin, eat meals within 15 minutes after taking this type of insulin. Daughters reported that their father like numbers and sliding scale insulin will be better option for him because he will not require insulin coverage if his blood sugar is <150. POC BG range on 02/12/2019:  mg/dL. Noted patient was treated with 25 gm D50 for hypoglycemia, discontinued lantus insulin but cont'd on correctional lispro insulin. POC BG report on 02/13/2019 at time of review: 331 mg/dL. Current Meal Intake: recorded. Patient Vitals for the past 100 hrs: 
 % Diet Eaten 02/13/19 0850 75 % 02/12/19 1808 50 % 02/12/19 1431 25 % 02/12/19 0900 25 % Noted plan for disch today and transition to SNF before going home. Recommendation(s): 
1.) Consider adding basal lantus insulin 10 units daily. Jeramy Orozco, to discuss first with Dr. Jodie Cerda. 
2.) Modified correctional lispro insulin to very resistant dose. Assessment: 
Patient is 82year old with past medical history including type 2 diabetes mellitus, prostate cancer, iron deficiency anemia, and hypertension - was admitted on 02/10/2019 with report of weight loss, generalized weakness, poor po intake and appetite. Noted: 
DKA, resolved. Acute UTI. 
CAP. New onset atrial fibrillation. Most recent blood glucose values: 
 
Results for Alton Paul (MRN [id number]) as of 2/13/2019 10:00 Ref. Range 2/12/2019 07:25 2/12/2019 07:36 2/12/2019 10:39 2/12/2019 15:58 2/12/2019 20:31 GLUCOSE,FAST - POC Latest Ref Range: 70 - 110 mg/dL 35 (LL) 126 (H) 107 305 (H) 208 (H) Results for Alton Paul (MRN [id number]) as of 2/13/2019 10:00 Ref. Range 2/13/2019 07:17 GLUCOSE,FAST - POC Latest Ref Range: 70 - 110 mg/dL 331 (H) Current A1C: 6.9% (2/10/2019) which is equivalent to estimated average blood glucose of 151 mg/dL during the past 2-3 months. Current hospital diabetes medications: 
Correctional lispro insulin ACHS. Very resistant dose. Total daily dose insulin requirement previous day: 02/12/2019 Lispro: 8 units Home diabetes medications: Patient reported on 02/12/2019: 
Novolog 70/30 mix insulin 10-15 units three times daily before meals (breakfast, lunch, dinner). Diet: Cardiac regular; diabetic. Goals:  Blood glucose will be within target range of  mg/dL by 2/16/2019. Education:  _X__  Refer to Diabetes Education Record: 2/12/2019 
           ___  Education not indicated at this time Lucinda Jiménez RN [facility] Pager: [phone]

## 2019-02-13 NOTE — DISCHARGE SUMMARY
[facility] Patient: Maura Belle Age: 82 y.o. : 1935 MR#: [id number] SSN: [id number] PCP on record: Bharti Oneal MD 
Admit date: 2/10/2019 Discharge date: 2019 Disposition:  
 []Home   []Home with Home Health   [x]SNF/NH   []Rehab   []Home with family []Alternate Facility:____________________ Admission Diagnoses: 
Diabetic hyperosmolar non-ketotic state ([facility] [address] [E11.00] Discharge Diagnoses:                            
Diabetic Ketoacidosis Community Acquired Pneumonia New onset a-fib Moderate protein malnutrition Hypertension H/o intracapsular prostate cancer s/p radiation therapy over 20 years ago Low TSH levels Tobacco Abuse Discharge Medications:  
 
Current Discharge Medication List  
  
START taking these medications Details  
apixaban (ELIQUIS) 5 mg tablet Take 1 Tab by mouth two (2) times a day. Qty: 60 Tab, Refills: 0  
  
docusate sodium (COLACE) 100 mg capsule Take 1 Cap by mouth two (2) times daily as needed for Constipation for up to 30 days. Qty: 60 Cap, Refills: 0  
  
glucose 4 gram chewable tablet Take 4 Tabs by mouth as needed. Qty: 10 Tab, Refills: 0  
  
insulin glargine (LANTUS) 100 unit/mL injection 5 Units by SubCUTAneous route daily. Qty: 1 Vial, Refills: 0  
  
insulin lispro (HUMALOG) 100 unit/mL injection Very Insulin Resistant For Blood Sugar (mg/dL) of:             
Less than 150 give no insulin  0 units 150 -199 =   3 units 200 -249 =   6 units 250 -299 =   9 units 300 -349 =   12 units 350 and above =   15 units Initiate Hypoglycemic protocol if blood glucose is <70 mg/dL. Fast Acting - Ad Qty: 1 Vial, Refills: 0  
  
therapeutic multivitamin (THERAGRAN) tablet Take 1 Tab by mouth daily. Qty: 30 Tab, Refills: 0  
  
metoprolol tartrate (LOPRESSOR) 50 mg tablet Take 1 Tab by mouth every twelve (12) hours. Qty: 60 Tab, Refills: 0 acetaminophen (TYLENOL) 325 mg tablet Take 2 Tabs by mouth every six (6) hours as needed. Qty: 30 Tab, Refills: 0  
  
cefpodoxime (VANTIN) 200 mg tablet Take 1 Tab by mouth two (2) times a day for 11 days. Qty: 22 Tab, Refills: 0  
  
azithromycin (ZITHROMAX) 500 mg tab Take 1 Tab by mouth daily for 3 days. Qty: 3 Tab, Refills: 0 CONTINUE these medications which have CHANGED Details  
travoprost (TRAVATAN Z) 0.004 % ophthalmic solution Administer 1 Drop to both eyes two (2) times a day. Qty: 5 mL, Refills: 0  
  
brinzolamide (AZOPT) 1 % ophthalmic suspension Administer 1 Drop to both eyes two (2) times a day. Qty: 5 mL, Refills: 0  
  
brimonidine-timolol (COMBIGAN) 0.2-0.5 % drop ophthalmic solution Administer 1 Drop to both eyes every twelve (12) hours. Qty: 5 mL, Refills: 0 CONTINUE these medications which have NOT CHANGED Details  
calcium carbonate (CALCIUM 500) 500 mg calcium (1,250 mg) tablet Take 1 Tab by mouth daily. Qty: 90 Tab, Refills: 2 Associated Diagnoses: Malignant neoplasm of prostate ([facility] [address] Monoclonal gammopathy  
  
oxybutynin (DITROPAN) 5 mg tablet Take 1 Tab by mouth three (3) times daily. Qty: 90 Tab, Refills: 10  
 Associated Diagnoses: Urinary frequency  
  
atorvastatin (LIPITOR) 40 mg tablet Take 1 Tab by mouth nightly. valsartan (DIOVAN) 80 mg tablet Take  by mouth daily. aspirin delayed-release 81 mg tablet Take  by mouth daily. STOP taking these medications  
  
 hydrochlorothiazide 12.5 mg cap 12.5 mg, lisinopril 5 mg tab 10 mg Comments:  
Reason for Stopping:   
   
 montelukast (SINGULAIR) 10 mg tablet Comments:  
Reason for Stopping: PRAVASTATIN SODIUM (PRAVASTATIN PO) Comments:  
Reason for Stopping: SIMVASTATIN PO Comments:  
Reason for Stopping:   
   
  
 
Consults:   
- cardiology  
- hematology Procedures: 
-   None Significant Diagnostic Studies:  
-  XR CHEST PORT on 02/10/2019 IMPRESSION:   
 1.  New patchy pulmonary opacities in the medial lungs bilaterally which may 
represent pneumonia. Suggest follow-up to resolution. 2.   Hyperinflation. Hospital Course by Problem 1. Diabetic Ketoacidosis - resolved with lantus and SSI along with diabetic diet during admission. Patient with episode of hypoglycemia noted on AM of 02/12 for which lantus was stopped. At time of discharge low dose lantus reintroduced, continue SSI on discharge. 2. Community Acquired Pneumonia - treated with IV antibiotics including azithromycin and rocephin while inpatient with transition to oral antibiotics upon discharge. No convincing evidence of UTI during admission. 3. New onset a-fib - started on eliquis while inpatient without close following of platelets and Hgb while inpatient. Platelets trended 135>538>616>925 during admission and hemoglobin stable at 11.2>10.6>10.0 during admission. Of note, patient has monoclonal gammopathy of undetermined significannce (MGUS) for which hematology following during admission and close follow up requested on discharge. If any evidence of bleeding, thrombocytopenia or recurrent falls at SNF should discontinue eliquis. Patient initially managed with diltiazem drip which has been discontinued and continue lopressor on discharge with good rate control. 4. Moderate protein malnutrition: nutrition consulted, improved intake while inpatient 5. Hypertension - continued on lopressor and diovan while inpatient. Per cardiology if low BP an issue can stop diovan. 6. H/o intracapsular prostate cancer s/p radiation therapy over 20 years ago 7. Low TSH levels: low FT3, normal FT4, d/w endocrine during admission Dr. Alma Justice, states low TSH likely d/t acute illness. Recheck on 02/13 with slight improvement with TSH of 0.27 and free T4 of 1.0. Follow as outpatient. 8. Tobacco Abuse: advised on cessation Today's examination of the patient revealed:  
 
Subjective: Objective:  
VS:  
Visit Vitals /73 Pulse 76 Temp 97 °F (36.1 °C) Resp 18 Ht 6' 1\" (1.854 m) Wt 57.8 kg (127 lb 6.4 oz) SpO2 97% BMI 16.81 kg/m² Tmax/24hrs: Temp (24hrs), Av °F (36.7 °C), Min:97 °F (36.1 °C), Max:99.2 °F (37.3 °C) Input/Output:  
 
Intake/Output Summary (Last 24 hours) at 2019 1039 Last data filed at 2019 9264 Gross per 24 hour Intake 1651.92 ml Output 700 ml Net 951.92 ml General:  Alert, NAD Cardiovascular:  RRR Pulmonary:  LSC throughout; respiratory effort WNL 
GI:  +BS in all four quadrants, soft, non-tender Extremities:  No edema; 2+ dorsalis pedis pulses bilaterally Additional:   
 
Labs:   
Recent Results (from the past 24 hour(s)) GLUCOSE, POC Collection Time: 19  3:58 PM  
Result Value Ref Range Glucose (POC) 305 (H) 70 - 110 mg/dL GLUCOSE, POC Collection Time: 19  8:31 PM  
Result Value Ref Range Glucose (POC) 208 (H) 70 - 110 mg/dL CBC WITH AUTOMATED DIFF Collection Time: 19  5:10 AM  
Result Value Ref Range WBC 8.0 4.6 - 13.2 K/uL  
 RBC 3.68 (L) 4.70 - 5.50 M/uL  
 HGB 10.0 (L) 13.0 - 16.0 g/dL HCT 32.4 (L) 36.0 - 48.0 % MCV 88.0 74.0 - 97.0 FL  
 MCH 27.2 24.0 - 34.0 PG  
 MCHC 30.9 (L) 31.0 - 37.0 g/dL  
 RDW 17.5 (H) 11.6 - 14.5 % PLATELET 167 496 - 951 K/uL MPV 9.4 9.2 - 11.8 FL  
 NEUTROPHILS 93 (H) 40 - 73 % LYMPHOCYTES 4 (L) 21 - 52 % MONOCYTES 2 (L) 3 - 10 % EOSINOPHILS 1 0 - 5 % BASOPHILS 0 0 - 2 %  
 ABS. NEUTROPHILS 7.4 1.8 - 8.0 K/UL  
 ABS. LYMPHOCYTES 0.3 (L) 0.9 - 3.6 K/UL  
 ABS. MONOCYTES 0.2 0.05 - 1.2 K/UL  
 ABS. EOSINOPHILS 0.1 0.0 - 0.4 K/UL  
 ABS. BASOPHILS 0.0 0.0 - 0.1 K/UL  
 DF AUTOMATED    
TSH 3RD GENERATION Collection Time: 19  5:10 AM  
Result Value Ref Range TSH 0.27 (L) 0.36 - 3.74 uIU/mL T4, FREE Collection Time: 19  5:10 AM  
Result Value Ref Range  T4, Free 1.0 0.7 - 1.5 NG/DL  
 METABOLIC PANEL, BASIC Collection Time: 02/13/19  5:10 AM  
Result Value Ref Range Sodium 140 136 - 145 mmol/L Potassium 3.5 3.5 - 5.5 mmol/L Chloride 106 100 - 108 mmol/L  
 CO2 28 21 - 32 mmol/L Anion gap 6 3.0 - 18 mmol/L Glucose 277 (H) 74 - 99 mg/dL BUN 21 (H) 7.0 - 18 MG/DL Creatinine 0.59 (L) 0.6 - 1.3 MG/DL  
 BUN/Creatinine ratio 36 (H) 12 - 20 GFR est AA >60 >60 ml/min/1.73m2 GFR est non-AA >60 >60 ml/min/1.73m2 Calcium 8.4 (L) 8.5 - 10.1 MG/DL  
GLUCOSE, POC Collection Time: 02/13/19  7:17 AM  
Result Value Ref Range Glucose (POC) 331 (H) 70 - 110 mg/dL Additional Data Reviewed: 
  
Condition: Stable Follow-up Appointments:  
Cardiology [facility] in 3-4 weeks Dr. Ruchi Perez in 7-10 days Will need follow up with PCP Dr. Katina Morales MD in 5-7 days upon discharge from SNF Protein electrophoresis pending 
 
>30 minutes spent coordinating this discharge (review instructions/follow-up, prescriptions, preparing report for sign off) Signed: 
Susannah Montiel NP 
2/13/2019 
10:39 AM

## 2019-02-21 NOTE — PROGRESS NOTES
Community Care Team Documentation for Patient in [facility]     Patient discharged from [facility] 2/10/2019 - 2/13/2019 to [facility], on 2/13/2019. Hospital Discharge diagnosis:     Diabetic Ketoacidosis  Community Acquired Pneumonia  New onset a-fib  Moderate protein malnutrition  Hypertension   H/o intracapsular prostate cancer s/p radiation therapy over 20 years ago  Low TSH levels   Tobacco Abuse    SNF Attending Provider:      Anticipated discharge date from SNF:        PCP : Laura Little MD    Nurse Navigator:     [facility] Team rounds completed, updates provided by facility. Full Code  PT/OT: amb 30-45 ft, progressing well. Anticipate dc 3/20. Dispo: return home. Lives with roommate, supportive dtr. [facility]. Low Risk            8       Total Score        3 Has Seen PCP in Last 6 Months (Yes=3, No=0)    5 Charlson Comorbidity Score (Age + Comorbid Conditions)        Criteria that do not apply:    . Living with Significant Other. Assisted Living. LTAC. SNF. or   Rehab    Patient Length of Stay (>5 days = 3)    IP Visits Last 12 Months (1-3=4, 4=9, >4=11)    Pt.  Coverage (Medicare=5 , Medicaid, or Self-Pay=4)      Active Ambulatory Problems     Diagnosis Date Noted    Malignant neoplasm of prostate ([address]     Malignant neoplasm of prostate ([address] 11/15/2011    Hypoglycemia 01/04/2014    History of prostate cancer 02/13/2015    Urinary frequency 28/84/9111    Neutrophilic leukocytosis 72/26/0640    Thrombocytosis (HCC) 08/29/2017    Monoclonal gammopathy 09/12/2017    Iron deficiency anemia secondary to inadequate dietary iron intake 01/23/2018    Diabetic hyperosmolar non-ketotic state ([address] 02/10/2019    Acute UTI 02/10/2019    Pneumonia 02/10/2019    New onset a-fib ([address] 02/10/2019    Moderate protein malnutrition ([address] 02/11/2019     Resolved Ambulatory Problems     Diagnosis Date Noted    No Resolved Ambulatory Problems     Past Medical History:   Diagnosis Date    Diabetes ([facility] [address]     Hypertension     [facility]

## 2019-03-01 NOTE — PROGRESS NOTES
Community Care Team Documentation for Patient in [facility]     Patient discharged from [facility] 2/10/2019 - 2/13/2019 to [facility], on 2/13/2019. Hospital Discharge diagnosis:     Diabetic Ketoacidosis  Community Acquired Pneumonia  New onset a-fib  Moderate protein malnutrition  Hypertension   H/o intracapsular prostate cancer s/p radiation therapy over 20 years ago  Low TSH levels   Tobacco Abuse    SNF Attending Provider:  Savanah Molina    Anticipated discharge date from SNF:  3/20/2019      PCP : Jl Barnes MD    Nurse Navigator:     [facility] Team rounds completed, updates provided by facility. Full Code  PT/OT: on track for 3/20 to home. Dtr may move in with pt. Dispo: return home. Lives with roommate, supportive dtr. [facility]. Low Risk            8       Total Score        3 Has Seen PCP in Last 6 Months (Yes=3, No=0)    5 Charlson Comorbidity Score (Age + Comorbid Conditions)        Criteria that do not apply:    . Living with Significant Other. Assisted Living. LTAC. SNF. or   Rehab    Patient Length of Stay (>5 days = 3)    IP Visits Last 12 Months (1-3=4, 4=9, >4=11)    Pt.  Coverage (Medicare=5 , Medicaid, or Self-Pay=4)      Active Ambulatory Problems     Diagnosis Date Noted    Malignant neoplasm of prostate ([address]     Malignant neoplasm of prostate ([address] 11/15/2011    Hypoglycemia 01/04/2014    History of prostate cancer 02/13/2015    Urinary frequency 50/79/2938    Neutrophilic leukocytosis 30/58/1259    Thrombocytosis (HCC) 08/29/2017    Monoclonal gammopathy 09/12/2017    Iron deficiency anemia secondary to inadequate dietary iron intake 01/23/2018    Diabetic hyperosmolar non-ketotic state ([address] 02/10/2019    Acute UTI 02/10/2019    Pneumonia 02/10/2019    New onset a-fib ([address] 02/10/2019    Moderate protein malnutrition ([address] 02/11/2019     Resolved Ambulatory Problems     Diagnosis Date Noted    No Resolved Ambulatory Problems     Past Medical History:   Diagnosis Date    Diabetes ([facility] [address]     Hypertension     [facility]

## 2019-03-03 NOTE — PROGRESS NOTES
Abbie Garza came late for his appointment and he was informed that we would work him in. Due to transportation issues, he could not stay and rescheduled for another day. Leg swelling

## 2019-03-06 NOTE — PROGRESS NOTES
Community Care Team Documentation for Patient in [facility]     Patient discharged from [facility] 2/10/2019 - 2/13/2019 to [facility], on 2/13/2019. Hospital Discharge diagnosis:     Diabetic Ketoacidosis  Community Acquired Pneumonia  New onset a-fib  Moderate protein malnutrition  Hypertension   H/o intracapsular prostate cancer s/p radiation therapy over 20 years ago  Low TSH levels   Tobacco Abuse    SNF Attending Provider:  Trae Mead    Anticipated discharge date from SNF:  3/20/2019      PCP : Alina Baumann MD    Nurse Navigator:     [facility] Team rounds completed, updates provided by facility. Full Code  PT/OT: Amb 45ft, making progress  Antic LOS 35 days. Dc 3/20, home. Dispo: return home. Lives with roommate, supportive dtr. [facility]. Low Risk            8       Total Score        3 Has Seen PCP in Last 6 Months (Yes=3, No=0)    5 Charlson Comorbidity Score (Age + Comorbid Conditions)        Criteria that do not apply:    . Living with Significant Other. Assisted Living. LTAC. SNF. or   Rehab    Patient Length of Stay (>5 days = 3)    IP Visits Last 12 Months (1-3=4, 4=9, >4=11)    Pt.  Coverage (Medicare=5 , Medicaid, or Self-Pay=4)      Active Ambulatory Problems     Diagnosis Date Noted    Malignant neoplasm of prostate ([address]     Malignant neoplasm of prostate ([address] 11/15/2011    Hypoglycemia 01/04/2014    History of prostate cancer 02/13/2015    Urinary frequency 39/11/3349    Neutrophilic leukocytosis 00/98/2991    Thrombocytosis (HCC) 08/29/2017    Monoclonal gammopathy 09/12/2017    Iron deficiency anemia secondary to inadequate dietary iron intake 01/23/2018    Diabetic hyperosmolar non-ketotic state ([address] 02/10/2019    Acute UTI 02/10/2019    Pneumonia 02/10/2019    New onset a-fib ([address] 02/10/2019    Moderate protein malnutrition ([address] 02/11/2019     Resolved Ambulatory Problems     Diagnosis Date Noted    No Resolved Ambulatory Problems     Past Medical History:   Diagnosis Date    Diabetes ([facility] [address]     Hypertension     [facility]

## 2019-03-13 NOTE — PROGRESS NOTES
Community Care Team Documentation for Patient in [facility]     Patient discharged from [facility] 2/10/2019 - 2/13/[facility] on [facility]13/[facility]. 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 Full Code  PT/OT: home visit last Friday, amb 90-100ft. able to do 6 steps. Making gains. Cognition mod to severe. Home with roommate and [facility]. Dc 3/20    Low Risk            8       Total Score        3 Has Seen PCP in Last 6 Months (Yes=3, No=0)    5 Charlson Comorbidity Score (Age + Comorbid Conditions)        Criteria that do not apply:    . Living with Significant Other. Assisted Living. LTAC. SNF. or   Rehab    Patient Length of Stay (>5 days = 3)    IP Visits Last 12 Months (1-3=4, 4=9, >4=11)    Pt.  Coverage (Medicare=5 , Medicaid, or Self-Pay=4)      Active Ambulatory Problems     Diagnosis Date Noted    Malignant neoplasm of prostate ([address]     Malignant neoplasm of prostate ([address] 11/15/2011    Hypoglycemia 01/04/2014    History of prostate cancer 02/13/2015    Urinary frequency 86/18/1054    Neutrophilic leukocytosis 93/27/4918    Thrombocytosis (HCC) 08/29/2017    Monoclonal gammopathy 09/12/2017    Iron deficiency anemia secondary to inadequate dietary iron intake 01/23/2018    Diabetic hyperosmolar non-ketotic state ([address] 02/10/2019    Acute UTI 02/10/2019    Pneumonia 02/10/2019    New onset a-fib ([address] 02/10/2019    Moderate protein malnutrition ([address] 02/11/2019     Resolved Ambulatory Problems     Diagnosis Date Noted    No Resolved Ambulatory Problems     Past Medical History:   Diagnosis Date    Diabetes ([facility] [address]     Hypertension     Malignant neoplasm of prostate ([facility] [address] 1998

## 2019-03-18 NOTE — PATIENT INSTRUCTIONS
Decrease Diovan (Valsartan) to 40 mg daily. Take one half tablet of the 80mg tablets daily until completed. Then start new prescription. All other medications will remain the same. Monitor blood pressure daily. Complete CBC this week to recheck platelet count. Follow up with Dr. Michael Robert as scheduled. Atrial Fibrillation: Care Instructions  Your Care Instructions    Atrial fibrillation is an irregular and often fast heartbeat. Treating this condition is important for several reasons. It can cause blood clots, which can travel from your heart to your brain and cause a stroke. If you have a fast heartbeat, you may feel lightheaded, dizzy, and weak. An irregular heartbeat can also increase your risk for heart failure. Atrial fibrillation is often the result of another heart condition, such as high blood pressure or coronary artery disease. Making changes to improve your heart condition will help you stay healthy and active. Follow-up care is a key part of your treatment and safety. Be sure to make and go to all appointments, and call your doctor if you are having problems. It's also a good idea to know your test results and keep a list of the medicines you take. How can you care for yourself at home? Medicines    · Take your medicines exactly as prescribed. Call your doctor if you think you are having a problem with your medicine. You will get more details on the specific medicines your doctor prescribes.     · If your doctor has given you a blood thinner to prevent a stroke, be sure you get instructions about how to take your medicine safely. Blood thinners can cause serious bleeding problems.     · Do not take any vitamins, over-the-counter drugs, or herbal products without talking to your doctor first.    Lifestyle changes    · Do not smoke. Smoking can increase your chance of a stroke and heart attack. If you need help quitting, talk to your doctor about stop-smoking programs and medicines.  These can increase your chances of quitting for good.     · Eat a heart-healthy diet.     · Stay at a healthy weight. Lose weight if you need to.     · Limit alcohol to 2 drinks a day for men and 1 drink a day for women. Too much alcohol can cause health problems.     · Avoid colds and flu. Get a pneumococcal vaccine shot. If you have had one before, ask your doctor whether you need another dose. Get a flu shot every year. If you must be around people with colds or flu, wash your hands often. Activity    · If your doctor recommends it, get more exercise. Walking is a good choice. Bit by bit, increase the amount you walk every day. Try for at least 30 minutes on most days of the week. You also may want to swim, bike, or do other activities. Your doctor may suggest that you join a cardiac rehabilitation program so that you can have help increasing your physical activity safely.     · Start light exercise if your doctor says it is okay. Even a small amount will help you get stronger, have more energy, and manage stress. Walking is an easy way to get exercise. Start out by walking a little more than you did in the hospital. Gradually increase the amount you walk.     · When you exercise, watch for signs that your heart is working too hard. You are pushing too hard if you cannot talk while you are exercising. If you become short of breath or dizzy or have chest pain, sit down and rest immediately.     · Check your pulse regularly. Place two fingers on the artery at the palm side of your wrist, in line with your thumb. If your heartbeat seems uneven or fast, talk to your doctor. When should you call for help? Call 911 anytime you think you may need emergency care. For example, call if:    · You have symptoms of a heart attack. These may include:  ? Chest pain or pressure, or a strange feeling in the chest.  ? Sweating. ? Shortness of breath. ? Nausea or vomiting.   ? Pain, pressure, or a strange feeling in the back, neck, jaw, or upper belly or in one or both shoulders or arms. ? Lightheadedness or sudden weakness. ? A fast or irregular heartbeat. After you call 911, the  may tell you to chew 1 adult-strength or 2 to 4 low-dose aspirin. Wait for an ambulance. Do not try to drive yourself.     · You have symptoms of a stroke. These may include:  ? Sudden numbness, tingling, weakness, or loss of movement in your face, arm, or leg, especially on only one side of your body. ? Sudden vision changes. ? Sudden trouble speaking. ? Sudden confusion or trouble understanding simple statements. ? Sudden problems with walking or balance. ? A sudden, severe headache that is different from past headaches.     · You passed out (lost consciousness).    Call your doctor now or seek immediate medical care if:    · You have new or increased shortness of breath.     · You feel dizzy or lightheaded, or you feel like you may faint.     · Your heart rate becomes irregular.     · You can feel your heart flutter in your chest or skip heartbeats. Tell your doctor if these symptoms are new or worse.    Watch closely for changes in your health, and be sure to contact your doctor if you have any problems. Where can you learn more? Go to http://petty-timothy.info/. Enter U020 in the search box to learn more about \"Atrial Fibrillation: Care Instructions. \"  Current as of: July 22, 2018  Content Version: 11.9  © 7037-9774 [employer]. Care instructions adapted under license by [employer] (which disclaims liability or warranty for this information). If you have questions about a medical condition or this instruction, always ask your healthcare professional. [place] [address] any warranty or liability for your use of this information. Atrial Fibrillation: Care Instructions  Your Care Instructions    Atrial fibrillation is an irregular and often fast heartbeat.  Treating this condition is important for several reasons. It can cause blood clots, which can travel from your heart to your brain and cause a stroke. If you have a fast heartbeat, you may feel lightheaded, dizzy, and weak. An irregular heartbeat can also increase your risk for heart failure. Atrial fibrillation is often the result of another heart condition, such as high blood pressure or coronary artery disease. Making changes to improve your heart condition will help you stay healthy and active. Follow-up care is a key part of your treatment and safety. Be sure to make and go to all appointments, and call your doctor if you are having problems. It's also a good idea to know your test results and keep a list of the medicines you take. How can you care for yourself at home? Medicines    · Take your medicines exactly as prescribed. Call your doctor if you think you are having a problem with your medicine. You will get more details on the specific medicines your doctor prescribes.     · If your doctor has given you a blood thinner to prevent a stroke, be sure you get instructions about how to take your medicine safely. Blood thinners can cause serious bleeding problems.     · Do not take any vitamins, over-the-counter drugs, or herbal products without talking to your doctor first.    Lifestyle changes    · Do not smoke. Smoking can increase your chance of a stroke and heart attack. If you need help quitting, talk to your doctor about stop-smoking programs and medicines. These can increase your chances of quitting for good.     · Eat a heart-healthy diet.     · Stay at a healthy weight. Lose weight if you need to.     · Limit alcohol to 2 drinks a day for men and 1 drink a day for women. Too much alcohol can cause health problems.     · Avoid colds and flu. Get a pneumococcal vaccine shot. If you have had one before, ask your doctor whether you need another dose. Get a flu shot every year.  If you must be around people with colds or flu, wash your hands often. Activity    · If your doctor recommends it, get more exercise. Walking is a good choice. Bit by bit, increase the amount you walk every day. Try for at least 30 minutes on most days of the week. You also may want to swim, bike, or do other activities. Your doctor may suggest that you join a cardiac rehabilitation program so that you can have help increasing your physical activity safely.     · Start light exercise if your doctor says it is okay. Even a small amount will help you get stronger, have more energy, and manage stress. Walking is an easy way to get exercise. Start out by walking a little more than you did in the hospital. Gradually increase the amount you walk.     · When you exercise, watch for signs that your heart is working too hard. You are pushing too hard if you cannot talk while you are exercising. If you become short of breath or dizzy or have chest pain, sit down and rest immediately.     · Check your pulse regularly. Place two fingers on the artery at the palm side of your wrist, in line with your thumb. If your heartbeat seems uneven or fast, talk to your doctor. When should you call for help? Call 911 anytime you think you may need emergency care. For example, call if:    · You have symptoms of a heart attack. These may include:  ? Chest pain or pressure, or a strange feeling in the chest.  ? Sweating. ? Shortness of breath. ? Nausea or vomiting. ? Pain, pressure, or a strange feeling in the back, neck, jaw, or upper belly or in one or both shoulders or arms. ? Lightheadedness or sudden weakness. ? A fast or irregular heartbeat. After you call 911, the  may tell you to chew 1 adult-strength or 2 to 4 low-dose aspirin. Wait for an ambulance. Do not try to drive yourself.     · You have symptoms of a stroke.  These may include:  ? Sudden numbness, tingling, weakness, or loss of movement in your face, arm, or leg, especially on only one side of your body.  ? Sudden vision changes. ? Sudden trouble speaking. ? Sudden confusion or trouble understanding simple statements. ? Sudden problems with walking or balance. ? A sudden, severe headache that is different from past headaches.     · You passed out (lost consciousness).    Call your doctor now or seek immediate medical care if:    · You have new or increased shortness of breath.     · You feel dizzy or lightheaded, or you feel like you may faint.     · Your heart rate becomes irregular.     · You can feel your heart flutter in your chest or skip heartbeats. Tell your doctor if these symptoms are new or worse.    Watch closely for changes in your health, and be sure to contact your doctor if you have any problems. Where can you learn more? Go to http://petty-timothy.info/. Enter U020 in the search box to learn more about \"Atrial Fibrillation: Care Instructions. \"  Current as of: July 22, 2018  Content Version: 11.9  © 4553-9983 [employer]. Care instructions adapted under license by [employer] (which disclaims liability or warranty for this information). If you have questions about a medical condition or this instruction, always ask your healthcare professional. [address] any warranty or liability for your use of this information.

## 2019-03-18 NOTE — PROGRESS NOTES
HPI:  Rissa Maier is a 82 y.o. male presenting today for a post hospital follow up visit. He was admitted to [facility] from 2/10/19-2/13/19 for diabetic ketoacidosis, community acquired pneumonia, UTI and new onset atrial fibrillation with rapid ventricular response. He also has been suffering from failure to thrive with poor oral intake and increased weakness. During his admission, he was started on a diltiazem drip followed by oral lopressor and Eliquis. He tolerated the medications well and was slowly weaned from the diltiazem drip. His platelets were monitored closely due to his chronic thrombocytopenia. He also underwent a transthoracic echocardiogram which demonstrated preserved LV function with EF of 56-60%, no WMA, and a small pericardial effusion adjacent to the right ventricle and right atrium. He is accompanied by his daughter today that reports he has been doing okay since his discharge from the hospital. He has been staying at [address] [place] [address] since his discharge and has slowly been regaining strength. Patient's daughter reveals that he is still unable to ambulate on his own and is mostly wheelchair bound. The patient denies any chest pain, palpitations, shortness of breath, orthopnea, PND or dizziness. He does have some bilateral lower extremity swelling which appears to be dependent edema. Patient has a past medical history significant for new onset atrial fibrillation on BB and Eliquis, hypertension, hyperlipidemia, diabetes mellitus, chronic thrombocytopenia and monoclonal gammopathy of undetermined significance (MGUS) followed by Dr. Vandana Gamez, FTT, prostate CA s/p radiation therapy, and tobacco abuse.          PMH:  Past Medical History:   Diagnosis Date    Diabetes ([address]     Hypertension     [facility] 1998       PSH:  Past Surgical History:   Procedure Laterality Date    BIOPSY OF PROSTATE,INCISIONAL  4/98    CO EXT BEAM RADIATION AS PRIMARY THERAPY TO PROSTATE ONLY  4/30-6/29/98       MEDS:  Current Outpatient Medications   Medication Sig    valsartan (DIOVAN) 40 mg tablet Take 1 Tab by mouth daily.  oxybutynin (DITROPAN) 5 mg tablet TAKE ONE TABLET BY MOUTH THREE TIMES DAILY    apixaban (ELIQUIS) 5 mg tablet Take 1 Tab by mouth two (2) times a day.  glucose 4 gram chewable tablet Take 4 Tabs by mouth as needed.  insulin glargine (LANTUS) 100 unit/mL injection 5 Units by SubCUTAneous route daily.  insulin lispro (HUMALOG) 100 unit/mL injection Very Insulin Resistant  For Blood Sugar (mg/dL) of:              Less than 150 give no insulin  0 units  150 -199 =   3 units  200 -249 =   6 units  250 -299 =   9 units  300 -349 =   12 units  350 and above =   15 units  Initiate Hypoglycemic protocol if blood glucose is <70 mg/dL. Fast Acting - Ad    therapeutic multivitamin (THERAGRAN) tablet Take 1 Tab by mouth daily.  metoprolol tartrate (LOPRESSOR) 50 mg tablet Take 1 Tab by mouth every twelve (12) hours.  acetaminophen (TYLENOL) 325 mg tablet Take 2 Tabs by mouth every six (6) hours as needed.  travoprost (TRAVATAN Z) 0.004 % ophthalmic solution Administer 1 Drop to both eyes two (2) times a day.  brinzolamide (AZOPT) 1 % ophthalmic suspension Administer 1 Drop to both eyes two (2) times a day.  brimonidine-timolol (COMBIGAN) 0.2-0.5 % drop ophthalmic solution Administer 1 Drop to both eyes every twelve (12) hours.  calcium carbonate (CALCIUM 500) 500 mg calcium (1,250 mg) tablet Take 1 Tab by mouth daily.  atorvastatin (LIPITOR) 40 mg tablet Take 1 Tab by mouth nightly.  aspirin delayed-release 81 mg tablet Take  by mouth daily. No current facility-administered medications for this visit.           Allergies and Sensitivities:  Allergies   Allergen Reactions    Iodinated Contrast- Oral And Iv Dye Hives     Faint, shaking       Family History:  Family History   Problem Relation Age of Onset    Diabetes Other     Hypertension Other        Social History:  He  reports that he has been smoking cigarettes. He has a 2.20 pack-year smoking history. he has never used smokeless tobacco.  He  reports that he does not drink alcohol. Review of Systems:    Constitutional: negative for fevers, chills, sweats.  Positive for fatigue  Respiratory: negative for cough  Cardiovascular: negative for chest pain, palpitations, syncope, dyspnea on exertion, SOB, PND, orthopnea  Gastrointestinal: negative for nausea, vomiting, diarrhea, melena and abdominal pain  Genitourinary: negative for hematuria  Musculoskeletal: Positive for lower extremity swelling    Neurological: negative for dizziness   Behavioral/Psych: negative for anxiety     Physical:  Vitals:  Visit Vitals  BP 90/50   Pulse 87   Ht 6' 1\" (1.854 m)   Wt 61.7 kg (136 lb)   SpO2 96%   BMI 17.94 kg/m²       Exam:     General appearance: no apparent distress  HEENT: normocephalic, atraumatic  Neck: supple, no JVD, no carotid bruits   Respiratory: clear to auscultation bilaterally  Cardiovascular: irregularly irregular rhythm  Abdomen: soft, non-tender, no hepatomegaly  Extremities: no lower extremity edema   Neurological: alert and oriented to person, place and time  Behavioral/Psych: normal mood and affect       Data:  EKG:     LABS:  Lab Results   Component Value Date/Time    Sodium 140 02/13/2019 05:10 AM    Potassium 3.5 02/13/2019 05:10 AM    Chloride 106 02/13/2019 05:10 AM    CO2 28 02/13/2019 05:10 AM    Glucose 277 (H) 02/13/2019 05:10 AM    BUN 21 (H) 02/13/2019 05:10 AM    Creatinine 0.59 (L) 02/13/2019 05:10 AM     Lab Results   Component Value Date/Time    Cholesterol, total 81 04/26/2018 07:51 AM    HDL Cholesterol 30 (L) 04/26/2018 07:51 AM    LDL, calculated 38.2 04/26/2018 07:51 AM    Triglyceride 64 04/26/2018 07:51 AM    CHOL/HDL Ratio 2.7 04/26/2018 07:51 AM     Lab Results   Component Value Date/Time    ALT (SGPT) 10 (L) 02/10/2019 03:36 PM Impression/Plan:  1. New onset Atrial fibrillation, on metoprolol and Eliquis, asymptomatic   2. Essential Hypertension, hypotensive in clinic today    3. Dyslipidemia, on atorvastatin 40 mg   4. Chronic thrombocytopenia, followed by Dr. Irais Cervantes   5. Diabetes mellitus, recommend Hgb A1c less than 7% from cardiac standpoint      Patient is presenting for follow up after hospital admission from February 10-13, 2019 for new onset atrial fibrillation with RVR. He remains in atrial fib on ECG today with a rate of 87 bpm and reports that he is feeling quite well. He denies any symptoms aside from ongoing fatigue and FTT per his daughter who accompanied him today. He remains on Eliquis and metoprolol twice daily without any associated side effects. Due to a history of chronic thrombocytopenia and MGUS, I will have him complete a CBC to assure his platelets are tolerating the Eliquis. He has a history of essential hypertension but is hypotensive in clinic today with a BP of 90/50. I reduced his dosage of Diovan from 80 to 40 mg. I advised his daughter to try and monitor his BP regularly. She states that he is due to come home from the nursing facility in less than a week and is hoping to set up home health as he was living by himself prior to his hospitalization. No other medication changes were made today. He will follow up with Dr. Nimco Atkins as scheduled in May, or sooner if needed. All questions answered.        HARRY Phillips

## 2019-03-20 NOTE — PROGRESS NOTES
Community Care Team Documentation for Patient in [facility]     Patient discharged from [facility] 2/10/2019 - 2/13/2019 to [facility], on 2/13/2019. 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 Team rounds completed, updates provided by facility. Full Code  Home with roommate and [facility]. Dc 3/20  PCP follow up scheduled for 3/25    Low Risk            8       Total Score        3 Has Seen PCP in Last 6 Months (Yes=3, No=0)    5 Charlson Comorbidity Score (Age + Comorbid Conditions)        Criteria that do not apply:    . Living with Significant Other. Assisted Living. LTAC. SNF. or   Rehab    Patient Length of Stay (>5 days = 3)    IP Visits Last 12 Months (1-3=4, 4=9, >4=11)    Pt.  Coverage (Medicare=5 , Medicaid, or Self-Pay=4)      Active Ambulatory Problems     Diagnosis Date Noted    Malignant neoplasm of prostate ([address]     Malignant neoplasm of prostate ([address] 11/15/2011    Hypoglycemia 01/04/2014    History of prostate cancer 02/13/2015    Urinary frequency 67/47/0408    Neutrophilic leukocytosis 04/40/7331    Thrombocytosis (HCC) 08/29/2017    Monoclonal gammopathy 09/12/2017    Iron deficiency anemia secondary to inadequate dietary iron intake 01/23/2018    Diabetic hyperosmolar non-ketotic state ([address] 02/10/2019    Acute UTI 02/10/2019    Pneumonia 02/10/2019    New onset a-fib ([address] 02/10/2019    Moderate protein malnutrition ([address] 02/11/2019     Resolved Ambulatory Problems     Diagnosis Date Noted    No Resolved Ambulatory Problems     Past Medical History: Diagnosis Date    [facility]     Hypertension     Malignant neoplasm of prostate ([facility] 1998

## 2019-03-21 NOTE — PROGRESS NOTES
Hospital follow up 3/21/19 Patient outreach made to patient. I spoke with his daughter Ms. Bower. Patient was discharged from rehab on 3/20/19. She states he has sores on his feet which were never documented. He isn't able to walk at this time. He has a wheelchair and walker. [facility] came out today. He will get OT, PT, SN, Aide, MSW. Order will be requested by [place] for wound care. He will see his pcp in 3/25/19. She states he is eating well. Hospital Discharge diagnosis:   
            Diabetic Ketoacidosis Community Acquired Pneumonia New onset a-fib Moderate protein malnutrition Hypertension H/o intracapsular prostate cancer s/p radiation therapy over 20 years ago Low TSH levels Tobacco Abuse 
  
Future Appointments Date Time Provider Baljit Casper 3/25/2019 To Be Determined WILLIE Reddy [id number] [facility]  
4/8/2019 10:00 AM Lenin Briceño MD [address] [facility]  
5/23/2019 10:40 AM Socorro Diaz  [facility]

## 2019-03-22 NOTE — DISCHARGE INSTRUCTIONS
Patient Education        Learning About High Blood Sugar  What is high blood sugar? Your body turns the food you eat into glucose (sugar), which it uses for energy. But if your body isn't able to use the sugar right away, it can build up in your blood and lead to high blood sugar. When the amount of sugar in your blood stays too high for too much of the time, you may have diabetes. Diabetes is a disease that can cause serious health problems. The good news is that lifestyle changes may help you get your blood sugar back to normal and avoid or delay diabetes. What causes high blood sugar? Sugar (glucose) can build up in your blood if you:  · Are overweight. · Have a family history of diabetes. · Take certain medicines, such as steroids. What are the symptoms? Having high blood sugar may not cause any symptoms at all. Or it may make you feel very thirsty or very hungry. You may also urinate more often than usual, have blurry vision, or lose weight without trying. How is high blood sugar treated? You can take steps to lower your blood sugar level if you understand what makes it get higher. Your doctor may want you to learn how to test your blood sugar level at home. Then you can see how illness, stress, or different kinds of food or medicine raise or lower your blood sugar level. Other tests may be needed to see if you have diabetes. How can you prevent high blood sugar? · Watch your weight. If you're overweight, losing just a small amount of weight may help. Reducing fat around your waist is most important. · Limit the amount of calories, sweets, and unhealthy fat you eat. Ask your doctor if a dietitian can help you. A registered dietitian can help you create meal plans that fit your lifestyle. · Get at least 30 minutes of exercise on most days of the week. Exercise helps control your blood sugar. It also helps you maintain a healthy weight. Walking is a good choice.  You also may want to do other activities, such as running, swimming, cycling, or playing tennis or team sports. · If your doctor prescribed medicines, take them exactly as prescribed. Call your doctor if you think you are having a problem with your medicine. You will get more details on the specific medicines your doctor prescribes. Follow-up care is a key part of your treatment and safety. Be sure to make and go to all appointments, and call your doctor if you are having problems. It's also a good idea to know your test results and keep a list of the medicines you take. Where can you learn more? Go to http://pettyGuided Therapeuticstimothy.info/. Enter O108 in the search box to learn more about \"Learning About High Blood Sugar. \"  Current as of: July 25, 2018  Content Version: 11.9  © 2518-0559 [employer]. Care instructions adapted under license by [employer] (which disclaims liability or warranty for this information). If you have questions about a medical condition or this instruction, always ask your healthcare professional. [place] [address] any warranty or liability for your use of this information. Patient Education        Wound Check: Care Instructions  Your Care Instructions  People have wounds that need care for many reasons. You may have a cut that needs care after surgery. You may have a cut or puncture wound from an accident. Or you may have a wound because of a condition like diabetes. Whatever the cause of your wound, there are things you can do to care for it at home. Your doctor may also want you to come back for a wound check. The wound check lets the doctor know how your wound is healing and if you need more treatment. Follow-up care is a key part of your treatment and safety. Be sure to make and go to all appointments, and call your doctor if you are having problems. It's also a good idea to know your test results and keep a list of the medicines you take.   How can you care for yourself at home? · If your doctor told you how to care for your wound, follow your doctor's instructions. If you did not get instructions, follow this general advice:  ? You may cover the wound with a thin layer of petroleum jelly, such as Vaseline, and a nonstick bandage. ? Apply more petroleum jelly and replace the bandage as needed. · Keep the wound dry for the first 24 to 48 hours. After this, you can shower if your doctor okays it. Pat the wound dry. · Be safe with medicines. Read and follow all instructions on the label. ? If the doctor gave you a prescription medicine for pain, take it as prescribed. ? If you are not taking a prescription pain medicine, ask your doctor if you can take an over-the-counter medicine. · If your doctor prescribed antibiotics, take them as directed. Do not stop taking them just because you feel better. You need to take the full course of antibiotics. · If you have stitches, do not remove them on your own. Your doctor will tell you when to come back to have them removed. · If you have Steri-Strips, leave them on until they fall off. · If possible, prop up the injured area on a pillow anytime you sit or lie down during the next 3 days. Try to keep it above the level of your heart. This will help reduce swelling. When should you call for help? Call your doctor now or seek immediate medical care if:    · You have new pain, or the pain gets worse.     · The skin near the wound is cold or pale or changes color.     · You have tingling, weakness, or numbness near the wound.     · The wound starts to bleed, and blood soaks through the bandage. Oozing small amounts of blood is normal.     · You have symptoms of infection, such as:  ? Increased pain, swelling, warmth, or redness. ? Red streaks leading from the wound. ? Pus draining from the wound.   ? A fever.    Watch closely for changes in your health, and be sure to contact your doctor if:    · You do not get better as expected. Where can you learn more? Go to http://petty-timothy.info/. Enter P342 in the search box to learn more about \"Wound Check: Care Instructions. \"  Current as of: September 23, 2018  Content Version: 11.9  © 8919-2879 [employer]. Care instructions adapted under license by [employer] (which disclaims liability or warranty for this information). If you have questions about a medical condition or this instruction, always ask your healthcare professional. [address] any warranty or liability for your use of this information.

## 2019-03-22 NOTE — ED PROVIDER NOTES
Emergency Department Treatment Report Patient: Matha Severin Age: 82 y.o. Sex: male YOB: 1935 Admit Date: 3/22/2019 PCP: Lamar Quinonez MD  
MRN: [id number]  CSN: [id number] Room: [place] [address] Time Dictated: 1:35 AM   
 
Chief Complaint Chief Complaint Patient presents with  
 High Blood Sugar History of Present Illness 82 y.o. male, PMH IDDM, HTN, presents with elevated blood sugar at home. According to the daughter, he was released yesterday from inpatient physical rehab for 5 weeks, but he did not receive his insulin prescription. He is supposed to be on 5 units of Lantus daily and a sliding scale Humalog, but he does not have the prescriptions. He has home nursing and a primary care physician appointment in 3 days. Only other complaint is that he is noticing some redness over his bilateral legs. Review of Systems Constitutional: No fever, chills, or weight loss Eyes: No visual symptoms. ENT: No sore throat, runny nose or ear pain. Respiratory: No cough, dyspnea or wheezing. Cardiovascular: No chest pain, pressure, palpitations, tightness or heaviness. Gastrointestinal: No vomiting, diarrhea or abdominal pain. Genitourinary: No dysuria, frequency, or urgency. Musculoskeletal: No joint pain or swelling. Integumentary: +leg redness Neurological: No headaches, sensory or motor symptoms. Denies complaints in all other systems. Past Medical/Surgical History Past Medical History:  
Diagnosis Date  Diabetes ([address]  Hypertension  Malignant neoplasm of prostate ([facility] [address] 1998 Past Surgical History:  
Procedure Laterality Date  BIOPSY OF PROSTATE,INCISIONAL  4/98  NV EXT BEAM RADIATION AS PRIMARY THERAPY TO PROSTATE ONLY  4/30-6/29/98 Social History Social History Socioeconomic History  Marital status: SINGLE Spouse name: Not on file  Number of children: Not on file  Years of education: Not on file  Highest education level: Not on file Tobacco Use  Smoking status: Current Some Day Smoker Packs/day: 0.10 Years: 22.00 Pack years: 2.20 Types: Cigarettes  Smokeless tobacco: Never Used Substance and Sexual Activity  Alcohol use: No  
  Alcohol/week: 0.0 oz  Drug use: No  
Social History Narrative ** Merged History Encounter ** Family History Family History Problem Relation Age of Onset  Diabetes Other  Hypertension Other Home Medications Prior to Admission Medications Prescriptions Last Dose Informant Patient Reported? Taking?  
acetaminophen (TYLENOL) 325 mg tablet   No No  
Sig: Take 2 Tabs by mouth every six (6) hours as needed. apixaban (ELIQUIS) 5 mg tablet   No No  
Sig: Take 1 Tab by mouth two (2) times a day. aspirin delayed-release 81 mg tablet   Yes No  
Sig: Take  by mouth daily. atorvastatin (LIPITOR) 40 mg tablet   Yes No  
Sig: Take 1 Tab by mouth nightly. brimonidine-timolol (COMBIGAN) 0.2-0.5 % drop ophthalmic solution   No No  
Sig: Administer 1 Drop to both eyes every twelve (12) hours. brinzolamide (AZOPT) 1 % ophthalmic suspension   No No  
Sig: Administer 1 Drop to both eyes two (2) times a day. calcium carbonate (CALCIUM 500) 500 mg calcium (1,250 mg) tablet   No No  
Sig: Take 1 Tab by mouth daily. glucose 4 gram chewable tablet   No No  
Sig: Take 4 Tabs by mouth as needed. insulin glargine (LANTUS) 100 unit/mL injection   No No  
Si Units by SubCUTAneous route daily. insulin lispro (HUMALOG) 100 unit/mL injection   No No  
Sig: Very Insulin Resistant For Blood Sugar (mg/dL) of:             
Less than 150 give no insulin  0 units 150 -199 =   3 units 200 -249 =   6 units 250 -299 =   9 units 300 -349 =   12 units 350 and above =   15 units Initiate Hypoglycemic protocol if blood glucose is <70 mg/dL.  Fast Acting - Ad  
metoprolol tartrate (LOPRESSOR) 50 mg tablet   No No  
 Sig: Take 1 Tab by mouth every twelve (12) hours. oxybutynin (DITROPAN) 5 mg tablet   No No  
Sig: TAKE ONE TABLET BY MOUTH THREE TIMES DAILY therapeutic multivitamin (THERAGRAN) tablet   No No  
Sig: Take 1 Tab by mouth daily. travoprost (TRAVATAN Z) 0.004 % ophthalmic solution   No No  
Sig: Administer 1 Drop to both eyes two (2) times a day. valsartan (DIOVAN) 40 mg tablet   No No  
Sig: Take 1 Tab by mouth daily. Facility-Administered Medications: None Allergies Allergies Allergen Reactions  Iodinated Contrast- Oral And Iv Dye Hives Faint, shaking Physical Exam  
 
ED Triage Vitals [03/21/19 6770] ED Encounter Vitals Group /69 Pulse (Heart Rate) (!) 103 Resp Rate 22 Temp 97.3 °F (36.3 °C) Temp src O2 Sat (%) 96 % Weight Height Constitutional: Patient appears well developed and well nourished. Appearance and behavior are age and situation appropriate. HEENT: Conjunctiva clear. PERRLA. Mucous membranes moist, non-erythematous. Surface of the pharynx, palate, and tongue are pink, moist and without lesions. Neck: supple, non tender, symmetrical, no masses or JVD. Respiratory: lungs clear to auscultation, nonlabored respirations. No tachypnea or accessory muscle use. Cardiovascular: Regular heart rate and rhythm. Calves soft and non-tender. Distal pulses 2+ and equal bilaterally. 1+ peripheral edema. Gastrointestinal:  Abdomen soft, nontender without complaint of pain to palpation Musculoskeletal: Nail beds pink with prompt capillary refill Integumentary: warm and dry, small wounds over left lower leg with surrounding erythema, blister over right anterior leg Neurologic: alert and oriented, Sensation intact, motor strength equal and symmetric. No facial asymmetry or dysarthria. Diagnostic Studies Lab:  
Recent Results (from the past 12 hour(s)) EKG, 12 LEAD, INITIAL  Collection Time: 03/21/19 11:56 PM  
 Result Value Ref Range Ventricular Rate 102 BPM  
 Atrial Rate 102 BPM  
 P-R Interval 132 ms QRS Duration 74 ms Q-T Interval 340 ms QTC Calculation (Bezet) 443 ms Calculated P Axis 82 degrees Calculated R Axis 80 degrees Calculated T Axis 83 degrees Diagnosis Sinus tachycardia Otherwise normal ECG When compared with ECG of 10-FEB-2019 17:58, Sinus rhythm has replaced Atrial fibrillation GLUCOSE, POC Collection Time: 03/21/19 11:57 PM  
Result Value Ref Range Glucose (POC) >600 (HH) 70 - 110 mg/dL GLUCOSE, POC Collection Time: 03/21/19 11:58 PM  
Result Value Ref Range Glucose (POC) >600 (HH) 70 - 110 mg/dL CBC WITH AUTOMATED DIFF Collection Time: 03/22/19 12:19 AM  
Result Value Ref Range WBC 14.7 (H) 4.6 - 13.2 K/uL  
 RBC 3.48 (L) 4.70 - 5.50 M/uL HGB 9.4 (L) 13.0 - 16.0 g/dL HCT 32.0 (L) 36.0 - 48.0 % MCV 92.0 74.0 - 97.0 FL  
 MCH 27.0 24.0 - 34.0 PG  
 MCHC 29.4 (L) 31.0 - 37.0 g/dL RDW 21.5 (H) 11.6 - 14.5 % PLATELET 089 (H) 447 - 420 K/uL MPV 9.2 9.2 - 11.8 FL  
 NEUTROPHILS 91 (H) 40 - 73 % LYMPHOCYTES 5 (L) 21 - 52 % MONOCYTES 2 (L) 3 - 10 % EOSINOPHILS 2 0 - 5 % BASOPHILS 0 0 - 2 %  
 ABS. NEUTROPHILS 13.4 (H) 1.8 - 8.0 K/UL  
 ABS. LYMPHOCYTES 0.7 (L) 0.9 - 3.6 K/UL  
 ABS. MONOCYTES 0.3 0.05 - 1.2 K/UL  
 ABS. EOSINOPHILS 0.3 0.0 - 0.4 K/UL  
 ABS. BASOPHILS 0.0 0.0 - 0.1 K/UL  
 DF AUTOMATED PLATELET COMMENTS Increased Platelets RBC COMMENTS ANISOCYTOSIS 2+ 
    
 RBC COMMENTS POLYCHROMASIA 1+ METABOLIC PANEL, COMPREHENSIVE Collection Time: 03/22/19 12:19 AM  
Result Value Ref Range Sodium 137 136 - 145 mmol/L Potassium 5.0 3.5 - 5.5 mmol/L Chloride 100 100 - 108 mmol/L  
 CO2 21 21 - 32 mmol/L Anion gap 16 3.0 - 18 mmol/L Glucose 641 (HH) 74 - 99 mg/dL BUN 25 (H) 7.0 - 18 MG/DL  Creatinine 1.07 0.6 - 1.3 MG/DL  
 BUN/Creatinine ratio 23 (H) 12 - 20    
 GFR est AA >60 >60 ml/min/1.73m2 GFR est non-AA >60 >60 ml/min/1.73m2 Calcium 8.5 8.5 - 10.1 MG/DL Bilirubin, total 0.7 0.2 - 1.0 MG/DL  
 ALT (SGPT) 15 (L) 16 - 61 U/L  
 AST (SGOT) 22 15 - 37 U/L Alk. phosphatase 119 (H) 45 - 117 U/L Protein, total 7.6 6.4 - 8.2 g/dL Albumin 1.9 (L) 3.4 - 5.0 g/dL Globulin 5.7 (H) 2.0 - 4.0 g/dL A-G Ratio 0.3 (L) 0.8 - 1.7 POC LACTIC ACID Collection Time: 03/22/19  1:17 AM  
Result Value Ref Range Lactic Acid (POC) 2.92 (HH) 0.40 - 2.00 mmol/L  
GLUCOSE, POC Collection Time: 03/22/19  3:32 AM  
Result Value Ref Range Glucose (POC) 474 (HH) 70 - 110 mg/dL GLUCOSE, POC Collection Time: 03/22/19  3:33 AM  
Result Value Ref Range Glucose (POC) 474 (HH) 70 - 110 mg/dL Imaging: No results found. AndKieran 
 
ED Course/Medical Decision Making Patient no acute distress. He is hyperglycemic but does not have an anion gap no signs of DKA. He is provided with his missed doses of Lantus and Humalog and his sugar decreased down to 474. He was provided a refill of his insulin until he can see his primary care physician in 3 days. Small amount of redness over his legs and unsure if this is early cellulitis, so will begin Bactrim. No signs of severe sepsis or septic shock, with a lactate below 4 and normal blood pressure. He is safe for outpatient treatment with strict return precautions. Medications  
sodium chloride (NS) flush 5-10 mL (has no administration in time range)  
trimethoprim-sulfamethoxazole (BACTRIM DS, SEPTRA DS) 160-800 mg per tablet (has no administration in time range)  
insulin glargine (LANTUS) injection 5 Units (5 Units SubCUTAneous Given 3/22/19 0121) insulin lispro (HUMALOG) injection 15 Units (15 Units SubCUTAneous Given 3/22/19 0120)  
trimethoprim-sulfamethoxazole (BACTRIM DS, SEPTRA DS) 160-800 mg per tablet 2 Tab (2 Tabs Oral Given 3/22/19 0136) sodium chloride 0.9 % bolus infusion 1,000 mL (0 mL IntraVENous IV Completed 3/22/19 9845) Final Diagnosis ICD-10-CM ICD-9-CM 1. Hyperglycemia R73.9 790.29  
2. Open wound of lower extremity, unspecified laterality, initial encounter S81.809A 891.0 Disposition Patient is discharged home in stable condition. Advised to follow with their primary care physician. Patient advised to return to the ER for any new or worsening symptoms. My Medications START taking these medications Instructions Each Dose to Equal Morning Noon Evening Bedtime Insulin Needles (Disposable) 31 gauge x 5/16\" Ndle Your last dose was: Your next dose is:   
 
  
 Use as needed. trimethoprim-sulfamethoxazole 160-800 mg per tablet Commonly known as:  BACTRIM DS, SEPTRA DS Your last dose was: Your next dose is: Take 1 Tab by mouth two (2) times a day for 10 days. 1 Tab CONTINUE taking these medications Instructions Each Dose to Equal Morning Noon Evening Bedtime  
acetaminophen 325 mg tablet Commonly known as:  TYLENOL Your last dose was: Your next dose is: Take 2 Tabs by mouth every six (6) hours as needed. 650 mg 
  
  
  
  
  
apixaban 5 mg tablet Commonly known as:  Verl Nailer Your last dose was: Your next dose is: Take 1 Tab by mouth two (2) times a day. 5 mg 
  
  
  
  
  
aspirin delayed-release 81 mg tablet Your last dose was: Your next dose is: Take  by mouth daily. atorvastatin 40 mg tablet Commonly known as:  LIPITOR Your last dose was: Your next dose is: Take 1 Tab by mouth nightly. 1 Tab 
  
  
  
  
  
brimonidine-timolol 0.2-0.5 % Drop ophthalmic solution Commonly known as:  COMBIGAN Your last dose was: Your next dose is:   
 
  
 Administer 1 Drop to both eyes every twelve (12) hours. 1 Drop brinzolamide 1 % ophthalmic suspension Commonly known as:  AZOPT Your last dose was: Your next dose is:   
 
  
 Administer 1 Drop to both eyes two (2) times a day. 1 Drop 
  
  
  
  
  
calcium carbonate 500 mg calcium (1,250 mg) tablet Commonly known as:  CALCIUM 500 Your last dose was: Your next dose is: Take 1 Tab by mouth daily. 1 Tab 
  
  
  
  
  
glucose 4 gram chewable tablet Your last dose was: Your next dose is: Take 4 Tabs by mouth as needed. 4 Tab 
  
  
  
  
  
insulin glargine 100 unit/mL injection Commonly known as:  LANTUS Your last dose was: Your next dose is:   
 
  
 5 Units by SubCUTAneous route daily. 5 Units 
  
  
  
  
  
insulin lispro 100 unit/mL injection Commonly known as:  HUMALOG Your last dose was: Your next dose is:   
 
  
 Very Insulin Resistant For Blood Sugar (mg/dL) of:             
Less than 150 give no insulin  0 units 150 -199 =   3 units 200 -249 =   6 units 250 -299 =   9 units 300 -349 =   12 units 350 and above =   15 units Initiate Hypoglycemic protocol if blood glucose is <70 mg/dL. Fast Acting - Ad 
   
  
  
  
  
metoprolol tartrate 50 mg tablet Commonly known as:  LOPRESSOR Your last dose was: Your next dose is: Take 1 Tab by mouth every twelve (12) hours. 50 mg 
  
  
  
  
  
oxybutynin 5 mg tablet Commonly known as:  FAHZKYFH Your last dose was: Your next dose is: TAKE ONE TABLET BY MOUTH THREE TIMES DAILY therapeutic multivitamin tablet Commonly known as:  [facility] Your last dose was: Your next dose is: Take 1 Tab by mouth daily. 1 Tab 
  
  
  
  
  
travoprost 0.004 % ophthalmic solution Commonly known as:  TRAVATAN Z Your last dose was: Your next dose is:   
 
  
 Administer 1 Drop to both eyes two (2) times a day. 1 Drop 
  
  
  
  
  
valsartan 40 mg tablet Commonly known as:  DIOVAN 
 
 Your last dose was: Your next dose is: Take 1 Tab by mouth daily. 40 mg Where to Get Your Medications Information on where to get these meds will be given to you by the nurse or doctor. Ask your nurse or doctor about these medications · apixaban 5 mg tablet · insulin glargine 100 unit/mL injection · insulin lispro 100 unit/mL injection · Insulin Needles (Disposable) 31 gauge x 5/16\" Ndle · trimethoprim-sulfamethoxazole 160-800 mg per tablet Winnie Martinez MD 
March 22, 2019 My signature above authenticates this document and my orders, the final   
diagnosis (es), discharge prescription (s), and instructions in the Epic   
record. If you have any questions please contact [phone]. 
  
Nursing notes have been reviewed by the physician/ advanced practice   
Clinician.

## 2019-03-22 NOTE — ED TRIAGE NOTES
Patient A/O x 4, brought into ED by daughter high blood sugar and urinary frequency. Patient's daughter states patient has been in rehab x 5 weeks and whole time while in rehab, his blood sugar read \"HI\". Patient daughter states he was not given his new doses of insulin. Denies chest pain, SOB, N/V,abdominal pain.

## 2019-03-27 NOTE — PROGRESS NOTES
Mild    There  Hematology/Oncology  Progress Note    Name: Claudetta Essex  Date: 2019  : 1935    PCP: Sue Zeng MD     Mr. Lorenzo Braun is a 82 y.o.  male who was seen for follow-up relating to his MGUS, neutrophilic leukocytosis and anemia. Current therapy: Active surveillance    Subjective:     Mr. Lorenzo Braun is an 82-year-old -American man who has MGUS. He also has chronic neutrophilic leukocytosis with flow cytometry revealing no immunophenotypic aberrancy in the leukocyte populations. His grandchildren accompanied him today for his appointment. They state that the patient was just discharged from the nursing home and he acquired sacral ulcers and foot ulcers from there. They further report that they alternate to visit the patient in his home so they can feed him. The patient has not experienced any unexplained fevers, night sweats, weight loss. PCP will refer patient to home care/home health. No other concerns or complaints at this time. Past medical history, family history, and social history: these were reviewed and remains unchanged.     Past Medical History:   Diagnosis Date    [facility] ([facility]     Hypertension     Malignant neoplasm of prostate ([facility] [facility]      Past Surgical History:   Procedure Laterality Date    BIOPSY OF PROSTATE,INCISIONAL      KS EXT BEAM RADIATION AS PRIMARY THERAPY TO PROSTATE ONLY  -98     Social History     Socioeconomic History    Marital status: SINGLE     Spouse name: Not on file    Number of children: Not on file    Years of education: Not on file    Highest education level: Not on file   Occupational History    Not on file   Social Needs    Financial resource strain: Not on file    Food insecurity:     Worry: Not on file     Inability: Not on file    Transportation needs:     Medical: Not on file     Non-medical: Not on file   Tobacco Use    Smoking status: Current Some Day Smoker     Packs/day: 0.10     Years: 22.00     Pack years: 2.20     Types: Cigarettes    Smokeless tobacco: Never Used   Substance and Sexual Activity    Alcohol use: No     Alcohol/week: 0.0 oz    Drug use: No    Sexual activity: Not on file   Lifestyle    Physical activity:     Days per week: Not on file     Minutes per session: Not on file    Stress: Not on file   Relationships    Social connections:     Talks on phone: Not on file     Gets together: Not on file     Attends Sikhism service: Not on file     Active member of club or organization: Not on file     Attends meetings of clubs or organizations: Not on file     Relationship status: Not on file    Intimate partner violence:     Fear of current or ex partner: Not on file     Emotionally abused: Not on file     Physically abused: Not on file     Forced sexual activity: Not on file   Other Topics Concern    Not on file   Social History Narrative    ** Merged History Encounter **          Family History   Problem Relation Age of Onset    Diabetes Other     Hypertension Other      Current Outpatient Medications   Medication Sig Dispense Refill    hydroxyurea (HYDREA) 500 mg capsule Take 500 mg by mouth two (2) times a day.  apixaban (ELIQUIS) 5 mg tablet Take 1 Tab by mouth two (2) times a day. 60 Tab 0    insulin glargine (LANTUS) 100 unit/mL injection 5 Units by SubCUTAneous route daily. 1 Vial 0    insulin lispro (HUMALOG) 100 unit/mL injection Very Insulin Resistant  For Blood Sugar (mg/dL) of:              Less than 150 give no insulin  0 units  150 -199 =   3 units  200 -249 =   6 units  250 -299 =   9 units  300 -349 =   12 units  350 and above =   15 units  Initiate Hypoglycemic protocol if blood glucose is <70 mg/dL. Fast Acting - Ad 1 Vial 0    trimethoprim-sulfamethoxazole (BACTRIM DS, SEPTRA DS) 160-800 mg per tablet Take 1 Tab by mouth two (2) times a day for 10 days. 20 Tab 0    Insulin Needles, Disposable, 31 gauge x 5/16\" ndle Use as needed.  1 Package 11    valsartan (DIOVAN) 40 mg tablet Take 1 Tab by mouth daily. 30 Tab 6    oxybutynin (DITROPAN) 5 mg tablet TAKE ONE TABLET BY MOUTH THREE TIMES DAILY 90 Tab 8    glucose 4 gram chewable tablet Take 4 Tabs by mouth as needed. 10 Tab 0    therapeutic multivitamin (THERAGRAN) tablet Take 1 Tab by mouth daily. 30 Tab 0    metoprolol tartrate (LOPRESSOR) 50 mg tablet Take 1 Tab by mouth every twelve (12) hours. 60 Tab 0    acetaminophen (TYLENOL) 325 mg tablet Take 2 Tabs by mouth every six (6) hours as needed. 30 Tab 0    travoprost (TRAVATAN Z) 0.004 % ophthalmic solution Administer 1 Drop to both eyes two (2) times a day. 5 mL 0    brinzolamide (AZOPT) 1 % ophthalmic suspension Administer 1 Drop to both eyes two (2) times a day. 5 mL 0    brimonidine-timolol (COMBIGAN) 0.2-0.5 % drop ophthalmic solution Administer 1 Drop to both eyes every twelve (12) hours. 5 mL 0    calcium carbonate (CALCIUM 500) 500 mg calcium (1,250 mg) tablet Take 1 Tab by mouth daily. 90 Tab 2    atorvastatin (LIPITOR) 40 mg tablet Take 1 Tab by mouth nightly.  aspirin delayed-release 81 mg tablet Take  by mouth daily. Review of Systems  Constitutional: The patient has no acute distress or discomfort. HEENT: The patient denies recent head trauma, eye pain, blurred vision,  hearing deficit, oropharyngeal mucosal pain or lesions, and the patient denies throat pain or discomfort. Lymphatics: The patient denies palpable peripheral lymphadenopathy. Hematologic: The patient denies having bruising, bleeding, or progressive fatigue. Respiratory: Patient denies having shortness of breath, cough, sputum production, fever, or dyspnea on exertion. Cardiovascular: The patient denies having leg pain, leg swelling, heart palpitations, chest permit, chest pain, or lightheadedness. The patient denies having dyspnea on exertion. Gastrointestinal: The patient denies having nausea, emesis, or diarrhea.  The patient denies having any hematemesis or blood in the stool. Genitourinary: Patient denies having urinary urgency, frequency, or dysuria. The patient denies having blood in the urine. Psychological: The patient denies having symptoms of nervousness, anxiety, depression, or thoughts of harming self. Skin: Patient denies having skin rashes, skin, ulcerations, or unexplained itching or pruritus. Musculoskeletal: The patient denies having pain in the joints or bones. Objective:     Visit Vitals  /86   Pulse 78   Temp 96.8 °F (36 °C) (Oral)   Resp 18   Ht 6' 1\" (1.854 m)   Wt 64 kg (141 lb)   BMI 18.60 kg/m²     ECOG PS=0  Physical Exam:   Gen. Appearance: The patient is in no acute distress. Skin: There is no bruise or rash. HEENT: The exam is unremarkable. Neck: Supple without lymphadenopathy or thyromegaly. Lungs: Clear to auscultation and percussion; there are no wheezes or rhonchi. Heart: Regular rate and rhythm; there are no murmurs, gallops, or rubs. Abdomen: Bowel sounds are present and normal.  There is no guarding, tenderness, or hepatosplenomegaly. Extremities: There is no clubbing, cyanosis, or edema. Neurologic: There are no focal neurologic deficits. Lymphatics: There is no palpable peripheral lymphadenopathy. Musculoskeletal: The patient has full range of motion at all joints. There is no evidence of joint deformity or effusions. There is no focal joint tenderness. Psychological/psychiatric: There is no clinical evidence of anxiety, depression, or melancholy.     Lab data:      Results for orders placed or performed during the hospital encounter of 03/27/19   CBC WITH 3 PART DIFF     Status: Abnormal   Result Value Ref Range Status    WBC 15.6 (H) 4.5 - 13.0 K/uL Final    RBC 3.52 (L) 4.10 - 5.10 M/uL Final    HGB 10.0 (L) 12.0 - 16.0 g/dL Final    HCT 32.4 (L) 36 - 48 % Final    MCV 92.0 78 - 102 FL Final    MCH 28.4 25.0 - 35.0 PG Final    MCHC 30.9 (L) 31 - 37 g/dL Final    RDW 21.2 (H) 11.5 - 14.5 % Final PLATELET 649 (H) 761 - 440 K/uL Final    NEUTROPHILS 91 (H) 40 - 70 % Final    MIXED CELLS 2 0.1 - 17 % Final    LYMPHOCYTES 7 (L) 14 - 44 % Final    ABS. NEUTROPHILS 14.2 (H) 1.8 - 9.5 K/UL Final    ABS. MIXED CELLS 0.4 0.0 - 2.3 K/uL Final    ABS. LYMPHOCYTES 1.0 (L) 1.1 - 5.9 K/UL Final     Comment: Test performed at [address] [place] [address] or Outpatient Infusion Center Location. Reviewed by Medical Director. DF AUTOMATED   Final           Assessment:     1. Monoclonal gammopathy    2. Iron deficiency anemia secondary to inadequate dietary iron intake    3. Neutrophilic leukocytosis    4. Thrombocytosis ([address]    5. Malignant neoplasm of prostate ([facility] [address]      Plan:   Monoclonal gammopathy of undetermined significance: The bone marrow biopsy done on 10/11/2017 showed 4.5% monoclonal plasma cells consistent with MGUS. The immunofixation confirmed an IgG a kappa M spike production. The most recent M spike from 02/21/2019 was 0.7g/dL. SPEP will be obtained at this time. Iron Deficiency Anemia secondary to inadequate iron intake: The CBC today shows his WBC is 15.6K/uL, hemoglobin is 10.0g/dL with hematocrit of 32.4%, and platelet count is 682,274. We will continue to monitor every 2 months. Iron Profile and Ferritin levels will be obtained at this time. Neutrophilic leukocytosis: A prior immunophenotyping profile showed no immunophenotypic aberrancy in the leukocyte populations. The test did show evidence of a monoclonal plasma cell population which was subsequently confirmed by bone marrow biopsy and SPEP. The neutrophil count is currently 91% of the total WBC count. Thrombocytosis: The current platelet count is 636,040. I have explained to the patient that the [address] [place] [address] mutation analysis was negative. At this time we will discontinue the hydroxyurea. Malignant neoplasm of the prostate: The patient has a history of prostate cancer.  His PSA test as of 10/23/2018 was 0.1 ng/mL.  I will recheck his PSA level at this time. Follow-up in 2 months or sooner if indicated. Orders Placed This Encounter    COMPLETE CBC & AUTO DIFF WBC    InHouse CBC ([email or web address]     Standing Status:   Future     Number of Occurrences:   1     Standing Expiration Date:   4/3/2019    IRON PROFILE     Standing Status:   Future     Standing Expiration Date:   7/32/9233    METABOLIC PANEL, COMPREHENSIVE     Standing Status:   Future     Standing Expiration Date:   3/27/2020    FERRITIN     Standing Status:   Future     Standing Expiration Date:   3/27/2020    PROSTATE SPECIFIC AG     Standing Status:   Future     Standing Expiration Date:   3/27/2020    SPEP     Standing Status:   Future     Standing Expiration Date:   3/27/2020       Kelly Novak NP  03/27/2019    I have assessed the patient independently and  agree with the full assessment as outlined.   Florentino Esquivel MD, Kwabena Souza

## 2019-03-27 NOTE — PATIENT INSTRUCTIONS
Complete Blood Count (CBC): About This Test  What is it? A complete blood count (CBC) is a blood test that gives important information about your blood cells, especially red blood cells, white blood cells, and platelets. Why is this test done? A CBC may be done as part of a regular physical exam. There are many other reasons that a doctor may want this blood test, including to:  · Find the cause of symptoms such as fatigue, weakness, fever, bruising, or weight loss. · Find anemia or an infection. · See how much blood has been lost if there is bleeding. · Diagnose diseases of the blood, such as leukemia or polycythemia. How can you prepare for the test?  You do not need to do anything before having this test.  What happens during the test?  The health professional taking a sample of your blood will:  · Wrap an elastic band around your upper arm. This makes the veins below the band larger so it is easier to put a needle into the vein. · Clean the needle site with alcohol. · Put the needle into the vein. · Attach a tube to the needle to fill it with blood. · Remove the band from your arm when enough blood is collected. · Put a gauze pad or cotton ball over the needle site as the needle is removed. · Put pressure on the site and then put on a bandage. If this blood test is done on a baby, a heel stick may be done instead of a blood draw from a vein. What happens after the test?  · You will probably be able to go home right away. · You can go back to your usual activities right away. Follow-up care is a key part of your treatment and safety. Be sure to make and go to all appointments, and call your doctor if you are having problems. It's also a good idea to keep a list of the medicines you take. Ask your doctor when you can expect to have your test results. Where can you learn more? Go to http://petty-timothy.info/.   Enter F691 in the search box to learn more about \"Complete Blood Count (CBC): About This Test.\"  Current as of: June 25, 2018  Content Version: 11.9  © 8437-2306 [employer]. Care instructions adapted under license by [employer] (which disclaims liability or warranty for this information). If you have questions about a medical condition or this instruction, always ask your healthcare professional. [address] any warranty or liability for your use of this information.

## 2019-04-15 NOTE — PROGRESS NOTES
Patient has graduated from the Transitions of Care Coordination  program on 4/15/19. Patient's symptoms are stable at this time. Patient/family has the ability to self-manage. Care management goals have been completed at this time. No further nurse navigator follow up scheduled. Goals Addressed     None          Pt has nurse navigator's contact information for any further questions, concerns, or needs.   Patients upcoming visits:    Future Appointments   Date Time Provider Baljit Casper   4/16/2019 To Be Determined Xenia Noriega RN [address]   4/16/2019  1:00 PM Tenzin Nuñez [address] [place] [address]   4/18/2019 To Be Determined Tenzin Nuñez [facility]   4/22/2019 To Be Determined Jimmy Cates OT [address] [place] [address]   5/23/2019 10:40 AM Chalino Hsu MD [address] [place] [address]   5/29/2019  4:00 PM Roberto Carlos Schmidt MD [facility]

## 2019-05-13 PROBLEM — D64.9 SYMPTOMATIC ANEMIA: Status: ACTIVE | Noted: 2019-01-01

## 2019-05-13 PROBLEM — D69.6 THROMBOCYTOPENIA (HCC): Status: ACTIVE | Noted: 2019-01-01

## 2019-05-13 NOTE — ROUTINE PROCESS
TRANSFER - OUT REPORT: 
 
Verbal report given Paresh VERDUZCO(name) on Claribel Lutz  being transferred to [address](unit) for routine progression of care Report consisted of patients Situation, Background, Assessment and  
Recommendations(SBAR). Information from the following report(s) SBAR and ED Summary was reviewed with the receiving nurse. Lines:  
Peripheral IV 05/13/19 Right Antecubital (Active) Site Assessment Clean, dry, & intact 5/13/2019 10:48 AM  
Phlebitis Assessment 0 5/13/2019 10:48 AM  
Infiltration Assessment 0 5/13/2019 10:48 AM  
Dressing Status Clean, dry, & intact 5/13/2019 10:48 AM  
Dressing Type Tape;Transparent 5/13/2019 10:48 AM  
Hub Color/Line Status Pink;Flushed;Patent 5/13/2019 10:48 AM  
Action Taken Blood drawn 5/13/2019 10:48 AM  
  
 
Opportunity for questions and clarification was provided. Patient transported with: 
 Registered Nurse

## 2019-05-13 NOTE — CDMP QUERY
Pt admitted with GI Bleed. Pt noted to have Symptomatic Anemia. If possible, please document in the progress notes and d/c summary if you are evaluating and / or treating any of the following: ? Anemia due to acute blood loss ? Anemia due to chronic blood loss ? Anemia due to iron deficiency ? Anemia due to chronic disease, please specify disease ? Dilutional anemia 
? Other, please specify ? Clinically unable to determine The medical record reflects the following: 
   Risk Factors: 83 yo with Dx GI bleed and symptomatic anemia Clinical Indicators: H&H 5.5/17.8 Treatment: Blood transfusion Thank you. Helena Bustamante RN BSN CCDS [phone]

## 2019-05-13 NOTE — PROGRESS NOTES
1430-Received report from [facility] in ED for patient coming to unit 1500-Received patient on unit with blood transfusing at 125 ml/hr. Provided patient with hygiene supplies, bed in lowest position, and aware of unit protocol. Call bell in reach. Family at bedside. Completed required documentation and admission assessment. Also, after skin check removed old dressings and replaced with new ones. Pt referred to wound care as well for stage II wound on sacral area and on calf. 1830-Attempted to give evening medication but patient refused all meds. He stated \"I already told you I do not want any of it\". He also refused blood work from the lab x2. He was able to stills state that he is in the hospital and knows his name. He refused to further answer questions for orientation. Although upon admission to the unit, he was oriented x4.   
 
 1900-Left voicemail with Dr. [place] office to let them know of patient noncompliance with evening medication. Requested call back. -1930- Bedside and Verbal shift change report given to Sudeep Padilla (oncoming nurse) by this nurse (offgoing nurse). Report included the following information SBAR, Kardex and Cardiac Rhythm AFIB.

## 2019-05-13 NOTE — ED PROVIDER NOTES
EMERGENCY DEPARTMENT HISTORY AND PHYSICAL EXAM 
 
12:14 PM 
 
 
Date: 5/13/2019 Patient Name: Jillian Medicine History of Presenting Illness Chief Complaint Patient presents with  Shortness of Breath History Provided By: Patient Location/Duration/Severity/Modifying factors Patient is a 60-year-old male with a history of prostate cancer, hypertension, debility, atrial fibrillation now on anticoagulation, diabetes, and hypertension, the presents emergency department with complaint of increasing fatigue for the past month. Patient's been having some increasing fatigue and lives at home with nearly 24 hours of care and a daughter that lives in [place]. The last several days he noticed his fatigue is worsening and now he has a hard time even getting up to get out of his chair but she was able to to do at least twice a day. Patient's daughter is also noted that he is having some red-tinged urine which is new but denies any blood in his stool however had one episode in the past 3 weeks where he had some blood streaking when she was wiping him. Patient denies any chest pain but denies any shortness of breath. The patient is eating quite well without any vomiting or diarrhea. Patient has not had any fevers or chills. Patient follows closely with his primary doctor and get some wound care at home. Patient and family deny any aggravating or alleviating factors. Patient really has no pain. PCP: Hieu Carlson MD 
 
Current Facility-Administered Medications Medication Dose Route Frequency Provider Last Rate Last Dose  
 0.9% sodium chloride infusion 250 mL  250 mL IntraVENous PRN Kiel Lassiter MD      
 pantoprazole (PROTONIX) injection 40 mg  40 mg IntraVENous Q12H Kiel Lassiter MD      
 
Current Outpatient Medications Medication Sig Dispense Refill  apixaban (ELIQUIS) 5 mg tablet Take 1 Tab by mouth two (2) times a day.  180 Tab 3  
  hydroxyurea (HYDREA) 500 mg capsule Take 500 mg by mouth two (2) times a day.  insulin glargine (LANTUS) 100 unit/mL injection 5 Units by SubCUTAneous route daily. (Patient taking differently: 10 Units by SubCUTAneous route daily. ) 1 Vial 0  
 insulin lispro (HUMALOG) 100 unit/mL injection Very Insulin Resistant For Blood Sugar (mg/dL) of:             
Less than 150 give no insulin  0 units 150 -199 =   3 units 200 -249 =   6 units 250 -299 =   9 units 300 -349 =   12 units 350 and above =   15 units Initiate Hypoglycemic protocol if blood glucose is <70 mg/dL. Fast Acting - Ad 1 Vial 0  
 Insulin Needles, Disposable, 31 gauge x 5/16\" ndle Use as needed. 1 Package 11  
 valsartan (DIOVAN) 40 mg tablet Take 1 Tab by mouth daily. 30 Tab 6  
 oxybutynin (DITROPAN) 5 mg tablet TAKE ONE TABLET BY MOUTH THREE TIMES DAILY 90 Tab 8  
 glucose 4 gram chewable tablet Take 4 Tabs by mouth as needed. (Patient taking differently: Take 4 Tabs by mouth as needed for Other (low blood sugar). ) 10 Tab 0  
 therapeutic multivitamin (THERAGRAN) tablet Take 1 Tab by mouth daily. 30 Tab 0  
 metoprolol tartrate (LOPRESSOR) 50 mg tablet Take 1 Tab by mouth every twelve (12) hours. 60 Tab 0  
 acetaminophen (TYLENOL) 325 mg tablet Take 2 Tabs by mouth every six (6) hours as needed. 30 Tab 0  
 travoprost (TRAVATAN Z) 0.004 % ophthalmic solution Administer 1 Drop to both eyes two (2) times a day. 5 mL 0  
 brinzolamide (AZOPT) 1 % ophthalmic suspension Administer 1 Drop to both eyes two (2) times a day. 5 mL 0  
 brimonidine-timolol (COMBIGAN) 0.2-0.5 % drop ophthalmic solution Administer 1 Drop to both eyes every twelve (12) hours. 5 mL 0  
 calcium carbonate (CALCIUM 500) 500 mg calcium (1,250 mg) tablet Take 1 Tab by mouth daily. 90 Tab 2  
 atorvastatin (LIPITOR) 40 mg tablet Take 1 Tab by mouth nightly.  aspirin delayed-release 81 mg tablet Take  by mouth daily. Past History Past Medical History: 
Past Medical History:  
Diagnosis Date  Diabetes ([facility] [address]  Hypertension  Malignant neoplasm of prostate ([facility] [address] 1998 Past Surgical History: 
Past Surgical History:  
Procedure Laterality Date  BIOPSY OF PROSTATE,INCISIONAL  4/98  TN EXT BEAM RADIATION AS PRIMARY THERAPY TO PROSTATE ONLY  4/30-6/29/98 Family History: 
Family History Problem Relation Age of Onset  Diabetes Other  Hypertension Other Social History: 
Social History Tobacco Use  Smoking status: Current Some Day Smoker Packs/day: 0.10 Years: 22.00 Pack years: 2.20 Types: Cigarettes  Smokeless tobacco: Never Used Substance Use Topics  Alcohol use: No  
  Alcohol/week: 0.0 oz  Drug use: No  
 
 
Allergies: Allergies Allergen Reactions  Iodinated Contrast- Oral And Iv Dye Hives Faint, shaking Review of Systems Review of Systems Constitutional: Positive for activity change and fatigue. Negative for fever. HENT: Negative for congestion and rhinorrhea. Eyes: Negative for visual disturbance. Respiratory: Positive for shortness of breath. Cardiovascular: Negative for chest pain and palpitations. Gastrointestinal: Negative for abdominal pain, diarrhea, nausea and vomiting. Genitourinary: Positive for hematuria. Negative for dysuria. Musculoskeletal: Negative for back pain. Skin: Positive for color change. Negative for rash. Neurological: Positive for light-headedness. Negative for dizziness and weakness. All other systems reviewed and are negative. Physical Exam  
 
Visit Vitals /62 (BP 1 Location: Left arm, BP Patient Position: At rest) Temp 96.7 °F (35.9 °C) Resp 18 Ht 6' 1\" (1.854 m) SpO2 100% BMI 18.60 kg/m² Physical Exam  
Constitutional: He is oriented to person, place, and time. He appears well-developed. No distress. Globally weak, lying in bed, no distress Appears thinner than his normal weight HENT:  
Head: Normocephalic and atraumatic. Right Ear: External ear normal.  
Left Ear: External ear normal.  
Nose: Nose normal.  
Mouth/Throat: Oropharynx is clear and moist.  
Eyes: Pupils are equal, round, and reactive to light. EOM are normal. No scleral icterus. Conjunctival pallor Neck: Normal range of motion. Neck supple. No JVD present. No tracheal deviation present. No thyromegaly present. Cardiovascular: Regular rhythm, normal heart sounds and intact distal pulses. Exam reveals no gallop and no friction rub. No murmur heard. Irregular Pulmonary/Chest: Effort normal and breath sounds normal. He exhibits no tenderness. Abdominal: Soft. Bowel sounds are normal. He exhibits no distension. There is no tenderness. There is no rebound and no guarding. Lipomatous nodule left lower quadrant that is mobile and soft Ecchymosis noted where insulin shots are given No pulsatile masses Genitourinary: Rectal exam shows guaiac positive stool. Genitourinary Comments: Trace heme positive brown stool Stage II sacral decubitus ulcer with barrier dressing in place Musculoskeletal: He exhibits edema. He exhibits no tenderness. Trace edema lower extremities Purpura bilateral upper extremities Lymphadenopathy:  
  He has no cervical adenopathy. Neurological: He is alert and oriented to person, place, and time. No cranial nerve deficit. Coordination normal.  
Globally weak, follows commands, gait not observed Skin: Skin is warm and dry. Psychiatric: He has a normal mood and affect. His behavior is normal. Judgment and thought content normal.  
Supportive and insightful family at the bedside Nursing note and vitals reviewed. Diagnostic Study Results Labs - Recent Results (from the past 12 hour(s)) EKG, 12 LEAD, INITIAL Collection Time: 05/13/19 10:21 AM  
Result Value Ref Range  Ventricular Rate 76 BPM  
 Atrial Rate 111 BPM  
 QRS Duration 80 ms  
 Q-T Interval 398 ms QTC Calculation (Bezet) 447 ms Calculated R Axis 50 degrees Calculated T Axis 66 degrees Diagnosis Atrial fibrillation Abnormal ECG When compared with ECG of 21-MAR-2019 23:56, Atrial fibrillation has replaced Sinus rhythm CBC WITH AUTOMATED DIFF Collection Time: 05/13/19 10:39 AM  
Result Value Ref Range WBC 6.1 4.6 - 13.2 K/uL  
 RBC 1.63 (L) 4.70 - 5.50 M/uL HGB 5.5 (LL) 13.0 - 16.0 g/dL HCT 17.8 (LL) 36.0 - 48.0 % .2 (H) 74.0 - 97.0 FL  
 MCH 33.7 24.0 - 34.0 PG  
 MCHC 30.9 (L) 31.0 - 37.0 g/dL PLATELET 42 (L) 397 - 420 K/uL NEUTROPHILS 78 (H) 42 - 75 % BAND NEUTROPHILS 1 0 - 5 % LYMPHOCYTES 9 (L) 20 - 51 % MONOCYTES 7 2 - 9 % EOSINOPHILS 3 0 - 5 % BASOPHILS 2 0 - 3 %  
 ABS. NEUTROPHILS 4.8 1.8 - 8.0 K/UL  
 ABS. LYMPHOCYTES 0.5 (L) 0.8 - 3.5 K/UL  
 ABS. MONOCYTES 0.4 0 - 1.0 K/UL  
 ABS. EOSINOPHILS 0.2 0.0 - 0.4 K/UL  
 ABS. BASOPHILS 0.1 0.0 - 0.1 K/UL PLATELET COMMENTS LARGE PLATELETS    
 RBC COMMENTS Dimorphic RBCs MACROCYTOSIS 2+ 
    
 RBC COMMENTS POIKILOCYTOSIS 
RBC FRAGMENTS 
BASOPHILIC STIPPLING 1+ 
  
 RBC COMMENTS POLYCHROMASIA 1+ 
    
 DF MANUAL METABOLIC PANEL, COMPREHENSIVE Collection Time: 05/13/19 10:39 AM  
Result Value Ref Range Sodium 144 136 - 145 mmol/L Potassium 3.9 3.5 - 5.5 mmol/L Chloride 110 (H) 100 - 108 mmol/L  
 CO2 25 21 - 32 mmol/L Anion gap 9 3.0 - 18 mmol/L Glucose 195 (H) 74 - 99 mg/dL BUN 26 (H) 7.0 - 18 MG/DL Creatinine 0.90 0.6 - 1.3 MG/DL  
 BUN/Creatinine ratio 29 (H) 12 - 20 GFR est AA >60 >60 ml/min/1.73m2 GFR est non-AA >60 >60 ml/min/1.73m2 Calcium 8.2 (L) 8.5 - 10.1 MG/DL Bilirubin, total 1.5 (H) 0.2 - 1.0 MG/DL  
 ALT (SGPT) 20 16 - 61 U/L  
 AST (SGOT) 44 (H) 15 - 37 U/L Alk. phosphatase 69 45 - 117 U/L Protein, total 7.0 6.4 - 8.2 g/dL Albumin 1.9 (L) 3.4 - 5.0 g/dL Globulin 5.1 (H) 2.0 - 4.0 g/dL A-G Ratio 0.4 (L) 0.8 - 1.7 CARDIAC PANEL,(CK, CKMB & TROPONIN) Collection Time: 05/13/19 10:39 AM  
Result Value Ref Range CK 98 39 - 308 U/L  
 CK - MB 2.6 <3.6 ng/ml CK-MB Index 2.7 0.0 - 4.0 % Troponin-I, QT 0.02 0.0 - 0.045 NG/ML  
NT-PRO BNP Collection Time: 05/13/19 10:39 AM  
Result Value Ref Range NT pro-BNP 1,519 0 - 1,800 PG/ML  
RETICULOCYTE COUNT Collection Time: 05/13/19 10:39 AM  
Result Value Ref Range Reticulocyte count 7.6 (H) 0.5 - 2.3 % TYPE & SCREEN Collection Time: 05/13/19 11:33 AM  
Result Value Ref Range Crossmatch Expiration 05/16/2019 ABO/Rh(D) AB POSITIVE Antibody screen NEG   
 CALLED TO:  [address] [facility], [id number] AT 1223 BY [facility]. Unit number [id number] Blood component type RC LR Unit division 00 Status of unit ALLOCATED Crossmatch result Compatible Radiologic Studies -  
XR CHEST PA LAT Final Result IMPRESSION:  
  
Small pleural effusions. Right basilar streaky density, likely atelectasis. Improved aeration in the right medial lung base compared to 2/10/2019. Medical Decision Making I am the first provider for this patient. I reviewed the vital signs, available nursing notes, past medical history, past surgical history, family history and social history. Vital Signs-Reviewed the patient's vital signs. EKG: Atrial fibrillation with rate control rate of 76 read at 1021 on arrival by an ED provider Records Reviewed: Nursing Notes and Old Medical Records (Time of Review: 12:14 PM) 
 
ED Course: Progress Notes, Reevaluation, and Consults: 
 
Patient has trace heme positive stool and hemoglobin of 5.5 he has some petechia/purpura on his legs and his arms. Unsure if the stool is the source of the dropping hemoglobin or there is other hematologic issues. We will transfuse him and plan for admission and give IV Protonix. Discussed case with Dr. Luann Daily and will admit the patient to a telemetry bed. Princess Bazan,  1:08 PM 
 
Discussed case with GI and will follow patient in the hospital.  Dr. Shree Mathis will see the patient in the hospital. 
 
Provider Notes (Medical Decision Making): MDM Number of Diagnoses or Management Options Diagnosis management comments: Patient 82-year-old male with a history of diabetes, hypertension, chronic atrial fibrillation now on anticoagulation, the presents the emergency department with increasing fatigue for 1 month now with shortness of breath difficulty moving around the house for the last 2 days. Patient appears pale and his hemoglobin is 5.5. Patient apparently has history of prostate cancer had radiation therapy and had some anemia after that. Patient does follow with Dr. Luz Ying and has no barriers to transfusion. Suspect he has symptomatic anemia unsure what the cause of the anemia is and will plan for transfusion, send anemia evaluation, trend cardiac labs, and plan to admit the hospital.Alex Muller, DO 12:19 PM 
 
 
 
Procedures Critical Care Time: Critical Care Time: The services I provided to this patient were to treat and/or prevent clinically significant deterioration that could result in the failure of one or more body systems and/or organ systems due to anemia: 
 
Services included the following: 
-reviewing nursing notes and old charts 
-vital sign assessments 
-direct patient care 
-medication orders and management 
-interpreting and reviewing diagnostic studies/labs 
-re-evaluations 
-documentation time Aggregate critical care time was 65 minutes, which includes only time during which I was engaged in work directly related to the patient's care as described above, whether I was at bedside or elsewhere in the Emergency Department.  It did not include time spent performing other reported procedures or the services of residents, students, nurses, or advance practice providers. Lexie Castro DO 1:07 PM 
 
 
Diagnosis Clinical Impression: 1. Thrombocytopenia ([address] 2. Fatigue, unspecified type 3. Symptomatic anemia 4. Dyspnea, unspecified type Disposition: Admit Follow-up Information None Patient's Medications Start Taking No medications on file Continue Taking ACETAMINOPHEN (TYLENOL) 325 MG TABLET    Take 2 Tabs by mouth every six (6) hours as needed. APIXABAN (ELIQUIS) 5 MG TABLET    Take 1 Tab by mouth two (2) times a day. ASPIRIN DELAYED-RELEASE 81 MG TABLET    Take  by mouth daily. ATORVASTATIN (LIPITOR) 40 MG TABLET    Take 1 Tab by mouth nightly. BRIMONIDINE-TIMOLOL (COMBIGAN) 0.2-0.5 % DROP OPHTHALMIC SOLUTION    Administer 1 Drop to both eyes every twelve (12) hours. BRINZOLAMIDE (AZOPT) 1 % OPHTHALMIC SUSPENSION    Administer 1 Drop to both eyes two (2) times a day. CALCIUM CARBONATE (CALCIUM 500) 500 MG CALCIUM (1,250 MG) TABLET    Take 1 Tab by mouth daily. GLUCOSE 4 GRAM CHEWABLE TABLET    Take 4 Tabs by mouth as needed. HYDROXYUREA (HYDREA) 500 MG CAPSULE    Take 500 mg by mouth two (2) times a day. INSULIN GLARGINE (LANTUS) 100 UNIT/ML INJECTION    5 Units by SubCUTAneous route daily. INSULIN LISPRO (HUMALOG) 100 UNIT/ML INJECTION    Very Insulin Resistant For Blood Sugar (mg/dL) of:             
Less than 150 give no insulin  0 units 150 -199 =   3 units 200 -249 =   6 units 250 -299 =   9 units 300 -349 =   12 units 350 and above =   15 units Initiate Hypoglycemic protocol if blood glucose is <70 mg/dL. Fast Acting - Ad INSULIN NEEDLES, DISPOSABLE, 31 GAUGE X 5/16\" NDLE    Use as needed. METOPROLOL TARTRATE (LOPRESSOR) 50 MG TABLET    Take 1 Tab by mouth every twelve (12) hours. OXYBUTYNIN (DITROPAN) 5 MG TABLET    TAKE ONE TABLET BY MOUTH THREE TIMES DAILY THERAPEUTIC MULTIVITAMIN (THERAGRAN) TABLET    Take 1 Tab by mouth daily. TRAVOPROST (TRAVATAN Z) 0.004 % OPHTHALMIC SOLUTION    Administer 1 Drop to both eyes two (2) times a day. VALSARTAN (DIOVAN) 40 MG TABLET    Take 1 Tab by mouth daily. These Medications have changed No medications on file Stop Taking No medications on file Disclaimer: Sections of this note are dictated using utilizing voice recognition software. Minor typographical errors may be present. If questions arise, please do not hesitate to contact me or call our department.

## 2019-05-13 NOTE — H&P
History and Physical 
NAME:  Lindsey Loaiza :   1935 MRN:   [id number] Date/Time:  2019 Chief Complaint: fatigue History of Present Illness:   
 
 
Mr. Titus Kay is a 82 y.o.   male with a PMH of Afib, HTN, Dm-2 and prostate cancer who presents with c/c of fatigue. Patient was feeling weak, tired and fatigue for the past 2 weeks and gradually getting worse. He denies chest pain or shortness of breathing. He denies hematemesis or melena. Denies fever or chills. He has no cough. Here in ED he was found to have anemia with hemoglobin of 5.5. He is also on eliquis for afib. He denies melena but has episode of mild rectal bleeding according the family. Blood transfusion is ordered and GI is also consulted and admitted for further evaluation and management. Past Medical History:  
Diagnosis Date  Diabetes ([facility]  Hypertension  Malignant neoplasm of prostate ([facility]  Past Surgical History:  
Procedure Laterality Date  BIOPSY OF PROSTATE,INCISIONAL    FL EXT BEAM RADIATION AS PRIMARY THERAPY TO PROSTATE ONLY  -98 Social History Tobacco Use  Smoking status: Current Some Day Smoker Packs/day: 0.10 Years: 22.00 Pack years: 2.20 Types: Cigarettes  Smokeless tobacco: Never Used Substance Use Topics  Alcohol use: No  
  Alcohol/week: 0.0 oz Family History Problem Relation Age of Onset  Diabetes Other  Hypertension Other Allergies Allergen Reactions  Iodinated Contrast- Oral And Iv Dye Hives Faint, shaking Prior to Admission medications Medication Sig Start Date End Date Taking? Authorizing Provider  
apixaban (ELIQUIS) 5 mg tablet Take 1 Tab by mouth two (2) times a day. 19   Selma Hsu MD  
hydroxyurea (HYDREA) 500 mg capsule Take 500 mg by mouth two (2) times a day.     Provider, Historical  
 insulin glargine (LANTUS) 100 unit/mL injection 5 Units by SubCUTAneous route daily. Patient taking differently: 10 Units by SubCUTAneous route daily. 3/22/19   Des Baez MD  
insulin lispro (HUMALOG) 100 unit/mL injection Very Insulin Resistant For Blood Sugar (mg/dL) of:             
Less than 150 give no insulin  0 units 150 -199 =   3 units 200 -249 =   6 units 250 -299 =   9 units 300 -349 =   12 units 350 and above =   15 units Initiate Hypoglycemic protocol if blood glucose is <70 mg/dL. Fast Acting - Ad 3/22/19   Des Baez MD  
Insulin Needles, Disposable, 31 gauge x 5/16\" ndle Use as needed. 3/22/19   Des Baez MD  
valsartan (DIOVAN) 40 mg tablet Take 1 Tab by mouth daily. 3/18/19   HARRY Schmid  
oxybutynin (DITROPAN) 5 mg tablet TAKE ONE TABLET BY MOUTH THREE TIMES DAILY 3/6/19   Harinder Aviles MD  
glucose 4 gram chewable tablet Take 4 Tabs by mouth as needed. Patient taking differently: Take 4 Tabs by mouth as needed for Other (low blood sugar). 2/13/19   Iris Nolan NP  
therapeutic multivitamin [facility]) tablet Take 1 Tab by mouth daily. 2/14/19   Iris Nolan NP  
metoprolol tartrate (LOPRESSOR) 50 mg tablet Take 1 Tab by mouth every twelve (12) hours. 2/13/19   Iris Nolan NP  
acetaminophen (TYLENOL) 325 mg tablet Take 2 Tabs by mouth every six (6) hours as needed. 2/13/19   Iris Nolan NP  
travoprost (TRAVATAN Z) 0.004 % ophthalmic solution Administer 1 Drop to both eyes two (2) times a day. 2/13/19   Iris Nolan NP  
brinzolamide (AZOPT) 1 % ophthalmic suspension Administer 1 Drop to both eyes two (2) times a day. 2/13/19   Iris Nolan NP  
brimonidine-timolol (COMBIGAN) 0.2-0.5 % drop ophthalmic solution Administer 1 Drop to both eyes every twelve (12) hours.  2/13/19   Iris Nolan NP  
calcium carbonate (CALCIUM 500) 500 mg calcium (1,250 mg) tablet Take 1 Tab by mouth daily. 7/27/18   Dyanna Bernheim A, NP  
atorvastatin (LIPITOR) 40 mg tablet Take 1 Tab by mouth nightly. 2/29/16   Provider, Historical  
aspirin delayed-release 81 mg tablet Take  by mouth daily. Provider, Historical  
 
 
  
Review of systems:  
 
CONSTITUTIONAL: No Fever, No chills, No weight loss, No Night sweats HEENT:  No epistaxis, No diff in swallowing CVS: No chest pain, No palpitations, No syncope, No peripheral edema, No PND, No orthopnea RS: No shortness of breath, No cough, No hemoptysis, No pleuritic chest pain GI: No abd pain, No vomitting, No diarrhea, No hematemesis, No rectal bleeding, No acid reflux or heartburn NEURO: No focal weakness, No headaches, No seizures PSYCH: No anxiety, No depression MUSCULOSKLETAL: No joint pain or swelling : No hematuria or dysuria SKIN: No rash Physical Exam: VITALS:   
Vital signs reviewed; most recent are: 
 
Visit Vitals /57 (BP 1 Location: Left arm, BP Patient Position: At rest) Pulse 71 Temp 97.6 °F (36.4 °C) Resp 18 Ht 6' 1\" (1.854 m) SpO2 98% BMI 18.60 kg/m² SpO2 Readings from Last 6 Encounters:  
05/13/19 98% 04/30/19 96% 04/26/19 98% 04/25/19 98% 04/23/19 97% 04/19/19 96% No intake or output data in the 24 hours ending 05/13/19 1516 GENERAL: Not in acute distress HEENT: pink conjunctiva, un icteric sclera, NECK: No lymphadenopthy or thyroid swelling, JVD not seen LYMPH: No supraclavicular or cervical or axillary nodes on both sides CVS: S1S2, No murmurs, No gallop or rub RS: CTA, No wheezing or crackles Abd: Soft, non tender, not distended, No guarding, No rigidity NEURO:  No focal neurologic deficits Extrm: no leg edema or swelling Skin: No rash Labs:  
 
Recent Results (from the past 24 hour(s)) EKG, 12 LEAD, INITIAL Collection Time: 05/13/19 10:21 AM  
Result Value Ref Range Ventricular Rate 76 BPM  
 Atrial Rate 111 BPM  
 QRS Duration 80 ms Q-T Interval 398 ms QTC Calculation (Bezet) 447 ms Calculated R Axis 50 degrees Calculated T Axis 66 degrees Diagnosis Atrial fibrillation Abnormal ECG When compared with ECG of 21-MAR-2019 23:56, Atrial fibrillation has replaced Sinus rhythm CBC WITH AUTOMATED DIFF Collection Time: 05/13/19 10:39 AM  
Result Value Ref Range WBC 6.1 4.6 - 13.2 K/uL  
 RBC 1.63 (L) 4.70 - 5.50 M/uL HGB 5.5 (LL) 13.0 - 16.0 g/dL HCT 17.8 (LL) 36.0 - 48.0 % .2 (H) 74.0 - 97.0 FL  
 MCH 33.7 24.0 - 34.0 PG  
 MCHC 30.9 (L) 31.0 - 37.0 g/dL PLATELET 42 (L) 379 - 420 K/uL NEUTROPHILS 78 (H) 42 - 75 % BAND NEUTROPHILS 1 0 - 5 % LYMPHOCYTES 9 (L) 20 - 51 % MONOCYTES 7 2 - 9 % EOSINOPHILS 3 0 - 5 % BASOPHILS 2 0 - 3 %  
 ABS. NEUTROPHILS 4.8 1.8 - 8.0 K/UL  
 ABS. LYMPHOCYTES 0.5 (L) 0.8 - 3.5 K/UL  
 ABS. MONOCYTES 0.4 0 - 1.0 K/UL  
 ABS. EOSINOPHILS 0.2 0.0 - 0.4 K/UL  
 ABS. BASOPHILS 0.1 0.0 - 0.1 K/UL PLATELET COMMENTS LARGE PLATELETS    
 RBC COMMENTS Dimorphic RBCs MACROCYTOSIS 2+ 
    
 RBC COMMENTS POIKILOCYTOSIS 
RBC FRAGMENTS 
BASOPHILIC STIPPLING 1+ 
  
 RBC COMMENTS POLYCHROMASIA 1+ 
    
 DF MANUAL METABOLIC PANEL, COMPREHENSIVE Collection Time: 05/13/19 10:39 AM  
Result Value Ref Range Sodium 144 136 - 145 mmol/L Potassium 3.9 3.5 - 5.5 mmol/L Chloride 110 (H) 100 - 108 mmol/L  
 CO2 25 21 - 32 mmol/L Anion gap 9 3.0 - 18 mmol/L Glucose 195 (H) 74 - 99 mg/dL BUN 26 (H) 7.0 - 18 MG/DL Creatinine 0.90 0.6 - 1.3 MG/DL  
 BUN/Creatinine ratio 29 (H) 12 - 20 GFR est AA >60 >60 ml/min/1.73m2 GFR est non-AA >60 >60 ml/min/1.73m2 Calcium 8.2 (L) 8.5 - 10.1 MG/DL Bilirubin, total 1.5 (H) 0.2 - 1.0 MG/DL  
 ALT (SGPT) 20 16 - 61 U/L  
 AST (SGOT) 44 (H) 15 - 37 U/L Alk. phosphatase 69 45 - 117 U/L Protein, total 7.0 6.4 - 8.2 g/dL Albumin 1.9 (L) 3.4 - 5.0 g/dL Globulin 5.1 (H) 2.0 - 4.0 g/dL A-G Ratio 0.4 (L) 0.8 - 1.7 CARDIAC PANEL,(CK, CKMB & TROPONIN) Collection Time: 05/13/19 10:39 AM  
Result Value Ref Range CK 98 39 - 308 U/L  
 CK - MB 2.6 <3.6 ng/ml CK-MB Index 2.7 0.0 - 4.0 % Troponin-I, QT 0.02 0.0 - 0.045 NG/ML  
NT-PRO BNP Collection Time: 05/13/19 10:39 AM  
Result Value Ref Range NT pro-BNP 1,519 0 - 1,800 PG/ML  
FERRITIN Collection Time: 05/13/19 10:39 AM  
Result Value Ref Range Ferritin 492 (H) 8 - 388 NG/ML  
IRON PROFILE Collection Time: 05/13/19 10:39 AM  
Result Value Ref Range Iron 48 (L) 50 - 175 ug/dL TIBC 182 (L) 250 - 450 ug/dL Iron % saturation 26 % RETICULOCYTE COUNT Collection Time: 05/13/19 10:39 AM  
Result Value Ref Range Reticulocyte count 7.6 (H) 0.5 - 2.3 % FOLATE Collection Time: 05/13/19 10:39 AM  
Result Value Ref Range Folate >20.0 (H) 3.10 - 17.50 ng/mL VITAMIN B12 Collection Time: 05/13/19 10:39 AM  
Result Value Ref Range Vitamin B12 1,503 (H) 211 - 911 pg/mL TYPE & SCREEN Collection Time: 05/13/19 11:33 AM  
Result Value Ref Range Crossmatch Expiration 05/16/2019 ABO/Rh(D) AB POSITIVE Antibody screen NEG   
 CALLED TO:  [address] [facility], [id number] AT 1223 BY [facility]. Unit number [id number] Blood component type [facility] Unit division 00 Status of unit ISSUED Crossmatch result Compatible Active Problems: Thrombocytopenia ([address] (5/13/2019) Symptomatic anemia (5/13/2019) Assessment: 1. Symptomatic anemia secondary chronic blood loss 2. GI bleeding 3. Thrombocytopenia 4. Afib, on eliquis 5. HTN,  
6. DM-2, uncontrolled with hyperglycemia 7. H/o prostate cancer Plan:  
 
 
· Patient being admitted · Continue transfusion · Monitor H/H 
· Hold off eliquis · GI also is consulted · Monitor CBC · Monitor blood glucose, SSI coverage · D/w patient and his daughter (POA). Patient is full code. Total time:  57 minutes _______________________________________________________________________ Attending Physician:  Kye Valadez MD  
 
 
Copies: Cheikh Vasquez MD

## 2019-05-14 NOTE — WOUND CARE
Physical Exam  
Musculoskeletal:  
     Legs: 
     Feet: 
 
Seen by wound care per nursing request, tissue injury assessment and dressing application performed at this time. Tissue injury listed above noted during skin assessment. Multiple areas of scabbing with vascular-like presentation noted on toes and feet. Treatment plan explained to [facility]  and Pt agreeable to continue current treatment. At this time wounds cleansed with dermal wound cleanser, aqua marie Ag and silicone border dressings applied. Wound care education provided to pt at this time. Plan of care reviewed with nursing. Will turn over care to nursing staff at this time Jadiel Recinos, Wound Care Department

## 2019-05-14 NOTE — ROUTINE PROCESS
Received report from LUBA Rodriguez. Pt AAOx3, NAD, breathing non labored, on room air, HOB up. IV site clean, dry and intact. Bed at the lowest level on lock position, call bell w/i reach. Bedside and Verbal shift change report given to LUBA Alfonso (oncoming nurse) by me (offgoing nurse).  Report included the following information SBAR, Kardex, Intake/Output, MAR, Recent Results and Cardiac Rhythm SR.

## 2019-05-14 NOTE — PROGRESS NOTES
NUTRITION Nursing Referral: Pressure Injury RECOMMENDATIONS / PLAN:  
 
- Recommend advancing diet as tolerated to diabetic diet. - Recommend starting Glucerna Shake, BID (vanilla/strawberry) once diet advanced. - Continue RD inpatient monitoring and evaluation. NUTRITION INTERVENTIONS & DIAGNOSIS:  
 
[x] Meals/snacks: modify composition 
[x] Medical food supplement therapy: recommend Nutrition Diagnosis: Inadequate energy intake related to altered GI function as evidenced by pt restricted to liquid diet with Gi bleed. Increased nutrient needs (protein) related to increased demand for wound healing as evidenced by pt with pressure injury. Patient meets criteria for Moderate Protein Calorie Malnutrition as evidenced by:  
ASPEN Malnutrition Criteria Acute Illness, Chronic Illness, or Social/Enviornmental: Chronic illness Body Fat: Mild(orbital, thoracic & upper arm body regions) Muscle Mass: Mild(temple, clavicle, dorsal hand, patellar body regions) ASPEN Malnutrition Score - Chronic Illness: 2 Chronic Illness - Malnutrition Diagnosis: Moderate malnutrition. ASSESSMENT:  
 
Pt's daughter at bedside reporting pt recently with excellent meal intake. Weight loss x several years with recently weight weight gain since home cooked meals now being prepared for pt daily. Good appetite and tolerating diet. NFPE completed. Plan to advanced diet as tolerated per GI. Average po intake adequate to meet patients estimated nutritional needs:   [] Yes     [x] No   [] Unable to determine at this time Diet: DIET CLEAR LIQUID Food Allergies: NKFA Current Appetite:   [x] Good     [] Fair     [] Poor     [] Other: 
Appetite/meal intake prior to admission:   [x] Good     [] Fair     [] Poor     [x] Other: family and home care assistant preparing pt's meals; occasional glucerna shake consumption Feeding Limitations:  [] Swallowing difficulty    [] Chewing difficulty    [] Other: Current Meal Intake: No data found. BM: 5/12 Skin Integrity: stage 2 pressure injury to sacral, blister to right foot, 2 open sores to right leg Edema:   [] No     [x] Yes Pertinent Medications: Reviewed: lantus, SSI, protonix, theragran Recent Labs 05/14/19 
0016 05/13/19 
1039  144  
K 3.6 3.9 * 110* CO2 29 25 GLU 97 195* BUN 23* 26* CREA 0.58* 0.90  
CA 7.9* 8.2* ALB  --  1.9*  
SGOT  --  44* ALT  --  20 Intake/Output Summary (Last 24 hours) at 5/14/2019 1302 Last data filed at 5/14/2019 8691 Gross per 24 hour Intake 570.8 ml Output 200 ml Net 370.8 ml Anthropometrics: 
Ht Readings from Last 1 Encounters:  
05/13/19 6' 1\" (1.854 m) Last 3 Recorded Weights in this Encounter 05/14/19 0425 Weight: 69 kg (152 lb 1.6 oz) Body mass index is 20.07 kg/m². Weight History: Pt's daughter at bedside reports pt with 80 lb weight loss x several years PTA with recent weight re-gain x several months. Weight Metrics 5/14/2019 3/27/2019 3/18/2019 2/13/2019 12/4/2018 10/23/2018 7/24/2018 Weight 152 lb 1.6 oz 141 lb 136 lb 127 lb 6.4 oz 132 lb 6.4 oz 127 lb 142 lb BMI 20.07 kg/m2 18.6 kg/m2 17.94 kg/m2 16.81 kg/m2 17.47 kg/m2 16.76 kg/m2 18.73 kg/m2 Admitting Diagnosis: Symptomatic anemia [D64.9] Thrombocytopenia ([address] [D69.6] Pertinent PMHx: prostate cancer, HTN, DM Education Needs:        [x] None identified  [] Identified - Not appropriate at this time  []  Identified and addressed - refer to education log Learning Limitations:   [x] None identified  [] Identified Cultural, Synagogue & ethnic food preferences:  [x] None identified    [] Identified and addressed ESTIMATED NUTRITION NEEDS:  
 
Calories: 8226-9927 kcal (HBEx1.2-1.5) based on  [x] Actual BW: 69 kg      [] IBW Protein:  gm (1.2-1.5 gm/kg) based on  [x] Actual BW      [] IBW Fluid: 1 mL/kcal 
  
MONITORING & EVALUATION:  
 
Nutrition Goal(s):  
 1. Po intake of meals will meet >75% of patient estimated nutritional needs within the next 7 days. Outcome:  [] Met/Ongoing    [] Progressing towards goal    [] Not progressing towards goal    [x] New/Initial goal  
 
Monitoring:   [x] Food and beverage intake   [x] Diet order   [x] Nutrition-focused physical findings   [x] Treatment/therapy   [] Weight   [] Enteral nutrition intake Previous Recommendations (for follow-up assessments only):     []   Implemented       []   Not Implemented (RD to address)      [] No Longer Appropriate     [] No Recommendation Made Discharge Planning: diabetic diet [x] Participated in care planning, discharge planning, & interdisciplinary rounds as appropriate Arlyn Pearza RD Pager: [phone]

## 2019-05-14 NOTE — ROUTINE PROCESS
Received bedside report from [address] [employer], patient was resting in bed, HOB elevated, bed in lowest position and oriented to call button. Patient's blood glucose was 37; patient was given orange juice, peanut butter and crackers; blood glucose came up 98. Gave bedside report to [facility].

## 2019-05-14 NOTE — PROGRESS NOTES
[facility] Hospitalist Group Progress Note Patient: Cheryl Hahn Age: 82 y.o. : 1935 MR#: [id number] SSN: [id number] Date/Time: 2019 Subjective:  
 
Review of systems No cp No SOB No Cough Patient's daughter in room with her She is giving all history Assessment/Plan:  
Patient who is wheel chair bound , 2019 - history of Pneumonia and prolong recovery since then weak per daughter , recently diagnosed Afib and on LTAC , other history of HTN and DM2 , Ca prostate ( Followed by Dr Gurinder Wilkes ) , patient had colonoscopy done many years ago ( per daughter ) no EGD . Now admitted with weakness and SOB , leg edema now less ( usually it used to be very significant ) . 1. Symptomatic anemia - over 2 weeks time - from history of weakness - No evidence or H/O melena . Hematemesis . - s/p 1 unit prbc  
- monitor H/H  
- PPI   
   
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  
 
3 Dm2 - h/o DKA in past  
- SSI  
- Basal insulin Dispo : work up for anemia - no plan for EGD/Colonoscopy / - if H/h stable and cleared by Hematology - DC home with home health Case discussed with:  [x]Patient  [x] Family ( In room with patient )    []Nursing  []Case Management DVT Prophylaxis:  []Lovenox  []Hep SQ  []SCDs  []Coumadin   []On Heparin gtt Objective:  
VS:  
Visit Vitals /81 (BP 1 Location: Left arm, BP Patient Position: At rest) Pulse 65 Temp 98 °F (36.7 °C) Resp 18 Ht 6' 1\" (1.854 m) Wt 69 kg (152 lb 1.6 oz) SpO2 95% BMI 20.07 kg/m² Tmax/24hrs: Temp (24hrs), Av.1 °F (36.7 °C), Min:97.6 °F (36.4 °C), Max:98.5 °F (36.9 °C) IOBRIEF Intake/Output Summary (Last 24 hours) at 2019 1408 Last data filed at 2019 5230 Gross per 24 hour Intake 570.8 ml Output 200 ml Net 370.8 ml General:  Alert, cooperative, no acute distress Cardiovascular: S1S2 - regular , No Murmur Pulmonary: Equal expansion , No Use of accessory muscles , No Rales No Rhonchi GI:  +BS in all four quadrants, soft, non-tender Extremities:  2+ edema; 2+ dorsalis pedis pulses bilaterally Neuro: Alert and oriented X 2. Right LL and UL - weakness - chronic Medications:  
Current Facility-Administered Medications Medication Dose Route Frequency  0.9% sodium chloride infusion 250 mL  250 mL IntraVENous PRN  potassium chloride (K-DUR, KLOR-CON) SR tablet 40 mEq  40 mEq Oral TID  
 0.9% sodium chloride infusion 250 mL  250 mL IntraVENous PRN  pantoprazole (PROTONIX) injection 40 mg  40 mg IntraVENous Q12H  
 atorvastatin (LIPITOR) tablet 40 mg  40 mg Oral QHS  valsartan (DIOVAN) tablet 40 mg  40 mg Oral DAILY  therapeutic multivitamin (THERAGRAN) tablet 1 Tab  1 Tab Oral DAILY  oxybutynin (DITROPAN) tablet 5 mg  5 mg Oral TID  metoprolol tartrate (LOPRESSOR) tablet 50 mg  50 mg Oral Q12H  
 insulin glargine (LANTUS) injection 5 Units  5 Units SubCUTAneous DAILY  hydroxyurea (HYDREA) chemo cap 500 mg  500 mg Oral BID  acetaminophen (TYLENOL) tablet 650 mg  650 mg Oral Q4H PRN  
 insulin lispro (HUMALOG) injection   SubCUTAneous AC&HS  
 glucose chewable tablet 16 g  16 g Oral PRN  
 glucagon (GLUCAGEN) injection 1 mg  1 mg IntraMUSCular PRN  
 dextrose (D50W) injection syrg 12.5-25 g  25-50 mL IntraVENous PRN  
 latanoprost (XALATAN) 0.005 % ophthalmic solution 1 Drop  1 Drop Both Eyes BID  dorzolamide (TRUSOPT) 2 % ophthalmic solution 1 Drop  1 Drop Both Eyes BID  
 brimonidine (ALPHAGAN) 0.2 % ophthalmic solution 1 Drop  1 Drop Both Eyes Q12H And  
 timolol (TIMOPTIC) 0.5 % ophthalmic solution 1 Drop  1 Drop Both Eyes BID Labs:   
Recent Labs 05/14/19 
3794 05/14/19 
0016 05/13/19 
1039 WBC  --   --  6.1 HGB 7.3* 6.4* 5.5* HCT 23.9* 20.0* 17.8* PLT  --   --  42* Recent Labs 05/14/19 
0016 05/13/19 
1039  144  
K 3.6 3.9 * 110* CO2 29 25 GLU 97 195* BUN 23* 26* CREA 0.58* 0.90  
CA 7.9* 8.2* ALB  --  1.9*  
SGOT  --  44* ALT  --  20 Signed By: Krystle Jacobs MD   
 May 14, 2019

## 2019-05-14 NOTE — CONSULTS
[email or web address] 
[phone] GASTROENTEROLOGY CONSULT Impression: 1. Lower GI bleed-pt had not noticed blood, caregiver noted small amount. Hgb 5.5 on admission. PRBCs given. 7.3 this morning 2. Sacral pressure ulcers-has home care for dressing changes. 3. Eliquis started in March 2019 for A-fib. 4. DM2 
5. H/O Prostate Ca 6. Thrombocytopenia-PLT=42. Plan:  
 
1. GI Bleed-no immediate need for scope and not warranted due to advanced age. 2. Monitor Hgb and transfuse <7.  
3. Continue IV Protonix. 4. Advance diet as tolerated Chief Complaint: weakness HPI: 
Cheryl Hahn is a 82 y.o. male who I am being asked to see in consultation for an opinion regarding . PMH of Afib, HTN, Dm-2 and prostate cancer who presents with c/c of fatigue. Patient was feeling weak, tired and fatigue for the past 2 weeks and gradually getting worse. He denies chest pain or shortness of breathing. He denies hematemesis or melena. Denies fever or chills. He has no cough. In ED he was found to have anemia with hemoglobin of 5.5. He is also on eliquis for afib. He denies melena but has episode of mild rectal bleeding according the family. PMH:  
Past Medical History:  
Diagnosis Date  Diabetes ([facility] [address]  Hypertension  Malignant neoplasm of prostate ([facility] [address] 1998 PSH:  
Past Surgical History:  
Procedure Laterality Date  BIOPSY OF PROSTATE,INCISIONAL  4/98  MO EXT BEAM RADIATION AS PRIMARY THERAPY TO PROSTATE ONLY  4/30-6/29/98 Social HX:  
Social History Socioeconomic History  Marital status: SINGLE Spouse name: Not on file  Number of children: Not on file  Years of education: Not on file  Highest education level: Not on file Occupational History  Not on file Social Needs  Financial resource strain: Not on file  Food insecurity:  
  Worry: Not on file Inability: Not on file  Transportation needs:  
  Medical: Not on file Non-medical: Not on file Tobacco Use  Smoking status: Current Some Day Smoker Packs/day: 0.10 Years: 22.00 Pack years: 2.20 Types: Cigarettes  Smokeless tobacco: Never Used Substance and Sexual Activity  Alcohol use: No  
  Alcohol/week: 0.0 oz  Drug use: No  
 Sexual activity: Not on file Lifestyle  Physical activity:  
  Days per week: Not on file Minutes per session: Not on file  Stress: Not on file Relationships  Social connections:  
  Talks on phone: Not on file Gets together: Not on file Attends Voodoo service: Not on file Active member of club or organization: Not on file Attends meetings of clubs or organizations: Not on file Relationship status: Not on file  Intimate partner violence:  
  Fear of current or ex partner: Not on file Emotionally abused: Not on file Physically abused: Not on file Forced sexual activity: Not on file Other Topics Concern  Not on file Social History Narrative ** Merged History Encounter ** FHX:  
Family History Problem Relation Age of Onset  Diabetes Other  Hypertension Other Allergy: Allergies Allergen Reactions  Iodinated Contrast- Oral And Iv Dye Hives Faint, shaking Patient Active Problem List  
Diagnosis Code  Malignant neoplasm of prostate ([facility] [address] C61  Malignant neoplasm of prostate ([facility] [address] C61  Hypoglycemia E16.2  History of prostate cancer Z85.46  
 Urinary frequency R35.0  Neutrophilic leukocytosis R52.5  Thrombocytosis (HCC) D47.3  Monoclonal gammopathy D47.2  Iron deficiency anemia secondary to inadequate dietary iron intake D50.8  Diabetic hyperosmolar non-ketotic state ([facility] [address] E11.00  Acute UTI N39.0  Pneumonia J18.9  New onset a-fib (HCC) I48.91  
 Moderate protein malnutrition (HCC) E44.0  Thrombocytopenia ([facility] [address] D69.6  Symptomatic anemia D64.9 Home Medications: Medications Prior to Admission Medication Sig  
 apixaban (ELIQUIS) 5 mg tablet Take 1 Tab by mouth two (2) times a day.  hydroxyurea (HYDREA) 500 mg capsule Take 500 mg by mouth two (2) times a day.  insulin glargine (LANTUS) 100 unit/mL injection 5 Units by SubCUTAneous route daily. (Patient taking differently: 10 Units by SubCUTAneous route daily.)  insulin lispro (HUMALOG) 100 unit/mL injection Very Insulin Resistant For Blood Sugar (mg/dL) of:             
Less than 150 give no insulin  0 units 150 -199 =   3 units 200 -249 =   6 units 250 -299 =   9 units 300 -349 =   12 units 350 and above =   15 units Initiate Hypoglycemic protocol if blood glucose is <70 mg/dL. Fast Acting - Ad  valsartan (DIOVAN) 40 mg tablet Take 1 Tab by mouth daily.  oxybutynin (DITROPAN) 5 mg tablet TAKE ONE TABLET BY MOUTH THREE TIMES DAILY  therapeutic multivitamin (THERAGRAN) tablet Take 1 Tab by mouth daily.  metoprolol tartrate (LOPRESSOR) 50 mg tablet Take 1 Tab by mouth every twelve (12) hours.  acetaminophen (TYLENOL) 325 mg tablet Take 2 Tabs by mouth every six (6) hours as needed.  travoprost (TRAVATAN Z) 0.004 % ophthalmic solution Administer 1 Drop to both eyes two (2) times a day.  brinzolamide (AZOPT) 1 % ophthalmic suspension Administer 1 Drop to both eyes two (2) times a day.  brimonidine-timolol (COMBIGAN) 0.2-0.5 % drop ophthalmic solution Administer 1 Drop to both eyes every twelve (12) hours.  calcium carbonate (CALCIUM 500) 500 mg calcium (1,250 mg) tablet Take 1 Tab by mouth daily.  atorvastatin (LIPITOR) 40 mg tablet Take 1 Tab by mouth nightly.  aspirin delayed-release 81 mg tablet Take  by mouth daily.  Insulin Needles, Disposable, 31 gauge x 5/16\" ndle Use as needed.  glucose 4 gram chewable tablet Take 4 Tabs by mouth as needed. (Patient taking differently: Take 4 Tabs by mouth as needed for Other (low blood sugar). ) Review of Systems: Constitutional: No fevers, chills, weight loss, fatigue. Skin: No rashes, pruritis, jaundice, ulcerations, erythema. HENT: No headaches, nosebleeds, sinus pressure, rhinorrhea, sore throat. Eyes: No visual changes, blurred vision, eye pain, photophobia, jaundice. Cardiovascular: No chest pain, heart palpitations. Respiratory: No cough, SOB, wheezing, chest discomfort, orthopnea. Gastrointestinal: Soft; non-distended; no masses noted; no tenderness. Genitourinary: No dysuria, bleeding, discharge, pyuria. Musculoskeletal: No weakness, arthralgias, wasting. Endo: No sweats. Heme: No bruising, easy bleeding. Allergies: As noted. Neurological: Cranial nerves intact. Alert and oriented. Gait not assessed. Psychiatric:  No anxiety, depression, hallucinations. Visit Vitals /58 (BP 1 Location: Right arm, BP Patient Position: At rest) Pulse 84 Temp 97.7 °F (36.5 °C) Resp 18 Ht 6' 1\" (1.854 m) Wt 69 kg (152 lb 1.6 oz) SpO2 98% BMI 20.07 kg/m² Physical Assessment:  
 
constitutional: appearance: well developed, well nourished, normal habitus, no deformities, in no acute distress. skin: inspection: no rashes, ulcers, icterus or other lesions; no clubbing or telangiectasias. palpation: no induration or subcutaneos nodules. eyes: inspection: normal conjunctivae and lids; no jaundice pupils: normal 
ENMT: mouth: normal oral mucosa,lips and gums; good dentition. oropharynx: normal tongue, hard and soft palate; posterior pharynx without erithema, exudate or lesions. neck: thyroid: normal size, consistency and position; no masses or tenderness. respiratory: effort: normal chest excursion; no intercostal retraction or accessory muscle use. cardiovascular: abdominal aorta: normal size and position; no bruits. palpation: PMI of normal size and position; normal rhythm; no thrill or murmurs. abdominal: abdomen: normal consistency; no tenderness or masses.  hernias: no hernias appreciated. liver: normal size and consistency. spleen: not palpable. rectal: hemoccult/guaiac: not performed. musculoskeletal: digits and nails: no clubbing, cyanosis, petechiae or other inflammatory conditions. gait: normal gait and station head and neck: normal range of motion; no pain, crepitation or contracture. spine/ribs/pelvis: normal range of motion; no pain, deformity or contracture. neurologic: cranial nerves: II-XII normal.  
psychiatric: judgement/insight: within normal limits. memory: within normal limits for recent and remote events. mood and affect: no evidence of depression, anxiety or agitation. orientation: oriented to time, space and person. Basic Metabolic Profile Recent Labs 05/14/19 
0016   
K 3.6 * CO2 29 BUN 23* GLU 97  
CA 7.9*  
  
  
CBC w/Diff Recent Labs 05/14/19 
4100  05/13/19 
1039 WBC  --   --  6.1  
RBC  --   --  1.63* HGB 7.3*   < > 5.5* HCT 23.9*   < > 17.8* MCV  --   --  109.2*  
MCH  --   --  33.7 MCHC  --   --  30.9* PLT  --   --  42*  
 < > = values in this interval not displayed. Recent Labs 05/13/19 
1039 GRANS 78* LYMPH 9*  
EOS 3 Hepatic Function Recent Labs 05/13/19 
1039 ALB 1.9* TP 7.0 TBILI 1.5* SGOT 44* AP 69 Coags No results for input(s): PTP, INR, APTT in the last 72 hours. No lab exists for component: JOSH Barbosa NP. [facility] [address] Cell: [phone] [email or web address]

## 2019-05-15 NOTE — DISCHARGE INSTRUCTIONS
Try to have a few hours difference between your evening 70/30 insulin and lantus dose. Please check your blood sugar before getting your insulin doses. If your blood sugar is <100 hold your hold your insulin dose and call your doctor.

## 2019-05-15 NOTE — DISCHARGE SUMMARY
[facility] Discharge Summary Patient: Nixon Maria Age: 82 y.o. : 1935 MR#: [id number] SSN: [id number] PCP on record: Racquel Andrade MD 
Admit date: 2019 Discharge date: 5/15/2019 Consults:  Ms Jenna Jeter., NP- hematology/oncology -  Ms Leonardo Miles, NP- GI 
Procedures: none - Significant Diagnostic Studies: CXR 19: IMPRESSION: 
  
Small pleural effusions. Right basilar streaky density, likely atelectasis. Improved aeration in the right medial lung base compared to 2/10/2019. 
-   
Discharge Diagnoses: - GI bleed 
-Acute blood loss anemia Patient Active Problem List  
Diagnosis Code  Malignant neoplasm of prostate ([address] C61  Malignant neoplasm of prostate ([facility] [address] C61  Hypoglycemia E16.2  History of prostate cancer Z85.46  
 Urinary frequency R35.0  Neutrophilic leukocytosis P44.6  Thrombocytosis (HCC) D47.3  Monoclonal gammopathy D47.2  Iron deficiency anemia secondary to inadequate dietary iron intake D50.8  Diabetic hyperosmolar non-ketotic state ([address] E11.00  Acute UTI N39.0  Pneumonia J18.9  New onset a-fib (HCC) I48.91  
 Moderate protein malnutrition (HCC) E44.0  Thrombocytopenia ([address] D69.6  Symptomatic anemia D64.9 Hospital Course by Problem 82 y o male w/ h/o afib on eliquis who presented to the ED w/ reports of feeling weak, worse over the two weeks prior to admission. Denied melena, hematemesis. In the ED his hgb was 5.5 and he was admitted for further eval and management. His eliquis was held and he was given PRBC and was evaluated by the GI service. He did not have endoscopy done and was observed after prbc transfusion. His hgb remained stable. Case discussed w/ GI in terms of when to re start his eliquis and recommendations were made to continue the medication. Case discussed w/ pt's daughter. On date of dc, he had no complaints and wanted to go home. He remained hemodynamically stable. Today's examination of the patient revealed:  
 
Subjective:  
 
Objective:  
VS:  
Visit Vitals /76 (BP 1 Location: Left arm, BP Patient Position: At rest) Pulse 72 Temp 97.2 °F (36.2 °C) Resp 18 Ht 6' 1\" (1.854 m) Wt 69.4 kg (153 lb) SpO2 99% BMI 20.19 kg/m² Tmax/24hrs: Temp (24hrs), Av.2 °F (36.8 °C), Min:97.2 °F (36.2 °C), Max:99 °F (37.2 °C) Input/Output:  
 
Intake/Output Summary (Last 24 hours) at 5/15/2019 1213 Last data filed at 5/15/2019 5427 Gross per 24 hour Intake 420 ml Output 600 ml Net -180 ml General:alert, awake, in nad Cardiovascular:  Rrr, no murmurs Pulmonary:  ctab GI: soft, nt, nd  
Extremities:  No edema Additional:   
 
Labs:   
Recent Results (from the past 24 hour(s)) HGB & HCT Collection Time: 19  3:33 PM  
Result Value Ref Range HGB 6.5 (L) 13.0 - 16.0 g/dL HCT 20.5 (L) 36.0 - 48.0 % GLUCOSE, POC Collection Time: 19  3:44 PM  
Result Value Ref Range Glucose (POC) 163 (H) 70 - 110 mg/dL GLUCOSE, POC Collection Time: 19  8:52 PM  
Result Value Ref Range Glucose (POC) 215 (H) 70 - 110 mg/dL GLUCOSE, POC Collection Time: 05/15/19  7:30 AM  
Result Value Ref Range Glucose (POC) 138 (H) 70 - 110 mg/dL CBC WITH AUTOMATED DIFF Collection Time: 05/15/19  9:36 AM  
Result Value Ref Range WBC 8.8 4.6 - 13.2 K/uL  
 RBC 2.99 (L) 4.70 - 5.50 M/uL HGB 9.8 (L) 13.0 - 16.0 g/dL HCT 30.7 (L) 36.0 - 48.0 % .7 (H) 74.0 - 97.0 FL  
 MCH 32.8 24.0 - 34.0 PG  
 MCHC 31.9 31.0 - 37.0 g/dL RDW 29.4 (H) 11.6 - 14.5 % PLATELET 42 (L) 430 - 420 K/uL NEUTROPHILS PENDING % LYMPHOCYTES PENDING % MONOCYTES PENDING % EOSINOPHILS PENDING % BASOPHILS PENDING %  
 ABS. NEUTROPHILS PENDING K/UL  
 ABS. LYMPHOCYTES PENDING K/UL  
 ABS. MONOCYTES PENDING K/UL ABS. EOSINOPHILS PENDING K/UL  
 ABS. BASOPHILS PENDING K/UL  
 DF PENDING   
GLUCOSE, POC Collection Time: 05/15/19 11:12 AM  
Result Value Ref Range Glucose (POC) 219 (H) 70 - 110 mg/dL Additional Data Reviewed: 
  
Condition: stable Disposition:  
 []Home   [x]Home with Home Health   []SNF/NH   []Rehab   []Home with family []Alternate Facility:____________________ Discharge Medications:  
 
Current Discharge Medication List  
  
CONTINUE these medications which have NOT CHANGED Details  
hydroxyurea (HYDREA) 500 mg capsule Take 500 mg by mouth two (2) times a day. insulin glargine (LANTUS) 100 unit/mL injection 5 Units by SubCUTAneous route daily. Qty: 1 Vial, Refills: 0  
  
insulin lispro (HUMALOG) 100 unit/mL injection Very Insulin Resistant For Blood Sugar (mg/dL) of:             
Less than 150 give no insulin  0 units 150 -199 =   3 units 200 -249 =   6 units 250 -299 =   9 units 300 -349 =   12 units 350 and above =   15 units Initiate Hypoglycemic protocol if blood glucose is <70 mg/dL. Fast Acting - Ad Qty: 1 Vial, Refills: 0  
  
valsartan (DIOVAN) 40 mg tablet Take 1 Tab by mouth daily. Qty: 30 Tab, Refills: 6 Associated Diagnoses: Essential hypertension  
  
oxybutynin (DITROPAN) 5 mg tablet TAKE ONE TABLET BY MOUTH THREE TIMES DAILY Qty: 90 Tab, Refills: 8 Associated Diagnoses: Urinary frequency  
  
therapeutic multivitamin (THERAGRAN) tablet Take 1 Tab by mouth daily. Qty: 30 Tab, Refills: 0  
  
metoprolol tartrate (LOPRESSOR) 50 mg tablet Take 1 Tab by mouth every twelve (12) hours. Qty: 60 Tab, Refills: 0  
  
acetaminophen (TYLENOL) 325 mg tablet Take 2 Tabs by mouth every six (6) hours as needed. Qty: 30 Tab, Refills: 0  
  
travoprost (TRAVATAN Z) 0.004 % ophthalmic solution Administer 1 Drop to both eyes two (2) times a day.  
Qty: 5 mL, Refills: 0  
  
brinzolamide (AZOPT) 1 % ophthalmic suspension Administer 1 Drop to both eyes two (2) times a day. Qty: 5 mL, Refills: 0  
  
brimonidine-timolol (COMBIGAN) 0.2-0.5 % drop ophthalmic solution Administer 1 Drop to both eyes every twelve (12) hours. Qty: 5 mL, Refills: 0  
  
calcium carbonate (CALCIUM 500) 500 mg calcium (1,250 mg) tablet Take 1 Tab by mouth daily. Qty: 90 Tab, Refills: 2 Associated Diagnoses: Malignant neoplasm of prostate ([address] Monoclonal gammopathy  
  
atorvastatin (LIPITOR) 40 mg tablet Take 1 Tab by mouth nightly. Insulin Needles, Disposable, 31 gauge x 5/16\" ndle Use as needed. Qty: 1 Package, Refills: 11  
  
glucose 4 gram chewable tablet Take 4 Tabs by mouth as needed. Qty: 10 Tab, Refills: 0  
  
eliquis STOP taking these medications  
  
    
   
 aspirin delayed-release 81 mg tablet Comments:  
Reason for Stopping: Follow-up Appointments:  
1. Your PCP: Sean Chang MD, within 4-54MYAD 2. GI in 2-3 wks >30 minutes spent coordinating this discharge (review instructions/follow-up, prescriptions, preparing report for sign off) Signed: 
Norm Jean Baptiste MD 
5/15/2019 
12:13 PM

## 2019-05-15 NOTE — PROGRESS NOTES
Discharge order noted for today. Pt has been accepted to [facility] agency. Met with patient and daughter and are agreeable to the transition plan today. Transport has been arranged through pt's daughter/nephew. Patient's discharge summary and home health  orders have been forwarded to [facility] home health  agency via cclink. Updated bedside RN,  to the transition plan. Discharge information has been documented on the AVS. Spoke with daughter, Esther Senior, who lives in [place], she is getting in contact with pt's other daughter to arrange  for pt, and is getting in touch with pt's hired aid to see if someone can stay with pt tonight. Reason for Admission:  Symptomatic anemia [D64.9] Thrombocytopenia ([address] [D69.6] RRAT Score:    17 Plan for utilizing home health:    Yes [facility] signed for [facility], already active. Likelihood of Readmission:   Moderate Do you (patient/family) have any concerns for transition/discharge? Daughter is just trying to coordinate pt's aid to be at the house with him for d/c. Transition of Care Plan:    
 
Initial assessment completed with pt's daughter, Esther DOUGLAS. Cognitive status of patient: oriented to time, place, person and situation. Face sheet information confirmed:  yes. The patient designates daughters to participate in his discharge plan and to receive any needed information. This patient lives in a single family home alone (has a roommate that is there some of the time. Patient is not able to navigate steps as needed. Prior to hospitalization, patient was considered to be independent with ADLs/IADLS : no . If not independent,  patient needs assist with : dressing, bathing, food preparation, cooking, toileting, grooming and self feeding Patient has a private aid hired that is there during the day during the week days, patients daughter stays with pt on weekends, Fri-Mon. Patient has a current ACP document on file: no, but daughter states she is POA. The daughter will be available to transport patient home upon discharge. The patient already has Veronica Robertson W/C medical equipment available in the home. Patient is currently active with home health. If active, agency name is [facility]. Patient has stayed in a skilled nursing facility or rehab. Was  stay within last 60 days : no. [address], pt and family not interested in SNF, stated it hindered more than it helped. This patient is on dialysis :no 
 
 
List of available Home Health agencies were provided and reviewed with the patient prior to discharge. Freedom of choice signed: yes, for [facility]. Currently, the discharge plan is Home with [address] [place] [address] Jaun Lopez. The patient states that he can obtain his medications from the pharmacy, and take his medications as directed. Patient's current insurance is Medicare and [employer]ARP. Care Management Interventions PCP Verified by CM: Yes 
Palliative Care Criteria Met (RRAT>21 & CHF Dx)?: No 
Mode of Transport at Discharge: Self Transition of Care Consult (CM Consult): Home Health [address]: Yes Current Support Network: Lives Alone, Has Personal Caregivers Confirm Follow Up Transport: Family Plan discussed with Pt/Family/Caregiver: Yes Freedom of Choice Offered: Yes Discharge Location Discharge Placement: Home with home health Neida Nyhan, MSW Case Management [phone]

## 2019-05-15 NOTE — CDMP QUERY
After further study, do you concur with the findings of Moderate Malnutrition noted in the RD Consultation note? ? Moderate Malnutrition ? Other Explanation of the clinical findings ? Clinically Undetermined (no explanation for clinical findings) The medical record reflects the following clinical findings, risk factors and treatment:  
Risk Factors:  prostate cancer, HTN, DM, sacral decub Clinical Indicators:    
5/15 RD:  ASPEN Malnutrition Criteria Acute Illness, Chronic Illness, or Social/Enviornmental: Chronic illness Body Fat: Mild(orbital, thoracic & upper arm body regions) Muscle Mass: Mild(temple, clavicle, dorsal hand, patellar body regions) ASPEN Malnutrition Score - Chronic Illness: 2 Chronic Illness - Malnutrition Diagnosis: Moderate malnutrition. Treatment: Add supplements: Glucerna Shake, BID., Continue RD inpatient monitoring and evaluation Thank you. Jorden Cooper RN BSN CCDS [phone]

## 2019-05-15 NOTE — ROUTINE PROCESS
Received report from [facility]. Pt AAOx3, NAD, breathing non labored, on room air, HOB up. Pt's for discharge awaiting medical transport. Family member at the bedside. PIV out, telemetry removed. Discharge instruction discussed and given to pt and family member per report. Bed at the lowest level on lock position, call bell w/i rodney. 2007-medical transport in to  pt. Paperworks given to transport. Pt discharged NAD, breathing nonlabored, on room air. Family member w/ the pt. All personal belongings w/ the pt.

## 2019-05-15 NOTE — HOME CARE
Patient is Active to [facility] , recived [facility] orders for SN,wound care,PT,OT, pt's daughter Braulio Will) states pt has PCA with [facility] and also pt's other daughter Vernon Mcnamara) lives down the street from patient, Discharge order noted for today, [facility] will follow. MIGUEL LUIS.

## 2019-05-15 NOTE — CONSULTS
Hematology / Oncology Consult Note Reason for Consultation: 
Camila Salter is a 82 y.o. male who I've been asked to consult for assistance with the management of the patients symptomatic anemia in the setting of MGUS. Subjective: HPI:  Mr. 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 He also has PMHx of Iron Deficiency Anemia secondary to inadequate iron intake, thrombocytosis, Hx of Prostate cancer, DM type 2, HTN A-Fib and  Chronic neutrophilic leukocytosis. The patient presented to the ED with increasing fatigue X 1 month now with shortness of breath difficulty moving around the house for 2 days prior to this admission. He was admitted on 5/13/19 with hgb of 5.5. Chief c/o weakness and shortness of breath. He was administered 2 unit of PRBC and PRBC s/p transfusion increased to 7.3 on 5/14/19. On admission WBC 6.1, Hgb 5.5, Hct 17.8, Platelet count 49,918 Sodium 144, Potaassium 3.9, Cl 110, BUN 26, Creatinine 0.90, Calcium 8.2 BIlirubin, total 1.5, Alt 20, AST 44, Troponin-I 0.02 Iron 48, TIBC 182, Iron sat 26%, Ferritin 492 IMPRESSION: 
  
CXR 5/13/19 Small pleural effusions. Right basilar streaky density, likely atelectasis. Improved aeration in the right medial lung base compared to 2/10/2019. Today the patient states that he does not recall having blood in his urine. Per RN pt appears to be somewhat confused today. Review of Systems:  
Unable to fully assess, pt is not a good historian. He does state that he feels good. Denies shortness of breath or pain. He says he does not remember having any blood in his urine. Physical Assessment:  
 
Visit Vitals /75 (BP 1 Location: Left arm, BP Patient Position: At rest) Pulse 73 Temp 98.8 °F (37.1 °C) Resp 18 Ht 6' 1\" (1.854 m) Wt 69.4 kg (153 lb) SpO2 95% BMI 20.19 kg/m² Temp (24hrs), Av.3 °F (36.8 °C), Min:97.2 °F (36.2 °C), Max:99 °F (37.2 °C) Physical Exam: 
 
General:Appears comfortable, NAD. HEENT:  Anicteric sclerae; pink palpebral conjunctivae; mucosa moist 
Resp:  Symmetrical chest expansion, no accessory muscle use; good airway entry; no rales/ wheezing/ rhonchi noted CV:  S1, S2 present; regular rate and rhythm GI:  Abdomen soft, non-tender; (+) active bowel sounds Extremities:  +2 pulses on all extremities; trace BLE edema. Skin:  Warm;Dressing intact at sacral decub ulcer Neurologic:  Alert and oriented X2 Assessment: · Symptomatic Anemia · Iron Deficiency Anemia secondary to inadequate iron intake, · MGUS 
· DM2 
· Hx of Prostate Cancer Plan:  
· H/H 7.3/23.9 s/p his first unit PRBC. I Will check H/H today s/p his second unit that was given on yesterday: if hgb is not less than 7g/dl he is clear for discharge to home from heme/onc perspective. · Lower GI Bleed-followed by GI and no immediate need for scope       warranted. · Hematuria: ?urinalysis? Urine cx? I will order Urinalysis- primary to follow. · Pt to follow up in our office within 7 day of discharge. Past Medical History:  
Diagnosis Date  Diabetes ([facility]  Hypertension  Malignant neoplasm of prostate ([facility]  Past Surgical History:  
Procedure Laterality Date  BIOPSY OF PROSTATE,INCISIONAL    FL EXT BEAM RADIATION AS PRIMARY THERAPY TO PROSTATE ONLY  -98 Family History Problem Relation Age of Onset  Diabetes Other  Hypertension Other Allergies Allergen Reactions  Iodinated Contrast- Oral And Iv Dye Hives Faint, shaking Home Medications:  
Home Meds reviewed with patient Hospital Medications:  
Current hospital medications reviewed Labs: 
Recent Labs 19 
1533 19 
1861 19 
0016 19 
1039 WBC  --   --   --  6.1  
RBC  --   --   --  1.63* HCT 20.5* 23.9* 20.0* 17.8*  
 MCV  --   --   --  109.2*  
MCH  --   --   --  33.7 MCHC  --   --   --  30.9* Recent Labs 05/14/19 
0016 05/13/19 
1039 CO2 29 25 BUN 23* 26* No results for input(s): ALBUMIN in the last 72 hours. No lab exists for component: [facility] Thank you for requesting us to consult on your patient. We appreciate the opportunity to participate in your patient's care. Please call if you have questions.  
 
 
Clara Gaston NP

## 2019-05-15 NOTE — PROGRESS NOTES
[email or web address] 
[phone] Gastroenterology follow up-Progress note Impression: 1. Lower GI bleed-pt had not noticed blood, caregiver noted small amount. Hgb 5.5 on admission. PRBCs given x 2 units. 2. Sacral pressure ulcers-has home care for dressing changes. 3. Eliquis started in March 2019 for A-fib. 4. DM2 
5. H/O Prostate Ca 6. Thrombocytopenia-PLT=42. Plan: 1. Awaiting CBC. If Hgb >7 ok for discharge and f/u OP. 2. Hematology following. 3. F/U in our office in 6-8 weeks. Chief Complaint: none Subjective:  No BM yesterday or today. Tolerating regular diet. Denies abdominal pain. ROS: Denies any fevers, chills, rash. Eyes: conjunctiva normal, EOM normal  
Neck: ROM normal, supple and trachea normal  
Cardiovascular: heart normal, intact distal pulses, normal rate and regular rhythm Pulmonary/Chest Wall: breath sounds normal and effort normal  
Abdominal: appearance normal, bowel sounds normal and soft, non-acute, non-tender Patient Active Problem List  
Diagnosis Code  Malignant neoplasm of prostate ([address] C61  Malignant neoplasm of prostate ([address] C61  Hypoglycemia E16.2  History of prostate cancer Z85.46  
 Urinary frequency R35.0  Neutrophilic leukocytosis E20.0  Thrombocytosis (HCC) D47.3  Monoclonal gammopathy D47.2  Iron deficiency anemia secondary to inadequate dietary iron intake D50.8  Diabetic hyperosmolar non-ketotic state ([address] E11.00  Acute UTI N39.0  Pneumonia J18.9  New onset a-fib (HCC) I48.91  
 Moderate protein malnutrition (HCC) E44.0  Thrombocytopenia ([address] D69.6  Symptomatic anemia D64.9 Visit Vitals /75 (BP 1 Location: Left arm, BP Patient Position: At rest) Pulse 73 Temp 98.8 °F (37.1 °C) Resp 18 Ht 6' 1\" (1.854 m) Wt 69.4 kg (153 lb) SpO2 95% BMI 20.19 kg/m² Intake/Output Summary (Last 24 hours) at 5/15/2019 1013 Last data filed at 5/15/2019 7949 Gross per 24 hour Intake 420 ml Output 600 ml Net -180 ml CBC w/Diff Lab Results Component Value Date/Time WBC 6.1 05/13/2019 10:39 AM  
 RBC 1.63 (L) 05/13/2019 10:39 AM  
 HGB 6.5 (L) 05/14/2019 03:33 PM  
 HCT 20.5 (L) 05/14/2019 03:33 PM  
 .2 (H) 05/13/2019 10:39 AM  
 MCH 33.7 05/13/2019 10:39 AM  
 MCHC 30.9 (L) 05/13/2019 10:39 AM  
 RDW 21.2 (H) 03/27/2019 04:43 PM  
 PLT 42 (L) 05/13/2019 10:39 AM  
 Lab Results Component Value Date/Time GRANS 78 (H) 05/13/2019 10:39 AM  
 LYMPH 9 (L) 05/13/2019 10:39 AM  
 EOS 3 05/13/2019 10:39 AM  
 BANDS 1 05/13/2019 10:39 AM  
 BASOS 2 05/13/2019 10:39 AM  
 MYELO 1 (H) 07/17/2018 11:40 AM  
 METAS 3 (H) 07/17/2018 11:40 AM  
  
Basic Metabolic Profile Recent Labs 05/14/19 
0016   
K 3.6 * CO2 29 BUN 23* CA 7.9* Hepatic Function Lab Results Component Value Date/Time ALB 1.9 (L) 05/13/2019 10:39 AM  
 TP 7.0 05/13/2019 10:39 AM  
 AP 69 05/13/2019 10:39 AM  
 Lab Results Component Value Date/Time SGOT 44 (H) 05/13/2019 10:39 AM  
  
 
 
Coags No results for input(s): PTP, INR, APTT in the last 72 hours. No lab exists for component: INREXT   
 
 
 
 
Ayde Black NP [facility]. [email or web address] Phone: [phone] Pager: [phone]

## 2019-05-15 NOTE — PROGRESS NOTES
Bedside shift change report given to Genaro cristobal (oncoming nurse) by Morgan Jain (offgoing nurse). Report included the following information SBAR, Kardex and MAR. assisted pt to sit up in bed and eat. Family at bedside. Pt frequently repeats himself. Call bell in reach. Dressing to LLE changed. Pt tolerated well. Good appetite . No c/o pain. Will continue to monitor. [address] Granddaughter at bedside. Reviewed discharge paperwork and medications. All questions answered. IV removed. Armband removed and shredded. Tylenol given for low grade fever. SSI given prior to discharge

## 2019-06-04 NOTE — ED NOTES
Pt discharged to home, quick clot dressing applied to coccyx wound and instructed to leave on for 24 hours. Pt discharge instructions and follow up instructions given to pt and caregiver and all questions answered.

## 2019-06-04 NOTE — ED TRIAGE NOTES
Pt arrived via EMS after a fall getting out of bed opening decub ulcer on coccyx and bleeding no stopping. Pt on eliquis.

## 2019-06-04 NOTE — ED PROVIDER NOTES
[facility]  [facility] EMERGENCY DEPT      9:06 AM    Date: 6/4/2019  Patient Name: Umm Don    History of Presenting Illness     Chief Complaint   Patient presents with    Decubitus Ulcer       82 y.o. male with noted past medical history who presents to the emergency department status post fall. The patient requires assistance with a walker and sometimes assistance with family to walk. He was on the toilet and when he tried to get up to walk he actually went backwards and slid off the toilet. With that he had a injury to his sacral decubitus which was already healing but it started to bleed with an associated abrasion from the sliding of the toilet. Family also states that he had 2 areas on bilateral medial thigh that were bleeding as well. Bleeding in fact he is on Coumadin and he cannot stop it that came to the ER for evaluation. Patient denies any other associated signs or symptoms. Patient denies any other complaints. Nursing notes regarding the HPI and triage nursing notes were reviewed. Prior medical records were reviewed. Current Outpatient Medications   Medication Sig Dispense Refill    apixaban (ELIQUIS) 5 mg tablet Take 1 Tab by mouth two (2) times a day. 180 Tab 3    hydroxyurea (HYDREA) 500 mg capsule Take 500 mg by mouth two (2) times a day.  insulin glargine (LANTUS) 100 unit/mL injection 5 Units by SubCUTAneous route daily. (Patient taking differently: 10 Units by SubCUTAneous route daily. ) 1 Vial 0    insulin lispro (HUMALOG) 100 unit/mL injection Very Insulin Resistant  For Blood Sugar (mg/dL) of:              Less than 150 give no insulin  0 units  150 -199 =   3 units  200 -249 =   6 units  250 -299 =   9 units  300 -349 =   12 units  350 and above =   15 units  Initiate Hypoglycemic protocol if blood glucose is <70 mg/dL. Fast Acting - Ad 1 Vial 0    Insulin Needles, Disposable, 31 gauge x 5/16\" ndle Use as needed.  1 Package 11    valsartan (DIOVAN) 40 mg tablet Take 1 Tab by mouth daily. 30 Tab 6    oxybutynin (DITROPAN) 5 mg tablet TAKE ONE TABLET BY MOUTH THREE TIMES DAILY 90 Tab 8    glucose 4 gram chewable tablet Take 4 Tabs by mouth as needed. (Patient taking differently: Take 4 Tabs by mouth as needed for Other (low blood sugar). ) 10 Tab 0    therapeutic multivitamin (THERAGRAN) tablet Take 1 Tab by mouth daily. 30 Tab 0    metoprolol tartrate (LOPRESSOR) 50 mg tablet Take 1 Tab by mouth every twelve (12) hours. 60 Tab 0    acetaminophen (TYLENOL) 325 mg tablet Take 2 Tabs by mouth every six (6) hours as needed. 30 Tab 0    travoprost (TRAVATAN Z) 0.004 % ophthalmic solution Administer 1 Drop to both eyes two (2) times a day. 5 mL 0    brinzolamide (AZOPT) 1 % ophthalmic suspension Administer 1 Drop to both eyes two (2) times a day. 5 mL 0    brimonidine-timolol (COMBIGAN) 0.2-0.5 % drop ophthalmic solution Administer 1 Drop to both eyes every twelve (12) hours. 5 mL 0    calcium carbonate (CALCIUM 500) 500 mg calcium (1,250 mg) tablet Take 1 Tab by mouth daily. 90 Tab 2    atorvastatin (LIPITOR) 40 mg tablet Take 1 Tab by mouth nightly. Past History     Past Medical History:  Past Medical History:   Diagnosis Date    Diabetes ([facility]     Hypertension     Malignant neoplasm of prostate ([facility] [address] 1998       Past Surgical History:  Past Surgical History:   Procedure Laterality Date    BIOPSY OF PROSTATE,INCISIONAL  4/98    MS EXT BEAM RADIATION AS PRIMARY THERAPY TO PROSTATE ONLY  4/30-6/29/98       Family History:  Family History   Problem Relation Age of Onset    Diabetes Other     Hypertension Other        Social History:  Social History     Tobacco Use    Smoking status: Current Some Day Smoker     Packs/day: 0.10     Years: 22.00     Pack years: 2.20     Types: Cigarettes    Smokeless tobacco: Never Used   Substance Use Topics    Alcohol use: No     Alcohol/week: 0.0 oz    Drug use: No       Allergies:   Allergies   Allergen Reactions    Iodinated Contrast- Oral And Iv Dye Hives     Faint, shaking       Patient's primary care provider (as noted in EPIC):  Brien Conway MD    Review of Systems   Constitutional: Negative for diaphoresis. HENT: Negative for congestion. Eyes: Negative for discharge. Respiratory: Negative for stridor. Cardiovascular: Negative for palpitations. Gastrointestinal: Negative for diarrhea. Endocrine: Negative for heat intolerance. Genitourinary: Negative for flank pain. Musculoskeletal: Negative for back pain. Neurological: Negative for syncope. Psychiatric/Behavioral: Negative for hallucinations. All other systems reviewed and are negative. Visit Vitals  /65 (BP 1 Location: Left arm, BP Patient Position: At rest)   Pulse 64   Temp 98.4 °F (36.9 °C)   Resp 16   SpO2 97%       PHYSICAL EXAM:    CONSTITUTIONAL: Alert, in no apparent distress; well-developed; well-nourished. HEAD:  Normocephalic, atraumatic. No Battles sign. No Raccoons eyes. EYES:  EOM's intact. Normal conjunctiva. Anicteric sclera. ENTM: Nose: no rhinorrhea; Oropharynx:  mucous membranes moist    Neck:  No JVD. No cervical vertebral bony point tenderness or step-off. RESP: Chest clear, equal breath sounds. CARDIOVASCULAR:  Regular rate and rhythm. No murmurs, rubs, or gallops. GI: Normal bowel sounds, abdomen soft and non-tender. No masses or organomegaly. : No costo-vertebral angle tenderness. BACK:  No TLS vertebral bony point tenderness or step-off. There is a hyperpigmented sacral area consult with his prior sacral decubitus that which is now small. Contiguous with the open sacral decubitus is an area of abrasion that has dark red blood oozing from it. It is a mild ooze but is still present unless pressure is applied. UPPER EXT:  Normal inspection  LOWER EXT: No edema, no calf tenderness. Distal pulses intact.   Bilateral thighs have no area of bleeding and no obvious significant abrasion. There are 2 punctate areas that may have been the prior source of bleeding. NEURO: Grossly normal motor and sensation. SKIN: No rashes; Normal for age and stage. PSYCH:  Alert and oriented, normal affect. DIFFERENTIAL DIAGNOSES/ MEDICAL DECISION MAKING:  Contusion, sprain, dislocation, fracture, ligamentous tear/ disruption or a combination of the above. Diagnostic Study Results     Abnormal lab results from this emergency department encounter:  Labs Reviewed   CBC WITH AUTOMATED DIFF - Abnormal; Notable for the following components:       Result Value    WBC 4.2 (*)     RBC 2.67 (*)     HGB 9.3 (*)     HCT 29.0 (*)     .6 (*)     MCH 34.8 (*)     PLATELET 41 (*)     All other components within normal limits   METABOLIC PANEL, BASIC - Abnormal; Notable for the following components:    Glucose 185 (*)     BUN 19 (*)     BUN/Creatinine ratio 24 (*)     Calcium 8.2 (*)     All other components within normal limits   PROTHROMBIN TIME + INR   PTT       Lab values for this patient within approximately the last 12 hours:  Recent Results (from the past 12 hour(s))   CBC WITH AUTOMATED DIFF    Collection Time: 06/04/19  9:20 AM   Result Value Ref Range    WBC 4.2 (L) 4.6 - 13.2 K/uL    RBC 2.67 (L) 4.70 - 5.50 M/uL    HGB 9.3 (L) 13.0 - 16.0 g/dL    HCT 29.0 (L) 36.0 - 48.0 %    .6 (H) 74.0 - 97.0 FL    MCH 34.8 (H) 24.0 - 34.0 PG    MCHC 32.1 31.0 - 37.0 g/dL    PLATELET 41 (L) 305 - 420 K/uL    NEUTROPHILS PENDING %    LYMPHOCYTES PENDING %    MONOCYTES PENDING %    EOSINOPHILS PENDING %    BASOPHILS PENDING %    ABS. NEUTROPHILS PENDING K/UL    ABS. LYMPHOCYTES PENDING K/UL    ABS. MONOCYTES PENDING K/UL    ABS. EOSINOPHILS PENDING K/UL    ABS.  BASOPHILS PENDING K/UL    DF PENDING    METABOLIC PANEL, BASIC    Collection Time: 06/04/19  9:20 AM   Result Value Ref Range    Sodium 141 136 - 145 mmol/L    Potassium 3.5 3.5 - 5.5 mmol/L    Chloride 107 100 - 108 mmol/L    CO2 28 21 - 32 mmol/L Anion gap 6 3.0 - 18 mmol/L    Glucose 185 (H) 74 - 99 mg/dL    BUN 19 (H) 7.0 - 18 MG/DL    Creatinine 0.80 0.6 - 1.3 MG/DL    BUN/Creatinine ratio 24 (H) 12 - 20      GFR est AA >60 >60 ml/min/1.73m2    GFR est non-AA >60 >60 ml/min/1.73m2    Calcium 8.2 (L) 8.5 - 10.1 MG/DL   PROTHROMBIN TIME + INR    Collection Time: 06/04/19  9:20 AM   Result Value Ref Range    Prothrombin time 15.1 11.5 - 15.2 sec    INR 1.2 0.8 - 1.2     PTT    Collection Time: 06/04/19  9:20 AM   Result Value Ref Range    aPTT 29.2 23.0 - 36.4 SEC       Radiologist and cardiologist interpretations if available at time of this note:  No results found. Medication(s) ordered for patient during this emergency visit encounter:  Medications - No data to display    Medical Decision Making     I am the first provider for this patient. I reviewed the vital signs, available nursing notes, past medical history, past surgical history, family history and social history. Vital Signs:  Reviewed the patient's vital signs. ED COURSE:    9:11 AM  Patient is on Coumadin and given this we will check some basic labs including PT, PTT, INR. As far as the oozing sacral decubitus abrasion, will apply quick clot and a bulky pressure bandage. Serial labs reviewed and patient is not over anticoagulated. Sacral decubitus oozing of blood has stopped with noted quick clot and bandaging. Patient will be discharged. IMPRESSION AND MEDICAL DECISION MAKING:  Based upon the patients presentation with noted HPI and PE, along with the work up done in the emergency department, I believe that the patient is having noted sacral abrasion from noted fall in a patient on Coumadin. Based on the patient's history of presenting illness, physical examination, laboratory, radiographic, and/or tests results, and response to medical interventions, i believe the patient most likely has a contusion.       SPECIFIC PATIENT INSTRUCTIONS FROM THE PHYSICIAN WHO TREATED YOU IN THE ER TODAY:  1. Return if worse. Keep the bandage on for the next 24 hours before removing from the sacrum. 2. Reevaluation by your doctor if not improving after 5 days. Patient is improved, resting quietly and comfortably. The patient will be discharged home. The patient was reassured that these symptoms do not appear to represent a serious or life threatening condition at this time. Warning signs of worsening condition were discussed and understood by the patient. Based on patient's age, coexisting illness, exam, and the results of this ED evaluation, the decision to treat as an outpatient was made. Based on the information available at time of discharge, acute pathology requiring immediate intervention was deemed relative unlikely. While it is impossible to completely exclude the possibility of underlying serious disease or worsening of condition, I feel the relative likelihood is extremely low. I discussed this uncertainty with the patient, who understood ED evaluation and treatment and felt comfortable with the outpatient treatment plan. All questions regarding care, test results, and follow up were answered. The patient is stable and appropriate to discharge. They understand that they should return to the emergency department for any new or worsening symptoms. I stressed the importance of follow up for repeat assessment and possibly further evaluation/treatment. Dictation disclaimer:  Please note that this dictation was completed with [employer], the computer voice recognition software. Quite often unanticipated grammatical, syntax, homophones, and other interpretive errors are inadvertently transcribed by the computer software. Please disregard these errors. Please excuse any errors that have escaped final proofreading. Coding Diagnoses     Clinical Impression:   1. Abrasion    2. Fall, initial encounter    3.  Pressure injury of skin of sacral region, unspecified injury stage        Disposition     Disposition: Home. Allan Mcdaniel M.D. SIMEON Board Certified Emergency Physician    Provider Attestation:  If a scribe was utilized in generation of this patient record, I personally performed the services described in the documentation, reviewed the documentation, as recorded by the scribe in my presence, and it accurately records the patient's history of presenting illness, review of systems, patient physical examination, and procedures performed by me as the attending physician. ANA Robles Board Certified Emergency Physician  6/4/2019.  9:12 AM

## 2019-06-04 NOTE — DISCHARGE INSTRUCTIONS
SPECIFIC PATIENT INSTRUCTIONS FROM THE PHYSICIAN WHO TREATED YOU IN THE ER TODAY:  1. Return if worse. Keep the bandage on for the next 24 hours before removing from the sacrum. 2. Reevaluation by your doctor if not improving after 5 days. Patient Education        Preventing Falls: Care Instructions  Your Care Instructions    Getting around your home safely can be a challenge if you have injuries or health problems that make it easy for you to fall. Loose rugs and furniture in walkways are among the dangers for many older people who have problems walking or who have poor eyesight. People who have conditions such as arthritis, osteoporosis, or dementia also have to be careful not to fall. You can make your home safer with a few simple measures. Follow-up care is a key part of your treatment and safety. Be sure to make and go to all appointments, and call your doctor if you are having problems. It's also a good idea to know your test results and keep a list of the medicines you take. How can you care for yourself at home? Taking care of yourself  · You may get dizzy if you do not drink enough water. To prevent dehydration, drink plenty of fluids, enough so that your urine is light yellow or clear like water. Choose water and other caffeine-free clear liquids. If you have kidney, heart, or liver disease and have to limit fluids, talk with your doctor before you increase the amount of fluids you drink. · Exercise regularly to improve your strength, muscle tone, and balance. Walk if you can. Swimming may be a good choice if you cannot walk easily. · Have your vision and hearing checked each year or any time you notice a change. If you have trouble seeing and hearing, you might not be able to avoid objects and could lose your balance. · Know the side effects of the medicines you take. Ask your doctor or pharmacist whether the medicines you take can affect your balance.  Sleeping pills or sedatives can affect your balance. · Limit the amount of alcohol you drink. Alcohol can impair your balance and other senses. · Ask your doctor whether calluses or corns on your feet need to be removed. If you wear loose-fitting shoes because of calluses or corns, you can lose your balance and fall. · Talk to your doctor if you have numbness in your feet. Preventing falls at home  · Remove raised doorway thresholds, throw rugs, and clutter. Repair loose carpet or raised areas in the floor. · Move furniture and electrical cords to keep them out of walking paths. · Use nonskid floor wax, and wipe up spills right away, especially on ceramic tile floors. · If you use a walker or cane, put rubber tips on it. If you use crutches, clean the bottoms of them regularly with an abrasive pad, such as steel wool. · Keep your house well lit, especially Darcie Narayan, and outside walkways. Use night-lights in areas such as hallways and bathrooms. Add extra light switches or use remote switches (such as switches that go on or off when you clap your hands) to make it easier to turn lights on if you have to get up during the night. · Install sturdy handrails on stairways. · Move items in your cabinets so that the things you use a lot are on the lower shelves (about waist level). · Keep a cordless phone and a flashlight with new batteries by your bed. If possible, put a phone in each of the main rooms of your house, or carry a cell phone in case you fall and cannot reach a phone. Or, you can wear a device around your neck or wrist. You push a button that sends a signal for help. · Wear low-heeled shoes that fit well and give your feet good support. Use footwear with nonskid soles. Check the heels and soles of your shoes for wear. Repair or replace worn heels or soles. · Do not wear socks without shoes on wood floors. · Walk on the grass when the sidewalks are slippery.  If you live in an area that gets snow and ice in the winter, sprinkle salt on slippery steps and sidewalks. Preventing falls in the bath  · Install grab bars and nonskid mats inside and outside your shower or tub and near the toilet and sinks. · Use shower chairs and bath benches. · Use a hand-held shower head that will allow you to sit while showering. · Get into a tub or shower by putting the weaker leg in first. Get out of a tub or shower with your strong side first.  · Repair loose toilet seats and consider installing a raised toilet seat to make getting on and off the toilet easier. · Keep your bathroom door unlocked while you are in the shower. Where can you learn more? Go to http://petty-timothy.info/. Enter 0476 79 69 [id number] in the search box to learn more about \"Preventing Falls: Care Instructions. \"  Current as of: March 15, 2018  Content Version: 11.9  © 7639-6845 Direct Access Software. Care instructions adapted under license by [employer] (which disclaims liability or warranty for this information). If you have questions about a medical condition or this instruction, always ask your healthcare professional. [place] [address] any warranty or liability for your use of this information. Patient Education        Pressure Injuries: Care Instructions  Your Care Instructions    A pressure injury on the skin is caused by constant pressure to that area. These injuries--also called decubitus ulcers or bedsores--may happen when you lie in bed or sit in a wheelchair for a long time. The constant pressure blocks the blood supply to the skin. This causes skin cells to die and creates a sore. Pressure injuries usually occur over bony areas, such as the hips, lower back, elbows, heels, and shoulders. They also can occur in places where the skin folds over on itself. You may have mild redness or open sores that are harder to heal.  Good care at home can help heal pressure injuries.  You or your caregiver needs to check your skin every day for sores. You need good nutrition and plenty of fluids to keep your skin healthy and prevent new pressure injuries. Follow-up care is a key part of your treatment and safety. Be sure to make and go to all appointments, and call your doctor if you are having problems. It's also a good idea to know your test results and keep a list of the medicines you take. How can you care for yourself at home? · If your doctor prescribed a medicated ointment or cream, use it exactly as prescribed. Call your doctor if you think you are having a problem with your medicine. · Wash pressure injuries every day, or as often as your doctor recommends. Most tap water is safe, but follow the advice of your doctor or nurse. He or she may recommend that you use a saline solution. This is a salt and water solution that you can buy over the counter. · Put on bandages as your doctor or wound care specialist says. · Keep healthy tissue around the sore clean and dry. · Check your skin every day for sores (or have a caregiver do it). · If you know what is causing the pressure that caused the sore, find a way to remove that pressure. To prevent pressure injuries  · Change your position or have your caregiver help you change your position often. You may need to do this every 2 hours if you are in bed or every 15 minutes if you are in a wheelchair. This lowers the chance of making sores worse and getting new sores. · Use special mattresses or other support. These may include low-pressure mattresses or cushions made of foam that can be filled with air, water, beads, or fiber. · Eat healthy foods with plenty of protein to help heal damaged skin and to help new skin grow. · Try to stay at a healthy weight. Being overweight can lead to more pressure on your skin. · Do not slide across sheets or slump in a chair or bed. · Do not smoke. Smoking dries the skin and reduces its blood supply.  If you need help quitting, talk to your doctor about stop-smoking programs and medicines. These can increase your chances of quitting for good. When should you call for help? Call your doctor now or seek immediate medical care if:    · You have signs of infection, such as:  ? Increased pain, swelling, warmth, or redness. ? Red streaks leading from the sore. ? Pus draining from the sore. ? A fever.    Watch closely for changes in your health, and be sure to contact your doctor if:    · Your pressure injuries are not healing.     · You have new pressure injuries.     · You need help changing positions in bed or in a chair.     · Your caregiver needs help to move you. Where can you learn more? Go to http://petty-timothy.info/. Enter F114 in the search box to learn more about \"Pressure Injuries: Care Instructions. \"  Current as of: September 26, 2018  Content Version: 11.9  © 6643-0368 [employer]. Care instructions adapted under license by [employer] (which disclaims liability or warranty for this information). If you have questions about a medical condition or this instruction, always ask your healthcare professional. [address] any warranty or liability for your use of this information. Shake Activation    Thank you for requesting access to Shake. Please follow the instructions below to securely access and download your online medical record. Shake allows you to send messages to your doctor, view your test results, renew your prescriptions, schedule appointments, and more. How Do I Sign Up? In your internet browser, go to [email or web address]. Click on the First Time User? Click Here link in the Sign In box. You will see the New Member Sign Up page. Enter your Shake Access Code exactly as it appears below. You will not need to use this code after you´ve completed the sign-up process.  If you do not sign up before the expiration date, you must request a new code. [employer] Access Code: [email or web address]  Expires: 3/28/2019  2:27 PM (This is the date your [employer] access code will )    Enter the last four digits of your Social Security Number (xxxx) and Date of Birth (mm/dd/yyyy) as indicated and click Submit. You will be taken to the next sign-up page. Create a [employer] ID. This will be your [employer] login ID and cannot be changed, so think of one that is secure and easy to remember. Create a [employer] password. You can change your password at any time. Enter your Password Reset Question and Answer. This can be used at a later time if you forget your password. Enter your e-mail address. You will receive e-mail notification when new information is available in [address] Click Sign Up. You can now view and download portions of your medical record. Click the S[employer]nik[employer] link to download a portable copy of your medical information. Additional Information    If you have questions, please visit the Frequently Asked Questions section of the [employer] website at [email or web address] Remember, [employer] is NOT to be used for urgent needs. For medical emergencies, dial 911.

## 2019-06-07 NOTE — PROGRESS NOTES
HISTORY OF PRESENT ILLNESS Zoila Ness is a 82 y.o. male. HPI Patient presents for a follow-up office visit. He was initially seen in the hospital by 1 of my partners in February 2019 for new onset atrial fibrillation which occurred in the setting of a community-acquired pneumonia, UTI and diabetic ketoacidosis. He was initially treated with IV diltiazem and switch over to oral metoprolol and eventually discharged with Eliquis for oral anticoagulation. He was last seen in the office by 1 of our physician assistants as part of a routine post hospital visit 2 to 3 months ago. Since that time, he was admitted to the hospital for severe anemia in May 2019 presumed due to GI blood loss. He required a total of 3 units of packed red blood cells and his Eliquis was ultimately restarted. He was also found to have significant thrombocytopenia with a platelet count in the 40,000 range. Historically it sounds like he has more of an issue with thrombocytosis which has been treated with hydroxyurea. The patient was last seen in the ER just a few days ago for a bleeding sacral decubitus ulcers. Patient complains of weakness and some dyspnea on exertion but denies any heart palpitations, no dizziness or syncope. No new chest pain, leg swelling, orthopnea, or PND. Past Medical History:  
Diagnosis Date  Diabetes ([facility] [address]  Hypertension  Malignant neoplasm of prostate ([facility] [address] 1998 Current Outpatient Medications Medication Sig Dispense Refill  NOVOLOG U-100 INSULIN ASPART 100 unit/mL injection INJECT AS DIRECTED PER SLIDING SCALE ( MAXM DOSE 15 UNITS /DAY)  0  
 insulin glargine (LANTUS) 100 unit/mL injection 5 Units by SubCUTAneous route daily. (Patient taking differently: 10 Units by SubCUTAneous route daily. ) 1 Vial 0  
 Insulin Needles, Disposable, 31 gauge x 5/16\" ndle Use as needed. 1 Package 11  
 valsartan (DIOVAN) 40 mg tablet Take 1 Tab by mouth daily. 30 Tab 6  oxybutynin (DITROPAN) 5 mg tablet TAKE ONE TABLET BY MOUTH THREE TIMES DAILY 90 Tab 8  
 glucose 4 gram chewable tablet Take 4 Tabs by mouth as needed. (Patient taking differently: Take 4 Tabs by mouth as needed for Other (low blood sugar). ) 10 Tab 0  
 therapeutic multivitamin (THERAGRAN) tablet Take 1 Tab by mouth daily. 30 Tab 0  
 metoprolol tartrate (LOPRESSOR) 50 mg tablet Take 1 Tab by mouth every twelve (12) hours. 60 Tab 0  
 travoprost (TRAVATAN Z) 0.004 % ophthalmic solution Administer 1 Drop to both eyes two (2) times a day. 5 mL 0  
 brinzolamide (AZOPT) 1 % ophthalmic suspension Administer 1 Drop to both eyes two (2) times a day. 5 mL 0  
 brimonidine-timolol (COMBIGAN) 0.2-0.5 % drop ophthalmic solution Administer 1 Drop to both eyes every twelve (12) hours. 5 mL 0  
 calcium carbonate (CALCIUM 500) 500 mg calcium (1,250 mg) tablet Take 1 Tab by mouth daily. 90 Tab 2  
 atorvastatin (LIPITOR) 40 mg tablet Take 1 Tab by mouth nightly. Allergies Allergen Reactions  Iodinated Contrast- Oral And Iv Dye Hives Faint, shaking Social History Tobacco Use  Smoking status: Current Some Day Smoker Packs/day: 0.10 Years: 22.00 Pack years: 2.20 Types: Cigarettes  Smokeless tobacco: Never Used Substance Use Topics  Alcohol use: No  
  Alcohol/week: 0.0 oz  Drug use: No  
 
Family History Problem Relation Age of Onset  Diabetes Other  Hypertension Other Review of Systems Constitutional: Positive for malaise/fatigue. Negative for chills, fever and weight loss. HENT: Negative for nosebleeds. Eyes: Negative for blurred vision and double vision. Respiratory: Positive for shortness of breath. Negative for cough and wheezing. Cardiovascular: Negative for chest pain, palpitations, orthopnea, claudication, leg swelling and PND. Gastrointestinal: Negative for abdominal pain, heartburn, nausea and vomiting. Genitourinary: Negative for dysuria and hematuria. Musculoskeletal: Negative for falls and myalgias. Skin: Negative for rash. Neurological: Negative for dizziness, focal weakness and headaches. Endo/Heme/Allergies: Does not bruise/bleed easily. Psychiatric/Behavioral: Negative for substance abuse. Visit Vitals /62 Pulse 80 Ht 6' 1\" (1.854 m) Wt 65.3 kg (144 lb) SpO2 98% BMI 19.00 kg/m² Physical Exam  
Constitutional: He is oriented to person, place, and time. He appears well-developed and well-nourished. HENT:  
Head: Normocephalic and atraumatic. Eyes: Conjunctivae are normal.  
Neck: Neck supple. No JVD present. Carotid bruit is not present. Cardiovascular: Normal rate, regular rhythm, S1 normal, S2 normal and normal pulses. Exam reveals distant heart sounds. Exam reveals no gallop and no S3. No murmur heard. Pulmonary/Chest: He has decreased breath sounds. He has no wheezes. He has no rales. Abdominal: Soft. Bowel sounds are normal. There is no tenderness. Musculoskeletal: He exhibits no edema. Neurological: He is alert and oriented to person, place, and time. Skin: Skin is warm and dry. EKG: Normal sinus rhythm, occasional atrial premature contractions, normal axis, normal QTc interval, no ST or T wave abnormalities. No change compared to previous EKG from May 2018 ASSESSMENT and PLAN Encounter Diagnoses Name Primary?  PAF (paroxysmal atrial fibrillation) (HCC) Yes  Essential hypertension  Dyslipidemia  Thrombocytopenia ([facility] [address]  Anemia, unspecified type Paroxysmal atrial fibrillation. This occurred in the setting of DKA and pneumonia. He underwent an echocardiogram which showed preserved EF of 56 to 60% without any valvular heart disease. He has been back in sinus rhythm on the last 3 EKGs dating back to the end of March of this year. He remains on metoprolol for rate control.   He has been taking Eliquis for oral anticoagulation since he left the hospital in February 2019. However he has had a significant GI bleed and now has significant thrombocytopenia with platelet counts in the 40,000 range. At this point I feel the risk outweighs the benefit of oral anticoagulation, psych asked him to discontinue the Eliquis altogether. I would not start an antiplatelet agent either since his platelet count is so low. Hopefully his atrial fibrillation was an isolated episode. Essential hypertension. This appears well-controlled on a combination of a low-dose of valsartan and metoprolol, both of which I would continue. Thrombocytopenia. This has been present over the past month on repeat CBCs. He is currently taking hydroxyurea, so I have instructed him to stop taking this medication follow-up with his hematologist as soon as possible. Anemia. Unclear etiology, however, the patient required 3 units of packed red blood cells last month in the hospital.  He never completed a GI work-up. I do not think he is a good candidate for long-term anticoagulation as described above. Diabetes mellitus, type II. On insulin. This is followed by his PCP. Follow-up in 6 months with our PA/NP. I would like to see him back annually, sooner if needed

## 2019-06-07 NOTE — PROGRESS NOTES
Darin Millan presents today for Chief Complaint Patient presents with  Hypertension [facility] preferred language for health care discussion is english/other. Is someone accompanying this pt? His daughter Is the patient using any DME equipment during OV?walker Depression Screening: 
3 most recent PHQ Screens 6/7/2019 Little interest or pleasure in doing things Not at all Feeling down, depressed, irritable, or hopeless Not at all Total Score PHQ 2 0 Learning Assessment: 
Learning Assessment 6/7/2019 PRIMARY LEARNER Patient HIGHEST LEVEL OF EDUCATION - PRIMARY LEARNER  GRADUATED HIGH SCHOOL OR GED  
BARRIERS PRIMARY LEARNER NONE  
CO-LEARNER CAREGIVER No  
CO-LEARNER NAME na PRIMARY LANGUAGE ENGLISH  
LEARNER PREFERENCE PRIMARY READING  
ANSWERED BY self RELATIONSHIP SELF Abuse Screening: 
Abuse Screening Questionnaire 6/7/2019 Do you ever feel afraid of your partner? Morteza Santiago Are you in a relationship with someone who physically or mentally threatens you? Morteza Santiago Is it safe for you to go home? Froylan Kc Fall Risk Fall Risk Assessment, last 12 mths 6/7/2019 Able to walk? Yes Fall in past 12 months? Yes Fall with injury? No  
Number of falls in past 12 months 2 Fall Risk Score 2 Pt currently taking Anticoagulant therapy? Eliquis Coordination of Care: 1. Have you been to the ER, urgent care clinic since your last visit? Hospitalized since your last visit? no 
 
2. Have you seen or consulted any other health care providers outside of the [facility] since your last visit? Include any pap smears or colon screening.  no

## 2019-06-09 PROBLEM — Z79.4 CONTROLLED TYPE 2 DIABETES MELLITUS, WITH LONG-TERM CURRENT USE OF INSULIN (HCC): Chronic | Status: ACTIVE | Noted: 2019-01-01

## 2019-06-09 PROBLEM — I21.4 NSTEMI (NON-ST ELEVATED MYOCARDIAL INFARCTION) (HCC): Status: ACTIVE | Noted: 2019-01-01

## 2019-06-09 PROBLEM — A41.9 SEPSIS (HCC): Status: ACTIVE | Noted: 2019-01-01

## 2019-06-09 PROBLEM — E16.1 HYPOGLYCEMIA, EXOGENOUS HYPERINSULINEMIA: Status: ACTIVE | Noted: 2019-01-01

## 2019-06-09 PROBLEM — E11.9 CONTROLLED TYPE 2 DIABETES MELLITUS, WITH LONG-TERM CURRENT USE OF INSULIN (HCC): Chronic | Status: ACTIVE | Noted: 2019-01-01

## 2019-06-09 NOTE — ED NOTES
Daughter at bedside, states pt's blood glucose was 50mg/dL this morning, so she gave him 10u Humalog insulin.

## 2019-06-09 NOTE — ED PROVIDER NOTES
EMERGENCY DEPARTMENT HISTORY AND PHYSICAL EXAM 
 
Date: 6/9/2019 Patient Name: Lilly Moser History of Presenting Illness Chief Complaint:  
Chief Complaint Patient presents with  Low Blood Sugar Additional History (Context): Lilly Moser is a 82 y.o. male who presents with hypoglycemia. Patient was found in the bathroom unresponsive therefore patient's caregiver gave insulin she was not educated on hypoglycemia. EMS was then called because patient had no change in mental status. EMS found patient to be hypoglycemic with undetectable blood glucose therefore gave one half D50 which briefly increased sugar but then fell below 50 one more time. They give the remainder of the 1/2 ampoule. Upon arrival patient is now coherent and able to converse which she was not on initial presentation for EMS. Patient denies any complaints but states he did not take his daily medications today including metoprolol. PCP: Shana Brantley MD 
 
Current Facility-Administered Medications Medication Dose Route Frequency Provider Last Rate Last Dose  magnesium sulfate 4 g/100 mL IVPB  4 g IntraVENous ONCE Dimple Rob A, DO 25 mL/hr at 06/09/19 1911 4 g at 06/09/19 1911  
 0.9% sodium chloride infusion  75 mL/hr IntraVENous CONTINUOUS Stef Ship, DO 75 mL/hr at 06/09/19 1906 75 mL/hr at 06/09/19 1906  sodium chloride (NS) flush 5-10 mL  5-10 mL IntraVENous PRN Dimple Rob A, DO      
 piperacillin-tazobactam (ZOSYN) 4.5 g in 0.9% sodium chloride (MBP/ADV) 100 mL MBP  4.5 g IntraVENous Q6H Dimple Rob A,  mL/hr at 06/09/19 1804 4.5 g at 06/09/19 1804  levoFLOXacin (LEVAQUIN) 750 mg in D5W IVPB  750 mg IntraVENous Q24H Dimple Rob A, DO   Stopped at 06/09/19 7174  [START ON 6/10/2019] vancomycin (VANCOCIN) 1250 mg in  ml infusion  1,250 mg IntraVENous Q24H Dimple Rob A, DO      
 atorvastatin (LIPITOR) tablet 40 mg  40 mg Oral QHS Stef Ship, DO      
 [START ON 6/10/2019] insulin glargine (LANTUS) injection 5 Units  5 Units SubCUTAneous DAILY Marsha Miracle A, DO      
 metoprolol tartrate (LOPRESSOR) tablet 50 mg  50 mg Oral Q12H Marsha Miracle A, DO   50 mg at 06/09/19 1533  
 oxybutynin (DITROPAN) tablet 5 mg  5 mg Oral TID Marsha Miracle A, DO   5 mg at 06/09/19 1533  
 [START ON 6/10/2019] therapeutic multivitamin (THERAGRAN) tablet 1 Tab  1 Tab Oral DAILY Pricila Poncho, DO      
 [START ON 6/10/2019] valsartan (DIOVAN) tablet 40 mg  40 mg Oral DAILY Marsha Miracle A, DO      
 insulin lispro (HUMALOG) injection   SubCUTAneous AC&HS Marsha Miracle A, DO   2 Units at 06/09/19 1734  
 glucose chewable tablet 16 g  4 Tab Oral PRN Pricila Poncho, DO      
 glucagon (GLUCAGEN) injection 1 mg  1 mg IntraMUSCular PRN Pricila Poncho, DO      
 dextrose (D50W) injection syrg 12.5-25 g  25-50 mL IntraVENous PRN Marsha Miracle A, DO      
 sodium chloride (NS) flush 5-40 mL  5-40 mL IntraVENous Q8H Marsha Miracle A, DO   10 mL at 06/09/19 1805  sodium chloride (NS) flush 5-40 mL  5-40 mL IntraVENous PRN Marsha Miracle A, DO   10 mL at 06/09/19 1644  
 brimonidine (ALPHAGAN) 0.2 % ophthalmic solution 1 Drop  1 Drop Both Eyes BID Pricila Poncho, DO And  
 timolol (TIMOPTIC) 0.25% ophthalmic solution  1 Drop Both Eyes BID Marsha Miracle A, DO      
 latanoprost (XALATAN) 0.005 % ophthalmic solution 1 Drop  1 Drop Both Eyes QPM Marsha Miracle A, DO   1 Drop at 06/09/19 1800  dorzolamide (TRUSOPT) 2 % ophthalmic solution 1 Drop  1 Drop Both Eyes TID Marsha Miracle A, DO   1 Drop at 06/09/19 1700 Past History Past Medical History: 
Past Medical History:  
Diagnosis Date  Controlled type 2 diabetes mellitus, with long-term current use of insulin ([facility] 6/9/2019  Diabetes ([facility]  Hypertension  Hypoglycemia, exogenous hyperinsulinemia ([facility] 6/9/2019  Malignant neoplasm of prostate ([facility] 1998  [facility] 6[facility]9/2019 Past Surgical History: 
Past Surgical History:  
Procedure Laterality Date  BIOPSY OF PROSTATE,INCISIONAL  4/98  [place] EXT BEAM RADIATION AS PRIMARY THERAPY TO PROSTATE ONLY  4/30-6/29/98 Family History: 
Family History Problem Relation Age of Onset  Diabetes Other  Hypertension Other Social History: 
Social History Tobacco Use  Smoking status: Current Some Day Smoker Packs/day: 0.10 Years: 22.00 Pack years: 2.20 Types: Cigarettes  Smokeless tobacco: Never Used Substance Use Topics  Alcohol use: No  
  Alcohol/week: 0.0 oz  Drug use: No  
 
 
Allergies: Allergies Allergen Reactions  Iodinated Contrast- Oral And Iv Dye Hives Faint, shaking Review of Systems POSITIVES IN HPI 
 
GENERAL/CONSTITUTIONAL: No weight loss. HEENT: No Vision change CARDIOVASCULAR: No varicose veins. RESPIRATORY: No night sweats SKIN: No nipple discharge. ENDOCRINE: No intolerance to cold temperature HEMATOLOGIC/LYMPHATIC: No bleeding tendency. ALLERGIC/IMMUNOLOGIC: No latex allergies or sensitivity. Remainder of systems Negative x10 systems total 
 
Physical Exam  
Vital Signs:  
Visit Vitals /56 (BP 1 Location: Left arm, BP Patient Position: At rest) Pulse 77 Temp (!) 100.5 °F (38.1 °C) Resp 18 Ht 6' 1\" (1.854 m) Wt 66.2 kg (146 lb) SpO2 95% BMI 19.26 kg/m² Physical Exam: 
General - Alert and in no acute distress Eyes - No conjunctival injection ENT - Moist Mucous Membranes, Neck - Trachea Midline Lymph Nodes - No lymphadenopathy Cardiovascular -irregularly irregular tachycardia, no m/r/g Lungs - No Respiratory Distress, Clear to auscultation bilaterally Skin - Skin Warm, Dry, and no Rashes Psychiatry - Normal Affect, No SI Abdomen - Abdomen soft and nontender Extremeties - No gross deformities, Normal ROM, No Edema Neurological  CN 2-12 grossly intact, Strength and Sensation intact Diagnostic Study Results Labs - Recent Results (from the past 12 hour(s)) GLUCOSE, POC Collection Time: 06/09/19  8:35 AM  
Result Value Ref Range Glucose (POC) <30.0 (LL) 70 - 110 mg/dL EKG, 12 LEAD, INITIAL Collection Time: 06/09/19  8:55 AM  
Result Value Ref Range Ventricular Rate 121 BPM  
 Atrial Rate 278 BPM  
 QRS Duration 56 ms  
 Q-T Interval 314 ms QTC Calculation (Bezet) 445 ms Calculated R Axis 62 degrees Calculated T Axis 12 degrees Diagnosis Atrial flutter with variable AV block Septal infarct , age undetermined Abnormal ECG When compared with ECG of 13-MAY-2019 10:21, Atrial flutter has replaced Sinus rhythm Vent. rate has increased BY  45 BPM 
Septal infarct is now present Non-specific change in ST segment in Inferior leads ST now depressed in Anterior leads Nonspecific T wave abnormality now evident in Inferior leads METABOLIC PANEL, COMPREHENSIVE Collection Time: 06/09/19  9:14 AM  
Result Value Ref Range Sodium 139 136 - 145 mmol/L Potassium 3.8 3.5 - 5.5 mmol/L Chloride 105 100 - 108 mmol/L  
 CO2 28 21 - 32 mmol/L Anion gap 6 3.0 - 18 mmol/L Glucose 244 (H) 74 - 99 mg/dL BUN 24 (H) 7.0 - 18 MG/DL Creatinine 1.03 0.6 - 1.3 MG/DL  
 BUN/Creatinine ratio 23 (H) 12 - 20 GFR est AA >60 >60 ml/min/1.73m2 GFR est non-AA >60 >60 ml/min/1.73m2 Calcium 7.8 (L) 8.5 - 10.1 MG/DL Bilirubin, total 1.3 (H) 0.2 - 1.0 MG/DL  
 ALT (SGPT) 25 16 - 61 U/L  
 AST (SGOT) 52 (H) 15 - 37 U/L Alk. phosphatase 76 45 - 117 U/L Protein, total 6.9 6.4 - 8.2 g/dL Albumin 2.0 (L) 3.4 - 5.0 g/dL Globulin 4.9 (H) 2.0 - 4.0 g/dL A-G Ratio 0.4 (L) 0.8 - 1.7 MAGNESIUM Collection Time: 06/09/19  9:14 AM  
Result Value Ref Range Magnesium 1.5 (L) 1.6 - 2.6 mg/dL TROPONIN I Collection Time: 06/09/19  9:14 AM  
Result Value Ref Range Troponin-I, QT 0.52 (H) 0.0 - 0.045 NG/ML  
GLUCOSE, POC Collection Time: 06/09/19  9:19 AM  
Result Value Ref Range Glucose (POC) 134 (H) 70 - 110 mg/dL CBC WITH AUTOMATED DIFF Collection Time: 06/09/19  9:45 AM  
Result Value Ref Range WBC 15.8 (H) 4.6 - 13.2 K/uL  
 RBC 2.64 (L) 4.70 - 5.50 M/uL HGB 9.4 (L) 13.0 - 16.0 g/dL HCT 29.7 (L) 36.0 - 48.0 % .5 (H) 74.0 - 97.0 FL  
 MCH 35.6 (H) 24.0 - 34.0 PG  
 MCHC 31.6 31.0 - 37.0 g/dL RDW 28.6 (H) 11.6 - 14.5 % PLATELET 37 (L) 587 - 420 K/uL NEUTROPHILS 81 (H) 42 - 75 % BAND NEUTROPHILS 8 (H) 0 - 5 % LYMPHOCYTES 3 (L) 20 - 51 % MONOCYTES 8 2 - 9 % EOSINOPHILS 0 0 - 5 % BASOPHILS 0 0 - 3 %  
 ABS. NEUTROPHILS 14.0 (H) 1.8 - 8.0 K/UL  
 ABS. LYMPHOCYTES 0.5 (L) 0.8 - 3.5 K/UL  
 ABS. MONOCYTES 1.3 (H) 0 - 1.0 K/UL  
 ABS. EOSINOPHILS 0.0 0.0 - 0.4 K/UL  
 ABS. BASOPHILS 0.0 0.0 - 0.06 K/UL  
 DF MANUAL PLATELET COMMENTS DECREASED PLATELETS    
 RBC COMMENTS MACROCYTOSIS 1+ 
    
 RBC COMMENTS POLYCHROMASIA 1+ 
    
 RBC COMMENTS SCHISTOCYTES 1+ RBC COMMENTS ANISOCYTOSIS 3+ PTT Collection Time: 06/09/19  9:45 AM  
Result Value Ref Range aPTT 33.2 23.0 - 36.4 SEC HEMOGLOBIN A1C WITH EAG Collection Time: 06/09/19  9:45 AM  
Result Value Ref Range Hemoglobin A1c 5.4 4.2 - 5.6 % Est. average glucose 108 mg/dL GLUCOSE, POC Collection Time: 06/09/19 11:25 AM  
Result Value Ref Range Glucose (POC) 191 (H) 70 - 110 mg/dL CULTURE, WOUND W GRAM STAIN Collection Time: 06/09/19 11:45 AM  
Result Value Ref Range Special Requests: RT GREAT TOE   
 GRAM STAIN NO WBC'S SEEN    
 GRAM STAIN NO ORGANISMS SEEN Culture result: PENDING   
CULTURE, WOUND W GRAM STAIN Collection Time: 06/09/19 11:45 AM  
Result Value Ref Range Special Requests: RT ANKLE   
 GRAM STAIN RARE WBC'S    
 GRAM STAIN MODERATE GRAM POSITIVE RODS  Culture result: PENDING   
 URINALYSIS W/MICROSCOPIC Collection Time: 06/09/19  3:10 PM  
Result Value Ref Range Color DARK YELLOW Appearance TURBID Specific gravity 1.020 1.005 - 1.030    
 pH (UA) 5.0 5.0 - 8.0 Protein 100 (A) NEG mg/dL Glucose 100 (A) NEG mg/dL Ketone TRACE (A) NEG mg/dL Bilirubin NEGATIVE  NEG Blood LARGE (A) NEG Urobilinogen 1.0 0.2 - 1.0 EU/dL Nitrites NEGATIVE  NEG Leukocyte Esterase MODERATE (A) NEG    
 WBC TOO NUMEROUS TO COUNT 0 - 5 /hpf  
 RBC TOO NUMEROUS TO COUNT 0 - 5 /hpf Epithelial cells 2+ 0 - 5 /lpf Bacteria 4+ (A) NEG /hpf Mucus 2+ (A) NEG /lpf PTT Collection Time: 06/09/19  4:12 PM  
Result Value Ref Range aPTT 102.5 (H) 23.0 - 36.4 SEC LACTIC ACID Collection Time: 06/09/19  4:12 PM  
Result Value Ref Range Lactic acid 3.9 (HH) 0.4 - 2.0 MMOL/L  
CARDIAC PANEL,(CK, CKMB & TROPONIN) Collection Time: 06/09/19  4:12 PM  
Result Value Ref Range  39 - 308 U/L  
 CK - MB 1.6 <3.6 ng/ml CK-MB Index 1.3 0.0 - 4.0 % Troponin-I, QT 0.45 (H) 0.0 - 0.045 NG/ML  
CBC WITH AUTOMATED DIFF Collection Time: 06/09/19  4:12 PM  
Result Value Ref Range WBC 13.4 (H) 4.6 - 13.2 K/uL  
 RBC 2.05 (L) 4.70 - 5.50 M/uL HGB 7.2 (L) 13.0 - 16.0 g/dL HCT 23.1 (L) 36.0 - 48.0 % .7 (H) 74.0 - 97.0 FL  
 MCH 35.1 (H) 24.0 - 34.0 PG  
 MCHC 31.2 31.0 - 37.0 g/dL RDW 28.6 (H) 11.6 - 14.5 % PLATELET 24 (LL) 203 - 420 K/uL NEUTROPHILS 79 (H) 42 - 75 % BAND NEUTROPHILS 12 (H) 0 - 5 % LYMPHOCYTES 5 (L) 20 - 51 % MONOCYTES 4 2 - 9 % EOSINOPHILS 0 0 - 5 % BASOPHILS 0 0 - 3 %  
 ABS. NEUTROPHILS 10.6 (H) 1.8 - 8.0 K/UL  
 ABS. LYMPHOCYTES 0.7 (L) 0.8 - 3.5 K/UL  
 ABS. MONOCYTES 0.5 0 - 1.0 K/UL  
 ABS. EOSINOPHILS 0.0 0.0 - 0.4 K/UL  
 ABS. BASOPHILS 0.0 0.0 - 0.1 K/UL PLATELET COMMENTS DECREASED PLATELETS    
 RBC COMMENTS ANISOCYTOSIS 3+ 
    
 RBC COMMENTS POIKILOCYTOSIS 2+ 
    
 RBC COMMENTS RBC FRAGMENTS 
MACROCYTOSIS 2+ 
    
 RBC COMMENTS ROULE[place] 2+ 
    
 RBC COMMENTS POLYCHROMASIA 1+ 
    
 DF MANUAL    
GLUCOSE, POC Collection Time: 06/09/19  4:13 PM  
Result Value Ref Range Glucose (POC) 195 (H) 70 - 110 mg/dL Radiologic Studies -  
XR CHEST SNGL V Final Result Impression:  Mild hyperexpansion. A component of vascular congestion suspected. Probable trace right pleural effusion. Quentin Thomas Medical Decision Making I am the first provider for this patient. I reviewed the vital signs, available nursing notes, past medical history, past surgical history, family history and social history. Vital Signs-Reviewed the patient's vital signs. Provider Notes (Medical Decision Making): MDM: Patient is ANO x3 but blood glucose is still undetectable therefore additional 1 amp dextrose ordered. I will feed patient in the emergency department and keep an eye on his blood glucose. Patient does have irregular irregular tachycardia which I evaluated with EKG. I gave patient's missed dose of morning medications including valsartan and metoprolol. ED Course: Progress Notes, Reevaluation, and Consults: 
581 patient's daughter is at bedside. She states that patient was confused in the bathroom but did not fall or hit his head. She states she helped him to the ground. She states that she did give him insulin thinking that would help fix patient's altered mental status. I counseled her on hypoglycemia proper treatments. Patient's labs showed elevated troponin and new a flutter as previous EKG was in sinus rhythm. Therefore I gave aspirin for cardioprotection. Patient is not currently anticoagulated with new a flutter and positive troponin I discussed case with Dr. Matty Calhoun and started heparin. I discussed case with Dr. Peace Squires who will admit patient.  
 
Myself and Dr. Peace Squires discussed the case further found patient's platelet is 47 and after discussing the case with Dr. Santo Ness we decided to discontinue the heparin because of the thrombocytopenia. Diagnosis Clinical Impression: 1. NSTEMI (non-ST elevated myocardial infarction) ([facility] [address] 2. Hypoglycemia Disposition: Admitted Follow-up Information None Current Discharge Medication List  
  
CONTINUE these medications which have NOT CHANGED Details NOVOLOG U-100 INSULIN ASPART 100 unit/mL injection INJECT AS DIRECTED PER SLIDING SCALE ( MAXM DOSE 15 UNITS /DAY) Refills: 0  
  
insulin glargine (LANTUS) 100 unit/mL injection 5 Units by SubCUTAneous route daily. Qty: 1 Vial, Refills: 0  
  
valsartan (DIOVAN) 40 mg tablet Take 1 Tab by mouth daily. Qty: 30 Tab, Refills: 6 Associated Diagnoses: Essential hypertension  
  
oxybutynin (DITROPAN) 5 mg tablet TAKE ONE TABLET BY MOUTH THREE TIMES DAILY Qty: 90 Tab, Refills: 8 Associated Diagnoses: Urinary frequency  
  
therapeutic multivitamin (THERAGRAN) tablet Take 1 Tab by mouth daily. Qty: 30 Tab, Refills: 0  
  
metoprolol tartrate (LOPRESSOR) 50 mg tablet Take 1 Tab by mouth every twelve (12) hours. Qty: 60 Tab, Refills: 0  
  
travoprost (TRAVATAN Z) 0.004 % ophthalmic solution Administer 1 Drop to both eyes two (2) times a day. Qty: 5 mL, Refills: 0  
  
brinzolamide (AZOPT) 1 % ophthalmic suspension Administer 1 Drop to both eyes two (2) times a day. Qty: 5 mL, Refills: 0  
  
brimonidine-timolol (COMBIGAN) 0.2-0.5 % drop ophthalmic solution Administer 1 Drop to both eyes every twelve (12) hours. Qty: 5 mL, Refills: 0  
  
calcium carbonate (CALCIUM 500) 500 mg calcium (1,250 mg) tablet Take 1 Tab by mouth daily. Qty: 90 Tab, Refills: 2 Associated Diagnoses: Malignant neoplasm of prostate ([facility] [address] Monoclonal gammopathy  
  
atorvastatin (LIPITOR) 40 mg tablet Take 1 Tab by mouth nightly. Insulin Needles, Disposable, 31 gauge x 5/16\" ndle Use as needed.  
Qty: 1 Package, Refills: 11  
 glucose 4 gram chewable tablet Take 4 Tabs by mouth as needed. Qty: 10 Tab, Refills: 0  
  
  
 
 
_______________________________ Disclaimer: Note is dictated utilizing voice recognition software. Unfortunately this leads to occasional typographical errors. I apologize in advance if the situation occurs. If questions occur please do not hesitate to call our department.

## 2019-06-09 NOTE — H&P
History & Physical 
Patient: Ed Kohli MRN: [id number]  CSN: [id number] YOB: 1935  Age: 82 y.o. Sex: male DOA: 6/9/2019 HPI:  
 
Ed Kohli is a 82 y.o. male who  
 
 
Past Medical History:  
Diagnosis Date  Controlled type 2 diabetes mellitus, with long-term current use of insulin ([facility] [address] 6/9/2019  Diabetes ([facility] [address]  Hypertension  Hypoglycemia, exogenous hyperinsulinemia ([facility] [address] 6/9/2019  Malignant neoplasm of prostate ([facility] [address] 1998  Sepsis ([facility] [address] 6/9/2019 Past Surgical History:  
Procedure Laterality Date  BIOPSY OF PROSTATE,INCISIONAL  4/98  [place] EXT BEAM RADIATION AS PRIMARY THERAPY TO PROSTATE ONLY  4/30-6/29/98 Family History Problem Relation Age of Onset  Diabetes Other  Hypertension Other Social History Socioeconomic History  Marital status: SINGLE Spouse name: Not on file  Number of children: Not on file  Years of education: Not on file  Highest education level: Not on file Tobacco Use  Smoking status: Current Some Day Smoker Packs/day: 0.10 Years: 22.00 Pack years: 2.20 Types: Cigarettes  Smokeless tobacco: Never Used Substance and Sexual Activity  Alcohol use: No  
  Alcohol/week: 0.0 oz  Drug use: No  
Social History Narrative ** Merged History Encounter **  
    
 
 
Prior to Admission medications Medication Sig Start Date End Date Taking? Authorizing Provider NOVOLOG U-100 INSULIN ASPART 100 unit/mL injection INJECT AS DIRECTED PER SLIDING SCALE ( MAXM DOSE 15 UNITS /DAY) 3/22/19  Yes Provider, Historical  
insulin glargine (LANTUS) 100 unit/mL injection 5 Units by SubCUTAneous route daily. Patient taking differently: 10 Units by SubCUTAneous route daily. 3/22/19  Yes Demetria Charles MD  
valsartan (DIOVAN) 40 mg tablet Take 1 Tab by mouth daily.  3/18/19  Yes HARRY Worthy  
oxybutynin (DITROPAN) 5 mg tablet TAKE ONE TABLET BY MOUTH THREE TIMES DAILY 3/6/19  Yes Zack Salcedo MD  
therapeutic multivitamin [facility]) tablet Take 1 Tab by mouth daily. 2/14/19  Yes Sebastien MYERS NP  
metoprolol tartrate (LOPRESSOR) 50 mg tablet Take 1 Tab by mouth every twelve (12) hours. 2/13/19  Yes Sebastien MYERS NP  
travoprost (TRAVATAN Z) 0.004 % ophthalmic solution Administer 1 Drop to both eyes two (2) times a day. 2/13/19  Yes Sebastien MYERS NP  
brinzolamide (AZOPT) 1 % ophthalmic suspension Administer 1 Drop to both eyes two (2) times a day. 2/13/19  Yes Sebastien MYERS NP  
brimonidine-timolol (COMBIGAN) 0.2-0.5 % drop ophthalmic solution Administer 1 Drop to both eyes every twelve (12) hours. 2/13/19  Yes Sebastien MYERS NP  
calcium carbonate (CALCIUM 500) 500 mg calcium (1,250 mg) tablet Take 1 Tab by mouth daily. 7/27/18  Yes Callie Jasmine NP  
atorvastatin (LIPITOR) 40 mg tablet Take 1 Tab by mouth nightly. 2/29/16  Yes Provider, Historical  
Insulin Needles, Disposable, 31 gauge x 5/16\" ndle Use as needed. 3/22/19   Demetria Charles MD  
glucose 4 gram chewable tablet Take 4 Tabs by mouth as needed. Patient taking differently: Take 4 Tabs by mouth as needed for Other (low blood sugar). 2/13/19   Gilbert Devries NP Allergies Allergen Reactions  Iodinated Contrast- Oral And Iv Dye Hives Faint, shaking Physical Exam:  
  
Visit Vitals /54 (BP 1 Location: Left arm, BP Patient Position: At rest) Pulse 80 Temp (!) 101 °F (38.3 °C) Resp 16 Ht 6' 1\" (1.854 m) Wt 66.2 kg (146 lb) SpO2 97% BMI 19.26 kg/m² Physical Exam: 
GENERAL: alert, fatigued, cooperative Lab/Data Review: 
Labs: Results:  
   
Chemistry Recent Labs  
  06/09/19 
5773 *   
K 3.8  CO2 28 BUN 24* CREA 1.03  
CA 7.8* AGAP 6  
BUCR 23* AP 76  
TP 6.9 ALB 2.0*  
GLOB 4.9* AGRAT 0.4* CBC w/Diff Recent Labs  
  06/09/19 
0945 WBC 15.8*  
RBC 2.64* HGB 9.4*  
 HCT 29.7*  
PLT 37* GRANS 81* LYMPH 3*  
EOS 0 Coagulation Recent Labs  
  06/09/19 
0945 APTT 33.2 Iron/Ferritin No results for input(s): IRON in the last 72 hours. No lab exists for component: TIBCCALC BNP No results for input(s): BNPP in the last 72 hours. Cardiac Enzymes No results for input(s): CPK, CKND1, STEPHENIE in the last 72 hours. No lab exists for component: Kimberly Quiroz Liver Enzymes Recent Labs  
  06/09/19 
0914 TP 6.9 ALB 2.0*  
AP 76 SGOT 52* Thyroid Studies Lab Results Component Value Date/Time TSH 0.27 (L) 02/13/2019 05:10 AM  
    
 
All Micro Results Procedure Component Value Units Date/Time RESPIRATORY CULTURE [[id number]] Order Status:  Sent Specimen:  Sputum CULTURE, URINE [[id number]] Order Status:  Sent Specimen:  Voided Urine CULTURE, BLOOD [[id number]] Order Status:  Sent Specimen:  Blood CULTURE, BLOOD [[id number]] Order Status:  Sent Specimen:  Blood Lendia Coventry STAIN [[id number]] Collected:  06/09/19 1145 Order Status:  Completed Specimen:  Ankle Updated:  06/09/19 1303 Special Requests: RT ANKLE  
  GRAM STAIN RARE WBC'S     
      
  MODERATE GRAM POSITIVE RODS Culture result: PENDING  
 CULTURE, Severiano Bridegroom STAIN [[id number]] Collected:  06/09/19 1145 Order Status:  Completed Specimen:  Wound from Toe Updated:  06/09/19 1303 Special Requests: RT GREAT TOE  
  GRAM STAIN NO WBC'S SEEN     
   NO ORGANISMS SEEN Culture result: PENDING Imaging Reviewed: 
 
 
 
Assessment:  
Principal Problem: 
  NSTEMI (non-ST elevated myocardial infarction) ([facility] [address] (6/9/2019) Active Problems: Hypoglycemia, exogenous hyperinsulinemia ([facility] [address] (6/9/2019) Sepsis ([facility] [address] (6/9/2019) Controlled type 2 diabetes mellitus, with long-term current use of insulin ([facility] [address] (6/9/2019) Plan:  
 
Patient with 101 temp Sepsis unknown source protocol 2nd troponin pending Platelets 37, heparin stopped Recheck CBC Mg++ 1.5, replacing 4 Gm now Discussed with Dr Remedios Morel,  
6/9/2019, 3:07 PM 
 
Dictation #  [id number]

## 2019-06-09 NOTE — PROGRESS NOTES
Kinetic Dosing- Initial Progress Note Pharmacy Consult ordered by Dr. Doreatha Osler Indication: sepsis Patient clinical status and labs ordered/reviewed. Pt Weight Weight: 66.2 kg (146 lb) Serum Creatinine Lab Results Component Value Date/Time Creatinine 1.03 06/09/2019 09:14 AM  
 
   
Creatinine Clearance Estimated Creatinine Clearance: 51.8 mL/min (based on SCr of 1.03 mg/dL). BUN Lab Results Component Value Date/Time BUN 24 (H) 06/09/2019 09:14 AM  
 
   
WBC Lab Results Component Value Date/Time WBC 15.8 (H) 06/09/2019 09:45 AM  
 
  
Temperature Temp: (!) 101 °F (38.3 °C) HR Pulse (Heart Rate): 80 
  
BP BP: 115/54 Kinetic Dosing Parameters: 
 Vd = 55L K = .039 hr-1 
            t ½ = 18h 
 
Drug Levels:  
Vancomycin No results for input(s): VANCP, VANCT, VANCR, VANRA in the last 72 hours. Gentamicin No results for input(s): GENP, GENT in the last 72 hours. No lab exists for component:  GENR Tobramycin No results for input(s): TOBP, TOBT, TOBR in the last 72 hours. Amikacin No results for input(s): Napolean Sayres in the last 72 hours. No lab exists for component:  Susan Marin Dose for naïve patient was initiated at: Vancomycin 1500mg iv x1 then 1250mg q24h Continue to monitor Sign: CAROLINE Davis [facility] Date: 6/9/2019 Time: 2:38 PM

## 2019-06-09 NOTE — PROGRESS NOTES
[facility] - Consult Note Consultation request by Lisandro Price DO for advice/opinion related to evaluating NSTEMI (non-ST elevated myocardial infarction) ([facility] [I21.4]; Hypoglycemia [E16.2];NSTEMI (non-ST elevated myocardial infarction) ([facility] [I21.4]; Hypoglycemia [E16.2] Date of  Admission: 6/9/2019  8:29 AM  
Primary Care Physician:  Arlen Dietrich MD 
 
 Assessment:  
 
Patient Active Problem List  
Diagnosis Code  Malignant neoplasm of prostate ([facility] C61  Malignant neoplasm of prostate ([facility] C61  Hypoglycemia, exogenous hyperinsulinemia (HCC) E15  
 History of prostate cancer Z85.46  
 Urinary frequency R35.0  Neutrophilic leukocytosis R13.5  Thrombocytosis ([facility]) D47.3  Monoclonal gammopathy D47.2  Iron deficiency anemia secondary to inadequate dietary iron intake D50.8  Diabetic hyperosmolar non-ketotic state ([facility] E11.00  Acute UTI N39.0  Pneumonia J18.9  New onset a-fib ([facility]) I48.91  
 Moderate protein malnutrition ([facility]) E44.0  Thrombocytopenia ([facility] D69.6  Anemia D64.9  
 NSTEMI (non-ST elevated myocardial infarction) ([facility]) I21.4  Sepsis ([facility] A41.9  Controlled type 2 diabetes mellitus, with long-term current use of insulin (HCC) E11.9, Z79.4  
  > Elevated troponin from supply/demand mismatch is setting of sepsis. > Paroxysmal atrial fibrillation. Plan:  
 
1. Will check echocardiogram. 
2. Will need to discuss appropriateness of long term anticoagulation with family members. History of Present Illness: This is a 82 y.o. male admitted for NSTEMI (non-ST elevated myocardial infarction) ([facility] [I21.4]; Hypoglycemia [E16.2];NSTEMI (non-ST elevated myocardial infarction) ([facility] [I21.4]; Hypoglycemia [E16.2].   This patient reportedly was poorly responsive at home and was given insulin by a caregiver who apparently was not adequately trained and the patient was found to be markedly hypoglycemic and unresponsive by the paramedics. He did respond to getting D50 by the paramedics and apparently was back to baseline levels although at the time of my examination he seems somewhat confused and answers questions hesitantly. He has had 2 elevated troponins at 0.52 and 0.45 which would appear to be secondary to supply demand mismatch in the setting of the above. He has had paroxysmal atrial fibrillation in the past which is recurrent. He was apparently put on heparin because of the troponin elevations or possibly to the atrial fibrillation but with thrombocytopenia and platelet count of 46,386 and I agree with Dr. Huseyin Nicole that this should be discontinued. Review of Symptoms: Poor historian and history is inadequate to get a reliable review of systems. Past Medical History:  
 
Past Medical History:  
Diagnosis Date  Controlled type 2 diabetes mellitus, with long-term current use of insulin ([facility] 6/9/2019  Diabetes ([facility]  Hypertension  Hypoglycemia, exogenous hyperinsulinemia ([facility] 6/9/2019  Malignant neoplasm of prostate ([facility] 1998  Sepsis ([facility] 6/9/2019 Social History:  
 
Social History Socioeconomic History  Marital status: SINGLE Spouse name: Not on file  Number of children: Not on file  Years of education: Not on file  Highest education level: Not on file Tobacco Use  Smoking status: Current Some Day Smoker Packs/day: 0.10 Years: 22.00 Pack years: 2.20 Types: Cigarettes  Smokeless tobacco: Never Used Substance and Sexual Activity  Alcohol use: No  
  Alcohol/week: 0.0 oz  Drug use: No  
Social History Narrative ** Merged History Encounter ** Family History:  
 
Family History Problem Relation Age of Onset  Diabetes Other  Hypertension Other Medications: Allergies Allergen Reactions  Iodinated Contrast- Oral And Iv Dye Hives Faint, shaking Current Facility-Administered Medications Medication Dose Route Frequency  magnesium sulfate 4 g/100 mL IVPB  4 g IntraVENous ONCE  
 0.9% sodium chloride infusion  75 mL/hr IntraVENous CONTINUOUS  
 sodium chloride (NS) flush 5-10 mL  5-10 mL IntraVENous PRN  piperacillin-tazobactam (ZOSYN) 4.5 g in 0.9% sodium chloride (MBP/ADV) 100 mL MBP  4.5 g IntraVENous Q6H  
 levoFLOXacin (LEVAQUIN) 750 mg in D5W IVPB  750 mg IntraVENous Q24H  
 vancomycin (VANCOCIN) 1500 mg in  ml infusion  1,500 mg IntraVENous ONCE  
 [START ON 6/10/2019] vancomycin (VANCOCIN) 1250 mg in  ml infusion  1,250 mg IntraVENous Q24H  
 atorvastatin (LIPITOR) tablet 40 mg  40 mg Oral QHS  [START ON 6/10/2019] insulin glargine (LANTUS) injection 5 Units  5 Units SubCUTAneous DAILY  metoprolol tartrate (LOPRESSOR) tablet 50 mg  50 mg Oral Q12H  
 oxybutynin (DITROPAN) tablet 5 mg  5 mg Oral TID  [START ON 6/10/2019] therapeutic multivitamin (THERAGRAN) tablet 1 Tab  1 Tab Oral DAILY  [START ON 6/10/2019] valsartan (DIOVAN) tablet 40 mg  40 mg Oral DAILY  insulin lispro (HUMALOG) injection   SubCUTAneous AC&HS  
 glucose chewable tablet 16 g  4 Tab Oral PRN  
 glucagon (GLUCAGEN) injection 1 mg  1 mg IntraMUSCular PRN  
 dextrose (D50W) injection syrg 12.5-25 g  25-50 mL IntraVENous PRN  
 sodium chloride (NS) flush 5-40 mL  5-40 mL IntraVENous Q8H  
 sodium chloride (NS) flush 5-40 mL  5-40 mL IntraVENous PRN  
 brimonidine (ALPHAGAN) 0.2 % ophthalmic solution 1 Drop  1 Drop Both Eyes BID And  
 timolol (TIMOPTIC) 0.25% ophthalmic solution  1 Drop Both Eyes BID  latanoprost (XALATAN) 0.005 % ophthalmic solution 1 Drop  1 Drop Both Eyes QPM  
 dorzolamide (TRUSOPT) 2 % ophthalmic solution 1 Drop  1 Drop Both Eyes TID Physical Exam:  
 
Visit Vitals /56 (BP 1 Location: Left arm, BP Patient Position: At rest) Pulse 77 Temp (!) 100.5 °F (38.1 °C) Resp 18 Ht 6' 1\" (1.854 m) Wt 66.2 kg (146 lb) SpO2 95% BMI 19.26 kg/m² BP Readings from Last 3 Encounters:  
06/09/19 111/56  
06/07/19 104/62  
06/04/19 135/65 Pulse Readings from Last 3 Encounters:  
06/09/19 77  
06/07/19 80  
06/04/19 64 Wt Readings from Last 3 Encounters:  
06/09/19 66.2 kg (146 lb) 06/07/19 65.3 kg (144 lb) 05/15/19 69.4 kg (153 lb) General:  alert, cooperative, no distress, appears stated age Neck:  no JVD Lungs:  clear to auscultation bilaterally Heart:  regular rate and rhythm, S1, S2 normal, no murmur, click, rub or gallop Abdomen:  abdomen is soft without significant tenderness, masses, organomegaly or guarding Extremities:  extremities normal, atraumatic, no cyanosis with 1 + edema Skin: Warm and dry. no hyperpigmentation, vitiligo, or suspicious lesions Neuro: alert, oriented x3, affect appropriate, no focal neurological deficits, moves all extremities well. He does appear lethargic and only answers questions hesitently Data Review:  
 
Recent Labs  
  06/09/19 
1612 06/09/19 
0945 WBC 13.4* 15.8* HGB 7.2* 9.4* HCT 23.1* 29.7*  
PLT 24* 37* Recent Labs  
  06/09/19 
0914   
K 3.8  CO2 28  
* BUN 24* CREA 1.03  
CA 7.8*  
MG 1.5* ALB 2.0*  
SGOT 52* ALT 25 Results for orders placed or performed during the hospital encounter of 06/09/19 EKG, 12 LEAD, INITIAL Result Value Ref Range Ventricular Rate 121 BPM  
 Atrial Rate 278 BPM  
 QRS Duration 56 ms  
 Q-T Interval 314 ms QTC Calculation (Bezet) 445 ms Calculated R Axis 62 degrees Calculated T Axis 12 degrees Diagnosis Atrial flutter with variable AV block Septal infarct , age undetermined Abnormal ECG When compared with ECG of 13-MAY-2019 10:21, Atrial flutter has replaced Sinus rhythm Vent. rate has increased BY  45 BPM 
Septal infarct is now present Non-specific change in ST segment in Inferior leads ST now depressed in Anterior leads Nonspecific T wave abnormality now evident in Inferior leads Results for orders placed or performed in visit on 06/07/19 AMB POC EKG ROUTINE W/ 12 LEADS, INTER & REP Impression See progress note. All Cardiac Markers in the last 24 hours:   
Lab Results Component Value Date/Time  06/09/2019 04:12 PM  
 CKMB 1.6 06/09/2019 04:12 PM  
 CKND1 1.3 06/09/2019 04:12 PM  
 TROIQ 0.45 (H) 06/09/2019 04:12 PM  
 TROIQ 0.52 (H) 06/09/2019 09:14 AM  
 
 
Last Lipid:   
Lab Results Component Value Date/Time Cholesterol, total 81 04/26/2018 07:51 AM  
 HDL Cholesterol 30 (L) 04/26/2018 07:51 AM  
 LDL, calculated 38.2 04/26/2018 07:51 AM  
 Triglyceride 64 04/26/2018 07:51 AM  
 CHOL/HDL Ratio 2.7 04/26/2018 07:51 AM  
 
 
Signed By: Kemal Ibarra DO   
 June 9, 2019

## 2019-06-09 NOTE — ED TRIAGE NOTES
Pt arrived to ED via EMS, caregiver found pt on floor in bathroom, BG checked and was 50. Caregiver administered insulin. Medics administered 1/2 amp of D50 and BG dropped to 25 en route and other half of D50 was administered. BG checked at ED, reads low. MD at bedside.

## 2019-06-09 NOTE — H&P
[address] [place] [address]  HISTORY AND PHYSICAL Name:  Katie Fleming 
MR#:   [id number] :  1935 ACCOUNT #:  [de-identified] ADMIT DATE:  2019 CHIEF COMPLAINT:  Found down in the bathroom at home. HISTORY OF PRESENT ILLNESS:  The patient is an 42-year-old male recently treated in the hospital in 2019 for paroxysmal atrial fibrillation in the setting of pneumonia and UTI with diabetic ketoacidosis. He had recently seen Dr. Christine Perea in followup. The patient was treated with Lopressor for rate control and discharged on apixaban. He does have a history of thrombocytopenia and his platelets had dropped to 40,000 at one time during his hospital stay. He has had a history of falls and he has had bleeding sacral ulcers for which he needed to be treated at the Emergency Room for. At his reevaluation, Dr. Christine Perea did stop his Eliquis and also his hydroxyurea because he was actually thrombocytopenic. Today, the patient was in the bathroom and was found by his family on the floor. Caregiver thought the patient might have had high blood sugar attack and gave him his morning insulin. 911 arrived to find a blood sugar of 25 en route to the hospital after giving an half amp of D50. They gave another half amp and as they arrived at the ER, the Glucometer read low. The patient, on arrival to the ER was hungry and in no acute distress and provided with a sandwich in a breakfast tray this morning. Daughter at the bedside stated that the patient's sugar was 50 this morning, so she gave him 10 units of Humalog. In the Emergency Room, the patient was found to be in atrial flutter. His troponin was positive at 0.52, magnesium is low at 1.5. He has GFR greater than 60. His creatinine has increased slightly to 1.03 from 0.58 last month. He in fact does have thrombocytopenia with platelets of 37. He has WBCs of 15.8, H and H is 9.4 with 29.7 and he had 8 bands.   The patient has been cardiology patient of Dr. Jazmyn Reaves. Dr. Susanna Kauffman was notified of the consult from the Emergency Room. The patient to be admitted with NSTEMI to Telemetry. PAST MEDICAL HISTORY: 
1. Controlled type diabetes type 2 with long term current use of insulin. 2.  Hypertension. 3.  Hypoglycemic event with exogenous insulin. 4.  History of prostate cancer. PAST SURGICAL HISTORY: 
1. Biopsy of prostate. 2.  External beam radiation therapy of prostate. ALLERGIES:  IODINATED CONTRAST AND IV DYE. SOCIO-ECONOMIC HISTORY:  The patient is . He lives with his daughter. He is a some-day smoker, maybe 2 a day at this point. FAMILY HISTORY: 
1.  Diabetes. 2.  Hypertension. MEDICATIONS PRIOR TO ADMISSION: 
1. Atorvastatin 40 mg one daily p.o. 2.  Combigan 0.2-0.5% ophthalmic solution one drop both eyes every 12 hours. 3.  Azopt 1% ophthalmic solution one drop both eyes twice a day. 4.  Calcium carbonate 500 mg one tablet by mouth daily. 5.  Glucose 4 g chewable tablet four tablets by mouth if low blood sugar. 6.  Lantus 5 units subcutaneous daily. 7.  Metoprolol tartrate 50 mg one tablet every 12 hours. 8.  Novolog sliding scale. 9.  Ditropan (oxybutynin) 5 mg one by mouth 3 times a day. 10.  Theragran multivitamin one a day. 11.  Travatan Z one drop both eyes two times a day. 12.  Diovan 40 mg one tablet daily. REVIEW OF SYSTEMS: 
GENERAL:  The patient had syncopal episode today, was found down on the floor in the bathroom. He reports dizziness. HEENT:  History of glaucoma, on several eye drops, denies any acute change in vision, denies any focal facial deficits, denies difficulty swallowing, denies neck pain. CHEST:  Denies acute chest pressure or pain at this time, denies palpitations, denies cough. ABDOMEN:  Denies nausea, vomiting or diarrhea. Denies abdominal pain. EXTREMITIES:  Several ulcers over both feet.   The patient has had his wound care come to the house. The patient's daughter states it ran out this past week and she was changing the dressing with her sister about every 2 days. The patient complains of bilateral foot pain. PHYSICAL EXAMINATION: 
GENERAL:  The patient is alert and cooperative and denies acute distress at this time. VITAL SIGNS:  Blood pressure 115/54, heart rate is 80 and regular, temperature is now 101, respiratory rate 16, height 6 feet 1 inch, weight 66.2 kg, 146 pounds, pulse ox is 97%. HEENT:  Conjunctivae are clear, no acute drainage, anicteric. Facial symmetry. Pharynx is clear. TMs are clear. NECK:  Supple, no bruit, no adenopathy. CHEST:  Heart rate is 80 and regular. LUNGS:  Decreased at the bases, clear anteriorly. The patient has a deformity at the sternoclavicular border on the right, appears to be a callused old injury. ABDOMEN:  Soft, bowel sounds positive, nontender, no bruit detected. EXTREMITIES:  Multiple purpura over both arms. Generalized muscle atrophy. Several purpura over legs. Right hallux with open ulceration, 2.5 cm. Right plantar fourth metatarsal with shallow ulceration posterior right ankle. No acute edema. Positive dorsalis pedis and pedal pulses. Sacral decubiti. Diagnostic studies have been reviewed. Most significant include troponin of 0.52 with a recent past reading on 05/13/2019 of 0.02, magnesium is 1.5, potassium is 3.8, glucose is 244. ASSESSMENT: 
1.  Non-ST elevated myocardial infarction. 2.  Hypoglycemia, exogenous hyperinsulinemia. 3.  Controlled type 2 diabetes mellitus with long term use of insulin. 4.  Sepsis, consider source, urinary tract infection?, aspiration?, soft tissue ulcers? PLAN: 
1. Sepsis bundle initiated. The patient is initiated on vancomycin, Zosyn and Levaquin, 2 L boluses given. Blood cultures, urine cultures sent. Respiratory cultures ordered.   Wound cultures were sent from the Emergency Room prior to the start of any antibiotics. 2.  Serial troponins and EKG. 3.  Dr. Flora Justice to consult for Cardiology. 4.  POC glucose with coverage. 5.  Full code. SUSAN ALANIZ DO 
 
 
[id number] 
D:  06/09/2019 15:19 
T:  06/09/2019 18:34 
JOB #:  [id number]

## 2019-06-10 NOTE — ROUTINE PROCESS
TRANSFER - OUT REPORT: 
 
Verbal report given to LUBA Rubi(name) on [place]  being transferred to CVT ICU(unit) for urgent transfer Report consisted of patients Situation, Background, Assessment and  
Recommendations(SBAR). Information from the following report(s) SBAR, Kardex, Intake/Output, MAR, Recent Results and Quality Measures was reviewed with the receiving nurse. Opportunity for questions and clarification was provided. Patient transported with: 
 Monitor Patient-specific medications from Pharmacy Registered Nurse Tech

## 2019-06-10 NOTE — WOUND CARE
Physical Exam  
Musculoskeletal:  
     Legs: 
     Feet: 
 
Seen by wound care per nursing request, skin assessment performed at this time with Damon Pantoja RN, bedside nurse. Tissue injury listed above noted during skin assessment. Treatment plan explained to [facility]  and Pt agreeable to continue current treatment. At this time medi-honey and aqua marie Ag placed over wound bed with mepilex silicone border dressing. Medi-honey placed on toes of right foot, eschar areas only. Nursing to change everyother day and prn for soilage Wound care education provided to pt at this time. Patient to be positioned from side to side and supine for meals only. Plan of care reviewed with nursing. Will turn over care to nursing staff at this time George Bermeo, Wound Care Department

## 2019-06-10 NOTE — PROGRESS NOTES
SUBJECTIVE: 
 
Feeling okay. No chest or abdominal pain. No SOB or cough. No nausea or vomiting. Does not walk well at home and has a walker per daughter. No dizziness. Eating food without any issues. OBJECTIVE: 
 
/51   Pulse 70   Temp 98 °F (36.7 °C)   Resp 18   Ht 6' 1\" (1.854 m)   Wt 66.2 kg (146 lb)   SpO2 98%   BMI 19.26 kg/m² General appearance - alert, well appearing, and in no distress Eyes - sclera anicteric, no pallor Nose - no nasal discharge. Neck - supple, no JVD, trachea is midline Chest - Good air entry noted in bases, no wheezes Heart - S1 and S2 normal 
Abdomen - soft, nontender, nondistended, Bowel sounds present Neurological - alert, normal speech, no focal findings noted in both UEs Extremities - + pedal edema noted ASSESSMENT: 
 
1. GNR Sepsis. 2. GNR UTI 3. Hypoglycemia due to sepsis, resolved 4. Indeterminate elevation of troponin due to demand ischemia sec to sepsis 5. Paroxysmal atrial fibrillation, anticoagulation d/c as outpatient due to thrombocytopenia 6. Thrombocytopenia 7. Anemia 8. HTN 
9. Hx prostate cancer 10. Decub/foot ulcers 11. Macrocytosis PLAN: 
 
Cont current management CXR report reviewed ID consult Cardiology input noted Check H/H and B12 level now - d/w nursing about it PT/OT tomorrow Repeat blood c/s in am 
 
BMP:  
Lab Results Component Value Date/Time  06/10/2019 06:05 AM  
 K 3.4 (L) 06/10/2019 06:05 AM  
  (H) 06/10/2019 06:05 AM  
 CO2 26 06/10/2019 06:05 AM  
 AGAP 6 06/10/2019 06:05 AM  
  (H) 06/10/2019 06:05 AM  
 BUN 25 (H) 06/10/2019 06:05 AM  
 CREA 0.96 06/10/2019 06:05 AM  
 GFRAA >60 06/10/2019 06:05 AM  
 GFRNA >60 06/10/2019 06:05 AM  
 
CBC:  
Lab Results Component Value Date/Time  WBC 13.8 (H) 06/10/2019 06:05 AM  
 HGB 7.0 (L) 06/10/2019 06:05 AM  
 HCT 22.5 (L) 06/10/2019 06:05 AM  
 PLT 21 (LL) 06/10/2019 06:05 AM

## 2019-06-10 NOTE — PROGRESS NOTES
Reason for Admission:   NSTEMI (non-ST elevated myocardial infarction) ([facility] [I21.4] Hypoglycemia [E16.2] NSTEMI (non-ST elevated myocardial infarction) ([facility] [I21.4] Hypoglycemia [E16.2] RRAT Score:    27 Resources/supports as identified by patient/family:  Patient has family supports and he has a personal care aide from 7:30AM-8:30PM Monday through Friday. Top Challenges facing patient (as identified by patient/family and CM): Finances/Medication cost?     NO Transportation      Daughter (Lala Dill) or his personal care aide will transport home. Support system or lack thereof? Family supports and personal care aide. Living arrangements? Has a roommate, who rents a room in his house. Self-care/ADLs/Cognition? Alert and oriented. Current Advanced Directive/Advance Care Plan:   no 
                       
Plan for utilizing home health:    Patient has no home health orders in place at this time. This writer will continue to closely monitor for potential home health needs and orders. Likelihood of readmission:   HIGH Transition of Care Plan:  Patient will return home with help from his family and have his personal care aide services restarted. Patient's daughter Vasiliy Stone) or his aide will transport home at the time of discharge. Initial assessment completed with patient and his daughter Vasiliy Stone) in person and his daughter Vandana Patterson) via phone. Cognitive status of patient: Alert and oriented. Face sheet information confirmed:  yes. The patient designates his daughters Vandana Patterson and Lala Dill) to participate in his discharge plan and to receive any needed information. This patient lives in a house with a roommate, who rents a room in his home. Patient is able to navigate steps as needed, with assistance.   Prior to hospitalization, patient was considered to be independent with ADLs/IADLS : no . If not independent,  patient needs assist with : ADLs and IADLs. He has a personal care aide 5 days a week for over 12 hours each day. Patient has a current ACP document on file: no  
 
Patient's daughter Kal Durbin) or his aide will be available to transport patient home upon discharge. The patient already has a walker and wheelchair available in the home. Patient is not currently active with home health. However, he is active with a personal care aide. Patient has not stayed in a skilled nursing facility or rehab. Currently, the discharge plan is to return home with help from his family and have his personal care aide services restarted. Patient's daughter Kal Durbin) or his aide will transport home at the time of discharge. Patient's daughter/POA is Veronica Bower, #[phone]). Teena Lua lives in [place], [place]. Patient's other daughter is (Leo Simms, #[phone]). Alfornia Sacks lives locally. Patient's PCP is Elbert. Patient is insured through [facility] and he also has 4 H [facility] Medicare. The patient states that he can obtain his medications from the pharmacy, and take his medications as directed. This writer will continue to closely monitor for discharge planning to ensure a safe discharge home from [facility]. Care Management Interventions PCP Verified by CM: Yes(Huy Lerma) Palliative Care Criteria Met (RRAT>21 & CHF Dx)?: No 
Mode of Transport at Discharge: Other (see comment)(Patient's daughter Kal Durbin) or his personal care aide will transport home.) Transition of Care Consult (CM Consult): Discharge Planning(Restart his personal care aide services.) Current Support Network: Own Home, Other, Family Lives Nearby, Has Personal Caregivers(Patient has a roommate.) Confirm Follow Up Transport: Family Plan discussed with Pt/Family/Caregiver: Yes Discharge Location Discharge Placement: Home with family assistance(and Personal care aide.) Lainey Bejarano. MSW Care Manager Pager#: [phone]

## 2019-06-10 NOTE — PROGRESS NOTES
[facility]  -  Progress Note Patient: Aries Contreras MRN: [id number]  SSN: [id number] YOB: 1935  Age: 82 y.o. Sex: male Admit Date: 6/9/2019 Assessment:  
 
-Sepsis, BCx x 2 6/9/19 positive for GNR, associated with UTI 
-Hypoglycemia in setting of insulin use 
-Flat indeterminate troponin 0.52 --> 0.45, likely demand ischemia in setting of above 
-Paroxysmal atrial fibrillation, anticoagulation d/c as outpatient due to thrombocytopenia 
-Thrombocytopenia, Plt down to 21K on 6/10/19 
-Anemia, Hgb down to 7.0 6/10/19 
-HTN 
-DM, insulin-dependent 
-Hx prostate cancer Plan:  
 
-Continue PO Metoprolol for rate control. 
-Anticoagulation d/c as outpatient due to thrombocytopenia and anemia. Platelets at 20K, H/H at 7.0/22.5 this AM. 
 
Subjective:  
 
C/o bilateral ankle and sacral pain. No c/o chest pain, shortness of breath. Objective:  
  
Patient Vitals for the past 8 hrs: 
 Temp Pulse Resp BP SpO2  
06/10/19 0729 97.4 °F (36.3 °C) 70 18 123/47 96 % 06/10/19 0400 99.7 °F (37.6 °C) 72 18 106/55 93 % 06/10/19 0200  64 11 123/52 96 % 06/10/19 0100 99.6 °F (37.6 °C) 77 17 99/51 99 % Patient Vitals for the past 96 hrs: 
 Weight 06/09/19 1422 146 lb (66.2 kg) Intake/Output Summary (Last 24 hours) at 6/10/2019 0860 Last data filed at 6/10/2019 0500 Gross per 24 hour Intake 1511.25 ml Output 1400 ml Net 111.25 ml Physical Exam: 
General:  alert, cooperative, no distress, appears stated age Neck:  No JVD Lungs:  clear to auscultation bilaterally to anterolateral lung fields Heart:  irregular rhythm Abdomen:  abdomen is soft without significant tenderness, masses, organomegaly or guarding Extremities:  Atraumatic, + edema to lower extremities Data Review:  
 
Labs: Results:  
   
Chemistry Recent Labs  
  06/10/19 
1758 06/09/19 
4672 * 244*  139  
K 3.4* 3.8 * 105 CO2 26 28 BUN 25* 24* CREA 0.96 1.03  
CA 7.7* 7.8*  
MG 2.2 1.5* PHOS 2.4*  --   
AGAP 6 6 BUCR 26* 23* AP 90 76  
TP 6.4 6.9 ALB 1.7* 2.0*  
GLOB 4.7* 4.9* AGRAT 0.4* 0.4* CBC w/Diff Recent Labs  
  06/10/19 
0605 06/09/19 
1612 06/09/19 
0945 WBC 13.8* 13.4* 15.8*  
RBC 2.00* 2.05* 2.64* HGB 7.0* 7.2* 9.4* HCT 22.5* 23.1* 29.7*  
PLT 21* 24* 37* GRANS PENDING 79* 81* LYMPH PENDING 5* 3*  
EOS PENDING 0 0 Cardiac Enzymes Lab Results Component Value Date/Time  06/09/2019 04:12 PM  
 CKMB 1.6 06/09/2019 04:12 PM  
 CKND1 1.3 06/09/2019 04:12 PM  
 TROIQ 0.45 (H) 06/09/2019 04:12 PM  
 TROIQ 0.52 (H) 06/09/2019 09:14 AM  
  
Coagulation Recent Labs  
  06/09/19 
1612 06/09/19 
0945 APTT 102.5* 33.2 Lipid Panel Lab Results Component Value Date/Time Cholesterol, total 81 04/26/2018 07:51 AM  
 HDL Cholesterol 30 (L) 04/26/2018 07:51 AM  
 LDL, calculated 38.2 04/26/2018 07:51 AM  
 VLDL, calculated 12.8 04/26/2018 07:51 AM  
 Triglyceride 64 04/26/2018 07:51 AM  
 CHOL/HDL Ratio 2.7 04/26/2018 07:51 AM  
  
Liver Enzymes Recent Labs  
  06/10/19 
0836 TP 6.4 ALB 1.7* AP 90 SGOT 71* Thyroid Studies Lab Results Component Value Date/Time  TSH 0.27 (L) 02/13/2019 05:10 AM

## 2019-06-10 NOTE — PROGRESS NOTES
Spoke with Zakiya Hairston RN in regards to patient Lactic acid level 6.6, no current order for repeat level. Stated that she will put an order in.

## 2019-06-10 NOTE — CONSULTS
Consult Patient: Fidencio Driscoll MRN: [id number]  SSN: [id number] YOB: 1935  Age: 82 y.o. Sex: male Subjective:  
  
Fidencio Driscoll is a 82 y.o. male who is being seen for asked to evaluate and treat sore on both feet , possible infection. . 
 
Past Medical History:  
Diagnosis Date  Controlled type 2 diabetes mellitus, with long-term current use of insulin ([facility] 6/9/2019  Diabetes ([facility]  Hypertension  Hypoglycemia, exogenous hyperinsulinemia ([facility] 6/9/2019  Malignant neoplasm of prostate ([facility] 1998  Sepsis ([facility] 6/9/2019 Past Surgical History:  
Procedure Laterality Date  BIOPSY OF PROSTATE,INCISIONAL  4/98  MD EXT BEAM RADIATION AS PRIMARY THERAPY TO PROSTATE ONLY  4/30-6/29/98 Family History Problem Relation Age of Onset  Diabetes Other  Hypertension Other Social History Tobacco Use  Smoking status: Current Some Day Smoker Packs/day: 0.10 Years: 22.00 Pack years: 2.20 Types: Cigarettes  Smokeless tobacco: Never Used Substance Use Topics  Alcohol use: No  
  Alcohol/week: 0.0 oz  
  
Current Facility-Administered Medications Medication Dose Route Frequency Provider Last Rate Last Dose  vancomycin (VANCOCIN) 1000 mg in  ml infusion  1,000 mg IntraVENous Q18H Lakeshia Reaves MD      
 0.9% sodium chloride infusion  75 mL/hr IntraVENous CONTINUOUS Rah ASHER, DO 75 mL/hr at 06/10/19 1058 75 mL/hr at 06/10/19 1058  piperacillin-tazobactam (ZOSYN) 4.5 g in 0.9% sodium chloride (MBP/ADV) 100 mL MBP  4.5 g IntraVENous Q6H Rah ASHER,  mL/hr at 06/10/19 1058 4.5 g at 06/10/19 1058  levoFLOXacin (LEVAQUIN) 750 mg in D5W IVPB  750 mg IntraVENous Q24H Rah ASHER, DO   Stopped at 06/09/19 1804  
 atorvastatin (LIPITOR) tablet 40 mg  40 mg Oral QHS Rah ASHER DO   40 mg at 06/09/19 2142  insulin glargine (LANTUS) injection 5 Units  5 Units SubCUTAneous DAILY Cecilio Coad, DO   5 Units at 06/10/19 0825  
 metoprolol tartrate (LOPRESSOR) tablet 50 mg  50 mg Oral Q12H Greenwich Osgood A, DO   50 mg at 06/10/19 0825  
 oxybutynin (DITROPAN) tablet 5 mg  5 mg Oral TID [place] Osgood A, DO   5 mg at 06/10/19 2758  therapeutic multivitamin (THERAGRAN) tablet 1 Tab  1 Tab Oral DAILY Jane Osgood A, DO   1 Tab at 06/10/19 0825  
 valsartan (DIOVAN) tablet 40 mg  40 mg Oral DAILY [place]wich Osgood A, DO      
 insulin lispro (HUMALOG) injection   SubCUTAneous AC&HS [place]wich Osgood A, DO   4 Units at 06/10/19 1106  
 glucose chewable tablet 16 g  4 Tab Oral PRN Cecilio Coad, DO      
 glucagon (GLUCAGEN) injection 1 mg  1 mg IntraMUSCular PRN Jane Osgood A, DO      
 dextrose (D50W) injection syrg 12.5-25 g  25-50 mL IntraVENous PRN [place]wich Osgood A, DO      
 sodium chloride (NS) flush 5-40 mL  5-40 mL IntraVENous Q8H Jane Osgood A, DO   10 mL at 06/10/19 0824  
 sodium chloride (NS) flush 5-40 mL  5-40 mL IntraVENous PRN Jane Osgood A, DO   10 mL at 06/09/19 1644  
 brimonidine (ALPHAGAN) 0.2 % ophthalmic solution 1 Drop  1 Drop Both Eyes BID Greenwich Osgood A, DO   1 Drop at 06/10/19 9056 And  timolol (TIMOPTIC) 0.25% ophthalmic solution  1 Drop Both Eyes BID Greenwich Osgood A, DO   1 Drop at 06/10/19 9613  latanoprost (XALATAN) 0.005 % ophthalmic solution 1 Drop  1 Drop Both Eyes QPM Jane Osgood A, DO   1 Drop at 06/09/19 1800  dorzolamide (TRUSOPT) 2 % ophthalmic solution 1 Drop  1 Drop Both Eyes TID Greenwich Osgood A, DO   1 Drop at 06/10/19 9066 Allergies Allergen Reactions  Iodinated Contrast- Oral And Iv Dye Hives Faint, shaking Review of Systems: A comprehensive review of systems was negative except for that written in the History of Present Illness. Objective:  
 
Vitals:  
 06/10/19 0400 06/10/19 0729 06/10/19 1105 06/10/19 1120 BP: 106/55 123/47 104/51 104/51 Pulse: 72 70 70 Resp: 18 18 18 Temp: 99.7 °F (37.6 °C) 97.4 °F (36.3 °C) 98 °F (36.7 °C) SpO2: 93% 96% 98% Weight:    66.2 kg (146 lb) Height:    6' 1\" (1.854 m) Physical Exam: 
Seen at bedside awake and alert. Inspected both feet. Feet are warm and adequate gross perfusion. Absent pedal pulses. Necrotic ulcers distal right hallux and lateral right distal fibula. Superficial, no pus or redness. No heel ulcers. Superficial ulcers left toes. Assessment:  
 
Hospital Problems  Date Reviewed: 6/10/2019 Codes Class Noted POA Hypoglycemia, exogenous hyperinsulinemia ([facility] ICD-10-CM: E15 
ICD-9-CM: 251.0  6/9/2019 Yes * (Principal) NSTEMI (non-ST elevated myocardial infarction) ([facility] ICD-10-CM: I21.4 ICD-9-CM: 410.70  6/9/2019 Yes Sepsis ([facility] ICD-10-CM: A41.9 ICD-9-CM: 038.9, 995.91  6/9/2019 Yes Controlled type 2 diabetes mellitus, with long-term current use of insulin ([facility]) (Chronic) ICD-10-CM: E11.9, Z79.4 ICD-9-CM: 250.00, V58.67  6/9/2019 Yes Plan:  
 
Peripheral artery disease. Ulcers with superficial necrosis. No signs infection. Discussed with wound care. No surgery at this time. Signed By: Harlan Robles DPM   
 Lucille 10, 2019

## 2019-06-10 NOTE — PROGRESS NOTES
Patient increasingly confused throughout shift. After dinner patient exhibits paraphrasia, fixating on topics, and recalling events that did not happen. Patient becoming increasingly agitated, and asking for help. When asking what he needs help with patient is unable to verbalize his needs. Spoke with Marquise garrison NP who ordered a CT of the head and PRN Ativan for increased agitation. Will continue to monitor.

## 2019-06-10 NOTE — PROGRESS NOTES
conducted an initial consultation and Spiritual Assessment for [facility], who is a 82 y. o.,male. Patients Primary Language is: [place]. According to the patients EMR Pentecostal Affiliation is: Akbar Guy.  
 
The reason the Patient came to the hospital is:  
Patient Active Problem List  
 Diagnosis Date Noted  Hypoglycemia, exogenous hyperinsulinemia ([address] 06/09/2019  
 NSTEMI (non-ST elevated myocardial infarction) ([address] 06/09/2019  Sepsis ([address] 06/09/2019  Controlled type 2 diabetes mellitus, with long-term current use of insulin ([address] 06/09/2019  Thrombocytopenia ([address] 05/13/2019  Anemia 05/13/2019  Moderate protein malnutrition ([address] 02/11/2019  Diabetic hyperosmolar non-ketotic state ([address] 02/10/2019  Acute UTI 02/10/2019  Pneumonia 02/10/2019  New onset a-fib ([address] 02/10/2019  Iron deficiency anemia secondary to inadequate dietary iron intake 01/23/2018  Monoclonal gammopathy 09/12/2017  Neutrophilic leukocytosis 28/49/8459  Thrombocytosis ([address] 08/29/2017  History of prostate cancer 02/13/2015  Urinary frequency 02/13/2015  Malignant neoplasm of prostate ([address] 11/15/2011  Malignant neoplasm of prostate ([address] The  provided the following Interventions: 
Patient was seen eating breakfast and needing help opening his milk cartoon. Initiated a relationship of care and support. Explored issues of nathalie, belief, spirituality and Voodoo/ritual needs while hospitalized. Listened empathically. Samina Middleton said I had heart attack but I know I did not. \"  
Patient was not oriented to time, place and date but oriented to self. Provided information about Spiritual Care Services. Offered assurance of continued prayers on patient's behalf. Chart reviewed. The following outcomes where achieved: 
Patient shared limited information about both his medical narrative and spiritual journey/beliefs.  confirmed Patient's [facility] Affiliation. Patient processed feeling about current hospitalization. Patient expressed gratitude for 's visit. Assessment: 
Patient denies any emotional concerns or spiritual needs at this time. Patient does not have any Latter-day/cultural needs that will affect patients preferences in health care. There are no spiritual or Latter-day issues which require intervention at this time. Plan: 
Chaplains will continue to follow and will provide pastoral care on an as needed/requested basis.  recommends bedside caregivers page  on duty if patient shows signs of acute spiritual or emotional distress. [address] Spiritual Care  
[phone]

## 2019-06-10 NOTE — PROGRESS NOTES
56-  Report received. 0100-  Beside skin assessment complete. Wound consult ordered. Pt on speciality bed. Wounds documented; refer to flow sheet. Pt alert to self and location. Pt disoriented to time and situation. No family present. Bed alarm activated. Pt in sight from nursing station. VSS. Will monitor. 0400-  Lactic acid lab ordered per sepsis protocol. Pt resting well. Pt does intermittently wake up yelling he has to pee. Pt reassured and condom catheter education reinforced. UOP ludmila and malodorous. 0500-  Echo to be completed today. 1966-  Pt to transfer to CVTSD once bed is available.

## 2019-06-10 NOTE — PROGRESS NOTES
Patient received severe sepsis bundle. Follow up lactic acid increased from 3 to 6, Hgb 7.2 (dilutional) requires close monitoring for arrhythmia and chf decompensation. He requires upgrade of care CVStepdown.  
 
Ami Fowler DO 
6/9/2019 8:37 PM

## 2019-06-11 NOTE — CDMP QUERY
Because there's conflicting documentation on the Type of bilat foot ulcers, (pressure vs vascular vs DM) Please clarify: 1. Please document cause or type of skin ulcer:  
         =>Pressure Ulcers bilat feet 
 =>  Non-pressure ulcer associated with: Atherosclerosis of lower extremities Chronic venous hypertension Diabetes Postphlebitic syndrome Postthrombotic syndrome Varicose veins Other (please specify)_____________________ Unknown »  Other (please specify)____________________ »  Clinically unable to determine »  Unknown The medical record reflects the following: 
   Risk Factors: 81 yo with sepsis, hx PAD and DM Clinical Indicators:  
H&P: \" Several ulcers over both feet\" POD:  \"Necrotic ulcers distal right hallux and lateral right distal fibula. Superficial, no pus or redness. No heel ulcers. Superficial ulcers left toes. Peripheral artery disease. Ulcers with superficial necrosis\" RN flow sheet:  \"R great toe and R outer ankle DM ulcers unstageable\" 6/10 PN:  \"Decub/foot ulcers\" Treatment: wound care, Pod consult Thank you. Bel Claudio RN BSN CCDS [phone]

## 2019-06-11 NOTE — PROGRESS NOTES
SUBJECTIVE: 
 
Feeling okay. Eating his breakfast.  No chest or abdominal pain. No nausea or vomiting. No SOB or cough. No obvious bleeding. OBJECTIVE: 
 
/63 (BP 1 Location: Left arm, BP Patient Position: At rest)   Pulse 63   Temp 98.4 °F (36.9 °C)   Resp 20   Ht 6' 1\" (1.854 m)   Wt 66.2 kg (146 lb)   SpO2 99%   BMI 19.26 kg/m² General appearance - alert, well appearing, and in no distress Eyes - + pallor Chest - Good air entry noted in bases, no wheezes Heart - S1 and S2 normal 
Abdomen - soft, nontender, nondistended, Bowel sounds present Neurological - alert, normal speech, no focal findings noted in both UEs Extremities - + pedal edema noted ASSESSMENT: 
 
1. GNR Sepsis. 2. GNR UTI 3. Hypoglycemia due to sepsis, resolved 4. Indeterminate elevation of troponin due to demand ischemia sec to sepsis 5. Paroxysmal atrial fibrillation, anticoagulation d/c as outpatient due to thrombocytopenia 6. Worsening Thrombocytopenia 7. Acute on chronic anemia 8. HTN 
9. Hx prostate cancer 10. Decub/foot ulcers 11. Macrocytosis 12. Hypokalemia 13. 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 Pressure Ulcers bilat feet, POA PLAN: 
 
Cont current management CT head report reviewed CT abdomen pelvis report pending Transfuse PRBCs Replace K Hematologist consulted - dr. Falguni Weber will ask his NP to see this patient soon to provide treatment plan Low dose Lantus and monitor Pending ID input D/w patient's POA over the phone BMP:  
Lab Results Component Value Date/Time  06/11/2019 06:28 AM  
 K 3.2 (L) 06/11/2019 06:28 AM  
  (H) 06/11/2019 06:28 AM  
 CO2 26 06/11/2019 06:28 AM  
 AGAP 6 06/11/2019 06:28 AM  
  (H) 06/11/2019 06:28 AM  
 BUN 28 (H) 06/11/2019 06:28 AM  
 CREA 1.07 06/11/2019 06:28 AM  
 GFRAA >60 06/11/2019 06:28 AM  
 GFRNA >60 06/11/2019 06:28 AM  
 
CBC:  
Lab Results Component Value Date/Time WBC 10.0 06/11/2019 06:28 AM  
 HGB 6.3 (L) 06/11/2019 06:28 AM  
 HCT 19.7 (L) 06/11/2019 06:28 AM  
 PLT 12 (LL) 06/11/2019 06:28 AM

## 2019-06-11 NOTE — ROUTINE PROCESS
Bedside and Verbal shift change report given to Caryle Spring, RN (oncoming nurse) by Beronica Bagley RN (offgoing nurse). Report included the following information SBAR, Kardex, Intake/Output, MAR and Cardiac Rhythm controlled Afib with PVCs.

## 2019-06-11 NOTE — PROGRESS NOTES
Discussed patient's fluctuation in mental status, and living situation with the daughter. Daughter has noticed increased confusion, occasional inability to form coherent sentences, staring at things \"as though trying to remember what to do with them. \" Pt only has an aid Mon-Fri for 8 hours per day, and has had multiple falls. Daughter would like to discuss SNF placement.

## 2019-06-11 NOTE — PROGRESS NOTES
PT orders received and chart was reviewed. Pt currently has a Hgb of 6.3, will hold evaluation at this time and continue to follow.   Akil Jean Baptiste, PT

## 2019-06-11 NOTE — PROGRESS NOTES
Palliative Medicine Consult DR. [facility]: [phone] [facility]: [phone] Palliative Medicine brief visit with Mr. Mehul Smart in room. He c/o being cold, extra blanket provided. Responded to verbal stimuli. Phone call to  Getachew Soares, daughter, [place] at [phone]. No answer, left generic voice message from hospital, no emergency noted, and requesting a return call to set up family meeting. Marisel Bower (POA) Daughter [phone] Genia Pathak RN, [facility] Palliative Medicine Inpatient RN  [facility] Line: [phone] ([phone] Return call from Jhonatan Allred from Work phone [phone]. She stated that Patient has a private pay, 12hr a day caregiver, Mon-Fri. This CG does not give any medications. She sees her father daily before and after work to give his medications. She has noticed a change in his behavior and lack of self care. His activity level has decreased, notes he is sleeping more and not engaging. He also has shown some memory issues, per [place]. [facility] stated that pt does not have Medicaid. He is  with 4 children. Jhonatan Allred and Getachew Soares are involved with the patient. Brennon Ly and Dagoberto rizo do not live locally. [facility] stated that she and her sister Rachel Bethea can meet at the hospital on Friday at [address] [place] [address] for family meeting. This writer requested that any advanced medical directive be brought to the meeting. Genia Pathak RN Palliative Medicine Inpatient RN  [facility] Palliative [place] Line: [phone] 
 
17:20 - Updated meeting time for family meeting. Friday, June 14, at 10:30 Magdy Garciaian will be on site and Kairitu Bethea will be phone conferenced into conversation.   
 
Genia Pathak RN

## 2019-06-11 NOTE — CDMP QUERY
Please specify type of myocardial infarction: 
    »   Non-ST elevation »   Type 2 MI (due to demand ischemia or secondary to ischemic imbalance) »   ST elevation (STEMI) (type 1) »       »   Other (please specify)_____________ »   Unknown »   Clinically unable to determine The medical record reflects the following: 
   Risk Factors: 81 yo with Dx sepsis and hypoglycemia Clinical Indicators: troponin 0.52, 0.45 ECG 6/9 Non-specific change in ST segment in Inferior leads ST now depressed in Anterior leads Nonspecific T wave abnormality now evident in Inferior leads Confirmed by Cassie Leiva ([id number]) on 6/10/2019 7:40:49 AM  
6/9 card consult:   He has had 2 elevated troponins at 0.52 and 0.45 which would appear to be secondary to supply demand mismatch in the setting of the above Treatment: card consult, echo ordered, D50, IV abx Thank you. Bel Claudio RN BSN CCDS [phone]

## 2019-06-11 NOTE — PROGRESS NOTES
OT order received and chart reviewed. Pt not seen for skilled OT as pt currently has low HGB of 6.3 g/dL. Will f/u when pt is medically appropriate to be seen and as pt's schedule allows.   
  
Thank you for this referral. 
Rodolfo Farias MS, OTR/L

## 2019-06-11 NOTE — CONSULTS
Hematology / Oncology Consult Note Reason for Consultation: 
Jenny Junior is a 82 y.o. male who I've been asked to consult for assistance with the management of the patients thrombocytopenia. Subjective: HPI:  Mr. 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 He has previously been on hydroxyurea for thrombocytosis. The patient was brought to the ED via EMR after being found on the bathroom floor at home. Caregiver thought the patient might have had high blood sugar attack and gave him his morning insulin. 911 arrived to find a blood sugar of 25 en route to the hospital. In the ED, the patient was found to be in atrial flutter. His troponin was positive at 0.52, magnesium is low at 1.5 and subsequently he was found to have thrombocytopenia with platelets of 91,102. He was admitted with NSTEMI to telemetry. On admission: WBC 13.4, Hgb 7.2, Hct 23.1, Platelet coutn 80,856 Socium 141, Potassium 3.5, Cl 107, BUN 19, Creatinine 0.80 Lactic Acid 3.9 CXR 6/9/19 IMPRESSION Impression:  Mild hyperexpansion. A component of vascular congestion suspected. Probable trace right pleural effusion. . 
  
Echo 6/10/19 · Left Ventricle: Estimated left ventricular ejection fraction is 56 - 60%. Visually measured ejection fraction. No regional wall motion abnormality noted. Today the patient states that he feels better, denies bleeding and  voices no c/o. Review of Systems:  
Constitutional: Negative for fever, chills, diaphoresis, activity change, appetite change and unexpected weight change. HENT: Negative for nosebleeds, congestion, facial swelling, mouth sores, trouble swallowing, neck pain, neck stiffness, voice change and postnasal drip. Eyes: Negative for photophobia, pain, discharge and itching. Respiratory: Negative for apnea, cough, choking, chest tightness, wheezing and stridor. Cardiovascular: Negative for chest pain, palpitations and leg swelling. Gastrointestinal: Negative for abdominal pain. Negative for nausea, diarrhea, constipation, blood in stool and rectal pain. Genitourinary: Negative for dysuria, urgency, hematuria, flank pain and difficulty urinating. Musculoskeletal: Negative for back pain, joint swelling, arthralgias and gait problem. Skin: Negative for color change, pallor, rash and wound. Neurological:  Negative for dizziness, facial asymmetry, speech difficulty, light-headedness and headaches. Hematological: Negative for adenopathy. Does not bruise/bleed easily. Psychiatric/Behavioral: Negative for hallucinations, confusion, disturbed wake/sleep cycle and agitation. Physical Assessment:  
 
Visit Vitals /63 (BP 1 Location: Left arm, BP Patient Position: At rest) Pulse 63 Temp 98.4 °F (36.9 °C) Resp 20 Ht 6' 1\" (1.854 m) Wt 66.2 kg (146 lb) SpO2 99% BMI 19.26 kg/m² Temp (24hrs), Av.5 °F (36.9 °C), Min:97.6 °F (36.4 °C), Max:99.8 °F (37.7 °C) Physical Exam: 
 
General: Appears comfortable, NAD. HEENT:  Anicteric sclerae; pink palpebral conjunctivae; mucosa moist 
Resp:  Symmetrical chest expansion, no accessory muscle use; good airway entry; no rales/ wheezing/ rhonchi noted CV:  S1, S2 present; regular rate and rhythm GI:  Abdomen soft, non-tender; (+) active bowel sounds Extremities:  +2 pulses on all extremities; BLE edema. Skin:  Warm; no rashes/ lesions noted Neurologic:  Non-focal 
 
 
 
 
Assessment: · Worsening Thrombocytopenia · Acute on chronic anemia · GNR Sepsis. · GNR UTI · Indeterminate elevation of troponin due to demand ischemia sec to sepsis · Paroxysmal atrial fibrillation, anticoagulation d/c as outpatient due to thrombocytopenia Plan:  
· Platelet count 65,961, we will order 1 unit of platelets and start IVIG every 24h X 3 doses as well as prednisone 30mg BID for now. · Hgb 6.3, 1 unit of PRBC to be administered today. I will order iron panel and ferritin today. · Continue current care Past Medical History:  
Diagnosis Date  Controlled type 2 diabetes mellitus, with long-term current use of insulin ([facility] 6/9/2019  Diabetes ([facility]  Hypertension  Hypoglycemia, exogenous hyperinsulinemia ([facility] 6/9/2019  Malignant neoplasm of prostate ([facility] 1998  Sepsis ([facility] 6/9/2019 Past Surgical History:  
Procedure Laterality Date  BIOPSY OF PROSTATE,INCISIONAL  4/98  IL EXT BEAM RADIATION AS PRIMARY THERAPY TO PROSTATE ONLY  4/30-6/29/98 Family History Problem Relation Age of Onset  Diabetes Other  Hypertension Other Allergies Allergen Reactions  Iodinated Contrast- Oral And Iv Dye Hives Faint, shaking Home Medications:  
Home Meds reviewed with patient Hospital Medications:  
Current hospital medications reviewed Labs: 
Recent Labs  
  06/11/19 
0628 06/10/19 
1611 06/10/19 
3400 06/09/19 
1612 WBC 10.0  --  13.8* 13.4*  
RBC 1.80*  --  2.00* 2.05* HCT 19.7* 22.8* 22.5* 23.1*  
.4*  --  112.5* 112.7*  
MCH 35.0*  --  35.0* 35.1*  
MCHC 32.0  --  31.1 31.2 RDW 27.1*  --  28.5* 28.6* Recent Labs  
  06/11/19 
1159 06/10/19 
1749 06/09/19 
9201 CO2 26 26 28 BUN 28* 25* 24* No results for input(s): ALBUMIN in the last 72 hours. No lab exists for component: [facility], [facility] Thank you for requesting us to consult on your patient. We appreciate the opportunity to participate in your patient's care. Please call if you have questions.  
 
 
Tha Vela NP

## 2019-06-11 NOTE — DIABETES MGMT
Diabetes Patient/Family Education Record Factors That  May Influence Patients Ability  to Learn or  Comply with Recommendations 
 []   Language barrier    []   Cultural needs   []   Motivation  
 []   Cognitive limitation    []   Physical   [x]   Education  
 []   Physiological factors   []   Hearing/vision/speaking impairment   []   Roman [employer] beliefs []   Financial factors   []  Other:   []  No factors identified at this time. Person Instructed: 
 []   Patient: not able to participate, sleeping   [x]   Family: Daughter, Thai Rdz, came from her lunch break. Received report that patient has caregivers at home and her father is able to convinced them to give him extra food and snack throughout the day. Thai Rdz reported that they keep diary of blood sugar at home. She noted the pattern of high blood sugar and that's how she discovered that her father has been eating throughout the day. []  Other Preference for Learning: 
 [x]   Verbal   []   Written   []  Demonstration Level of Comprehension & Competence:   
[x]  Good                                      [] Fair                                     []  Poor                             []  Needs Reinforcement  
[x]  Teachback completed Education Component:  
[x]  Medication management, including how to administer insulin (if appropriate) and potential medication interactions: Yes. Thai Rdz reported that patient's PCP recently increased the basal lantus insulin from 5 to 10 units daily which patient is getting at bedtime if her father's blood sugar is above 200. Since then she noticed her father having frequent low blood sugar in the 50's before breakfast. The patient is treated for low blood sugar but the PCP was not notified. Educated and encouraged Thai Rdz to notify patient's PCP as soon as possible for pattern of low blood sugar especially if they cannot explain it. Duke Nam also reported the patient's fast acting insulin, novolog, was changed from sliding scale before meals to 10 units twice daily before meals (breakfast and dinner). Educated Duke Nam about the following: 
Lantus insulin: long acting. Novolog insulin: fast acting. Patient must start eating within 15 minutes after receiving this insulin to help prevent low blood sugar. It is acceptable to wait after patient had eaten at least half of his meal before giving this insulin to ensure patient has adequate food intake. [x]  Nutritional management -obtain usual meal pattern: Yes. Duke Nam reported that her father is constantly eating throughout the day because her father is able to manipulate/convince the caregivers are often giving him extra food/snack. []  Exercise [x]  Signs, symptoms, and treatment of hyperglycemia and hypoglycemia: Yes. Emily verbalized understanding about action of lantus and novolog insulin. It is also very important to notify the patient's primary medical provider for pattern of low blood sugar to help determine if insulin dose need to be lowered. Contact the doctor as soon as possible to help prevent hypoglycemia. Duke Nam reported they know blood sugar below 70 is too low and verbalized correct treatment until blood sugar is above 70 [x] Prevention, recognition and treatment of hyperglycemia and hypoglycemia: Yes. [x]  Importance of blood glucose monitoring and how to obtain a blood glucose meter   
[]  Instruction on use of the blood glucose meter [x]  Discuss the importance of HbA1C monitoring: Yes. Patient's current A1c level is 5.4% (6/09/2019) which is equivalent to estimated average blood glucose of 108 mg/dL. Duke Nam reported that her father's blood sugar is often elevated after breakfast and that is why the novolog insulin was changed from sliding scale to 10 units twice daily before meals (breakfast and dinner). []  Sick day guidelines [x]  Proper use and disposal of lancets, needles, syringes or insulin pens (if appropriate): Yes. [x]  Potential long-term complications (retinopathy, kidney disease, neuropathy, foot care): Yes. [x] Information about whom to contact in case of emergency or for more information: Yes. [x]  Goal:  Patient/family will demonstrate understanding of Diabetes Self Management Skills by: 6/18/2019 Plan for post-discharge education or self-management support: 
  [x] Outpatient class schedule provided            [] Patient Declined 
  [] Scheduled for outpatient classes (date) _______ Verify: 
Does patient/daughter ([facility]) understand how diabetes medications work? Yes, after educating the daughter. Does patient/daughter ([facility]) know what their most recent A1c is? Yes. Does patient/daughter (Emily) monitor glucose at home? Yes. Does patient have difficulty obtaining diabetes medications and testing supplies? No. 
  
 
Joaquín Christensen RN Pager: [phone]

## 2019-06-11 NOTE — PROGRESS NOTES
[facility] - Progress Note Admit Date: 6/9/2019 Patient seen and examined independently. Adequate rate control in his atrial fibrillation with present cardiac Rx. Patient is not a candidate for anticoagulation secondary to thrombocytopenia and frail status. No further evaluation is planned at this time. Agree with assessment and plan as noted below. Rashmi Bell MD 
Assessment:  
 
Hospital Problems  Date Reviewed: 6/10/2019 Codes Class Noted POA Hypoglycemia, exogenous hyperinsulinemia ([facility] ICD-10-CM: E15 
ICD-9-CM: 251.0  6/9/2019 Yes * (Principal) NSTEMI (non-ST elevated myocardial infarction) ([facility] ICD-10-CM: I21.4 ICD-9-CM: 410.70  6/9/2019 Yes Sepsis ([facility] ICD-10-CM: A41.9 ICD-9-CM: 038.9, 995.91  6/9/2019 Yes Controlled type 2 diabetes mellitus, with long-term current use of insulin ([facility]) (Chronic) ICD-10-CM: E11.9, Z79.4 ICD-9-CM: 250.00, V58.67  6/9/2019 Yes  
   
  
 
 
-Sepsis, BCx x 2 6/9/19 positive for GNR, associated with UTI 
-Hypoglycemia in setting of insulin use 
-Flat indeterminate troponin 0.52 --> 0.45, likely demand ischemia in setting of above. Echocardiogram shows overall normal left ventricular systolic function; he does have mild to moderate mitral regurgitation 
-Paroxysmal atrial fibrillation, anticoagulation d/c as outpatient due to thrombocytopenia 
-Thrombocytopenia, Plt down to 21K on 6/10/19 
-Anemia, Hgb down to 7.0 6/10/19 
-HTN 
-DM, insulin-dependent 
-Hx prostate cancer Plan:  
 
Patient is continued on BB and statin. No antiplatelets or anticoagulants given anemia and thrombocytopenia. Troponin indeterminate and not consistent with ACS. Echo with normal LV function. Continue work-up and treatment of underlying illness. Will not pursue further cardiac evaluation in setting of acute illness with anemia and thrombocytopenia. Subjective: No CP. No SOB. Objective:  
  
Patient Vitals for the past 8 hrs: Temp Pulse Resp BP SpO2  
06/11/19 1101 98.1 °F (36.7 °C) 79 20 108/58 98 % 06/11/19 0725 98.4 °F (36.9 °C) 63 20 122/63 99 % 06/11/19 0430 99.8 °F (37.7 °C) 82 20 138/70 99 % Patient Vitals for the past 96 hrs: 
 Weight  
06/10/19 1120 66.2 kg (146 lb) 06/09/19 1422 66.2 kg (146 lb) Intake/Output Summary (Last 24 hours) at 6/11/2019 1129 Last data filed at 6/11/2019 1015 Gross per 24 hour Intake 830 ml Output 650 ml Net 180 ml Physical Exam: 
General:  alert, cooperative, no distress, appears stated age Neck:  no JVD Lungs:  clear to auscultation bilaterally Heart:  regular rate and rhythm Abdomen:  abdomen is soft without significant tenderness, masses, organomegaly or guarding Extremities:  extremities normal, atraumatic, no cyanosis 2+ 
edema Data Review:  
 
Labs: Results:  
   
Chemistry Recent Labs  
  06/11/19 
0393 06/10/19 
6523 06/09/19 
4178 * 108* 244*  142 139  
K 3.2* 3.4* 3.8 * 110* 105 CO2 26 26 28 BUN 28* 25* 24* CREA 1.07 0.96 1.03  
CA 7.5* 7.7* 7.8*  
MG 2.0 2.2 1.5* PHOS  --  2.4*  --   
AGAP 6 6 6 BUCR 26* 26* 23* AP  --  90 76 TP  --  6.4 6.9 ALB  --  1.7* 2.0*  
GLOB  --  4.7* 4.9* AGRAT  --  0.4* 0.4* CBC w/Diff Recent Labs  
  06/11/19 
0628 06/10/19 
1611 06/10/19 
3536 06/09/19 
1612 WBC 10.0  --  13.8* 13.4*  
RBC 1.80*  --  2.00* 2.05* HGB 6.3* 7.2* 7.0* 7.2* HCT 19.7* 22.8* 22.5* 23.1*  
PLT 12*  --  21* 24* GRANS 92*  --  96* 79* LYMPH 5*  --  1* 5* EOS 1  --  0 0 Cardiac Enzymes No results found for: CPK, CK, CKMMB, CKMB, RCK3, CKMBT, CKNDX, CKND1, STEPHENIE, TROPT, TROIQ, MIKE, TROPT, TNIPOC, BNP, BNPP Coagulation Recent Labs  
  06/09/19 
1612 06/09/19 
0945 APTT 102.5* 33.2 Lipid Panel Lab Results Component Value Date/Time  Cholesterol, total 81 04/26/2018 07:51 AM  
 HDL Cholesterol 30 (L) 04/26/2018 07:51 AM  
 LDL, calculated 38.2 04/26/2018 07:51 AM  
 VLDL, calculated 12.8 04/26/2018 07:51 AM  
 Triglyceride 64 04/26/2018 07:51 AM  
 CHOL/HDL Ratio 2.7 04/26/2018 07:51 AM  
  
BNP No results found for: BNP, BNPP, XBNPT Liver Enzymes Recent Labs  
  06/10/19 
6309 TP 6.4 ALB 1.7* AP 90 SGOT 71* Digoxin Thyroid Studies Lab Results Component Value Date/Time TSH 0.27 (L) 02/13/2019 05:10 AM  
    
 
Signed By: HARRY Lacey   
 June 11, 2019

## 2019-06-11 NOTE — DIABETES MGMT
GLYCEMIC CONTROL and DIABETES EDUCATION: See separate notes, 6/11/2019, for complete education notes. Patient's daughter, Zelalem Owens, reported the following home diabetes medications: 
 
Lantus insulin was recently increased from 5 to 10 units daily at bedtime. But she noticed pattern of low blood sugar before breakfast since the lantus dose was increased but did not notify the primary medical provider. I educated her and the importance of contacting the doctor for pattern of low blood sugar. Novolog insulin was changed from sliding scale to 10 units twice daily before meals (breakfast and dinner). Educated Zelalem Owens that this is a fast acting insulin therefore patient should start eating within 15 minutes after taking this insulin to help prevent low blood sugar. They can wait to give this insulin until patient had eaten at least half of his meal to help ensure adequate food. Zelalem Owens reported her father has caregivers at home during the day. They keep a diary of his blood sugar readings and she noted pattern of elevated readings. She discovered that her father was able to convince the caregivers to give him extra food plus snacks throughout the day. Tiffanie Villela RN Pager: [phone]

## 2019-06-11 NOTE — DIABETES MGMT
Glycemic Control Plan of Care 
 
T2DM with current A1c of 5.4% (6/09/2019). Pending assessment of home diabetes management and education via primary caregiver, Jerrica Oliva (daughter). I called her at [phone]. She agreed to come for education. POC BG range on 6/10/2019: 219-359 mg/dL. POC BG report on 6/11/2019 at time of review: 251 mg/dL. Noted basal lantus insulin dose decreased from 5 to 3 units daily. Patient cont on very resistant dose of correctional lispro insulin. Nursing reported that it is difficult to get a fasting blood sugar because patient eat very slow. Patient awake and stated that he ate some breakfast this morning. Current Meal Intake: 
Patient Vitals for the past 100 hrs: 
 % Diet Eaten 06/11/19 1015 50 % 06/10/19 1505 80 % 06/10/19 0943 100 % 06/10/19 0100 0 % 06/09/19 1808 25 % 06/09/19 1323 0 % Recommendation(s): 
1.) Consider resuming basal lantus insulin at 5 units daily. Discussed with Dr. Jaden Dietz and obtained order. Patient already received 3 units of lantus insulin this morning. Entered a one time additional dose of 2 units for today. 2.) Discuss insulin prior to discharge to help prevent further hypoglycemia. 3.) Educate daughter, Jerrica Oliva. She agreed to come and staff will page me when she arrive. Assessment: 
Patient is 82year old with past medical history including type 2 diabetes mellitus, NSTEMI, malignant neoplasm of prostate, thrombocytosis and atrial fibrillation - was admitted on 6/09/2019 with report that patient was found on the floor by a caregiver, low blood sugar and was given insulin. Noted: Hypoglycemia, resolved Exogenous hyperinsulinemia. Sepsis. UTI. Decub bilateral ft ulcer. Worsening thrombocytopenia. Paroxysmal atrial fibrillation. T2DM with current A1c of 5.4% (6/09/2019).  
 
Most recent blood glucose values: 
 
Results for [facility] (MRN [id number]) as of 6/11/2019 11:07 
 Ref. Range 6/10/2019 11:01 6/10/2019 17:23 6/10/2019 22:24 GLUCOSE,FAST - POC Latest Ref Range: 70 - 110 mg/dL 219 (H) 359 (H) 435 (HH) Results for [facility] (MRN [id number]) as of 6/11/2019 11:07 Ref. Range 6/11/2019 07:38 GLUCOSE,FAST - POC Latest Ref Range: 70 - 110 mg/dL 251 (H) Current A1C: 5.4% (6/09/2019) which is equivalent to estimated average blood glucose of 108 mg/dL during the past 2-3 months. Current hospital diabetes medications: 
Basal lantus insulin dose increased back from 3 to 5 units daily. Correctional lispro insulin ACHS. Very resistant dose. Total daily dose insulin requirement previous day: 6/10/2019 Lantus: 5 units Lispro: 31 units TDD insulin: 36 units Home diabetes medications: 
Lantus insulin 5 units daily. Novolog sliding scale insulin. Goals:  Blood glucose will be within target range of  mg/dL by 6/14/2019: 
 
Diet: Diabetic consistent carb regular. Education:  I called the patient's daughter, Angel Reyez (primary caregiver), at [phone] and offered to meet with her for diabetes education. She agreed. Encouraged staff to page me when she arrive. 
___  Refer to Diabetes Education Record 
___  Education not indicated at this time Sina Land RN Pager: [phone]

## 2019-06-11 NOTE — CONSULTS
Infectious Disease Consultation Note Requested by: Dr. Cheryl Johnson Reason: sepsis, gram negative bacteremia/bacteriuria Current abx Prior abx Pip/tazo, vancomycin since 6/9/19 Lines:  
 
 
Assessment : 
 
61-year-old male with h/o paroxysmal atrial fibrillation, type 2 DM, prostate cancer, Chronic neutrophilic leukocytosis,Iron Deficiency Anemia secondary to inadequate iron intake, thrombocytosis, and MGUS admitted to [facility] on 6/9/19 for unresponsiveness. Now with gram negative bacteremia, e.coli in urine cultures. Clinical picture c/w sepsis (POA) due to gram negative bloodstream infection, e.coli cystitis. Cons, diphtheroids with right ankle wound cultures likely colonizer. No clinical evidence of acute infection. Worsening thrombocytopenia: ?sepsis induced superimposed on chronic thrombocytopenia of unclear etiology. Recommend to obtain imaging studies to rule out obstructive uropathy Elevated troponin: likely demand ischemia: cardiology f/u appreciated Recommendations: 
 
1. D/c pip/tazo, vancomycin. Start ceftriaxone 2. Obtain ct abd/pelvis 3. Repeat blood cultures 4. F/u hematology recommendations for thrombocytopenia 5. F/u cardiology recommendations Advance Care planning: full code: discussed  with patient/surrogate decision maker: Elsie Javi: [phone] Thank you for consultation request. Above plan was discussed in details with patient, and dr. Jt Álvarez. Please call me if any further questions or concerns. Will continue to participate in the care of this patient. HPI: 
 
61-year-old male with h/o paroxysmal atrial fibrillation, type 2 DM, prostate cancer, Chronic neutrophilic leukocytosis,Iron Deficiency Anemia secondary to inadequate iron intake, thrombocytosis, and MGUS admitted to [facility] on 6/9/19 for unresponsiveness. Patient had recent hospitalization at [facility] in 2/2019 for paroxysmal a.fib. The patient was treated with Lopressor for rate control and discharged on apixaban. He does have a history of thrombocytopenia and his platelets had dropped to 40,000 at one time during his hospital stay. He has had a history of falls and he has had bleeding sacral ulcers for which he needed to be treated at the Emergency Room for. At his reevaluation, Dr. Andrew Lind did stop his Eliquis and also his hydroxyurea because he was actually thrombocytopenic. on 6/9/19, the patient was in the bathroom and was found by his family on the floor. Caregiver thought the patient might have had high blood sugar attack and gave him his morning insulin. 911 arrived to find a blood sugar of 25 en route to the hospital after giving an half amp of D50. They gave another half amp and as they arrived at the ER, the Glucometer read low. In the Emergency Room, the patient was found to be in atrial flutter. His troponin was positive at 0.52, magnesium is low at 1.5.  he did have thrombocytopenia with platelets of 37. He has WBCs of 15.8, H and H is 9.4 with 29.7 and he had 8 bands. he was noted to have leukocytosis, elevated lactate. Started on pip/tazo, vancomycin. Blood cultures are now positive for gnr. Urine cultures 6/9 reveal e.coli. I have been consulted for further recommendations. Patient denies dysuria, lower abdominal pain, flank pain throughout this time. He has chronic low back pain which has not changed recently. Doesn't recollect all the events which led to his hospitalization.  since admission, he has had worsening thrombocytopenia with platelet count of 53J today. Patient denies headaches, visual disturbances, sore throat, runny nose, earaches, cp, sob, chills, cough, abdominal pain, diarrhea, burning micturition, pain or weakness in extremities. He denies back pain/flank pain. He denies recent sick contacts. No h/o recent travel.  No known h/o MRSA colonization or infection in the past. 
 
 
 
 
 Past Medical History:  
Diagnosis Date  Controlled type 2 diabetes mellitus, with long-term current use of insulin ([facility] 6/9/2019  Diabetes ([facility]  Hypertension  Hypoglycemia, exogenous hyperinsulinemia ([facility] 6/9/2019  Malignant neoplasm of prostate ([facility] 1998  Sepsis ([facility] 6/9/2019 Past Surgical History:  
Procedure Laterality Date  BIOPSY OF PROSTATE,INCISIONAL  4/98  CO EXT BEAM RADIATION AS PRIMARY THERAPY TO PROSTATE ONLY  4/30-6/29/98  
 
 
home Medication List  
 Details NOVOLOG U-100 INSULIN ASPART 100 unit/mL injection INJECT AS DIRECTED PER SLIDING SCALE ( MAXM DOSE 15 UNITS /DAY) Refills: 0  
  
insulin glargine (LANTUS) 100 unit/mL injection 5 Units by SubCUTAneous route daily. Qty: 1 Vial, Refills: 0  
  
valsartan (DIOVAN) 40 mg tablet Take 1 Tab by mouth daily. Qty: 30 Tab, Refills: 6 Associated Diagnoses: Essential hypertension  
  
oxybutynin (DITROPAN) 5 mg tablet TAKE ONE TABLET BY MOUTH THREE TIMES DAILY Qty: 90 Tab, Refills: 8 Associated Diagnoses: Urinary frequency  
  
therapeutic multivitamin (THERAGRAN) tablet Take 1 Tab by mouth daily. Qty: 30 Tab, Refills: 0  
  
metoprolol tartrate (LOPRESSOR) 50 mg tablet Take 1 Tab by mouth every twelve (12) hours. Qty: 60 Tab, Refills: 0  
  
travoprost (TRAVATAN Z) 0.004 % ophthalmic solution Administer 1 Drop to both eyes two (2) times a day. Qty: 5 mL, Refills: 0  
  
brinzolamide (AZOPT) 1 % ophthalmic suspension Administer 1 Drop to both eyes two (2) times a day. Qty: 5 mL, Refills: 0  
  
brimonidine-timolol (COMBIGAN) 0.2-0.5 % drop ophthalmic solution Administer 1 Drop to both eyes every twelve (12) hours. Qty: 5 mL, Refills: 0  
  
calcium carbonate (CALCIUM 500) 500 mg calcium (1,250 mg) tablet Take 1 Tab by mouth daily. Qty: 90 Tab, Refills: 2 Associated Diagnoses: Malignant neoplasm of prostate ([facility] Monoclonal gammopathy atorvastatin (LIPITOR) 40 mg tablet Take 1 Tab by mouth nightly. Insulin Needles, Disposable, 31 gauge x 5/16\" ndle Use as needed. Qty: 1 Package, Refills: 11  
  
glucose 4 gram chewable tablet Take 4 Tabs by mouth as needed. Qty: 10 Tab, Refills: 0 Current Facility-Administered Medications Medication Dose Route Frequency  dextrose 10% infusion 125-250 mL  125-250 mL IntraVENous PRN  
 0.9% sodium chloride infusion 250 mL  250 mL IntraVENous PRN  potassium chloride (K-DUR, KLOR-CON) SR tablet 40 mEq  40 mEq Oral Q4H  
 insulin glargine (LANTUS) injection 3 Units  3 Units SubCUTAneous DAILY  vancomycin (VANCOCIN) 1000 mg in  ml infusion  1,000 mg IntraVENous Q18H  
 metoprolol tartrate (LOPRESSOR) tablet 25 mg  25 mg Oral Q12H  
 LORazepam (ATIVAN) injection 1 mg  1 mg IntraVENous Q6H PRN  
 melatonin tablet 3 mg  3 mg Oral QHS PRN  
 0.9% sodium chloride infusion  75 mL/hr IntraVENous CONTINUOUS  piperacillin-tazobactam (ZOSYN) 4.5 g in 0.9% sodium chloride (MBP/ADV) 100 mL MBP  4.5 g IntraVENous Q6H  
 levoFLOXacin (LEVAQUIN) 750 mg in D5W IVPB  750 mg IntraVENous Q24H  
 atorvastatin (LIPITOR) tablet 40 mg  40 mg Oral QHS  therapeutic multivitamin (THERAGRAN) tablet 1 Tab  1 Tab Oral DAILY  valsartan (DIOVAN) tablet 40 mg  40 mg Oral DAILY  insulin lispro (HUMALOG) injection   SubCUTAneous AC&HS  
 glucose chewable tablet 16 g  4 Tab Oral PRN  
 glucagon (GLUCAGEN) injection 1 mg  1 mg IntraMUSCular PRN  
 sodium chloride (NS) flush 5-40 mL  5-40 mL IntraVENous Q8H  
 sodium chloride (NS) flush 5-40 mL  5-40 mL IntraVENous PRN  
 brimonidine (ALPHAGAN) 0.2 % ophthalmic solution 1 Drop  1 Drop Both Eyes BID And  
 timolol (TIMOPTIC) 0.25% ophthalmic solution  1 Drop Both Eyes BID  latanoprost (XALATAN) 0.005 % ophthalmic solution 1 Drop  1 Drop Both Eyes QPM  
 dorzolamide (TRUSOPT) 2 % ophthalmic solution 1 Drop  1 Drop Both Eyes TID Allergies: Iodinated contrast- oral and iv dye Family History Problem Relation Age of Onset  Diabetes Other  Hypertension Other Social History Socioeconomic History  Marital status: SINGLE Spouse name: Not on file  Number of children: Not on file  Years of education: Not on file  Highest education level: Not on file Occupational History  Not on file Social Needs  Financial resource strain: Not on file  Food insecurity:  
  Worry: Not on file Inability: Not on file  Transportation needs:  
  Medical: Not on file Non-medical: Not on file Tobacco Use  Smoking status: Current Some Day Smoker Packs/day: 0.10 Years: 22.00 Pack years: 2.20 Types: Cigarettes  Smokeless tobacco: Never Used Substance and Sexual Activity  Alcohol use: No  
  Alcohol/week: 0.0 oz  Drug use: No  
 Sexual activity: Not on file Lifestyle  Physical activity:  
  Days per week: Not on file Minutes per session: Not on file  Stress: Not on file Relationships  Social connections:  
  Talks on phone: Not on file Gets together: Not on file Attends Adventism service: Not on file Active member of club or organization: Not on file Attends meetings of clubs or organizations: Not on file Relationship status: Not on file  Intimate partner violence:  
  Fear of current or ex partner: Not on file Emotionally abused: Not on file Physically abused: Not on file Forced sexual activity: Not on file Other Topics Concern  Not on file Social History Narrative ** Merged History Encounter ** Social History Tobacco Use Smoking Status Current Some Day Smoker  Packs/day: 0.10  Years: 22.00  
 Pack years: 2.20  Types: Cigarettes Smokeless Tobacco Never Used Temp (24hrs), Av.5 °F (36.9 °C), Min:97.6 °F (36.4 °C), Max:99.8 °F (37.7 °C) Visit Vitals /63 (BP 1 Location: Left arm, BP Patient Position: At rest) Pulse 63 Temp 98.4 °F (36.9 °C) Resp 20 Ht 6' 1\" (1.854 m) Wt 66.2 kg (146 lb) SpO2 99% BMI 19.26 kg/m² ROS: 12 point ROS obtained in details. Pertinent positives as mentioned in HPI,  
otherwise negative Physical Exam: 
 
GENERAL:  The patient is alert and cooperative and denies acute distress at this time. VITAL SIGNS: reviewed HEENT:  Conjunctivae are clear, no acute drainage, anicteric. Facial symmetry. Pharynx is clear. TMs are clear. NECK:  Supple, no bruit, no adenopathy. CHEST:  Heart rate is 80 and regular. LUNGS:  Decreased at the bases, clear anteriorly. The patient has a deformity at the sternoclavicular border on the right, appears to be a callused old injury. ABDOMEN:  Soft, bowel sounds positive, nontender, no bruit detected. EXTREMITIES:  Multiple purpura over both arms. Generalized muscle atrophy. Several purpura over legs. Right hallux with open ulceration, 2.5 cm. Right plantar fourth metatarsal with shallow ulceration posterior right ankle. +2 pitting edema both LE . Positive dorsalis pedis and pedal pulses. Sacral decubiti. Labs: Results:  
Chemistry Recent Labs  
  06/11/19 
9569 06/10/19 
9222 06/09/19 
2209 * 108* 244*  142 139  
K 3.2* 3.4* 3.8 * 110* 105 CO2 26 26 28 BUN 28* 25* 24* CREA 1.07 0.96 1.03  
CA 7.5* 7.7* 7.8* AGAP 6 6 6 BUCR 26* 26* 23* AP  --  90 76 TP  --  6.4 6.9 ALB  --  1.7* 2.0*  
GLOB  --  4.7* 4.9* AGRAT  --  0.4* 0.4* CBC w/Diff Recent Labs  
  06/11/19 
0628 06/10/19 
1611 06/10/19 
6063 06/09/19 
1612 WBC 10.0  --  13.8* 13.4*  
RBC 1.80*  --  2.00* 2.05* HGB 6.3* 7.2* 7.0* 7.2* HCT 19.7* 22.8* 22.5* 23.1*  
PLT 12*  --  21* 24* GRANS 92*  --  96* 79* LYMPH 5*  --  1* 5* EOS 1  --  0 0 Microbiology Recent Labs  
  06/09/19 
1615 06/09/19 
1612 06/09/19 
1510 06/09/19 
1146 CULT AEROBIC AND ANAEROBIC BOTTLES GRAM NEGATIVE RODS* AEROBIC BOTTLE GRAM NEGATIVE RODS* >100,000 COLONIES/mL GRAM NEGATIVE RODS* MANY DIPHTHEROIDS*  MODERATE STAPHYLOCOCCUS SPECIES, COAGULASE NEGATIVE*  MODERATE DIPHTHEROIDS*  FEW CANDIDA ALBICANS* RADIOLOGY: 
 
All available imaging studies/reports in [facility] for this admission were reviewed Dr. Johanne Love, Infectious Disease Specialist 
[phone] June 11, 2019 
9:10 AM

## 2019-06-12 PROBLEM — L89.159 PRESSURE INJURY OF SKIN OF SACRAL REGION: Status: ACTIVE | Noted: 2019-01-01

## 2019-06-12 PROBLEM — R53.81 DEBILITY: Status: ACTIVE | Noted: 2019-01-01

## 2019-06-12 PROBLEM — Z71.89 GOALS OF CARE, COUNSELING/DISCUSSION: Status: ACTIVE | Noted: 2019-01-01

## 2019-06-12 PROBLEM — R41.82 ALTERED MENTAL STATUS: Status: ACTIVE | Noted: 2019-01-01

## 2019-06-12 NOTE — ACP (ADVANCE CARE PLANNING)
Advance Care Planning (ACP) Note Date of ACP Conversation: 6/11/2019 Conversation requested by: Dr. Lamin Moctezuma Authorized Decision Maker (if patient is incapable of making informed decisions): This person is:  Primary Decision Maker: Marisel Bower (POA) - Daughter - [phone] Interventions Provided: Call to both of patient's daughters, Akiko Marx  and Bal Cohen on 6/11/19. Akiko Marx is MPOA and was able to e-mail this writer the copy of the [facility] Copies made for medical record for scanning into EMR. Family meeting arranged for Friday AT 10:30 am in patients room with daughter. Goals Of Care: At this time family wants current care for patient to continue. PM Team will explore code status with family. Plan will be to complete POST with family. CODE STATUS: FULL CODE 
 
 
Jenniffer Kamara RN, [facility] Palliative Medicine Inpatient RN DR. SHIPLEY [facility] Palliative [facility] Line: [phone]

## 2019-06-12 NOTE — DIABETES MGMT
Glycemic Control Plan of Care 
 
T2DM with current A1c of 5.4% (6/09/2019). See separate notes, 6/11/2019, for assessment of home diabetes management and education via primary caregiver, Renee Keane (daughter). Daughter reported patient has caregivers at home during the day and has been manipulating them to give him extra food and snacking throughout the day. Home diabetes medications as reported by daughter on 6/11/2019: 
Lantus insulin 10 units daily at bedtime. Meal time novolog insulin 10 units twice daily before meals: breakfast and dinner. POC BG range on 6/11/2019: 236-307 mg/dL. POC BG report on 6/12/2019 at time of review: 266, 411 with repeat of 394 mg/dL. Seen patient eating lunch and stated that is planning to eat most of it. Current Meal Intake: 
Patient Vitals for the past 100 hrs: 
 % Diet Eaten 06/12/19 0910 90 % 06/11/19 1613 50 % 06/11/19 1015 50 % 06/11/19 0800 50 % 06/10/19 1505 80 % 06/10/19 0943 100 % 06/10/19 0100 0 % 06/09/19 1808 25 % 06/09/19 1323 0 % Recommendation(s): 
1.) Called and notified Dr. Elizabeth Hayes of elevated BG readings. Obtained order to increase basal lantus dose to 10 units starting today, 6/12/2019, and add meal time lispro insulin 4 units TID AC. Discussed above with So Holman RN. Noted patient already received 5 units of lantus insulin this morning. Entered a one time additional dose of 5 units lantus for today. Assessment: 
Patient is 82year old with past medical history including type 2 diabetes mellitus, NSTEMI, malignant neoplasm of prostate, thrombocytosis and atrial fibrillation - was admitted on 6/09/2019 with report that patient was found on the floor by a caregiver, low blood sugar and was given insulin. Noted: Hypoglycemia, resolved Exogenous hyperinsulinemia. Sepsis. UTI. Decub bilateral ft ulcer. Worsening thrombocytopenia. Paroxysmal atrial fibrillation. T2DM with current A1c of 5.4% (6/09/2019). Most recent blood glucose values: 
 
Results for Ania Reagan (MRN [id number]) as of 6/12/2019 12:33 Ref. Range 6/11/2019 07:38 6/11/2019 11:04 6/11/2019 11:26 6/11/2019 15:47 6/11/2019 21:49 GLUCOSE,FAST - POC Latest Ref Range: 70 - 110 mg/dL 251 (H) 284 (H) 262 (H) 236 (H) 307 (H) Results for Ania Reagan (MRN [id number]) as of 6/12/2019 12:33 Ref. Range 6/12/2019 08:05 6/12/2019 11:40 6/12/2019 11:41 GLUCOSE,FAST - POC Latest Ref Range: 70 - 110 mg/dL 266 (H) 411 (HH) 394 (H) Current A1C: 5.4% (6/09/2019) which is equivalent to estimated average blood glucose of 108 mg/dL during the past 2-3 months. Current hospital diabetes medications: 
Basal lantus insulin dose increased back from 5 to 10 units daily. Meal time lispro insulin 4 units TID AC. Correctional lispro insulin ACHS. Very resistant dose. Total daily dose insulin requirement previous day: 6/11/2019 Lantus: 5 units Lispro: 36 units TDD insulin: 41 units Home diabetes medications: 
Lantus insulin 10 units daily. Meal time novolog insulin 10 units twice daily before meals: breakfast and dinner. Goals:  Blood glucose will be within target range of  mg/dL by 6/14/2019: 
 
Diet: Diabetic consistent carb soft solids; nutr suppl: glucerna shake with breakfast, lunch and dinne. Education:   
__X_  Refer to Diabetes Education Record: 6/11/2019 via daughter, Leo Cardoso (primary caregiver) 
___  Education not indicated at this time Danna Bullock RN Pager: [phone]

## 2019-06-12 NOTE — PROGRESS NOTES
OT order received and chart reviewed. Pt not seen for skilled OT as pt currently has low HGB of 6.6 g/dL. Will f/u when pt is medically appropriate to be seen and as pt's schedule allows.   
  
Thank you for this referral. 
Melissa Mendieta MS, OTR/L

## 2019-06-12 NOTE — PROGRESS NOTES
Hematology/Medical Oncology Progress Note Name: Heber Sanchez : 1935 MRN: [id number] Date: 2019 9:48 AM 
  
[x]I have reviewed the flowsheet and previous days notes. Events overnight reviewed and discussed with nursing staff. Vital signs and records reviewed. Mr. Nick Myrick is an 82-year-old -American man with a PMHx Prostate cancer, DM type 2, HTN A-Fib and  Chronic neutrophilic leukocytosis,Iron Deficiency Anemia secondary to inadequate iron intake, thrombocytosis, and MGUS . Admitted with NSTEMI to telemetry subsequently found to have thrombocytopenia for which we are following. Pt voices no c/o today, with exception of below. ROS: 
Constitutional:  Negative for fever, chills, diaphoresis, activity change, appetite change and unexpected weight change. HENT: Negative for nosebleeds, congestion, facial swelling, mouth sores, trouble swallowing, neck pain, neck stiffness, voice change and postnasal drip. Eyes: Negative for photophobia, pain, discharge and itching. Respiratory: Negative for apnea, cough, choking, chest tightness, wheezing and stridor. Cardiovascular: Negative for chest pain, palpitations and leg swelling. Gastrointestinal: Negative for abdominal pain. Negative for nausea, diarrhea, constipation, blood in stool and rectal pain. Genitourinary: Negative for dysuria, urgency, hematuria, flank pain and difficulty urinating. Musculoskeletal: Negative for back pain, joint swelling, arthralgias and gait problem. Skin: Negative for color change, pallor, rash and wound. Neurological: Positive for generalized weakness. Negative for dizziness, facial asymmetry, speech difficulty, light-headedness and headaches. Hematological: Negative for adenopathy. Does not bruise/bleed easily. Psychiatric/Behavioral: Negative for hallucinations. Vital Signs:   
Visit Vitals /85 Pulse 100 Temp 97.4 °F (36.3 °C) Resp 20  6' 1\" (1.854 m) Wt 69.9 kg (154 lb 1.6 oz) SpO2 91% BMI 20.33 kg/m² O2 Device: Room air Temp (24hrs), Av.4 °F (36.9 °C), Min:97.4 °F (36.3 °C), Max:100 °F (37.8 °C) Intake/Output:  
Last shift:      701 - 1900 In: 240 [P.O.:240] Out: - Last 3 shifts: 06/10 1901 -  0700 In: 2165.5 [P.O.:720; I.V.:853.8] Out: 1100 [Urine:1100] Intake/Output Summary (Last 24 hours) at 2019 3590 Last data filed at 2019 1029 Gross per 24 hour Intake 2165.45 ml Output 600 ml Net 1565.45 ml Physical Exam: 
General: Appears comfortable, NAD. HEENT:  Anicteric sclerae; pink palpebral conjunctivae; mucosa moist 
Resp:  Symmetrical chest expansion, no accessory muscle use; good airway entry; no rales/ wheezing/ rhonchi noted CV:  S1, S2 present; tachy rate and regular rhythm GI:  Abdomen soft, non-tender; (+) active bowel sounds Extremities:  +2 pulses on all extremities; BLE edema. Skin:  Warm; no rashes/ lesions noted Neurologic:  Non-focal. Oriented to person, place. DATA:  
Current Facility-Administered Medications Medication Dose Route Frequency  ferrous sulfate tablet 325 mg  1 Tab Oral DAILY WITH BREAKFAST  dextrose 10% infusion 125-250 mL  125-250 mL IntraVENous PRN  
 0.9% sodium chloride infusion 250 mL  250 mL IntraVENous PRN  predniSONE (DELTASONE) tablet 30 mg  30 mg Oral BID WITH MEALS  
 0.9% sodium chloride infusion 250 mL  250 mL IntraVENous PRN  
 insulin glargine (LANTUS) injection 5 Units  5 Units SubCUTAneous DAILY  cefTRIAXone (ROCEPHIN) 2 g in sterile water (preservative free) 20 mL IV syringe  2 g IntraVENous Q24H  
 acetaminophen (TYLENOL) tablet 650 mg  650 mg Oral Q6H PRN  
 metoprolol tartrate (LOPRESSOR) tablet 25 mg  25 mg Oral Q12H  
 melatonin tablet 3 mg  3 mg Oral QHS PRN  
 0.9% sodium chloride infusion  50 mL/hr IntraVENous CONTINUOUS  
 atorvastatin (LIPITOR) tablet 40 mg  40 mg Oral QHS  therapeutic multivitamin (THERAGRAN) tablet 1 Tab  1 Tab Oral DAILY  valsartan (DIOVAN) tablet 40 mg  40 mg Oral DAILY  insulin lispro (HUMALOG) injection   SubCUTAneous AC&HS  
 glucose chewable tablet 16 g  4 Tab Oral PRN  
 glucagon (GLUCAGEN) injection 1 mg  1 mg IntraMUSCular PRN  
 sodium chloride (NS) flush 5-40 mL  5-40 mL IntraVENous Q8H  
 sodium chloride (NS) flush 5-40 mL  5-40 mL IntraVENous PRN  
 brimonidine (ALPHAGAN) 0.2 % ophthalmic solution 1 Drop  1 Drop Both Eyes BID And  
 timolol (TIMOPTIC) 0.25% ophthalmic solution  1 Drop Both Eyes BID  latanoprost (XALATAN) 0.005 % ophthalmic solution 1 Drop  1 Drop Both Eyes QPM  
 dorzolamide (TRUSOPT) 2 % ophthalmic solution 1 Drop  1 Drop Both Eyes TID Labs: 
Recent Labs  
  06/12/19 
0910 06/12/19 
0040 06/11/19 
8493 WBC 8.8 7.8 10.0 HGB 7.8* 6.6* 6.3* HCT 24.5* 20.4* 19.7* PLT 35* 32* 12* Recent Labs  
  06/12/19 
0040 06/11/19 
9697 06/10/19 
2932  143 142  
K 4.0 3.2* 3.4*  
* 111* 110* CO2 23 26 26 * 231* 108* BUN 28* 28* 25* CREA 1.00 1.07 0.96  
CA 7.3* 7.5* 7.7* MG  --  2.0 2.2 PHOS  --   --  2.4* ALB  --   --  1.7* SGOT  --   --  71* ALT  --   --  40 No results for input(s): PH, PCO2, PO2, HCO3, FIO2 in the last 72 hours. IMPRESSION:  
· Worsening Thrombocytopenia · Acute on chronic anemia · GNR Sepsis. · GNR UTI · Indeterminate elevation of troponin due to demand ischemia sec to sepsis · Paroxysmal atrial fibrillation, anticoagulation d/c as outpatient due to thrombocytopenia 
  
  
 
· PLAN:  
· Platelet count improved today at 35,000,   Continue IVIG every 24h X 3 doses as well as prednisone 30mg BID for now. · Hgb 7.8 today. Ferritin is 942, Iron 25, TIBC 118, iron percent saturation 21 
· Continue current care · The patient is: [x] acutely ill Risk of deterioration: [] moderate  
 [] critically ill  [] high My assessment/plan was discussed with: 
[x]nursing []PT/OT   
[]respiratory therapy [x]Dr.Lloyd Demetria Proctor MD  
  
[]family [] Mel Jacobsen NP

## 2019-06-12 NOTE — PROGRESS NOTES
SUBJECTIVE: 
 
Feeling okay. Eating his breakfast.  No chest or abdominal pain. No nausea or vomiting. No SOB or cough. States he did not know if he has heart problems. OBJECTIVE: 
 
/85   Pulse 100   Temp 97.4 °F (36.3 °C)   Resp 20   Ht 6' 1\" (1.854 m)   Wt 69.9 kg (154 lb 1.6 oz)   SpO2 91%   BMI 20.33 kg/m² General appearance - alert, well appearing, and in no distress Chest - Good air entry noted in bases, no wheezes Heart - S1 and S2 normal 
Abdomen - soft, nondistended, Bowel sounds present Neurological - alert, normal speech, no focal findings noted in both UEs Extremities - + pedal edema noted ASSESSMENT: 
 
1. E coli Sepsis. 2. E coli UTI - emphysematous UTI 3. Hypoglycemia due to sepsis, resolved 4. Indeterminate elevation of troponin due to demand ischemia sec to sepsis 5. Paroxysmal atrial fibrillation, anticoagulation d/c as outpatient due to thrombocytopenia 6. Thrombocytopenia s/p platelet transfusion 7. Acute on chronic anemia s/p PRBCs 8. HTN 
9. Hx prostate cancer 10. Decub/foot ulcers 11. Macrocytosis 12. Hypokalemia, better 13. 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 Pressure Ulcers bilat feet, POA 15. Cognitive impairment PLAN: 
 
Cont current management CT abdomen pelvis report reviewed Dr. Daniel Montgomery to follow for anemia D/w ID - cont antibiotic Will order HIDA scan for abnormal GB appearance on CT  
 
BMP:  
Lab Results Component Value Date/Time  06/12/2019 12:40 AM  
 K 4.0 06/12/2019 12:40 AM  
  (H) 06/12/2019 12:40 AM  
 CO2 23 06/12/2019 12:40 AM  
 AGAP 5 06/12/2019 12:40 AM  
  (H) 06/12/2019 12:40 AM  
 BUN 28 (H) 06/12/2019 12:40 AM  
 CREA 1.00 06/12/2019 12:40 AM  
 GFRAA >60 06/12/2019 12:40 AM  
 GFRNA >60 06/12/2019 12:40 AM  
 
CBC:  
Lab Results Component Value Date/Time  WBC 8.8 06/12/2019 09:10 AM  
 HGB 7.8 (L) 06/12/2019 09:10 AM  
 HCT 24.5 (L) 06/12/2019 09:10 AM  
 PLT 35 (L) 06/12/2019 09:10 AM

## 2019-06-12 NOTE — PROGRESS NOTES
Problem: Mobility Impaired (Adult and Pediatric) Goal: *Acute Goals and Plan of Care (Insert Text) Description Physical Therapy Goals Initiated 6/12/2019 and to be accomplished within 7 day(s) 1. Patient will move from supine to sit and sit to supine  and roll side to side in bed with minimal assistance/contact guard assist.    
2.  Patient will transfer from bed to chair and chair to bed with minimal assistance/contact guard assist using the least restrictive device. 3.  Patient will perform sit to stand with minimal assistance/contact guard assist. 
4.  Patient will ambulate with minimal assistance/contact guard assist for 25 feet with the least restrictive device. To prepare pt for mobility at next level of care PLOF:  Pt lives with his daughters in a 1 story home with 3 steps to enter. Pt reports that he was able to walk household distances with a walker and someone available to assist.  He states that he also has a w/c and does use that for mobility at times. Outcome: Progressing Towards Goal 
PHYSICAL THERAPY EVALUATION Patient: Paul Vega (89 y.o. male) Date: 6/12/2019 Primary Diagnosis: NSTEMI (non-ST elevated myocardial infarction) ([address] [I21.4] Hypoglycemia [E16.2] NSTEMI (non-ST elevated myocardial infarction) ([address] [I21.4] Hypoglycemia [E16.2] Precautions:   Skin PLOF: See goals section above ASSESSMENT : 
Based on the objective data described below, the patient presents with decreased bilateral LE strength, decreased functional activity tolerance and dependence on caregivers for all functional mobility following admission for NSTEMI and hypoglycemia. Pt was cleared by nursing to work with therapy. Pt was received semi-reclined in bed, agreeable to participate in PT . Prevlon boots were removed and pt's LE strength/ROM was assessed and pt is unable to raise LEs against gravity.   Pt could follow commands and was oriented to person and place but not time. During assessment, CNA arrived to check blood glucose and it was 394. Pt was assisted to roll bilaterally in bed while linens were straightened and wedge was repositioned. Pt rolled to the L with max A using bedrailing and to the R with max A and bedrailing, but other functional mobility was deferred due to blood glucose being > 300. Nurse notified. Prevlon boots donned. Pt requested an extra blanket and was given one. At conclusion of session, pt was left resting comfortably in bed, needs met, call bell in reach, nurse notified. Patient will benefit from skilled intervention to address the above impairments. Patient's rehabilitation potential is considered to be Good Factors which may influence rehabilitation potential include:  
? None noted ? Mental ability/status ? Medical condition ? Home/family situation and support systems ? Safety awareness 
? Pain tolerance/management 
? Other: PLAN : 
Recommendations and Planned Interventions:  
?           Bed Mobility Training             ? Neuromuscular Re-Education ? Transfer Training                   ? Orthotic/Prosthetic Training 
? Gait Training                          ? Modalities ? Therapeutic Exercises           ? Edema Management/Control ? Therapeutic Activities            ? Family Training/Education ? Patient Education ? Other (comment): Frequency/Duration: Patient will be followed by physical therapy 1-2 times per day to address goals. Discharge Recommendations: Elliot Garcia Further Equipment Recommendations for Discharge: N/A  
 
SUBJECTIVE:  
Patient stated ? I'm cold natured. ? OBJECTIVE DATA SUMMARY:  
 
Past Medical History:  
Diagnosis Date  Controlled type 2 diabetes mellitus, with long-term current use of insulin ([facility] 6/9/2019 Diabetes ([facility] Hypertension Hypoglycemia, exogenous hyperinsulinemia ([facility] 6/9/2019 Malignant neoplasm of prostate ([facility] [address] 1998 [facility]epsis ([facility] 6/9/2019 Past Surgical History:  
Procedure Laterality Date BIOPSY OF PROSTATE,INCISIONAL  4/98 [place] EXT BEAM RADIATION AS PRIMARY THERAPY TO PROSTATE ONLY  4/30-6/29/98 Barriers to Learning/Limitations: yes;  cognitive Compensate with: Visual Cues, Verbal Cues, Tactile Cues and Kinesthetic Cues Home Situation: 
Home Situation Home Environment: Private residence # Steps to Enter: 3 Rails to Enter: Yes Hand Rails : Bilateral 
One/Two Story Residence: One story Living Alone: No 
Support Systems: Child(monroe) Patient Expects to be Discharged to[de-identified] Private residence Current DME Used/Available at Home: Walker, rolling, Wheelchair Critical Behavior: 
Neurologic State: Alert Orientation Level: Disoriented to time;Oriented to person;Oriented to place Cognition: Follows commands Psychosocial 
Patient Behaviors: Calm; Cooperative Family  Behaviors: Calm; Cooperative Purposeful Interaction: Yes Pt Identified Daily Priority: Clinical issues (comment); Family issues (comment) Caritas Process: Nurture loving kindness;Establish trust;Teaching/learning; Attend basic human needs; Open life/death unknowns; Supportive expression Caring Interventions: Reassure Reassure: Informing; Acceptance Therapeutic Modalities: Humor; Intentional therapeutic touch Other Caring Modalities: hourly rounds Skin Condition/Temp: Dry;Warm Family  Behaviors: Calm; Cooperative Skin Integrity: Wound (add Wound LDA) Skin Integumentary Skin Color: Ecchymosis (comment)(BUE) Skin Condition/Temp: Dry;Warm 
Skin Integrity: Wound (add Wound LDA) Strength:   
Strength: Grossly decreased, non-functional 
 
Tone & Sensation:  
Tone: Normal 
 
Sensation: Intact Range Of Motion: 
AROM: Generally decreased, functional 
 
PROM: Within functional limits Functional Mobility: 
Bed Mobility: 
Rolling: Maximum assistance Pain: 
Pain level pre-treatment: /10 Pain level post-treatment: /10 Pain Intervention(s) : Medication (see MAR); Rest, Ice, Repositioning Response to intervention: Nurse notified, See doc flow Activity Tolerance:  
Fair Please refer to the flowsheet for vital signs taken during this treatment. After treatment:  
?         Patient left in no apparent distress sitting up in chair ? Patient left in no apparent distress in bed 
? Call bell left within reach ? Nursing notified ? Caregiver present ? Bed alarm activated ? SCDs applied COMMUNICATION/EDUCATION:  
?         Role of Physical Therapy in the acute care setting. ?         Fall prevention education was provided and the patient/caregiver indicated understanding. ? Patient/family have participated as able in goal setting and plan of care. ?         Patient/family agree to work toward stated goals and plan of care. ?         Patient understands intent and goals of therapy, but is neutral about his/her participation. ? Patient is unable to participate in goal setting/plan of care: ongoing with therapy staff. ?         Other: Thank you for this referral. 
Anson Chacko, PT Time Calculation: 15 mins Eval Complexity: History: LOW Complexity : Zero comorbidities / personal factors that will impact the outcome / POCExam:LOW Complexity : 1-2 Standardized tests and measures addressing body structure, function, activity limitation and / or participation in recreation  Presentation: LOW Complexity : Stable, uncomplicated  Clinical Decision Making:Low Complexity    Overall Complexity:LOW

## 2019-06-12 NOTE — PROGRESS NOTES
PT order received and chart reviewed. Pt not seen for skilled PT as pt currently has low HGB of 6.6 g/dL. Will f/u when pt is medically appropriate to be seen and as pt's schedule allows.   
 
 
Thank you for this referral. 
 
Darwin Rodriguez, PT, DPT

## 2019-06-12 NOTE — PROGRESS NOTES
Discharge planning/Family Meeting: There is a family meeting scheduled for Friday, June 14, 2019; with Palliative Care. This writer plans to attend the family meeting to assist with discharge planning. This writer will continue to closely monitor for discharge planning to ensure a safe discharge from [facility]. Lainey Lua. MSW Care Manager Pager#: [phone]

## 2019-06-12 NOTE — PROGRESS NOTES
Problem: Dysphagia (Adult) Goal: *Acute Goals and Plan of Care (Insert Text) Description Recommendations: 
Diet: Soft solids/thin liquids Meds: Whole in puree Aspiration Precautions Oral Care TID Goals:  Patient will: 1. Tolerate PO trials with 0 s/s overt distress in 4/5 trials 2. Utilize compensatory swallow strategies/maneuvers (decrease bite/sip, size/rate, alt. liq/sol) with min cues in 4/5 trials 3. Perform oral-motor/laryngeal exercises to increase oropharyngeal swallow function with min cues 4. Complete an objective swallow study (i.e., MBSS) to assess swallow integrity, r/o aspiration, and determine of safest LRD, min A Outcome: Progressing Towards Goal 
 
 
SPEECH LANGUAGE PATHOLOGY BEDSIDE SWALLOW  
EVALUATION & TREATMENT Patient: Tang Flores (55 y.o. male) Date: 6/12/2019 Primary Diagnosis: NSTEMI (non-ST elevated myocardial infarction) ([facility] [I21.4] Hypoglycemia [E16.2] NSTEMI (non-ST elevated myocardial infarction) ([facility] [I21.4] Hypoglycemia [E16.2] Precautions: Aspiration  Skin PLOF: Independent ASSESSMENT : 
Based on the objective data described below, the patient presents with mild oropharyngeal dysphagia. RN reports difficulty with pills. Pt somewhat lethargic, able to maintain alertness enough for PO intake with min cues. Oral-motor exam revealed pt with upper and lower dentures, all other structures grossly intact for mastication and deglutition. Observed self-feeding thin liquid + straw, puree and solid; x1 trial each. Oral bolus manipulation mildly delayed with solids. Delayed/effortful swallow initiation and multiple audible swallows noted with all trials. 0 overt s/sx of aspiration across all trials. Pt reported increasing fatigue, refused all further trials. Recommend soft solid, thin liquid diet with aspiration precautions. Pills should be presented whole in puree or as tolerated.  Patient may benefit from MBSS if silent aspiration is a concern. D/w RN, oMy Larry. TREATMENT : 
Skilled therapy initiated; Educated pt on aspiration precautions and importance of compensatory swallow techniques to decrease aspiration risk (decrease rate of intake & sip/bite size, upright @HOB for all po intake and ~30 minutes after po); verbalized comprehension. SLP to follow as indicated. Patient will benefit from skilled intervention to address the above impairments. Patient's rehabilitation potential is considered to be Fair Factors which may influence rehabilitation potential include: ? None noted ? Mental ability/status ? Medical condition ? Home/family situation and support systems ? Safety awareness ? Pain tolerance/management ? Other: PLAN : 
Recommendations and Planned Interventions: 
Soft/thin Frequency/Duration: Patient will be followed by speech-language pathology 1-2 times per day/4-7 days per week to address goals. Discharge Recommendations: To Be Determined SUBJECTIVE:  
Patient stated ? I'm tired? . 
 
OBJECTIVE:  
 
Past Medical History:  
Diagnosis Date Controlled type 2 diabetes mellitus, with long-term current use of insulin ([address] 6/9/2019 Diabetes ([address] Hypertension Hypoglycemia, exogenous hyperinsulinemia ([address] 6/9/2019 Malignant neoplasm of prostate ([address] 1998 Sepsis ([address] 6/9/2019 Past Surgical History:  
Procedure Laterality Date BIOPSY OF PROSTATE,INCISIONAL  4/98 [place] EXT BEAM RADIATION AS PRIMARY THERAPY TO PROSTATE ONLY  4/30-6/29/98 Home Situation:  
Home Situation Home Environment: Private residence # Steps to Enter: 3 Rails to Enter: Yes Hand Rails : Bilateral 
One/Two Story Residence: One story Living Alone: No 
Support Systems: Child(monroe) Patient Expects to be Discharged to[de-identified] Private residence Current DME Used/Available at Home: Walker, rolling, Wheelchair Diet prior to admission: Regular/thin Current Diet:  Soft/thin  
Cognitive and Communication Status: 
Neurologic State: Alert Orientation Level: Oriented to person, Oriented to place, Oriented to situation, Disoriented to time Cognition: Follows commands, Decreased attention/concentration Perception: Appears intact Perseveration: No perseveration noted Safety/Judgement: Awareness of environment Oral Assessment: 
Oral Assessment Labial: Decreased rate Dentition: Upper & lower dentures Oral Hygiene: [address] Lingual: Decreased strength Velum: No impairment Mandible: No impairment P.O. Trials: 
Patient Position: OXG 81 Vocal quality prior to P.O.: No impairment Consistency Presented: Thin liquid;Puree; Solid How Presented: Self-fed/presented Bolus Acceptance: No impairment Bolus Formation/Control: Impaired Type of Impairment: Delayed;Mastication Propulsion: Delayed (# of seconds) Oral Residue: None Initiation of Swallow: Delayed (# of seconds) Laryngeal Elevation: Functional 
Aspiration Signs/Symptoms: Infiltrate on chest xray Pharyngeal Phase Characteristics: Audible swallow;Easily fatigued ; Poor endurance Effective Modifications: Alternate liquids/solids;Small sips and bites Cues for Modifications: Moderate Oral Phase Severity: Mild Pharyngeal Phase Severity : Mild PAIN: 
Pain level pre-treatment: 0/10 Pain level post-treatment: 0/10 After treatment:  
?            Patient left in no apparent distress sitting up in chair ? Patient left in no apparent distress in bed ? Call bell left within reach ? Nursing notified ? Family present ? Caregiver present ? Bed alarm activated COMMUNICATION/EDUCATION:  
?            Aspiration precautions; swallow safety; compensatory techniques. ?            Patient/family have participated as able in goal setting and plan of care. ?            Patient/family agree to work toward stated goals and plan of care. ?            Patient understands intent and goals of therapy; neutral about participation. ? Patient unable to participate in goal setting/plan of care; educ ongoing with interdisciplinary staff ? Posted safety precautions in patient's room. Thank you for this referral. 
ERMIAS Bradley Time Calculation: 16 mins Evaluation Time: 8 minutes Treatment Time: 8 minutes

## 2019-06-12 NOTE — PROGRESS NOTES
Problem: Self Care Deficits Care Plan (Adult) Goal: *Acute Goals and Plan of Care (Insert Text) Description Occupational Therapy Goals Initiated 6/12/2019 within 7 day(s). 1.  Patient will perform upper body dressing with supervision/set-up 2. Patient will perform lower body dressing with minimal assistance/contact guard assist. 
3.  Patient will perform toileting with minimal assistance/contact guard assist. 
4.  Patient will perform toilet transfers with minimal assistance/contact guard assist. 
5.  Patient will participate in upper extremity therapeutic exercise/activities with supervision/set-up for 8 minutes. Prior Level of Function: Pt reports he was (I) with basic self-care/ADLs PTA. He ambulated with a RW for functional mobility. Outcome: Progressing Towards Goal 
 OCCUPATIONAL THERAPY EVALUATION Patient: Grisel Hill (38 y.o. male) Date: 6/12/2019 Primary Diagnosis: NSTEMI (non-ST elevated myocardial infarction) ([facility] [I21.4] Hypoglycemia [E16.2] NSTEMI (non-ST elevated myocardial infarction) ([facility] [I21.4] Hypoglycemia [E16.2] Precautions:  Skin ASSESSMENT : 
Upon entering room, pt supine, alert, and agreeable to therapy session. Pt seen in conjunction with PT to increase pt participation and to maximize safety of patient and staff members. Based on the objective data described below, the patient presents with decreased strength, decreased endurance, decreased functional balance, and decreased functional mobility, and confusion affecting his performance in basic self-care/ADL tasks. Pt only oriented to person, place, but not time during time of evaluation. Pt presents BUEs AROM and strength are generally decreased, but functional. CNA was present to take pt's vitals with BP reading at 160/83 and blood sugar at 394. D/t pt's blood glucose levels, did not perform functional balance or toilet transfers at this time.   Pt was able to perform rolling with max assist for therapist to re-adjust pt's wedges and bed sheets. Educated pt on the role of OT, evaluation process, and goals for therapy with pt demonstrating fair understanding. The pt will benefit from further OT services, in order to maximize his ADL performance and decrease the risk for complications associated with decreased functional activity. At the end of the session, pt left resting comfortably in bed, call-bell in reach, with all needs met. Patient will benefit from skilled intervention to address the above impairments. Patient's rehabilitation potential is considered to be Good Factors which may influence rehabilitation potential include: ? None noted ? Mental ability/status ? Medical condition ? Home/family situation and support systems ? Safety awareness ? Pain tolerance/management ? Other: PLAN : 
Recommendations and Planned Interventions:  
?               Self Care Training                  ? Therapeutic Activities ? Functional Mobility Training   ? Cognitive Retraining 
? Therapeutic Exercises           ? Endurance Activities ? Balance Training                    ? Neuromuscular Re-Education ? Visual/Perceptual Training     ? Home Safety Training 
? Patient Education                   ? Family Training/Education ? Other (comment): Frequency/Duration: Patient will be followed by occupational therapy 1-2 times per day/4-7 days per week to address goals. Discharge Recommendations: Home Health vs SNF pending pt's progress in therapy Further Equipment Recommendations for Discharge: [facility] and Shower chair to decrease the risk of falls SUBJECTIVE:  
Patient stated ? I can do that myself? OBJECTIVE DATA SUMMARY:  
 
Past Medical History:  
Diagnosis Date Controlled type 2 diabetes mellitus, with long-term current use of insulin ([facility] [address] 6/9/2019 Diabetes ([facility] [address] Hypertension Hypoglycemia, exogenous hyperinsulinemia ([facility] [address] 6/9/2019 Malignant neoplasm of prostate ([facility] [address] 1998 Sepsis ([facility] [address] 6/9/2019 Past Surgical History:  
Procedure Laterality Date BIOPSY OF PROSTATE,INCISIONAL  4/98 [place] EXT BEAM RADIATION AS PRIMARY THERAPY TO PROSTATE ONLY  4/30-6/29/98 Barriers to Learning/Limitations: yes;  altered mental status (i.e.Sedation, Confusion) Compensate with: visual, verbal, tactile, kinesthetic cues/model Home Situation:  
Home Situation Home Environment: Private residence # Steps to Enter: 3 Rails to Enter: Yes Hand Rails : Bilateral 
One/Two Story Residence: One story Living Alone: No 
Support Systems: Child(monroe) Patient Expects to be Discharged to[de-identified] Private residence Current DME Used/Available at Home: Walker, rolling, Wheelchair ? Right hand dominant   ? Left hand dominant Cognitive/Behavioral Status: 
Neurologic State: Alert Orientation Level: Disoriented to time;Oriented to person;Oriented to place Cognition: Follows commands Safety/Judgement: Fall prevention Skin: Noted bruising distal of R forearm and L forearm. Edema: None noted Coordination: BUE Coordination: Generally decreased, functional 
Fine Motor Skills-Upper: Left Intact; Right Intact Gross Motor Skills-Upper: Left Intact; Right Intact Balance: 
Not assessed at this time d/t pt's BG levels Strength: BUE Strength: Generally decreased, functional 
 
Tone & Sensation: BUE Tone: Normal 
Sensation: Intact Range of Motion: BUE 
AROM: Within functional limitsPROM: Within functional limits Functional Mobility and Transfers for ADLs: 
Bed Mobility: 
Rolling: Maximum assistance Transfers: 
Not assessed at this time d/t pt's BG levels ADL Assessment:  
Feeding: Setup Oral Facial Hygiene/Grooming: Setup Bathing: Moderate assistance Upper Body Dressing: Minimum assistance Lower Body Dressing: Maximum assistance Toileting: Total assistance ADL Intervention: 
Cognitive Retraining Safety/Judgement: Fall prevention Pain: 
Pain level pre-treatment: 0/10 Pain level post-treatment: 0/10 Pain Intervention(s): Medication (see MAR); Rest, Ice, Repositioning Response to intervention: Nurse notified, See doc flow Activity Tolerance:  
Fair Please refer to the flowsheet for vital signs taken during this treatment. After treatment:  
? Patient left in no apparent distress sitting up in chair ? Patient left in no apparent distress in bed 
? Call bell left within reach ? Nursing notified ? Caregiver present ? Bed alarm activated COMMUNICATION/EDUCATION:  
? Role of Occupational Therapy in the acute care setting 
? Home safety education was provided and the patient/caregiver indicated understanding. ? Patient/family have participated as able in goal setting and plan of care. ? Patient/family agree to work toward stated goals and plan of care. ? Patient understands intent and goals of therapy, but is neutral about his/her participation. ? Patient is unable to participate in goal setting and plan of care. Thank you for this referral. 
Evelyn Pineda MS, OTR/L Time Calculation: 17 mins Eval Complexity: History: MEDIUM Complexity : Expanded review of history including physical, cognitive and psychosocial  history ; Examination: MEDIUM Complexity : 3-5 performance deficits relating to physical, cognitive , or psychosocial skils that result in activity limitations and / or participation restrictions; Decision Making:MEDIUM Complexity : Patient may present with comorbidities that affect occupational performnce. Miniml to moderate modification of tasks or assistance (eg, physical or verbal ) with assesment(s) is necessary to enable patient to complete evaluation

## 2019-06-12 NOTE — PROGRESS NOTES
Infectious Disease progress Note Requested by: Dr. Cheryl Johnson Reason: sepsis, gram negative bacteremia/bacteriuria Current abx Prior abx Pip/tazo, vancomycin 6/9/19 Ceftriaxone, levofloxacin since 6/10 Lines:  
 
 
Assessment : 
 
21-year-old male with h/o paroxysmal atrial fibrillation, type 2 DM, prostate cancer, Chronic neutrophilic leukocytosis,Iron Deficiency Anemia secondary to inadequate iron intake, thrombocytosis, and MGUS admitted to [facility] on 6/9/19 for unresponsiveness. Now with gram negative bacteremia, e.coli in urine cultures. Clinical picture c/w sepsis (POA) due to e.coli bloodstream infection (positive blood culture 6/9), e.coli emphysematous cystitis. Ct scan 6/9/19 suggestive of emphysematous cystitis. No clinical or radiographic evidence of ascending UTI. Quick clinical improvement Cons, diphtheroids with right ankle wound cultures likely colonizer. No clinical evidence of acute infection. Worsening thrombocytopenia: ?sepsis induced superimposed on chronic thrombocytopenia of unclear etiology. Recommend to obtain imaging studies to rule out obstructive uropathy Elevated troponin: likely demand ischemia: cardiology f/u appreciated Recommendations: 1.  cont ceftriaxone. D/c levofloxacin 2. Repeat blood cultures 3. F/u hematology recommendations for thrombocytopenia 4. F/u cardiology recommendations 5. Obtain post void residual urine volume to r/o incomplete bladder emptying as the cause of severe cystitis. Advance Care planning: full code: discussed  with patient/surrogate decision maker: Elsie Riojast: [phone] Above plan was discussed in details with patient, and dr. Jt Álvarez. Please call me if any further questions or concerns. Will continue to participate in the care of this patient. HPI: 
 
Feels fine.  Patient denies dysuria, lower abdominal pain, flank pain throughout this time. Patient denies headaches, visual disturbances, sore throat, runny nose, earaches, cp, sob, chills, cough, abdominal pain, diarrhea, burning micturition, pain or weakness in extremities. He denies back pain/flank pain. home Medication List  
 Details NOVOLOG U-100 INSULIN ASPART 100 unit/mL injection INJECT AS DIRECTED PER SLIDING SCALE ( MAXM DOSE 15 UNITS /DAY) Refills: 0  
  
insulin glargine (LANTUS) 100 unit/mL injection 5 Units by SubCUTAneous route daily. Qty: 1 Vial, Refills: 0  
  
valsartan (DIOVAN) 40 mg tablet Take 1 Tab by mouth daily. Qty: 30 Tab, Refills: 6 Associated Diagnoses: Essential hypertension  
  
oxybutynin (DITROPAN) 5 mg tablet TAKE ONE TABLET BY MOUTH THREE TIMES DAILY Qty: 90 Tab, Refills: 8 Associated Diagnoses: Urinary frequency  
  
therapeutic multivitamin (THERAGRAN) tablet Take 1 Tab by mouth daily. Qty: 30 Tab, Refills: 0  
  
metoprolol tartrate (LOPRESSOR) 50 mg tablet Take 1 Tab by mouth every twelve (12) hours. Qty: 60 Tab, Refills: 0  
  
travoprost (TRAVATAN Z) 0.004 % ophthalmic solution Administer 1 Drop to both eyes two (2) times a day. Qty: 5 mL, Refills: 0  
  
brinzolamide (AZOPT) 1 % ophthalmic suspension Administer 1 Drop to both eyes two (2) times a day. Qty: 5 mL, Refills: 0  
  
brimonidine-timolol (COMBIGAN) 0.2-0.5 % drop ophthalmic solution Administer 1 Drop to both eyes every twelve (12) hours. Qty: 5 mL, Refills: 0  
  
calcium carbonate (CALCIUM 500) 500 mg calcium (1,250 mg) tablet Take 1 Tab by mouth daily. Qty: 90 Tab, Refills: 2 Associated Diagnoses: Malignant neoplasm of prostate ([address] Monoclonal gammopathy  
  
atorvastatin (LIPITOR) 40 mg tablet Take 1 Tab by mouth nightly. Insulin Needles, Disposable, 31 gauge x 5/16\" ndle Use as needed. Qty: 1 Package, Refills: 11  
  
glucose 4 gram chewable tablet Take 4 Tabs by mouth as needed. Qty: 10 Tab, Refills: 0 Current Facility-Administered Medications Medication Dose Route Frequency  dextrose 10% infusion 125-250 mL  125-250 mL IntraVENous PRN  
 0.9% sodium chloride infusion 250 mL  250 mL IntraVENous PRN  predniSONE (DELTASONE) tablet 30 mg  30 mg Oral BID WITH MEALS  
 0.9% sodium chloride infusion 250 mL  250 mL IntraVENous PRN  
 insulin glargine (LANTUS) injection 5 Units  5 Units SubCUTAneous DAILY  cefTRIAXone (ROCEPHIN) 2 g in sterile water (preservative free) 20 mL IV syringe  2 g IntraVENous Q24H  
 acetaminophen (TYLENOL) tablet 650 mg  650 mg Oral Q6H PRN  
 metoprolol tartrate (LOPRESSOR) tablet 25 mg  25 mg Oral Q12H  
 LORazepam (ATIVAN) injection 1 mg  1 mg IntraVENous Q6H PRN  
 melatonin tablet 3 mg  3 mg Oral QHS PRN  
 0.9% sodium chloride infusion  75 mL/hr IntraVENous CONTINUOUS  
 levoFLOXacin (LEVAQUIN) 750 mg in D5W IVPB  750 mg IntraVENous Q24H  
 atorvastatin (LIPITOR) tablet 40 mg  40 mg Oral QHS  therapeutic multivitamin (THERAGRAN) tablet 1 Tab  1 Tab Oral DAILY  valsartan (DIOVAN) tablet 40 mg  40 mg Oral DAILY  insulin lispro (HUMALOG) injection   SubCUTAneous AC&HS  
 glucose chewable tablet 16 g  4 Tab Oral PRN  
 glucagon (GLUCAGEN) injection 1 mg  1 mg IntraMUSCular PRN  
 sodium chloride (NS) flush 5-40 mL  5-40 mL IntraVENous Q8H  
 sodium chloride (NS) flush 5-40 mL  5-40 mL IntraVENous PRN  
 brimonidine (ALPHAGAN) 0.2 % ophthalmic solution 1 Drop  1 Drop Both Eyes BID And  
 timolol (TIMOPTIC) 0.25% ophthalmic solution  1 Drop Both Eyes BID  latanoprost (XALATAN) 0.005 % ophthalmic solution 1 Drop  1 Drop Both Eyes QPM  
 dorzolamide (TRUSOPT) 2 % ophthalmic solution 1 Drop  1 Drop Both Eyes TID Allergies: Iodinated contrast- oral and iv dye Temp (24hrs), Av.4 °F (36.9 °C), Min:97.4 °F (36.3 °C), Max:100 °F (37.8 °C) Visit Vitals /85 Pulse 100 Temp 97.4 °F (36.3 °C) Resp 20 Ht 6' 1\" (1.854 m) Wt 69.9 kg (154 lb 1.6 oz) SpO2 91% BMI 20.33 kg/m² ROS: 12 point ROS obtained in details. Pertinent positives as mentioned in HPI,  
otherwise negative Physical Exam: 
 
GENERAL:  The patient is alert and cooperative and denies acute distress at this time. VITAL SIGNS: reviewed HEENT:  Conjunctivae are clear, no acute drainage, anicteric. Facial symmetry. Pharynx is clear. TMs are clear. NECK:  Supple, no bruit, no adenopathy. CHEST:  Heart rate is 80 and regular. LUNGS:  Decreased at the bases, clear anteriorly. The patient has a deformity at the sternoclavicular border on the right, appears to be a callused old injury. ABDOMEN:  Soft, bowel sounds positive, nontender, no bruit detected. EXTREMITIES:  Multiple purpura over both arms. Generalized muscle atrophy. Several purpura over legs. Right hallux with open ulceration, 2.5 cm. Right plantar fourth metatarsal with shallow ulceration posterior right ankle. +2 pitting edema both LE . Positive dorsalis pedis and pedal pulses. Sacral decubiti. Labs: Results:  
Chemistry Recent Labs  
  06/12/19 
0040 06/11/19 
9391 06/10/19 
3421 * 231* 108*  143 142  
K 4.0 3.2* 3.4*  
* 111* 110* CO2 23 26 26 BUN 28* 28* 25* CREA 1.00 1.07 0.96  
CA 7.3* 7.5* 7.7* AGAP 5 6 6 BUCR 28* 26* 26* AP  --   --  90  
TP  --   --  6.4 ALB  --   --  1.7*  
GLOB  --   --  4.7* AGRAT  --   --  0.4* CBC w/Diff Recent Labs  
  06/12/19 
0040 06/11/19 
1468 06/10/19 
1611 06/10/19 
4590 WBC 7.8 10.0  --  13.8*  
RBC 1.97* 1.80*  --  2.00* HGB 6.6* 6.3* 7.2* 7.0*  
HCT 20.4* 19.7* 22.8* 22.5*  
PLT 32* 12*  --  21* GRANS 92* 92*  --  96* LYMPH 4* 5*  --  1* EOS 1 1  --  0 Microbiology Recent Labs  
  06/09/19 
1615 06/09/19 
1612 06/09/19 
1510 06/09/19 
1145 CULT AEROBIC AND ANAEROBIC BOTTLES ESCHERICHIA COLI* AEROBIC BOTTLE ESCHERICHIA COLI For Susceptibility Refer to Culture [id number]* >100,000 COLONIES/mL ESCHERICHIA COLI* MANY DIPHTHEROIDS*  MODERATE STAPHYLOCOCCUS SPECIES, COAGULASE NEGATIVE*  MODERATE DIPHTHEROIDS*  FEW CANDIDA ALBICANS* RADIOLOGY: 
 
All available imaging studies/reports in [facility] for this admission were reviewed Dr. Sheron Godinez, Infectious Disease Specialist 
[phone] June 12, 2019 
9:10 AM

## 2019-06-12 NOTE — CONSULTS
Palliative Medicine Consult Patient Name: Zoila Ness YOB: 1935 Date of Initial Consult: 06/12/2019 Reason for Consult: Goals of care discussion Requesting Provider: Cruz Galo MD  
Primary Care Physician: Adis Zarco MD 
  
 SUMMARY:  
Zoila Ness is a 82 y.o. with a past history of prostate cancer, pressure ulcers, MGUS, Thrombocytosis, KRISTIE, HTN, A-Fib, T2DM, who was admitted on 6/9/2019 from home with a diagnosis of Altered mental status, Sepsis, UTI, pressure ulcers, hypoglycemia due to sepsis, PAF, and thrombocytopenia. Current medical issues leading to Palliative Medicine involvement include: Altered mental status, goals of care discussion, sepsis, pressure ulcer, and debility. PALLIATIVE DIAGNOSES:  
1. Goals of care discussion 2. Altered mental status 3. Sepsis 4. Pressure ulcer 5. Debility PLAN:  
1. Goals of Care discussion: Palliative medicine team including RANDELL Bailey, NP and myself met with patient at bedside. Patient is alert and oriented x 2. He states the year is 1. He is unable to understand the difference between the telephone and nursing call bell, even after instruction given. Per nurse, patient is having a hard time understanding how to use food utensils during feeding. Unsure of his baseline mentation. No family at bedside. Palliative care briefly talked with Jefferson Morales (daughter) yesterday who admitted that patient has been gradually getting confused and sleeping more. Per Rios Bass, patient is , with 4 children. Rios Bass had emailed a copy of patient's AMD, will be scanned into chart. Per AMD, Eva Degroot (daughter) is the MPOA, with no secondary listed. Vilma Jefferson lives in [place], and Rios Bass works during the day and unable to come in to discuss goals of care further until Friday.  Family meeting scheduled for Friday, June 14, at 10:30 Tammy Briceño will be on site and Petevladimir  will be phone conferenced into conversation to discuss goals of care. Plan is to obtain a POST. At this time continue FULL code with Full interventions. 2. Altered mental status: Unsure of baseline. Possible contributing factor includes sepsis. Reports of some confusion per Madison Solders prior to admission but unsure of the extent of confusion and duration. No known confirmed underlying diagnosis of dementia. As stated in number one, confusion over telephone versus nursing call bell, and feeding utensils even after directions given. 3. Sepsis: ID following. gram negative bacteremia/bacteriuria. 4. Pressure ulcer: Discussed with RN. Patient with pressure injury ulcer to the sacrum and diabetic ulcers to the BLE, POA. Madison Solders did admit patient is sleeping more and not active. 5. Debility: Frequent falls at home. Sleeping more often and less active. Pressure ulcer to sacrum. There is a possible self hired care aide that assists in the home, but does not give medications. Unable to ascertain his mobility status PTA, will address Friday at [facility]. 6. Initial consult note routed to primary continuity provider 7. Communicated plan of care with: Palliative IDT 
 
 
 GOALS OF CARE / TREATMENT PREFERENCES:  
[====Goals of Care====] GOALS OF CARE: FULL code with Full interventions. Patient/Health Care Proxy Stated Goals: Prolong life TREATMENT PREFERENCES:  
Code Status: Full Code Advance Care Planning: 
Advance Care Planning 6/12/2019 Patient's Healthcare Decision Maker is: Named in scanned ACP document Confirm Advance Directive Yes, on file Does the patient have other document types Power of [place] Medical Interventions: Full interventions The palliative care team has discussed with patient / health care proxy about goals of care / treatment preferences for patient. 
[====Goals of Care====]  HISTORY:  
 
 History obtained from: chart, RN, Marsha Gula, patient(limited) CHIEF COMPLAINT: head of bed elevated too much HPI/SUBJECTIVE: The patient is:  
[x] Verbal and participatory limited due to confusion; oriented x 2 [] Non-participatory due to:  
 
 
Clinical Pain Assessment (nonverbal scale for severity on nonverbal patients):  
Clinical Pain Assessment Severity: 0 FUNCTIONAL ASSESSMENT:  
 
Palliative Performance Scale (PPS): PPS: 40 PSYCHOSOCIAL/SPIRITUAL SCREENING:  
 
Advance Care Planning: 
Advance Care Planning 6/12/2019 Patient's Healthcare Decision Maker is: Named in scanned ACP document Confirm Advance Directive Yes, on file Does the patient have other document types Power of [employer] Any spiritual / Orthodoxy concerns: Did not assess today 
[] Yes /  [] No 
 
Caregiver Burnout: 
[] Yes /  [] No /  [x] No Caregiver Present Anticipatory grief assessment:  
[x] Normal  / [] Maladaptive REVIEW OF SYSTEMS:  
 
Positive and pertinent negative findings in ROS are noted above in HPI. The following systems were [x] reviewed (limited) / [] unable to be reviewed as noted in HPI Other findings are noted below. Systems: constitutional, ears/nose/mouth/throat, respiratory, gastrointestinal, genitourinary, musculoskeletal, integumentary, neurologic, psychiatric, endocrine. Positive findings noted below. Modified ESAS Completed by: provider Pain: 0 Anxiety: 0 Nausea: 0 Dyspnea: 0 Stool Occurrence(s): 2 PHYSICAL EXAM:  
 
From RN flowsheet: 
Wt Readings from Last 3 Encounters:  
06/11/19 69.9 kg (154 lb 1.6 oz) 06/07/19 65.3 kg (144 lb) 05/15/19 69.4 kg (153 lb) Blood pressure 155/85, pulse 100, temperature 97.4 °F (36.3 °C), resp. rate 20, height 6' 1\" (1.854 m), weight 69.9 kg (154 lb 1.6 oz), SpO2 91 %. Pain Scale 1: Numeric (0 - 10) Pain Intensity 1: 0 Last bowel movement, if known: unknown Constitutional: Awake, NAD Eyes: pupils equal, anicteric ENMT: no nasal discharge Respiratory: breathing not labored, symmetric Musculoskeletal: no deformity Skin: warm, dry Neurologic: following commands, oriented x 2 HISTORY:  
 
Principal Problem: 
  NSTEMI (non-ST elevated myocardial infarction) ([address] (6/9/2019) Active Problems: Hypoglycemia, exogenous hyperinsulinemia ([address] (6/9/2019) Sepsis ([address] (6/9/2019) Controlled type 2 diabetes mellitus, with long-term current use of insulin ([address] (6/9/2019) Past Medical History:  
Diagnosis Date  Controlled type 2 diabetes mellitus, with long-term current use of insulin ([address] 6/9/2019  Diabetes ([address]  Hypertension  Hypoglycemia, exogenous hyperinsulinemia ([address] 6/9/2019  Malignant neoplasm of prostate ([address] 1998  Sepsis ([address] 6/9/2019 Past Surgical History:  
Procedure Laterality Date  BIOPSY OF PROSTATE,INCISIONAL  4/98  NM EXT BEAM RADIATION AS PRIMARY THERAPY TO PROSTATE ONLY  4/30-6/29/98 Family History Problem Relation Age of Onset  Diabetes Other  Hypertension Other History reviewed, no pertinent family history. Social History Tobacco Use  Smoking status: Current Some Day Smoker Packs/day: 0.10 Years: 22.00 Pack years: 2.20 Types: Cigarettes  Smokeless tobacco: Never Used Substance Use Topics  Alcohol use: No  
  Alcohol/week: 0.0 oz Allergies Allergen Reactions  Iodinated Contrast- Oral And Iv Dye Hives Faint, shaking Current Facility-Administered Medications Medication Dose Route Frequency  ferrous sulfate tablet 325 mg  1 Tab Oral DAILY WITH BREAKFAST  dextrose 10% infusion 125-250 mL  125-250 mL IntraVENous PRN  predniSONE (DELTASONE) tablet 30 mg  30 mg Oral BID WITH MEALS  insulin glargine (LANTUS) injection 5 Units  5 Units SubCUTAneous DAILY  cefTRIAXone (ROCEPHIN) 2 g in sterile water (preservative free) 20 mL IV syringe  2 g IntraVENous Q24H  
 acetaminophen (TYLENOL) tablet 650 mg  650 mg Oral Q6H PRN  
 metoprolol tartrate (LOPRESSOR) tablet 25 mg  25 mg Oral Q12H  
 melatonin tablet 3 mg  3 mg Oral QHS PRN  
 0.9% sodium chloride infusion  50 mL/hr IntraVENous CONTINUOUS  
 atorvastatin (LIPITOR) tablet 40 mg  40 mg Oral QHS  therapeutic multivitamin (THERAGRAN) tablet 1 Tab  1 Tab Oral DAILY  valsartan (DIOVAN) tablet 40 mg  40 mg Oral DAILY  insulin lispro (HUMALOG) injection   SubCUTAneous AC&HS  
 glucose chewable tablet 16 g  4 Tab Oral PRN  
 glucagon (GLUCAGEN) injection 1 mg  1 mg IntraMUSCular PRN  
 sodium chloride (NS) flush 5-40 mL  5-40 mL IntraVENous Q8H  
 sodium chloride (NS) flush 5-40 mL  5-40 mL IntraVENous PRN  
 brimonidine (ALPHAGAN) 0.2 % ophthalmic solution 1 Drop  1 Drop Both Eyes BID And  
 timolol (TIMOPTIC) 0.25% ophthalmic solution  1 Drop Both Eyes BID  latanoprost (XALATAN) 0.005 % ophthalmic solution 1 Drop  1 Drop Both Eyes QPM  
 dorzolamide (TRUSOPT) 2 % ophthalmic solution 1 Drop  1 Drop Both Eyes TID  
 
 
 
 LAB AND IMAGING FINDINGS:  
 
Lab Results Component Value Date/Time WBC 8.8 06/12/2019 09:10 AM  
 HGB 7.8 (L) 06/12/2019 09:10 AM  
 PLATELET 35 (L) 40/51/0906 09:10 AM  
 
Lab Results Component Value Date/Time Sodium 140 06/12/2019 12:40 AM  
 Potassium 4.0 06/12/2019 12:40 AM  
 Chloride 112 (H) 06/12/2019 12:40 AM  
 CO2 23 06/12/2019 12:40 AM  
 BUN 28 (H) 06/12/2019 12:40 AM  
 Creatinine 1.00 06/12/2019 12:40 AM  
 Calcium 7.3 (L) 06/12/2019 12:40 AM  
 Magnesium 2.0 06/11/2019 06:28 AM  
 Phosphorus 2.4 (L) 06/10/2019 06:05 AM  
  
Lab Results Component Value Date/Time AST (SGOT) 71 (H) 06/10/2019 06:05 AM  
 Alk.  phosphatase 90 06/10/2019 06:05 AM  
 Protein, total 6.4 06/10/2019 06:05 AM  
 Albumin 1.7 (L) 06/10/2019 06:05 AM  
 Globulin 4.7 (H) 06/10/2019 06:05 AM  
 
Lab Results Component Value Date/Time INR 1.2 06/04/2019 09:20 AM  
 Prothrombin time 15.1 06/04/2019 09:20 AM  
 aPTT 102.5 (H) 06/09/2019 04:12 PM  
  
Lab Results Component Value Date/Time Iron 25 (L) 06/11/2019 06:28 AM  
 TIBC 118 (L) 06/11/2019 06:28 AM  
 Iron % saturation 21 06/11/2019 06:28 AM  
 Ferritin 942 (H) 06/11/2019 06:28 AM  
  
No results found for: PH, PCO2, PO2 No components found for: [place] Lab Results Component Value Date/Time  06/09/2019 04:12 PM  
 CK - MB 1.6 06/09/2019 04:12 PM  
  
 
 
   
 
Total time: 50 minutes Counseling / coordination time, spent as noted above: 40 minutes 
> 50% counseling / coordination?: yes, patient, family, RN Prolonged service was provided for  []30 min   []75 min in face to face time in the presence of the patient, spent as noted above. Time Start:  
Time End:  
Note: this can only be billed with 04150 (initial) or 37894 (follow up). If multiple start / stop times, list each separately.

## 2019-06-13 NOTE — PROGRESS NOTES
[phone] [facility]: [phone] Palliative Medicine Team follow up visit with Elizabeth Dao NP, Tatum Li LCSW and this writer in patients room. Patient sitting up in chair looking somewhat fatigued after having just worked with physical therapy. Some blood noted from IV site in right forearm, gauze applied and BSRN informed. Pt stated he was OK, had a good appetite and no c/o pain. .  
Patient has medical h/o of prostate cancer, MGUS, thrombocytosis, KRISTIE, HTN, a-fib, DM2, and pressure ulcers. He was admitted from home on 06/09/19 r/t AMS. Patient only aware of person and place, unable to correctly identify the month or year. Patient status is currently FULL CODE. Meeting set for 10:30 AM on 06/14/19 with mirian Luu (via phone) and Jose Guajardo (in person) to discuss goals of care/CODE status. Patient has an AMD on file naming Hubert Hewitt as his primary healthcare agent ([phone]). The goal will be to complete a POST. Hanna Blair RN, [facility] Palliative Medicine Inpatient RN  [facility] Palliative [facility]E Line: [phone] ([phone])

## 2019-06-13 NOTE — PROGRESS NOTES
Hematology/Medical Oncology Progress Note Name: Jenny Junior : 1935 MRN: [id number] Date: 2019 9:48 AM 
  
[x]I have reviewed the flowsheet and previous days notes. Events overnight reviewed and discussed with nursing staff. Vital signs and records reviewed. Mr. Liz Myrick is an 74-year-old -American man with a PMHx Prostate cancer, DM type 2, HTN A-Fib and  Chronic neutrophilic leukocytosis,Iron Deficiency Anemia secondary to inadequate iron intake, thrombocytosis, and MGUS . Admitted with NSTEMI to telemetry subsequently found to have thrombocytopenia for which we are following. Sitting up at bedside chair, voices no c/o today. ROS: 
Constitutional:  Negative for fever, chills, diaphoresis, activity change, appetite change and unexpected weight change. HENT: Negative for nosebleeds, congestion, facial swelling, mouth sores, trouble swallowing, neck pain, neck stiffness, voice change and postnasal drip. Eyes: Negative for photophobia, pain, discharge and itching. Respiratory: Negative for apnea, cough, choking, chest tightness, wheezing and stridor. Cardiovascular: Negative for chest pain, palpitations and leg swelling. Gastrointestinal: Negative for abdominal pain. Negative for nausea, diarrhea, constipation, blood in stool and rectal pain. Genitourinary: Negative for dysuria, urgency, hematuria, flank pain and difficulty urinating. Musculoskeletal: Negative for back pain, joint swelling, arthralgias and gait problem. Skin: Negative for color change, pallor, rash and wound. Neurological: Positive for generalized weakness. Negative for dizziness, facial asymmetry, speech difficulty, light-headedness and headaches. Hematological: Negative for adenopathy. Does not bruise/bleed easily. Psychiatric/Behavioral: Negative for hallucinations. Vital Signs:   
Visit Vitals BP (!) 183/95 (BP 1 Location: Left arm, BP Patient Position: At rest) Pulse 64 Temp 97.6 °F (36.4 °C) Resp 20 Ht 6' 1\" (1.854 m) Wt 69.9 kg (154 lb 1.6 oz) SpO2 98% BMI 20.33 kg/m² O2 Device: Room air Temp (24hrs), Av.7 °F (36.5 °C), Min:97.5 °F (36.4 °C), Max:98.2 °F (36.8 °C) Intake/Output:  
Last shift:      No intake/output data recorded. Last 3 shifts:  1901 -  0700 In: 671.7 [P.O.:490] Out: 625 [Urine:625] Intake/Output Summary (Last 24 hours) at 2019 3483 Last data filed at 2019 6391 Gross per 24 hour Intake 250 ml Output 625 ml Net -375 ml Physical Exam: 
General: Appears comfortable, NAD. HEENT:  Anicteric sclerae; pink palpebral conjunctivae; mucosa moist 
Resp:  Symmetrical chest expansion, no accessory muscle use; good airway entry; no rales/ wheezing/ rhonchi noted CV:  S1, S2 present; reg rate and  rhythm GI:  Abdomen soft, non-tender; (+) active bowel sounds Extremities:  +2 pulses on all extremities; BLE edema. Skin:  Warm; no rashes/ lesions noted Neurologic:  Non-focal. Oriented to person, place. DATA:  
Current Facility-Administered Medications Medication Dose Route Frequency  ferrous sulfate tablet 325 mg  1 Tab Oral DAILY WITH BREAKFAST  insulin glargine (LANTUS) injection 10 Units  10 Units SubCUTAneous DAILY  insulin lispro (HUMALOG) injection 4 Units  4 Units SubCUTAneous TIDAC  
 0.9% sodium chloride infusion  50 mL/hr IntraVENous CONTINUOUS  
 dextrose 10% infusion 125-250 mL  125-250 mL IntraVENous PRN  predniSONE (DELTASONE) tablet 30 mg  30 mg Oral BID WITH MEALS  cefTRIAXone (ROCEPHIN) 2 g in sterile water (preservative free) 20 mL IV syringe  2 g IntraVENous Q24H  
 acetaminophen (TYLENOL) tablet 650 mg  650 mg Oral Q6H PRN  
 metoprolol tartrate (LOPRESSOR) tablet 25 mg  25 mg Oral Q12H  
 melatonin tablet 3 mg  3 mg Oral QHS PRN  
 0.9% sodium chloride infusion  50 mL/hr IntraVENous CONTINUOUS  
  atorvastatin (LIPITOR) tablet 40 mg  40 mg Oral QHS  therapeutic multivitamin (THERAGRAN) tablet 1 Tab  1 Tab Oral DAILY  valsartan (DIOVAN) tablet 40 mg  40 mg Oral DAILY  insulin lispro (HUMALOG) injection   SubCUTAneous AC&HS  
 glucose chewable tablet 16 g  4 Tab Oral PRN  
 glucagon (GLUCAGEN) injection 1 mg  1 mg IntraMUSCular PRN  
 sodium chloride (NS) flush 5-40 mL  5-40 mL IntraVENous Q8H  
 sodium chloride (NS) flush 5-40 mL  5-40 mL IntraVENous PRN  
 brimonidine (ALPHAGAN) 0.2 % ophthalmic solution 1 Drop  1 Drop Both Eyes BID And  
 timolol (TIMOPTIC) 0.25% ophthalmic solution  1 Drop Both Eyes BID  latanoprost (XALATAN) 0.005 % ophthalmic solution 1 Drop  1 Drop Both Eyes QPM  
 dorzolamide (TRUSOPT) 2 % ophthalmic solution 1 Drop  1 Drop Both Eyes TID Labs: 
Recent Labs  
  06/13/19 
0510 06/12/19 
0910 06/12/19 
0040 WBC 12.5 8.8 7.8 HGB 8.6* 7.8* 6.6* HCT 27.1* 24.5* 20.4* PLT 30* 35* 32* Recent Labs  
  06/13/19 
0510 06/12/19 
0040 06/11/19 
6585  140 143 K 4.5 4.0 3.2*  
* 112* 111* CO2 23 23 26 * 236* 231* BUN 34* 28* 28* CREA 0.91 1.00 1.07  
CA 7.9* 7.3* 7.5* MG  --   --  2.0 No results for input(s): PH, PCO2, PO2, HCO3, FIO2 in the last 72 hours. IMPRESSION:  
· Worsening Thrombocytopenia · Acute on chronic anemia · GNR Sepsis. · GNR UTI · Indeterminate elevation of troponin due to demand ischemia sec to sepsis · Paroxysmal atrial fibrillation, anticoagulation d/c as outpatient due to thrombocytopenia 
  
  
 
· PLAN:  
· Platelet count is 24,730,   Continue IVIG every 24h X 3 doses as well as prednisone 30mg BID. · Hgb 8.6 today. Ferritin is 942, Iron 25, TIBC 118, iron percent saturation 21 
· Continue current care · The patient is: [x] acutely ill Risk of deterioration: [] moderate  
 [] critically ill  [] high My assessment/plan was discussed with: 
 [x]nursing []PT/OT   
[]respiratory therapy [x]Dr.Lloyd Jose L Worrell MD  
  
[]family [] Ilene Elam NP

## 2019-06-13 NOTE — ROUTINE PROCESS
Bedside and Verbal shift change report given to [address] [place] [address] (oncoming nurse) by RN (offgoing nurse). Report included the following information SBAR, Kardex, Intake/Output, MAR, Recent Results and Cardiac Rhythm AFIBB. 1000 Per Dr. Chacha Lira bladder scan should be completed every six hours to verify pt is not retaining. 1030 Pt working with physical therapy. PT assisted pt to bedside recliner. Pt is stable with no complaints of pain at this time. Will continue to monitor. 1130 Scanned pt's bladder. 54 mL present at this time. Pt has not voided this shift. Will continue to monitor. 1500 Assisted pt back to bed with two person assist. Pt resting in bed with no complaints of pain and no change to condition. Will continue to monitor. [address] Os[address] pt's bladder. 24 mL documented on flow sheet. Will continue to monitor. 1810 Repositioned pt in bed. Pt is stable with no complaints of pain. Will continue to monitor. Bedside and Verbal shift change report given to Reginaldo Ferraro (oncoming nurse) by [address] [place] [address] (offgoing nurse). Report included the following information SBAR, Kardex, Intake/Output, MAR, Recent Results and Cardiac Rhythm AFIBB.

## 2019-06-13 NOTE — PROGRESS NOTES
OCCUPATIONAL THERAPY TREATMENT Patient: Brandin Fried (56 y.o. male) Date: 6/13/2019 Diagnosis: NSTEMI (non-ST elevated myocardial infarction) ([facility] [address] [I21.4] Hypoglycemia [E16.2] NSTEMI (non-ST elevated myocardial infarction) ([facility] [address] [I21.4] Hypoglycemia [E16.2] NSTEMI (non-ST elevated myocardial infarction) ([facility] [address] Precautions: Skin Chart, occupational therapy assessment, plan of care, and goals were reviewed. Prior Level of Function: Pt reports he was (I) with basic self-care/ADLs PTA. He ambulated with a RW for functional mobility. ASSESSMENT: 
Pt seen with Physical Therapy for safety with functional mobility. Pt received in supine and hesitantly agreeable to OT/PT session at this time. Pt able to use Right hand to bring drink to mouth when taking medications with Nursing present. Pt tasked with moving from supine to sit EOB. Seated at EOB, Pt donned B Slippers with back with Max A while using bed rail for stability. Pt then engaged in [employer] seated at edge of bed. On second attempt, LOB occurred. While attempting to transfer from EOB to recliner via SPT, Pt had bowel movement in stand. Toileting hygiene completed in stand with PTA providing Pt standing assistance. Pt unable to assist with hygiene tasks or clothing management. Seated in recliner, Pt required Max a for don/doff hospital gown with verbal cueing for sequencing and physical assistant. Motor planning difficulties demonstrated on this date. Pt left seated in recliner with all needs within reach and nursing notified. Progression toward goals: 
?          Improving appropriately and progressing toward goals ? Improving slowly and progressing toward goals ? Not making progress toward goals and plan of care will be adjusted PLAN: 
Patient continues to benefit from skilled intervention to address the above impairments. Continue treatment per established plan of care. Discharge Recommendations:  Elliot Garcia Further Equipment Recommendations for Discharge:  TBD SUBJECTIVE:  
Patient stated ? I am okay. I am a little tired now.? OBJECTIVE DATA SUMMARY:  
Cognitive/Behavioral Status: 
Neurologic State: Alert Orientation Level: Oriented to person, Oriented to place, Oriented to situation, Disoriented to time Cognition: Follows commands Safety/Judgement: Awareness of environment Functional Mobility and Transfers for ADLs: 
Bed Mobility: 
  
  
  
  
 Transfers: 
  
  
  
  
   
   
   
   
   
   
 
Balance: ADL Intervention: 
 
Feeding Drink to Mouth: Supervision;Set-up Upper Body Dressing Assistance Hospital Gown: Maximum assistance Cues: Physical assistance;Verbal cues provided Lower Body Dressing Assistance Slip on Shoes with Back: Maximum assistance Leg Crossed Method Used: No 
Position Performed: Seated edge of bed Cues: Don;Physical assistance;Verbal cues provided Toileting Toileting Assistance: Total assistance(dependent) Bowel Hygiene: Total assistance (dependent) Clothing Management: Total assistance (dependent) Therapeutic Exercises:  
Seated EOB: 
B UE- Shoulder Flexion, Abduction, Horizontal Abduction/Adduction, Elbow flexion/extension, x2 
Pain: 
Pt reports 0/10 pain or discomfort prior to treatment. Pt reports 0/10 pain or discomfort post treatment. Activity Tolerance:   
Poor Please refer to the flowsheet for vital signs taken during this treatment. After treatment:  
?  Patient left in no apparent distress sitting up in chair ? Patient left in no apparent distress in bed 
? Call bell left within reach ? Nursing notified ? Caregiver present ? Bed alarm activated COMMUNICATION/EDUCATION:  
? Home safety education was provided and the patient/caregiver indicated understanding. ? Patient/family have participated as able in goal setting and plan of care. ? Patient/family agree to work toward stated goals and plan of care. ? Patient understands intent and goals of therapy, but is neutral about his/her participation. ? Patient is unable to participate in goal setting and plan of care. AAKASH Blevins Time Calculation: 32 mins

## 2019-06-13 NOTE — PROGRESS NOTES
Infectious Disease progress Note Requested by: Dr. Juanetta Crigler Reason: sepsis, gram negative bacteremia/bacteriuria Current abx Prior abx Ceftriaxone since 6/10 levofloxacin 6/10-6/12 Pip/tazo, vancomycin 6/9/19 Lines:  
 
 
Assessment : 
 
65-year-old male with h/o paroxysmal atrial fibrillation, type 2 DM, prostate cancer, Chronic neutrophilic leukocytosis,Iron Deficiency Anemia secondary to inadequate iron intake, thrombocytosis, and MGUS admitted to [facility] on 6/9/19 for unresponsiveness. Now with gram negative bacteremia, e.coli in urine cultures. Clinical picture c/w sepsis (POA) due to e.coli bloodstream infection (positive blood culture 6/9), e.coli emphysematous cystitis. Ct scan 6/9/19 suggestive of emphysematous cystitis. No clinical or radiographic evidence of ascending UTI. Quick clinical improvement Cons, diphtheroids with right ankle wound cultures likely colonizer. No clinical evidence of acute infection. Worsening thrombocytopenia: ?sepsis induced superimposed on chronic thrombocytopenia of unclear etiology. Elevated troponin: likely demand ischemia: cardiology f/u appreciated Recommendations: 1.  cont ceftriaxone. 2. F/u Repeat blood cultures 3. F/u hematology recommendations for thrombocytopenia 4. F/u cardiology recommendations Await family meeting scheduled on Friday Advance Care planning: full code: discussed  with patient/surrogate decision maker: Yue Mcmahonooks: [phone] Above plan was discussed in details with patient, and dr. Obinna Messina. Please call me if any further questions or concerns. Will continue to participate in the care of this patient. HPI: 
 
Feels fine. Patient denies dysuria, lower abdominal pain, flank pain throughout this time.  Patient denies headaches, visual disturbances, sore throat, runny nose, earaches, cp, sob, chills, cough, abdominal pain, diarrhea, burning micturition, pain or weakness in extremities. He denies back pain/flank pain. home Medication List  
 Details NOVOLOG U-100 INSULIN ASPART 100 unit/mL injection INJECT AS DIRECTED PER SLIDING SCALE ( MAXM DOSE 15 UNITS /DAY) Refills: 0  
  
insulin glargine (LANTUS) 100 unit/mL injection 5 Units by SubCUTAneous route daily. Qty: 1 Vial, Refills: 0  
  
valsartan (DIOVAN) 40 mg tablet Take 1 Tab by mouth daily. Qty: 30 Tab, Refills: 6 Associated Diagnoses: Essential hypertension  
  
oxybutynin (DITROPAN) 5 mg tablet TAKE ONE TABLET BY MOUTH THREE TIMES DAILY Qty: 90 Tab, Refills: 8 Associated Diagnoses: Urinary frequency  
  
therapeutic multivitamin (THERAGRAN) tablet Take 1 Tab by mouth daily. Qty: 30 Tab, Refills: 0  
  
metoprolol tartrate (LOPRESSOR) 50 mg tablet Take 1 Tab by mouth every twelve (12) hours. Qty: 60 Tab, Refills: 0  
  
travoprost (TRAVATAN Z) 0.004 % ophthalmic solution Administer 1 Drop to both eyes two (2) times a day. Qty: 5 mL, Refills: 0  
  
brinzolamide (AZOPT) 1 % ophthalmic suspension Administer 1 Drop to both eyes two (2) times a day. Qty: 5 mL, Refills: 0  
  
brimonidine-timolol (COMBIGAN) 0.2-0.5 % drop ophthalmic solution Administer 1 Drop to both eyes every twelve (12) hours. Qty: 5 mL, Refills: 0  
  
calcium carbonate (CALCIUM 500) 500 mg calcium (1,250 mg) tablet Take 1 Tab by mouth daily. Qty: 90 Tab, Refills: 2 Associated Diagnoses: Malignant neoplasm of prostate ([facility] [address] Monoclonal gammopathy  
  
atorvastatin (LIPITOR) 40 mg tablet Take 1 Tab by mouth nightly. Insulin Needles, Disposable, 31 gauge x 5/16\" ndle Use as needed. Qty: 1 Package, Refills: 11  
  
glucose 4 gram chewable tablet Take 4 Tabs by mouth as needed. Qty: 10 Tab, Refills: 0 Current Facility-Administered Medications Medication Dose Route Frequency  ferrous sulfate tablet 325 mg  1 Tab Oral DAILY WITH BREAKFAST  insulin glargine (LANTUS) injection 10 Units  10 Units SubCUTAneous DAILY  insulin lispro (HUMALOG) injection 4 Units  4 Units SubCUTAneous TIDAC  
 0.9% sodium chloride infusion  50 mL/hr IntraVENous CONTINUOUS  
 dextrose 10% infusion 125-250 mL  125-250 mL IntraVENous PRN  predniSONE (DELTASONE) tablet 30 mg  30 mg Oral BID WITH MEALS  cefTRIAXone (ROCEPHIN) 2 g in sterile water (preservative free) 20 mL IV syringe  2 g IntraVENous Q24H  
 acetaminophen (TYLENOL) tablet 650 mg  650 mg Oral Q6H PRN  
 metoprolol tartrate (LOPRESSOR) tablet 25 mg  25 mg Oral Q12H  
 melatonin tablet 3 mg  3 mg Oral QHS PRN  
 atorvastatin (LIPITOR) tablet 40 mg  40 mg Oral QHS  therapeutic multivitamin (THERAGRAN) tablet 1 Tab  1 Tab Oral DAILY  valsartan (DIOVAN) tablet 40 mg  40 mg Oral DAILY  insulin lispro (HUMALOG) injection   SubCUTAneous AC&HS  
 glucose chewable tablet 16 g  4 Tab Oral PRN  
 glucagon (GLUCAGEN) injection 1 mg  1 mg IntraMUSCular PRN  
 sodium chloride (NS) flush 5-40 mL  5-40 mL IntraVENous Q8H  
 sodium chloride (NS) flush 5-40 mL  5-40 mL IntraVENous PRN  
 brimonidine (ALPHAGAN) 0.2 % ophthalmic solution 1 Drop  1 Drop Both Eyes BID And  
 timolol (TIMOPTIC) 0.25% ophthalmic solution  1 Drop Both Eyes BID  latanoprost (XALATAN) 0.005 % ophthalmic solution 1 Drop  1 Drop Both Eyes QPM  
 dorzolamide (TRUSOPT) 2 % ophthalmic solution 1 Drop  1 Drop Both Eyes TID Allergies: Iodinated contrast- oral and iv dye Temp (24hrs), Av.7 °F (36.5 °C), Min:97.5 °F (36.4 °C), Max:98.2 °F (36.8 °C) Visit Vitals /83 (BP 1 Location: Right arm, BP Patient Position: At rest) Pulse 83 Temp 97.7 °F (36.5 °C) Resp 21 Ht 6' 1\" (1.854 m) Wt 69.9 kg (154 lb 1.6 oz) SpO2 98% BMI 20.33 kg/m² ROS: 12 point ROS obtained in details. Pertinent positives as mentioned in HPI,  
otherwise negative Physical Exam: GENERAL:  The patient is alert and cooperative and denies acute distress at this time. VITAL SIGNS: reviewed HEENT:  Conjunctivae are clear, no acute drainage, anicteric. Facial symmetry. Pharynx is clear. TMs are clear. NECK:  Supple, no bruit, no adenopathy. CHEST:  Heart rate is 80 and regular. LUNGS:  Decreased at the bases, clear anteriorly. The patient has a deformity at the sternoclavicular border on the right, appears to be a callused old injury. ABDOMEN:  Soft, bowel sounds positive, nontender, no bruit detected. EXTREMITIES:  Multiple purpura over both arms. Generalized muscle atrophy. Several purpura over legs. Right hallux with open ulceration, 2.5 cm. Right plantar fourth metatarsal with shallow ulceration posterior right ankle. +2 pitting edema both LE . Positive dorsalis pedis and pedal pulses. Sacral decubiti. Labs: Results:  
Chemistry Recent Labs  
  06/13/19 
0510 06/12/19 
0040 06/11/19 
2981 * 236* 231*  140 143 K 4.5 4.0 3.2*  
* 112* 111* CO2 23 23 26 BUN 34* 28* 28* CREA 0.91 1.00 1.07  
CA 7.9* 7.3* 7.5* AGAP 5 5 6 BUCR 37* 28* 26* CBC w/Diff Recent Labs  
  06/13/19 
0510 06/12/19 
0910 06/12/19 
0040 06/11/19 
3576 WBC 12.5 8.8 7.8 10.0  
RBC 2.60* 2.29* 1.97* 1.80* HGB 8.6* 7.8* 6.6* 6.3* HCT 27.1* 24.5* 20.4* 19.7* PLT 30* 35* 32* 12* GRANS 92*  --  92* 92* LYMPH 5*  --  4* 5* EOS 1  --  1 1 Microbiology Recent Labs  
  06/12/19 
1127 CULT NO GROWTH AFTER 19 HOURS  
  
 
 
RADIOLOGY: 
 
All available imaging studies/reports in [facility] for this admission were reviewed Dr. Johanne Love, Infectious Disease Specialist 
[phone] June 13, 2019 
9:10 AM

## 2019-06-13 NOTE — PROGRESS NOTES
Problem: Mobility Impaired (Adult and Pediatric) Goal: *Acute Goals and Plan of Care (Insert Text) Description Physical Therapy Goals Initiated 6/12/2019 and to be accomplished within 7 day(s) 1. Patient will move from supine to sit and sit to supine  and roll side to side in bed with minimal assistance/contact guard assist.    
2.  Patient will transfer from bed to chair and chair to bed with minimal assistance/contact guard assist using the least restrictive device. 3.  Patient will perform sit to stand with minimal assistance/contact guard assist. 
4.  Patient will ambulate with minimal assistance/contact guard assist for 25 feet with the least restrictive device. To prepare pt for mobility at next level of care PLOF:  Pt lives with his daughters in a 1 story home with 3 steps to enter. Pt reports that he was able to walk household distances with a walker and someone available to assist.  He states that he also has a w/c and does use that for mobility at times. Outcome: Progressing Towards Goal 
 PHYSICAL THERAPY TREATMENT Patient: Fidencio Driscoll (46 y.o. male) Date: 6/13/2019 Diagnosis: NSTEMI (non-ST elevated myocardial infarction) ([facility] [address] [I21.4] Hypoglycemia [E16.2] NSTEMI (non-ST elevated myocardial infarction) ([facility] [address] [I21.4] Hypoglycemia [E16.2] NSTEMI (non-ST elevated myocardial infarction) ([facility] [address] Precautions: Skin ASSESSMENT: 
Pt found supine HOB elevated willing to participate w/ therapy. Tx performed w/ OT to maximize safety of pt and staff. Tx performed post first attempt at 36 as pt was in direct nursing care. Pt able to improve in mobility this visit w/ glucose readings w/n safe, therapeutic levels. Pt able to display intact, but weak SLR on (B) LEs to remove Prevalon boots and mobility to EOB req mod A w/ elevating trunk to obtain an upright sitting posture.  Once positioned properly w/ cueing for sequencing, pt able to maintain good sitting balance and fair w/ dynamic UE activities. Pt performed sit <> stand from EOB displaying fair standing balance at first, had BM standing thus req to return, and stood again w/ digressed balance w/ heavy post lean. May have been result of stance of this LPTA to allow for pericare, but regardless pt displayed difficulty obtaining ant lean w/ cueing. Pt amb for 2' to recliner req cueing for step sequencing and to increase step clearance. Pt sat in recliner w/ poor ecc control and left in room w/ all needs in reach. Pt voiced comfort and nurse notified of position and recommendation of 2 person stand pivot when back to bed. Progression toward goals: good ? Improving appropriately and progressing toward goals ? Improving slowly and progressing toward goals ? Not making progress toward goals and plan of care will be adjusted PLAN: 
Patient continues to benefit from skilled intervention to address the above impairments. Continue treatment per established plan of care. Discharge Recommendations:  Elliot Garcia Further Equipment Recommendations for Discharge:  rolling walker, BSC  
 
SUBJECTIVE:  
Patient stated ? I feel pretty good, just tired. ? OBJECTIVE DATA SUMMARY:  
Critical Behavior: 
Neurologic State: Alert, Eyes open spontaneously Orientation Level: Oriented to person, Oriented to place Cognition: Recognition of people/places, Follows commands Safety/Judgement: Awareness of environment Functional Mobility Training: 
Bed Mobility: 
Rolling: Minimum assistance Supine to Sit: Moderate assistance;Assist x2 Transfers: 
Sit to Stand: Maximum assistance;Assist x2 Stand to Sit: Moderate assistance;Assist x2 Bed to Chair: Moderate assistance;Assist x2 Balance: 
Sitting: Impaired; With support Sitting - Static: Good (unsupported) Sitting - Dynamic: Fair (occasional) Standing: Impaired; With support Standing - Static: Fair;Poor Standing - Dynamic : Poor Ambulation/Gait Training: Distance (ft): 2 Feet (ft) Assistive Device: Walker, rolling Ambulation - Level of Assistance: Moderate assistance;Assist x2 Gait Abnormalities: Decreased step clearance Base of Support: Center of gravity altered;Narrowed Therapeutic Exercises:  
 
 
EXERCISE Sets Reps Active Active Assist  
Passive Self ROM Comments Ankle Pumps 1 10  ? ? ? ? Pain: 
Pain level pre-treatment: 0/10 Pain level post-treatment: 0/10 Activity Tolerance:  
Fair, fatigues w/ mobility Please refer to the flowsheet for vital signs taken during this treatment. After treatment:  
? Patient left in no apparent distress sitting up in chair ? Patient left in no apparent distress in bed 
? Call bell left within reach ? Nursing notified ? Caregiver present ? Bed alarm activated ? SCDs applied COMMUNICATION/EDUCATION:  
?         Role of Physical Therapy in the acute care setting. ?         Fall prevention education was provided and the patient/caregiver indicated understanding. ? Patient/family have participated as able in working toward goals and plan of care. ?         Patient/family agree to work toward stated goals and plan of care. ?         Patient understands intent and goals of therapy, but is neutral about his/her participation. ? Patient is unable to participate in stated goals/plan of care: ongoing with therapy staff. ?         Other: 
 
   
Kari Carlton, PTA Time Calculation: 32 mins

## 2019-06-13 NOTE — PROGRESS NOTES
Palliative Medicine Consult DR. WATSON'[facility]: [phone] ([phone]) [facility]: [phone] Time In: 0589 Time Out: 1000 Palliative Medicine Team Imelda Mosqueda, INDIRA, Genia Pathak, and this LCSW) met with patient in room. Patient had just finished working with PT and appeared very tired. The reason for this palliative consult is goals of care discussion. Patient has PMH of prostate cancer, MGUS, thrombocytosis, KRISTIE, HTN, a-fib, DM2, and pressure ulcers. He was admitted from home on 06/09/19 r/t AMS. Patient's speech was limited during visit. He was not able to correctly identify the month (stated it was November) or the year (did not answer). Patient reported that he was feeling better. NP reminded patient about how to use his call bell. Patient status is currently FULL CODE. Meeting set for 10:30 AM on 06/14/19 with daughters Getachew Soares (via phone) and Benji Aviles (in person) to discuss goals of care/CODE status. Patient has an AMD on file naming Kairitu Neema as his primary healthcare agent ([phone]). The goal will be to complete a POST. PM Team to f/u as appropriate.

## 2019-06-13 NOTE — PROGRESS NOTES
SUBJECTIVE: 
 
Feeling okay. Sitting in a chair. States he does not want to eat. No chest or abdominal pain. No nausea or vomiting. No SOB or cough. OBJECTIVE: 
 
/83 (BP 1 Location: Right arm, BP Patient Position: At rest)   Pulse 83   Temp 97.7 °F (36.5 °C)   Resp 21   Ht 6' 1\" (1.854 m)   Wt 69.9 kg (154 lb 1.6 oz)   SpO2 98%   BMI 20.33 kg/m² General appearance - alert, well appearing, and in no distress Chest - Good air entry noted in bases, no wheezes Heart - S1 and S2 normal 
Abdomen - soft, nondistended, Bowel sounds present Neurological - alert, normal speech, no focal findings noted in both UEs Extremities - + pedal edema noted ASSESSMENT: 
 
1. E coli Sepsis. 2. E coli UTI - emphysematous UTI 3. Hypoglycemia due to sepsis, resolved 4. Indeterminate elevation of troponin due to demand ischemia sec to sepsis 5. Paroxysmal atrial fibrillation, anticoagulation d/c as outpatient due to thrombocytopenia 6. Thrombocytopenia s/p platelet transfusion 7. Acute on chronic anemia s/p PRBCs 8. HTN 
9. Hx prostate cancer 10. Decub/foot ulcers 11. Macrocytosis 12. Hypokalemia, better 13. 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 Pressure Ulcers bilat feet, POA 15. Cognitive impairment 16. Steroid induced hyperglycemia PLAN: 
 
Cont current management Follow repeat blood culture Dr. Radha Teresa to follow for anemia/thrombocytopenia Normal HIDA scan  
palliative care meeting with family tomorrow BMP:  
Lab Results Component Value Date/Time  06/13/2019 05:10 AM  
 K 4.5 06/13/2019 05:10 AM  
  (H) 06/13/2019 05:10 AM  
 CO2 23 06/13/2019 05:10 AM  
 AGAP 5 06/13/2019 05:10 AM  
  (H) 06/13/2019 05:10 AM  
 BUN 34 (H) 06/13/2019 05:10 AM  
 CREA 0.91 06/13/2019 05:10 AM  
 GFRAA >60 06/13/2019 05:10 AM  
 GFRNA >60 06/13/2019 05:10 AM  
 
CBC:  
Lab Results Component Value Date/Time  WBC 12.5 06/13/2019 05:10 AM  
 HGB 8.6 (L) 06/13/2019 05:10 AM  
 HCT 27.1 (L) 06/13/2019 05:10 AM  
 PLT 30 (L) 06/13/2019 05:10 AM

## 2019-06-13 NOTE — PROGRESS NOTES
Palliative Medicine Consult Patient Name: Vicky Newman YOB: 1935 Date of Initial Consult: 06/12/2019, follow up 6/13/2019 Reason for Consult: Goals of care discussion Requesting Provider: Flory Kumar MD  
Primary Care Physician: Sharon Her MD 
  
 SUMMARY:  
Vicky Newman is a 82 y.o. with a past history of prostate cancer, pressure ulcers, MGUS, Thrombocytosis, KRISTIE, HTN, A-Fib, T2DM, who was admitted on 6/9/2019 from home with a diagnosis of Altered mental status, Sepsis, UTI, pressure ulcers, hypoglycemia due to sepsis, PAF, and thrombocytopenia. Current medical issues leading to Palliative Medicine involvement include: Altered mental status, goals of care discussion, sepsis, pressure ulcer, and debility. 6/13/2019 Just finished working with physical therapy. In recliner, fatigued but voices no complaints, alert but confused. Family meeting tomorrow at 56. PALLIATIVE DIAGNOSES:  
1. Goals of care discussion 2. Altered mental status 3. Sepsis 4. Pressure ulcer 5. Debility PLAN:  
1.6/13/2019  Follow up along with Ms Markie Young and Ms Bianca Wayne. Just completed working with physical therapy, they are recommending SNF at d/c. In recliner denies pain or SOB. Did not remember me from yesterday. Tells us he is trying to get better. Alert and talkative, did not know month or year today. Appears more fatigued then yesterday but just completed working with therapy. Family meeting tomorrow with 2 daughters. Goals of care have not changed full code with full interventions. ( please see below for previous conversations) Goals of Care discussion: Palliative medicine team including RANDELL Cervantes, NP and myself met with patient at bedside. Patient is alert and oriented x 2. He states the year is 1. He is unable to understand the difference between the telephone and nursing call bell, even after instruction given. Per nurse, patient is having a hard time understanding how to use food utensils during feeding. Unsure of his baseline mentation. No family at bedside. Palliative care briefly talked with Sergio Stafford (daughter) yesterday who admitted that patient has been gradually getting confused and sleeping more. Per Lupe Bragg, patient is , with 4 children. Lupe Bragg had emailed a copy of patient's AMD, will be scanned into chart. Per AMD, Mukesh Murdock (daughter) is the MPOA, with no secondary listed. Chris Fairbanks lives in [place], and [employer] Mahsa works during the day and unable to come in to discuss goals of care further until Friday. Family meeting scheduled for Friday, June 14, at 10:30 Juana Benítez will be on site and Chris Fairbanks will be phone conference into conversation to discuss goals of care. Plan is to obtain a POST. At this time continue FULL code with Full interventions. 2. Altered mental status: Unsure of baseline. Possible contributing factor includes sepsis. Reports of some confusion per Lupe Bragg prior to admission but unsure of the extent of confusion and duration. No known confirmed underlying diagnosis of dementia. As stated in number one, confusion over telephone versus nursing call bell, and feeding utensils even after directions given. 3. Sepsis: ID following. gram negative bacteremia/bacteriuria. 4. Pressure ulcer: Discussed with RN. Patient with pressure injury ulcer to the sacrum and diabetic ulcers to the BLE, POA. Lupe Bragg did admit patient is sleeping more and not active. 5. Debility: Frequent falls at home. Sleeping more often and less active. Pressure ulcer to sacrum. There is a possible self hired care aide that assists in the home, but does not give medications. Unable to ascertain his mobility status PTA, will address Friday at [facility]. 6. Initial consult note routed to primary continuity provider 7. Communicated plan of care with: Palliative IDT GOALS OF CARE / TREATMENT PREFERENCES:  
[====Goals of Care====] GOALS OF CARE: FULL code with Full interventions. Patient/Health Care Proxy Stated Goals: Prolong life TREATMENT PREFERENCES:  
Code Status: Full Code Advance Care Planning: 
Advance Care Planning 6/12/2019 Patient's Healthcare Decision Maker is: Named in scanned ACP document Confirm Advance Directive Yes, on file Does the patient have other document types Power of [employer] Medical Interventions: Full interventions The palliative care team has discussed with patient / health care proxy about goals of care / treatment preferences for patient. 
[====Goals of Care====] HISTORY:  
 
History obtained from: chart, RN, Altagracia Walton, patient(limited) CHIEF COMPLAINT: head of bed elevated too much HPI/SUBJECTIVE: The patient is:  
[x] Verbal and participatory limited due to confusion; oriented x 2 [] Non-participatory due to:  
 
 
Clinical Pain Assessment (nonverbal scale for severity on nonverbal patients):  
Clinical Pain Assessment Severity: 0 FUNCTIONAL ASSESSMENT:  
 
Palliative Performance Scale (PPS): PPS: 30 
 
 
 PSYCHOSOCIAL/SPIRITUAL SCREENING:  
 
Advance Care Planning: 
Advance Care Planning 6/12/2019 Patient's Healthcare Decision Maker is: Named in scanned ACP document Confirm Advance Directive Yes, on file Does the patient have other document types Power of [employer] Any spiritual / Worship concerns: Did not assess today 
[] Yes /  [] No 
 
Caregiver Burnout: 
[] Yes /  [] No /  [x] No Caregiver Present Anticipatory grief assessment:  
[x] Normal  / [] Maladaptive REVIEW OF SYSTEMS:  
 
Positive and pertinent negative findings in ROS are noted above in HPI. The following systems were [x] reviewed (limited) / [] unable to be reviewed as noted in HPI Other findings are noted below.  
Systems: constitutional, ears/nose/mouth/throat, respiratory, gastrointestinal, genitourinary, musculoskeletal, integumentary, neurologic, psychiatric, endocrine. Positive findings noted below. Modified ESAS Completed by: provider Fatigue: 7 Pain: 0 Anxiety: 0 Nausea: 0 Dyspnea: 0 Stool Occurrence(s): 1 PHYSICAL EXAM:  
 
From RN flowsheet: 
Wt Readings from Last 3 Encounters:  
06/11/19 69.9 kg (154 lb 1.6 oz) 06/07/19 65.3 kg (144 lb) 05/15/19 69.4 kg (153 lb) Blood pressure 143/83, pulse 83, temperature 97.7 °F (36.5 °C), resp. rate 21, height 6' 1\" (1.854 m), weight 69.9 kg (154 lb 1.6 oz), SpO2 98 %. Pain Scale 1: Numeric (0 - 10) Pain Intensity 1: 0 Last bowel movement, incontinent yes today Constitutional: Awake, NAD, verbal, calm oriented x 2 did not know month or year Eyes: pupils equal, anicteric ENMT: no nasal discharge Respiratory: breathing appeared slightly labored although just finished PT Musculoskeletal: no deformity Skin: warm, dry Neurologic: following commands, oriented x 2 HISTORY:  
 
Principal Problem: 
  NSTEMI (non-ST elevated myocardial infarction) ([address] (6/9/2019) Active Problems: Hypoglycemia, exogenous hyperinsulinemia ([address] (6/9/2019) Sepsis ([address] (6/9/2019) Controlled type 2 diabetes mellitus, with long-term current use of insulin ([address] (6/9/2019) Goals of care, counseling/discussion (6/12/2019) Altered mental status (6/12/2019) Pressure injury of skin of sacral region (6/12/2019) Debility (6/12/2019) Past Medical History:  
Diagnosis Date  Controlled type 2 diabetes mellitus, with long-term current use of insulin ([address] 6/9/2019  Diabetes ([address]  Hypertension  Hypoglycemia, exogenous hyperinsulinemia ([address] 6/9/2019  Malignant neoplasm of prostate ([address] 1998  Sepsis ([address] 6/9/2019 Past Surgical History:  
Procedure Laterality Date  BIOPSY OF PROSTATE,INCISIONAL  4/98  NE EXT BEAM RADIATION AS PRIMARY THERAPY TO PROSTATE ONLY  4/30-6/29/98 Family History Problem Relation Age of Onset  Diabetes Other  Hypertension Other History reviewed, no pertinent family history. Social History Tobacco Use  Smoking status: Current Some Day Smoker Packs/day: 0.10 Years: 22.00 Pack years: 2.20 Types: Cigarettes  Smokeless tobacco: Never Used Substance Use Topics  Alcohol use: No  
  Alcohol/week: 0.0 oz Allergies Allergen Reactions  Iodinated Contrast- Oral And Iv Dye Hives Faint, shaking Current Facility-Administered Medications Medication Dose Route Frequency  ferrous sulfate tablet 325 mg  1 Tab Oral DAILY WITH BREAKFAST  insulin glargine (LANTUS) injection 10 Units  10 Units SubCUTAneous DAILY  insulin lispro (HUMALOG) injection 4 Units  4 Units SubCUTAneous TIDAC  
 0.9% sodium chloride infusion  50 mL/hr IntraVENous CONTINUOUS  
 dextrose 10% infusion 125-250 mL  125-250 mL IntraVENous PRN  predniSONE (DELTASONE) tablet 30 mg  30 mg Oral BID WITH MEALS  cefTRIAXone (ROCEPHIN) 2 g in sterile water (preservative free) 20 mL IV syringe  2 g IntraVENous Q24H  
 acetaminophen (TYLENOL) tablet 650 mg  650 mg Oral Q6H PRN  
 metoprolol tartrate (LOPRESSOR) tablet 25 mg  25 mg Oral Q12H  
 melatonin tablet 3 mg  3 mg Oral QHS PRN  
 atorvastatin (LIPITOR) tablet 40 mg  40 mg Oral QHS  therapeutic multivitamin (THERAGRAN) tablet 1 Tab  1 Tab Oral DAILY  valsartan (DIOVAN) tablet 40 mg  40 mg Oral DAILY  insulin lispro (HUMALOG) injection   SubCUTAneous AC&HS  
 glucose chewable tablet 16 g  4 Tab Oral PRN  
 glucagon (GLUCAGEN) injection 1 mg  1 mg IntraMUSCular PRN  
 sodium chloride (NS) flush 5-40 mL  5-40 mL IntraVENous Q8H  
 sodium chloride (NS) flush 5-40 mL  5-40 mL IntraVENous PRN  
 brimonidine (ALPHAGAN) 0.2 % ophthalmic solution 1 Drop  1 Drop Both Eyes BID  And  
  timolol (TIMOPTIC) 0.25% ophthalmic solution  1 Drop Both Eyes BID  latanoprost (XALATAN) 0.005 % ophthalmic solution 1 Drop  1 Drop Both Eyes QPM  
 dorzolamide (TRUSOPT) 2 % ophthalmic solution 1 Drop  1 Drop Both Eyes TID  
 
 
 
 LAB AND IMAGING FINDINGS:  
 
Lab Results Component Value Date/Time WBC 12.5 06/13/2019 05:10 AM  
 HGB 8.6 (L) 06/13/2019 05:10 AM  
 PLATELET 30 (L) 64/25/2490 05:10 AM  
 
Lab Results Component Value Date/Time Sodium 138 06/13/2019 05:10 AM  
 Potassium 4.5 06/13/2019 05:10 AM  
 Chloride 110 (H) 06/13/2019 05:10 AM  
 CO2 23 06/13/2019 05:10 AM  
 BUN 34 (H) 06/13/2019 05:10 AM  
 Creatinine 0.91 06/13/2019 05:10 AM  
 Calcium 7.9 (L) 06/13/2019 05:10 AM  
 Magnesium 2.0 06/11/2019 06:28 AM  
 Phosphorus 2.4 (L) 06/10/2019 06:05 AM  
  
Lab Results Component Value Date/Time AST (SGOT) 71 (H) 06/10/2019 06:05 AM  
 Alk. phosphatase 90 06/10/2019 06:05 AM  
 Protein, total 6.4 06/10/2019 06:05 AM  
 Albumin 1.7 (L) 06/10/2019 06:05 AM  
 Globulin 4.7 (H) 06/10/2019 06:05 AM  
 
Lab Results Component Value Date/Time INR 1.2 06/04/2019 09:20 AM  
 Prothrombin time 15.1 06/04/2019 09:20 AM  
 aPTT 102.5 (H) 06/09/2019 04:12 PM  
  
Lab Results Component Value Date/Time Iron 25 (L) 06/11/2019 06:28 AM  
 TIBC 118 (L) 06/11/2019 06:28 AM  
 Iron % saturation 21 06/11/2019 06:28 AM  
 Ferritin 942 (H) 06/11/2019 06:28 AM  
  
No results found for: PH, PCO2, PO2 No components found for: [place] Lab Results Component Value Date/Time  06/09/2019 04:12 PM  
 CK - MB 1.6 06/09/2019 04:12 PM  
  
 
 
   
 
Total time: 15 minutes Counseling / coordination time, spent as noted above: 10 minutes 
> 50% counseling / coordination?: yes, patient, PT Prolonged service was provided for  []30 min   []75 min in face to face time in the presence of the patient, spent as noted above. Time Start:  
Time End: Note: this can only be billed with 62254 (initial) or 95265 (follow up). If multiple start / stop times, list each separately.

## 2019-06-13 NOTE — DIABETES MGMT
Glycemic Control Plan of Care 
 
T2DM with current A1c of 5.4% (6/09/2019). See separate notes, 6/11/2019, for assessment of home diabetes management and education via primary caregiver, Marsha Henderson (daughter). Daughter reported patient has caregivers at home during the day and has been manipulating them to give him extra food and snacking throughout the day. Home diabetes medications as reported by daughter on 6/11/2019: 
Lantus insulin 10 units daily at bedtime. Meal time novolog insulin 10 units twice daily before meals: breakfast and dinner. Noted palliative care meeting sched for tomorrow, 6/14. POC BG range on 6/12/2019: 266-411 mg/dL. Increase basal lantus insulin dose to 10 units daily, added meal time lispro insulin 4 units TID AC and cont on very resistant dose of correctional lispro insulin. He received a total of 66 units of insulin. POC BG report on 6/13/2019 at time of review: 162 mg/dL. Seen patient this morning sitting in recliner at bedside. Patient stated that he ate most of his breakfast this morning. Current Meal Intake: 
Patient Vitals for the past 100 hrs: 
 % Diet Eaten 06/12/19 1327 50 % 06/12/19 0910 90 % 06/11/19 1613 50 % 06/11/19 1015 50 % 06/11/19 0800 50 % 06/10/19 1505 80 % 06/10/19 0943 100 % 06/10/19 0100 0 % 06/09/19 1808 25 % 06/09/19 1323 0 % Recommendation(s): 
1.) Cont inpatient glycemic monitoring and current insulin orders: basal, meal time and correctional.. Assessment: 
Patient is 82year old with past medical history including type 2 diabetes mellitus, NSTEMI, malignant neoplasm of prostate, thrombocytosis and atrial fibrillation - was admitted on 6/09/2019 with report that patient was found on the floor by a caregiver, low blood sugar and was given insulin. Noted: Hypoglycemia, resolved Exogenous hyperinsulinemia. Sepsis. UTI. Decub bilateral ft ulcer. Worsening thrombocytopenia. Paroxysmal atrial fibrillation. T2DM with current A1c of 5.4% (6/09/2019). Most recent blood glucose values: 
 
Results for Godfrey Hassan (MRN [id number]) as of 6/13/2019 11:09 Ref. Range 6/12/2019 08:05 6/12/2019 11:40 6/12/2019 11:41 6/12/2019 15:59 6/12/2019 22:42 GLUCOSE,FAST - POC Latest Ref Range: 70 - 110 mg/dL 266 (H) 411 (HH) 394 (H) 407 (HH) 288 (H) Results for Godfrey Hassan (MRN [id number]) as of 6/13/2019 11:09 Ref. Range 6/13/2019 07:54 GLUCOSE,FAST - POC Latest Ref Range: 70 - 110 mg/dL 162 (H) Current A1C: 5.4% (6/09/2019) which is equivalent to estimated average blood glucose of 108 mg/dL during the past 2-3 months. Current hospital diabetes medications: 
Basal lantus insulin dose 10 units daily. Meal time lispro insulin 4 units TID AC. Correctional lispro insulin ACHS. Very resistant dose. Total daily dose insulin requirement previous day: 6/12/2019 Lantus: 10 units Lispro: 56 units TDD insulin: 66 units Home diabetes medications: 
Lantus insulin 10 units daily. Meal time novolog insulin 10 units twice daily before meals: breakfast and dinner. Goals:  Blood glucose will be within target range of  mg/dL by 6/16/2019: 
 
Diet: Diabetic consistent carb soft solids; nutr suppl: glucerna shake with breakfast, lunch and dinne. Education:   
__X_  Refer to Diabetes Education Record: 6/11/2019 via daughter, [address] [facility],[address] (primary caregiver) 
___  Education not indicated at this time Dave Stahl, RN Pager: [phone]

## 2019-06-14 NOTE — DIABETES MGMT
Glycemic Control Plan of Care 
 
T2DM with current A1c of 5.4% (6/09/2019). See separate notes, 6/11/2019, for assessment of home diabetes management and education via primary caregiver, Pernell Rodriguez (daughter). Daughter reported patient has caregivers at home during the day and has been manipulating them to give him extra food and snacking throughout the day. Home diabetes medications as reported by daughter on 6/11/2019: 
Lantus insulin 10 units daily at bedtime. Meal time novolog insulin 10 units twice daily before meals: breakfast and dinner. Code status: DNR Noted palliative care meeting sched today, 6/14. POC BG range on 6/13/2019: 152-213 mg/dL. POC BG report on 6/14/2019 at time of review: 214 mg/dL. Current Meal Intake: 
Patient Vitals for the past 100 hrs: 
 % Diet Eaten 06/13/19 1727 0 % 06/13/19 1245 10 % 06/13/19 0920 75 % 06/12/19 1327 50 % 06/12/19 0910 90 % 06/11/19 1613 50 % 06/11/19 1015 50 % 06/11/19 0800 50 % 06/10/19 1505 80 % 06/10/19 0943 100 % Recommendation(s): 
1.) Cont inpatient glycemic monitoring and current insulin orders: basal, meal time and correctional.. Assessment: 
Patient is 82year old with past medical history including type 2 diabetes mellitus, NSTEMI, malignant neoplasm of prostate, thrombocytosis and atrial fibrillation - was admitted on 6/09/2019 with report that patient was found on the floor by a caregiver, low blood sugar and was given insulin. Noted: Hypoglycemia, resolved Exogenous hyperinsulinemia. Sepsis. UTI. Decub bilateral ft ulcer. Worsening thrombocytopenia. Paroxysmal atrial fibrillation. T2DM with current A1c of 5.4% (6/09/2019). Most recent blood glucose values: 
 
Results for Sivan Davalos (MRN [id number]) as of 6/14/2019 11:52 Ref. Range 6/13/2019 07:54 6/13/2019 11:20 6/13/2019 16:35 6/13/2019 21:14 GLUCOSE,FAST - POC Latest Ref Range: 70 - 110 mg/dL 162 (H) 213 (H) 188 (H) 152 (H) Results for Magen Dixon (MRN [id number]) as of 6/14/2019 11:52 Ref. Range 6/14/2019 07:27 GLUCOSE,FAST - POC Latest Ref Range: 70 - 110 mg/dL 214 (H) Current A1C: 5.4% (6/09/2019) which is equivalent to estimated average blood glucose of 108 mg/dL during the past 2-3 months. Current hospital diabetes medications: 
Basal lantus insulin dose 10 units daily. Meal time lispro insulin 4 units TID AC. Correctional lispro insulin ACHS. Very resistant dose. Total daily dose insulin requirement previous day: 6/13/2019 Lantus: 10 units Lispro: 27 units TDD insulin: 37 units Home diabetes medications: 
Lantus insulin 10 units daily. Meal time novolog insulin 10 units twice daily before meals: breakfast and dinner. Goals:  Blood glucose will be within target range of  mg/dL by 6/17/2019: 
 
Diet: Diabetic consistent carb soft solids; nutr suppl: glucerna shake with breakfast, lunch and dinne. Education:   
__X_  Refer to Diabetes Education Record: 6/11/2019 via daughter, Khoa Cano (primary caregiver) 
___  Education not indicated at this time Alejandra Martinez, RN Pager: [phone]

## 2019-06-14 NOTE — CONSULTS
Palliative Medicine Consult Patient Name: Heber Sanchez YOB: 1935 Date of Initial Consult: 06/12/2019, follow up 6/13/2019, follow up 6/14/2019 Reason for Consult: Goals of care discussion Requesting Provider: Sona Catalan MD  
Primary Care Physician: Soraya Stephen MD 
  
 SUMMARY:  
Heber Sanchez is a 82 y.o. with a past history of prostate cancer, pressure ulcers, MGUS, Thrombocytosis, KRISTIE, HTN, A-Fib, T2DM, who was admitted on 6/9/2019 from home with a diagnosis of Altered mental status, Sepsis, UTI, pressure ulcers, hypoglycemia due to sepsis, PAF, and thrombocytopenia. Current medical issues leading to Palliative Medicine involvement include: Altered mental status, goals of care discussion, sepsis, pressure ulcer, and debility. 6/13/2019 Just finished working with physical therapy. In recliner, fatigued but voices no complaints, alert but confused. Family meeting tomorrow at 1030.  
 
6/14/2019: Family meeting at patient's bedside with daughters Per ARITA (on phone) and Luba Lacy (in person). Patient resting with eyes closed during visit. Daughters discussed patient's progressive memory and functional decline prior to hospitalization; daughters have been noticing decline since December 2018. We discussed goals of care, introduced the POST form. Discussed the benefits and burdens of CPR in the event of cardiac or pulmonary arrest, discussed benefits and burdens of intubation in the event of respiratory distress, and also discussed the benefits and burdens of feeding tubes. Per Martínez and Luba Lacy both agree with DNR in the event of cardiac or pulmonary arrest and no feeding tubes. They will have to discuss with family regarding decision on whether or not to intubate for potentially reversible respiratory distress. Plan to meet with LYLA Joiner on 6/17/2019 to complete the POST form in its entirety.  Family including LYLA agreeable for DNR in the event of cardiac or pulmonary arrest to start at this time. Continue all other aggressive therapies up until cardiac or pulmonary arrest.  
 
 
 PALLIATIVE DIAGNOSES:  
1. Goals of care discussion 2. Altered mental status 3. Sepsis 4. Pressure ulcer 5. Debility PLAN:  
6/14/2019: Family meeting at patient's bedside with daughters Vilma ARITA (on phone) and Rios Bass (in person). Patient resting with eyes closed during visit, NAD. Daughters are aware of reasons for this hospitalization and treatments to date. Daughters discussed patient's progressive memory and functional decline prior to hospitalization, daughters have been noticing decline since December 2018. He started not doing so well on the memory exams at his PCP office, becoming more forgetful, and periodically he confuses daughter Vilma Jefferson for his ex-wife (legally  for over 30 years). Walked with a cane for years, but since Jan-Feb of 2019 has been mobile with a walker. He is sleeping more at home. Admits patient has a great appetite, but concerned that he is progressively getting weaker and loosing interest in hobbies. Daughters interested in facility placement. Stated patient owns a home and other assets, and they will move forward with gathering funds for placement. States it is challenging to care for him at home because they both work and cannot provide around the clock care; Vilma Jefferson lives and works in [place], but comes down every weekend to care for patient. Patient has never been assessed by a neurologist. We discussed that his progression of cognitive impairment may be related to underlying undiagnosed dementia, though I stressed that I cannot diagnose as dementia because it is not within my scope of practice. Encouraged outpatient neurology follow up. Discussed the progression of dementia and cognitive impairment (decreased participation in ADLs, frequent infections, possible dysphagia, falls, weakness).  We discussed goals of care and  introduced the POST form. Discussed the benefits and burdens of CPR (chest compressions, electrical shock, and intubation) in the event of cardiac or pulmonary arrest, discussed benefits and burdens of intubation in the event of respiratory distress, and also discussed the benefits and burdens of feeding tubes. Marisel (LYLA) and Thai Rdz both agree with DNR in the event of cardiac or pulmonary arrest and no feeding tubes. Daughters admit patient would never want a feeding tube for artificial feedings. They will have to discuss with other family members regarding decision on whether or not to intubate for potentially reversible respiratory distress. Plan to meet with LYLA Joiner on 6/17/2019 at 8AM to complete the POST form in its entirety in person. Family including LYLA agreeable for DNR in the event of cardiac or pulmonary arrest to start at this time. Continue all other aggressive therapies up until cardiac or pulmonary arrest.  
 
( please see below for previous conversations) 6/13/2019  Follow up along with Ms Jhony Knutson and Ms [facility], [facility]. Just completed working with physical therapy, they are recommending SNF at d/c. In recliner denies pain or SOB. Did not remember me from yesterday. Tells us he is trying to get better. Alert and talkative, did not know month or year today. Appears more fatigued then yesterday but just completed working with therapy. Family meeting tomorrow with 2 daughters. Goals of care have not changed full code with full interventions. Goals of Care discussion: Palliative medicine team including D. Venetta Duane, INDIRA and myself met with patient at bedside. Patient is alert and oriented x 2. He states the year is 1. He is unable to understand the difference between the telephone and nursing call bell, even after instruction given. Per nurse, patient is having a hard time understanding how to use food utensils during feeding. Unsure of his baseline mentation.  No family at bedside. Palliative care briefly talked with Nilay Saha (daughter) yesterday who admitted that patient has been gradually getting confused and sleeping more. Per Wesley Patel, patient is , with 4 children. Wesley Patel had emailed a copy of patient's AMD, will be scanned into chart. Per AMD, Arron Young (daughter) is the MPOA, with no secondary listed. Maryana Moore lives in [place], and Wesley Patel works during the day and unable to come in to discuss goals of care further until Friday. Family meeting scheduled for Friday, June 14, at 10:30 [facility] will be on site and Maryana Moore will be phone conference into conversation to discuss goals of care. Plan is to obtain a POST. At this time continue FULL code with Full interventions. 2. Altered mental status: Unsure of baseline. Possible contributing factor includes sepsis. Reports of some confusion per Wesley Patel prior to admission but unsure of the extent of confusion and duration. No known confirmed underlying diagnosis of dementia. As stated in number one, confusion over telephone versus nursing call bell, and feeding utensils even after directions given. 3. Sepsis: ID following. gram negative bacteremia/bacteriuria. 4. Pressure ulcer: Discussed with RN. Patient with pressure injury ulcer to the sacrum and diabetic ulcers to the BLE, POA. Wesley Patel did admit patient is sleeping more and not active. 5. Debility: Frequent falls at home. Sleeping more often and less active. Pressure ulcer to sacrum. There is a possible self hired care aide that assists in the home, but does not give medications. Unable to ascertain his mobility status PTA, will address Friday at [facility]. 6. Initial consult note routed to primary continuity provider 7. Communicated plan of care with: Palliative IDT 
 
 
 GOALS OF CARE / TREATMENT PREFERENCES:  
[====Goals of Care====] GOALS OF CARE: DNR in the event of cardiac or pulmonary arrest. Continue all other aggressive therapies up until cardiac or pulmonary arrest.  
 
Patient/Health Care Proxy Stated Goals: Prolong life TREATMENT PREFERENCES:  
Code Status: DNR Advance Care Planning: 
Advance Care Planning 6/12/2019 Patient's Healthcare Decision Maker is: Named in scanned ACP document Confirm Advance Directive Yes, on file Does the patient have other document types Power of [employer] Medical Interventions: Full interventions(DNR in the event of cardiac or pulmonary arrest. Continue all aggressive therapies until cardiac or pulmonary arrest.) Artificially Administered Nutrition: No feeding tube The palliative care team has discussed with patient / health care proxy about goals of care / treatment preferences for patient. 
[====Goals of Care====] HISTORY:  
 
History obtained from: chart, Emily (daughter) and Kelli Pump (daughter). CHIEF COMPLAINT: asleep HPI/SUBJECTIVE: The patient is:  
[x] Verbal and participatory limited due to confusion: asleep during visit today 
[] Non-participatory due to:  
 
 
Clinical Pain Assessment (nonverbal scale for severity on nonverbal patients):  
Clinical Pain Assessment Severity: 0 Adult Nonverbal Pain Scale Face: No particular expression or smile Activity (Movement): Lying quietly, normal position Guarding: Lying quietly, no positioning of hands over areas of body Physiology (Vital Signs): Stable vital signs Respiratory: Baseline RR/SpO2 compliant with ventilator Total Score: 0 
 
 
 FUNCTIONAL ASSESSMENT:  
 
Palliative Performance Scale (PPS): PPS: 30 
 
 
 PSYCHOSOCIAL/SPIRITUAL SCREENING:  
 
Advance Care Planning: 
Advance Care Planning 6/12/2019 Patient's Healthcare Decision Maker is: Named in scanned ACP document Confirm Advance Directive Yes, on file Does the patient have other document types Power of [employer] Any spiritual / Baptist concerns: Did not assess today 
[] Yes /  [] No 
 
 Caregiver Burnout: 
[] Yes /  [x] No /  [] No Caregiver Present Anticipatory grief assessment:  
[x] Normal  / [] Maladaptive REVIEW OF SYSTEMS:  
 
Positive and pertinent negative findings in ROS are noted above in HPI. The following systems were [] reviewed (limited) / [x] unable to be reviewed as noted in HPI Other findings are noted below. Systems: constitutional, ears/nose/mouth/throat, respiratory, gastrointestinal, genitourinary, musculoskeletal, integumentary, neurologic, psychiatric, endocrine. Positive findings noted below. Modified ESAS Completed by: provider Fatigue: 7 Pain: 0 Anxiety: 0 Nausea: 0 Dyspnea: 0 Stool Occurrence(s): 1 PHYSICAL EXAM:  
 
From RN flowsheet: 
Wt Readings from Last 3 Encounters:  
06/14/19 71.3 kg (157 lb 3.2 oz) 06/07/19 65.3 kg (144 lb) 05/15/19 69.4 kg (153 lb) Blood pressure 132/67, pulse 78, temperature 97.4 °F (36.3 °C), resp. rate 20, height 6' 1\" (1.854 m), weight 71.3 kg (157 lb 3.2 oz), SpO2 95 %. Pain Scale 1: Numeric (0 - 10) Pain Intensity 1: 0 Last bowel movement, incontinent yes today Constitutional: Asleep, NAD Eyes: closed ENMT: no nasal discharge Respiratory: symmetrical, unlabored Musculoskeletal: no deformity Skin: warm, dry Neurologic: asleep HISTORY:  
 
Principal Problem: 
  NSTEMI (non-ST elevated myocardial infarction) ([address] (6/9/2019) Active Problems: Hypoglycemia, exogenous hyperinsulinemia ([address] (6/9/2019) Sepsis ([address] (6/9/2019) Controlled type 2 diabetes mellitus, with long-term current use of insulin ([address] (6/9/2019) Goals of care, counseling/discussion (6/12/2019) Altered mental status (6/12/2019) Pressure injury of skin of sacral region (6/12/2019) Debility (6/12/2019) Past Medical History:  
Diagnosis Date  Controlled type 2 diabetes mellitus, with long-term current use of insulin ([address] 6/9/2019  Diabetes ([facility]  Hypertension  Hypoglycemia, exogenous hyperinsulinemia ([facility] 6/9/2019  Malignant neoplasm of prostate ([facility] 1998  Sepsis ([facility] 6/9/2019 Past Surgical History:  
Procedure Laterality Date  BIOPSY OF PROSTATE,INCISIONAL  4/98  NM EXT BEAM RADIATION AS PRIMARY THERAPY TO PROSTATE ONLY  4/30-6/29/98 Family History Problem Relation Age of Onset  Diabetes Other  Hypertension Other History reviewed, no pertinent family history. Social History Tobacco Use  Smoking status: Current Some Day Smoker Packs/day: 0.10 Years: 22.00 Pack years: 2.20 Types: Cigarettes  Smokeless tobacco: Never Used Substance Use Topics  Alcohol use: No  
  Alcohol/week: 0.0 oz Allergies Allergen Reactions  Iodinated Contrast- Oral And Iv Dye Hives Faint, shaking Current Facility-Administered Medications Medication Dose Route Frequency  immune globulin 10% (GAMUNEX-C) infusion 80 g  80 g IntraVENous DAILY  ferrous sulfate tablet 325 mg  1 Tab Oral DAILY WITH BREAKFAST  insulin glargine (LANTUS) injection 10 Units  10 Units SubCUTAneous DAILY  insulin lispro (HUMALOG) injection 4 Units  4 Units SubCUTAneous TIDAC  dextrose 10% infusion 125-250 mL  125-250 mL IntraVENous PRN  predniSONE (DELTASONE) tablet 30 mg  30 mg Oral BID WITH MEALS  cefTRIAXone (ROCEPHIN) 2 g in sterile water (preservative free) 20 mL IV syringe  2 g IntraVENous Q24H  
 acetaminophen (TYLENOL) tablet 650 mg  650 mg Oral Q6H PRN  
 metoprolol tartrate (LOPRESSOR) tablet 25 mg  25 mg Oral Q12H  
 melatonin tablet 3 mg  3 mg Oral QHS PRN  
 atorvastatin (LIPITOR) tablet 40 mg  40 mg Oral QHS  therapeutic multivitamin (THERAGRAN) tablet 1 Tab  1 Tab Oral DAILY  valsartan (DIOVAN) tablet 40 mg  40 mg Oral DAILY  insulin lispro (HUMALOG) injection   SubCUTAneous AC&HS  
 glucose chewable tablet 16 g  4 Tab Oral PRN  
  glucagon (GLUCAGEN) injection 1 mg  1 mg IntraMUSCular PRN  
 sodium chloride (NS) flush 5-40 mL  5-40 mL IntraVENous Q8H  
 sodium chloride (NS) flush 5-40 mL  5-40 mL IntraVENous PRN  
 brimonidine (ALPHAGAN) 0.2 % ophthalmic solution 1 Drop  1 Drop Both Eyes BID And  
 timolol (TIMOPTIC) 0.25% ophthalmic solution  1 Drop Both Eyes BID  latanoprost (XALATAN) 0.005 % ophthalmic solution 1 Drop  1 Drop Both Eyes QPM  
 dorzolamide (TRUSOPT) 2 % ophthalmic solution 1 Drop  1 Drop Both Eyes TID  
 
 
 
 LAB AND IMAGING FINDINGS:  
 
Lab Results Component Value Date/Time WBC 12.6 06/14/2019 11:20 AM  
 HGB 8.5 (L) 06/14/2019 11:20 AM  
 PLATELET 18 (LL) 30/24/8058 11:20 AM  
 
Lab Results Component Value Date/Time Sodium 138 06/13/2019 05:10 AM  
 Potassium 4.5 06/13/2019 05:10 AM  
 Chloride 110 (H) 06/13/2019 05:10 AM  
 CO2 23 06/13/2019 05:10 AM  
 BUN 34 (H) 06/13/2019 05:10 AM  
 Creatinine 0.91 06/13/2019 05:10 AM  
 Calcium 7.9 (L) 06/13/2019 05:10 AM  
 Magnesium 2.0 06/11/2019 06:28 AM  
 Phosphorus 2.4 (L) 06/10/2019 06:05 AM  
  
Lab Results Component Value Date/Time AST (SGOT) 71 (H) 06/10/2019 06:05 AM  
 Alk. phosphatase 90 06/10/2019 06:05 AM  
 Protein, total 6.4 06/10/2019 06:05 AM  
 Albumin 1.7 (L) 06/10/2019 06:05 AM  
 Globulin 4.7 (H) 06/10/2019 06:05 AM  
 
Lab Results Component Value Date/Time INR 1.2 06/04/2019 09:20 AM  
 Prothrombin time 15.1 06/04/2019 09:20 AM  
 aPTT 102.5 (H) 06/09/2019 04:12 PM  
  
Lab Results Component Value Date/Time Iron 25 (L) 06/11/2019 06:28 AM  
 TIBC 118 (L) 06/11/2019 06:28 AM  
 Iron % saturation 21 06/11/2019 06:28 AM  
 Ferritin 942 (H) 06/11/2019 06:28 AM  
  
No results found for: PH, PCO2, PO2 No components found for: [place] Lab Results Component Value Date/Time  06/09/2019 04:12 PM  
 CK - MB 1.6 06/09/2019 04:12 PM  
  
 
 
   
 
Total time: 65 minutes Counseling / coordination time, spent as noted above: 55 minutes 
> 50% counseling / coordination?: yes, patient, family, RN Prolonged service was provided for  [x]30 min   []75 min in face to face time in the presence of the patient, spent as noted above. Time Start: 1030 Time End: 6225 Note: this can only be billed with 86980 (initial) or 74964 (follow up). If multiple start / stop times, list each separately.

## 2019-06-14 NOTE — ROUTINE PROCESS
0700- Bedside and Verbal shift change report given to Ramonita Padilla (oncoming nurse) by Ajith Gay RN (offgoing nurse). Report included the following information SBAR, Kardex, Intake/Output, MAR and Cardiac Rhythm AFIB. 0820- Pt eating breakfast. 
 
0845- Pt able to tolerate scheduled PO meds and eye drops w/o issue. 1000- Pt daughter is in the room w/ pt.  
 
1030- Palliative team in to s/w pt and family. 1215- Pt eating lunch in bed. 1500- Pt resting quietly in bed. 1700- Pt eating dinner in bed 
 
1825- Report called to [address], pt transferred to 216 w/ his chart and all belongings.

## 2019-06-14 NOTE — PROGRESS NOTES
Infectious Disease progress Note Requested by: Dr. Anthony Sanchez Reason: sepsis, gram negative bacteremia/bacteriuria Current abx Prior abx Ceftriaxone since 6/10 levofloxacin 6/10-6/12 Pip/tazo, vancomycin 6/9/19 Lines:  
 
 
Assessment : 
 
82-year-old male with h/o paroxysmal atrial fibrillation, type 2 DM, prostate cancer, Chronic neutrophilic leukocytosis,Iron Deficiency Anemia secondary to inadequate iron intake, thrombocytosis, and MGUS admitted to [facility] on 6/9/19 for unresponsiveness. Now with gram negative bacteremia, e.coli in urine cultures. Clinical picture c/w sepsis (POA) due to e.coli bloodstream infection (positive blood culture 6/9), e.coli emphysematous cystitis. Ct scan 6/9/19 suggestive of emphysematous cystitis. No clinical or radiographic evidence of ascending UTI. Quick clinical improvement Cons, diphtheroids with right ankle wound cultures likely colonizer. No clinical evidence of acute infection. Worsening thrombocytopenia: ?sepsis induced superimposed on chronic thrombocytopenia of unclear etiology. Elevated troponin: likely demand ischemia: cardiology f/u appreciated Recommendations: 1.  cont ceftriaxone. 2. F/u Repeat blood cultures 3. F/u hematology recommendations for thrombocytopenia 4. F/u cardiology recommendations 5. Will switch to po antibiotics when discharge planned Advance Care planning: full code: discussed  with patient/surrogate decision maker: Tonny Yusuf: [phone] Above plan was discussed in details with patient, and dr. Gilbert Javed. Please call me if any further questions or concerns. Will continue to participate in the care of this patient. HPI: 
 
Feels fine. Patient denies dysuria, lower abdominal pain, flank pain throughout this time.  Patient denies headaches, visual disturbances, sore throat, runny nose, earaches, cp, sob, chills, cough, abdominal pain, diarrhea, burning micturition, pain or weakness in extremities. He denies back pain/flank pain. home Medication List  
 Details NOVOLOG U-100 INSULIN ASPART 100 unit/mL injection INJECT AS DIRECTED PER SLIDING SCALE ( MAXM DOSE 15 UNITS /DAY) Refills: 0  
  
insulin glargine (LANTUS) 100 unit/mL injection 5 Units by SubCUTAneous route daily. Qty: 1 Vial, Refills: 0  
  
valsartan (DIOVAN) 40 mg tablet Take 1 Tab by mouth daily. Qty: 30 Tab, Refills: 6 Associated Diagnoses: Essential hypertension  
  
oxybutynin (DITROPAN) 5 mg tablet TAKE ONE TABLET BY MOUTH THREE TIMES DAILY Qty: 90 Tab, Refills: 8 Associated Diagnoses: Urinary frequency  
  
therapeutic multivitamin (THERAGRAN) tablet Take 1 Tab by mouth daily. Qty: 30 Tab, Refills: 0  
  
metoprolol tartrate (LOPRESSOR) 50 mg tablet Take 1 Tab by mouth every twelve (12) hours. Qty: 60 Tab, Refills: 0  
  
travoprost (TRAVATAN Z) 0.004 % ophthalmic solution Administer 1 Drop to both eyes two (2) times a day. Qty: 5 mL, Refills: 0  
  
brinzolamide (AZOPT) 1 % ophthalmic suspension Administer 1 Drop to both eyes two (2) times a day. Qty: 5 mL, Refills: 0  
  
brimonidine-timolol (COMBIGAN) 0.2-0.5 % drop ophthalmic solution Administer 1 Drop to both eyes every twelve (12) hours. Qty: 5 mL, Refills: 0  
  
calcium carbonate (CALCIUM 500) 500 mg calcium (1,250 mg) tablet Take 1 Tab by mouth daily. Qty: 90 Tab, Refills: 2 Associated Diagnoses: Malignant neoplasm of prostate ([facility] Monoclonal gammopathy  
  
atorvastatin (LIPITOR) 40 mg tablet Take 1 Tab by mouth nightly. Insulin Needles, Disposable, 31 gauge x 5/16\" ndle Use as needed. Qty: 1 Package, Refills: 11  
  
glucose 4 gram chewable tablet Take 4 Tabs by mouth as needed. Qty: 10 Tab, Refills: 0 Current Facility-Administered Medications Medication Dose Route Frequency  immune globulin 10% (GAMUNEX-C) infusion 80 g  80 g IntraVENous DAILY  ferrous sulfate tablet 325 mg  1 Tab Oral DAILY WITH BREAKFAST  insulin glargine (LANTUS) injection 10 Units  10 Units SubCUTAneous DAILY  insulin lispro (HUMALOG) injection 4 Units  4 Units SubCUTAneous TIDAC  dextrose 10% infusion 125-250 mL  125-250 mL IntraVENous PRN  predniSONE (DELTASONE) tablet 30 mg  30 mg Oral BID WITH MEALS  cefTRIAXone (ROCEPHIN) 2 g in sterile water (preservative free) 20 mL IV syringe  2 g IntraVENous Q24H  
 acetaminophen (TYLENOL) tablet 650 mg  650 mg Oral Q6H PRN  
 metoprolol tartrate (LOPRESSOR) tablet 25 mg  25 mg Oral Q12H  
 melatonin tablet 3 mg  3 mg Oral QHS PRN  
 atorvastatin (LIPITOR) tablet 40 mg  40 mg Oral QHS  therapeutic multivitamin (THERAGRAN) tablet 1 Tab  1 Tab Oral DAILY  valsartan (DIOVAN) tablet 40 mg  40 mg Oral DAILY  insulin lispro (HUMALOG) injection   SubCUTAneous AC&HS  
 glucose chewable tablet 16 g  4 Tab Oral PRN  
 glucagon (GLUCAGEN) injection 1 mg  1 mg IntraMUSCular PRN  
 sodium chloride (NS) flush 5-40 mL  5-40 mL IntraVENous Q8H  
 sodium chloride (NS) flush 5-40 mL  5-40 mL IntraVENous PRN  
 brimonidine (ALPHAGAN) 0.2 % ophthalmic solution 1 Drop  1 Drop Both Eyes BID And  
 timolol (TIMOPTIC) 0.25% ophthalmic solution  1 Drop Both Eyes BID  latanoprost (XALATAN) 0.005 % ophthalmic solution 1 Drop  1 Drop Both Eyes QPM  
 dorzolamide (TRUSOPT) 2 % ophthalmic solution 1 Drop  1 Drop Both Eyes TID Allergies: Iodinated contrast- oral and iv dye Temp (24hrs), Av.7 °F (36.5 °C), Min:97.1 °F (36.2 °C), Max:98.3 °F (36.8 °C) Visit Vitals /67 (BP 1 Location: Right arm, BP Patient Position: At rest) Pulse 78 Temp 97.4 °F (36.3 °C) Resp 20 Ht 6' 1\" (1.854 m) Wt 71.3 kg (157 lb 3.2 oz) SpO2 95% BMI 20.74 kg/m² ROS: 12 point ROS obtained in details. Pertinent positives as mentioned in HPI,  
otherwise negative Physical Exam: GENERAL:  The patient is alert and cooperative and denies acute distress at this time. VITAL SIGNS: reviewed HEENT:  Conjunctivae are clear, no acute drainage, anicteric. Facial symmetry. Pharynx is clear. TMs are clear. NECK:  Supple, no bruit, no adenopathy. CHEST:  Heart rate is 80 and regular. LUNGS:  Decreased at the bases, clear anteriorly. The patient has a deformity at the sternoclavicular border on the right, appears to be a callused old injury. ABDOMEN:  Soft, bowel sounds positive, nontender, no bruit detected. EXTREMITIES:  Multiple purpura over both arms. Generalized muscle atrophy. Several purpura over legs. Right hallux with open ulceration, 2.5 cm. Right plantar fourth metatarsal with shallow ulceration posterior right ankle. +2 pitting edema both LE . Positive dorsalis pedis and pedal pulses. Sacral decubiti. Labs: Results:  
Chemistry Recent Labs  
  06/13/19 
0510 06/12/19 
0040 * 236*  140  
K 4.5 4.0  
* 112* CO2 23 23 BUN 34* 28* CREA 0.91 1.00  
CA 7.9* 7.3* AGAP 5 5 BUCR 37* 28* CBC w/Diff Recent Labs  
  06/14/19 
1120 06/13/19 
0510 06/12/19 
0910 06/12/19 
0040 WBC 12.6 12.5 8.8 7.8  
RBC 2.53* 2.60* 2.29* 1.97* HGB 8.5* 8.6* 7.8* 6.6* HCT 26.5* 27.1* 24.5* 20.4* PLT 18* 30* 35* 32* GRANS  --  92*  --  92* LYMPH  --  5*  --  4*  
EOS  --  1  --  1 Microbiology Recent Labs  
  06/12/19 
1127 CULT NO GROWTH 2 DAYS  
  
 
 
RADIOLOGY: 
 
All available imaging studies/reports in [facility] care for this admission were reviewed Dr. Nehemiah Siddiqui, Infectious Disease Specialist 
[phone] June 14, 2019 
9:10 AM

## 2019-06-14 NOTE — PROGRESS NOTES
Hematology/Medical Oncology Progress Note Name: Zoila Ness : 1935 MRN: [id number] Date: 2019 9:48 AM 
  
[x]I have reviewed the flowsheet and previous days notes. Events overnight reviewed and discussed with nursing staff. Vital signs and records reviewed. Mr. Sachin Manuel is an 82-year-old -American man with a PMHx Prostate cancer, DM type 2, HTN A-Fib and  Chronic neutrophilic leukocytosis,Iron Deficiency Anemia secondary to inadequate iron intake, thrombocytosis, and MGUS . Admitted with NSTEMI to telemetry subsequently found to have thrombocytopenia for which we are following. ROS: 
Constitutional:  Negative for fever, chills, diaphoresis, activity change, appetite change and unexpected weight change. HENT: Negative for nosebleeds, congestion, facial swelling, mouth sores, trouble swallowing, neck pain, neck stiffness, voice change and postnasal drip. Eyes: Negative for photophobia, pain, discharge and itching. Respiratory: Negative for apnea, cough, choking, chest tightness, wheezing and stridor. Cardiovascular: Negative for chest pain, palpitations and leg swelling. Gastrointestinal: Negative for abdominal pain. Negative for nausea, diarrhea, constipation, blood in stool and rectal pain. Genitourinary: Negative for dysuria, urgency, hematuria, flank pain and difficulty urinating. Musculoskeletal: Negative for back pain, joint swelling, arthralgias and gait problem. Skin: Negative for color change, pallor, rash and wound. Neurological: Positive for generalized weakness. Negative for dizziness, facial asymmetry, speech difficulty, light-headedness and headaches. Hematological: Negative for adenopathy. Does not bruise/bleed easily. Psychiatric/Behavioral: Negative for hallucinations. Vital Signs:   
Visit Vitals /90 (BP 1 Location: Right arm, BP Patient Position: At rest) Pulse 70 Temp 97.6 °F (36.4 °C) Resp 20  6' 1\" (1.854 m) Wt 71.3 kg (157 lb 3.2 oz) SpO2 97% BMI 20.74 kg/m² O2 Device: Room air Temp (24hrs), Av.6 °F (36.4 °C), Min:97.4 °F (36.3 °C), Max:97.8 °F (36.6 °C) Intake/Output:  
Last shift:      No intake/output data recorded. Last 3 shifts: 1901 -  0700 In: 600 [P.O.:600] Out: 1600 [Urine:1600] Intake/Output Summary (Last 24 hours) at 2019 0751 Last data filed at 2019 5749 Gross per 24 hour Intake 600 ml Output 975 ml Net -375 ml Physical Exam: 
General: Appears comfortable, NAD. HEENT:  Anicteric sclerae; pink palpebral conjunctivae; mucosa moist 
Resp:  Symmetrical chest expansion, no accessory muscle use; good airway entry; no rales/ wheezing/ rhonchi noted CV:  S1, S2 present; reg rate and  rhythm GI:  Abdomen soft, non-tender; (+) active bowel sounds Extremities:  +2 pulses on all extremities Skin:  Warm; no rashes/ lesions noted Neurologic:  Non-focal. Oriented to person, place. DATA:  
Current Facility-Administered Medications Medication Dose Route Frequency  ferrous sulfate tablet 325 mg  1 Tab Oral DAILY WITH BREAKFAST  insulin glargine (LANTUS) injection 10 Units  10 Units SubCUTAneous DAILY  insulin lispro (HUMALOG) injection 4 Units  4 Units SubCUTAneous TIDAC  
 0.9% sodium chloride infusion  50 mL/hr IntraVENous CONTINUOUS  
 dextrose 10% infusion 125-250 mL  125-250 mL IntraVENous PRN  predniSONE (DELTASONE) tablet 30 mg  30 mg Oral BID WITH MEALS  cefTRIAXone (ROCEPHIN) 2 g in sterile water (preservative free) 20 mL IV syringe  2 g IntraVENous Q24H  
 acetaminophen (TYLENOL) tablet 650 mg  650 mg Oral Q6H PRN  
 metoprolol tartrate (LOPRESSOR) tablet 25 mg  25 mg Oral Q12H  
 melatonin tablet 3 mg  3 mg Oral QHS PRN  
 atorvastatin (LIPITOR) tablet 40 mg  40 mg Oral QHS  therapeutic multivitamin (THERAGRAN) tablet 1 Tab  1 Tab Oral DAILY  valsartan (DIOVAN) tablet 40 mg  40 mg Oral DAILY  insulin lispro (HUMALOG) injection   SubCUTAneous AC&HS  
 glucose chewable tablet 16 g  4 Tab Oral PRN  
 glucagon (GLUCAGEN) injection 1 mg  1 mg IntraMUSCular PRN  
 sodium chloride (NS) flush 5-40 mL  5-40 mL IntraVENous Q8H  
 sodium chloride (NS) flush 5-40 mL  5-40 mL IntraVENous PRN  
 brimonidine (ALPHAGAN) 0.2 % ophthalmic solution 1 Drop  1 Drop Both Eyes BID And  
 timolol (TIMOPTIC) 0.25% ophthalmic solution  1 Drop Both Eyes BID  latanoprost (XALATAN) 0.005 % ophthalmic solution 1 Drop  1 Drop Both Eyes QPM  
 dorzolamide (TRUSOPT) 2 % ophthalmic solution 1 Drop  1 Drop Both Eyes TID Labs: 
Recent Labs  
  06/13/19 
0510 06/12/19 
0910 06/12/19 
0040 WBC 12.5 8.8 7.8 HGB 8.6* 7.8* 6.6* HCT 27.1* 24.5* 20.4* PLT 30* 35* 32* Recent Labs  
  06/13/19 
0510 06/12/19 
0040  140  
K 4.5 4.0  
* 112* CO2 23 23 * 236* BUN 34* 28* CREA 0.91 1.00  
CA 7.9* 7.3* No results for input(s): PH, PCO2, PO2, HCO3, FIO2 in the last 72 hours. IMPRESSION:  
· Worsening Thrombocytopenia · Acute on chronic anemia · GNR Sepsis. · GNR UTI · Indeterminate elevation of troponin due to demand ischemia sec to sepsis · Paroxysmal atrial fibrillation, anticoagulation d/c as outpatient due to thrombocytopenia 
  
  
 
· PLAN:  
· Platelet count is 90,083. Awaiting am labs,While inpatient I will continue IVIG every 24h over the weekend. Continue prednisone 30mg BID. · Hgb 8.6. Ferritin is 942, Iron 25, TIBC 118, iron percent saturation 21 
· Continue current care · Family meeting scheduled for today with primary. · The patient is: [x] acutely ill Risk of deterioration: [] moderate  
 [] critically ill  [] high My assessment/plan was discussed with: 
[x]nursing []PT/OT   
[]respiratory therapy [x]Dr.Lloyd Lawson Silva MD  
  
[]family [] Elfreda Bamberger, NP

## 2019-06-14 NOTE — CDMP QUERY
Patient admitted with Sepsis, noted to have BMI 19.26 with weight loss with poor intake per RD 6/11. If possible, please document in progress notes and d/c summary if you are evaluating and/or treating any of the following: 
 
=>Weight Loss w/ or w/o mild malnutrition 
=>Underweight w/ or w/o mild malnutrition 
=>Cachexia w/ or w/o mild malnutrition =>Failure to Thrive w/ or w/o mild malnutrition =>Other explanation of clinical findings =>Clinically Undetermined (no explanation for clinical findings) The medical record reflects the following: 
   Risk Factors: 81 yo with sepsis and pressure ulcers Clinical Indicators: 
6/11 RD:   Nutrition Diagnosis: Unintended weight loss related to inadequate energy intake, lethargy and poor dentition as evidenced by 7 lb, 4.6% weight loss x month. Increased nutrient (protein) needs related to promotion of wound healing as evidenced by pt with unstageable and stage III pressure injury wounds. Body mass index is 19.26 kg/m² Treatment: RD:   Add supplements: Glucerna Shake TID. SLP consult for swallow evaluation.  
- Continue RD inpatient monitoring and evaluation Thank you. Heber Arriaga RN BSN CCDS [phone]

## 2019-06-14 NOTE — PROGRESS NOTES
Discharge planning: 
 
Patient is being recommended for SNF placement. This writer spoke with patient's daughter Balta Paige and was able to obtain freedom of choice. [facility]st choice ([facility]), 2nd choice [facility]) and 3rd choice ([address] [place], [place] [address]Nd [facility]). This writer sent out the referrals to all three places via [facility], for a potential Monday discharge. Patient is also being transferred to [facility]. Daughter is aware. Care management will continue to closely monitor for discharge planning. Lainey Gay. MS Care Manager Pager#: [phone]

## 2019-06-14 NOTE — ROUTINE PROCESS
1930-Bedside and verbal report from [facility], [address] [place] [address]. Pt resting, alert to self, pleasant, no c/o pain, no SOB on room air. Call bell within reach, on right side, turn team. Bed alarm on. 2030-Pt resting, NAD, no c/o pain, no SOB, call bell within reach, bed alarm on, pt clean/dry. 2130-Pt resting, NAD, no apparent pain, no SOB, call bell within reach, bed alarm on, pt clean/dry. 2230-Pt resting, NAD, no apparent pain, no SOB on room air. Call bell within reach. Pt turned to left side. Bed alarm on.  
 
0000-Pt sleeping, alert to self, no SOB, no c/o pain, call bell within reach, bed alarm on.  
 
0200-Pt sleeping, NAD, turned to right side,. No c/o pain, no SOB noted on room air. Call bell within each, bed alarm on.  
 
0330-Pt had BM, cleaned of stool, given bed bath, linen and gown changed, dressing changed on saccrum. No SOB, no c/o pain. Tolerated well. Turned to left side. 0530-Pt sleeping, NAD, no apparent pain, no SOB, call bell within reach, Bed alarm on.  
 
0700-Bedside and verbal report given to Mckenna Sears RN.

## 2019-06-15 NOTE — ROUTINE PROCESS
Bedside and Verbal shift change report given to Serena (oncoming nurse) by Joya Wallis (offgoing nurse). Report included the following information SBAR, Intake/Output, MAR, Recent Results and Cardiac Rhythm Sinus Rhythm.

## 2019-06-15 NOTE — PROGRESS NOTES
Occupational Therapy Note Patient: Mikayla Cristobal (18 y.o. male) Date: 6/15/2019 Diagnosis: NSTEMI (non-ST elevated myocardial infarction) ([facility] [address] [I21.4] Hypoglycemia [E16.2] NSTEMI (non-ST elevated myocardial infarction) ([facility] [address] [I21.4] Hypoglycemia [E16.2] NSTEMI (non-ST elevated myocardial infarction) ([facility] [address] Precautions: Skin Chart, occupational therapy assessment, plan of care, and goals were reviewed. Occupational Therapy treatment attempted. Patient is unable to participate due to: 
[]  Nausea/vomiting 
[]  Eating 
[]  Pain 
[]  Pt lethargic 
[]  Off Unit 
[x] Other: 
Hold OT at this time for pt's safety 2/2 pt's HGB is below therapeutic range (6.8). Will f/u later as appropriate. Thank you.  
 
AAKASH Chaparro

## 2019-06-15 NOTE — ROUTINE PROCESS
Bedside and Verbal shift change report received  from Aashish Pozo (off going nurse) to Serena (oncoming nurse).  Report included the following information SBAR, Intake/Output, MAR, Recent Results and Cardiac Rhythm Sinus Rhythm.

## 2019-06-15 NOTE — PROGRESS NOTES
SUBJECTIVE: 
 
Seen with daughter at bedside. Per daughter pt refusing to eat lunch, but has heard that he ate breakfast. No specific complaints. OBJECTIVE: 
 
/81 (BP 1 Location: Right arm, BP Patient Position: At rest)   Pulse 85   Temp 97 °F (36.1 °C)   Resp 16   Ht 6' 1\" (1.854 m)   Wt 78 kg (171 lb 14.4 oz)   SpO2 93%   BMI 22.68 kg/m² General appearance - alert, well appearing, and in no distress Chest - Good air entry noted in bases, no wheezes Heart - S1 and S2 normal 
Abdomen - soft, nondistended, Bowel sounds present Neurological - alert, normal speech, no focal findings noted in both UEs Extremities - + pedal edema noted ASSESSMENT: 
 
1. E coli Sepsis. 2. E coli UTI - emphysematous UTI on abx 3. Hypoglycemia due to sepsis, resolved 4. Indeterminate elevation of troponin due to demand ischemia sec to sepsis 5. Paroxysmal atrial fibrillation, anticoagulation d/c as outpatient due to thrombocytopenia 6. Thrombocytopenia s/p platelet transfusion, management per heme/onc 7. Acute on chronic anemia w/ drop in hgb to get prbc transfusion 8. HTN 
9. Hx prostate cancer 10. Decub/foot ulcers 11. Macrocytosis 12. Hypokalemia, better 13. 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 Pressure Ulcers bilat feet, POA 15. Cognitive impairment 16. Steroid induced hyperglycemia 17. Underweight with mild malnutrition PLAN: 
Negative repeat blood culture Blood transfusion and follow hgb D/w CM - Placement on Monday BMP:  
Lab Results Component Value Date/Time  06/15/2019 12:41 AM  
 K 4.1 06/15/2019 12:41 AM  
  (H) 06/15/2019 12:41 AM  
 CO2 23 06/15/2019 12:41 AM  
 AGAP 5 06/15/2019 12:41 AM  
  (H) 06/15/2019 12:41 AM  
 BUN 36 (H) 06/15/2019 12:41 AM  
 CREA 0.76 06/15/2019 12:41 AM  
 GFRAA >60 06/15/2019 12:41 AM  
 GFRNA >60 06/15/2019 12:41 AM  
 
CBC:  
Lab Results Component Value Date/Time WBC 7.5 06/15/2019 12:41 AM  
 HGB 6.8 (L) 06/15/2019 12:41 AM  
 HCT 20.4 (L) 06/15/2019 12:41 AM  
 PLT 21 (LL) 06/15/2019 12:41 AM

## 2019-06-15 NOTE — PROGRESS NOTES
Hematology/Medical Oncology Progress Note Name: Shauna Mosquera : 1935 MRN: [id number] Date: 6/15/2019 9:48 AM 
  
[x]I have reviewed the flowsheet and previous days notes. Events overnight reviewed and discussed with nursing staff. Vital signs and records reviewed. Mr. Cherie Gardner is an 53-year-old -American man with a PMHx Prostate cancer, DM type 2, HTN A-Fib and  Chronic neutrophilic leukocytosis,Iron Deficiency Anemia secondary to inadequate iron intake, thrombocytosis, and MGUS . Admitted with NSTEMI to telemetry subsequently found to have thrombocytopenia for which we are following. Pt sitting up in bed eating breakfast. 
 
           
ROS: 
Constitutional:  Negative for fever, chills, diaphoresis, activity change, appetite change and unexpected weight change. HENT: Negative for nosebleeds, congestion, facial swelling, mouth sores, trouble swallowing, neck pain, neck stiffness, voice change and postnasal drip. Eyes: Negative for photophobia, pain, discharge and itching. Respiratory: Negative for apnea, cough, choking, chest tightness, wheezing and stridor. Cardiovascular: Negative for chest pain, palpitations and leg swelling. Gastrointestinal: Negative for abdominal pain. Negative for nausea, diarrhea, constipation, blood in stool and rectal pain. Genitourinary: Negative for dysuria, urgency, hematuria, flank pain and difficulty urinating. Musculoskeletal: Negative for back pain, joint swelling, arthralgias and gait problem. Skin: Negative for color change, pallor, rash and wound. Neurological: Positive for generalized weakness. Negative for dizziness, facial asymmetry, speech difficulty, light-headedness and headaches. Hematological: Negative for adenopathy. Does not bruise/bleed easily. Psychiatric/Behavioral: Negative for hallucinations. Vital Signs:   
Visit Vitals /87 (BP 1 Location: Right arm, BP Patient Position: At rest) Pulse 67  
 Temp 97.4 °F (36.3 °C) Resp 19 Ht 6' 1\" (1.854 m) Wt 78 kg (171 lb 14.4 oz) SpO2 97% BMI 22.68 kg/m² O2 Device: Room air Temp (24hrs), Av.7 °F (36.5 °C), Min:97 °F (36.1 °C), Max:98.5 °F (36.9 °C) Intake/Output:  
Last shift:      No intake/output data recorded. Last 3 shifts:  190 - 06/15 0700 In: 2791.7 [P.O.:50; I.V.:2741.7] Out: 1475 [QGFT] Intake/Output Summary (Last 24 hours) at 6/15/2019 0961 Last data filed at 6/15/2019 6454 Gross per 24 hour Intake 2741.67 ml Output 1000 ml Net 1741.67 ml Physical Exam: 
General: Appears comfortable, NAD. HEENT:  Anicteric sclerae; pink palpebral conjunctivae; mucosa moist 
Resp:  Symmetrical chest expansion, no accessory muscle use; good airway entry; no rales/ wheezing/ rhonchi noted CV:  S1, S2 present; reg rate and  rhythm GI:  Abdomen soft, non-tender; (+) active bowel sounds Extremities:  +2 pulses on all extremities Skin:  Warm; sporatic bruising to exts Neurologic:  Non-focal. Oriented to person, place. DATA:  
Current Facility-Administered Medications Medication Dose Route Frequency  0.9% sodium chloride infusion 250 mL  250 mL IntraVENous PRN  
 acetaminophen (TYLENOL) tablet 650 mg  650 mg Oral ONCE  
 diphenhydrAMINE (BENADRYL) capsule 25 mg  25 mg Oral ONCE  
 immune globulin 10% (GAMUNEX-C) infusion 80 g  80 g IntraVENous DAILY  ferrous sulfate tablet 325 mg  1 Tab Oral DAILY WITH BREAKFAST  insulin glargine (LANTUS) injection 10 Units  10 Units SubCUTAneous DAILY  insulin lispro (HUMALOG) injection 4 Units  4 Units SubCUTAneous TIDAC  dextrose 10% infusion 125-250 mL  125-250 mL IntraVENous PRN  predniSONE (DELTASONE) tablet 30 mg  30 mg Oral BID WITH MEALS  cefTRIAXone (ROCEPHIN) 2 g in sterile water (preservative free) 20 mL IV syringe  2 g IntraVENous Q24H  
 acetaminophen (TYLENOL) tablet 650 mg  650 mg Oral Q6H PRN  
  metoprolol tartrate (LOPRESSOR) tablet 25 mg  25 mg Oral Q12H  
 melatonin tablet 3 mg  3 mg Oral QHS PRN  
 atorvastatin (LIPITOR) tablet 40 mg  40 mg Oral QHS  therapeutic multivitamin (THERAGRAN) tablet 1 Tab  1 Tab Oral DAILY  valsartan (DIOVAN) tablet 40 mg  40 mg Oral DAILY  insulin lispro (HUMALOG) injection   SubCUTAneous AC&HS  
 glucose chewable tablet 16 g  4 Tab Oral PRN  
 glucagon (GLUCAGEN) injection 1 mg  1 mg IntraMUSCular PRN  
 sodium chloride (NS) flush 5-40 mL  5-40 mL IntraVENous Q8H  
 sodium chloride (NS) flush 5-40 mL  5-40 mL IntraVENous PRN  
 brimonidine (ALPHAGAN) 0.2 % ophthalmic solution 1 Drop  1 Drop Both Eyes BID And  
 timolol (TIMOPTIC) 0.25% ophthalmic solution  1 Drop Both Eyes BID  latanoprost (XALATAN) 0.005 % ophthalmic solution 1 Drop  1 Drop Both Eyes QPM  
 dorzolamide (TRUSOPT) 2 % ophthalmic solution 1 Drop  1 Drop Both Eyes TID Labs: 
Recent Labs  
  06/15/19 
0041 06/14/19 
1120 06/13/19 
0510 WBC 7.5 12.6 12.5 HGB 6.8* 8.5* 8.6* HCT 20.4* 26.5* 27.1*  
PLT 21* 18* 30* Recent Labs  
  06/15/19 
0041 06/13/19 
0510  138  
K 4.1 4.5  
* 110* CO2 23 23 * 182* BUN 36* 34* CREA 0.76 0.91  
CA 7.1* 7.9* No results for input(s): PH, PCO2, PO2, HCO3, FIO2 in the last 72 hours. IMPRESSION:  
· Worsening Thrombocytopenia · Acute on chronic anemia · GNR Sepsis. · GNR UTI · Indeterminate elevation of troponin due to demand ischemia sec to sepsis · Paroxysmal atrial fibrillation, anticoagulation d/c as outpatient due to thrombocytopenia 
  
  
 
· PLAN:  
· Platelet count is 63,775 and will continue IVIG every 24h over the weekend. · Continue prednisone 30mg BID. · Hgb 6.8, I will order 1 unit PRBC's to be transfused today.  I have explained to the patient the need for blood transfusion this am, his response \" yes its good\" did not exemplify clear understanding of my conversation with him this morning. · Blood Cx no growth X 3 days · The patient is: [x] acutely ill Risk of deterioration: [] moderate  
 [] critically ill  [] high My assessment/plan was discussed with: 
[x]nursing []PT/OT   
[]respiratory therapy [x]Dr.Lloyd Dorthey Riedel, MD  
  
[]family [] Kandyce Goldberg, NP

## 2019-06-15 NOTE — PROGRESS NOTES
Spoke with Dr. Og Reyes and ok to give tomorrorow's dose of immunoglobulin dose for tomorrow today after blood finishes transfusing.

## 2019-06-16 NOTE — ROUTINE PROCESS
Bedside and Verbal shift change report given to Becky Juarez (oncoming nurse) by Mimi Romano (offgoing nurse). Report included the following information SBAR, Intake/Output, MAR, Recent Results and Cardiac Rhythm Sinus Rhythm.

## 2019-06-16 NOTE — PROGRESS NOTES
Hematology/Medical Oncology Progress Note Name: Ramy Herzog : 1935 MRN: [id number] Date: 2019 9:48 AM 
  
[x]I have reviewed the flowsheet and previous days notes. Events overnight reviewed and discussed with nursing staff. Vital signs and records reviewed. Mr. Thomas Jackson is an 29-year-old -American man with a PMHx Prostate cancer, DM type 2, HTN A-Fib and  Chronic neutrophilic leukocytosis,Iron Deficiency Anemia secondary to inadequate iron intake, thrombocytosis, and MGUS . Admitted with NSTEMI to telemetry subsequently found to have thrombocytopenia for which we are following. Sitting up in bed eating breakfast.           
ROS: 
Constitutional:  Negative for fever, chills, diaphoresis, activity change, appetite change and unexpected weight change. HENT: Negative for nosebleeds, congestion, facial swelling, mouth sores, trouble swallowing, neck pain, neck stiffness, voice change and postnasal drip. Eyes: Negative for photophobia, pain, discharge and itching. Respiratory: Negative for apnea, cough, choking, chest tightness, wheezing and stridor. Cardiovascular: Negative for chest pain, palpitations and leg swelling. Gastrointestinal: Negative for abdominal pain. Negative for nausea, diarrhea, constipation, blood in stool and rectal pain. Genitourinary: Negative for dysuria, urgency, hematuria, flank pain and difficulty urinating. Musculoskeletal: Negative for back pain, joint swelling, arthralgias and gait problem. Skin: Negative for color change, pallor, rash and wound. Neurological: Positive for generalized weakness. Negative for dizziness, facial asymmetry, speech difficulty, light-headedness and headaches. Hematological: Negative for adenopathy. Does not bruise/bleed easily. Psychiatric/Behavioral: Negative for hallucinations. Vital Signs:   
Visit Vitals /86 (BP 1 Location: Right arm, BP Patient Position: At rest) Pulse 66  
 Temp 97.4 °F (36.3 °C) Resp 15 Ht 6' 1\" (1.854 m) Wt 78.5 kg (173 lb) SpO2 97% BMI 22.82 kg/m² O2 Device: Room air Temp (24hrs), Av.6 °F (36.4 °C), Min:97 °F (36.1 °C), Max:98.1 °F (36.7 °C) Intake/Output:  
Last shift:      No intake/output data recorded. Last 3 shifts:  1901 -  0700 In: 330 Out: 2950 [Urine:2950] Intake/Output Summary (Last 24 hours) at 2019 6588 Last data filed at 2019 2577 Gross per 24 hour Intake 330 ml Output 1500 ml Net -1170 ml Physical Exam: 
General: Appears comfortable, NAD. HEENT:  Anicteric sclerae; pink palpebral conjunctivae; mucosa moist 
Resp:  Symmetrical chest expansion, no accessory muscle use; good airway entry; no rales/ wheezing/ rhonchi noted CV:  S1, S2 present; reg rate and  rhythm GI:  Abdomen soft, non-tender; (+) active bowel sounds Extremities:  +2 pulses on all extremities Skin:  Warm; sporatic bruising to exts Neurologic:  Non-focal. Oriented to person, place. DATA:  
Current Facility-Administered Medications Medication Dose Route Frequency  0.9% sodium chloride infusion 250 mL  250 mL IntraVENous PRN  
 immune globulin 10% (GAMUNEX-C) infusion 80 g  80 g IntraVENous DAILY  ferrous sulfate tablet 325 mg  1 Tab Oral DAILY WITH BREAKFAST  insulin glargine (LANTUS) injection 10 Units  10 Units SubCUTAneous DAILY  insulin lispro (HUMALOG) injection 4 Units  4 Units SubCUTAneous TIDAC  dextrose 10% infusion 125-250 mL  125-250 mL IntraVENous PRN  predniSONE (DELTASONE) tablet 30 mg  30 mg Oral BID WITH MEALS  cefTRIAXone (ROCEPHIN) 2 g in sterile water (preservative free) 20 mL IV syringe  2 g IntraVENous Q24H  
 acetaminophen (TYLENOL) tablet 650 mg  650 mg Oral Q6H PRN  
 metoprolol tartrate (LOPRESSOR) tablet 25 mg  25 mg Oral Q12H  
 melatonin tablet 3 mg  3 mg Oral QHS PRN  
 atorvastatin (LIPITOR) tablet 40 mg  40 mg Oral QHS  therapeutic multivitamin (THERAGRAN) tablet 1 Tab  1 Tab Oral DAILY  valsartan (DIOVAN) tablet 40 mg  40 mg Oral DAILY  insulin lispro (HUMALOG) injection   SubCUTAneous AC&HS  
 glucose chewable tablet 16 g  4 Tab Oral PRN  
 glucagon (GLUCAGEN) injection 1 mg  1 mg IntraMUSCular PRN  
 sodium chloride (NS) flush 5-40 mL  5-40 mL IntraVENous Q8H  
 sodium chloride (NS) flush 5-40 mL  5-40 mL IntraVENous PRN  
 brimonidine (ALPHAGAN) 0.2 % ophthalmic solution 1 Drop  1 Drop Both Eyes BID And  
 timolol (TIMOPTIC) 0.25% ophthalmic solution  1 Drop Both Eyes BID  latanoprost (XALATAN) 0.005 % ophthalmic solution 1 Drop  1 Drop Both Eyes QPM  
 dorzolamide (TRUSOPT) 2 % ophthalmic solution 1 Drop  1 Drop Both Eyes TID Labs: 
Recent Labs  
  06/15/19 
0041 06/14/19 
1120 WBC 7.5 12.6 HGB 6.8* 8.5* HCT 20.4* 26.5*  
PLT 21* 18* Recent Labs  
  06/15/19 
0041   
K 4.1 * CO2 23 * BUN 36* CREA 0.76  
CA 7.1* No results for input(s): PH, PCO2, PO2, HCO3, FIO2 in the last 72 hours. IMPRESSION:  
· Worsening Thrombocytopenia · Acute on chronic anemia · GNR Sepsis. · GNR UTI · Indeterminate elevation of troponin due to demand ischemia sec to sepsis · Paroxysmal atrial fibrillation, anticoagulation d/c as outpatient due to thrombocytopenia 
  
  
 
· PLAN:  
· Platelet count on yesterday was 21,000 and will continue IVIG for today. Awaiting platelet count results from this am.  
· Continue prednisone 30mg BID. · Hgb was  6.8 on yesterday, recieved 1 unit PRBC's. Awaiting results from post       CBC. · Tentative plan is for placement to long term care facility on tomorrow. · Pt will need to follow up with Dr. Karlos Montiel within 5-7 days after discharge, please arrange. · The patient is: [x] acutely ill Risk of deterioration: [] moderate  
 [] critically ill  [] high My assessment/plan was discussed with: 
 [x]nursing []PT/OT   
[]respiratory therapy [x]Dr.Lloyd Shanda Wood MD  
  
[]family [] Roselyn Steel, INDIRA

## 2019-06-16 NOTE — PROGRESS NOTES
SUBJECTIVE: 
 
Pt asleep, easily woke up, had complaints of feeling cold. OBJECTIVE: 
 
/70 (BP 1 Location: Right arm, BP Patient Position: At rest)   Pulse 69   Temp 97.3 °F (36.3 °C)   Resp 19   Ht 6' 1\" (1.854 m)   Wt 78.5 kg (173 lb)   SpO2 98%   BMI 22.82 kg/m² General appearance - somnolent and in no distress Chest - Good air entry noted in bases, no wheezes Heart - S1 and S2 normal 
Abdomen - soft, nondistended, Bowel sounds present Neurological - alert, normal speech, no focal findings noted in both UEs Extremities - + pedal edema noted ASSESSMENT: 
 
1. E coli Sepsis on abx. 2. E coli UTI - emphysematous UTI on abx 3. Hypoglycemia due to sepsis, resolved 4. Indeterminate elevation of troponin due to demand ischemia sec to sepsis 5. Paroxysmal atrial fibrillation, anticoagulation d/c as outpatient due to thrombocytopenia 6. Thrombocytopenia s/p platelet transfusion, management per heme/onc 7. Acute on chronic anemia w/ drop in hgb to get prbc transfusion 8. HTN 
9. Hx prostate cancer 10. Decub/foot ulcers 11. Macrocytosis 12. Hypokalemia, better 13. 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 Pressure Ulcers bilat feet, POA 15. Cognitive impairment 16. Steroid induced hyperglycemia 17. Underweight with mild malnutrition PLAN: 
Negative repeat blood culture Blood transfusion and follow hgb BMP:  
No results found for: NA, K, CL, CO2, AGAP, GLU, BUN, CREA, GFRAA, GFRNA 
CBC:  
Lab Results Component Value Date/Time  WBC 8.0 06/16/2019 01:33 PM  
 HGB 8.8 (L) 06/16/2019 01:33 PM  
 HCT 26.6 (L) 06/16/2019 01:33 PM  
 PLT 19 (LL) 06/16/2019 01:33 PM

## 2019-06-16 NOTE — PROGRESS NOTES
-AM.--Bedside and Verbal shift change report given to this RN (oncoming nurse) by [employer] (offgoing nurse). Report included the following information SBAR, Kardex and Cardiac Rhythm AFIB. -Completed dressing changes for pt per protocol. Tolerated well. Pt has also been receiving immunoglobulin which he tolerated with no problems. Titrated rate up to 150 ml/hr Bedside and Verbal shift change report given to Rosita Tejada (oncoming nurse) by this nurse (offgoing nurse). Report included the following information SBAR and Kardex. and cardiac rhythm NSR

## 2019-06-17 NOTE — PROGRESS NOTES
Hematology/Medical Oncology Progress Note Name: Abhilash Chapman : 1935 MRN: [id number] Date: 2019 9:48 AM 
  
[x]I have reviewed the flowsheet and previous days notes. Events overnight reviewed and discussed with nursing staff. Vital signs and records reviewed. Mr. Aide Luong is an 26-year-old -American man with a PMHx Prostate cancer, DM type 2, HTN A-Fib and  Chronic neutrophilic leukocytosis,Iron Deficiency Anemia secondary to inadequate iron intake, thrombocytosis, and MGUS . Admitted with NSTEMI to telemetry subsequently found to have thrombocytopenia for which we are following. ROS: 
Constitutional:  Negative for fever, chills, diaphoresis, activity change, appetite change and unexpected weight change. HENT: Negative for nosebleeds, congestion, facial swelling, mouth sores, trouble swallowing, neck pain, neck stiffness, voice change and postnasal drip. Eyes: Negative for photophobia, pain, discharge and itching. Respiratory: Negative for apnea, cough, choking, chest tightness, wheezing and stridor. Cardiovascular: Negative for chest pain, palpitations and leg swelling. Gastrointestinal: Negative for abdominal pain. Negative for nausea, diarrhea, constipation, blood in stool and rectal pain. Genitourinary: Negative for dysuria, urgency, hematuria, flank pain and difficulty urinating. Musculoskeletal: Negative for back pain, joint swelling, arthralgias and gait problem. Skin: Negative for color change, pallor, rash and wound. Neurological: Positive for generalized weakness. Negative for dizziness, facial asymmetry, speech difficulty, light-headedness and headaches. Hematological: Negative for adenopathy. Does not bruise/bleed easily. Psychiatric/Behavioral: Negative for hallucinations. Vital Signs:   
Visit Vitals /81 (BP 1 Location: Right arm, BP Patient Position: At rest) Pulse 66 Temp 98.9 °F (37.2 °C) Resp 16  
  6' 1\" (1.854 m) Wt 79.8 kg (175 lb 14.4 oz) SpO2 96% BMI 23.21 kg/m² O2 Device: Room air Temp (24hrs), Av.3 °F (36.8 °C), Min:97.3 °F (36.3 °C), Max:99 °F (37.2 °C) Intake/Output:  
Last shift:       07 - 1900 In: 120 [P.O.:120] Out: - Last 3 shifts: 06/15 1901 -  0700 In: 480 [P.O.:480] Out: 3350 [CHARK:7783] Intake/Output Summary (Last 24 hours) at 2019 0203 Last data filed at 2019 1658 Gross per 24 hour Intake 360 ml Output 2750 ml Net -2390 ml Physical Exam: 
General: Appears comfortable, NAD. HEENT:  Anicteric sclerae; pink palpebral conjunctivae; mucosa moist 
Resp:  Symmetrical chest expansion, no accessory muscle use; good airway entry; no rales/ wheezing/ rhonchi noted CV:  S1, S2 present; reg rate and  rhythm GI:  Abdomen soft, non-tender; (+) active bowel sounds Extremities:  +2 pulses on all extremities Skin:  Warm; sporatic bruising to exts Neurologic:  Non-focal. Oriented to person, place. DATA:  
Current Facility-Administered Medications Medication Dose Route Frequency  0.9% sodium chloride infusion 250 mL  250 mL IntraVENous PRN  
 ferrous sulfate tablet 325 mg  1 Tab Oral DAILY WITH BREAKFAST  insulin glargine (LANTUS) injection 10 Units  10 Units SubCUTAneous DAILY  insulin lispro (HUMALOG) injection 4 Units  4 Units SubCUTAneous TIDAC  dextrose 10% infusion 125-250 mL  125-250 mL IntraVENous PRN  predniSONE (DELTASONE) tablet 30 mg  30 mg Oral BID WITH MEALS  cefTRIAXone (ROCEPHIN) 2 g in sterile water (preservative free) 20 mL IV syringe  2 g IntraVENous Q24H  
 acetaminophen (TYLENOL) tablet 650 mg  650 mg Oral Q6H PRN  
 metoprolol tartrate (LOPRESSOR) tablet 25 mg  25 mg Oral Q12H  
 melatonin tablet 3 mg  3 mg Oral QHS PRN  
 atorvastatin (LIPITOR) tablet 40 mg  40 mg Oral QHS  therapeutic multivitamin (THERAGRAN) tablet 1 Tab  1 Tab Oral DAILY  valsartan (DIOVAN) tablet 40 mg  40 mg Oral DAILY  insulin lispro (HUMALOG) injection   SubCUTAneous AC&HS  
 glucose chewable tablet 16 g  4 Tab Oral PRN  
 glucagon (GLUCAGEN) injection 1 mg  1 mg IntraMUSCular PRN  
 sodium chloride (NS) flush 5-40 mL  5-40 mL IntraVENous Q8H  
 sodium chloride (NS) flush 5-40 mL  5-40 mL IntraVENous PRN  
 brimonidine (ALPHAGAN) 0.2 % ophthalmic solution 1 Drop  1 Drop Both Eyes BID And  
 timolol (TIMOPTIC) 0.25% ophthalmic solution  1 Drop Both Eyes BID  latanoprost (XALATAN) 0.005 % ophthalmic solution 1 Drop  1 Drop Both Eyes QPM  
 dorzolamide (TRUSOPT) 2 % ophthalmic solution 1 Drop  1 Drop Both Eyes TID Labs: 
Recent Labs  
  06/17/19 
0403 06/16/19 
1333 06/15/19 
0041 WBC 6.7 8.0 7.5 HGB 7.6* 8.8* 6.8* HCT 22.9* 26.6* 20.4* PLT 19* 19* 21* Recent Labs  
  06/15/19 
0041   
K 4.1 * CO2 23 * BUN 36* CREA 0.76  
CA 7.1* No results for input(s): PH, PCO2, PO2, HCO3, FIO2 in the last 72 hours. IMPRESSION:  
· Worsening Thrombocytopenia · Acute on chronic anemia · GNR Sepsis. · GNR UTI · Indeterminate elevation of troponin due to demand ischemia sec to sepsis · Paroxysmal atrial fibrillation, anticoagulation d/c as outpatient due to thrombocytopenia 
  
 
  
  
PLAN:  
· Platelet count 08,933 and I will give an additional dose of IVIG today prior to discharge. · Continue prednisone 30mg BID will continue after discharge. Pt will need to follow up with Dr. Soledad Obregon within 5-7 days after discharge, please arrange. · Hgb 7.6 s/p 1 unit PRBC's X 2 days ago. Continue oral iron supplements. · Tentative plan is for placement to long term care facility today. · The patient is: [x] acutely ill Risk of deterioration: [] moderate  
 [] critically ill  [] high My assessment/plan was discussed with: 
[x]nursing []PT/OT []respiratory therapy [x]Dr.Lloyd Chencho Sky MD  
  
[]family [] Bridgette Moran NP

## 2019-06-17 NOTE — ROUTINE PROCESS
Bedside shift change report given to Mallory Brady RN (oncoming nurse) by Erum Higgins RN (offgoing nurse). Report included the following information SBAR, Kardex, Intake/Output, Recent Results and Cardiac Rhythm NSR.

## 2019-06-17 NOTE — ACP (ADVANCE CARE PLANNING)
Palliative Medicine Pt does have an Advance Directive on file in EMR. Primary Decision [facility] between [facility] (Health Care Agent): Jenetta Kehr Relationship to patient:  Adult daughter Phone number:  [phone] [x] Named in a scanned document  
[] Legal Next of Kin 
[] Guardian Secondary Decision Maker (First Alternate Health Care Agent):  None ACP documents you currently have include: 
[x] Advance Directive or Living Will 
[] Durable Do Not Resuscitate [x] Physician Orders for Scope of Treatment (POST) [] Medical Power of  
[x] Other - General Power of [employer] POST form completed today by pt's daughter/mPOA Jenetta Kehr and by Araceli Form - Palliative Medicine NP. POST form denotes DDNR status, limited additional interventions (NO intubation, ok with CPAP/BiPAP, avoid ICU) and NO feeding tube. One copy was placed on the chart for scanning into EMR. 3 additional copies were given to the mPOA/pt's daughter Dimitris. Pt remains DNR/DNI. Dispo plan is SNF for rehab. Thank you for the Palliative Medicine consult and allowing us to participate in the care of Mr. Marcellus Estrella. Will continue to monitor and provide support. VANI Han Palliative Medicine Inpatient RN DR. [facility] Palliative COPE Line: [phone])

## 2019-06-17 NOTE — PROGRESS NOTES
NUTRITION Nursing Referral: Pressure Injury RECOMMENDATIONS / PLAN:  
 
- Add double protein portion. 
- Clarify supplement flavor preferences. - Continue RD inpatient monitoring and evaluation. NUTRITION INTERVENTIONS & DIAGNOSIS:  
 
[x] Meals/snacks: modify composition 
[x] Medical food supplement therapy: Glucerna Shake, TID Nutrition Diagnosis: Unintended weight loss related to inadequate energy intake, lethargy and poor dentition as evidenced by 7 lb, 4.6% weight loss x month. Increased nutrient (protein) needs related to promotion of wound healing as evidenced by pt with unstageable and stage III pressure injury wounds. ASSESSMENT:  
 
6/17: Eating 100% of meals and consuming supplements. Still hungry and asking for more foods. Explained the importance of consistent carbohydrates for BG control, agreeable to double protein portion and lantus dose increased. Tolerating diet, BM overnight. Palliative following, goals of care include no feeding tube. 6/11: Admitted with hypoglycemia, found down and unresponsive at home by Caregiver. Daughter at bedside states pt eating well PTA and tolerating soft foods, now with fair appetite and difficulty eating 2/2 mentation and limited dexterity per RN. Discussed with daughter the importance of adequate meal intake- especially protein and importance of limiting sodium to prevent fluid accumulation and concentrated sweets- cautioned over restricting due to concern for hypoglycemia. Average po intake adequate to meet patients estimated nutritional needs:   [x] Yes     [] No   [] Unable to determine at this time Diet: DIET NUTRITIONAL SUPPLEMENTS Breakfast, Lunch, Dinner; [address] 
DIET DIABETIC CONSISTENT CARB Soft Solids Food Allergies: NKFA Current Appetite:   [x] Good     [] Fair     [] Poor     [x] Other: self feeding difficulty Appetite/meal intake prior to admission:   [x] Good per daughter report [] Fair     [] Poor     [] Other: 
Feeding Limitations:  [x] Swallowing difficulty: SLP following    [x] Chewing difficulty: poor dentition with dentures     [x] Other: limited dexterity and weakness with difficulty feeding himself and resistant to assistance eating from nursing Current Meal Intake:  
Patient Vitals for the past 100 hrs: 
 % Diet Eaten 06/17/19 0843 100 % 06/16/19 1243 100 % 06/16/19 0856 75 % 06/13/19 1727 0 % 06/13/19 1245 10 % 06/13/19 0920 75 % BM: 6/16 Skin Integrity: right ankle and right toe unstageable pressure injuries, sacral stage III pressure injury Edema:   [] No     [x] Yes Pertinent Medications: Reviewed: ferrous sulfate, lantus, SSI, theragran, steroid Recent Labs  
  06/15/19 
0041   
K 4.1 * CO2 23 * BUN 36* CREA 0.76  
CA 7.1* Intake/Output Summary (Last 24 hours) at 6/17/2019 1207 Last data filed at 6/17/2019 5484 Gross per 24 hour Intake 360 ml Output 2750 ml Net -2390 ml Anthropometrics: 
Ht Readings from Last 1 Encounters:  
06/10/19 6' 1\" (1.854 m) Last 3 Recorded Weights in this Encounter 06/15/19 0696 06/16/19 3054 06/17/19 6814 Weight: 78 kg (171 lb 14.4 oz) 78.5 kg (173 lb) 79.8 kg (175 lb 14.4 oz) Body mass index is 23.21 kg/m². Weight History: 80 lb weight loss x several years- down to 127 lb then in March 2019 started to gain weight back x several months- up to 153 lb per daughter report; now with weight loss over the past month (7 lb, 4.6% weight loss) down to current weight of 146 lb (may be falsely high d/t edema, fluid accumulation on admission) Weight Metrics 6/17/2019 6/7/2019 5/15/2019 3/27/2019 3/18/2019 2/13/2019 12/4/2018 Weight 175 lb 14.4 oz 144 lb 153 lb 141 lb 136 lb 127 lb 6.4 oz 132 lb 6.4 oz BMI 23.21 kg/m2 19 kg/m2 20.19 kg/m2 18.6 kg/m2 17.94 kg/m2 16.81 kg/m2 17.47 kg/m2 Admitting Diagnosis: NSTEMI (non-ST elevated myocardial infarction) ([facility] [address] [I21.4] Hypoglycemia [E16.2] NSTEMI (non-ST elevated myocardial infarction) ([facility] [address] [I21.4] Hypoglycemia [E16.2] Pertinent PMHx: DM, HTN, prostate cancer Education Needs:        [x] None identified  [] Identified - Not appropriate at this time  []  Identified and addressed - refer to education log Learning Limitations:   [] None identified  [x] Identified: altered mentation/confusion at times Cultural, Religion & ethnic food preferences:  [x] None identified    [] Identified and addressed ESTIMATED NUTRITION NEEDS:  
 
Calories: 3000-5220 kcal (30-35 kcal/kg) based on  [x] Actual BW 66 kg     [] IBW Protein: 79-99 gm (1.2-1.5 gm/kg) based on  [x] Actual BW      [] IBW Fluid: 1 mL/kcal 
  
MONITORING & EVALUATION:  
 
Nutrition Goal(s): 1. Po intake of meals will meet >75% of patient estimated nutritional needs within the next 7 days. Outcome:  [x] Met/Ongoing    [] Progressing towards goal    [] Not progressing towards goal    [] New/Initial goal  
 
Monitoring:   [x] Food and beverage intake   [x] Diet order   [x] Nutrition-focused physical findings   [x] Treatment/therapy   [] Weight   [] Enteral nutrition intake Previous Recommendations (for follow-up assessments only):     [x]   Implemented       []   Not Implemented (RD to address)      [] No Longer Appropriate     [] No Recommendation Made Discharge Planning: cardiac, diabetic soft solid diet as tolerated   
[x] Participated in care planning, discharge planning, & interdisciplinary rounds as appropriate Bandar Hays RD Pager: [phone]

## 2019-06-17 NOTE — ROUTINE PROCESS
Bedside and Verbal shift change report given to [address] [place] [address] (oncoming nurse) by Ruby Vega RN 
 (offgoing nurse). Report included the following information SBAR, Kardex, Intake/Output, MAR and Recent Results.

## 2019-06-17 NOTE — PROGRESS NOTES
Infectious Disease progress Note Requested by: Dr. Es Dodson Reason: sepsis, gram negative bacteremia/bacteriuria Current abx Prior abx Ceftriaxone since 6/10 levofloxacin 6/10- Pip/tazo, vancomycin 19 Lines:  
 
 
Assessment : 
 
82-year-old male with h/o paroxysmal atrial fibrillation, type 2 DM, prostate cancer, Chronic neutrophilic leukocytosis,Iron Deficiency Anemia secondary to inadequate iron intake, thrombocytosis, and MGUS admitted to [facility] on 19 for unresponsiveness. Now with gram negative bacteremia, e.coli in urine cultures. Clinical picture c/w sepsis (POA) due to e.coli bloodstream infection (positive blood culture , negative blood culture ), e.coli emphysematous cystitis. Ct scan 19 suggestive of emphysematous cystitis. No clinical or radiographic evidence of ascending UTI. Quick clinical improvement Cons, diphtheroids with right ankle wound cultures likely colonizer. No clinical evidence of acute infection. Worsening thrombocytopenia: ?sepsis induced superimposed on chronic thrombocytopenia of unclear etiology. Elevated troponin: likely demand ischemia: cardiology f/u appreciated Recommendations: 
 
1. D/c ceftriaxone. Start po ciprofloxacin till 19 
2. F/u hematology recommendations for thrombocytopenia 3. F/u cardiology recommendations 4. Ok to d/c patient from ID standpoint Advance Care planning: full code: discussed  with patient/surrogate decision maker: Galen [phone] Above plan was discussed in details with patient, and dr. Bryce Dean. Please call me if any further questions or concerns. Will continue to participate in the care of this patient. HPI: 
 
Feels fine. Patient denies dysuria, lower abdominal pain, flank pain throughout this time.  Patient denies headaches, visual disturbances, sore throat, runny nose, earaches, cp, sob, chills, cough, abdominal pain, diarrhea, burning micturition, pain or weakness in extremities. He denies back pain/flank pain. home Medication List  
 Details NOVOLOG U-100 INSULIN ASPART 100 unit/mL injection INJECT AS DIRECTED PER SLIDING SCALE ( MAXM DOSE 15 UNITS /DAY) Refills: 0  
  
insulin glargine (LANTUS) 100 unit/mL injection 5 Units by SubCUTAneous route daily. Qty: 1 Vial, Refills: 0  
  
valsartan (DIOVAN) 40 mg tablet Take 1 Tab by mouth daily. Qty: 30 Tab, Refills: 6 Associated Diagnoses: Essential hypertension  
  
oxybutynin (DITROPAN) 5 mg tablet TAKE ONE TABLET BY MOUTH THREE TIMES DAILY Qty: 90 Tab, Refills: 8 Associated Diagnoses: Urinary frequency  
  
therapeutic multivitamin (THERAGRAN) tablet Take 1 Tab by mouth daily. Qty: 30 Tab, Refills: 0  
  
metoprolol tartrate (LOPRESSOR) 50 mg tablet Take 1 Tab by mouth every twelve (12) hours. Qty: 60 Tab, Refills: 0  
  
travoprost (TRAVATAN Z) 0.004 % ophthalmic solution Administer 1 Drop to both eyes two (2) times a day. Qty: 5 mL, Refills: 0  
  
brinzolamide (AZOPT) 1 % ophthalmic suspension Administer 1 Drop to both eyes two (2) times a day. Qty: 5 mL, Refills: 0  
  
brimonidine-timolol (COMBIGAN) 0.2-0.5 % drop ophthalmic solution Administer 1 Drop to both eyes every twelve (12) hours. Qty: 5 mL, Refills: 0  
  
calcium carbonate (CALCIUM 500) 500 mg calcium (1,250 mg) tablet Take 1 Tab by mouth daily. Qty: 90 Tab, Refills: 2 Associated Diagnoses: Malignant neoplasm of prostate ([facility] [address] Monoclonal gammopathy  
  
atorvastatin (LIPITOR) 40 mg tablet Take 1 Tab by mouth nightly. Insulin Needles, Disposable, 31 gauge x 5/16\" ndle Use as needed. Qty: 1 Package, Refills: 11  
  
glucose 4 gram chewable tablet Take 4 Tabs by mouth as needed. Qty: 10 Tab, Refills: 0 Current Facility-Administered Medications Medication Dose Route Frequency  immune globulin 10% (GAMUNEX-C) infusion 79.9 g  1 g/kg (Ideal) IntraVENous ONCE  
  0.9% sodium chloride infusion 250 mL  250 mL IntraVENous PRN  
 ferrous sulfate tablet 325 mg  1 Tab Oral DAILY WITH BREAKFAST  insulin glargine (LANTUS) injection 10 Units  10 Units SubCUTAneous DAILY  insulin lispro (HUMALOG) injection 4 Units  4 Units SubCUTAneous TIDAC  dextrose 10% infusion 125-250 mL  125-250 mL IntraVENous PRN  predniSONE (DELTASONE) tablet 30 mg  30 mg Oral BID WITH MEALS  cefTRIAXone (ROCEPHIN) 2 g in sterile water (preservative free) 20 mL IV syringe  2 g IntraVENous Q24H  
 acetaminophen (TYLENOL) tablet 650 mg  650 mg Oral Q6H PRN  
 metoprolol tartrate (LOPRESSOR) tablet 25 mg  25 mg Oral Q12H  
 melatonin tablet 3 mg  3 mg Oral QHS PRN  
 atorvastatin (LIPITOR) tablet 40 mg  40 mg Oral QHS  therapeutic multivitamin (THERAGRAN) tablet 1 Tab  1 Tab Oral DAILY  valsartan (DIOVAN) tablet 40 mg  40 mg Oral DAILY  insulin lispro (HUMALOG) injection   SubCUTAneous AC&HS  
 glucose chewable tablet 16 g  4 Tab Oral PRN  
 glucagon (GLUCAGEN) injection 1 mg  1 mg IntraMUSCular PRN  
 sodium chloride (NS) flush 5-40 mL  5-40 mL IntraVENous Q8H  
 sodium chloride (NS) flush 5-40 mL  5-40 mL IntraVENous PRN  
 brimonidine (ALPHAGAN) 0.2 % ophthalmic solution 1 Drop  1 Drop Both Eyes BID And  
 timolol (TIMOPTIC) 0.25% ophthalmic solution  1 Drop Both Eyes BID  latanoprost (XALATAN) 0.005 % ophthalmic solution 1 Drop  1 Drop Both Eyes QPM  
 dorzolamide (TRUSOPT) 2 % ophthalmic solution 1 Drop  1 Drop Both Eyes TID Allergies: Iodinated contrast- oral and iv dye Temp (24hrs), Av.3 °F (36.8 °C), Min:97.3 °F (36.3 °C), Max:99 °F (37.2 °C) Visit Vitals /81 (BP 1 Location: Right arm, BP Patient Position: At rest) Pulse 66 Temp 98.9 °F (37.2 °C) Resp 16 Ht 6' 1\" (1.854 m) Wt 79.8 kg (175 lb 14.4 oz) SpO2 96% BMI 23.21 kg/m² ROS: 12 point ROS obtained in details.  Pertinent positives as mentioned in HPI,  
 otherwise negative Physical Exam: 
 
GENERAL:  The patient is alert and cooperative and denies acute distress at this time. VITAL SIGNS: reviewed HEENT:  Conjunctivae are clear, no acute drainage, anicteric. Facial symmetry. Pharynx is clear. TMs are clear. NECK:  Supple, no bruit, no adenopathy. CHEST:  Heart rate is 80 and regular. LUNGS:  Decreased at the bases, clear anteriorly. The patient has a deformity at the sternoclavicular border on the right, appears to be a callused old injury. ABDOMEN:  Soft, bowel sounds positive, nontender, no bruit detected. EXTREMITIES:  Multiple purpura over both arms. Generalized muscle atrophy. Several purpura over legs. Right hallux with open ulceration, 2.5 cm. Right plantar fourth metatarsal with shallow ulceration posterior right ankle. +2 pitting edema both LE . Positive dorsalis pedis and pedal pulses. Sacral decubiti. Labs: Results:  
Chemistry Recent Labs  
  06/15/19 
0041 *   
K 4.1 * CO2 23 BUN 36* CREA 0.76  
CA 7.1* AGAP 5  
BUCR 47* CBC w/Diff Recent Labs  
  06/17/19 
0403 06/16/19 
1333 06/15/19 
0041 WBC 6.7 8.0 7.5  
RBC 2.24* 2.60* 1.92* HGB 7.6* 8.8* 6.8* HCT 22.9* 26.6* 20.4* PLT 19* 19* 21* GRANS 90*  --  92* LYMPH 8*  --  6*  
EOS 1  --  1 Microbiology No results for input(s): CULT in the last 72 hours. RADIOLOGY: 
 
All available imaging studies/reports in [facility] for this admission were reviewed Dr. Henrry Valentine, Infectious Disease Specialist 
[phone] June 17, 2019 
9:10 AM

## 2019-06-17 NOTE — PROGRESS NOTES
4 hour shift summary: 
Assumed care of patient in bed resting but easily aroused. Took medication without difficulty. No complaints offered. Uneventful night. call bell within reach. Patient Vitals for the past 4 hrs: 
 Temp Pulse Resp BP SpO2  
06/16/19 2018 98.4 °F (36.9 °C) 65 18 156/83 98 %

## 2019-06-17 NOTE — PROGRESS NOTES
Discharge for today cancelled due to pt needing additional medication, all parties aware, plan for transfer to SNF tomorrow morning, will contact daughters with d/c plans.

## 2019-06-17 NOTE — ROUTINE PROCESS
Report received from [facility]. Patient is in bed, no distress noted. Continue to monitor. 0630  Patient alert, bath given. No distress noted. 0730  Bedside, Verbal and Written shift change report given to  Kemar Chiu (oncoming nurse) by Jonatan Browning (offgoing nurse). Report included the following information SBAR, Kardex, MAR and Accordion.

## 2019-06-17 NOTE — CONSULTS
Palliative Medicine Consult Patient Name: Yulissa Henao YOB: 1935 Date of Initial Consult: 06/12/2019, follow up 6/13/2019, follow up 6/14/2019, Follow up 6/17/2019 Reason for Consult: Goals of care discussion Requesting Provider: Evita España MD  
Primary Care Physician: Garcia Devi MD 
  
 SUMMARY:  
Yulissa Henao is a 82 y.o. with a past history of prostate cancer, pressure ulcers, MGUS, Thrombocytosis, KRISTIE, HTN, A-Fib, T2DM, who was admitted on 6/9/2019 from home with a diagnosis of Altered mental status, Sepsis, UTI, pressure ulcers, hypoglycemia due to sepsis, PAF, and thrombocytopenia. Current medical issues leading to Palliative Medicine involvement include: Altered mental status, goals of care discussion, sepsis, pressure ulcer, and debility. 6/13/2019 Just finished working with physical therapy. In recliner, fatigued but voices no complaints, alert but confused. Family meeting tomorrow at 1030.  
 
6/14/2019: Family meeting at patient's bedside with daughters Pascale ARITA (on phone) and Pernell Rodriguez (in person). Patient resting with eyes closed during visit. Daughters discussed patient's progressive memory and functional decline prior to hospitalization; daughters have been noticing decline since December 2018. We discussed goals of care, introduced the POST form. Discussed the benefits and burdens of CPR in the event of cardiac or pulmonary arrest, discussed benefits and burdens of intubation in the event of respiratory distress, and also discussed the benefits and burdens of feeding tubes. Pascale Arevalo and Pernell Rodriguez both agree with DNR in the event of cardiac or pulmonary arrest and no feeding tubes. They will have to discuss with family regarding decision on whether or not to intubate for potentially reversible respiratory distress. Plan to meet with LYLA Joiner on 6/17/2019 to complete the POST form in its entirety.  Family including LYLA agreeable for DNR in the event of cardiac or pulmonary arrest to start at this time. Continue all other aggressive therapies up until cardiac or pulmonary arrest.  
 
6/17/2019: Met with daughter Jenetta Kehr, MPOA at patients' bedside. Patient ate 100% of breakfast. Alert, oriented x 2. POST completed by Jenetta Kehr (LYLA). POST states: DNR/DNI, limited interventions, no feeding tube. PALLIATIVE DIAGNOSES:  
1. Goals of care discussion 2. Altered mental status 3. Sepsis 4. Pressure ulcer 5. Debility PLAN:  
6/17/2019: Palliative medicine team including GUILLE Rashid, LUBA and myself met with patient at bedside. Patient was asleep but easily arousable to verbal stimuli. Remains A&Ox2. Denies pain, nausea, vomiting, SOB. Pascale Arevalo arrived to complete the POST form. Per our conversation on Friday (see below) family has talked and Pascale Arevalo completed POST form. POST states: DNR/DNI, limited interventions, no feeding tube. ( please see below for previous conversations) 6/14/2019: Family meeting at patient's bedside with daughters Pascale ARITA (on phone) and Pernell Rodriguez (in person). Patient resting with eyes closed during visit, NAD. Daughters are aware of reasons for this hospitalization and treatments to date. Daughters discussed patient's progressive memory and functional decline prior to hospitalization, daughters have been noticing decline since December 2018. He started not doing so well on the memory exams at his PCP office, becoming more forgetful, and periodically he confuses daughter Pascale Arevalo for his ex-wife (legally  for over 30 years). Walked with a cane for years, but since Jan-Feb of 2019 has been mobile with a walker. He is sleeping more at home. Admits patient has a great appetite, but concerned that he is progressively getting weaker and loosing interest in hobbies. Daughters interested in facility placement. Stated patient owns a home and other assets, and they will move forward with gathering funds for placement. States it is challenging to care for him at home because they both work and cannot provide around the clock care; Camelia Sloan lives and works in [address], but comes down every weekend to care for patient. Patient has never been assessed by a neurologist. We discussed that his progression of cognitive impairment may be related to underlying undiagnosed dementia, though I stressed that I cannot diagnose as dementia because it is not within my scope of practice. Encouraged outpatient neurology follow up. Discussed the progression of dementia and cognitive impairment (decreased participation in ADLs, frequent infections, possible dysphagia, falls, weakness). We discussed goals of care and  introduced the POST form. Discussed the benefits and burdens of CPR (chest compressions, electrical shock, and intubation) in the event of cardiac or pulmonary arrest, discussed benefits and burdens of intubation in the event of respiratory distress, and also discussed the benefits and burdens of feeding tubes. Marisel MAYES) and Milady Yanez both agree with DNR in the event of cardiac or pulmonary arrest and no feeding tubes. Daughters admit patient would never want a feeding tube for artificial feedings. They will have to discuss with other family members regarding decision on whether or not to intubate for potentially reversible respiratory distress. Plan to meet with LYLA Joiner on 6/17/2019 at 8AM to complete the POST form in its entirety in person. Family including LBA agreeable for DNR in the event of cardiac or pulmonary arrest to start at this time. Continue all other aggressive therapies up until cardiac or pulmonary arrest.  
 
6/13/2019  Follow up along with Ms Chintan Gutierres and Ms Marian Sidhu, [address] [place] [address]. Just completed working with physical therapy, they are recommending SNF at d/c. In recliner denies pain or SOB. Did not remember me from yesterday. Tells us he is trying to get better. Alert and talkative, did not know month or year today. Appears more fatigued then yesterday but just completed working with therapy. Family meeting tomorrow with 2 daughters. Goals of care have not changed full code with full interventions. Goals of Care discussion: Palliative medicine team including RANDELL Tuttle, NP and myself met with patient at bedside. Patient is alert and oriented x 2. He states the year is 1. He is unable to understand the difference between the telephone and nursing call bell, even after instruction given. Per nurse, patient is having a hard time understanding how to use food utensils during feeding. Unsure of his baseline mentation. No family at bedside. Palliative care briefly talked with Jc Rose (daughter) yesterday who admitted that patient has been gradually getting confused and sleeping more. Per Lala Dill, patient is , with 4 children. Lala Dill had emailed a copy of patient's AMD, will be scanned into chart. Per AMD, Maggy Rivera (daughter) is the MPOA, with no secondary listed. Demetria Todd lives in [place], and Lala Dill works during the day and unable to come in to discuss goals of care further until Friday. Family meeting scheduled for Friday, June 14, at 10:30 Aartiyane Wright will be on site and Demetria Todd will be phone conference into conversation to discuss goals of care. Plan is to obtain a POST. At this time continue FULL code with Full interventions. 2. Altered mental status: Unsure of baseline. Possible contributing factor includes sepsis. Reports of some confusion per Lala Dill prior to admission but unsure of the extent of confusion and duration. No known confirmed underlying diagnosis of dementia. As stated in number one, confusion over telephone versus nursing call bell, and feeding utensils even after directions given. 3. Sepsis: ID following. gram negative bacteremia/bacteriuria. 4. Pressure ulcer: Discussed with RN. Patient with pressure injury ulcer to the sacrum and diabetic ulcers to the BLE, POA. Marsha Henderson did admit patient is sleeping more and not active. 5. Debility: Frequent falls at home. Sleeping more often and less active. Pressure ulcer to sacrum. There is a possible self hired care aide that assists in the home, but does not give medications. Unable to ascertain his mobility status PTA, will address Friday at [facility]. 6. Initial consult note routed to primary continuity provider 7. Communicated plan of care with: Palliative IDT 
 
 
 GOALS OF CARE / TREATMENT PREFERENCES:  
[====Goals of Care====] GOALS OF CARE: POST states: DNR/DNI, limited interventions, no feeding tube. Patient/Health Care Proxy Stated Goals: Prolong life TREATMENT PREFERENCES:  
Code Status: DNR Advance Care Planning: 
Advance Care Planning 6/17/2019 Patient's Healthcare Decision Maker is: Named in scanned ACP document Confirm Advance Directive Yes, on file Does the patient have other document types Power of ;MOST/MOLST/POST/POLST Medical Interventions: Limited additional interventions Artificially Administered Nutrition: No feeding tube The palliative care team has discussed with patient / health care proxy about goals of care / treatment preferences for patient. 
[====Goals of Care====] HISTORY:  
 
History obtained from: chart, patient, and LYLA Ellington (daughter). CHIEF COMPLAINT: Still hungry after eating breakfast 
 
HPI/SUBJECTIVE: The patient is:  
[x] Verbal and participatory limited due to confusion 
[] Non-participatory due to:  
 
 
Clinical Pain Assessment (nonverbal scale for severity on nonverbal patients):  
Clinical Pain Assessment Severity: 0 Adult Nonverbal Pain Scale Face: No particular expression or smile Activity (Movement): Lying quietly, normal position Guarding: Lying quietly, no positioning of hands over areas of body Physiology (Vital Signs): Stable vital signs Respiratory: Baseline RR/SpO2 compliant with ventilator Total Score: 0 
 
 
 FUNCTIONAL ASSESSMENT:  
 
Palliative Performance Scale (PPS): PPS: 30 
 
 
 PSYCHOSOCIAL/SPIRITUAL SCREENING:  
 
Advance Care Planning: 
Advance Care Planning 6/17/2019 Patient's Healthcare Decision Maker is: Named in scanned ACP document Confirm Advance Directive Yes, on file Does the patient have other document types Power of ;MOST/MOLST/POST/POLST Any spiritual / Jewish concerns: Did not assess today 
[] Yes /  [] No 
 
Caregiver Burnout: 
[] Yes /  [x] No /  [] No Caregiver Present Anticipatory grief assessment:  
[x] Normal  / [] Maladaptive REVIEW OF SYSTEMS:  
 
Positive and pertinent negative findings in ROS are noted above in HPI. The following systems were [x] reviewed (limited) / [] unable to be reviewed as noted in HPI Other findings are noted below. Systems: constitutional, ears/nose/mouth/throat, respiratory, gastrointestinal, genitourinary, musculoskeletal, integumentary, neurologic, psychiatric, endocrine. Positive findings noted below. Modified ESAS Completed by: provider Fatigue: 7 Pain: 0 Anxiety: 0 Nausea: 0 Dyspnea: 0 Stool Occurrence(s): 1 PHYSICAL EXAM:  
 
From RN flowsheet: 
Wt Readings from Last 3 Encounters:  
06/17/19 79.8 kg (175 lb 14.4 oz) 06/07/19 65.3 kg (144 lb) 05/15/19 69.4 kg (153 lb) Blood pressure 163/81, pulse 66, temperature 98.9 °F (37.2 °C), resp. rate 16, height 6' 1\" (1.854 m), weight 79.8 kg (175 lb 14.4 oz), SpO2 96 %. Pain Scale 1: Visual 
Pain Intensity 1: 0 Constitutional: Alert, NAD, woke up to eat breakfast 
ENMT: no nasal discharge Respiratory: symmetrical, unlabored Musculoskeletal: no deformity Skin: warm, dry Neurologic: oriented x 2 HISTORY:  
 
Principal Problem: 
  NSTEMI (non-ST elevated myocardial infarction) ([address] (6/9/2019) Active Problems: Hypoglycemia, exogenous hyperinsulinemia ([address] (6/9/2019) Sepsis ([address] (6/9/2019) Controlled type 2 diabetes mellitus, with long-term current use of insulin ([address] (6/9/2019) Goals of care, counseling/discussion (6/12/2019) Altered mental status (6/12/2019) Pressure injury of skin of sacral region (6/12/2019) Debility (6/12/2019) Past Medical History:  
Diagnosis Date  Controlled type 2 diabetes mellitus, with long-term current use of insulin ([address] 6/9/2019  Diabetes ([address]  Hypertension  Hypoglycemia, exogenous hyperinsulinemia ([address] 6/9/2019  Malignant neoplasm of prostate ([address] 1998  Sepsis ([address] 6/9/2019 Past Surgical History:  
Procedure Laterality Date  BIOPSY OF PROSTATE,INCISIONAL  4/98  CT EXT BEAM RADIATION AS PRIMARY THERAPY TO PROSTATE ONLY  4/30-6/29/98 Family History Problem Relation Age of Onset  Diabetes Other  Hypertension Other History reviewed, no pertinent family history. Social History Tobacco Use  Smoking status: Current Some Day Smoker Packs/day: 0.10 Years: 22.00 Pack years: 2.20 Types: Cigarettes  Smokeless tobacco: Never Used Substance Use Topics  Alcohol use: No  
  Alcohol/week: 0.0 oz Allergies Allergen Reactions  Iodinated Contrast- Oral And Iv Dye Hives Faint, shaking Current Facility-Administered Medications Medication Dose Route Frequency  immune globulin 10% (GAMUNEX-C) infusion 79.9 g  1 g/kg (Ideal) IntraVENous ONCE  
 0.9% sodium chloride infusion 250 mL  250 mL IntraVENous PRN  
 ferrous sulfate tablet 325 mg  1 Tab Oral DAILY WITH BREAKFAST  insulin glargine (LANTUS) injection 10 Units  10 Units SubCUTAneous DAILY  insulin lispro (HUMALOG) injection 4 Units  4 Units SubCUTAneous TIDAC  dextrose 10% infusion 125-250 mL  125-250 mL IntraVENous PRN  predniSONE (DELTASONE) tablet 30 mg  30 mg Oral BID WITH MEALS  cefTRIAXone (ROCEPHIN) 2 g in sterile water (preservative free) 20 mL IV syringe  2 g IntraVENous Q24H  
 acetaminophen (TYLENOL) tablet 650 mg  650 mg Oral Q6H PRN  
 metoprolol tartrate (LOPRESSOR) tablet 25 mg  25 mg Oral Q12H  
 melatonin tablet 3 mg  3 mg Oral QHS PRN  
 atorvastatin (LIPITOR) tablet 40 mg  40 mg Oral QHS  therapeutic multivitamin (THERAGRAN) tablet 1 Tab  1 Tab Oral DAILY  valsartan (DIOVAN) tablet 40 mg  40 mg Oral DAILY  insulin lispro (HUMALOG) injection   SubCUTAneous AC&HS  
 glucose chewable tablet 16 g  4 Tab Oral PRN  
 glucagon (GLUCAGEN) injection 1 mg  1 mg IntraMUSCular PRN  
 sodium chloride (NS) flush 5-40 mL  5-40 mL IntraVENous Q8H  
 sodium chloride (NS) flush 5-40 mL  5-40 mL IntraVENous PRN  
 brimonidine (ALPHAGAN) 0.2 % ophthalmic solution 1 Drop  1 Drop Both Eyes BID And  
 timolol (TIMOPTIC) 0.25% ophthalmic solution  1 Drop Both Eyes BID  latanoprost (XALATAN) 0.005 % ophthalmic solution 1 Drop  1 Drop Both Eyes QPM  
 dorzolamide (TRUSOPT) 2 % ophthalmic solution 1 Drop  1 Drop Both Eyes TID  
 
 
 
 LAB AND IMAGING FINDINGS:  
 
Lab Results Component Value Date/Time WBC 6.7 06/17/2019 04:03 AM  
 HGB 7.6 (L) 06/17/2019 04:03 AM  
 PLATELET 19 (LL) 74/56/0772 04:03 AM  
 
Lab Results Component Value Date/Time Sodium 138 06/15/2019 12:41 AM  
 Potassium 4.1 06/15/2019 12:41 AM  
 Chloride 110 (H) 06/15/2019 12:41 AM  
 CO2 23 06/15/2019 12:41 AM  
 BUN 36 (H) 06/15/2019 12:41 AM  
 Creatinine 0.76 06/15/2019 12:41 AM  
 Calcium 7.1 (L) 06/15/2019 12:41 AM  
 Magnesium 2.0 06/11/2019 06:28 AM  
 Phosphorus 2.4 (L) 06/10/2019 06:05 AM  
  
Lab Results Component Value Date/Time  AST (SGOT) 71 (H) 06/10/2019 06:05 AM  
 Alk. phosphatase 90 06/10/2019 06:05 AM  
 Protein, total 6.4 06/10/2019 06:05 AM  
 Albumin 1.7 (L) 06/10/2019 06:05 AM  
 Globulin 4.7 (H) 06/10/2019 06:05 AM  
 
Lab Results Component Value Date/Time INR 1.2 06/04/2019 09:20 AM  
 Prothrombin time 15.1 06/04/2019 09:20 AM  
 aPTT 102.5 (H) 06/09/2019 04:12 PM  
  
Lab Results Component Value Date/Time Iron 25 (L) 06/11/2019 06:28 AM  
 TIBC 118 (L) 06/11/2019 06:28 AM  
 Iron % saturation 21 06/11/2019 06:28 AM  
 Ferritin 942 (H) 06/11/2019 06:28 AM  
  
No results found for: PH, PCO2, PO2 No components found for: [place] Lab Results Component Value Date/Time  06/09/2019 04:12 PM  
 CK - MB 1.6 06/09/2019 04:12 PM  
  
 
 
   
 
Total time: 35 minutes Counseling / coordination time, spent as noted above: 25 minutes 
> 50% counseling / coordination?: yes, patient, family, RN Prolonged service was provided for  []30 min   []75 min in face to face time in the presence of the patient, spent as noted above. Time Start:  
Time End:  
Note: this can only be billed with 11694 (initial) or 66479 (follow up). If multiple start / stop times, list each separately.

## 2019-06-17 NOTE — PROGRESS NOTES
Pt blood sugar 435 on recheck. Dr. Melody Carbone notified, order to give 5 more units of lantus and recheck in one hour. Pt was given 19 units humalog coverage for lunch and pt eating when this recheck taken. Pt asymptomatic at this time, will continue to monitor.

## 2019-06-17 NOTE — PROGRESS NOTES
Spoke with admissions at [facility], [facility] will review and call CM back. Spoke with admissions at [facility], will review and call CM back. [phone] [facility] has accepted patient, this is pt's third choice. Lizbeth Thorne, MSW Case Management [phone]

## 2019-06-17 NOTE — DISCHARGE SUMMARY
BRIEF DISCHARGE SUMMARY PATIENT DISCHARGE SUMMARY Meagan Arce / [id number] : 1935 Admitted 2019 Discharged: 2019 Dictated # [id number] Discharge Diagnosis: 1. E coli Sepsis - resolved 2. E coli UTI - emphysematous UTI 3. Hypoglycemia due to sepsis, resolved 4. Indeterminate elevation of troponin due to demand ischemia sec to sepsis 5. Paroxysmal atrial fibrillation, anticoagulation d/c as outpatient due to thrombocytopenia 6. Thrombocytopenia s/p platelet transfusion. 7. Acute on chronic anemia, stable currently. 8. HTN 
9. Hx prostate cancer 10. Decub/foot ulcers 11. Macrocytosis 12. Hypokalemia, better 13. 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 Pressure Ulcers bilat feet, POA 15. Cognitive impairment 16. Steroid induced hyperglycemia 17. Underweight with mild malnutrition · It is important that you take the medication exactly as they are prescribed. · Keep your medication in the bottles provided by the pharmacist and keep a list of the medication names, dosages, and times to be taken in your wallet. · Do not take other medications without consulting your doctor. What to do at [facility] Recommended Diet: Cardiac Diet and Diabetic Diet Recommended Activity: PT/OT Eval and Treat Discharge Medications:  
 
Current Discharge Medication List  
  
START taking these medications Details  
ferrous sulfate 325 mg (65 mg iron) tablet Take 1 Tab by mouth daily (with breakfast). Qty: 30 Tab, Refills: 0  
  
insulin lispro (HUMALOG) 100 unit/mL injection For Blood Sugar (mg/dL) of:             
Less than 150 =   0 units 150 -199 =   3 units 200 -249 =   6 units 250 -299 =   9 units 300 -349 =   12 units 350 and above =   15 units Qty: 1 Vial, Refills: 0  
  
predniSONE (DELTASONE) 10 mg tablet 3 tablets po twice daily for 5 days then 3 tablets in morning and 2 tablets in evening for 5 days then 2 tablets po twice daily until seen by dr. Terry East: 60 Tab, Refills: 0  
  
OTHER CBC on 6/22/19 and call report to dr. Colleen Patel Reason: Myelodysplastic syndrome 
Qty: 1 Each, Refills: 0  
  
ciprofloxacin HCl (CIPRO) 500 mg tablet Take 1 Tab by mouth two (2) times a day for 6 days. Qty: 12 Tab, Refills: 0 CONTINUE these medications which have CHANGED Details  
insulin glargine (LANTUS) 100 unit/mL injection 13 Units by SubCUTAneous route daily. Qty: 10 mL, Refills: 0 CONTINUE these medications which have NOT CHANGED Details  
valsartan (DIOVAN) 40 mg tablet Take 1 Tab by mouth daily. Qty: 30 Tab, Refills: 6 Associated Diagnoses: Essential hypertension  
  
therapeutic multivitamin (THERAGRAN) tablet Take 1 Tab by mouth daily. Qty: 30 Tab, Refills: 0  
  
metoprolol tartrate (LOPRESSOR) 50 mg tablet Take 1 Tab by mouth every twelve (12) hours. Qty: 60 Tab, Refills: 0  
  
travoprost (TRAVATAN Z) 0.004 % ophthalmic solution Administer 1 Drop to both eyes two (2) times a day. Qty: 5 mL, Refills: 0  
  
brinzolamide (AZOPT) 1 % ophthalmic suspension Administer 1 Drop to both eyes two (2) times a day. Qty: 5 mL, Refills: 0  
  
brimonidine-timolol (COMBIGAN) 0.2-0.5 % drop ophthalmic solution Administer 1 Drop to both eyes every twelve (12) hours. Qty: 5 mL, Refills: 0  
  
calcium carbonate (CALCIUM 500) 500 mg calcium (1,250 mg) tablet Take 1 Tab by mouth daily. Qty: 90 Tab, Refills: 2 Associated Diagnoses: Malignant neoplasm of prostate ([facility] Monoclonal gammopathy  
  
atorvastatin (LIPITOR) 40 mg tablet Take 1 Tab by mouth nightly. Insulin Needles, Disposable, 31 gauge x 5/16\" ndle Use as needed. Qty: 1 Package, Refills: 11  
  
glucose 4 gram chewable tablet Take 4 Tabs by mouth as needed. Qty: 10 Tab, Refills: 0 STOP taking these medications NOVOLOG U-100 INSULIN ASPART 100 unit/mL injection Comments:  
Reason for Stopping: oxybutynin (DITROPAN) 5 mg tablet Comments:  
Reason for Stopping: Follow-up Appointments Procedures  FOLLOW UP VISIT Appointment in: Other (Specify) With dr. Joan Kwong in 5 to 7 days With dr. Geoff Gamino office in 3-4 weeks With dr. Joan Kwong in 5 to 7 days With dr. Geoff Gamino office in 3-4 weeks Standing Status:   Standing Number of Occurrences:   1 Order Specific Question:   Appointment in Answer: Other (Specify)

## 2019-06-17 NOTE — DISCHARGE SUMMARY
[facility] DISCHARGE SUMMARY Name:  Geraldo Lennox 
MR#:   [id number] :  1935 ACCOUNT #:  [de-identified] ADMIT DATE:  2019 DISCHARGE DATE:  2019 DISPOSITION:  Discharge to skilled nursing facility. DISCHARGE CONDITION:  Stable. DISCHARGE DIAGNOSES: 
1. Escherichia coli sepsis, resolved. 2.  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 3.  Hypoglycemia due to sepsis, resolved. 4.  Indeterminate elevation of troponin due to demand ischemia secondary to sepsis. 5.  Paroxysmal atrial fibrillation. 6.  Thrombocytopenia requiring blood transfusion. 7.  History of monoclonal gammopathy of undetermined significance. 8.  History of thrombocytosis, off hydroxyurea now. 9.  Acute-on-chronic anemia, stable currently, requiring blood transfusion. 10.  History of prostate cancer. 11.  Hypertension. 12.  Macrocytosis. 13.  Decubitus/foot ulcers. 14.  Hypokalemia, resolved. 15.  Pressure ulcers, bilateral feet, present on admission. 16.  Cognitive impairment. 17.  Steroid-induced hyperglycemia. 18.  Underweight with malnutrition. DISCHARGE MEDICATIONS: 
1. Ciprofloxacin 500 mg b.i.d. until 2019. 
2.  Ferrous sulfate 325 mg daily. 3.  Insulin Humalog sliding scale. 4.  Insulin Lantus 15 units daily. 5.  Prednisone 30 mg twice a day for five days, then 30 mg in the morning and 20 mg in the evening for five days, then 20 mg two times a day until seen by Dr. Reid. 6.  Diovan 40 mg daily. 7.  Multivitamin one tablet daily. 8.  Lopressor 50 mg twice a day. 9.  Travatan Z one drop b.i.d. both eyes. 10.  Azopt 1% both eyes b.i.d. 11.  Combigan one drop both eyes b.i.d. 12.  Calcium carbonate one tablet daily. 13.  Lipitor 40 mg daily. IMAGING AND PROCEDURES: 
1. Chest x-ray was done at the time of admission and showed mild hyperexpansion. 2.  Renal ultrasound was done. No evidence of hydronephrosis. 3.  CT chest, abdomen, and pelvis without contrast was done and reported changes that could be related to emphysematous cystitis, effusion in lung bases. 4.  CT head was done. No evidence of acute intracranial process. 5. HIDA scan was reported within normal limit. 6.  Echocardiogram was done and showed ejection fraction of 56-60%. 7.  Blood culture initially was positive for E. coli. 8.  Urine culture was positive for E. coli. 9.  Repeat blood culture negative. CONSULTANTS: 
1. ID with Dr. Alethea Mallory, saw the patient on the day of discharge, cleared for discharge with ciprofloxacin until 06/23/2019. 
2.  Oncology-Hematology with Dr. Thuan Arias and , cleared for discharge with outpatient followup after the patient received IVIG today. 3.  Palliative care consult. 4.  Cardiology consult with Dr. Leandro Calderon. HOSPITAL COURSE:  The patient was admitted to the hospital on 06/09/2019 with a complaint of the patient was found on the floor. Please refer hospital admission H and P for further details. The patient had mildly elevated troponin. He was admitted here with a diagnosis of non-ST elevation MI and hypoglycemia. He was also found out to be hypoglycemic, that might have caused the fall. Initially, he was taken off insulin, but then was restarted back, as he had steroid-induced hyperglycemia. With regards to indeterminate elevation of troponin, the patient was seen by cardiologist.  It was deemed to be secondary to sepsis. Echocardiogram was done. Because of the patient having anemia and thrombocytopenia, no antiplatelet or any anticoagulation can be given to him. He will be just maintained on Lipitor. The risk of stroke has been discussed with the patient and the patient's family. They verbalized understanding about it. At this time, the risk of using any blood thinner outweigh the benefits. With regards to sepsis, the patient had a blood culture positive for E. coli secondary to UTI, seen by ID, treated with IV antibiotic, and will be changed to Cipro with the above-mentioned dose until 06/23/2019. The patient had repeat blood culture negative. With regards to anemia and thrombocytopenia, the patient has history of MGUS and was seen by Dr. Valentino Maxcy. The patient was given platelet transfusion, blood transfusion, IVIG, and steroid. I talked to dr. Valentino Maxcy on the day of discharge and he will follow patient in one week. As per them, the patient can be discharged to rehab and they will follow up the patient outpatient with the above-mentioned dose of steroid. The patient will continue home medication for the comorbidities. He was seen by Palliative Care and made DNR. The patient will be discharged to rehab later today. CBC:  
Lab Results Component Value Date/Time WBC 7.2 06/18/2019 12:37 AM  
 HGB 8.3 (L) 06/18/2019 12:37 AM  
 HCT 26.0 (L) 06/18/2019 12:37 AM  
 PLT 23 (LL) 06/18/2019 12:37 AM  
 
 
DISCHARGE INSTRUCTIONS: 
1. Diet:  ADA cardiac diet. 2.  Activity:  As tolerated. Fall precautions. PT/OT to follow. 3.  Nursing home doctor will follow this patient. 4.  Follow up with Dr. Valentino Maxcy in 5-7 days. 5.  Follow up with Dr. Santo Ness and group in 3-4 weeks. 6.  CBC after five days from now. TOTAL TIME:  Greater than 35 minutes. Electa Brittle, MD 
 
 
/CHARLENE_BASILIO_T/BS_EDIT 
D:  06/17/2019 10:46 
T:  06/18/2019 8:59 
JOB #:  [id number] CC:  DO Duke Summers MD Marvetta Bottoms, MD

## 2019-06-17 NOTE — PROGRESS NOTES
Discharge order noted for today. Patient has been accepted to [facility]  skilled nursing facility. Confirmed with Berenice that bed is available today. Met with patient and daughter and are agreeable to the transition plan today. Transport to facility has been arranged through [address] at  time. Patient's discharge summary has been forwarded to skilled nursing facility via cclink. Bedside RN, Ramonita Cervantes, has been updated to the transition plan. Discharge information has been updated on the AVS.  Please call report to [phone]. Pt's daughter provided with a replacement walker, as pt's was lost in transition. WILLIE Celaya Case Management [phone]

## 2019-06-17 NOTE — PROGRESS NOTES
SUBJECTIVE: 
 
Patient is awake. Denies for pain other than back discomfort. No nausea or vomiting. No leg pain. No dizziness. No SOB or cough. Daughter is at bedside. OBJECTIVE: 
 
/81 (BP 1 Location: Right arm, BP Patient Position: At rest)   Pulse 66   Temp 98.9 °F (37.2 °C)   Resp 16   Ht 6' 1\" (1.854 m)   Wt 79.8 kg (175 lb 14.4 oz)   SpO2 96%   BMI 23.21 kg/m² General appearance - somnolent and in no distress Chest - Good air entry noted in bases, no wheezes Heart - S1 and S2 normal 
Abdomen - soft, nondistended, Bowel sounds present. no tenderness. Neurological - alert, normal speech, no focal findings noted in both UEs Extremities - no pedal edema noted ASSESSMENT: 
 
1. E coli Sepsis - resolved 2. E coli UTI - emphysematous UTI 3. Hypoglycemia due to sepsis, resolved 4. Indeterminate elevation of troponin due to demand ischemia sec to sepsis 5. Paroxysmal atrial fibrillation, anticoagulation d/c as outpatient due to thrombocytopenia 6. Thrombocytopenia s/p platelet transfusion. 7. Acute on chronic anemia, stable currently. 8. HTN 
9. Hx prostate cancer 10. Decub/foot ulcers 11. Macrocytosis 12. Hypokalemia, better 13. 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 Pressure Ulcers bilat feet, POA 15. Cognitive impairment 16. Steroid induced hyperglycemia 17. Underweight with mild malnutrition PLAN: 
 
Cont current management D/w ID - cipro until 6/23/19 D/w hematology NP - she spoke to dr. Hieu Bullard, can be discharged to SNF after IVIG dose today and advised to continue steroid. D/w CM about discharge planning - patient has acceptance D/w daughter - agree with discharge plan. Understands patient's risks being on blood thinner outweigh benefits and risk of stroke. DISCHARGE PATIENT TO SNF BMP:  
No results found for: NA, K, CL, CO2, AGAP, GLU, BUN, CREA, GFRAA, GFRNA 
CBC:  
Lab Results Component Value Date/Time WBC 6.7 06/17/2019 04:03 AM  
 HGB 7.6 (L) 06/17/2019 04:03 AM  
 HCT 22.9 (L) 06/17/2019 04:03 AM  
 PLT 19 (LL) 06/17/2019 04:03 AM  
 
 
Current Discharge Medication List  
  
START taking these medications Details  
ferrous sulfate 325 mg (65 mg iron) tablet Take 1 Tab by mouth daily (with breakfast). Qty: 30 Tab, Refills: 0  
  
insulin lispro (HUMALOG) 100 unit/mL injection For Blood Sugar (mg/dL) of:             
Less than 150 =   0 units 150 -199 =   3 units 200 -249 =   6 units 250 -299 =   9 units 300 -349 =   12 units 350 and above =   15 units Qty: 1 Vial, Refills: 0  
  
predniSONE (DELTASONE) 10 mg tablet 3 tablets po twice daily for 5 days then 3 tablets in morning and 2 tablets in evening for 5 days then 2 tablets po twice daily until seen by dr. Hedy Augustine: 60 Tab, Refills: 0  
  
OTHER CBC on 6/22/19 and call report to dr. Joan Kwong Reason: Myelodysplastic syndrome 
Qty: 1 Each, Refills: 0  
  
ciprofloxacin HCl (CIPRO) 500 mg tablet Take 1 Tab by mouth two (2) times a day for 6 days. Qty: 12 Tab, Refills: 0 CONTINUE these medications which have CHANGED Details  
insulin glargine (LANTUS) 100 unit/mL injection 10 Units by SubCUTAneous route daily. Qty: 10 mL, Refills: 0 CONTINUE these medications which have NOT CHANGED Details NOVOLOG U-100 INSULIN ASPART 100 unit/mL injection INJECT AS DIRECTED PER SLIDING SCALE ( MAXM DOSE 15 UNITS /DAY) Refills: 0  
  
valsartan (DIOVAN) 40 mg tablet Take 1 Tab by mouth daily. Qty: 30 Tab, Refills: 6 Associated Diagnoses: Essential hypertension  
  
therapeutic multivitamin (THERAGRAN) tablet Take 1 Tab by mouth daily. Qty: 30 Tab, Refills: 0  
  
metoprolol tartrate (LOPRESSOR) 50 mg tablet Take 1 Tab by mouth every twelve (12) hours. Qty: 60 Tab, Refills: 0  
  
travoprost (TRAVATAN Z) 0.004 % ophthalmic solution Administer 1 Drop to both eyes two (2) times a day. Qty: 5 mL, Refills: 0 brinzolamide (AZOPT) 1 % ophthalmic suspension Administer 1 Drop to both eyes two (2) times a day. Qty: 5 mL, Refills: 0  
  
brimonidine-timolol (COMBIGAN) 0.2-0.5 % drop ophthalmic solution Administer 1 Drop to both eyes every twelve (12) hours. Qty: 5 mL, Refills: 0  
  
calcium carbonate (CALCIUM 500) 500 mg calcium (1,250 mg) tablet Take 1 Tab by mouth daily. Qty: 90 Tab, Refills: 2 Associated Diagnoses: Malignant neoplasm of prostate ([facility] [address] Monoclonal gammopathy  
  
atorvastatin (LIPITOR) 40 mg tablet Take 1 Tab by mouth nightly. Insulin Needles, Disposable, 31 gauge x 5/16\" ndle Use as needed. Qty: 1 Package, Refills: 11  
  
glucose 4 gram chewable tablet Take 4 Tabs by mouth as needed. Qty: 10 Tab, Refills: 0 STOP taking these medications  
  
 oxybutynin (DITROPAN) 5 mg tablet Comments:  
Reason for Stopping:

## 2019-06-18 NOTE — PROGRESS NOTES
DELAY IN DISCHARGE DUE TO [facility]. CM CONTACTED TRANSPORT WHO STATED THEY WOULD HAVE A UNIT OUT WITHIN THE HOUR AND APOLOGIZED FOR DELAY. CM MANAGER NOTIFIED.

## 2019-06-18 NOTE — PROGRESS NOTES
Report called to Geno Hsu at [facility]. Pt resting quietly in bed at this time with stable vital signs, awaiting transportation via [employer].

## 2019-06-18 NOTE — PROGRESS NOTES
Discharge order noted for today. Patient has been accepted to [facility]. Confirmed with Berenice that bed is available today. Met with patient and spoke with both daughters and are agreeable to the transition plan today. [facility] authorization has been obtained. ** [facility] at 11:30am time. Patient's discharge summary has been forwarded to skilled nursing facility via cclink. Bedside RN, Chino Jefferson, has been updated to the transition plan. Discharge information has been updated on the AVS.  Please call report to [phone]. Maura Briceño, [facility] Case Management [phone]

## 2019-06-18 NOTE — PROGRESS NOTES
SUBJECTIVE: 
 
Patient is awake and eating breakfast.  Denies for chest and abdominal pain. No SOB or cough. No nausea or vomiting. Spoke to patient's daughter Ankit Woodward over the phone. OBJECTIVE: 
 
/79 (BP 1 Location: Right arm, BP Patient Position: At rest)   Pulse 89   Temp 98 °F (36.7 °C)   Resp 19   Ht 6' 1\" (1.854 m)   Wt 79.5 kg (175 lb 3.2 oz)   SpO2 96%   BMI 23.11 kg/m² General appearance - somnolent and in no distress Chest - Good air entry noted in bases, no wheezes Heart - S1 and S2 normal 
Abdomen - soft, nondistended, Bowel sounds present. no tenderness. Neurological - Awake, follows verbal commands, no focal findings noted in both UEs Extremities - no pedal edema noted ASSESSMENT: 
 
1. E coli Sepsis - resolved 2. E coli UTI - emphysematous UTI 3. Hypoglycemia due to sepsis, resolved 4. Indeterminate elevation of troponin due to demand ischemia sec to sepsis 5. Paroxysmal atrial fibrillation, anticoagulation d/c as outpatient due to thrombocytopenia 6. Thrombocytopenia s/p platelet transfusion. 7. Acute on chronic anemia, stable currently. 8. HTN 
9. Hx prostate cancer 10. Decub/foot ulcers 11. Macrocytosis 12. Hypokalemia, better 13. 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 Pressure Ulcers bilat feet, POA 15. Cognitive impairment 16. Steroid induced hyperglycemia 17. Underweight with mild malnutrition PLAN: 
 
Cont current management D/w dr. Nguyen Chandler - dimitri to discharge him and he will follow this patient in a week. D/w CM - will make an appointment with dr. Debby Mota DISCHARGE PATIENT TO SNF TODAY BMP:  
No results found for: NA, K, CL, CO2, AGAP, GLU, BUN, CREA, GFRAA, GFRNA  
 
CBC:  
Lab Results Component Value Date/Time  WBC 7.2 06/18/2019 12:37 AM  
 HGB 8.3 (L) 06/18/2019 12:37 AM  
 HCT 26.0 (L) 06/18/2019 12:37 AM  
 PLT 23 (LL) 06/18/2019 12:37 AM  
 
 
Current Discharge Medication List  
  
 START taking these medications Details  
ferrous sulfate 325 mg (65 mg iron) tablet Take 1 Tab by mouth daily (with breakfast). Qty: 30 Tab, Refills: 0  
  
insulin lispro (HUMALOG) 100 unit/mL injection For Blood Sugar (mg/dL) of:             
Less than 150 =   0 units 150 -199 =   3 units 200 -249 =   6 units 250 -299 =   9 units 300 -349 =   12 units 350 and above =   15 units Qty: 1 Vial, Refills: 0  
  
predniSONE (DELTASONE) 10 mg tablet 3 tablets po twice daily for 5 days then 3 tablets in morning and 2 tablets in evening for 5 days then 2 tablets po twice daily until seen by dr. Bush Emms: 60 Tab, Refills: 0  
  
OTHER CBC on 6/22/19 and call report to dr. Libby Solorio Reason: Myelodysplastic syndrome 
Qty: 1 Each, Refills: 0  
  
ciprofloxacin HCl (CIPRO) 500 mg tablet Take 1 Tab by mouth two (2) times a day for 6 days. Qty: 12 Tab, Refills: 0 CONTINUE these medications which have CHANGED Details  
insulin glargine (LANTUS) 100 unit/mL injection 15 Units by SubCUTAneous route daily. Qty: 10 mL, Refills: 0 CONTINUE these medications which have NOT CHANGED Details  
valsartan (DIOVAN) 40 mg tablet Take 1 Tab by mouth daily. Qty: 30 Tab, Refills: 6 Associated Diagnoses: Essential hypertension  
  
therapeutic multivitamin (THERAGRAN) tablet Take 1 Tab by mouth daily. Qty: 30 Tab, Refills: 0  
  
metoprolol tartrate (LOPRESSOR) 50 mg tablet Take 1 Tab by mouth every twelve (12) hours. Qty: 60 Tab, Refills: 0  
  
travoprost (TRAVATAN Z) 0.004 % ophthalmic solution Administer 1 Drop to both eyes two (2) times a day. Qty: 5 mL, Refills: 0  
  
brinzolamide (AZOPT) 1 % ophthalmic suspension Administer 1 Drop to both eyes two (2) times a day. Qty: 5 mL, Refills: 0  
  
brimonidine-timolol (COMBIGAN) 0.2-0.5 % drop ophthalmic solution Administer 1 Drop to both eyes every twelve (12) hours. Qty: 5 mL, Refills: 0 calcium carbonate (CALCIUM 500) 500 mg calcium (1,250 mg) tablet Take 1 Tab by mouth daily. Qty: 90 Tab, Refills: 2 Associated Diagnoses: Malignant neoplasm of prostate ([address] Monoclonal gammopathy  
  
atorvastatin (LIPITOR) 40 mg tablet Take 1 Tab by mouth nightly. Insulin Needles, Disposable, 31 gauge x 5/16\" ndle Use as needed. Qty: 1 Package, Refills: 11  
  
glucose 4 gram chewable tablet Take 4 Tabs by mouth as needed. Qty: 10 Tab, Refills: 0 STOP taking these medications NOVOLOG U-100 INSULIN ASPART 100 unit/mL injection Comments:  
Reason for Stopping:   
   
 oxybutynin (DITROPAN) 5 mg tablet Comments:  
Reason for Stopping:

## 2019-06-27 NOTE — PROGRESS NOTES
Community Care Team Documentation for Patient in [facility]     Patient discharged from [facility]""  to [address] [place] [address],[address], on 6/18/19 . DISCHARGE DIAGNOSES:     1. Escherichia coli sepsis, resolved. 2.  Escherichia coli urinary tract infection with emphysematous  urinary tract infection, status post treatment. 3.  Hypoglycemia due to sepsis, resolved. 4.  Indeterminate elevation of troponin due to demand ischemia  secondary to sepsis. 5.  Paroxysmal atrial fibrillation. 6.  Thrombocytopenia requiring blood transfusion. 7.  History of monoclonal gammopathy of undetermined  significance. 8.  History of thrombocytosis, off hydroxyurea now. 9.  Acute-on-chronic anemia, stable currently, requiring blood  transfusion. 10.  History of prostate cancer. 11.  Hypertension. 12.  Macrocytosis. 13.  Decubitus/foot ulcers. 14.  Hypokalemia, resolved. 15.  Pressure ulcers, bilateral feet, present on admission. 16.  Cognitive impairment. 17.  Steroid-induced hyperglycemia. 25.  Underweight with malnutrition.     SNF Attending Provider:  Dr. Arroyo Levels    Anticipated discharge date from SNF:   TBD    PCP : Arlen Dietrich MD    Nurse Navigator:      [facility] Team rounds completed, updates provided by facility. Brief Summary of Care:    Making minimal progress, using Sliding Board for Transfers. [facility] Soft diet. Working with therapy. Dispo:  LCD 7/16 but starts copay days on 7/6/19. To meet with family to discuss options. May be staying LTC    RRAT:  High Risk            22       Total Score        3 Has Seen PCP in Last 6 Months (Yes=3, No=0)    4 IP Visits Last 12 Months (1-3=4, 4=9, >4=11)    15 Charlson Comorbidity Score (Age + Comorbid Conditions)        Criteria that do not apply:    . Living with Significant Other. Assisted Living. LTAC. SNF. or   Rehab    Patient Length of Stay (>5 days = 3)    Pt. Coverage (Medicare=5 , Medicaid, or Self-Pay=4)        Active Ambulatory Problems     Diagnosis Date Noted    Malignant neoplasm of prostate ([address]     Malignant neoplasm of prostate ([address] 11/15/2011    Hypoglycemia, exogenous hyperinsulinemia ([address] 06/09/2019    History of prostate cancer 02/13/2015    Urinary frequency 27/31/0234    Neutrophilic leukocytosis 97/04/2434    Thrombocytosis ([address] 08/29/2017    Monoclonal gammopathy 09/12/2017    Iron deficiency anemia secondary to inadequate dietary iron intake 01/23/2018    Diabetic hyperosmolar non-ketotic state ([address] 02/10/2019    Acute UTI 02/10/2019    Pneumonia 02/10/2019    New onset a-fib ([address] 02/10/2019    Moderate protein malnutrition ([address] 02/11/2019    Thrombocytopenia ([address] 05/13/2019    Anemia 05/13/2019    NSTEMI (non-ST elevated myocardial infarction) ([address] 06/09/2019    Sepsis ([address] 06/09/2019    Controlled type 2 diabetes mellitus, with long-term current use of insulin ([address] 06/09/2019    Goals of care, counseling/discussion 06/12/2019    Altered mental status 06/12/2019    Pressure injury of skin of sacral region 06/12/2019    Debility 06/12/2019     Resolved Ambulatory Problems     Diagnosis Date Noted    No Resolved Ambulatory Problems     Past Medical History:   Diagnosis Date    Controlled type 2 diabetes mellitus, with long-term current use of insulin ([address] 6/9/2019    Diabetes ([address]     Hypertension     Hypoglycemia, exogenous hyperinsulinemia ([address] 6/9/2019    Malignant neoplasm of prostate ([address] 1998    Sepsis ([address] 6/9/2019

## 2019-07-02 NOTE — PROGRESS NOTES
Community Care Team Documentation for Patient in [facility]     [facility] discharged [facility]  to [address] [place] [address],[address], on 6/18/19 . DISCHARGE DIAGNOSES:     1. Escherichia coli sepsis, resolved. 2.  Escherichia coli urinary tract infection with emphysematous  urinary tract infection, status post treatment. 3.  Hypoglycemia due to sepsis, resolved. 4.  Indeterminate elevation of troponin due to demand ischemia  secondary to sepsis. 5.  Paroxysmal atrial fibrillation. 6.  Thrombocytopenia requiring blood transfusion. 7.  History of monoclonal gammopathy of undetermined  significance. 8.  History of thrombocytosis, off hydroxyurea now. 9.  Acute-on-chronic anemia, stable currently, requiring blood  transfusion. 10.  History of prostate cancer. 11.  Hypertension. 12.  Macrocytosis. 13.  Decubitus/foot ulcers. 14.  Hypokalemia, resolved. 15.  Pressure ulcers, bilateral feet, present on admission. 16.  Cognitive impairment. 17.  Steroid-induced hyperglycemia. 25.  Underweight with malnutrition.     SNF Attending Provider:  Dr. Clifford Hui    Anticipated discharge date from SNF:   TBD    PCP : Ann Gibbons MD    Nurse Navigator:      [facility] Team rounds completed, updates provided by facility. Brief Summary of Care:    Making minimal progress, using Sliding Board for Transfers. [facility] Soft diet. Working with therapy. Max assist still for most tasks. Dispo:  LCD 7/16 but starts copay days on 7/6/19. To meet with family to discuss options. Plan to go live with daughter in [place] - has to go up 36 steps to get in home.    Over resourced for Medicaid    RRAT:  High Risk            22       Total Score        3 Has Seen PCP in Last 6 Months (Yes=3, No=0)    4 IP Visits Last 12 Months (1-3=4, 4=9, >4=11)    15 Charlson Comorbidity Score (Age + Comorbid Conditions)        Criteria that do not apply: . Living with Significant Other. Assisted Living. LTAC. SNF. or   Rehab    Patient Length of Stay (>5 days = 3)    Pt.  Coverage (Medicare=5 , Medicaid, or Self-Pay=4)        Active Ambulatory Problems     Diagnosis Date Noted    Malignant neoplasm of prostate ([address]     Malignant neoplasm of prostate ([address] 11/15/2011    Hypoglycemia, exogenous hyperinsulinemia ([address] 06/09/2019    History of prostate cancer 02/13/2015    Urinary frequency 15/63/8326    Neutrophilic leukocytosis 34/23/3723    Thrombocytosis ([address] 08/29/2017    Monoclonal gammopathy 09/12/2017    Iron deficiency anemia secondary to inadequate dietary iron intake 01/23/2018    Diabetic hyperosmolar non-ketotic state ([address] 02/10/2019    Acute UTI 02/10/2019    Pneumonia 02/10/2019    New onset a-fib ([address] 02/10/2019    Moderate protein malnutrition ([address] 02/11/2019    Thrombocytopenia ([address] 05/13/2019    Anemia 05/13/2019    NSTEMI (non-ST elevated myocardial infarction) ([address] 06/09/2019    Sepsis ([address] 06/09/2019    Controlled type 2 diabetes mellitus, with long-term current use of insulin ([address] 06/09/2019    Goals of care, counseling/discussion 06/12/2019    Altered mental status 06/12/2019    Pressure injury of skin of sacral region 06/12/2019    Debility 06/12/2019     Resolved Ambulatory Problems     Diagnosis Date Noted    No Resolved Ambulatory Problems     Past Medical History:   Diagnosis Date    Controlled type 2 diabetes mellitus, with long-term current use of insulin ([address] 6/9/2019    Diabetes ([address]     Hypertension     Hypoglycemia, exogenous hyperinsulinemia ([address] 6/9/2019    Malignant neoplasm of prostate ([address] 1998    Sepsis ([address] 6/9/2019

## 2019-07-10 NOTE — PROGRESS NOTES
[facility] Progress Note Date: July 10, 2019 Name: Frank Donald MRN: [id number] : 1935 Mr. Sherman Plasencia arrived in the [facility] today at [address] , in stable condition, here to receive 2 Units of PRBC. He was assessed and education was provided. (He was accompanied by his daughter.) Mr. Esperanza Hylton vitals were reviewed. Visit Vitals /71 (BP 1 Location: Left arm, BP Patient Position: Sitting) Pulse 78 Temp 97.4 °F (36.3 °C) Resp 16 SpO2 100% Lab results were obtained and reviewed (his CBC results from 19), and the results revealed a low HGB of 8.0 & HCT of 24.5. I called and spoke with Dr. Romulo Sahni, and received a verbal order to administer Tylenol 650 mg PO & Benadryl 25 mg PO, pre-blood transfusion today. Tylenol 650 mg PO & Benadryl 25 mg PO, was administered at 0924, per order, and without incident. PIV (# 20 G) was established in his right AC at 0915, without incident.  ml Bag IV, was initiated to infuse @ KVO prn, at 0930.  
 
 
2 units of PRBC, were infused per order, and without incident, each unit over approximately 2 hours. Mr. Sherman Plasencia was monitored for 1 hour, after the completion of both units blood, per protocol, and also without incident. After completion of the observation period, the PIV was removed and gauze/bandaid was applied. Mr. Sherman Plasencia tolerated well, and had no complaints. Mr. Sherman Plasencia was discharged from [place] [address] in stable condition at 1550. He has no further OPIC appointments scheduled at this time. However, he is scheduled to see Dr. Kareen Sepulveda on Tuesday, 19 at 0930. And then, he will follow up with Dr. Araceli Sharma as needed.   
 
Fani Cutler RN 
July 10, 2019 
9:55 AM

## 2019-07-16 NOTE — PATIENT INSTRUCTIONS
Iron Deficiency Anemia: Care Instructions Your Care Instructions Anemia means that you do not have enough red blood cells. Red blood cells carry oxygen around your body. When you have anemia, it can make you pale, weak, and tired. Many things can cause anemia. The most common cause is loss of blood. This can happen if you have heavy menstrual periods. It can also happen if you have bleeding in your stomach or bowel. You can also get anemia if you don't have enough iron in your diet or if it's hard for your body to absorb iron. In some cases, pregnancy causes anemia. That's because a pregnant woman needs more iron. Your doctor may do more tests to find the cause of your anemia. If a disease or other health problem is causing it, your doctor will treat that problem. It's important to follow up with your doctor to make sure that your iron level returns to normal. 
Follow-up care is a key part of your treatment and safety. Be sure to make and go to all appointments, and call your doctor if you are having problems. It's also a good idea to know your test results and keep a list of the medicines you take. How can you care for yourself at home? · If your doctor recommended iron pills, take them as directed. ? Try to take the pills on an empty stomach. You can do this about 1 hour before or 2 hours after meals. But you may need to take iron with food to avoid an upset stomach. ? Do not take antacids or drink milk or anything with caffeine within 2 hours of when you take your iron. They can keep your body from absorbing the iron well. ? Vitamin C helps your body absorb iron. You may want to take iron pills with a glass of orange juice or some other food high in vitamin C. 
? Iron pills may cause stomach problems. These include heartburn, nausea, diarrhea, constipation, and cramps. It can help to drink plenty of fluids and include fruits, vegetables, and fiber in your diet. ? It's normal for iron pills to make your stool a greenish or grayish black. But internal bleeding can also cause dark stool. So it's important to tell your doctor about any color changes. ? Call your doctor if you think you are having a problem with your iron pills. Even after you start to feel better, it will take several months for your body to build up its supply of iron. ? If you miss a pill, don't take a double dose. ? Keep iron pills out of the reach of small children. Too much iron can be very dangerous. · Eat foods with a lot of iron. These include red meat, shellfish, poultry, and eggs. They also include beans, raisins, whole-grain bread, and leafy green vegetables. · Steam your vegetables. This is the best way to prepare them if you want to get as much iron as possible. · Be safe with medicines. Do not take nonsteroidal anti-inflammatory pain relievers unless your doctor tells you to. These include aspirin, naproxen (Aleve), and ibuprofen (Advil, Motrin). · Liquid iron can stain your teeth. But you can mix it with water or juice and drink it with a straw. Then it won't get on your teeth. When should you call for help? Call 911 anytime you think you may need emergency care. For example, call if: 
  · You passed out (lost consciousness).  
 Call your doctor now or seek immediate medical care if: 
  · You are short of breath.  
  · You are dizzy or light-headed, or you feel like you may faint.  
  · You have new or worse bleeding.  
 Watch closely for changes in your health, and be sure to contact your doctor if: 
  · You feel weaker or more tired than usual.  
  · You do not get better as expected. Where can you learn more? Go to http://petty-timothy.info/. Enter D688 in the search box to learn more about \"Iron Deficiency Anemia: Care Instructions. \" Current as of: May 6, 2018 Content Version: 11.9 © 2739-7935 [employer].  Care instructions adapted under license by [address] (which disclaims liability or warranty for this information). If you have questions about a medical condition or this instruction, always ask your healthcare professional. [address] any warranty or liability for your use of this information.

## 2019-07-16 NOTE — PROGRESS NOTES
Hematology/Oncology  Progress Note Name: Kenna Olivera Date: 2019 : 1935 PCP: Imelda Bravo MD  
 
Mr. Ari Amezquita is a 82 y.o.  male who was seen for follow-up relating to his MGUS, neutrophilic leukocytosis and anemia. Current therapy: Active surveillance Subjective:  
 
Mr. Ari Amezquita is an 61-year-old -American man who has MGUS, which was diagnosed in 2017. He also has chronic neutrophilic leukocytosis with flow cytometry revealing no immunophenotypic aberrancy in the leukocyte populations. His grandchildren accompanied him today for his appointment. They state that the patient was just discharged from the nursing home and he acquired sacral ulcers and foot ulcers from there. The patient was hospitalized recently spent some time in the nursing home. He is just getting out of rehab today and his daughter is planning to take him to [place]. He will be residing with his family in that area. Today sitting in the wheelchair and has no complaints of pain or discomfort. Past medical history, family history, and social history: these were reviewed and remains unchanged. Past Medical History:  
Diagnosis Date  Controlled type 2 diabetes mellitus, with long-term current use of insulin ([address] 2019  Diabetes ([address]  Hypertension  Hypoglycemia, exogenous hyperinsulinemia ([address] 2019  Malignant neoplasm of prostate ([address] 1998  Sepsis ([address] 2019 Past Surgical History:  
Procedure Laterality Date  BIOPSY OF PROSTATE,INCISIONAL    ID EXT BEAM RADIATION AS PRIMARY THERAPY TO PROSTATE ONLY  -98 Social History Socioeconomic History  Marital status: SINGLE Spouse name: Not on file  Number of children: Not on file  Years of education: Not on file  Highest education level: Not on file Occupational History  Not on file Social Needs  Financial resource strain: Not on file  Food insecurity: Worry: Not on file Inability: Not on file  Transportation needs:  
  Medical: Not on file Non-medical: Not on file Tobacco Use  Smoking status: Current Some Day Smoker Packs/day: 0.10 Years: 22.00 Pack years: 2.20 Types: Cigarettes  Smokeless tobacco: Never Used Substance and Sexual Activity  Alcohol use: No  
  Alcohol/week: 0.0 oz  Drug use: No  
 Sexual activity: Not on file Lifestyle  Physical activity:  
  Days per week: Not on file Minutes per session: Not on file  Stress: Not on file Relationships  Social connections:  
  Talks on phone: Not on file Gets together: Not on file Attends Worship service: Not on file Active member of club or organization: Not on file Attends meetings of clubs or organizations: Not on file Relationship status: Not on file  Intimate partner violence:  
  Fear of current or ex partner: Not on file Emotionally abused: Not on file Physically abused: Not on file Forced sexual activity: Not on file Other Topics Concern  Not on file Social History Narrative ** Merged History Encounter ** Family History Problem Relation Age of Onset  Diabetes Other  Hypertension Other Current Outpatient Medications Medication Sig Dispense Refill  hydroxyurea (HYDREA) 500 mg capsule TAKE ONE CAPSULE BY MOUTH TWICE A  Cap 2  
 insulin glargine (LANTUS) 100 unit/mL injection 15 Units by SubCUTAneous route daily. 10 mL 0  
 ferrous sulfate 325 mg (65 mg iron) tablet Take 1 Tab by mouth daily (with breakfast). 30 Tab 0  
 insulin lispro (HUMALOG) 100 unit/mL injection For Blood Sugar (mg/dL) of:             
Less than 150 =   0 units 150 -199 =   3 units 200 -249 =   6 units 250 -299 =   9 units 300 -349 =   12 units 350 and above =   15 units 1 Vial 0  predniSONE (DELTASONE) 10 mg tablet 3 tablets po twice daily for 5 days then 3 tablets in morning and 2 tablets in evening for 5 days then 2 tablets po twice daily until seen by dr. Bryanna Ramirez 60 Tab 0  
 OTHER CBC on 6/22/19 and call report to dr. Bryanna Ramirez Reason: Myelodysplastic syndrome 1 Each 0  
 Insulin Needles, Disposable, 31 gauge x 5/16\" ndle Use as needed. 1 Package 11  
 valsartan (DIOVAN) 40 mg tablet Take 1 Tab by mouth daily. 30 Tab 6  
 glucose 4 gram chewable tablet Take 4 Tabs by mouth as needed. (Patient taking differently: Take 4 Tabs by mouth as needed for Other (low blood sugar). ) 10 Tab 0  
 therapeutic multivitamin (THERAGRAN) tablet Take 1 Tab by mouth daily. 30 Tab 0  
 metoprolol tartrate (LOPRESSOR) 50 mg tablet Take 1 Tab by mouth every twelve (12) hours. 60 Tab 0  
 travoprost (TRAVATAN Z) 0.004 % ophthalmic solution Administer 1 Drop to both eyes two (2) times a day. 5 mL 0  
 brinzolamide (AZOPT) 1 % ophthalmic suspension Administer 1 Drop to both eyes two (2) times a day. 5 mL 0  
 brimonidine-timolol (COMBIGAN) 0.2-0.5 % drop ophthalmic solution Administer 1 Drop to both eyes every twelve (12) hours. 5 mL 0  
 calcium carbonate (CALCIUM 500) 500 mg calcium (1,250 mg) tablet Take 1 Tab by mouth daily. 90 Tab 2  
 atorvastatin (LIPITOR) 40 mg tablet Take 1 Tab by mouth nightly. Review of Systems Constitutional: The patient has no acute distress or discomfort. HEENT: The patient denies recent head trauma, eye pain, blurred vision,  hearing deficit, oropharyngeal mucosal pain or lesions, and the patient denies throat pain or discomfort. Lymphatics: The patient denies palpable peripheral lymphadenopathy. Hematologic: The patient denies having bruising, bleeding, or progressive fatigue. Respiratory: Patient denies having shortness of breath, cough, sputum production, fever, or dyspnea on exertion. Cardiovascular:  The patient denies having leg pain, leg swelling, heart palpitations, chest permit, chest pain, or lightheadedness. The patient denies having dyspnea on exertion. Gastrointestinal: The patient denies having nausea, emesis, or diarrhea. The patient denies having any hematemesis or blood in the stool. Genitourinary: Patient denies having urinary urgency, frequency, or dysuria. The patient denies having blood in the urine. Psychological: The patient denies having symptoms of nervousness, anxiety, depression, or thoughts of harming self. Skin: Patient denies having skin rashes, skin, ulcerations, or unexplained itching or pruritus. Musculoskeletal: The patient denies having pain in the joints or bones. Objective:  
 
Visit Vitals /69 Pulse 64 Temp 97.8 °F (36.6 °C) Ht 6' 1\" (1.854 m) SpO2 100% BMI 23.11 kg/m² ECOG PS=0 Physical Exam:  
Gen. Appearance: The patient is in no acute distress. Skin: There is no bruise or rash. HEENT: The exam is unremarkable. Neck: Supple without lymphadenopathy or thyromegaly. Lungs: Clear to auscultation and percussion; there are no wheezes or rhonchi. Heart: Regular rate and rhythm; there are no murmurs, gallops, or rubs. Abdomen: Bowel sounds are present and normal.  There is no guarding, tenderness, or hepatosplenomegaly. Extremities: There is no clubbing, cyanosis, or edema. Neurologic: There are no focal neurologic deficits. Lymphatics: There is no palpable peripheral lymphadenopathy. Musculoskeletal: The patient has full range of motion at all joints. There is no evidence of joint deformity or effusions. There is no focal joint tenderness. Psychological/psychiatric: There is no clinical evidence of anxiety, depression, or melancholy. Lab data: 
   
Results for orders placed or performed during the hospital encounter of 07/16/19 CBC WITH 3 PART DIFF     Status: Abnormal  
Result Value Ref Range Status  WBC 7.7 4.5 - 13.0 K/uL Final  
 RBC 3.17 (L) 4.10 - 5.10 M/uL Final  
 HGB 10.6 (L) 12.0 - 16.0 g/dL Final  
 HCT 33.6 (L) 36 - 48 % Final  
 .0 (H) 78 - 102 FL Final  
 MCH 33.4 25.0 - 35.0 PG Final  
 MCHC 31.5 31 - 37 g/dL Final  
 RDW 22.6 (H) 11.5 - 14.5 % Final  
 PLATELET 99 (L) 251 - 440 K/uL Final  
 NEUTROPHILS 87 (H) 40 - 70 % Final  
 MIXED CELLS 6 0.1 - 17 % Final  
 LYMPHOCYTES 7 (L) 14 - 44 % Final  
 ABS. NEUTROPHILS 6.7 1.8 - 9.5 K/UL Final  
 ABS. MIXED CELLS 0.5 0.0 - 2.3 K/uL Final  
 ABS. LYMPHOCYTES 0.5 (L) 1.1 - 5.9 K/UL Final  
  Comment: Test performed at [address] [place] [address] or Outpatient [facility]fusion [facility] Location. Reviewed by Medical Director. DF AUTOMATED   Final  
 
 
   
Assessment:  
 
1. Monoclonal gammopathy 2. Iron deficiency anemia secondary to inadequate dietary iron intake 3. Neutrophilic leukocytosis 4. Thrombocytosis ([facility] 5. Malignant neoplasm of prostate ([facility] Plan:  
Monoclonal gammopathy of undetermined significance: The bone marrow biopsy done on 10/11/2017 showed 4.5% monoclonal plasma cells consistent with MGUS. The immunofixation confirmed an IgG a kappa M spike production. The most recent M spike from 02/21/2019 was 0.7g/dL. SPEP will be obtained at this time. Iron Deficiency Anemia secondary to inadequate iron intake: The CBC today shows his WBC is 7.7, the hemoglobin is 10.6 g/dL and hematocrit is 33.6%. . We will continue to monitor every 3 months. Iron Profile and Ferritin levels will be obtained at this time. Neutrophilic leukocytosis: A prior immunophenotyping profile showed no immunophenotypic aberrancy in the leukocyte populations. The test did show evidence of a monoclonal plasma cell population which was subsequently confirmed by bone marrow biopsy and SPEP. The neutrophil count is currently 91% of the total WBC count. Thrombocytosis: The current platelet count is is quite low at 99,000.   Previously he has had problems with thrombocytosis but now he is experiencing thrombocytopenia. We will recheck his platelet counts every 3 months in the future. Malignant neoplasm of the prostate: The patient has a history of prostate cancer. His PSA test as of 10/23/2018 was 0.1 ng/mL. I will recheck his PSA level at this time. Follow-up in 3 months or sooner if indicated. Orders Placed This Encounter  COMPLETE CBC & AUTO DIFF WBC  InHouse CBC ([employer]) Standing Status:   Future Number of Occurrences:   1 Standing Expiration Date:   7/23/2019  METABOLIC PANEL, COMPREHENSIVE Standing Status:   Future Standing Expiration Date:   7/16/2020  
 IRON PROFILE Standing Status:   Future Standing Expiration Date:   7/16/2020  FERRITIN Standing Status:   Future Standing Expiration Date:   7/16/2020  
 PROTEIN ELECTROPHORESIS Standing Status:   Future Standing Expiration Date:   7/16/2020  
 PROSTATE SPECIFIC AG Standing Status:   Future Standing Expiration Date:   7/16/2020 Clarence Garcia MD 
7/16/2019

## 2019-08-26 PROBLEM — Z71.89 GOALS OF CARE, COUNSELING/DISCUSSION: Status: RESOLVED | Noted: 2019-01-01 | Resolved: 2019-01-01

## 2019-08-26 PROBLEM — E11.00 DIABETIC HYPEROSMOLAR NON-KETOTIC STATE (HCC): Status: RESOLVED | Noted: 2019-01-01 | Resolved: 2019-01-01

## 2019-08-26 PROBLEM — R41.82 ALTERED MENTAL STATUS: Status: RESOLVED | Noted: 2019-01-01 | Resolved: 2019-01-01

## 2019-08-26 PROBLEM — D50.8 IRON DEFICIENCY ANEMIA SECONDARY TO INADEQUATE DIETARY IRON INTAKE: Status: RESOLVED | Noted: 2018-01-23 | Resolved: 2019-01-01

## 2019-08-26 PROBLEM — J18.9 PNEUMONIA: Status: RESOLVED | Noted: 2019-01-01 | Resolved: 2019-01-01

## 2019-08-26 PROBLEM — R55 SYNCOPE: Status: ACTIVE | Noted: 2019-01-01

## 2019-08-26 PROBLEM — N39.0 ACUTE UTI: Status: RESOLVED | Noted: 2019-01-01 | Resolved: 2019-01-01

## 2019-08-26 PROBLEM — L89.159 PRESSURE INJURY OF SKIN OF SACRAL REGION: Status: RESOLVED | Noted: 2019-01-01 | Resolved: 2019-01-01

## 2019-08-26 PROBLEM — I48.91 NEW ONSET A-FIB (HCC): Status: RESOLVED | Noted: 2019-01-01 | Resolved: 2019-01-01

## 2019-08-26 PROBLEM — E16.1 HYPOGLYCEMIA, EXOGENOUS HYPERINSULINEMIA: Status: RESOLVED | Noted: 2019-01-01 | Resolved: 2019-01-01

## 2019-08-26 PROBLEM — E87.5 HYPERKALEMIA: Status: ACTIVE | Noted: 2019-01-01

## 2019-08-26 PROBLEM — D47.2 MONOCLONAL GAMMOPATHY: Status: RESOLVED | Noted: 2017-09-12 | Resolved: 2019-01-01

## 2019-08-26 PROBLEM — D75.839 THROMBOCYTOSIS: Status: RESOLVED | Noted: 2017-08-29 | Resolved: 2019-01-01

## 2019-08-26 PROBLEM — D69.6 THROMBOCYTOPENIA (HCC): Status: RESOLVED | Noted: 2019-01-01 | Resolved: 2019-01-01

## 2019-08-26 PROBLEM — D72.9 NEUTROPHILIC LEUKOCYTOSIS: Status: RESOLVED | Noted: 2017-08-29 | Resolved: 2019-01-01

## 2019-08-26 PROBLEM — A41.9 SEPSIS (HCC): Status: RESOLVED | Noted: 2019-01-01 | Resolved: 2019-01-01

## 2019-08-26 PROBLEM — I21.4 NSTEMI (NON-ST ELEVATED MYOCARDIAL INFARCTION) (HCC): Status: RESOLVED | Noted: 2019-01-01 | Resolved: 2019-01-01

## 2019-08-26 NOTE — PROGRESS NOTES
Bedside and Verbal shift change report given to Shirley Garcia RN (oncoming nurse) by Sonido Carver RN (offgoing nurse). Report included the following information SBAR, Kardex, Intake/Output, MAR and Recent Results.

## 2019-08-26 NOTE — H&P
[facility]    Hospitalist Admission Note      NAME:  Minerva Barrios   :   1935   MRN:  [id number]     PCP:  Wei Cruz MD     Date/Time:  2019 4:00 PM          Subjective:     CHIEF COMPLAINT: weak     HISTORY OF PRESENT ILLNESS:     Mr. Levorn Halsted is a 82 y.o.  male who presented to the Emergency Department complaining of weakness. He is a poor historian due to cognitive disability. No family is present. I have reviewed the chart. He chronic anemia of unclear etiology, requiring intermittent transfusions. He sees outpatient hematology. ER today finds Hb 7.6. We will admit him for observation      Past Medical History:   Diagnosis Date    Anemia     Cognitive impairment     Controlled type 2 diabetes mellitus, with long-term current use of insulin ([facility] 2019    Glaucoma     Hypertension     Malignant neoplasm of prostate ([facility]     MGUS (monoclonal gammopathy of unknown significance)     Mildly underweight adult     Paroxysmal A-fib ([facility]         Past Surgical History:   Procedure Laterality Date    BIOPSY OF PROSTATE,INCISIONAL      [place] EXT BEAM RADIATION AS PRIMARY THERAPY TO PROSTATE ONLY  -98       Social History     Tobacco Use    Smoking status: Current Some Day Smoker     Packs/day: 0.10     Years: 22.00     Pack years: 2.20     Types: Cigarettes    Smokeless tobacco: Never Used   Substance Use Topics    Alcohol use: No     Alcohol/week: 0.0 standard drinks        Family History   Problem Relation Age of Onset    Diabetes Other     Hypertension Other       Family hx cannot be fully assessed, due to his cognitive disability    Allergies   Allergen Reactions    Iodinated Contrast Media Hives     Faint, shaking        Prior to Admission medications    Medication Sig Start Date End Date Taking? Authorizing Provider   predniSONE (DELTASONE) 10 mg tablet Take 10 mg by mouth two (2) times a day.  until seen by MD 19 Mallory Del Toro MD   ascorbic acid, vitamin C, (VITAMIN C) 250 mg tablet Take 250 mg by mouth two (2) times a day. 7/22/19   Mallory Del Toro MD   oxybutynin (DITROPAN) 5 mg tablet Take 5 mg by mouth three (3) times daily. 7/30/19   Markie Morales MD   latanoprost (XALATAN) 0.005 % ophthalmic solution Administer 1 Drop to both eyes nightly. Provider, Historical   timolol (BETIMOL) 0.5 % ophthalmic solution Administer 1 Drop to both eyes two (2) times a day. Provider, Historical   brimonidine (ALPHAGAN) 0.2 % ophthalmic solution Administer 1 Drop to both eyes two (2) times a day. Provider, Historical   dorzolamide (TRUSOPT) 2 % ophthalmic solution Administer 1 Drop to both eyes three (3) times daily. Provider, Historical   hydroxyurea (HYDREA) 500 mg capsule TAKE ONE CAPSULE BY MOUTH TWICE A DAY 7/5/19   Ben Munoz MD   insulin glargine (LANTUS) 100 unit/mL injection 15 Units by SubCUTAneous route daily. Patient taking differently: 5 Units by SubCUTAneous route daily. 6/18/19   Mallory Palacios MD   ferrous sulfate 325 mg (65 mg iron) tablet Take 1 Tab by mouth daily (with breakfast). 6/17/19   Mallory Palacios MD   insulin lispro (HUMALOG) 100 unit/mL injection For Blood Sugar (mg/dL) of:              Less than 150 =   0 units  150 -199 =   3 units  200 -249 =   6 units  250 -299 =   9 units  300 -349 =   12 units  350 and above =   15 units 6/17/19   Mallory Palacios MD   predniSONE (DELTASONE) 10 mg tablet 3 tablets po twice daily for 5 days then 3 tablets in morning and 2 tablets in evening for 5 days then 2 tablets po twice daily until seen by dr. Mary Canales 6/17/19   Mallory Palacios MD   OTHER CBC on 6/22/19 and call report to dr. Mary Canales  Reason: Myelodysplastic syndrome 6/17/19   Mallory Palacios MD   Insulin Needles, Disposable, 31 gauge x 5/16\" ndle Use as needed. 3/22/19   Juliette Valdovinos MD   valsartan (DIOVAN) 40 mg tablet Take 1 Tab by mouth daily.  3/18/19   Malcom Dewey, PA   glucose 4 gram chewable tablet Take 4 Tabs by mouth as needed. Patient taking differently: Take 4 Tabs by mouth as needed for Other (low blood sugar). 2/13/19   Ian Batista NP   therapeutic multivitamin [facility]) tablet Take 1 Tab by mouth daily. 2/14/19   Ian Batista NP   metoprolol tartrate (LOPRESSOR) 50 mg tablet Take 1 Tab by mouth every twelve (12) hours. 2/13/19   Ian Batista NP   travoprost (TRAVATAN Z) 0.004 % ophthalmic solution Administer 1 Drop to both eyes two (2) times a day. 2/13/19   Ian Batista NP   brinzolamide (AZOPT) 1 % ophthalmic suspension Administer 1 Drop to both eyes two (2) times a day. 2/13/19   Ian Batista NP   brimonidine-timolol (COMBIGAN) 0.2-0.5 % drop ophthalmic solution Administer 1 Drop to both eyes every twelve (12) hours. 2/13/19   Ian Batista NP   calcium carbonate (CALCIUM 500) 500 mg calcium (1,250 mg) tablet Take 1 Tab by mouth daily. 7/27/18   Maury ASHER NP   atorvastatin (LIPITOR) 40 mg tablet Take 1 Tab by mouth nightly.  2/29/16   Provider, Historical       Review of Systems:  (bold if positive, if negative)    Gen:  Eyes:  ENT:  CVS:  Pulm:  GI:  :  MS:  Skin:  Psych:  Endo:  Hem:  Renal:  Neuro:        Objective:      VITALS:    Vital signs reviewed; most recent are:    Visit Vitals  /68 (BP 1 Location: Right arm, BP Patient Position: At rest)   Pulse 80   Temp 98.3 °F (36.8 °C)   Resp 14   Ht 6' 1\" (1.854 m)   Wt 69.9 kg (154 lb)   SpO2 100%   BMI 20.32 kg/m²     SpO2 Readings from Last 6 Encounters:   08/26/19 100%   08/21/19 98%   08/19/19 93%   08/15/19 93%   08/15/19 93%   08/14/19 98%        No intake or output data in the 24 hours ending 08/26/19 1600     Exam:     Physical Exam:    Gen:  Thin, frail, in no acute distress  HEENT:  Pale conjunctivae, PERRL, hearing intact to voice, moist mucous membranes  Neck:  Supple, without masses, thyroid non-tender  Resp:  No accessory muscle use, clear breath sounds without wheezes rales or rhonchi  Card:  No murmurs, normal S1, S2 without thrills, bruits or peripheral edema  Abd:  Soft, non-tender, non-distended, normoactive bowel sounds are present, no mass  Lymph:  No cervical or inguinal adenopathy  Musc:  No cyanosis or clubbing  Skin:  No rashes or ulcers, skin turgor is good  Neuro:  Cranial nerves are grossly intact, general motor weakness, follows commands vaguely  Psych:  Poor insight, oriented to person     Labs:    Recent Labs     08/26/19  1324   WBC 10.8   HGB 7.6*   HCT 25.0*   *     Recent Labs     08/26/19  1324      K 5.2*   *   CO2 29   *   BUN 25*   CREA 0.68*   CA 7.9*   ALB 1.9*   TBILI 0.7   SGOT 45*   ALT 29     Lab Results   Component Value Date/Time    Glucose (POC) 266 (H) 06/18/2019 11:14 AM    Glucose (POC) 162 (H) 06/18/2019 07:22 AM     No results for input(s): PH, PCO2, PO2, HCO3, FIO2 in the last 72 hours. No results for input(s): INR in the last 72 hours. No lab exists for component: INREXT  All Micro Results     Procedure Component Value Units Date/Time    URINE CULTURE HOLD SAMPLE [[id number]]     Order Status:  Sent Specimen:  Urine            Assessment and Plan:      Anemia / MGUS (monoclonal gammopathy of unknown significance) - POA, recurrent, moderate, unclear etiology. Monitor for transfusion if Hb<7.  Consult Heme. Send serologies and hemoccult. Recent serologies show no deficiency in B12, folate or iron. Used to be on hydroxyurea, prednisone. Syncope / [place] / Mildly underweight adult / Cognitive impairment - Pre syncope reported today, and presumed related to polypharmacy, anemia and deconditioning. Normal has to have help even using walker. Fall precautions. PT eval.     Controlled type 2 diabetes mellitus, with long-term current use of insulin - Diabetic diet and counseling. SSI per protocol. Continue home Lantus. A1c useless in setting of anemia.     Moderate protein malnutrition - Add supplements. Hyperkalemia - Mild, hold supplement, monitor. Paroxysmal A-fib / Hypertension - continue metoprolol and valsartan. Monitor pulse and BP. Not on anticoagulation. Incontinence - continue oxybutynin, though it may worsen debility    Glaucoma - Resume his current multiple eye drops    Hyperlipidemia - continue lipitor and check panel. Malignant neoplasm of prostate - outpatient follow up. Bone infiltration is possible. Telemetry reviewed:   normal sinus rhythm    Risk of deterioration: high      Total time spent with patient: 79 Minutes I reviewed chart, notes, data and current medications in the medical record. I have examined and treated the patient at bedside during this period.                  Care Plan discussed with: Patient, Nursing Staff and >50% of time spent in counseling and coordination of care    Discussed:  Care Plan       ___________________________________________________    Attending Physician: Jason Vela MD

## 2019-08-26 NOTE — PROGRESS NOTES
Spiritual Care Assessment/Progress Note  [address]      NAME: Rodriguez Jones      MRN: [id number]  AGE: 82 y.o.  SEX: male  Restorationism Affiliation: Bahai   Language: English     8/26/2019     Total Time (in minutes): 12     Spiritual Assessment begun in [facility] EMERGENCY DEPT through conversation with:         [x]Patient        [] Family    [] Friend(s)        Reason for Consult: Palliative Care, Initial/Spiritual Assessment     Spiritual beliefs: (Please include comment if needed)     [x] Identifies with a nathalie tradition: Bahai        [] Supported by a nathalie community:            [] Claims no spiritual orientation:           [] Seeking spiritual identity:                [] Adheres to an individual form of spirituality:           [] Not able to assess:                           Identified resources for coping:      [] Prayer                               [] Music                  [] Guided Imagery     [x] Family/friends                 [] Pet visits     [] Devotional reading                         [] Unknown     [] Other:                                               Interventions offered during this visit: (See comments for more details)    Patient Interventions: Affirmation of emotions/emotional suffering, Affirmation of nathalie, Catharsis/review of pertinent events in supportive environment, Coping skills reviewed/reinforced, Iconic (affirming the presence of God/Higher Power)           Plan of Care:     [] Support spiritual and/or cultural needs    [] Support AMD and/or advance care planning process      [] Support grieving process   [] Coordinate Rites and/or Rituals    [] Coordination with community clergy   [] No spiritual needs identified at this time   [] Detailed Plan of Care below (See Comments)  [] Make referral to Music Therapy  [] Make referral to Pet Therapy     [] Make referral to Addiction services  [] Make referral to [facility]  [] Make referral to Spiritual Care Partner  [] No future visits requested        [x] Follow up visits as needed     Comments:  visit for initial spiritual assessment, palliative care consult. Patient reclining on gurney in Emergency Room (ER) room 10, resting with television on, awakens to verbal stimuli and knock on door. Good eye contact, friendly. Says he is feeling okay. Provided spiritual presence and listening as he spoke briefly of his present thoughts, feelings, and concerns. Appeared sleepy and did not carry on a lengthy conversation, but answered questions appropriately. Described an active nathalie saying he belongs to the [employer]. Also stated that he hopes to be able to return home soon. He, then, expressed gratitude for this visit and thanked this  for stopping by. Visited by Rev. Mckayla Hazel MDiv, [facility], [facility]   paging service: [phone])

## 2019-08-26 NOTE — ED NOTES
Pt Throughput: Charge Nurse on 5th floor  made aware of patient's bed assignment. Burton Montgomery RN  Emergency Dept Charge RN.

## 2019-08-26 NOTE — ED NOTES
TRANSFER - OUT REPORT:    Verbal report given to Ayesha Landisra (name) on [place]  being transferred to [facility] [id number] (unit) for routine progression of care       Report consisted of patients Situation, Background, Assessment and   Recommendations(SBAR). Information from the following report(s) SBAR, ED Summary, [facility] and Recent Results was reviewed with the receiving nurse. Lines:   Peripheral IV 08/26/19 Left Forearm (Active)   Site Assessment Clean, dry, & intact 8/26/2019  1:56 PM   Phlebitis Assessment 0 8/26/2019  1:56 PM   Infiltration Assessment 0 8/26/2019  1:56 PM   Dressing Status Clean, dry, & intact 8/26/2019  1:56 PM   Dressing Type Tape;Trach dressing 8/26/2019  1:56 PM   Hub Color/Line Status Pink;Flushed;Patent 8/26/2019  1:56 PM   Action Taken Blood drawn 8/26/2019  1:56 PM       Peripheral IV 08/26/19 Right Antecubital (Active)   Site Assessment Clean, dry, & intact 8/26/2019  5:39 PM   Phlebitis Assessment 0 8/26/2019  5:39 PM   Infiltration Assessment 0 8/26/2019  5:39 PM   Dressing Status Clean, dry, & intact; New 8/26/2019  5:39 PM   Dressing Type Transparent 8/26/2019  5:39 PM   Hub Color/Line Status Pink;Flushed;Patent 8/26/2019  5:39 PM   Action Taken Blood drawn 8/26/2019  5:39 PM        Opportunity for questions and clarification was provided.       Patient transported with:   [employer]

## 2019-08-26 NOTE — ED PROVIDER NOTES
Tamika Roman is an 82 y.o. male who presents via EMS to the ED with a c/o near syncope today. Pt's daughter states that pt was eating his breakfast and all of a sudden stated that \" I am getting weak\" and then slowly collapsed into his chair. Denies LOC. Pt's daughter reports that pt does have episides of syncope due to hypoglycemia but states that this morning his blood sugar was 146. Of note, pt's daughter reports that pt frequently has low HGB and has to get blood transfusions. His last blood transfusion was 7/10-7/11. He also had blood work on 7/16/19 (pt's daughter does not know results). Pt is also undergoing home-health and wound care. Pt specifically denies chest pain, shortness of breath, n/v,d , fever, chills, numbness, tingling, abdominal pain, back pain, cough, leg swelling, dizziness or any other acute sx. Pt denies any recent travel or known sick contacts. PCP: Lulu Hair MD  PMHx significant for: HTN, DM, Malignant neoplasm of prostate, Sepsis, Hypoglycemia, Anemia  PSHx significant for: Prostate Biopsy  Social Hx: Tobacco: Current some day smoker EtOH: none Illicit drug use: none    There are no further complaints or symptoms at this time. Signed by: indio Palmer for Trae Adams MD on August 26th, 2019 at 12:44 PM.    The history is provided by the patient, a relative, medical records and the EMS personnel. No  was used.         Past Medical History:   Diagnosis Date    Controlled type 2 diabetes mellitus, with long-term current use of insulin ([address] 6/9/2019    Diabetes ([address]     Hypertension     Hypoglycemia, exogenous hyperinsulinemia ([address] 6/9/2019    Malignant neoplasm of prostate ([address] 1998    Sepsis ([address] 6/9/2019       Past Surgical History:   Procedure Laterality Date    BIOPSY OF PROSTATE,INCISIONAL  4/98    [place] EXT BEAM RADIATION AS PRIMARY THERAPY TO PROSTATE ONLY  4/30-6/29/98         Family History:   Problem Relation Age of Onset    Diabetes Other     Hypertension Other        Social History     Socioeconomic History    Marital status: SINGLE     Spouse name: Not on file    Number of children: Not on file    Years of education: Not on file    Highest education level: Not on file   Occupational History    Not on file   Social Needs    Financial resource strain: Not on file    Food insecurity:     Worry: Not on file     Inability: Not on file    Transportation needs:     Medical: Not on file     Non-medical: Not on file   Tobacco Use    Smoking status: Current Some Day Smoker     Packs/day: 0.10     Years: 22.00     Pack years: 2.20     Types: Cigarettes    Smokeless tobacco: Never Used   Substance and Sexual Activity    Alcohol use: No     Alcohol/week: 0.0 standard drinks    Drug use: No    Sexual activity: Not on file   Lifestyle    Physical activity:     Days per week: Not on file     Minutes per session: Not on file    Stress: Not on file   Relationships    Social connections:     Talks on phone: Not on file     Gets together: Not on file     Attends Judaism service: Not on file     Active member of club or organization: Not on file     Attends meetings of clubs or organizations: Not on file     Relationship status: Not on file    Intimate partner violence:     Fear of current or ex partner: Not on file     Emotionally abused: Not on file     Physically abused: Not on file     Forced sexual activity: Not on file   Other Topics Concern    Not on file   Social History Narrative    ** Merged History Encounter **              ALLERGIES: Iodinated contrast media    Review of Systems   Constitutional: Negative for chills and fever. Respiratory: Negative for cough and shortness of breath. Cardiovascular: Negative for chest pain and leg swelling. Gastrointestinal: Negative for abdominal pain, diarrhea, nausea and vomiting. Genitourinary: Negative for dysuria and hematuria.    Musculoskeletal: Negative for back pain. Neurological: Positive for weakness. All other systems reviewed and are negative. Vitals:    08/26/19 1159   BP: 137/68   Pulse: 80   Resp: 14   Temp: 98.3 °F (36.8 °C)   SpO2: 100%   Weight: 69.9 kg (154 lb)   Height: 6' 1\" (1.854 m)            Physical Exam   Constitutional: He appears well-developed and well-nourished. No distress. HENT:   Head: Normocephalic and atraumatic. Eyes: Conjunctivae are normal.   Neck: Neck supple. No tracheal deviation present. Cardiovascular: Normal rate and regular rhythm. Pulmonary/Chest: Effort normal. No respiratory distress. Abdominal: He exhibits no distension. Musculoskeletal: Normal range of motion. He exhibits no deformity. Bandages for ulcers on left anterior shin, rt lateral malleolus, and rt toes   Neurological: He is alert. No cranial nerve deficit. Skin: Skin is warm and dry. Bandages for ulcers on left anterior shin, rt lateral malleolus, and rt toes   Psychiatric: His behavior is normal.   Nursing note and vitals reviewed. MDM     29-year-old male with diabetes, hypertension and anemia presents with near syncopal episode at home where his daughter witnessed him become weak and slumped over without fully losing consciousness. He has resolved currently but has a new hemoglobin of less than 8 with last reading following transfusion during previous hospitalization of greater than 10. During previous admission in [place] he had sepsis from a urinary tract infection but no indication of infection today. Procedures    Hospitalist Imani for Admission  2:59 PM    ED Room Number: ER10/10  Patient Name and age:  Shane Amite 82 y.o.  male  Working Diagnosis:   1. Acute on chronic anemia    2.  Near syncope      Readmission: no  Isolation Requirements:  no  Recommended Level of Care:  telemetry  Code Status:  Full

## 2019-08-26 NOTE — ED TRIAGE NOTES
Pt arrived via EMS for near syncopal episode with weakness while eating breakfast this AM. Pt states symptoms have resolved and he feels at his baseline on arrival to the ED. Denies pain, SOB, extremity weakness, or chills. Afebrile. VSS.

## 2019-08-26 NOTE — PROGRESS NOTES
[facility]: MED RECONCILIATION    Comments/Recommendations:    Med rec performed via interview with family member who manages medications and verification of bottles.  Per family, patient was discharged from a rehab facility on 7/16/19 and was given scripts for 30 days. Family is unsure if patient is to continue vitamin c, lipitor, ferrous sulfate or prednisone since they do not have any additional refills. Per family, PCP did not address these medications at last appointment a few weeks ago. Family is in the process of finding a closer PCP and plans to address medications in the near future.  Confirmed patient does not take any anticoagulants (was taken off of eliquis this year), aspirin, NSAIDs, etc. Family reports patient was also taken off of hydroxyurea due to anemia/bleeding issues.  Regarding insulin, family states patient was changed from novolog to novolog mix 70/30. Discussed safety of using novolog mix 70/30 as sliding scale as it contains an intermediate acting insulin type. She states she has left messages for their PCP to ensure he knows it's 70/30 and has not heard back from him. She states she typically only gives twice daily and records his BG in a notebook. She is adamant on not giving him insulin if BG < 150 and reports his BG is fairly well controlled using the current regimen. Recommend DTC consult. Discussed with Dr. Enmanuel Patel.  Confirmed eye drops as: combigan, azopt, trusopt, and travatan per family.      Medications removed:    · Brimonidine  · Hydroxyurea--family states patient was taken off of this medication due to issues with anemia/bleeding  · Latanoprost  · Prednisone (duplicate)  · timolol    Medications adjusted:    · Changed humalog to novolog mix 70/30  · Changed metoprolol from 50 mg to 25 mg     Information obtained from: family, bottles, rx query    Significant PMH/Disease States:   Past Medical History:   Diagnosis Date    Anemia     Cognitive impairment     Controlled type 2 diabetes mellitus, with long-term current use of insulin ([facility] 6/9/2019    Glaucoma     Hypertension     Malignant neoplasm of prostate ([facility] 1998    MGUS (monoclonal gammopathy of unknown significance)     Mildly underweight adult     Paroxysmal A-fib [facility])        Chief Complaint for this Admission:   Chief Complaint   Patient presents with    Fatigue       Allergies: Iodinated contrast media    Prior to Admission Medications:   Prior to Admission Medications   Prescriptions Last Dose Informant Patient Reported? Taking? Insulin Needles, Disposable, 31 gauge x 5/16\" ndle  Family Member No No   Sig: Use as needed. ascorbic acid, vitamin C, (VITAMIN C) 250 mg tablet 8/24/2019 Family Member Yes No   Sig: Take 250 mg by mouth two (2) times a day. atorvastatin (LIPITOR) 40 mg tablet 8/24/2019 Family Member Yes No   Sig: Take 1 Tab by mouth nightly. brimonidine-timolol (COMBIGAN) 0.2-0.5 % drop ophthalmic solution 8/26/2019 at AM Family Member No Yes   Sig: Administer 1 Drop to both eyes every twelve (12) hours. brinzolamide (AZOPT) 1 % ophthalmic suspension 8/26/2019 at AM Family Member No Yes   Sig: Administer 1 Drop to both eyes two (2) times a day. calcium carbonate (CALCIUM 500) 500 mg calcium (1,250 mg) tablet 8/26/2019 at AM Family Member No Yes   Sig: Take 1 Tab by mouth daily. dorzolamide (TRUSOPT) 2 % ophthalmic solution 8/26/2019 at AM Family Member Yes Yes   Sig: Administer 1 Drop to both eyes three (3) times daily. ferrous sulfate 325 mg (65 mg iron) tablet 8/24/2019 Family Member No No   Sig: Take 1 Tab by mouth daily (with breakfast). glucose 4 gram chewable tablet  Family Member No Yes   Sig: Take 4 Tabs by mouth as needed. insulin aspart protamine/insulin aspart (NOVOLOG MIX 70-30 U-100 INSULN) 100 unit/mL (70-30) injection  Family Member Yes Yes   Sig: by SubCUTAneous route Before breakfast, lunch, dinner and at bedtime.  For Blood Sugar (mg/dL) of:              Less than 150 =   0 units  150 -199 =   3 units  200 -249 =   6 units  250 -299 =   9 units  300 -349 =   12 units  350 and above =   15 units   insulin glargine (LANTUS U-100 INSULIN) 100 unit/mL injection 2019 at Unknown time Family Member Yes Yes   Si Units by SubCUTAneous route nightly. Hold for BG < 150   metoprolol tartrate (LOPRESSOR) 50 mg tablet 2019 at AM Family Member Yes Yes   Sig: Take 25 mg by mouth two (2) times a day. oxybutynin (DITROPAN) 5 mg tablet 2019 Family Member Yes Yes   Sig: Take 5 mg by mouth three (3) times daily. predniSONE (DELTASONE) 10 mg tablet 2019 Family Member Yes Yes   Sig: Take 20 mg by mouth two (2) times a day. until seen by MD   therapeutic multivitamin [facility]) tablet 2019 at Unknown time Family Member No Yes   Sig: Take 1 Tab by mouth daily. travoprost (TRAVATAN Z) 0.004 % ophthalmic solution 2019 at AM Family Member No Yes   Sig: Administer 1 Drop to both eyes two (2) times a day. valsartan (DIOVAN) 40 mg tablet 2019 at AM Family Member No Yes   Sig: Take 1 Tab by mouth daily.       Facility-Administered Medications: None                    GURDEEP Ornelas   Contact: [phone]

## 2019-08-26 NOTE — PROGRESS NOTES
8/26/2019  3:38 PM  Reason for Admission:   Anemia     Pt is 83 yo AA male present s to [facility] ED c/o near syncopal episode and weakness was with Dtr who was able to help pt to floor, no fall   PMH significant for   T2 DM,HTN,PAFib,  Cognative impairment       RRAT Score:     27  High Risk/Red             Resources/supports as identified by patient/family:   Pt lies w/ Dtr Shyla Lindsey who is supportive, and assists in his care nights and weekends, has caregiver(relative) while Dtr works                Top Challenges facing patient (as identified by patient/family and CM): Finances/Medication cost?      [employer], [address], [facility]? Relies on family               Support system or lack thereof? Pt has supportive Dtr who he lives with has caregivers 24/7                     Living arrangements? Pt lives w/ Dtr Shyla Lindsey in 3rd floor apt home w/ chair lift in           Self-care/ADLs/Cognition? Pt ,requires assistance w/ ADLs          Current Advanced Directive/Advance Care Plan:  ACP on file Yo Lindsey [phone] is surrogate                          Plan for utilizing home health:    Pt is open to [facility] for SN,PT,OT                 Transition of Care Plan:       1. Hospital admission OBS for medical management,henatology consult  PT eval, palliative consult       2. CM to follow  3. Est pt w/ [facility], CM specialist  4. D/C when stable to home w/ [facility]    5. Dtr Andrew Wong will transport    Care Management Interventions  PCP Verified by CM: Yes(Huy Goodson MD, no NN; last visit\" last month\")  Palliative Care Criteria Met (RRAT>21 & CHF Dx)?: Yes(no documented CHF)  Palliative Consult Recommended?: Yes  Mode of Transport at Discharge: Self(Dtr Shyla Lindsey C) [phone])  Transition of Care Consult (CM Consult):  Other(TRST 4, multiple ED use)  Physical Therapy Consult: Yes  Occupational Therapy Consult: No  Speech Therapy Consult: No  Current Support Network: Relative's Home, Has Personal Caregivers(pt lvies w/ Dtr Abbe Leiva in pvt residence has caregiver (relative) and Dtr 24/7, ambulatory w/ rollator, WC for mobility, relies on family for transport)  Confirm Follow Up Transport: Family  Discharge Location  Discharge Placement: Home with home health([facility])    CM met w/ pt and Dtr Abbe Leiva for d/c planning,charted address is pt's home in [place] he has been living w/ Dtr Marisel since 7/2019 at [address] [facility] [address] in 3rd floor apt w/ chair lift in. PTA pt has caregivers 24/7 to assist w/ ADLs, family provides transport. PCP: currently Huy Lerma MD, no NN  HH: open to [address] for SN,PT, OT, pt has snf at [place] 7/2019  DME: hospital bed, WC, RW, rollator, BSC 3-in-1, shower chair,glucometer  Rx: [id number] [place] [address]    CM attempted to provide pt w/ State and [place] OBS letters, family not at bedside.     Flavia Thomas

## 2019-08-27 NOTE — PROGRESS NOTES
Order noted for bone marrow bx. Spoke to Gema Bell in [address] who noted one scanner is currently broken and once repaired, the other scanner is getting a new part which will take all day to install. Spoke to Panda Diaz RN who will notify MD.  Hopefully plan for bone marrow bx tomorrow, please make NPO tonight at [facility] for procedure.

## 2019-08-27 NOTE — ROUTINE PROCESS
Bedside and Verbal shift change report given to Ulises Valentin (oncoming nurse) by Martin Stubbs (offgoing nurse). Report included the following information SBAR, Kardex, Procedure Summary, MAR, Accordion, Recent Results and Dual Neuro Assessment.

## 2019-08-27 NOTE — PROGRESS NOTES
Bedside and Verbal shift change report given to Shady Nieves RN (oncoming nurse) by Yves Sprague RN (offgoing nurse). Report included the following information SBAR, Kardex, Intake/Output, MAR and Recent Results.

## 2019-08-27 NOTE — DIABETES MGMT
[facility]    [facility] Progress Note    Recommendations/ Comments:   Consult noted; met with patient at bedside, family not currently present. Patient still eating from breakfast tray at time of chart note. Noted in chart notes patient is a slow eating, with difficulty getting accurate preprandial BG checks during last hospitalization. Patient reports he eats 3 meals daily and snacks, consumes diet soda, Glucerna shakes, and sometimes orange juice. Patient states he checks his blood sugar at home four times daily. To verify all information with daughter when available. If appropriate, please consider:  Noted A1c ordered; patient had transfusions in June and July 2019. Current hospital DM medication:   Lispro resistant sensitivity correction scale  Lantus 10 units nightly, dose increase from 5 units tonight    Chart reviewed on [facility]. Patient is a 82 y.o. male with known Type 2 Diabetes on insulin injections: NPH/Reg 70/30: ACHS per scale, 3:50 starting at 150 mg/dL, Lantus : 5 units nightly, hold < 150 mg/dL at home. A1c:   Lab Results   Component Value Date/Time    Hemoglobin A1c 5.4 06/09/2019 09:45 AM    Hemoglobin A1c 6.9 (H) 02/10/2019 03:36 PM       Recent Glucose Results:   Lab Results   Component Value Date/Time     (H) 08/27/2019 05:35 AM     (H) 08/26/2019 01:24 PM    GLUCPOC 146 (H) 08/27/2019 11:03 AM    GLUCPOC 202 (H) 08/27/2019 07:05 AM    GLUCPOC 451 (H) 08/27/2019 12:03 AM        Lab Results   Component Value Date/Time    Creatinine 0.62 (L) 08/27/2019 05:35 AM     Estimated Creatinine Clearance: 90.8 mL/min (A) (based on SCr of 0.62 mg/dL (L)). Active Orders   Diet    DIET CARDIAC Regular    DIET NPO        PO intake: No data found. Will continue to follow as needed.     Thank you  Ariela Clark RN  Office [phone]  Pager [phone]        Time spent: 7 min

## 2019-08-27 NOTE — CONSULTS
[facility]  [address], [address] [facility] [facility]  [place]il [place]anna: [phone]  F: [phone]      Reason for Visit:   Jesus Coleman is a 82 y.o. male who is seen in consultation at the request of Dr. Rizwan Montana for evaluation of hx of MGUS and anemia. History of Present Illness:     Jesus Coleman presented to our ED via EMS on 8/26/19 for near syncopal episode associated with weakness. Denied any pain , SOB or chills. ED labs hgb 7.6. Admitted for observation. Pt is poor historian due to dementia. Reports pain to toes and to lower back area. Denies SOB or N/V. Able to state the month is Aug; aware in the hospital.   Daughter reports her dad c/o being weak and collapsed at the breakfast table. Daughter and caregivers x 2 at bedside. Hx obtained from daughter, at bedside. States her father has relocated here from the [facility] on July 16th. Was being followed by a hematologist for many years. Since Dec her dad has required approx 10 transfusions. . Reports recent hospitalizations in Jan for PNA and bladder infection and required blood transfusions; went to rehab x 5 weeks. Then 3-6 weeks later again admitted for bladder infection went to rehab again x 4-5 weeks. Family was not pleased with rehab so daughter decided to bring her father to [place] to live with her and has hired 2 caregivers to be with him while she is at work. States her father is able to get up with a walker, able to sit up in a chair; cleans his own teeth. Has had open sores on his toes and sacral area since rehab. They are starting to heal. Appetite remains good. In Jan was weighing 129# and now weighs #50 more. Bowels are regular. Has followed Dr Vee Izaguirre / Stef Valencia and PCP / Dr Eloise Leventhal in the [facility]. Has dementia and days vary to his degree of orientation.    Was on Hydrea and Eliquis for approx 2 weeks when they thought he had had a MI but then he was taken off by the cardiologist.       Past Medical History:   Diagnosis Date    Anemia     Cognitive impairment     Controlled type 2 diabetes mellitus, with long-term current use of insulin ([facility] 6/9/2019    Glaucoma     Hypertension     Malignant neoplasm of prostate ([facility] 1998    MGUS (monoclonal gammopathy of unknown significance)     Mildly underweight adult     Paroxysmal A-fib (H[facility])       Past Surgical History:   Procedure Laterality Date    BIOPSY OF PROSTATE,INCISIONAL  4/98    WI EXT BEAM RADIATION AS PRIMARY THERAPY TO PROSTATE ONLY  4/30-6/29/98      Social History     Tobacco Use    Smoking status: Current Some Day Smoker     Packs/day: 0.10     Years: 22.00     Pack years: 2.20     Types: Cigarettes    Smokeless tobacco: Never Used   Substance Use Topics    Alcohol use: No     Alcohol/week: 0.0 standard drinks      Family History   Problem Relation Age of Onset    Diabetes Other     Hypertension Other      Current Facility-Administered Medications   Medication Dose Route Frequency    insulin lispro (HUMALOG) injection 18 Units  18 Units SubCUTAneous ONCE    insulin glargine (LANTUS) injection 10 Units  10 Units SubCUTAneous QHS    insulin lispro (HUMALOG) injection   SubCUTAneous AC&HS    potassium phosphate 30 mmol in 0.9% sodium chloride 250 mL infusion   IntraVENous ONCE    atorvastatin (LIPITOR) tablet 40 mg  40 mg Oral QHS    oxybutynin (DITROPAN) tablet 5 mg  5 mg Oral TID    valsartan (DIOVAN) tablet 40 mg  40 mg Oral DAILY    sodium chloride (NS) flush 5-40 mL  5-40 mL IntraVENous Q8H    sodium chloride (NS) flush 5-40 mL  5-40 mL IntraVENous PRN    glucose chewable tablet 16 g  4 Tab Oral PRN    glucagon (GLUCAGEN) injection 1 mg  1 mg IntraMUSCular PRN    dextrose 10% infusion 125-250 mL  125-250 mL IntraVENous PRN    acetaminophen (TYLENOL) tablet 650 mg  650 mg Oral Q4H PRN    diphenhydrAMINE (BENADRYL) capsule 25 mg  25 mg Oral Q4H PRN    ondansetron (ZOFRAN) injection 4 mg  4 mg IntraVENous Q4H PRN    metoprolol tartrate (LOPRESSOR) tablet 25 mg  25 mg Oral Q12H    dorzolamide (TRUSOPT) 2 % ophthalmic solution 1 Drop  1 Drop Both Eyes TID    latanoprost (XALATAN) 0.005 % ophthalmic solution 1 Drop  1 Drop Both Eyes BID    timolol (TIMOPTIC) 0.5 % ophthalmic solution 1 Drop  1 Drop Both Eyes BID    And    brimonidine (ALPHAGAN) 0.2 % ophthalmic solution 1 Drop  1 Drop Both Eyes BID      Allergies   Allergen Reactions    Iodinated Contrast Media Hives     Faint, shaking        Review of Systems: A complete review of systems was obtained, negative except as described above. Physical Exam:     Visit Vitals  /62 (BP 1 Location: Left arm, BP Patient Position: Head of bed elevated (Comment degrees))   Pulse 74   Temp 98.1 °F (36.7 °C)   Resp 16   Ht 6' 1\" (1.854 m)   Wt 69.9 kg (154 lb)   SpO2 99%   BMI 20.32 kg/m²     ECOG PS: 2-3  General: elderly, No distress  Eyes: PERRLA, anicteric sclerae  HENT: Atraumatic with normal appearance of ears and nose; OP clear  Neck: Supple; no thyromegaly   Lymphatic: No cervical, supraclavicular, or axillary adenopathy  Respiratory: anteriorly CTAB, normal respiratory effort  CV: Normal rate, regular rhythm, no murmurs, no peripheral edema  GI: Soft, nontender, nondistended, no masses, no hepatomegaly, no splenomegaly  MS: Digits without clubbing or cyanosis. Skin: open areas to toes bilaterally; and family reports sacral area; No rashes, ecchymoses, or petechiae. Normal temperature, turgor, and texture. Neuro/Psych: Alert, oriented to self and setting, appropriate affect, fair judgment/insight      Results:     Lab Results   Component Value Date/Time    WBC 7.2 08/27/2019 05:35 AM    HGB 7.2 (L) 08/27/2019 05:35 AM    HCT 23.3 (L) 08/27/2019 05:35 AM    PLATELET 423 (L) 09/43/6756 05:35 AM    .9 (H) 08/27/2019 05:35 AM    ABS.  NEUTROPHILS 9.3 (H) 08/26/2019 01:24 PM     Lab Results   Component Value Date/Time    Sodium 142 08/27/2019 05:35 AM    Potassium 3.8 08/27/2019 05:35 AM    Chloride 110 (H) 08/27/2019 05:35 AM    CO2 27 08/27/2019 05:35 AM    Glucose 208 (H) 08/27/2019 05:35 AM    BUN 27 (H) 08/27/2019 05:35 AM    Creatinine 0.62 (L) 08/27/2019 05:35 AM    GFR est AA >60 08/27/2019 05:35 AM    GFR est non-AA >60 08/27/2019 05:35 AM    Calcium 8.2 (L) 08/27/2019 05:35 AM    Glucose (POC) 202 (H) 08/27/2019 07:05 AM     Lab Results   Component Value Date/Time    Bilirubin, total 0.7 08/27/2019 05:35 AM    ALT (SGPT) 25 08/27/2019 05:35 AM    AST (SGOT) 35 08/27/2019 05:35 AM    Alk. phosphatase 91 08/27/2019 05:35 AM    Protein, total 6.8 08/27/2019 05:35 AM    Albumin 1.8 (L) 08/27/2019 05:35 AM    Globulin 5.0 (H) 08/27/2019 05:35 AM     Lab Results   Component Value Date/Time    Prostate Specific Ag 0.1 03/27/2019 04:43 PM    Prostate Specific Ag 0.2 11/17/2014 04:00 PM     (H) 08/26/2019 05:09 PM     Lab Results   Component Value Date/Time    Reticulocyte count 1.7 08/26/2019 01:24 PM    Iron % saturation 22 08/26/2019 01:24 PM    TIBC 140 (L) 08/26/2019 01:24 PM    Ferritin 2,797 (H) 08/26/2019 05:09 PM    Vitamin B12 1,252 (H) 08/26/2019 05:09 PM    Folate 40.1 (H) 08/26/2019 05:09 PM    Haptoglobin 192 08/26/2019 05:09 PM    SIENNA Poly POS 08/26/2019 01:24 PM     (H) 08/26/2019 05:09 PM    Beta-2 Microglobulin, serum 2.5 (H) 09/12/2017 02:21 PM    Sed rate, automated 68 (H) 07/24/2018 10:47 AM    TSH 1.91 08/26/2019 01:24 PM    M-Rigo Not Observed 07/16/2019 10:22 AM     Lab Results   Component Value Date/Time    INR 1.2 06/04/2019 09:20 AM    aPTT 102.5 (H) 06/09/2019 04:12 PM       10/11/17 Bone Marrow bx  PERIPHERAL BLOOD SMEAR, BONE MARROW ASPIRATE AND BIOPSY:   MARKEDLY HYPERCELLULAR MARROW WITH MARKED GRANULOCYTIC   HYPERPLASIA AND MILD DYSMEGAKARYOPOIESIS (SEE COMMENT). ANEMIA, NEUTROPHILIA AND THROMBOCYTOSIS. MILD MARROW FIBROSIS. BLASTS ARE NOT INCREASED.   4-5% MONOCLONAL PLASMA CELLS. ADEQUATE IRON STORES. DIAGNOSIS COMMENT:   Two processes are apparent in the marrow. There is a plasma cell neoplasm with a low percentage of bone marrow plasma cells as noted above. In addition, the marrow is markedly hypercellular (>95%) with marked granulocytic hyperplasia, megakaryocytic atypia, and marrow fibrosis, suggestive of a myeloproliferative neoplasm or possibly a myeloproliferative/myelodysplastic overlap syndrome. Review of laboratory results reveals that the patient had a previously documented negative study for the JAK2 V617F mutation. Although the peripheral blood smear lacks classic features of chronic myeloid leukemia, a diagnosis of CML cannot be excluded. Correlation with pending cytogenetic studies is recommended. If cytogenetic analysis is normal, consideration could be given to performance of FISH studies for the BCL/ABL translocation and/or to additional molecular genetic studies to exclude the presence of other less common genetic abnormalities associated with myeloproliferative neoplasms. These studies can be performed on peripheral blood. 8/26/19 XR CHEST  IMPRESSION:  No acute process.        Assessment and Recommendations:   1. Anemia, macrocytic  Concerning for a primary bone marrow pathology such as MDS/MPN, or perhaps myeloma with his history of MGUS. Previous bone marrow biopsy did not demonstrate myeloma, but was concerning for a possible MPN. Labwork is essentially unremarkable and has failed to provide an explanation for his persistent anemia, which has required multiple transfusions so far this year. I recommend a repeat bone marrow biopsy to reevaluate.  -Plan to repeat BM bx today but CT machine down so will plan for tomorrow; NPO after midnight; discussed with nursing   -continue to monitor CBC and transfuse for Hgb < 7    2. Thrombocytopenia  Noted in hx; 6/19 during hospitalization with infecton  -BM bx as above.  -continue to monitor      3.  DM Management per hospitalist      4. Wounds  Toes and ? Sacral area  Wound team consulted    5. Dementia  Continue supportive care    6. Debility  PT/OT eval    Patient seen in conjunction with Jing Knight NP.       Signed By: Jing Knight NP

## 2019-08-27 NOTE — WOUND CARE
Wound care consult:  Initial consult: Consulted for \"wounds and redness. \"    Patient lying in bed, no distress. Daughter at bedside. Assessment  All skin folds and bony prominences assessed, turned with staff assistance. Wounds and skin conditions noted were present prior to admission. Sacrum: 6cm x 5cm x 0cm wound to sacrum Wound bed is yellow and red with some slough noted. Unstageable pressure injury. Minimal sanguineous drainage noted, no odor. Veronica-wound is dark and very moist. Per daughter, the wound has been there for \"a very long time. \"    Right lateral ankle: 3cm x 3cm wound. Dark eschar noted to wound bed with edge open, pink but no erythema, edema, odor, or drainage. Right great toe: 1cm x 1cm area open on dorsal toe. Wound bed is moist, pink with    0.5cm x 0.5cm  Open area on tip of toe, dry and scabbed. Unable to visualize wound bed. No odor or drainage noted. Left second toe: 0.5cm x 0.5cm wound dry and scabbed. Unable to visualize wound bed. minimal serous drainage, no odor. Treatment  Sacrum: Cleanse with saline and apply medihoney and cover with mepilex. Change DAILY or as needed. Right lateral ankle: Cleanse with saline and cover with mepilex. Change EVERY OTHER DAY or as needed. Right great toe: Cleanse with saline and cover with Xeroform and wrap with kerlix. Change EVERY OTHER DAY or as needed. Left second toe: Cleanse with saline and cover with Xeroform and wrap with kerlix. Change EVERY OTHER DAY or as needed. Incontinent care given with moisture barrier wipes  Repositioned in bed on right side  Heels on prevalon boots    Recommendations/Plan  Mobility team ordered    Turn, reposition every 2 hours as tolerated, float heels, place in prevalon boots. Incontinent care with comfort shields. Moisture barrier as needed. Reconsult as needed.

## 2019-08-27 NOTE — PROGRESS NOTES
Tino Ervinelsen [facility] 81  [address]  [phone]      Medical Progress Note      NAME: Pk Ribeiro   :  1935  MRM:  [id number]    Date/Time: 2019         Subjective:     Chief Complaint:  Patient was seen and examined by me. Chart reviewed. C/o weakness. No chest pain, SOB       Objective:       Vitals:       Last 24hrs VS reviewed since prior progress note.  Most recent are:    Visit Vitals  /62 (BP 1 Location: Left arm, BP Patient Position: Head of bed elevated (Comment degrees))   Pulse 74   Temp 98.1 °F (36.7 °C)   Resp 16   Ht 6' 1\" (1.854 m)   Wt 69.9 kg (154 lb)   SpO2 99%   BMI 20.32 kg/m²     SpO2 Readings from Last 6 Encounters:   19 99%   19 98%   19 93%   08/15/19 93%   08/15/19 93%   19 98%            Intake/Output Summary (Last 24 hours) at 2019 1053  Last data filed at 2019 2014  Gross per 24 hour   Intake 120 ml   Output    Net 120 ml        Exam:     Physical Exam:    Gen:  Elderly, disheveled, thin, NAD  HEENT:  Pink conjunctivae, PERRL, hearing intact to voice, moist mucous membranes  Neck:  Supple, without masses, thyroid non-tender  Resp:  No accessory muscle use, clear breath sounds without wheezes rales or rhonchi  Card:  No murmurs, normal S1, S2 without thrills, bruits or peripheral edema  Abd:  Soft, non-tender, non-distended, normoactive bowel sounds are present  Musc:  No cyanosis or clubbing  Skin:  Decub ulcer, bilateral foot wounds  Neuro:  Cranial nerves 3-12 are grossly intact, diffused weakness, follows commands appropriately  Psych:  poor insight, oriented to person, place and time, alert    Medications Reviewed: (see below)    Lab Data Reviewed: (see below)    ______________________________________________________________________    Medications:     Current Facility-Administered Medications   Medication Dose Route Frequency    insulin lispro (HUMALOG) injection 18 Units  18 Units SubCUTAneous ONCE    insulin glargine (LANTUS) injection 10 Units  10 Units SubCUTAneous QHS    insulin lispro (HUMALOG) injection   SubCUTAneous AC&HS    potassium phosphate 30 mmol in 0.9% sodium chloride 250 mL infusion   IntraVENous ONCE    atorvastatin (LIPITOR) tablet 40 mg  40 mg Oral QHS    oxybutynin (DITROPAN) tablet 5 mg  5 mg Oral TID    valsartan (DIOVAN) tablet 40 mg  40 mg Oral DAILY    sodium chloride (NS) flush 5-40 mL  5-40 mL IntraVENous Q8H    sodium chloride (NS) flush 5-40 mL  5-40 mL IntraVENous PRN    glucose chewable tablet 16 g  4 Tab Oral PRN    glucagon (GLUCAGEN) injection 1 mg  1 mg IntraMUSCular PRN    dextrose 10% infusion 125-250 mL  125-250 mL IntraVENous PRN    acetaminophen (TYLENOL) tablet 650 mg  650 mg Oral Q4H PRN    diphenhydrAMINE (BENADRYL) capsule 25 mg  25 mg Oral Q4H PRN    ondansetron (ZOFRAN) injection 4 mg  4 mg IntraVENous Q4H PRN    metoprolol tartrate (LOPRESSOR) tablet 25 mg  25 mg Oral Q12H    dorzolamide (TRUSOPT) 2 % ophthalmic solution 1 Drop  1 Drop Both Eyes TID    latanoprost (XALATAN) 0.005 % ophthalmic solution 1 Drop  1 Drop Both Eyes BID    timolol (TIMOPTIC) 0.5 % ophthalmic solution 1 Drop  1 Drop Both Eyes BID    And    brimonidine (ALPHAGAN) 0.2 % ophthalmic solution 1 Drop  1 Drop Both Eyes BID          Lab Review:     Recent Labs     08/27/19  0535 08/26/19  1324   WBC 7.2 10.8   HGB 7.2* 7.6*   HCT 23.3* 25.0*   * 133*     Recent Labs     08/27/19  0535 08/26/19  1324    141   K 3.8 5.2*   * 109*   CO2 27 29   * 253*   BUN 27* 25*   CREA 0.62* 0.68*   CA 8.2* 7.9*   MG 2.0  --    PHOS 1.8*  --    ALB 1.8* 1.9*   TBILI 0.7 0.7   SGOT 35 45*   ALT 25 29     Lab Results   Component Value Date/Time    Glucose (POC) 202 (H) 08/27/2019 07:05 AM    Glucose (POC) 451 (H) 08/27/2019 12:03 AM    Glucose (POC) 529 (H) 08/26/2019 10:45 PM    Glucose (POC) 529 (H) 08/26/2019 10:41 PM    Glucose (POC) 518 (H) 08/26/2019 10:39 PM          Assessment / Plan:     Principal Problem:    83 yo hx of HTN, DM, Pafib, MGUS, anemia, malnutrition, decub ulcer, presented w/ weakness, near syncope, anemia    1) MGUS/anemia: worsening. Hematology now following, plan for bone marrow bx tomorrow. Cont to monitor Hgb closely, transfuse prn    2) Near syncope/weakness: due to anemia, malnutrition. Can walk with walker and assistance. Cont PT/OT    3) Moderate malnutrition: encourage PO. Nutrition following    4) HTN: cont metoprolol, valsartan    5) Pafib: cont BB    6) DM type 2: A1C pending.   Cont Lantus, SSI    Total time spent with patient: 35 min                  Care Plan discussed with: Patient, nursing    Discussed:  Care Plan    Prophylaxis:  SCD's    Disposition:   PT, OT, RN           ___________________________________________________    Attending Physician: Niesha Alva MD

## 2019-08-27 NOTE — PROGRESS NOTES
Nutrition Assessment:    RECOMMENDATIONS/INTERVENTION(S):   1. Continue cardiac, consistent carb diet. 2. Continue Glucerna TID. 3. Obtain standing scale wt for accurate wt hx assessment. 3. Continue to monitor po intake, wt changes, labs, skin integrity. ASSESSMENT:   8/27: RD consult received for nutrition supplements. MST >2 for pressure injury. Pt admitted for anemia. PMH includes T2DM, HTN, malignant neoplasm of prostate. BMI 20, underweight for age. Pt reports good appetite and po intake. When asked about wt loss, pt reported \"it's in the records. \" Currently recorded wt is pt stated. Need standing scale wt for accurate wt hx assessment. Pt currently with stage 3 sacral PI. Dicussed protein sources and its importance with healing. Pt currently receiving Glucerna, will continue to send. A1c 5.4 in June, c/w well-controlled DM. BG has been elevated since admission. Phos 1.8 low, currently being repleted. Will continue to monitor po intake, wt changes, labs, skin integrity. Meds- lipitor, Lantus, Humalog, Kphos. Labs- phos 1.8, Cl 110, Ca 8.22, -208-451. Diet Order: Cardiac, Consistent carb  % Eaten:  No data found. Pertinent Medications: [x] Reviewed    Labs: [x] Reviewed    Anthropometrics: Height: 6' 1\" (185.4 cm) Weight: 69.9 kg (154 lb)    IBW (%IBW):   ( ) UBW (%UBW):   (  %)      BMI: Body mass index is 20.32 kg/m². This BMI is indicative of:   [x] Underweight    [] Normal    [] Overweight    []  Obesity    []  Extreme Obesity (BMI>40)  Estimated Nutrition Needs (Based on): 1885 Kcals/day(1450 x 1.3 AF) , 70 g(1-1.2 g/kg) Protein  Carbohydrate: At Least 130 g/day  Fluids: 1885 mL/day (1 ml/kcal)    Last BM: 8/26   [x]Active     []Hyperactive  []Hypoactive       [] Absent   BS  Skin:    [] Intact   [] Incision  [x] Breakdown: stage 3 sacral PI  [] DTI   [] Tears/Excoriation/Abrasion  []Edema [] Other:      Wt Readings from Last 30 Encounters:   08/26/19 69.9 kg (154 lb)   08/07/19 69.9 kg (154 lb)   07/20/19 67.6 kg (149 lb)   06/18/19 79.5 kg (175 lb 3.2 oz)   06/07/19 65.3 kg (144 lb)   05/15/19 69.4 kg (153 lb)   03/27/19 64 kg (141 lb)   03/18/19 61.7 kg (136 lb)   02/13/19 57.8 kg (127 lb 6.4 oz)   12/04/18 60.1 kg (132 lb 6.4 oz)   10/23/18 57.6 kg (127 lb)   07/24/18 64.4 kg (142 lb)   07/17/18 60.3 kg (133 lb)   07/16/18 64.9 kg (143 lb)   04/24/18 63 kg (139 lb)   04/09/18 62.1 kg (137 lb)   01/23/18 65.8 kg (145 lb)   10/24/17 66.7 kg (147 lb)   10/11/17 64.9 kg (143 lb)   09/12/17 66.4 kg (146 lb 6.4 oz)   08/29/17 65.8 kg (145 lb)   04/11/17 72.1 kg (159 lb)   05/05/16 78 kg (172 lb)   04/12/16 81.6 kg (180 lb)   03/28/16 81.6 kg (180 lb)   03/17/16 78 kg (172 lb)   02/19/16 80.7 kg (178 lb)   04/28/15 86.2 kg (190 lb)   02/13/15 85.7 kg (189 lb)   02/10/15 85.7 kg (189 lb)      NUTRITION DIAGNOSES:   Problem:  Increased nutrient needs     Etiology: related to increased demand for protein     Signs/Symptoms: as evidenced by stage 3 sacral PI      NUTRITION INTERVENTIONS:  Meals/Snacks: General/healthful diet   Supplements: Commercial supplement              GOAL:   PO intake >75% meals + ONS next 5-7 days    Cultural, Confucianist, or Ethnic Dietary Needs: None     EDUCATION & DISCHARGE NEEDS:    [x] None Identified   [] Identified and Education Provided/Documented   [] Identified and Pt declined/was not appropriate      [] Interdisciplinary Care Plan Reviewed/Documented    [x] Discharge Needs:  Heart healthy diet   [] No Nutrition Related Discharge Needs    NUTRITION RISK:   Pt Is At Nutrition Risk  [x]     No Nutrition Risk Identified  []       PT SEEN FOR:    [x]  MD Consult: []Calorie Count      []Diabetic Diet Education        []Diet Education     []Electrolyte Management     [x]General Nutrition Management and Supplements     []Management of Tube Feeding     []TPN Recommendations    [x]  RN Referral:  [x]MST score >=2     []Enteral/Parenteral Nutrition PTA     []Pregnant: Gestational DM or Multigestation                 [] Pressure Ulcer    []  Low BMI      []  Length of Stay       [] Dysphagia Diet         [] Ventilator  []  Follow-up     Previous Recommendations:   [] Implemented          [] Not Implemented          [x] Not Applicable    Previous Goal:   [] Met              [] Progressing Towards Goal              [] Not Progressing Towards Goal   [x] Not Applicable            Catalina Pryor, [address] [facility]  Pager [phone]  Phone [phone]

## 2019-08-27 NOTE — DIABETES MGMT
[facility]    [facility] Progress Note    Recommendations/ Comments:   Called and spoke with previous PCP Dr. Argelia Goodson, in [place], [place] [phone]. Per PCP, patient was originally on Lantus 5 units nightly and Novolog SS when living in [place] due to the fact that the patient was bedridden and not eating. Once patient moved in with daughter here, regimen was to be changed to Novolog Mix 70/30 5 units BID for ease of use. Dose was to be titrated based on insulin needs. Per patient's daughter, she has been unable to clarify insulin regimen with PCP. Noted phone number for PCP on chart is incorrect, number updated under comments. 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 Patient's daughter reports that at home, she would rather he be in the high 100s without bedtime coverage. Discussed during phone call with daughter the duration of action of Novolog Mix, and if used at each meal, combined with Lantus, increasing the risk for hypoglycemia. Patient often sneaks food in bed at home (candy, snacks). Daughter to arrive tomorrow with medications and log for DTC to review.      Thank you,   Lyndon Salas, RN  Office [phone]  Pager [phone]

## 2019-08-27 NOTE — PROGRESS NOTES
Problem: Self Care Deficits Care Plan (Adult)  Goal: *Acute Goals and Plan of Care (Insert Text)  Description    FUNCTIONAL STATUS PRIOR TO ADMISSION: Patient required minimal assistance to CGA for basic and instrumental ADLs. HOME SUPPORT: The patient lived with daughter who assists in patient care. Occupational Therapy Goals  Initiated 8/27/2019  1. Patient will perform lower body dressing with minimal assistance/contact guard assist within 7 day(s). 2.  Patient will perform upper body dressing with supervision/set-up within 7 day(s). 3.  Patient will perform grooming with supervision/set-up within 7 day(s). 4.  Patient will perform toilet transfers with contact guard assist within 7 day(s). 5.  Patient will perform all aspects of toileting with supervision/set-up within 7 day(s). 6.  Patient will participate in upper extremity therapeutic exercise/activities with supervision/set-up for 10 minutes within 7 day(s). Outcome: Progressing Towards Goal   OCCUPATIONAL THERAPY EVALUATION  Patient: Rodriguez Jones (11 y.o. male)  Date: 8/27/2019  Primary Diagnosis: Anemia [D64.9]  Anemia [D64.9]        Precautions: fall       ASSESSMENT:  Based on the objective data described below, the patient presents with general weakness, decreased activity tolerance, and functional mobility. Patient able to follow OT commands but noted requires increased time to perform. He is able to follow daughter's commands with greater accuracy and familiar language for patient. Patient requires assist to don slip on shoes, and performs sit to stand with min assist x2. Patient able to perform ambulation with RW and min assist x 1, verbal cues for direction and safety. Patient left in chair, alarm set, family present. Family hopeful for patient to return home at discharge.     Current Level of Function Impacting Discharge (ADLs/self-care): min assist for transfers, mod assist for LE ADLs    Functional Outcome Measure: The patient scored 35/100 on the Barthel Index outcome measure. Other factors to consider for discharge: general weakness, decreased activity tolerance      Patient will benefit from skilled therapy intervention to address the above noted impairments. PLAN :  Recommendations and Planned Interventions: self care training, functional mobility training, therapeutic exercise, balance training, therapeutic activities, endurance activities, patient education, home safety training and family training/education    Frequency/Duration: Patient will be followed by occupational therapy 5 times a week to address goals. Recommendation for discharge: (in order for the patient to meet his/her long term goals)  Occupational therapy at least 2 days/week in the home AND ensure assist and/or supervision for safety with ADLs and transfers      This discharge recommendation:  Has not yet been discussed the attending provider and/or case management    Equipment recommendations for successful discharge (if) home: none       SUBJECTIVE:   Patient stated I can sit up for a while.     OBJECTIVE DATA SUMMARY:   HISTORY:   Past Medical History:   Diagnosis Date    Anemia     Cognitive impairment     Controlled type 2 diabetes mellitus, with long-term current use of insulin ([address] 6/9/2019    Glaucoma     Hypertension     Malignant neoplasm of prostate ([address] 1998    MGUS (monoclonal gammopathy of unknown significance)     Mildly underweight adult     Paroxysmal A-fib [facility])      Past Surgical History:   Procedure Laterality Date    BIOPSY OF PROSTATE,INCISIONAL  4/98    [facility] EXT BEAM RADIATION AS PRIMARY THERAPY TO PROSTATE ONLY  4/30-6/29/98       Expanded or extensive additional review of patient history:     Home Situation  Home Environment: Private residence  # Steps to Enter: 39  Rails to Enter: (has lift chair)  One/Two Story Residence: One story  Living Alone: No  Support Systems: Child(monroe), Family member(s)  Current DME Used/Available at Home: Fili Webb, [address] [facility] chair, Wheelchair, Hospital bed, Commode, bedside, Walker, rollator  Tub or Shower Type: Tub/Shower combination    Hand dominance: Right    EXAMINATION OF PERFORMANCE DEFICITS:  Cognitive/Behavioral Status:  Neurologic State: Alert  Orientation Level: Oriented to person;Oriented to place;Oriented to situation  Cognition: Follows commands;Decreased attention/concentration(with increased time )  Perception: Appears intact  Perseveration: No perseveration noted  Safety/Judgement: Decreased awareness of environment    Skin: intact as seen    Edema: none noted     Hearing:       Vision/Perceptual:                                     Range of Motion:  AROM: Within functional limits  PROM: Within functional limits                      Strength:  Strength: Generally decreased, functional                Coordination:  Coordination: Within functional limits            Tone & Sensation:                              Balance:  Sitting: Intact; High guard  Standing: Impaired;Pull to stand; With support  Standing - Static: Fair  Standing - Dynamic : Fair    Functional Mobility and Transfers for ADLs:  Bed Mobility:  Rolling: Minimum assistance  Supine to Sit: Minimum assistance  Sit to Supine: Minimum assistance  Scooting: Minimum assistance    Transfers:  Sit to Stand: Minimum assistance;Assist x2  Stand to Sit: Minimum assistance  Bed to Chair: Minimum assistance  Toilet Transfer : Minimum assistance;Assist x2  Assistive Device : Walker, rolling    ADL Assessment:  Feeding: Supervision    Oral Facial Hygiene/Grooming: Minimum assistance    Bathing: Moderate assistance    Upper Body Dressing: Minimum assistance    Lower Body Dressing: Moderate assistance    Toileting:  Moderate assistance                ADL Intervention and task modifications:                                     Cognitive Retraining  Safety/Judgement: Decreased awareness of environment    Therapeutic Exercise: Functional Measure:  Barthel Index:    Bathin  Bladder: 0  Bowels: 5  Groomin  Dressin  Feeding: 10  Mobility: 0  Stairs: 0  Toilet Use: 5  Transfer (Bed to Chair and Back): 10  Total: 35/100        The Barthel ADL Index: Guidelines  1. The index should be used as a record of what a patient does, not as a record of what a patient could do. 2. The main aim is to establish degree of independence from any help, physical or verbal, however minor and for whatever reason. 3. The need for supervision renders the patient not independent. 4. A patient's performance should be established using the best available evidence. Asking the patient, friends/relatives and nurses are the usual sources, but direct observation and common sense are also important. However direct testing is not needed. 5. Usually the patient's performance over the preceding 24-48 hours is important, but occasionally longer periods will be relevant. 6. Middle categories imply that the patient supplies over 50 per cent of the effort. 7. Use of aids to be independent is allowed. Ruth Elizabeth., Barthel, D.W. ([id number]). Functional evaluation: the Barthel Index. [address] [facility] (14)2. Shahram Ruelas jann DALE Estrada, Amrik Murillo., Mario Alberto Angeles., [place], [address] [place] [address] (). Measuring the change indisability after inpatient rehabilitation; comparison of the responsiveness of the Barthel Index and Functional [place] Measure. Journal of Neurology, Neurosurgery, and Psychiatry, 66(4), 273-880. Sosa Margarita, N.J.A, MAYE Hodgson, & Genoveva Henson M.A. (2004.) Assessment of post-stroke quality of life in cost-effectiveness studies: The usefulness of the Barthel Index and the EuroQoL-5D.  Quality of Life Research, 15, 480-00         Occupational Therapy Evaluation Charge Determination   History Examination Decision-Making   LOW Complexity : Brief history review  LOW Complexity : 1-3 performance deficits relating to physical, cognitive , or psychosocial skils that result in activity limitations and / or participation restrictions  LOW Complexity : No comorbidities that affect functional and no verbal or physical assistance needed to complete eval tasks       Based on the above components, the patient evaluation is determined to be of the following complexity level: LOW   Pain Rating:      Activity Tolerance:   Fair  Please refer to the flowsheet for vital signs taken during this treatment. After treatment patient left in no apparent distress:    Sitting in chair, Call bell within reach, Bed / chair alarm activated and Caregiver / family present    COMMUNICATION/EDUCATION:   The patients plan of care was discussed with: Physical Therapist and Registered Nurse. Home safety education was provided and the patient/caregiver indicated understanding., Patient/family have participated as able in goal setting and plan of care. and Patient/family agree to work toward stated goals and plan of care. This patients plan of care is appropriate for delegation to \[facility] [place] [facility]\"".     Thank you for this referral.  Gaston Nicole OTR/L  Time Calculation: 26 mins

## 2019-08-27 NOTE — PROGRESS NOTES
Problem: Mobility Impaired (Adult and Pediatric)  Goal: *Acute Goals and Plan of Care (Insert Text)  Description  FUNCTIONAL STATUS PRIOR TO ADMISSION: Patient required setup assistance for basic and instrumental ADLs. HOME SUPPORT PRIOR TO ADMISSION: The patient lived with family and has care giver at home who assist him also get HHPT. Physical Therapy Goals  Initiated 8/27/2019  1. Patient will move from supine to sit and sit to supine , scoot up and down and roll side to side in bed with modified independence within 7 day(s). 2.  Patient will transfer from bed to chair and chair to bed with supervision/set-up using the least restrictive device within 7 day(s). 3.  Patient will perform sit to stand with supervision/set-up within 7 day(s). 4.  Patient will ambulate with supervision/set-up for 150 feet with the least restrictive device within 7 day(s). 5.  Patient will ascend/descend 4 stairs with  handrail(s) with minimal assistance/contact guard assist within 7 day(s). Outcome: Progressing Towards Goal   PHYSICAL THERAPY EVALUATION  Patient: Jeannie Briceño (33 y.o. male)  Date: 8/27/2019  Primary Diagnosis: Anemia [D64.9]  Anemia [D64.9]        Precautions: fall         ASSESSMENT  Based on the objective data described below, the patient presents with generalized weakness and debility. Rolled and sit up on the edge of bed with min assist. Ambulate with rolling walker min assist out on the 5th floor hallway slow pace gait with narrow base of support. Need verbal cues to have a wide base of support during standing and ambulation. OOB to chair as tolerated performed some active range of motion exercise on both LE all planes. Family in the room during the entire therapy sessions and agreed with all goals set for the patient. Communicated with nurse who agreed to monitor patient.      Current Level of Function Impacting Discharge (mobility/balance): min assist with transfers and ambulation    Functional Outcome Measure: The patient scored 50/100 on the barthel index outcome measure. Other factors to consider for discharge: generalized weakness     Patient will benefit from skilled therapy intervention to address the above noted impairments. PLAN :  Recommendations and Planned Interventions: bed mobility training, transfer training, gait training, therapeutic exercises and therapeutic activities      Frequency/Duration: Patient will be followed by physical therapy:  5 times a week to address goals. Recommendation for discharge: (in order for the patient to meet his/her long term goals)  Physical therapy at least 2 days/week in the home AND ensure assist and/or supervision for safety with rolling walker     This discharge recommendation:  Has been made in collaboration with the attending provider and/or case management    Equipment recommendations for successful discharge (if) home: patient owns DME required for discharge         SUBJECTIVE:   Patient stated [address].     OBJECTIVE DATA SUMMARY:   HISTORY:    Past Medical History:   Diagnosis Date    Anemia     Cognitive impairment     Controlled type 2 diabetes mellitus, with long-term current use of insulin ([facility] 6/9/2019    Glaucoma     Hypertension     Malignant neoplasm of prostate ([facility] 1998    MGUS (monoclonal gammopathy of unknown significance)     Mildly underweight adult     Paroxysmal A-fib [facility])      Past Surgical History:   Procedure Laterality Date    BIOPSY OF PROSTATE,INCISIONAL  4/98    [facility] EXT BEAM RADIATION AS PRIMARY THERAPY TO PROSTATE ONLY  4/30-6/29/98       Personal factors and/or comorbidities impacting plan of care:     Home Situation  Home Environment: Private residence  # Steps to Enter: 39  Rails to [employer]: (has lift chair)  One/Two Story Residence: One story  Living Alone: No  Support Systems: Child(monroe), Family member(s)  Current DME Used/Available at Home: Fili Parker, [address] [facility] chair, Wheelchair, Hospital bed, Commode, bedside, Walker, rollator  Tub or Shower Type: Tub/Shower combination    EXAMINATION/PRESENTATION/DECISION MAKING:   Critical Behavior:  Neurologic State: Alert, Confused  Orientation Level: Disoriented to time, Oriented to person, Oriented to place, Oriented to situation  Cognition: Follows commands, Appropriate decision making, Appropriate for age attention/concentration, Appropriate safety awareness     Hearing:    Range Of Motion:  AROM: Within functional limits           PROM: Within functional limits           Strength:    Strength: Generally decreased, functional                    Tone & Sensation:                                  Coordination:  Coordination: Within functional limits  Vision:      Functional Mobility:  Bed Mobility:  Rolling: Minimum assistance  Supine to Sit: Minimum assistance  Sit to Supine: Minimum assistance  Scooting: Minimum assistance  Transfers:  Sit to Stand: Minimum assistance  Stand to Sit: Minimum assistance  Stand Pivot Transfers: Minimum assistance     Bed to Chair: Minimum assistance        Sliding Board : Minimum assistance     Balance:   Sitting: Intact; High guard  Standing: Impaired;Pull to stand; With support  Standing - Static: Fair  Standing - Dynamic : Fair  Ambulation/Gait Training:  Distance (ft): 60 Feet (ft)  Assistive Device: Walker, rolling;Gait belt  Ambulation - Level of Assistance: Minimal assistance; Additional time        Gait Abnormalities: Path deviations; Step to gait        Base of Support: Narrowed     Speed/Myra: Slow;Pace decreased (<100 feet/min)  Step Length: Right shortened;Left shortened                  Therapeutic Exercises:    Instructed patient to continue active range of motion exercise on both legs while up on chair or on bed.       Functional Measure:  Barthel Index:    Bathin  Bladder: 10  Bowels: 10  Groomin  Dressin  Feeding: 10  Mobility: 0  Stairs: 0  Toilet Use: 5  Transfer (Bed to Chair and Back): 10  Total: 50/100 The Barthel ADL Index: Guidelines  1. The index should be used as a record of what a patient does, not as a record of what a patient could do. 2. The main aim is to establish degree of independence from any help, physical or verbal, however minor and for whatever reason. 3. The need for supervision renders the patient not independent. 4. A patient's performance should be established using the best available evidence. Asking the patient, friends/relatives and nurses are the usual sources, but direct observation and common sense are also important. However direct testing is not needed. 5. Usually the patient's performance over the preceding 24-48 hours is important, but occasionally longer periods will be relevant. 6. Middle categories imply that the patient supplies over 50 per cent of the effort. 7. Use of aids to be independent is allowed. Jazmyne Min., Barthel, DRejiW. (7794). Functional evaluation: the Barthel Index. [facility] (14)2. [facility] Pena jann DALE Estrada, Shaylee Rebolledo., Zulay Adames., [facility], [address] [place] [address] (1999). Measuring the change indisability after inpatient rehabilitation; comparison of the responsiveness of the Barthel Index and Functional Page Measure. Journal of Neurology, Neurosurgery, and Psychiatry, 66(4), 955-601. Kat Self, N.[place].LAKESHIA, MAYE Hodgson, & Isabel Kimball, MYOLANDA. (2004.) Assessment of post-stroke quality of life in cost-effectiveness studies: The usefulness of the Barthel Index and the EuroQoL-5D.  Quality of Life Research, 15, 751-14           Physical Therapy Evaluation Charge Determination   History Examination Presentation Decision-Making   MEDIUM  Complexity : 1-2 comorbidities / personal factors will impact the outcome/ POC  MEDIUM Complexity : 3 Standardized tests and measures addressing body structure, function, activity limitation and / or participation in recreation  MEDIUM Complexity : Evolving with changing characteristics  Other outcome measures barthel MEDIUM      Based on the above components, the patient evaluation is determined to be of the following complexity level: MEDIUM    Pain Ratin/10    Activity Tolerance:   Good  Please refer to the flowsheet for vital signs taken during this treatment. After treatment patient left in no apparent distress:   Sitting in chair, Call bell within reach, Bed / chair alarm activated and Caregiver / family present    COMMUNICATION/EDUCATION:   The patients plan of care was discussed with: Occupational Therapist, Registered Nurse and . Fall prevention education was provided and the patient/caregiver indicated understanding. Thank you for this referral.  Tommy Jin, PT,WCC.    Time Calculation: 27 mins

## 2019-08-27 NOTE — PROGRESS NOTES
8/27/2019   CARE MANAGEMENT NOTE:  CM reviewed EMR and received handoff from ER . Pt was admitted with dx of anemia and per chart plan is for bone marrow bx on Wed. 8/28. Reportedly, pt resides with his dtr Steven Bedoya Post located at [address] [place] [address] in 3rd floor apt w/ chair lift. Dtr. Steven Bedoya Post ([phone]). Transition Plan of Care:  1. Plan is to return to his dtr's home (see address above). 2.  Pt is currently receiving homecare thru [facility] (skilled nursing, PT/OT. A resumption order will be needed. 3.  Family will transport pt home. CM will continue to follow pt until discharge.   Saravanan

## 2019-08-28 NOTE — PROGRESS NOTES
PHYSICAL THERAPY:Pt pleasantly declined PT after a second attempt. PT will follow next treatment day.

## 2019-08-28 NOTE — PROGRESS NOTES
[facility]  [address] [facility], [address] [place] [address]  [phone]      Medical Progress Note      NAME: Janet Melendez   :  1935  MRM:  [id number]    Date/Time: 2019         Subjective:     Chief Complaint:  Patient was seen and examined by me. Chart reviewed. Weakness about the same. Awaiting bone marrow bx. Spoke with daughter at bedside       Objective:       Vitals:       Last 24hrs VS reviewed since prior progress note.  Most recent are:    Visit Vitals  /65 (BP 1 Location: Left arm, BP Patient Position: At rest)   Pulse 76   Temp 99 °F (37.2 °C)   Resp 18   Ht 6' 1\" (1.854 m)   Wt 69.9 kg (154 lb)   SpO2 100%   BMI 20.32 kg/m²     SpO2 Readings from Last 6 Encounters:   19 100%   19 98%   19 93%   08/15/19 93%   08/15/19 93%   19 98%          No intake or output data in the 24 hours ending 19 1148     Exam:     Physical Exam:    Gen:  Elderly, disheveled, thin, NAD  HEENT:  Pink conjunctivae, PERRL, hearing intact to voice, moist mucous membranes  Neck:  Supple, without masses, thyroid non-tender  Resp:  No accessory muscle use, clear breath sounds without wheezes rales or rhonchi  Card:  No murmurs, normal S1, S2 without thrills, bruits or peripheral edema  Abd:  Soft, non-tender, non-distended, normoactive bowel sounds are present  Musc:  No cyanosis or clubbing  Skin:  Decub ulcer, bilateral foot wounds  Neuro:  Cranial nerves 3-12 are grossly intact, diffused weakness, follows commands appropriately  Psych:  poor insight, oriented to person, place and time, alert    Medications Reviewed: (see below)    Lab Data Reviewed: (see below)    ______________________________________________________________________    Medications:     Current Facility-Administered Medications   Medication Dose Route Frequency    insulin glargine (LANTUS) injection 10 Units  10 Units SubCUTAneous QHS    insulin lispro (HUMALOG) injection SubCUTAneous AC&HS    atorvastatin (LIPITOR) tablet 40 mg  40 mg Oral QHS    oxybutynin (DITROPAN) tablet 5 mg  5 mg Oral TID    valsartan (DIOVAN) tablet 40 mg  40 mg Oral DAILY    sodium chloride (NS) flush 5-40 mL  5-40 mL IntraVENous Q8H    sodium chloride (NS) flush 5-40 mL  5-40 mL IntraVENous PRN    glucose chewable tablet 16 g  4 Tab Oral PRN    glucagon (GLUCAGEN) injection 1 mg  1 mg IntraMUSCular PRN    dextrose 10% infusion 125-250 mL  125-250 mL IntraVENous PRN    acetaminophen (TYLENOL) tablet 650 mg  650 mg Oral Q4H PRN    diphenhydrAMINE (BENADRYL) capsule 25 mg  25 mg Oral Q4H PRN    ondansetron (ZOFRAN) injection 4 mg  4 mg IntraVENous Q4H PRN    metoprolol tartrate (LOPRESSOR) tablet 25 mg  25 mg Oral Q12H    dorzolamide (TRUSOPT) 2 % ophthalmic solution 1 Drop  1 Drop Both Eyes TID    latanoprost (XALATAN) 0.005 % ophthalmic solution 1 Drop  1 Drop Both Eyes BID    timolol (TIMOPTIC) 0.5 % ophthalmic solution 1 Drop  1 Drop Both Eyes BID    And    brimonidine (ALPHAGAN) 0.2 % ophthalmic solution 1 Drop  1 Drop Both Eyes BID          Lab Review:     Recent Labs     08/28/19  0258 08/27/19  0535 08/26/19  1324   WBC 9.7 7.2 10.8   HGB 8.2* 7.2* 7.6*   HCT 26.4* 23.3* 25.0*   * 139* 133*     Recent Labs     08/28/19  0258 08/27/19  0535 08/26/19  1324    142 141   K 4.1 3.8 5.2*    110* 109*   CO2 27 27 29   * 208* 253*   BUN 38* 27* 25*   CREA 0.84 0.62* 0.68*   CA 7.9* 8.2* 7.9*   MG 2.1 2.0  --    PHOS 2.4* 1.8*  --    ALB  --  1.8* 1.9*   TBILI  --  0.7 0.7   SGOT  --  35 45*   ALT  --  25 29     Lab Results   Component Value Date/Time    Glucose (POC) 82 08/28/2019 11:03 AM    Glucose (POC) 91 08/28/2019 08:50 AM    Glucose (POC) 100 08/28/2019 07:01 AM    Glucose (POC) 513 (H) 08/27/2019 08:56 PM    Glucose (POC) 434 (H) 08/27/2019 04:14 PM          Assessment / Plan:     Principal Problem:    81 yo hx of HTN, DM, Pafib, MGUS, anemia, malnutrition, decub ulcer, presented w/ weakness, near syncope, anemia    1) MGUS/anemia: worsening. Hematology now following, plan for bone marrow bx today. Cont to monitor Hgb closely, transfuse prn    2) Near syncope/weakness: due to anemia, malnutrition. Can walk with walker and assistance. Cont PT/OT. Family wants to continue [place]. Does not want SNF    3) Moderate malnutrition: encourage PO. Nutrition following    4) HTN: cont metoprolol, valsartan    5) Pafib: cont BB    6) DM type 2: A1C 8.2%. Cont Lantus (adjust prn), SSI. Patient was using insulin 70/30 5u bid at home. DM educator following    7) Decub ulcer/ankle ulcer/toe wound: POA. Ulcers unstageable.   Wound care team following    Total time spent with patient: 20 min                  Care Plan discussed with: Patient, family, nursing    Discussed:  Care Plan    Prophylaxis:  SCD's    Disposition:   PT, OT, RN           ___________________________________________________    Attending Physician: Gustavo De Luna MD

## 2019-08-28 NOTE — DIABETES MGMT
[facility]    [facility] Consult Note    Recommendations/ Comments:    Met with patient's daughter at bedside. Reviewed telephone call with PCP, in regards to home insulin regimen (see my note 08/27/19 at 1657). Reinforced to daughter that insulin regimen can either be the twice daily combination insulin -or- MDI regimen with rapid-acting insulin via scale and basal insulin. Also educated patient's daughter on shelf-life of opened insulin vials, as she was unaware to discard after 28 days. She reports she has one unopened Novolog Mix 70/30 at home, but her Lantus vial was out of date. Patient's daughter is agreeable to any insulin regimen recommended, provided she is given clear instructions and the correct insulin prescription. Reviewed home BG logs. Patient's BG trends mirror current hospital logs, noting extreme hyperglycemia in the evening and at bedtime. Noted in log that patient would often have fasting hypoglycemia after receiving multiple doses of mixed 70/30. Patient would also vary times for meals based on appetite, compounding the risk for resultant hypoglycemia. Noted treatment in log for hypoglycemia using glucose tablets, 1-2 tablets given based on BG reading. Reviewed hypoglycemia treatment with patient's daughter and provided written materials. Reviewed Balanced Plate meal planning with daughter, to provide more consistent carb intake for patient. Patient's appetite has vastly improved since moving in with daughter. Patient's daughter reports she was told to reduce meal portions as day progressed for BG control, however she was unaware of carb sources from strarchy vegetables aside from potatoes. Patient also receives Glucerna shakes as snacks. Advised patient's daughter of consistent carb allowances, and \"free foods\" that patient can eat if still reporting hunger that will not adversely affect his BG. Patient consumes sugar-free drinks and water.     Provided patient's daughter with outpatient DTC contact information to assist with future outpatient education as needed. Patient's daughter would like to first review and incorporate today's teaching at home, and then reach out as needed. If appropriate, please consider: Due to daily POC range, would recommend patient remain on rapid-acting correction insulin via scale and basal insulin at discharge. Once patient resumes eating, would either consider addition of mealtime lispro at dinner, or changing basal insulin from once-daily Lantus to twice-daily NPH to better cover the POC rise in the evening. Current hospital DM medication:   Lantus 10 units nightly  Lispro resistant sensitivity correction scale      Consult received for:  [x]             Assessment of home management                []      Medication Recommendations                []             Meter/monitoring     []             Insulin instruction     []             New diagnosis     [x]             Outpatient education     []             Insulin pump patient     []             Insulin infusion     []             DKA/HHS    Chart reviewed and initial evaluation complete on [facility]. Patient is a 82 y.o. male with known Type 2 Diabetes on insulin injections: NPH/Reg 70/30: ACHS per scale, 3:50 starting at 150 mg/dL, Lantus : 5 units nightly, hold < 150 mg/dL at home. BG monitoring at home 4 times per day. Patient reports BG levels at home trend: fluctuating a lot.     Assessed and instructed patient on the following:   ·  interpretation of lab results,   · blood sugar goals,   · hypoglycemia prevention and treatment,   · insulin adjustments and regimen  · SMBG skills,   · nutrition and   · referred to Diabetes Educator    Encouraged the following:   · increased exercise,   · dietary modifications: consistent carb intake, Balanced Plate    · Establish with local PCP    Provided patient with the following: [x]             Diabetes Self Care Guide               [] Insulin education materials               []             CHO counting education materials               [x]             Outpatient DTC contact number               []             Glucometer                 Discussed with patient and/or family need for follow up appointment for diabetes management after discharge. A1c:   Lab Results   Component Value Date/Time    Hemoglobin A1c 8.2 (H) 08/27/2019 05:35 AM       Recent Glucose Results:   Lab Results   Component Value Date/Time     (H) 08/28/2019 02:58 AM    GLUCPOC 91 08/28/2019 08:50 AM    GLUCPOC 100 08/28/2019 07:01 AM    GLUCPOC 513 (H) 08/27/2019 08:56 PM        Lab Results   Component Value Date/Time    Creatinine 0.84 08/28/2019 02:58 AM     Estimated Creatinine Clearance: 67 mL/min (based on SCr of 0.84 mg/dL). Active Orders   Diet    DIET NPO        PO intake:   Patient Vitals for the past 72 hrs:   % Diet Eaten   08/27/19 1120 70 %       Will continue to follow as needed. Thank you.   Sandy Dupree RN  Office [phone]  Pager [phone]        Time spent: 30 min

## 2019-08-28 NOTE — PROGRESS NOTES
Occupational therapy note:  Chart reviewed. Noted patient with cancelled procedure today. Attempted to see patient for OT services, patient declining offers for therapy today. Will follow up with patient tomorrow. Abby Nava MS OTR/L

## 2019-08-28 NOTE — PROGRESS NOTES
1200 CT down and scheduled CT bone marrow biopsy moved to tomorrow. Patient's diet reordered and NPO after midnight entered. Dr. Hanane Wang spoke with by telephone for orders. [place] GINETTE Terrazas apprised of above.

## 2019-08-28 NOTE — ROUTINE PROCESS
Bedside and Verbal shift change report given to Elias Flannery (oncoming nurse) by Jose Cunningham (offgoing nurse). Report included the following information SBAR, Kardex, Procedure Summary, Intake/Output, MAR, Accordion, Recent Results and Dual Neuro Assessment.

## 2019-08-28 NOTE — PROGRESS NOTES
[facility] 88  [address] [facility], [address] [facility] [address] [facility]  [place] [facility]: [phone]  F: [phone]      Reason for Visit:   Luis Eduardo Ford is a 82 y.o. male who is seen in consultation at the request of Dr. Merlinda Mulder for evaluation of hx of MGUS and anemia. History of Present Illness:     Luis Eduardo Ford presented to our ED via EMS on 8/26/19 for near syncopal episode associated with weakness. Denied any pain , SOB or chills. ED labs hgb 7.6. Admitted for observation. Pt is poor historian due to dementia. Reports pain to toes and to lower back area. Denies SOB or N/V. Able to state the month is Aug; aware in the hospital.   Daughter reports her dad c/o being weak and collapsed at the breakfast table. Daughter and caregivers x 2 at bedside. Hx obtained from daughter, at bedside. States her father has relocated here from the [facility] on July 16th. Was being followed by a hematologist for many years. Since Dec her dad has required approx 10 transfusions. . Reports recent hospitalizations in Jan for PNA and bladder infection and required blood transfusions; went to rehab x 5 weeks. Then 3-6 weeks later again admitted for bladder infection went to rehab again x 4-5 weeks. Family was not pleased with rehab so daughter decided to bring her father to [facility] to live with her and has hired 2 caregivers to be with him while she is at work. States her father is able to get up with a walker, able to sit up in a chair; cleans his own teeth. Has had open sores on his toes and sacral area since rehab. They are starting to heal. Appetite remains good. In Jan was weighing 129# and now weighs #50 more. Bowels are regular. Has followed Dr Ashely Odom / Helen Rodriguez and PCP / Dr Kortney St in the [facility]. Has dementia and days vary to his degree of orientation.    Was on Hydrea and Eliquis for approx 2 weeks when they thought he had had a MI but then he was taken off by the cardiologist.       Interval History:     No complaints except had spilled his water on himself. No family at bedside.        Past Medical History:   Diagnosis Date    Anemia     Cognitive impairment     Controlled type 2 diabetes mellitus, with long-term current use of insulin ([facility] 6/9/2019    Glaucoma     Hypertension     Malignant neoplasm of prostate ([facility].) 1998    MGUS (monoclonal gammopathy of unknown significance)     Mildly underweight adult     Paroxysmal A-fib (HCC)       Past Surgical History:   Procedure Laterality Date    BIOPSY OF PROSTATE,INCISIONAL  4/98    KS EXT BEAM RADIATION AS PRIMARY THERAPY TO PROSTATE ONLY  4/30-6/29/98      Social History     Tobacco Use    Smoking status: Current Some Day Smoker     Packs/day: 0.10     Years: 22.00     Pack years: 2.20     Types: Cigarettes    Smokeless tobacco: Never Used   Substance Use Topics    Alcohol use: No     Alcohol/week: 0.0 standard drinks      Family History   Problem Relation Age of Onset    Diabetes Other     Hypertension Other      Current Facility-Administered Medications   Medication Dose Route Frequency    insulin glargine (LANTUS) injection 10 Units  10 Units SubCUTAneous QHS    insulin lispro (HUMALOG) injection   SubCUTAneous AC&HS    atorvastatin (LIPITOR) tablet 40 mg  40 mg Oral QHS    oxybutynin (DITROPAN) tablet 5 mg  5 mg Oral TID    valsartan (DIOVAN) tablet 40 mg  40 mg Oral DAILY    sodium chloride (NS) flush 5-40 mL  5-40 mL IntraVENous Q8H    sodium chloride (NS) flush 5-40 mL  5-40 mL IntraVENous PRN    glucose chewable tablet 16 g  4 Tab Oral PRN    glucagon (GLUCAGEN) injection 1 mg  1 mg IntraMUSCular PRN    dextrose 10% infusion 125-250 mL  125-250 mL IntraVENous PRN    acetaminophen (TYLENOL) tablet 650 mg  650 mg Oral Q4H PRN    diphenhydrAMINE (BENADRYL) capsule 25 mg  25 mg Oral Q4H PRN    ondansetron (ZOFRAN) injection 4 mg  4 mg IntraVENous Q4H PRN    metoprolol tartrate (LOPRESSOR) tablet 25 mg  25 mg Oral Q12H    dorzolamide (TRUSOPT) 2 % ophthalmic solution 1 Drop  1 Drop Both Eyes TID    latanoprost (XALATAN) 0.005 % ophthalmic solution 1 Drop  1 Drop Both Eyes BID    timolol (TIMOPTIC) 0.5 % ophthalmic solution 1 Drop  1 Drop Both Eyes BID    And    brimonidine (ALPHAGAN) 0.2 % ophthalmic solution 1 Drop  1 Drop Both Eyes BID      Allergies   Allergen Reactions    Iodinated Contrast Media Hives     Faint, shaking        Review of Systems: A complete review of systems was obtained, negative except as described above. Physical Exam:     Visit Vitals  /65 (BP 1 Location: Left arm, BP Patient Position: At rest)   Pulse 76   Temp 99 °F (37.2 °C)   Resp 18   Ht 6' 1\" (1.854 m)   Wt 69.9 kg (154 lb)   SpO2 100%   BMI 20.32 kg/m²     ECOG PS: 2-3  General: elderly, No distress  Eyes: PERRLA, anicteric sclerae  HENT: Atraumatic with normal appearance of ears and nose; OP clear  Neck: Supple; no thyromegaly   Lymphatic: No cervical, supraclavicular, or axillary adenopathy  Respiratory: anteriorly CTAB, normal respiratory effort  CV: Normal rate, regular rhythm, no murmurs, no peripheral edema  GI: Soft, nontender, nondistended, no masses, no hepatomegaly, no splenomegaly  MS: Digits without clubbing or cyanosis. Skin: open areas to toes bilaterally; and family reports sacral area; No rashes, ecchymoses, or petechiae. Normal temperature, turgor, and texture. Neuro/Psych: Alert, oriented to self and setting, appropriate affect, fair judgment/insight      Results:     Lab Results   Component Value Date/Time    WBC 9.7 08/28/2019 02:58 AM    HGB 8.2 (L) 08/28/2019 02:58 AM    HCT 26.4 (L) 08/28/2019 02:58 AM    PLATELET 950 (L) 81/81/2634 02:58 AM    .8 (H) 08/28/2019 02:58 AM    ABS.  NEUTROPHILS 8.0 08/28/2019 02:58 AM     Lab Results   Component Value Date/Time    Sodium 139 08/28/2019 02:58 AM    Potassium 4.1 08/28/2019 02:58 AM    Chloride 106 08/28/2019 02:58 AM    CO2 27 08/28/2019 02:58 AM    Glucose 169 (H) 08/28/2019 02:58 AM    BUN 38 (H) 08/28/2019 02:58 AM    Creatinine 0.84 08/28/2019 02:58 AM    GFR est AA >60 08/28/2019 02:58 AM    GFR est non-AA >60 08/28/2019 02:58 AM    Calcium 7.9 (L) 08/28/2019 02:58 AM    Glucose (POC) 82 08/28/2019 11:03 AM     Lab Results   Component Value Date/Time    Bilirubin, total 0.7 08/27/2019 05:35 AM    ALT (SGPT) 25 08/27/2019 05:35 AM    AST (SGOT) 35 08/27/2019 05:35 AM    Alk. phosphatase 91 08/27/2019 05:35 AM    Protein, total 6.8 08/27/2019 05:35 AM    Albumin 1.8 (L) 08/27/2019 05:35 AM    Globulin 5.0 (H) 08/27/2019 05:35 AM     Lab Results   Component Value Date/Time    Prostate Specific Ag 0.0 (L) 08/27/2019 12:30 PM    Prostate Specific Ag 0.2 11/17/2014 04:00 PM     (H) 08/26/2019 05:09 PM     Lab Results   Component Value Date/Time    Reticulocyte count 1.7 08/26/2019 01:24 PM    Iron % saturation 22 08/26/2019 01:24 PM    TIBC 140 (L) 08/26/2019 01:24 PM    Ferritin 2,797 (H) 08/26/2019 05:09 PM    Vitamin B12 1,252 (H) 08/26/2019 05:09 PM    Folate 40.1 (H) 08/26/2019 05:09 PM    Haptoglobin 192 08/26/2019 05:09 PM    SIENNA Poly POS 08/26/2019 01:24 PM     (H) 08/26/2019 05:09 PM    Beta-2 Microglobulin, serum 2.5 (H) 09/12/2017 02:21 PM    Sed rate, automated 68 (H) 07/24/2018 10:47 AM    TSH 1.91 08/26/2019 01:24 PM    M-Rigo Not Observed 07/16/2019 10:22 AM     Lab Results   Component Value Date/Time    INR 1.2 06/04/2019 09:20 AM    aPTT 102.5 (H) 06/09/2019 04:12 PM       10/11/17 Bone Marrow bx  PERIPHERAL BLOOD SMEAR, BONE MARROW ASPIRATE AND BIOPSY:   MARKEDLY HYPERCELLULAR MARROW WITH MARKED GRANULOCYTIC   HYPERPLASIA AND MILD DYSMEGAKARYOPOIESIS (SEE COMMENT). ANEMIA, NEUTROPHILIA AND THROMBOCYTOSIS. MILD MARROW FIBROSIS. BLASTS ARE NOT INCREASED.   4-5% MONOCLONAL PLASMA CELLS. ADEQUATE IRON STORES. DIAGNOSIS COMMENT:   Two processes are apparent in the marrow.  There is a plasma cell neoplasm with a low percentage of bone marrow plasma cells as noted above. In addition, the marrow is markedly hypercellular (>95%) with marked granulocytic hyperplasia, megakaryocytic atypia, and marrow fibrosis, suggestive of a myeloproliferative neoplasm or possibly a myeloproliferative/myelodysplastic overlap syndrome. Review of laboratory results reveals that the patient had a previously documented negative study for the JAK2 V617F mutation. Although the peripheral blood smear lacks classic features of chronic myeloid leukemia, a diagnosis of CML cannot be excluded. Correlation with pending cytogenetic studies is recommended. If cytogenetic analysis is normal, consideration could be given to performance of FISH studies for the BCL/ABL translocation and/or to additional molecular genetic studies to exclude the presence of other less common genetic abnormalities associated with myeloproliferative neoplasms. These studies can be performed on peripheral blood. 8/26/19 XR CHEST  IMPRESSION:  No acute process.        Assessment and Recommendations:   1. Anemia, macrocytic  Concerning for a primary bone marrow pathology such as MDS/MPN, or perhaps myeloma with his history of MGUS. Previous bone marrow biopsy did not demonstrate myeloma, but was concerning for a possible MPN. Labwork is essentially unremarkable and has failed to provide an explanation for his persistent anemia, which has required multiple transfusions so far this year. I recommend a repeat bone marrow biopsy to reevaluate.  -Plan to repeat BM bx today but CT machine remains down so will plan for tomorrow 8/29; NPO after midnight; discussed with nursing   -continue to monitor CBC and transfuse for Hgb < 7    2. Thrombocytopenia  Noted in hx; 6/19 during hospitalization with infecton  -BM bx as above.  -continue to monitor    3. DM   Management per hospitalist      4. Wounds  Toes and ? Sacral area  Wound team following    5. Dementia  Continue supportive care    6. Debility  PT/OT eval    7.  Goals of care  Palliative team following    Plan reviewed with Dr Brown      Signed By: Jenifer Tom NP

## 2019-08-28 NOTE — PROGRESS NOTES
Palliative Medicine      Code Status: DNR    Primary Decision Maker: Chantelle Silva [employer] [facility]) - Daughter - [phone]  Advance Care Planning 8/28/2019   Patient's Healthcare Decision Maker is: Named in scanned ACP document   Confirm Advance Directive Yes, on file   Does the patient have other document types MOST/MOLST/POST/POLST       Patient / Family Encounter Documentation    Participants (names):  Pt, dtr Wali Faustin, Palliative Medicine (NP Priti Conner)     Narrative: Met with pt and dtr at bedside. Pt slept through majority of visit, gave only vague answers to questions when awake. Pt is originally from [facility], moved to [place] in the 1960s, where he remained until July, when dtr Wali Faustin moved him to her apartment in [place]. Marisel's dtr and 2 young grandchildren have since moved into the home as well. Pt is , has 3 dtrs and 1 son. Dtr Wali Faustin reports pt has some dementia but stated he is still typically able to make his own decisions, though perhaps not for more complex decisions (pt did not demonstrate sound understanding of his medical condition during this visit). Dtr shared that pt did not have favorable experiences in SNFs, was being kept in diapers in bed, was not given opportunities to thrive. Pt is now receiving home health at dtr's home, is able to walk with a walker, wears Depends but can tend to much of his own personal hygiene, does use a condom cath at night to allow family to get rest.       Copy of AMD dated 3/6/19 is on file which appoints dtr Estevan Faustin as sole Medical POA. Pt was seen by Palliative Medicine team at [facility] in June; POST form was completed with dtr Marisel on 6/17/19. Copy is not currently on file but notes from Palliative team state \"DDNR status, limited additional interventions (NO intubation, ok with CPAP/BiPAP, avoid ICU) and NO feeding tube. \"  Dtr confirmed that document reflects current wishes, stated form is posted on the refrigerator at home, will bring copy to be scanned into medical record. Psychosocial Issues Identified/ Resilience Factors: Dtr shared that she has not had a day off since February, was driving to [place] every weekend before moving pt to [place], does have paid caregivers in the home while she is at work but does not feel comfortable having someone stay in the home 24/7 if she were to go out of town. Dtr has support from her own son and dtr but both have other responsibilities (work, family), stated her 3 siblings do not offer assistance of any kind, do not provide Steven Numbers with any caregiver relief. Discussed the importance of self care, which dtr is struggling with, stated she was recently able to get a massage and pedicure (\"but then I had to go home\"). Goals of Care / Plan: Return home with family when stable for discharge, resume home health services. Dtr requested assistance arranging appts with PCP and HemeOnc, wishes to switch pt's care from [facility] to [place], wishes to keep pt's care within [facility] system as able. Dtr works 3pm-midnight, requests am appts. Relayed request to Care Manager and to [facility] NP. Dtr will provide copy of POST for medical record, code status has been changed to DNR. Palliative team will follow for support. Thank you for including Palliative Medicine in the care of Mr. Hola Tijerina.     Claudia Barbour LCSW, [facility]  [phone])

## 2019-08-28 NOTE — PROGRESS NOTES
8/28/2019   CARE MANAGEMENT NOTE:  CM reviewed EMR. Pt was admitted with dx of anemia and per chart plan is for bone marrow bx on Wed. 8/28. Reportedly, pt resides with his dtr [facility] located at [address] [place] [address] in 3rd floor apt w/ chair lift. Also, pt has 24/7 caregivers.     Dtr. [facility] ([phone]).    Transition Plan of Care:  1. Plan is to return to his dtr's home (see address above). 2.  Pt is currently receiving homecare thru [address] [place] [address] agency (skilled nursing, PT/OT. A resumption order will be needed. 3.  Pt's OBS status changed to inpt on 8/27  4. Family will transport pt home. CM will continue to follow pt until hospital discharge.   Saravanan

## 2019-08-28 NOTE — ACP (ADVANCE CARE PLANNING)
Primary Decision Maker: Geoff Oconnell [employer] [facility]) - Daughter - [phone]  Advance Care Planning 8/28/2019   Patient's Healthcare Decision Maker is: Named in scanned ACP document   Confirm Advance Directive Yes, on file   Does the patient have other document types MOST/MOLST/POST/POLST     Copy of AMD dated 3/6/19 is on file which appoints dtr Kayy Saunders as sole Medical POA. Pt was seen by Palliative Medicine team at [facility] in June; POST form was completed with dtr Marisel on 6/17/19. Copy is not currently on file but notes from Palliative team state \"DDNR status, limited additional interventions (NO intubation, ok with CPAP/BiPAP, avoid ICU) and NO feeding tube. \"  Dtr confirmed that document reflects current wishes, stated form is posted on the refrigerator at home, will bring copy to be scanned into medical record.

## 2019-08-28 NOTE — PROGRESS NOTES
Bedside shift change report given to [facility], RN (oncoming nurse) by Gaurang Naylor RN (offgoing nurse). Report included the following information SBAR, Kardex, ED Summary, Procedure Summary, Intake/Output, MAR and Recent Results.

## 2019-08-28 NOTE — CONSULTS
Palliative Medicine Consult  Hoang: [phone]    Patient Name: Jesus Coleman  YOB: 1935    Date of Initial Consult: 08/26/2019  Reason for Consult: Care decisions  Requesting Provider: Saul Donaldson MD   Primary Care Physician: Lc Foster MD     SUMMARY:   Jesus Coleman is a 82 y.o. with a past history of afib, HTN, DM, prostate CA, chronic anemia/MGUS, dementia, who was admitted on 8/26/2019 from home with a diagnosis of anemia. Patient presented to the ED wit  complaints of near syncope and weakness. ED eval revealed hgb 7.6. Patient admitted and Heme Onc consulted and plan for patient to undergo bone marrow biopsy today. Current medical issues leading to Palliative Medicine involvement include: care decisions. SH: Originally from [address] [place] [address]. , two daughters and one son. Moved from [place], [place] to live with daughterJerzy when he could no longer live alone. PALLIATIVE DIAGNOSES:   1. Physical debility  2. Hypoalbuminemia  3. Goals of care  4. ACP  5. Anemia/MGUS       PLAN:   1. Met with patient, daughter Doris Canales with Isabella Cochran LCSW and introduced the role of Palliative Medicine. 2. Patient resting comfortably in bed, denies any complaints currently. 3. Goals of care--> Discussed current hospitalization and prior level of functioning. Doris Canales reports that since Feb, patient was admitted three times at [facility] and after the first two admissions, was discharged to rehab at [facility]. Reports negative experience with each SNF stay, with patient not making gains with rehab (due to lack of attempts by staff). After 3rd admission in July, Marisel decided to move patient to [place] to live with her. After moving in with her, is now able to walk with a walker. Has 24/7 care at home (between Doris Canales who works evening shift, granddaughter who also lives there, and a paid caregiver) and requires assistance with ADLs.  Doris Canales is able to recount patient's history of anemia, having undergone a bone marrow biopsy two years ago and told \"it wasn't quite normal\" and has subsequently required 10 blood transfusions. Hopes that this bone marrow biopsy will be diagnostic and then will know what treatment options will be. Is in the process of transferring patient's care from [place] to [address] [place] [address], trying to set up PCP and Hematology follow-up for patient. Relayed this to CM and Heme Onc NP to assist with scheduling appointments prior to discharge. Plan for patient to return home with daughter at discharge. 4. Copy of AMD dated 3/6/19 is on file which appoints dtr Monica Alva as sole Medical POA. Patient was seen by Palliative Medicine team at [facility] in June; POST form was completed with daughter, Brijesh Chacko on 6/17/19. Copy is not currently on file but notes from Palliative team state \"DDNR status, limited additional interventions (NO intubation, ok with CPAP/BiPAP, avoid ICU) and NO feeding tube. \" Daughter confirmed that document reflects current wishes, stated form is posted on the refrigerator at home, will bring copy to be scanned into medical record. 5. Initial consult note routed to primary continuity provider and/or primary health care team members  6.  Communicated plan of care with: Palliative ID[facility] Team     GOALS OF CARE / TREATMENT PREFERENCES:     GOALS OF CARE:  Patient/Health Care Proxy Stated Goals: (pursue bone marrow biopsy in hopes of establishing diagnosis)    TREATMENT PREFERENCES:   Code Status: DNR    Advance Care Planning:  [x] [facility] Interdisciplinary Team has updated the ACP Navigator with Health Care Decision Maker and Patient Capacity      Primary Decision Maker: Mitzi Webber [employer] [facility]) - Daughter - [phone]  Advance Care Planning 8/28/2019   Patient's Healthcare Decision Maker is: Named in scanned ACP document   Confirm Advance Directive Yes, on file   Does the patient have other document types MOST/MOLST/POST/POLST       Medical Interventions: Limited additional interventions     Other Instructions:   Artificially Administered Nutrition: No feeding tube     Other:    As far as possible, the palliative care team has discussed with patient / health care proxy about goals of care / treatment preferences for patient. HISTORY:     History obtained from: patient, daughter, chart    CHIEF COMPLAINT: near syncope and weakness    HPI/SUBJECTIVE:    The patient is:   [x] Verbal and participatory  [] Non-participatory due to:      Patient presented to the ED with complaints of near syncope and weakness. ED eval revealed hgb 7.6. Patient admitted and Heme Onc consulted and plan for patient to undergo bone marrow biopsy today. Patient currently without complaint. Clinical Pain Assessment (nonverbal scale for severity on nonverbal patients):   Clinical Pain Assessment  Severity: 0          Duration: for how long has pt been experiencing pain (e.g., 2 days, 1 month, years)  Frequency: how often pain is an issue (e.g., several times per day, once every few days, constant)     FUNCTIONAL ASSESSMENT:     Palliative Performance Scale (PPS):  PPS: 50       PSYCHOSOCIAL/SPIRITUAL SCREENING:     Palliative IDT has assessed this patient for cultural preferences / practices and a referral made as appropriate to needs (Cultural Services, Patient Advocacy, Ethics, etc.)    Any spiritual / Lutheran concerns:  [] Yes /  [x] No    Caregiver Burnout:  [] Yes /  [x] No /  [] No Caregiver Present      Anticipatory grief assessment:   [x] Normal  / [] Maladaptive       ESAS Anxiety: Anxiety: 0    ESAS Depression:          REVIEW OF SYSTEMS:     Positive and pertinent negative findings in ROS are noted above in HPI. The following systems were [x] reviewed / [] unable to be reviewed as noted in HPI  Other findings are noted below.   Systems: constitutional, ears/nose/mouth/throat, respiratory, gastrointestinal, genitourinary, musculoskeletal, integumentary, neurologic, psychiatric, endocrine. Positive findings noted below. Modified ESAS Completed by: provider   Fatigue: 2 Drowsiness: 3     Pain: 0   Anxiety: 0 Nausea: 0   Anorexia: 0 Dyspnea: 0     Constipation: No     Stool Occurrence(s): 1        PHYSICAL EXAM:     From RN flowsheet:  Wt Readings from Last 3 Encounters:   08/26/19 154 lb (69.9 kg)   08/07/19 154 lb (69.9 kg)   07/20/19 149 lb (67.6 kg)     Blood pressure 123/65, pulse 76, temperature 99 °F (37.2 °C), resp. rate 18, height 6' 1\" (1.854 m), weight 154 lb (69.9 kg), SpO2 100 %.     Pain Scale 1: Numeric (0 - 10)  Pain Intensity 1: 0                 Last bowel movement, if known: 08/27/19    Constitutional: NAD  Eyes: pupils equal, anicteric  ENMT: no nasal discharge, moist mucous membranes  Cardiovascular: regular rhythm, distal pulses intact, no DOROTHEA  Respiratory: breathing not labored, symmetric  Gastrointestinal: soft non-tender, +bowel sounds  Musculoskeletal: no deformity, no tenderness to palpation  Skin: warm, dry  Neurologic: following commands, moving all extremities  Psychiatric: full affect, no hallucinations  Other:       HISTORY:     Principal Problem:    Anemia (5/13/2019)    Active Problems:    Malignant neoplasm of prostate ([address] (11/15/2011)      Moderate protein malnutrition ([address] (2/11/2019)      Controlled type 2 diabetes mellitus, with long-term current use of insulin ([address] (6/9/2019)      Debility (6/12/2019)      Hyperkalemia (8/26/2019)      Hypertension ()      MGUS (monoclonal gammopathy of unknown significance) ()      Glaucoma ()      Cognitive impairment ()      Mildly underweight adult ()      Paroxysmal A-fib ([address] ()      Syncope (8/26/2019)      Past Medical History:   Diagnosis Date    Anemia     Cognitive impairment     Controlled type 2 diabetes mellitus, with long-term current use of insulin ([address] 6/9/2019    Glaucoma     Hypertension     Malignant neoplasm of prostate ([address] 1998  MGUS (monoclonal gammopathy of unknown significance)     Mildly underweight adult     Paroxysmal A-fib [facility])       Past Surgical History:   Procedure Laterality Date    BIOPSY OF PROSTATE,INCISIONAL  4/98    NM EXT BEAM RADIATION AS PRIMARY THERAPY TO PROSTATE ONLY  4/30-6/29/98      Family History   Problem Relation Age of Onset    Diabetes Other     Hypertension Other       History reviewed, no pertinent family history.   Social History     Tobacco Use    Smoking status: Current Some Day Smoker     Packs/day: 0.10     Years: 22.00     Pack years: 2.20     Types: Cigarettes    Smokeless tobacco: Never Used   Substance Use Topics    Alcohol use: No     Alcohol/week: 0.0 standard drinks     Allergies   Allergen Reactions    Iodinated Contrast Media Hives     Faint, shaking      Current Facility-Administered Medications   Medication Dose Route Frequency    insulin glargine (LANTUS) injection 10 Units  10 Units SubCUTAneous QHS    insulin lispro (HUMALOG) injection   SubCUTAneous AC&HS    atorvastatin (LIPITOR) tablet 40 mg  40 mg Oral QHS    oxybutynin (DITROPAN) tablet 5 mg  5 mg Oral TID    valsartan (DIOVAN) tablet 40 mg  40 mg Oral DAILY    sodium chloride (NS) flush 5-40 mL  5-40 mL IntraVENous Q8H    sodium chloride (NS) flush 5-40 mL  5-40 mL IntraVENous PRN    glucose chewable tablet 16 g  4 Tab Oral PRN    glucagon (GLUCAGEN) injection 1 mg  1 mg IntraMUSCular PRN    dextrose 10% infusion 125-250 mL  125-250 mL IntraVENous PRN    acetaminophen (TYLENOL) tablet 650 mg  650 mg Oral Q4H PRN    diphenhydrAMINE (BENADRYL) capsule 25 mg  25 mg Oral Q4H PRN    ondansetron (ZOFRAN) injection 4 mg  4 mg IntraVENous Q4H PRN    metoprolol tartrate (LOPRESSOR) tablet 25 mg  25 mg Oral Q12H    dorzolamide (TRUSOPT) 2 % ophthalmic solution 1 Drop  1 Drop Both Eyes TID    latanoprost (XALATAN) 0.005 % ophthalmic solution 1 Drop  1 Drop Both Eyes BID    timolol (TIMOPTIC) 0.5 % ophthalmic solution 1 Drop  1 Drop Both Eyes BID    And    brimonidine (ALPHAGAN) 0.2 % ophthalmic solution 1 Drop  1 Drop Both Eyes BID          LAB AND IMAGING FINDINGS:     Lab Results   Component Value Date/Time    WBC 9.7 08/28/2019 02:58 AM    HGB 8.2 (L) 08/28/2019 02:58 AM    PLATELET 302 (L) 87/68/9409 02:58 AM     Lab Results   Component Value Date/Time    Sodium 139 08/28/2019 02:58 AM    Potassium 4.1 08/28/2019 02:58 AM    Chloride 106 08/28/2019 02:58 AM    CO2 27 08/28/2019 02:58 AM    BUN 38 (H) 08/28/2019 02:58 AM    Creatinine 0.84 08/28/2019 02:58 AM    Calcium 7.9 (L) 08/28/2019 02:58 AM    Magnesium 2.1 08/28/2019 02:58 AM    Phosphorus 2.4 (L) 08/28/2019 02:58 AM      Lab Results   Component Value Date/Time    AST (SGOT) 35 08/27/2019 05:35 AM    Alk. phosphatase 91 08/27/2019 05:35 AM    Protein, total 6.8 08/27/2019 05:35 AM    Albumin 1.8 (L) 08/27/2019 05:35 AM    Globulin 5.0 (H) 08/27/2019 05:35 AM     Lab Results   Component Value Date/Time    INR 1.2 06/04/2019 09:20 AM    Prothrombin time 15.1 06/04/2019 09:20 AM    aPTT 102.5 (H) 06/09/2019 04:12 PM      Lab Results   Component Value Date/Time    Iron 31 (L) 08/26/2019 01:24 PM    TIBC 140 (L) 08/26/2019 01:24 PM    Iron % saturation 22 08/26/2019 01:24 PM    Ferritin 2,797 (H) 08/26/2019 05:09 PM      No results found for: PH, PCO2, PO2  No components found for: [place]   Lab Results   Component Value Date/Time     06/09/2019 04:12 PM    CK - MB 1.6 06/09/2019 04:12 PM                Total time: 70 min  Counseling / coordination time, spent as noted above: 50 min  > 50% counseling / coordination?: yes    Prolonged service was provided for  []30 min   []75 min in face to face time in the presence of the patient, spent as noted above. Time Start:   Time End:   Note: this can only be billed with 04197 (initial) or 78140 (follow up). If multiple start / stop times, list each separately.

## 2019-08-28 NOTE — PROGRESS NOTES
PHYSICAL THERAPY:Pt and family report pending test and request PT come back later. PT will continue to follow.

## 2019-08-29 NOTE — DISCHARGE INSTRUCTIONS
HOSPITALIST DISCHARGE INSTRUCTIONS  NAME: Brooklynn Zaragoza   :  1935   MRN:  [id number]     Date/Time:  2019 11:13 AM    ADMIT DATE: 2019     DISCHARGE DATE: 2019     ADMITTING DIAGNOSIS:  Weakness, anemia, MGUS    DISCHARGE DIAGNOSIS:  same    MEDICATIONS:  See after visit summary       · It is important that you take the medication exactly as they are prescribed. · Keep your medication in the bottles provided by the pharmacist and keep a list of the medication names, dosages, and times to be taken in your wallet. · Do not take other medications without consulting your doctor     Pain Management: per above medications    What to do at Home    Recommended diet:  Diabetic Diet    Recommended activity: Activity as tolerated    1) Return to the hospital if you feel worse    2) If you experience any of the following symptoms then please call your primary care physician or return to the emergency room if you cannot get hold of your doctor:  Fever, chills, nausea, vomiting, diarrhea, change in mentation, falling, bleeding, shortness of breath, chest pain, severe headache, severe abdominal pain,     3) Stop taking insulin 70/30. This could have caused low blood sugar, leading to weakness, passing out    4) Takes Lantus at night. Use lispro as needed before meals base on blood sugar. See instructions    5) Follow up with your doctors    Follow Up:   Follow-up Information     Follow up With Specialties Details Why Contact Info    Sridhar Brown MD Hematology and Oncology, Internal Medicine, Hematology, Oncology Go on 2019 appt at 11 am [address]  [address] [facility]  [phone]      Shanda Guallpa MD Family Practice Schedule an appointment as soon as possible for a visit in 1 week  [address] [place] [address] Drive [address] [phone]              Information obtained by :  I understand that if any problems occur once I am at home I am to contact my physician. I understand and acknowledge receipt of the instructions indicated above.                                                                                                                                            Physician's or R.N.'s Signature                                                                  Date/Time                                                                                                                                              Patient or Representative Signature                                                          Date/Time

## 2019-08-29 NOTE — PROGRESS NOTES
This RN reported a blood glucose level of 375 mg/dL per order parameters.  MD recommended administration of 12 units of insulin humalog

## 2019-08-29 NOTE — PROGRESS NOTES
8/29/2019   CARE MANAGEMENT NOTE: CM reviewed EMR.  Pt was admitted with dx of anemia and per chart plan is for bone marrow bx today. Reportedly, pt resides with his dtr Christina Echeverria located [address] [place] [address] in 3rd floor apt w/ chair lift. Also, pt has 24/7 caregivers.     [facility]tr. [facility] ([phone]).    Transition Plan of Care:  1.  Plan is to return to his dtr's home (see address above).    2.  Pt is currently receiving homecare thru [address] [place] [address] agency (skilled nursing, PT/OT.  A resumption order will be needed. 3.  Pt's OBS status changed to inpt on 8/27  4.  Family will transport pt home.     CM will continue to follow pt until hospital discharge.   Saravanan

## 2019-08-29 NOTE — PROGRESS NOTES
8833 Patient arrived via stretcher for bone marrow biopsy. Patient with dementia and not able to consent. Patient's daughter obtained from room for consent. Baseline VSS, Afib 90.   0830 Patient's daughter at bedside for consent. PIV flushed and patent. S1S2, lungs CTA. Medications ordered and obtained. 7497 Dr. Gladys Burton at bedside for consent with review of risks and benefits. 1010 Patient taken to CT and placed prone on the table and connected to monitor/fluids/oxygen. Time out 1024 Start time 1024 End time 1032. Versed 2 mg and fentanyl 50 mcg given for sedation and patient tolerated well. CDI dressing to right iliac crest.   1045 Patient brought back to [facility] and placed back on monitor and patient's daughter brought to bedside. Provided soda and crackers. Patient resting comfortably. 1115 Patient awake and alert and tolerating crackers and soda. 1130 Report called to [facility] for transport back to room [address] [facility] [address] [address] transporter here to transport back to room 522 via stretcher.

## 2019-08-29 NOTE — PALLIATIVE CARE DISCHARGE
Goals of Care/Treatment Preferences    The Palliative Medicine team was consulted as part of your/your loved one's care in the hospital. Our team is a supportive service; we strive to relieve suffering and improve quality of life. We reviewed advance care planning information, which includes the following:  Patient's Healthcare Decision Maker is[de-identified] Named in scanned ACP document  Confirm Advance Directive: Yes, on file  Does the patient have other document types: MOST/MOLST/POST/POLST    We reviewed / discussed your code status as: DNR     Full Code means perform CPR in the event of cardiac arrest.      DNR means do NOT perform CPR in the event of cardiac arrest.      Partial Code means you have specific preferences, please discuss with your healthcare team.      No Order means this issue was not addressed / resolved during your stay    Medical Interventions: Limited additional interventions     Other Instructions: A POST form was completed during a prior hospitalization but was not scanned into your medical record at that time. Please consider bringing a copy to your next visit with a [employer] provider to be placed on your chart. Artificially Administered Nutrition: No feeding tube    Because of the importance of this information, we are providing you with a printed copy to share with other healthcare providers after this hospitalization is complete.

## 2019-08-29 NOTE — PROGRESS NOTES
Bedside shift change report given to Antwan Hart (oncoming nurse) by Zaria Liao (offgoing nurse). Report included the following information SBAR, Kardex, Procedure Summary, Intake/Output, MAR and Recent Results.

## 2019-08-29 NOTE — PROGRESS NOTES
[facility] [place] [address]  [address] [facility], [address] [place] [address] 1007 [facility]  Aamir Lacks: [phone]  F: [phone]      Reason for Visit:   Jeannie Briceño is a 82 y.o. male who is seen in consultation at the request of Dr. Collins Prince for evaluation of hx of MGUS and anemia. History of Present Illness:     Jeannie Briceño presented to our ED via EMS on 8/26/19 for near syncopal episode associated with weakness. Denied any pain , SOB or chills. ED labs hgb 7.6. Admitted for observation. Pt is poor historian due to dementia. Reports pain to toes and to lower back area. Denies SOB or N/V. Able to state the month is Aug; aware in the hospital.   Daughter reports her dad c/o being weak and collapsed at the breakfast table. Daughter and caregivers x 2 at bedside. Hx obtained from daughter, at bedside. States her father has relocated here from the [facility] on July 16th. Was being followed by a hematologist for many years. Since Dec her dad has required approx 10 transfusions. . Reports recent hospitalizations in Jan for PNA and bladder infection and required blood transfusions; went to rehab x 5 weeks. Then 3-6 weeks later again admitted for bladder infection went to rehab again x 4-5 weeks. Family was not pleased with rehab so daughter decided to bring her father to [place] to live with her and has hired 2 caregivers to be with him while she is at work. States her father is able to get up with a walker, able to sit up in a chair; cleans his own teeth. Has had open sores on his toes and sacral area since rehab. They are starting to heal. Appetite remains good. In Jan was weighing 129# and now weighs #50 more. Bowels are regular. Has followed Dr Ty Bernardo / Oly Bobby and PCP / Dr Candy Scanlon in the [facility]. Has dementia and days vary to his degree of orientation.    Was on Hydrea and Eliquis for approx 2 weeks when they thought he had had a MI but then he was taken off by the cardiologist.       Interval History:     Alert; no complaints. Ready to go home. No family at bedside. Current Facility-Administered Medications   Medication Dose Route Frequency    potassium phosphate 30 mmol in 0.9% sodium chloride 250 mL infusion   IntraVENous ONCE    insulin glargine (LANTUS) injection 10 Units  10 Units SubCUTAneous QHS    insulin lispro (HUMALOG) injection   SubCUTAneous AC&HS    atorvastatin (LIPITOR) tablet 40 mg  40 mg Oral QHS    oxybutynin (DITROPAN) tablet 5 mg  5 mg Oral TID    valsartan (DIOVAN) tablet 40 mg  40 mg Oral DAILY    sodium chloride (NS) flush 5-40 mL  5-40 mL IntraVENous Q8H    sodium chloride (NS) flush 5-40 mL  5-40 mL IntraVENous PRN    glucose chewable tablet 16 g  4 Tab Oral PRN    glucagon (GLUCAGEN) injection 1 mg  1 mg IntraMUSCular PRN    dextrose 10% infusion 125-250 mL  125-250 mL IntraVENous PRN    acetaminophen (TYLENOL) tablet 650 mg  650 mg Oral Q4H PRN    diphenhydrAMINE (BENADRYL) capsule 25 mg  25 mg Oral Q4H PRN    ondansetron (ZOFRAN) injection 4 mg  4 mg IntraVENous Q4H PRN    metoprolol tartrate (LOPRESSOR) tablet 25 mg  25 mg Oral Q12H    dorzolamide (TRUSOPT) 2 % ophthalmic solution 1 Drop  1 Drop Both Eyes TID    latanoprost (XALATAN) 0.005 % ophthalmic solution 1 Drop  1 Drop Both Eyes BID    timolol (TIMOPTIC) 0.5 % ophthalmic solution 1 Drop  1 Drop Both Eyes BID    And    brimonidine (ALPHAGAN) 0.2 % ophthalmic solution 1 Drop  1 Drop Both Eyes BID     Current Outpatient Medications   Medication Sig    insulin glargine (LANTUS U-100 INSULIN) 100 unit/mL injection 15 Units by SubCUTAneous route nightly.  insulin lispro (HUMALOG) 100 unit/mL injection Use as needed before each meals if:    Blood sugar 150-199, give 2 units  Blood sugar 200-249, give 4 units  Blood sugar 250-299, give 6 units  Blood sugar >300, give 8 units    metoprolol tartrate (LOPRESSOR) 50 mg tablet Take 25 mg by mouth two (2) times a day.  oxybutynin (DITROPAN) 5 mg tablet Take 5 mg by mouth three (3) times daily.  dorzolamide (TRUSOPT) 2 % ophthalmic solution Administer 1 Drop to both eyes three (3) times daily.  valsartan (DIOVAN) 40 mg tablet Take 1 Tab by mouth daily.  glucose 4 gram chewable tablet Take 4 Tabs by mouth as needed.  therapeutic multivitamin (THERAGRAN) tablet Take 1 Tab by mouth daily.  travoprost (TRAVATAN Z) 0.004 % ophthalmic solution Administer 1 Drop to both eyes two (2) times a day.  brinzolamide (AZOPT) 1 % ophthalmic suspension Administer 1 Drop to both eyes two (2) times a day.  brimonidine-timolol (COMBIGAN) 0.2-0.5 % drop ophthalmic solution Administer 1 Drop to both eyes every twelve (12) hours.  calcium carbonate (CALCIUM 500) 500 mg calcium (1,250 mg) tablet Take 1 Tab by mouth daily.  ascorbic acid, vitamin C, (VITAMIN C) 250 mg tablet Take 250 mg by mouth two (2) times a day.  ferrous sulfate 325 mg (65 mg iron) tablet Take 1 Tab by mouth daily (with breakfast).  Insulin Needles, Disposable, 31 gauge x 5/16\" ndle Use as needed.  atorvastatin (LIPITOR) 40 mg tablet Take 1 Tab by mouth nightly. Allergies   Allergen Reactions    Iodinated Contrast Media Hives     Faint, shaking        Review of Systems: A complete review of systems was obtained, negative except as described above.       Physical Exam:     Visit Vitals  /83 (BP 1 Location: Left arm, BP Patient Position: Supine;Sitting)   Pulse (!) 107   Temp 99.2 °F (37.3 °C)   Resp 14   Ht 6' 1\" (1.854 m)   Wt 69.9 kg (154 lb)   SpO2 100%   BMI 20.32 kg/m²     ECOG PS: 2-3  General: elderly, No distress  Eyes: PERRLA, anicteric sclerae  HENT: Atraumatic with normal appearance of ears and nose; OP clear  Neck: Supple; no thyromegaly   Lymphatic: No cervical, supraclavicular, or axillary adenopathy  Respiratory: anteriorly CTAB, normal respiratory effort  CV: Normal rate, regular rhythm, no murmurs, no peripheral edema  GI: Soft, nontender, nondistended, no masses, no hepatomegaly, no splenomegaly  MS: Digits without clubbing or cyanosis. Skin: open areas to toes bilaterally; and family reports sacral area; No rashes, ecchymoses, or petechiae. Normal temperature, turgor, and texture. Neuro/Psych: Alert, oriented to self and setting, appropriate affect, fair judgment/insight      Results:     Lab Results   Component Value Date/Time    WBC 7.8 08/29/2019 01:15 AM    WBC 7.9 08/29/2019 01:15 AM    HGB 7.7 (L) 08/29/2019 01:15 AM    HGB 7.7 (L) 08/29/2019 01:15 AM    HCT 23.9 (L) 08/29/2019 01:15 AM    HCT 25.0 (L) 08/29/2019 01:15 AM    PLATELET 817 (L) 80/96/0354 01:15 AM    PLATELET 512 (L) 94/16/3650 01:15 AM    .2 (H) 08/29/2019 01:15 AM    .7 (H) 08/29/2019 01:15 AM    ABS. NEUTROPHILS 6.4 08/29/2019 01:15 AM     Lab Results   Component Value Date/Time    Sodium 139 08/29/2019 01:15 AM    Potassium 4.0 08/29/2019 01:15 AM    Chloride 107 08/29/2019 01:15 AM    CO2 27 08/29/2019 01:15 AM    Glucose 245 (H) 08/29/2019 01:15 AM    BUN 22 (H) 08/29/2019 01:15 AM    Creatinine 0.81 08/29/2019 01:15 AM    GFR est AA >60 08/29/2019 01:15 AM    GFR est non-AA >60 08/29/2019 01:15 AM    Calcium 7.8 (L) 08/29/2019 01:15 AM    Glucose (POC) 118 (H) 08/29/2019 12:21 PM     Lab Results   Component Value Date/Time    Bilirubin, total 0.7 08/27/2019 05:35 AM    ALT (SGPT) 25 08/27/2019 05:35 AM    AST (SGOT) 35 08/27/2019 05:35 AM    Alk.  phosphatase 91 08/27/2019 05:35 AM    Protein, total 6.8 08/27/2019 05:35 AM    Albumin 1.8 (L) 08/27/2019 05:35 AM    Globulin 5.0 (H) 08/27/2019 05:35 AM     Lab Results   Component Value Date/Time    Prostate Specific Ag 0.0 (L) 08/27/2019 12:30 PM    Prostate Specific Ag 0.2 11/17/2014 04:00 PM     (H) 08/26/2019 05:09 PM     Lab Results   Component Value Date/Time    Reticulocyte count 1.7 08/26/2019 01:24 PM    Iron % saturation 22 08/26/2019 01:24 PM    TIBC 140 (L) 08/26/2019 01:24 PM    Ferritin 2,797 (H) 08/26/2019 05:09 PM    Vitamin B12 1,252 (H) 08/26/2019 05:09 PM    Folate 40.1 (H) 08/26/2019 05:09 PM    Haptoglobin 192 08/26/2019 05:09 PM    SIENNA Poly POS 08/26/2019 01:24 PM     (H) 08/26/2019 05:09 PM    Beta-2 Microglobulin, serum 2.5 (H) 09/12/2017 02:21 PM    Sed rate, automated 68 (H) 07/24/2018 10:47 AM    TSH 1.91 08/26/2019 01:24 PM    M-Rigo Not Observed 07/16/2019 10:22 AM    GIRISH, Direct Positive (A) 08/26/2019 05:09 PM     Lab Results   Component Value Date/Time    INR 1.2 06/04/2019 09:20 AM    aPTT 102.5 (H) 06/09/2019 04:12 PM       10/11/17 Bone Marrow bx  PERIPHERAL BLOOD SMEAR, BONE MARROW ASPIRATE AND BIOPSY:   MARKEDLY HYPERCELLULAR MARROW WITH MARKED GRANULOCYTIC   HYPERPLASIA AND MILD DYSMEGAKARYOPOIESIS (SEE COMMENT). ANEMIA, NEUTROPHILIA AND THROMBOCYTOSIS. MILD MARROW FIBROSIS. BLASTS ARE NOT INCREASED.   4-5% MONOCLONAL PLASMA CELLS. ADEQUATE IRON STORES. DIAGNOSIS COMMENT:   Two processes are apparent in the marrow. There is a plasma cell neoplasm with a low percentage of bone marrow plasma cells as noted above. In addition, the marrow is markedly hypercellular (>95%) with marked granulocytic hyperplasia, megakaryocytic atypia, and marrow fibrosis, suggestive of a myeloproliferative neoplasm or possibly a myeloproliferative/myelodysplastic overlap syndrome. Review of laboratory results reveals that the patient had a previously documented negative study for the JAK2 V617F mutation. Although the peripheral blood smear lacks classic features of chronic myeloid leukemia, a diagnosis of CML cannot be excluded. Correlation with pending cytogenetic studies is recommended.  If cytogenetic analysis is normal, consideration could be given to performance of FISH studies for the BCL/ABL translocation and/or to additional molecular genetic studies to exclude the presence of other less common genetic abnormalities associated with myeloproliferative neoplasms. These studies can be performed on peripheral blood. 8/26/19 XR CHEST  IMPRESSION:  No acute process.      8/29/19 BM bx   Path pending    Assessment and Recommendations:   1. Anemia, macrocytic  Concerning for a primary bone marrow pathology such as MDS/MPN, or perhaps myeloma with his history of MGUS. Previous bone marrow biopsy did not demonstrate myeloma, but was concerning for a possible MPN. Labwork is essentially unremarkable and has failed to provide an explanation for his persistent anemia, which has required multiple transfusions so far this year. I recommend a repeat bone marrow biopsy to reevaluate.  -8/29  repeat BM bx   NPO after midnight; discussed with nursing   -follow up established for clinic; placed in discharge summary    2. Thrombocytopenia  Noted in hx; 6/19 during hospitalization with infecton  -BM bx as above.  -continue to monitor    3. DM   Management per hospitalist      4. Wounds  Toes and ? Sacral area  Wound team following    5. Dementia  Continue supportive care    6. Debility  PT/OT eval    7.  Goals of care  Palliative team following    Plan reviewed with Dr Brown      Signed By: Missy Lopez NP

## 2019-08-29 NOTE — DISCHARGE SUMMARY
[facility]  [address]  [phone]    Physician Discharge Summary     Patient ID:  Vincenzo Mccain  [id number]  32 y.o.  1935    Admit date: 8/26/2019    Discharge date and time: 8/29/2019    Admission Diagnoses: Anemia [D64.9]  Anemia [D64.9]    Discharge Diagnoses:  Principal Diagnosis Anemia                                            Principal Problem:    Anemia (5/13/2019)    Active Problems:    Malignant neoplasm of prostate ([facility] (11/15/2011)      Moderate protein malnutrition ([facility] (2/11/2019)      Controlled type 2 diabetes mellitus, with long-term current use of insulin ([facility] (6/9/2019)      Debility (6/12/2019)      Hyperkalemia (8/26/2019)      Hypertension ()      MGUS (monoclonal gammopathy of unknown significance) ()      Glaucoma ()      Cognitive impairment ()      Mildly underweight adult ()      Paroxysmal A-fib (HCC) ()      Syncope (8/26/2019)      Hypoalbuminemia ()           Hospital Course:     81 yo hx of HTN, DM, Pafib, MGUS, anemia, malnutrition, decub ulcer, presented w/ weakness, near syncope, anemia     1) MGUS/anemia: Anemia now more stable. Hx of multiple blood transfusions. Hematology now following, plan for bone marrow bx today. Cont to monitor Hgb closely, transfuse prn. Patient will f/u with Dr. Ruperto Martin     2) Near syncope/weakness: due to anemia, malnutrition, possible hypoglycemia. Can walk with walker and assistance. Cont PT/OT. Family wants to continue [place]. Does not want SNF     3) Moderate malnutrition: encourage PO. Nutrition following     4) HTN: cont metoprolol, valsartan     5) Pafib: cont BB     6) DM type 2: A1C 8.2%. Cont Lantus (adjust prn), SSI with lispro. Will discontinue home insulin 70/30     7) Decub ulcer/ankle ulcer/toe wound: POA. Ulcers unstageable. Wound care team following.   Cont  for wound care    PCP: Felix Martin MD     Consults: Hematology/Oncology    Significant Diagnostic Studies: bone marrow bx    Discharge Exam:  Physical Exam:    Gen:  Elderly, disheveled, thin, NAD  HEENT:  Pink conjunctivae, PERRL, hearing intact to voice, moist mucous membranes  Neck:  Supple, without masses, thyroid non-tender  Resp:  No accessory muscle use, clear breath sounds without wheezes rales or rhonchi  Card:  No murmurs, normal S1, S2 without thrills, bruits or peripheral edema  Abd:  Soft, non-tender, non-distended, normoactive bowel sounds are present  Musc:  No cyanosis or clubbing  Skin:  Decub ulcer, bilateral foot wounds  Neuro:  Cranial nerves 3-12 are grossly intact, diffused weakness, follows commands appropriately  Psych:  poor insight, oriented to person, place and time, alert    Disposition:   Discharge Condition: Stable    Patient Instructions:   Current Discharge Medication List      START taking these medications    Details   insulin lispro (HUMALOG) 100 unit/mL injection Use as needed before each meals if:    Blood sugar 150-199, give 2 units  Blood sugar 200-249, give 4 units  Blood sugar 250-299, give 6 units  Blood sugar >300, give 8 units  Qty: 1 Vial, Refills: 1         CONTINUE these medications which have CHANGED    Details   insulin glargine (LANTUS U-100 INSULIN) 100 unit/mL injection 15 Units by SubCUTAneous route nightly. Qty: 1 Vial, Refills: 1         CONTINUE these medications which have NOT CHANGED    Details   metoprolol tartrate (LOPRESSOR) 50 mg tablet Take 25 mg by mouth two (2) times a day. oxybutynin (DITROPAN) 5 mg tablet Take 5 mg by mouth three (3) times daily. dorzolamide (TRUSOPT) 2 % ophthalmic solution Administer 1 Drop to both eyes three (3) times daily. valsartan (DIOVAN) 40 mg tablet Take 1 Tab by mouth daily. Qty: 30 Tab, Refills: 6    Associated Diagnoses: Essential hypertension      glucose 4 gram chewable tablet Take 4 Tabs by mouth as needed.   Qty: 10 Tab, Refills: 0      therapeutic multivitamin (THERAGRAN) tablet Take 1 Tab by mouth daily. Qty: 30 Tab, Refills: 0      travoprost (TRAVATAN Z) 0.004 % ophthalmic solution Administer 1 Drop to both eyes two (2) times a day. Qty: 5 mL, Refills: 0      brinzolamide (AZOPT) 1 % ophthalmic suspension Administer 1 Drop to both eyes two (2) times a day. Qty: 5 mL, Refills: 0      brimonidine-timolol (COMBIGAN) 0.2-0.5 % drop ophthalmic solution Administer 1 Drop to both eyes every twelve (12) hours. Qty: 5 mL, Refills: 0      calcium carbonate (CALCIUM 500) 500 mg calcium (1,250 mg) tablet Take 1 Tab by mouth daily. Qty: 90 Tab, Refills: 2    Associated Diagnoses: Malignant neoplasm of prostate ([address] Monoclonal gammopathy      ascorbic acid, vitamin C, (VITAMIN C) 250 mg tablet Take 250 mg by mouth two (2) times a day. ferrous sulfate 325 mg (65 mg iron) tablet Take 1 Tab by mouth daily (with breakfast). Qty: 30 Tab, Refills: 0      Insulin Needles, Disposable, 31 gauge x 5/16\" ndle Use as needed. Qty: 1 Package, Refills: 11      atorvastatin (LIPITOR) 40 mg tablet Take 1 Tab by mouth nightly.          STOP taking these medications       insulin aspart protamine/insulin aspart (NOVOLOG MIX 70-30 U-100 INSULN) 100 unit/mL (70-30) injection Comments:   Reason for Stopping:         predniSONE (DELTASONE) 10 mg tablet Comments:   Reason for Stopping:             Activity: Activity as tolerated  Diet: Diabetic Diet  Wound Care: As directed    Follow-up with  Follow-up Information     Follow up With Specialties Details Why Contact Info    Carmela Brown MD Hematology and Oncology, Internal Medicine, Hematology, Oncology Go on 9/17/2019 appt at 11 am [address]  [address] [facility]  [phone]      Comfort Castillo MD Family Practice Schedule an appointment as soon as possible for a visit in 1 week  [address] [place] [id number] [phone]            Follow-up tests/labs: bone marrow bx results     Signed:  Aiden Jolly MD  8/29/2019  11:15 AM    I spent 40 min on discharge

## 2019-08-30 NOTE — TELEPHONE ENCOUNTER
[address] at [facility]  [phone]    08/30/19- Received a call from [place] with [facility] - INPAT[facility], she stated patient is in need of several medication refills, but doesn't currently have a PCP in the area. Discussed with Dr. Glenis Wick, he's okay refilling ferrous sulfate, vitamin C, and calcium. Patient will either need to contact his PCP from [facility] for refills of other medications, lipitor and oxybutynin, or establish care with a new PCP. She verbalized understanding. Requested Prescriptions     Signed Prescriptions Disp Refills    calcium carbonate (CALCIUM 500) 500 mg calcium (1,250 mg) tablet 30 Tab 3     Sig: Take 1 Tab by mouth daily. Authorizing Provider: Leticia Clark     Ordering User: Mel Schrader ferrous sulfate 325 mg (65 mg iron) tablet 30 Tab 3     Sig: Take 1 Tab by mouth daily (with breakfast). Authorizing Provider: Leticia Clark     Ordering User: Anita VEGA ascorbic acid, vitamin C, (VITAMIN C) 250 mg tablet 60 Tab 3     Sig: Take 1 Tab by mouth two (2) times a day.      Authorizing Provider: Leticia Pham User: Modesta VEGA

## 2019-09-04 NOTE — TELEPHONE ENCOUNTER
Pts daughter called stating pt is not eating and she would like a return call from the nurse. Call back number [phone] for Marisel.

## 2019-09-04 NOTE — TELEPHONE ENCOUNTER
[facility]  [phone]    09/04/19- Patient's daughter stated yesterday and today, patient has had no appetite at all, which is not normal for him. PT just left and he did start to eat a little. She reports increased weakness, and BG has been a little elevated. She also reports a knot behind his left ear that is sore to the touch. Patient does not currently have a local PCP, she stated it will take 2-3 months to establish care. She questioned if we could move his follow up sooner. Discussed with Dr. Chloé Ochoa, we're still waiting for additional testing on the bone marrow, what has come back wouldn't explain his lack of appetite. Offered to recheck labs to see if he needs another transfusion. Patient's daughter was agreeable, stated she's available tomorrow and Friday. Encouraged her to continue working on establishing care with a PCP. She verbalized understanding.

## 2019-09-09 PROBLEM — D64.9 SEVERE ANEMIA: Status: ACTIVE | Noted: 2019-01-01

## 2019-09-09 NOTE — ED PROVIDER NOTES
Liyah Giang is a 82 y.o. male  who presents by EMS to ER with c/o Patient presents with:  High Blood Sugar  Dementia  Patient presents with family. Per family patient with increased dementia and fatigue x 3 weeks. Patient recently discharged from hospital for same, daughter reports no improvement of symptoms since discharge. Patient also with continued elevated blood sugars at home. Patient will follow some commands however has had a significant decline over the last 3 weeks. Patient also with decreased appetite per family. He specifically denies any fevers, chills, nausea, vomiting, chest pain, shortness of breath, headache, rash, diarrhea, abdominal pain, urinary/bowel changes, sweating or weight loss. PCP: Silvio John MD   PMHx significant for: Past Medical History:  No date: Anemia  No date: Cognitive impairment  6/9/2019: Controlled type 2 diabetes mellitus, with long-term current   use of insulin (HCC)  No date: Glaucoma  No date: Hypertension  1998: Malignant neoplasm of prostate ([facility] [address]  No date: MGUS (monoclonal gammopathy of unknown significance)  No date: Mildly underweight adult  No date: Paroxysmal A-fib (HCC)   PSHx significant for: Past Surgical History:  4/98: BIOPSY OF PROSTATE,INCISIONAL  4/30-6/29/98: [place] EXT BEAM RADIATION AS PRIMARY THERAPY TO PROSTATE   ONLY  Social Hx: Tobacco use: Social History    Tobacco Use      Smoking status: Current Some Day Smoker        Packs/day: 0.10        Years: 22.00        Pack years: 2.2        Types: Cigarettes      Smokeless tobacco: Never Used  ; EtOH use: The patient states he drinks 0 per week.; Illicit Drug use: Allergies:   -- Iodinated Contrast Media -- Hives    --  Faint, shaking    There are no other complaints, changes or physical findings at this time.               Past Medical History:   Diagnosis Date    Anemia     Cognitive impairment     Controlled type 2 diabetes mellitus, with long-term current use of insulin ([facility] [address] 6/9/2019    Glaucoma     Hypertension     Malignant neoplasm of prostate ([facility] [address] 1998    MGUS (monoclonal gammopathy of unknown significance)     Mildly underweight adult     Paroxysmal A-fib (HCC)        Past Surgical History:   Procedure Laterality Date    BIOPSY OF PROSTATE,INCISIONAL  4/98    [place] EXT BEAM RADIATION AS PRIMARY THERAPY TO PROSTATE ONLY  4/30-6/29/98         Family History:   Problem Relation Age of Onset    Diabetes Other     Hypertension Other        Social History     Socioeconomic History    Marital status: SINGLE     Spouse name: Not on file    Number of children: Not on file    Years of education: Not on file    Highest education level: Not on file   Occupational History    Not on file   Social Needs    Financial resource strain: Not on file    Food insecurity:     Worry: Not on file     Inability: Not on file    Transportation needs:     Medical: Not on file     Non-medical: Not on file   Tobacco Use    Smoking status: Current Some Day Smoker     Packs/day: 0.10     Years: 22.00     Pack years: 2.20     Types: Cigarettes    Smokeless tobacco: Never Used   Substance and Sexual Activity    Alcohol use: No     Alcohol/week: 0.0 standard drinks    Drug use: No    Sexual activity: Not on file   Lifestyle    Physical activity:     Days per week: Not on file     Minutes per session: Not on file    Stress: Not on file   Relationships    Social connections:     Talks on phone: Not on file     Gets together: Not on file     Attends Spiritism service: Not on file     Active member of club or organization: Not on file     Attends meetings of clubs or organizations: Not on file     Relationship status: Not on file    Intimate partner violence:     Fear of current or ex partner: Not on file     Emotionally abused: Not on file     Physically abused: Not on file     Forced sexual activity: Not on file   Other Topics Concern    Not on file   Social History Narrative    ** Merged History Encounter **              ALLERGIES: Iodinated contrast media    Review of Systems   Constitutional: Positive for fatigue. Negative for activity change, appetite change, chills and fever. HENT: Negative for congestion and sore throat. Respiratory: Negative for cough and shortness of breath. Cardiovascular: Negative for chest pain. Gastrointestinal: Negative for abdominal pain, diarrhea, nausea and vomiting. Genitourinary: Negative for dysuria. Musculoskeletal: Negative for arthralgias and myalgias. Skin: Negative for color change. Neurological: Positive for weakness. Negative for dizziness. Psychiatric/Behavioral: The patient is not nervous/anxious. All other systems reviewed and are negative. Vitals:    09/09/19 1829   BP: 116/61   Pulse: 96   Resp: 16   Temp: 98.1 °F (36.7 °C)   SpO2: 100%   Weight: 69.9 kg (154 lb)            Physical Exam   Constitutional: He appears well-developed. Non-toxic appearance. He does not have a sickly appearance. He appears ill. No distress. HENT:   Head: Normocephalic and atraumatic. Right Ear: External ear normal.   Left Ear: External ear normal.   Mouth/Throat: Oropharynx is clear and moist. No oropharyngeal exudate. Eyes: Pupils are equal, round, and reactive to light. Conjunctivae and EOM are normal. Right eye exhibits no discharge. Left eye exhibits no discharge. No scleral icterus. Neck: Normal range of motion. Neck supple. No tracheal deviation present. No thyromegaly present. Cardiovascular: Normal rate, regular rhythm, normal heart sounds and intact distal pulses. No murmur heard. Pulmonary/Chest: Effort normal and breath sounds normal. No respiratory distress. He has no wheezes. He has no rales. Abdominal: Soft. Bowel sounds are normal. He exhibits no distension. There is no tenderness. There is no rebound and no guarding. Musculoskeletal: Normal range of motion. He exhibits no edema or tenderness.    Lymphadenopathy: He has no cervical adenopathy. Neurological: He is alert. He displays atrophy. No cranial nerve deficit. He exhibits abnormal muscle tone. Coordination normal.   Skin: Skin is warm. No rash noted. No erythema. Psychiatric: He has a normal mood and affect. His behavior is normal. Judgment and thought content normal.   Nursing note and vitals reviewed. MDM       Procedures               CONSULT NOTE:   8:27 PM  Luciana Harada PA-C spoke with Dr. Leanne Chaudhari,   Specialty: Hospitalist  Discussed pt's hx, disposition, and available diagnostic and imaging results. Reviewed care plans. Consultant agrees with plans as outlined. Will see for admission. Patient is being admitted to the hospital.  The results of their tests and reasons for their admission have been discussed with them and/or available family. They convey agreement and understanding for the need to be admitted and for their admission diagnosis. Consultation has been made with the inpatient physician specialist for hospitalization.     LABORATORY TESTS:  Recent Results (from the past 12 hour(s))   EKG, 12 LEAD, INITIAL    Collection Time: 09/09/19  7:05 PM   Result Value Ref Range    Ventricular Rate 92 BPM    Atrial Rate 94 BPM    QRS Duration 70 ms    Q-T Interval 358 ms    QTC Calculation (Bezet) 442 ms    Calculated R Axis 70 degrees    Calculated T Axis 78 degrees    Diagnosis       Atrial fibrillation  Abnormal ECG  When compared with ECG of 26-AUG-2019 12:54,  No significant change was found     CBC WITH AUTOMATED DIFF    Collection Time: 09/09/19  7:14 PM   Result Value Ref Range    WBC 11.0 4.1 - 11.1 K/uL    RBC 1.86 (L) 4.10 - 5.70 M/uL    HGB 6.2 (L) 12.1 - 17.0 g/dL    HCT 19.7 (L) 36.6 - 50.3 %    .9 (H) 80.0 - 99.0 FL    MCH 33.3 26.0 - 34.0 PG    MCHC 31.5 30.0 - 36.5 g/dL    RDW 21.0 (H) 11.5 - 14.5 %    PLATELET 106 250 - 457 K/uL    MPV 10.4 8.9 - 12.9 FL    NRBC 0.4 (H) 0  WBC    ABSOLUTE NRBC 0.04 (H) 0.00 - 0.01 K/uL NEUTROPHILS 94 (H) 32 - 75 %    LYMPHOCYTES 3 (L) 12 - 49 %    MONOCYTES 3 (L) 5 - 13 %    EOSINOPHILS 0 0 - 7 %    BASOPHILS 0 0 - 1 %    IMMATURE GRANULOCYTES 0 %    ABS. NEUTROPHILS 10.4 (H) 1.8 - 8.0 K/UL    ABS. LYMPHOCYTES 0.3 (L) 0.8 - 3.5 K/UL    ABS. MONOCYTES 0.3 0.0 - 1.0 K/UL    ABS. EOSINOPHILS 0.0 0.0 - 0.4 K/UL    ABS. BASOPHILS 0.0 0.0 - 0.1 K/UL    ABS. IMM. GRANS. 0.0 K/UL    RBC COMMENTS MACROCYTOSIS  1+        RBC COMMENTS ANISOCYTOSIS  2+        DF MANUAL     METABOLIC PANEL, COMPREHENSIVE    Collection Time: 09/09/19  7:14 PM   Result Value Ref Range    Sodium 136 136 - 145 mmol/L    Potassium 3.7 3.5 - 5.1 mmol/L    Chloride 105 97 - 108 mmol/L    CO2 25 21 - 32 mmol/L    Anion gap 6 5 - 15 mmol/L    Glucose 133 (H) 65 - 100 mg/dL    BUN 30 (H) 6 - 20 MG/DL    Creatinine 0.81 0.70 - 1.30 MG/DL    BUN/Creatinine ratio 37 (H) 12 - 20      GFR est AA >60 >60 ml/min/1.73m2    GFR est non-AA >60 >60 ml/min/1.73m2    Calcium 8.2 (L) 8.5 - 10.1 MG/DL    Bilirubin, total 0.6 0.2 - 1.0 MG/DL    ALT (SGPT) 25 12 - 78 U/L    AST (SGOT) 46 (H) 15 - 37 U/L    Alk.  phosphatase 82 45 - 117 U/L    Protein, total 7.0 6.4 - 8.2 g/dL    Albumin 1.7 (L) 3.5 - 5.0 g/dL    Globulin 5.3 (H) 2.0 - 4.0 g/dL    A-G Ratio 0.3 (L) 1.1 - 2.2     TROPONIN I    Collection Time: 09/09/19  7:14 PM   Result Value Ref Range    Troponin-I, Qt. <0.05 <0.05 ng/mL   MAGNESIUM    Collection Time: 09/09/19  7:14 PM   Result Value Ref Range    Magnesium 1.7 1.6 - 2.4 mg/dL   GLUCOSE, POC    Collection Time: 09/09/19  7:19 PM   Result Value Ref Range    Glucose (POC) 174 (H) 65 - 100 mg/dL    Performed by Carlos    Collection Time: 09/09/19  8:25 PM   Result Value Ref Range    Crossmatch Expiration 09/12/2019     ABO/Rh(D) AB POSITIVE     Antibody screen NEG    GLUCOSE, POC    Collection Time: 09/09/19 11:17 PM   Result Value Ref Range    Glucose (POC) 146 (H) 65 - 100 mg/dL    Performed by Sylvia Velasquez IMAGING RESULTS:  See chart    MEDICATIONS GIVEN:  Medications   sodium chloride (NS) flush 5-40 mL (10 mL IntraVENous Given 9/9/19 2322)   sodium chloride (NS) flush 5-40 mL (has no administration in time range)   acetaminophen (TYLENOL) tablet 650 mg (has no administration in time range)   naloxone (NARCAN) injection 0.4 mg (has no administration in time range)   insulin lispro (HUMALOG) injection (has no administration in time range)   glucose chewable tablet 16 g (has no administration in time range)   glucagon (GLUCAGEN) injection 1 mg (has no administration in time range)   dextrose 10% infusion 0-250 mL (has no administration in time range)   ascorbic acid (vitamin C) (VITAMIN C) tablet 250 mg (has no administration in time range)   atorvastatin (LIPITOR) tablet 40 mg (40 mg Oral Given 9/9/19 2337)   calcium carbonate (OS-STACEY) tablet 500 mg [elemental] (has no administration in time range)   dorzolamide (TRUSOPT) 2 % ophthalmic solution 1 Drop (1 Drop Both Eyes Given 9/9/19 2338)   therapeutic multivitamin (THERAGRAN) tablet 1 Tab (has no administration in time range)   latanoprost (XALATAN) 0.005 % ophthalmic solution 1 Drop (1 Drop Both Eyes Given 9/9/19 2337)   metoprolol tartrate (LOPRESSOR) tablet 25 mg (has no administration in time range)   insulin glargine (LANTUS) injection 5 Units (5 Units SubCUTAneous Given 9/9/19 2336)   cephALEXin (KEFLEX) capsule 500 mg (500 mg Oral Given 9/9/19 2337)   timolol (TIMOPTIC) 0.5 % ophthalmic solution 1 Drop (1 Drop Both Eyes Given 9/9/19 2337)     And   brimonidine (ALPHAGAN) 0.2 % ophthalmic solution 1 Drop (1 Drop Both Eyes Given 9/9/19 2337)       IMPRESSION:  1. Anemia, unspecified type    2. Lethargy        PLAN:  1.  Admit to hosptial

## 2019-09-10 PROBLEM — D64.9 SEVERE ANEMIA: Status: ACTIVE | Noted: 2019-01-01

## 2019-09-10 PROBLEM — E44.0 MODERATE PROTEIN MALNUTRITION (HCC): Chronic | Status: ACTIVE | Noted: 2019-01-01

## 2019-09-10 PROBLEM — R53.81 DEBILITY: Chronic | Status: ACTIVE | Noted: 2019-01-01

## 2019-09-10 NOTE — PROGRESS NOTES
Palliative Medicine      Code Status: DNR    Advance Care Planning:  Advance Care Planning 9/10/2019   Patient's Healthcare Decision Maker is: Named in scanned ACP document:  Dtr [id number]  Directive Yes, on file   Does the patient have other document types MOST/MOLST/POST/POLST;Power of        Patient / Family Encounter Documentation    Participants (names): Pt, dtr Bobby Loyd, Palliative Medicine (NP Mauro Kiran)    Narrative:  Pt was awake in bed with dtr at bedside, is known to Palliative team from August hospitalization (8/26-8/29). Pt was more alert than during prior interaction, was being transfused at time of visit. Dtr reports pt has gone downhill significantly since discharge, has been out of the house only for medical care, including a trip back to [place] to see PCP because dtr was unable to get an appt to establish pt with a local PCP until December. Pt has had [facility] in place, receives wound care at home. Pt had been living in [place] until dtr moved him into her apt in July. Marisel's dtr and 2 young grandchildren have since moved into the home as well. Pt is , has 3 dtrs and 1 son. Pt has AMD on file signed 3/6/19 which appoints dtr Ger Putnam as sole Medical POA. POST form [facility] 6[facility]17[facility]; copy has been scanned into medical record. Psychosocial Issues Identified/ Resilience Factors: Caregiver stress:  dtr previously shared that she has not had a day off since February, was driving to [place] every weekend before moving pt to [place], does have paid caregivers in the home while she is at work but does not feel comfortable having someone stay in the home 24/7 if she were to go out of town. Dtr was looking forward to attending a team building retreat at work this afternoon as a means of connecting with others and taking care of herself.     Goals of Care / Plan:  Discussed possible referral to [address] [place] [address]; dtr shared that she has actually already been in touch with Referral Navigator Davy Whitley. LOS communicated with Blake Ricci, who indicated pt is being considered for [facility] vs Home Palliative services. LOS later learned that [facility] spoke with dtr re: possible hospice. Dtr to speak with her siblings tonight and see what labs show in the morning. Palliative team will follow for support and ongoing goals of care conversations. Discussed with [facility] NP and with Care Manager. Thank you for including Palliative Medicine in the care of Mr. Merle Doyle.     [facility] BOZENA Barbour, [facility]-  [phone])

## 2019-09-10 NOTE — PROGRESS NOTES
Patient has elevated temp at 100.7. Spoke to Dr Castro Rajan to notify. No new orders received. Tylenol 650 mg po given. Will continue to monitor.

## 2019-09-10 NOTE — PROGRESS NOTES
2350  Bedside and Verbal shift change report given to Jana (oncoming nurse) by Isaura Briones (offgoing nurse). Report included the following information SBAR, Kardex, MAR and Recent Results. Trg [address]vee [address] to Dr. Frankie Hadley this morning about patient's Hgb of 6.2, compared to MD note about goal of >7. Hematology to see in am, will defer to their judgement in this case. Will continue to monitor. 4305  Dr. Frankie Hadley notified of critical lab work that was called to the floor when I was bathing patient and redressing some wounds. Hgb now 5.0. Morning MD team will be here shortly, and his H&H will be addressed. No further orders given to me at this time. 0730  Bedside and Verbal shift change report given to Venkatesh Kim (oncoming nurse) by Sherman Maldonado (offgoing nurse). Report included the following information SBAR, Kardex, MAR and Recent Results.

## 2019-09-10 NOTE — PROGRESS NOTES
[facility]: MED RECONCILIATION    Comments/Recommendations:   Med rec performed via interview with patient's family who manages medications. Patient was recently discharged 8/29 and family reports they no longer use prednisone or 70/30 insulin (changed to novolog). Per family, they have not been taking oxybutynin because MD told them they could find an equivalent over the counter, but they have not found that yet. Family will need a script for oxybutynin if it is continued at discharge. Confirmed eye drops with family. Medications adjusted:    Changed lantus to 5 units from 15 units (decreased by PCP)     Information obtained from: family     Significant PMH/Disease States:   Past Medical History:   Diagnosis Date    Anemia     Cognitive impairment     Controlled type 2 diabetes mellitus, with long-term current use of insulin ([facility] [address] 6/9/2019    Glaucoma     Hypertension     Malignant neoplasm of prostate ([facility] [address] 1998    MGUS (monoclonal gammopathy of unknown significance)     Mildly underweight adult     Paroxysmal A-[facility])        Chief Complaint for this Admission:   Chief Complaint   Patient presents with    High Blood Sugar    Dementia       Allergies: Iodinated contrast media    Prior to Admission Medications:   Prior to Admission Medications   Prescriptions Last Dose Informant Patient Reported? Taking? Insulin Needles, Disposable, 31 gauge x 5/16\" ndle  Family Member No Yes   Sig: Use as needed. ascorbic acid, vitamin C, (VITAMIN C) 250 mg tablet 9/9/2019 at Unknown time Family Member No Yes   Sig: Take 1 Tab by mouth two (2) times a day. atorvastatin (LIPITOR) 40 mg tablet 9/8/2019 at Unknown time Family Member Yes Yes   Sig: Take 1 Tab by mouth nightly. brimonidine-timolol (COMBIGAN) 0.2-0.5 % drop ophthalmic solution 9/9/2019 at Unknown time Family Member No Yes   Sig: Administer 1 Drop to both eyes every twelve (12) hours.    brinzolamide (AZOPT) 1 % ophthalmic suspension 9/9/2019 at Unknown time Family Member No Yes   Sig: Administer 1 Drop to both eyes two (2) times a day. calcium carbonate (CALCIUM 500) 500 mg calcium (1,250 mg) tablet 2019 at Unknown time Family Member No Yes   Sig: Take 1 Tab by mouth daily. cephALEXin (KEFLEX) 500 mg capsule 2019 at Unknown time Family Member Yes Yes   Sig: Take 500 mg by mouth four (4) times daily. dorzolamide (TRUSOPT) 2 % ophthalmic solution 2019 at Unknown time Family Member Yes Yes   Sig: Administer 1 Drop to both eyes three (3) times daily. ferrous sulfate 325 mg (65 mg iron) tablet 2019 at Unknown time Family Member No Yes   Sig: Take 1 Tab by mouth daily (with breakfast). glucose 4 gram chewable tablet  Family Member No Yes   Sig: Take 4 Tabs by mouth as needed. insulin glargine (LANTUS SOLOSTAR U-100 INSULIN) 100 unit/mL (3 mL) inpn 2019 at PM Family Member Yes Yes   Si Units by SubCUTAneous route nightly. insulin lispro (HUMALOG) 100 unit/mL injection 8 units@ 1400 Family Member No Yes   Sig: Use as needed before each meals if:    Blood sugar 150-199, give 2 units  Blood sugar 200-249, give 4 units  Blood sugar 250-299, give 6 units  Blood sugar >300, give 8 units   metoprolol tartrate (LOPRESSOR) 50 mg tablet 2019 at Unknown time Family Member Yes Yes   Sig: Take 25 mg by mouth two (2) times a day. oxybutynin (DITROPAN) 5 mg tablet  Family Member Yes No   Sig: Take 5 mg by mouth three (3) times daily. therapeutic multivitamin (THERAGRAN) tablet 2019 at Unknown time Family Member No Yes   Sig: Take 1 Tab by mouth daily. travoprost (TRAVATAN Z) 0.004 % ophthalmic solution 2019 at Unknown time Family Member No Yes   Sig: Administer 1 Drop to both eyes two (2) times a day. valsartan (DIOVAN) 40 mg tablet 2019 at Unknown time Family Member No Yes   Sig: Take 1 Tab by mouth daily.       Facility-Administered Medications: None                    GURDEEP Severino   Contact: [phone]

## 2019-09-10 NOTE — ED NOTES
Pt Throughput: Charge Nurse on 4th floor  made aware of patient's bed assignment, room 414. Sherita Boston RN  Emergency Dept Charge RN.

## 2019-09-10 NOTE — PROGRESS NOTES
Palliative Medicine Consult  Hoang: [phone] ([phone])    Patient Name: Liyah Giang  YOB: 1935    Date of Initial Consult: 09/10/2019  Reason for Consult: Care decisions  Requesting Provider: Daysi Odonnell MD   Primary Care Physician: Silvio John MD     SUMMARY:   Liyah Giang is a 82 y.o. with a past history of afib, HTN, DM, prostate CA, chronic anemia/MGUS, dementia, who was admitted on 9/9/2019 from home with a diagnosis of anemia. Patient presented to the ED with complaints of worsening confusion and fatigue. ED eval revealed hgb 6.2 and patient was admitted for blood transfusion. Patient was admitted to [facility] previously from 08/26-08/29/19 for anemia and underwent transfusion and bone marrow biopsy with plan to follow-up with Heme Onc. Patient initially diagnosed with anemia/MGUS 2 yeats ago and had a unrevealing bone marrow biopsy performed. In the past 2 years, he has required over a dozen blood transfusions. Current medical issues leading to Palliative Medicine involvement include: care decision. SH: Originally from [place]. , two daughters and one son. Moved from [place], [place] to live with daughterAllen when he could no longer live alone. Has 24/7 care at home (between Dimitris who works evening shift, granddaughter who also lives there, and a paid caregiver) and requires assistance with ADLs. PALLIATIVE DIAGNOSES:   1. Physical debility  2. Hypoalbuminemia  3. Goals of care  4. DNR  5. ACP  6. Anemia/MGUS       PLAN:   1. Met with patient and daughter, Dimitris, with Lou Clayton LCSW and introduced the role of Palliative Medicine. Patient is well known to PM team from previous admission. 2. Patient alert, being fed breakfast by Dimitris. Greeted us with \"good morning\" and will answer \"yes\" and \"no\".  Dimitris reports this is the most alert he has been in several days and feels encouraged that he is interested in eating, as his intake has been very poor since discharge. 3. Goals of care--> Reviewed events of hospitalization and how patient was doing since last seen on 08/29. Marisol Hung reports that since discharge, patient has not done well overall. Continued to decline with poor oral intake, less interactive and confused, and lethargic. Marisol Hung had tried to get him seen by a local PCP but could not get an appt until December, had to resort to driving him to [place] to see his provider there and was told he needed a blood transfusion. She then went to the [facility] and was told that his Hgb was 7.1 and transfusion was not indicated. Marisol Hung frustrated with difficulty getting patient seen by providers locally, as well as with still not knowing \"root cause\" of ongoing anemia. Has been told by Heme Onc that bone marrow biopsy results and additional testing still pending. Is hopeful that it will reveal \"something that can be fixed' and patient will be able to return home with ongoing PT/OT, as he was making gains prior to last admission. Has refused SNF placement in the past, citing that he has been to two in the past and \"came home worse than he went in\". Marisol Hung has reached out to [facility] with our [facility] service and we will ensure that patient has follow-up with our team prior to discharge (based on outcome of hospitalization). 4. Silvia status--> DNR, POST form completed 06/17/19 and scanned in media; has been printed and will be scanned into ACP. 5. ACP--> Copy of AMD dated 3/6/19 is on file which appoints dtr Khoi Mark as sole Medical POA.  Per POST, limited additional interventions (NO intubation, ok with CPAP/BiPAP, avoid ICU) and NO feeding tube. 6. Initial consult note routed to primary continuity provider and/or primary health care team members  7.  Communicated plan of care with: Palliative [facility] Team     GOALS OF CARE / TREATMENT PREFERENCES:     GOALS OF CARE:  Patient/Health Care Proxy Stated Goals: (continue transfusion and pursue possible treatment for cause of anemia)    TREATMENT PREFERENCES:   Code Status: DNR    Advance Care Planning:  [x] [facility] Interdisciplinary Team has updated the ACP Navigator with Health Care Decision Maker and Patient Capacity      Primary Decision Maker: Kerry Debbie [employer] [facility]) - Daughter - [phone]  Advance Care Planning 9/10/2019   Patient's Healthcare Decision Maker is: Named in scanned ACP document   Confirm Advance Directive Yes, on file   Does the patient have other document types MOST/MOLST/POST/POLST;Power of        Medical Interventions: Limited additional interventions     Other Instructions:   Artificially Administered Nutrition: No feeding tube     Other:    As far as possible, the palliative care team has discussed with patient / health care proxy about goals of care / treatment preferences for patient. HISTORY:     History obtained from: chart, daughter    CHIEF COMPLAINT: confusion and fatigue    HPI/SUBJECTIVE:    The patient is:   [] Verbal and participatory  [x] Non-participatory due to: dementia    Patient presented to the ED with complaints of worsening confusion and fatigue. ED eval revealed hgb 6.2 and patient was admitted for blood transfusion.     See ESAS    Clinical Pain Assessment (nonverbal scale for severity on nonverbal patients):   Clinical Pain Assessment  Severity: 0          Duration: for how long has pt been experiencing pain (e.g., 2 days, 1 month, years)  Frequency: how often pain is an issue (e.g., several times per day, once every few days, constant)     FUNCTIONAL ASSESSMENT:     Palliative Performance Scale (PPS):  PPS: 40       PSYCHOSOCIAL/SPIRITUAL SCREENING:     Palliative IDT has assessed this patient for cultural preferences / practices and a referral made as appropriate to needs (Cultural Services, Patient Advocacy, Ethics, etc.)    Any spiritual / Caodaism concerns:  [] Yes /  [x] No    Caregiver Burnout:  [] Yes /  [x] No /  [] No Caregiver Present      Anticipatory grief assessment:   [x] Normal  / [] Maladaptive       ESAS Anxiety: Anxiety: 0    ESAS Depression:          REVIEW OF SYSTEMS:     Positive and pertinent negative findings in ROS are noted above in HPI. The following systems were [x] reviewed / [] unable to be reviewed as noted in HPI  Other findings are noted below. Systems: constitutional, ears/nose/mouth/throat, respiratory, gastrointestinal, genitourinary, musculoskeletal, integumentary, neurologic, psychiatric, endocrine. Positive findings noted below. Modified ESAS Completed by: provider   Fatigue: 8 Drowsiness: 0     Pain: 0   Anxiety: 0     Anorexia: 4 Dyspnea: 0     Constipation: No     Stool Occurrence(s): 1        PHYSICAL EXAM:     From RN flowsheet:  Wt Readings from Last 3 Encounters:   09/09/19 154 lb (69.9 kg)   08/29/19 154 lb (69.9 kg)   08/07/19 154 lb (69.9 kg)     Blood pressure 143/74, pulse 86, temperature 98.2 °F (36.8 °C), resp. rate 16, weight 154 lb (69.9 kg), SpO2 95 %.     Pain Scale 1: Visual  Pain Intensity 1: 1                 Last bowel movement, if known:     Constitutional: frail  Eyes: pupils equal, anicteric  ENMT: no nasal discharge, moist mucous membranes  Cardiovascular: regular rhythm, distal pulses intact, no DOROTHEA  Respiratory: breathing not labored, symmetric  Gastrointestinal: soft non-tender, +bowel sounds  Musculoskeletal: no deformity, no tenderness to palpation  Skin: warm, dry  Neurologic: following commands, moving all extremities  Psychiatric: full affect, no hallucinations  Other:       HISTORY:     Principal Problem:    Anemia (5/13/2019)    Active Problems:    Malignant neoplasm of prostate ([address] (11/15/2011)      Moderate protein malnutrition ([address] (2/11/2019)      Controlled type 2 diabetes mellitus, with long-term current use of insulin ([address] (6/9/2019)      Debility (6/12/2019)      Hypertension ()      MGUS (monoclonal gammopathy of unknown significance) () Cognitive impairment ()      Paroxysmal A-fib (HCC) ()      Hypoalbuminemia ()      Severe anemia (9/10/2019)      DNR (do not resuscitate) ()      Past Medical History:   Diagnosis Date    Anemia     Cognitive impairment     Controlled type 2 diabetes mellitus, with long-term current use of insulin ([facility] 6/9/2019    Glaucoma     Hypertension     Malignant neoplasm of prostate ([facility] 1998    MGUS (monoclonal gammopathy of unknown significance)     Mildly underweight adult     Paroxysmal A-fib [facility])       Past Surgical History:   Procedure Laterality Date    BIOPSY OF PROSTATE,INCISIONAL  4/98    NV EXT BEAM RADIATION AS PRIMARY THERAPY TO PROSTATE ONLY  4/30-6/29/98      Family History   Problem Relation Age of Onset    Diabetes Other     Hypertension Other       History reviewed, no pertinent family history.   Social History     Tobacco Use    Smoking status: Current Some Day Smoker     Packs/day: 0.10     Years: 22.00     Pack years: 2.20     Types: Cigarettes    Smokeless tobacco: Never Used   Substance Use Topics    Alcohol use: No     Alcohol/week: 0.0 standard drinks     Allergies   Allergen Reactions    Iodinated Contrast Media Hives     Faint, shaking      Current Facility-Administered Medications   Medication Dose Route Frequency    0.9% sodium chloride infusion 250 mL  250 mL IntraVENous PRN    sodium chloride (NS) flush 5-40 mL  5-40 mL IntraVENous Q8H    sodium chloride (NS) flush 5-40 mL  5-40 mL IntraVENous PRN    acetaminophen (TYLENOL) tablet 650 mg  650 mg Oral Q6H PRN    naloxone (NARCAN) injection 0.4 mg  0.4 mg IntraVENous PRN    insulin lispro (HUMALOG) injection   SubCUTAneous TID WITH MEALS    glucose chewable tablet 16 g  4 Tab Oral PRN    glucagon (GLUCAGEN) injection 1 mg  1 mg IntraMUSCular PRN    dextrose 10% infusion 0-250 mL  0-250 mL IntraVENous PRN    ascorbic acid (vitamin C) (VITAMIN C) tablet 250 mg  250 mg Oral BID    atorvastatin (LIPITOR) tablet 40 mg 40 mg Oral QHS    calcium carbonate (OS-STACEY) tablet 500 mg [elemental]  500 mg Oral DAILY    dorzolamide (TRUSOPT) 2 % ophthalmic solution 1 Drop  1 Drop Both Eyes TID    therapeutic multivitamin (THERAGRAN) tablet 1 Tab  1 Tab Oral DAILY    latanoprost (XALATAN) 0.005 % ophthalmic solution 1 Drop  1 Drop Both Eyes BID    metoprolol tartrate (LOPRESSOR) tablet 25 mg  25 mg Oral BID    insulin glargine (LANTUS) injection 5 Units  5 Units SubCUTAneous QHS    cephALEXin (KEFLEX) capsule 500 mg  500 mg Oral QID    timolol (TIMOPTIC) 0.5 % ophthalmic solution 1 Drop  1 Drop Both Eyes BID    And    brimonidine (ALPHAGAN) 0.2 % ophthalmic solution 1 Drop  1 Drop Both Eyes BID          LAB AND IMAGING FINDINGS:     Lab Results   Component Value Date/Time    WBC 10.0 09/10/2019 05:35 AM    HGB 8.5 (L) 09/10/2019 01:59 PM    PLATELET 377 37/64/0823 05:35 AM     Lab Results   Component Value Date/Time    Sodium 138 09/10/2019 05:35 AM    Potassium 3.7 09/10/2019 05:35 AM    Chloride 109 (H) 09/10/2019 05:35 AM    CO2 25 09/10/2019 05:35 AM    BUN 24 (H) 09/10/2019 05:35 AM    Creatinine 0.54 (L) 09/10/2019 05:35 AM    Calcium 7.7 (L) 09/10/2019 05:35 AM    Magnesium 1.6 09/10/2019 05:35 AM    Phosphorus 2.0 (L) 09/10/2019 05:35 AM      Lab Results   Component Value Date/Time    AST (SGOT) 43 (H) 09/10/2019 05:35 AM    Alk.  phosphatase 68 09/10/2019 05:35 AM    Protein, total 6.0 (L) 09/10/2019 05:35 AM    Albumin 1.4 (L) 09/10/2019 05:35 AM    Globulin 4.6 (H) 09/10/2019 05:35 AM     Lab Results   Component Value Date/Time    INR 1.2 06/04/2019 09:20 AM    Prothrombin time 15.1 06/04/2019 09:20 AM    aPTT 102.5 (H) 06/09/2019 04:12 PM      Lab Results   Component Value Date/Time    Iron 31 (L) 08/26/2019 01:24 PM    TIBC 140 (L) 08/26/2019 01:24 PM    Iron % saturation 22 08/26/2019 01:24 PM    Ferritin 2,797 (H) 08/26/2019 05:09 PM      No results found for: PH, PCO2, PO2  No components found for: [facility]   Lab Results Component Value Date/Time     06/09/2019 04:12 PM    CK - MB 1.6 06/09/2019 04:12 PM                Total time:   Counseling / coordination time, spent as noted above:   > 50% counseling / coordination?:     Prolonged service was provided for  []30 min   []75 min in face to face time in the presence of the patient, spent as noted above. Time Start:   Time End:   Note: this can only be billed with 97376 (initial) or [id number] (follow up). If multiple start / stop times, list each separately.

## 2019-09-10 NOTE — CONSULTS
[facility] at [facility]  [address] [facility], [address] [facility] [address] [facility] : [phone]  F: [phone]      Reason for Visit:   Janet Melendez is a 82 y.o. 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 Hematology / Oncology Treatment History:   Hx obtained from daughter, at bedside. States her father has relocated here from the [facility] on July 16th. Was being followed by a hematologist for many years. Since Dec her dad has required approx 10 transfusions. . Reports recent hospitalizations in Jan for PNA and bladder infection and required blood transfusions; went to rehab x 5 weeks. Then 3-6 weeks later again admitted for bladder infection went to rehab again x 4-5 weeks. Family was not pleased with rehab so daughter decided to bring her father to [facility] to live with her and has hired 2 caregivers to be with him while she is at work. States her father is able to get up with a walker, able to sit up in a chair; cleans his own teeth. Has had open sores on his toes and sacral area since rehab. They are starting to heal. Appetite remains good. In Jan was weighing 129# and now weighs #50 more. Bowels are regular. Has followed Dr Lazaro Dietrich / Nehal Peoples and PCP / Dr Placido Sims in the [facility]. Has dementia and days vary to his degree of orientation. Was on Hydrea and Eliquis for approx 2 weeks when they thought he had had a MI but then he was taken off by the cardiologist.       History of Present Illness:     Janet Melendez was admitted from the ED when he presented from home with confusion associated with increased weakness. Hx of dementia. Has 24-hr care at home. ED labs Hgb 6.2 Alb 1.7 SGOT 46. ED CXR no acute process. Therefore he was admitted for further eval and management. Pt unable to provide hx due to dementia. Denies any SOB. Care providers from home at bedside.  States he stopped eating a few days ago. They have him exercising at home. Reports he is up walking at home. Had normal BM yesterday/.  Thought he was weak from just needing blood. Unsure about weight loss. No family at bedside.                 Past Medical History:   Diagnosis Date    Anemia     Cognitive impairment     Controlled type 2 diabetes mellitus, with long-term current use of insulin ([facility] 6/9/2019    Glaucoma     Hypertension     Malignant neoplasm of prostate ([facility] 1998    MGUS (monoclonal gammopathy of unknown significance)     Mildly underweight adult     Paroxysmal A-fib (HCC)       Past Surgical History:   Procedure Laterality Date    BIOPSY OF PROSTATE,INCISIONAL  4/98    WI EXT BEAM RADIATION AS PRIMARY THERAPY TO PROSTATE ONLY  4/30-6/29/98      Social History     Tobacco Use    Smoking status: Current Some Day Smoker     Packs/day: 0.10     Years: 22.00     Pack years: 2.20     Types: Cigarettes    Smokeless tobacco: Never Used   Substance Use Topics    Alcohol use: No     Alcohol/week: 0.0 standard drinks      Family History   Problem Relation Age of Onset    Diabetes Other     Hypertension Other      Current Facility-Administered Medications   Medication Dose Route Frequency    0.9% sodium chloride infusion 250 mL  250 mL IntraVENous PRN    insulin lispro (HUMALOG) injection 12 Units  12 Units SubCUTAneous ONCE    sodium chloride (NS) flush 5-40 mL  5-40 mL IntraVENous Q8H    sodium chloride (NS) flush 5-40 mL  5-40 mL IntraVENous PRN    acetaminophen (TYLENOL) tablet 650 mg  650 mg Oral Q6H PRN    naloxone (NARCAN) injection 0.4 mg  0.4 mg IntraVENous PRN    insulin lispro (HUMALOG) injection   SubCUTAneous TID WITH MEALS    glucose chewable tablet 16 g  4 Tab Oral PRN    glucagon (GLUCAGEN) injection 1 mg  1 mg IntraMUSCular PRN    dextrose 10% infusion 0-250 mL  0-250 mL IntraVENous PRN    ascorbic acid (vitamin C) (VITAMIN C) tablet 250 mg  250 mg Oral BID    atorvastatin (LIPITOR) tablet 40 mg 40 mg Oral QHS    calcium carbonate (OS-STACEY) tablet 500 mg [elemental]  500 mg Oral DAILY    dorzolamide (TRUSOPT) 2 % ophthalmic solution 1 Drop  1 Drop Both Eyes TID    therapeutic multivitamin (THERAGRAN) tablet 1 Tab  1 Tab Oral DAILY    latanoprost (XALATAN) 0.005 % ophthalmic solution 1 Drop  1 Drop Both Eyes BID    metoprolol tartrate (LOPRESSOR) tablet 25 mg  25 mg Oral BID    insulin glargine (LANTUS) injection 5 Units  5 Units SubCUTAneous QHS    cephALEXin (KEFLEX) capsule 500 mg  500 mg Oral QID    timolol (TIMOPTIC) 0.5 % ophthalmic solution 1 Drop  1 Drop Both Eyes BID    And    brimonidine (ALPHAGAN) 0.2 % ophthalmic solution 1 Drop  1 Drop Both Eyes BID      Allergies   Allergen Reactions    Iodinated Contrast Media Hives     Faint, shaking        Review of Systems: A complete review of systems was obtained, negative except as described above. Physical Exam:     Visit Vitals  /73 (BP 1 Location: Left arm, BP Patient Position: Head of bed elevated (Comment degrees))   Pulse (!) 101   Temp 97.3 °F (36.3 °C)   Resp 18   Wt 154 lb (69.9 kg)   SpO2 100%   BMI 20.32 kg/m²     ECOG PS: 3-4  General: frail; chronically ill appearing; No acute distress  Eyes: PERRLA, anicteric sclerae  HENT: Atraumatic with normal appearance of ears and nose; OP clear  Neck: Supple; no thyromegaly   Lymphatic: No cervical, supraclavicular, or axillary adenopathy  Respiratory: anteriorly CTAB, normal respiratory effort  CV: Normal rate, regular rhythm, no murmurs, 1+ LE edema  GI: Soft, nontender, nondistended, no masses, no hepatomegaly, no splenomegaly  MS: Digits without clubbing or cyanosis. Skin: No rashes, ecchymoses, or petechiae. Normal temperature, turgor, and texture.   Neuro/Psych: Alert, appropriate affect, normal judgment/insight      Results:     Lab Results   Component Value Date/Time    WBC 10.0 09/10/2019 05:35 AM    HGB 8.5 (L) 09/10/2019 01:59 PM    HCT 27.6 (L) 09/10/2019 01:59 PM PLATELET 598 32/15/4526 05:35 AM    .0 (H) 09/10/2019 05:35 AM    ABS. NEUTROPHILS 9.3 (H) 09/10/2019 05:35 AM     Lab Results   Component Value Date/Time    Sodium 138 09/10/2019 05:35 AM    Potassium 3.7 09/10/2019 05:35 AM    Chloride 109 (H) 09/10/2019 05:35 AM    CO2 25 09/10/2019 05:35 AM    Glucose 90 09/10/2019 05:35 AM    BUN 24 (H) 09/10/2019 05:35 AM    Creatinine 0.54 (L) 09/10/2019 05:35 AM    GFR est AA >60 09/10/2019 05:35 AM    GFR est non-AA >60 09/10/2019 05:35 AM    Calcium 7.7 (L) 09/10/2019 05:35 AM    Glucose (POC) 353 (H) 09/10/2019 04:14 PM     Lab Results   Component Value Date/Time    Bilirubin, total 0.5 09/10/2019 05:35 AM    ALT (SGPT) 20 09/10/2019 05:35 AM    AST (SGOT) 43 (H) 09/10/2019 05:35 AM    Alk.  phosphatase 68 09/10/2019 05:35 AM    Protein, total 6.0 (L) 09/10/2019 05:35 AM    Albumin 1.4 (L) 09/10/2019 05:35 AM    Globulin 4.6 (H) 09/10/2019 05:35 AM       Lab Results   Component Value Date/Time    Reticulocyte count 1.5 09/10/2019 05:35 AM    Iron % saturation 22 08/26/2019 01:24 PM    TIBC 140 (L) 08/26/2019 01:24 PM    Ferritin 2,797 (H) 08/26/2019 05:09 PM    Vitamin B12 1,252 (H) 08/26/2019 05:09 PM    Folate 40.1 (H) 08/26/2019 05:09 PM    Haptoglobin 192 08/26/2019 05:09 PM    SIENNA Poly POS 08/26/2019 01:24 PM     (H) 08/26/2019 05:09 PM    Beta-2 Microglobulin, serum 2.5 (H) 09/12/2017 02:21 PM    Sed rate, automated 68 (H) 07/24/2018 10:47 AM    TSH 1.91 08/26/2019 01:24 PM    M-Rigo Not Observed 07/16/2019 10:22 AM    GIRISH, Direct Positive (A) 08/26/2019 05:09 PM     Lab Results   Component Value Date/Time    INR 1.2 06/04/2019 09:20 AM    aPTT 102.5 (H) 06/09/2019 04:12 PM     Lab Results   Component Value Date/Time    Reticulocyte count 1.5 09/10/2019 05:35 AM    Iron % saturation 22 08/26/2019 01:24 PM    TIBC 140 (L) 08/26/2019 01:24 PM    Ferritin 2,797 (H) 08/26/2019 05:09 PM    Vitamin B12 1,252 (H) 08/26/2019 05:09 PM    Folate 40.1 (H) 08/26/2019 05:09 PM    Haptoglobin 192 08/26/2019 05:09 PM    SIENNA Poly POS 08/26/2019 01:24 PM     (H) 08/26/2019 05:09 PM    Beta-2 Microglobulin, serum 2.5 (H) 09/12/2017 02:21 PM    Sed rate, automated 68 (H) 07/24/2018 10:47 AM    TSH 1.91 08/26/2019 01:24 PM    M-Rigo Not Observed 07/16/2019 10:22 AM    GIRISH, Direct Positive (A) 08/26/2019 05:09 PM     Lab Results   Component Value Date/Time    INR 1.2 06/04/2019 09:20 AM    aPTT 102.5 (H) 06/09/2019 04:12 PM     Lab Results   Component Value Date/Time    Prostate Specific Ag 0.0 (L) 08/27/2019 12:30 PM    Prostate Specific Ag 0.2 11/17/2014 04:00 PM     (H) 08/26/2019 05:09 PM     10/11/17 Bone Marrow bx  PERIPHERAL BLOOD SMEAR, BONE MARROW ASPIRATE AND BIOPSY:   MARKEDLY HYPERCELLULAR MARROW WITH MARKED GRANULOCYTIC   HYPERPLASIA AND MILD DYSMEGAKARYOPOIESIS (SEE COMMENT). ANEMIA, NEUTROPHILIA AND THROMBOCYTOSIS. MILD MARROW FIBROSIS. BLASTS ARE NOT INCREASED.   4-5% MONOCLONAL PLASMA CELLS. ADEQUATE IRON STORES.     DIAGNOSIS COMMENT:   Two processes are apparent in the marrow. There is a plasma cell neoplasm with a low percentage of bone marrow plasma cells as noted above. In addition, the marrow is markedly hypercellular (>95%) with marked granulocytic hyperplasia, megakaryocytic atypia, and marrow fibrosis, suggestive of a myeloproliferative neoplasm or possibly a myeloproliferative/myelodysplastic overlap syndrome. Review of laboratory results reveals that the patient had a previously documented negative study for the JAK2 V617F mutation. Although the peripheral blood smear lacks classic features of chronic myeloid leukemia, a diagnosis of CML cannot be excluded. Correlation with pending cytogenetic studies is recommended.  If cytogenetic analysis is normal, consideration could be given to performance of FISH studies for the BCL/ABL translocation and/or to additional molecular genetic studies to exclude the presence of other less common genetic abnormalities associated with myeloproliferative neoplasms. These studies can be performed on peripheral blood. 8/29/2019 BM bx  FINAL PATHOLOGIC DIAGNOSIS   Bone marrow, posterior iliac crest, aspirate, clot section, touch imprint, and decalcified core biopsy:   Hypercellular marrow, 80%, with granulocytic proliferation and erythroid hypoplasia. Monoclonal plasma cells, 2%. Negative for increased blast.   Peripheral blood:   Macrocytic anemia, pancytopenia, granulocytic atypia. Comment   The bone marrow is involved by a plasma cell neoplasm along with a possible second neoplastic process. There is a small population of monoclonal plasma cells (less than 10% of total cells) compatible with the clinical diagnosis of MGUS. In addition, there is peripheral cytopenia in association with a hypercellular marrow with granulocytic proliferation and erythroid hypoplasia with dysplastic features raising the possibility of a myelodysplastic syndrome. Normal cytogenetic studies have been reported. FISH studies are pending for further classification. 9/9/2019 XR CHEST  IMPRESSION:  No acute process on portable chest. No change. 9/9/2019 DUPLEX LE   · No evidence of acute deep vein thrombosis in the right common femoral, superficial femoral, popliteal, posterior tibial, and peroneal veins. The veins were imaged in the transverse and longitudinal planes. The vessels showed normal color filling and compressibility. Doppler interrogation showed phasic and spontaneous flow. · No evidence of acute deep vein thrombosis in the left common femoral, superficial femoral, popliteal, posterior tibial, and peroneal veins. The veins were imaged in the transverse and longitudinal planes. The vessels showed normal color filling and compressibility. Doppler interrogation showed phasic and spontaneous flow. Assessment and Recommendations:   1. 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   Extensive laboratory evaluation has not revealed another cause. This condition is not curable and management is with palliative intent. It is likely to progress further over time. He is not a candidate for chemotherapy. We could consider growth factor support, along with transfusion support. Though I wonder if he would be best served with hospice, given his significant progressive decline (dementia, malnutrition, debility, etc). For now, monitor HGB and transfuse PRN when HGB <7.  S/p 1u pRBC today with big bump in HGB level on follow up. Check again in am.    Repeat stool blood is pending. 2. Jacky Reeves  Seems out of proportion to what is seen with MDS. Hospice may be appropriate. If family opts not to proceed with hospice, he at least needs to establish care with a PCP on discharge to help manage these issues. -PT/OT eval      3. Decub/ skin wounds  Toes and sacral area  Wound team consulted    4. LE swelling  Doppler negative for DVT    5. Malnutrition  Dietician following    6. Dementia  Continue supportive care    7. MGUS  No evidence of myeloma on bone marrow biopsy. Consider yearly MGUS labs. 8. Goals of care  Palliative team following    Patient seen in conjunction with Cheo Krueger NP.       Signed By: Cindi Antoine MD

## 2019-09-10 NOTE — PROGRESS NOTES
Unit of packed red blood cells finished. No signs of adverse reactions. Patient alert and oriented x 4. Lungs are clear bilaterally. Denies any complaints at present. Patient sitting up in bed feeding self lunch. Family member at bedside. Dressing to sacral area and feet dry and intact. Incontinent of urine. IV in right ac intact. No redness or swelling noted at site. No signs of distress noted.

## 2019-09-10 NOTE — PROGRESS NOTES
Tino Ramos jyothi [place] [address]  [address] [facility], [address] [place] [address]  [phone]      Medical Progress Note      NAME: Santiago Michel   :  1935  MRM:  [id number]    Date/Time: 9/10/2019  9:28 AM         Subjective:     Chief Complaint:  Weakness: can't really tell me much about why he is here or what has happened recently, states he is comfortable    ROS:  (bold if positive, if negative)                        Tolerating Diet          Objective:       Vitals:          Last 24hrs VS reviewed since prior progress note.  Most recent are:    Visit Vitals  /56 (BP 1 Location: Left arm, BP Patient Position: At rest)   Pulse 81   Temp 97.6 °F (36.4 °C)   Resp 16   Wt 69.9 kg (154 lb)   SpO2 99%   BMI 20.32 kg/m²     SpO2 Readings from Last 6 Encounters:   09/10/19 99%   19 98%   19 98%   19 96%   19 98%   19 97%            Intake/Output Summary (Last 24 hours) at 9/10/2019 6046  Last data filed at 9/10/2019 0645  Gross per 24 hour   Intake 0 ml   Output    Net 0 ml          Exam:     Physical Exam:    Gen:  Well-developed, frail, elderly, chronically ill-appearing, in no acute distress  HEENT:  Pink conjunctivae, PERRL, hearing intact to voice, moist mucous membranes  Neck:  Supple, without masses, thyroid non-tender  Resp:  No accessory muscle use, clear breath sounds without wheezes rales or rhonchi  Card:  No murmurs, normal S1, S2 without thrills, bruits; 2+ pitting peripheral edema, 2+ pedal pulses  Abd:  Soft, non-tender, non-distended, normoactive bowel sounds are present, no palpable organomegaly and no detectable hernias  Lymph:  No cervical or inguinal adenopathy  Musc:  No cyanosis or clubbing  Skin:  No rashes or ulcers, skin turgor is good, cap refill <2 sec  Neuro:  Cranial nerves are grossly intact, no focal motor weakness, follows commands appropriately  Psych:  Poor insight, oriented to person, place but not time, alert      Medications Reviewed: (see below)    Lab Data Reviewed: (see below)    ______________________________________________________________________    Medications:     Current Facility-Administered Medications   Medication Dose Route Frequency    0.9% sodium chloride infusion 250 mL  250 mL IntraVENous PRN    sodium chloride (NS) flush 5-40 mL  5-40 mL IntraVENous Q8H    sodium chloride (NS) flush 5-40 mL  5-40 mL IntraVENous PRN    acetaminophen (TYLENOL) tablet 650 mg  650 mg Oral Q6H PRN    naloxone (NARCAN) injection 0.4 mg  0.4 mg IntraVENous PRN    insulin lispro (HUMALOG) injection   SubCUTAneous TID WITH MEALS    glucose chewable tablet 16 g  4 Tab Oral PRN    glucagon (GLUCAGEN) injection 1 mg  1 mg IntraMUSCular PRN    dextrose 10% infusion 0-250 mL  0-250 mL IntraVENous PRN    ascorbic acid (vitamin C) (VITAMIN C) tablet 250 mg  250 mg Oral BID    atorvastatin (LIPITOR) tablet 40 mg  40 mg Oral QHS    calcium carbonate (OS-STACEY) tablet 500 mg [elemental]  500 mg Oral DAILY    dorzolamide (TRUSOPT) 2 % ophthalmic solution 1 Drop  1 Drop Both Eyes TID    therapeutic multivitamin (THERAGRAN) tablet 1 Tab  1 Tab Oral DAILY    latanoprost (XALATAN) 0.005 % ophthalmic solution 1 Drop  1 Drop Both Eyes BID    metoprolol tartrate (LOPRESSOR) tablet 25 mg  25 mg Oral BID    insulin glargine (LANTUS) injection 5 Units  5 Units SubCUTAneous QHS    cephALEXin (KEFLEX) capsule 500 mg  500 mg Oral QID    timolol (TIMOPTIC) 0.5 % ophthalmic solution 1 Drop  1 Drop Both Eyes BID    And    brimonidine (ALPHAGAN) 0.2 % ophthalmic solution 1 Drop  1 Drop Both Eyes BID            Lab Review:     Recent Labs     09/10/19  0535 09/09/19  1914   WBC 10.0 11.0   HGB 5.0* 6.2*   HCT 15.9* 19.7*    179     Recent Labs     09/10/19  0535 09/09/19 1914    136   K 3.7 3.7   * 105   CO2 25 25   GLU 90 133*   BUN 24* 30*   CREA 0.54* 0.81   CA 7.7* 8.2*   MG 1.6 1.7   PHOS 2.0*  --    ALB 1.4* 1.7*   TBILI 0.5 0.6   SGOT 43* 46*   ALT 20 25     Lab Results   Component Value Date/Time    Glucose (POC) 146 (H) 09/09/2019 11:17 PM    Glucose (POC) 174 (H) 09/09/2019 07:19 PM    Glucose (POC) 118 (H) 08/29/2019 12:21 PM    Glucose (POC) 112 (H) 08/29/2019 06:32 AM    Glucose (POC) 375 (H) 08/28/2019 09:34 PM     No results for input(s): PH, PCO2, PO2, HCO3, FIO2 in the last 72 hours. No results for input(s): INR in the last 72 hours.     No lab exists for component: INREXT  No results found for: SDES  Lab Results   Component Value Date/Time    Culture result: NO GROWTH 6 DAYS 06/12/2019 11:27 AM    Culture result: AEROBIC AND ANAEROBIC BOTTLES ESCHERICHIA COLI (A) 06/09/2019 04:15 PM    Culture result: (A) 06/09/2019 04:12 PM     AEROBIC BOTTLE ESCHERICHIA COLI For Susceptibility Refer to Culture [id number]            Assessment:     Principal Problem:    Anemia (5/13/2019)    Active Problems:    Malignant neoplasm of prostate ([address] (11/15/2011)      Moderate protein malnutrition ([address] (2/11/2019)      Controlled type 2 diabetes mellitus, with long-term current use of insulin ([address] (6/9/2019)      Debility (6/12/2019)      Hypertension ()      MGUS (monoclonal gammopathy of unknown significance) ()      Cognitive impairment ()      Paroxysmal A-fib (HCC) ()      Hypoalbuminemia ()           Plan:     Principal Problem:    Anemia (5/13/2019)   - transfuse today, ordered by Dr. Hermilo Neri   - unclear to me how long we should pursue chronic transfusion, his current plan for outpatient transfusion does not seem to be working   - consult Palliative Care    Active Problems:    Malignant neoplasm of prostate ([address] (11/15/2011)   - I do not see a recent PSA, outpatient follow up as appropriate      FTT/Moderate protein malnutrition ([address] (2/11/2019)/Hypoalbuminemia ()   - diagnosed last admission, may be worse now   - he appears to be failing in general      Controlled type 2 diabetes mellitus, with long-term current use of insulin ([address] (6/9/2019)   - BS okay on meds as above      Debility (6/12/2019)   - attempt PT/OT, family does not want him to go anywhere but home   - he has multisystem, chronic, incurable disease and is a poor candidate for rehab, in my opinion      Hypertension ()   - BP okay on meds as above      MGUS (monoclonal gammopathy of unknown significance) ()   - with MDS on bone marrow biopsy,       Cognitive impairment ()   - suspect underlying dementia      Paroxysmal A-fib (HCC) ()   - rate controlled, no anticoagulation      Total time spent in patient care: 25 minutes                  Care Plan discussed with: Patient, Care Manager and Nursing Staff    Discussed:  Code Status, Care Plan and D/C Planning    Prophylaxis:  SCD's    Disposition:   PT, OT, RN           ___________________________________________________    Attending Physician: Don Taylor MD

## 2019-09-10 NOTE — PROGRESS NOTES
1030: Patient much more alert and eating breakfast with his daughter. Bladder scan shows 300cc of urine. Daughter and patient aware that we need a urine sample. He states he doesn't have the urge to go, but daughter says he's capable during the day to notify when he needs to use the restroom. Truong Cummings stated we could straight cath if we cannot get sample. Daughter also aware of that. 1045: Wound care at bedside. 1100: Bedside and Verbal shift change report given to LUBA Rao (oncoming nurse) by [facility], RN (offgoing nurse). Report included the following information SBAR, Kardex, MAR and Recent Results.

## 2019-09-10 NOTE — PROGRESS NOTES
Physical Therapy - Consult received and acknowledged. Chart reviewed including past encounters. Patient recently discharged from [facility] on 8/29/19 due to admission for anemia. Patient has had nearly dozen blood transfusions for anemia since Dec 2018. Patient with PMH significant for prostate cancer and outpatient work up with oncology/hematology for bone marrow disease. Today Hgb is 5.0 and past Hgb per August appear to range consistently in 7.2-7.8 range. Per notes from [address] patient was ambulating 200-250' ranges with CG A on 8/28-8/29. PT will attempt therapy once Hgb more stable and patient with less symptomatic fatigue.   Kash Taylor, PT, DPT

## 2019-09-10 NOTE — WOUND CARE
Wound care consult:  Initial consult: Consulted for \"decubitus ulcer. \"     Patient lying in bed, no distress. Daughter at bedside.     Assessment  All skin folds and bony prominences assessed, turned with staff assistance. Wounds and skin conditions noted were present prior to admission.     Sacrum: 7.5cm x 6.5cm x 0cm wound to sacrum Wound bed has slough and eschar. Very small amount of red/pink tissue observed. This is an Unstageable pressure injury. No drainage noted at the time of dressing change, no odor. Veronica-wound is intact. Left hip: Skin tear noted about 3cm x 3cm. Wound bed is red with small amount od serosanguineous drainage. Per daughter, it happened at home while he was \"scooting himself in the bed. \"    Anterior penis: Skin tear noted 0.7cm x 0.2cm. No drainage noted. Per daughter, she noted it after patient was using urinal prior to admission and thinks he scratched himself. Left lateral knee: Deep tissue injury noted. Skin is intact with dark/purple discoloration. Left lateral ankle: Deep tissue injury noted. Skin is intact with dark/purple disoloration. Right medial foot: Deep tissue injury noted. Skin is intact with dark/purple disoloration. Left dorsal foot: Redness in a pattern resembling petichiae noted to foot. Note that edema is to bilateral feet. Left heel: Deep tissue injury noted. Noted an area of unblanchable erythema and a dark discoloration. Per daughter, these started as blisters to bilateral heels while at home. Intact, no drainage noted. Right heel: Deep tissue injury. Large blister noted with skin pale and discolored. Intact, no drainage noted.     Right lateral ankle: 3cm x 3cm wound. Unstageable pressure injury. Dark eschar noted to wound bed with edge open, pink but no erythema, edema, odor, or drainage.     Right great toe: 1cm x 1cm area open on dorsal toe. Wound bed is moist with about 50% of dark eschar and 50% yellow slough.    Open area on tip of toe with eschar. Some of the edges open with a small amount of serosanguineous drainage, no odor. Unable to visualize wound bed. Etiology of wound is unknown.     Left second toe: 1cm x 1cm. Wound bed is moist with about 50% of dark eschar and 50% yellow slough, No drainage noted. No odor. Etiology of wound is unknown. Treatment  Sacrum: Cleanse with saline and apply medihoney and cover with mepilex. Change DAILY or as needed. Left hip skin tear: Cleanse with saline and cover with mepilex. Change EVERY THREE DAYS or as needed.     Right lateral ankle: Cleanse with saline and dry, cover with mepilex. Change EVERY OTHER DAY or as needed.     Right great toe: Cleanse with saline, apply medi-honey, and cover with Xeroform and wrap with kerlix. Change EVERY OTHER DAY or as needed.     Left second toe: Cleanse with saline, apply medi-honey, and cover with Xeroform and wrap with kerlix. Change EVERY OTHER DAY or as needed. Bilateral heels: Spray no-sting skin prep to heels and let dry. Leave open to air. Place heels in Prevalon boots.      Incontinent care given with moisture barrier wipes. Nurse notified of stool and urine output. Repositioned in bed on right side    Educated family on importance of turning and repositioning as well as keeping head of bed low as can be tolerated when not eating.     Recommendations/Plan  Low air-loss bed provided for patient. Mobility team ordered     Turn, reposition every 2 hours as tolerated, float heels, place in prevalon boots. Incontinent care with comfort shields.   Moisture barrier as needed.         Reconsult as needed.

## 2019-09-10 NOTE — H&P
[place] [address]  [phone]    Hospitalist Admission Note      NAME:  Rochelle Benedict   :   1935   MRN:  [id number]     PCP:  Perfecto uS MD     Date/Time:  2019 9:03 PM         Assessment / Plan:        Severe anemia: with hx of MGUS requiring multiple transfusions in the past. No bleeding other than mild epistaxis a few days ago. Recent BM biopsy showed findings concerns for MDS. Agree with pRBC transfusion to keep Hg >7. HOLD DVT chemoprophylaxis for now. Consult hematology.      Weakness / confusion: likely multifactorial, due to the above. No focal deficits to suggest CVA, more of an encephalopathic picture. Frequent neuro checks. Check UA. No e/o PNA on CXR. PT/OT, Fall precautions. DC planning. Daughter refuses placement at SNF. Has 24-hr supervision at home. Will need to resume HH PT/OT     BLE edema: severe, per daughter improving. May be from low oncotic pressure from low albumin. Check LE dopplers       Moderate malnutrition: encourage PO. Nutrition consult      HTN: BP controlled. Cont metoprolol with holding parameter but hold valsartan for now      AF: rate controlled. Not on anticoagulation due to the above      DM type 2: recent A1C 8.2% however in the setting of anemia. Appears poorly controlled. Resume Lantus and titrate PRN. SSI      Decub ulcer/ankle ulcer/toe wound / superficial penile wound: POA.  Ulcers unstageable.  Wound care consult.  On oral abx    Code Status: DNR, per review of palliative care note     Surrogate decision maker: daughter    Previous notes and lab results reviewed, including recent DC summary      Total time spent with patient: 79 Minutes   Time spent in the care of this patient included reviewing records, discussing with nursing, obtaining history and examining the patient, and discussing treatment plans, with >50% time spent counseling/coordinating care    Risk of deterioration: High                 Care Plan discussed with: ED provider, Patient, Family, Nursing Staff and >50% of time spent in counseling and coordination of care    Discussed:  Code Status, Care Plan and D/C Planning    Prophylaxis:  SCD's    Disposition:  SNF/LTC                 Subjective:     CHIEF COMPLAINT: weakness, confusion     HISTORY OF PRESENT ILLNESS:     Mr. Liat Jacques is a 82 y.o. male w/ hx of MGUS, HTN, AF who presents with confusion. Recently discharged from anemia requiring transfusions. For the past few days, increasingly confused and weak. No apparently HA, fevers, CP, SOB. Weak, moderate, worsening, no falls. Has 24-hr care at home. ED workup showed severe anemia. CXR showed no acute process. Mr. Liat Jacques is admitted for further evaluation and management. Past Medical History:   Diagnosis Date    Anemia     Cognitive impairment     Controlled type 2 diabetes mellitus, with long-term current use of insulin ([facility] [address] 6/9/2019    Glaucoma     Hypertension     Malignant neoplasm of prostate ([facility] [address] 1998    MGUS (monoclonal gammopathy of unknown significance)     Mildly underweight adult     Paroxysmal A-fib [facility])         Past Surgical History:   Procedure Laterality Date    BIOPSY OF PROSTATE,INCISIONAL  4/98    [place] EXT BEAM RADIATION AS PRIMARY THERAPY TO PROSTATE ONLY  4/30-6/29/98       Social History     Tobacco Use    Smoking status: Current Some Day Smoker     Packs/day: 0.10     Years: 22.00     Pack years: 2.20     Types: Cigarettes    Smokeless tobacco: Never Used   Substance Use Topics    Alcohol use: No     Alcohol/week: 0.0 standard drinks        Family History   Problem Relation Age of Onset    Diabetes Other     Hypertension Other         Allergies   Allergen Reactions    Iodinated Contrast Media Hives     Faint, shaking        Prior to Admission medications    Medication Sig Start Date End Date Taking?  Authorizing Provider   cephALEXin (KEFLEX) 500 mg capsule Take 500 mg by mouth four (4) times daily. Provider, Historical   calcium carbonate (CALCIUM 500) 500 mg calcium (1,250 mg) tablet Take 1 Tab by mouth daily. 8/30/19   Yevgeniy Hernández MD   ferrous sulfate 325 mg (65 mg iron) tablet Take 1 Tab by mouth daily (with breakfast). 8/30/19   Sridhar Brown MD   ascorbic acid, vitamin C, (VITAMIN C) 250 mg tablet Take 1 Tab by mouth two (2) times a day. 8/30/19   Sridhar Brown MD   metoprolol tartrate (LOPRESSOR) 50 mg tablet Take 25 mg by mouth two (2) times a day. Provider, Historical   insulin glargine (LANTUS U-100 INSULIN) 100 unit/mL injection 15 Units by SubCUTAneous route nightly. Patient taking differently: 5 Units by SubCUTAneous route nightly. 8/29/19   Thais Miller MD   insulin lispro (HUMALOG) 100 unit/mL injection Use as needed before each meals if:    Blood sugar 150-199, give 2 units  Blood sugar 200-249, give 4 units  Blood sugar 250-299, give 6 units  Blood sugar >300, give 8 units 8/29/19   Tab Miller MD   metoprolol tartrate (LOPRESSOR) 50 mg tablet Take 25 mg by mouth two (2) times a day. Provider, Historical   oxybutynin (DITROPAN) 5 mg tablet Take 5 mg by mouth three (3) times daily. 7/30/19   Beverley Dow MD   dorzolamide (TRUSOPT) 2 % ophthalmic solution Administer 1 Drop to both eyes three (3) times daily. Provider, Historical   Insulin Needles, Disposable, 31 gauge x 5/16\" ndle Use as needed. 3/22/19   Cheyanne Claudio MD   valsartan (DIOVAN) 40 mg tablet Take 1 Tab by mouth daily. 3/18/19   HARRY Moore   glucose 4 gram chewable tablet Take 4 Tabs by mouth as needed. 2/13/19   Preet Fairchild NP   therapeutic multivitamin [facility]) tablet Take 1 Tab by mouth daily. 2/14/19   Preet Fairchild NP   travoprost (TRAVATAN Z) 0.004 % ophthalmic solution Administer 1 Drop to both eyes two (2) times a day.  2/13/19   Preet Fairchild NP   brinzolamide (AZOPT) 1 % ophthalmic suspension Administer 1 Drop to both eyes two (2) times a day. 2/13/19   Portia Francis NP   brimonidine-timolol (COMBIGAN) 0.2-0.5 % drop ophthalmic solution Administer 1 Drop to both eyes every twelve (12) hours. 2/13/19   Portia Francis NP   atorvastatin (LIPITOR) 40 mg tablet Take 1 Tab by mouth nightly. 2/29/16   Provider, Historical       Review of Systems:  (bold if positive, if negative)    Gen:  fatigueEyes:  ENT:  CVS:  Pulm:  GI:    :    MS:  Pain, weaknessSkin:  woundPsych:  Endo:    Hem:  Renal:    Neuro:            Objective:      VITALS:    Vital signs reviewed; most recent are:    Visit Vitals  /72 (BP 1 Location: Left arm, BP Patient Position: At rest)   Pulse 78   Temp 98.1 °F (36.7 °C)   Resp 15   Wt 69.9 kg (154 lb)   SpO2 96%   BMI 20.32 kg/m²     SpO2 Readings from Last 6 Encounters:   09/09/19 96%   09/09/19 98%   09/09/19 98%   09/07/19 96%   08/30/19 98%   08/30/19 97%        No intake or output data in the 24 hours ending 09/09/19 2103         Exam:     Physical Exam:    Gen:  Elderly, frail. Chronically ill-appearing. NAD  HEENT:  No scleral icterus, pale conjunctiva. PERRL, hearing intact to voice, moist mucous membranes  Neck:  Supple, without masses. Thyroid non-tender  Resp:  No accessory muscle use. CTAB without wheezing, rales, rhonchi  Card: RRR. Normal S1 and S2 without murmurs, rubs, or gallops. 2+ peripheral lower extremity edema. No JVD. Peripheral pulses in tact. Abd:  Normoactive bowel sounds. Soft, non-tender, non-distended. No rebound, no guarding. No appreciable hepatosplenomegaly   Lymph:  No cervical adenopathy  Musc:  No cyanosis or clubbing  Skin:  Sacral ulcer, heel ulcers, superficial penile wound  Neuro:  Cranial nerves 3-12 intact, generalized but no focal motor weakness, follows commands appropriately  Psych:  Poor insight, normal affect. Alert, oriented to self. Lethargic but easily arousable.  Answer questions appropriately       Labs:    Recent Labs     09/09/19 1914   WBC 11.0 HGB 6.2*   HCT 19.7*        Recent Labs     09/09/19  1914      K 3.7      CO2 25   *   BUN 30*   CREA 0.81   CA 8.2*   MG 1.7   ALB 1.7*   SGOT 46*   ALT 25     No components found for: GLPOC  No results for input(s): PH, PCO2, PO2, HCO3, FIO2 in the last 72 hours. No results for input(s): INR in the last 72 hours. No lab exists for component: INREXT  No results found for: SDES  Lab Results   Component Value Date/Time    Culture result: NO GROWTH 6 DAYS 06/12/2019 11:27 AM    Culture result: AEROBIC AND ANAEROBIC BOTTLES ESCHERICHIA COLI (A) 06/09/2019 04:15 PM    Culture result: (A) 06/09/2019 04:12 PM     AEROBIC BOTTLE ESCHERICHIA COLI For Susceptibility Refer to Culture [id number]     All other current labs reviewed in the computer.       Imaging/Studies:    CXR: no acute process  Imaging personally reviewed    EKG: AF  EKG personally reviewed    ___________________________________________________    Attending Physician: Hemal Xie MD

## 2019-09-10 NOTE — PROGRESS NOTES
[facility]: MED RECONCILIATION    Comments/Recommendations:   Med rec performed via interview with patient's family who manages medications. Patient was recently discharged 8/29 and family reports they no longer use prednisone or 70/30 insulin (changed to novolog). Per family, they have not been taking oxybutynin because MD told them they could find an equivalent over the counter, but they have not found that yet. Family will need a script for oxybutynin if it is continued at discharge. Medications adjusted:    Changed lantus to 5 units from 15 units (decreased by PCP)     Information obtained from: family     Significant PMH/Disease States:   Past Medical History:   Diagnosis Date    Anemia     Cognitive impairment     Controlled type 2 diabetes mellitus, with long-term current use of insulin ([facility] [address] 6/9/2019    Glaucoma     Hypertension     Malignant neoplasm of prostate ([facility] [address] 1998    MGUS (monoclonal gammopathy of unknown significance)     Mildly underweight adult     Paroxysmal [facility])        Chief Complaint for this Admission:   Chief Complaint   Patient presents with    High Blood Sugar    Dementia       Allergies: Iodinated contrast media    Prior to Admission Medications:   Prior to Admission Medications   Prescriptions Last Dose Informant Patient Reported? Taking? Insulin Needles, Disposable, 31 gauge x 5/16\" ndle  Family Member No Yes   Sig: Use as needed. ascorbic acid, vitamin C, (VITAMIN C) 250 mg tablet 9/9/2019 at Unknown time Family Member No Yes   Sig: Take 1 Tab by mouth two (2) times a day. atorvastatin (LIPITOR) 40 mg tablet 9/8/2019 at Unknown time Family Member Yes Yes   Sig: Take 1 Tab by mouth nightly. brimonidine-timolol (COMBIGAN) 0.2-0.5 % drop ophthalmic solution 9/9/2019 at Unknown time Family Member No Yes   Sig: Administer 1 Drop to both eyes every twelve (12) hours.    brinzolamide (AZOPT) 1 % ophthalmic suspension 9/9/2019 at Unknown time Family Member No Yes   Sig: Administer 1 Drop to both eyes two (2) times a day. calcium carbonate (CALCIUM 500) 500 mg calcium (1,250 mg) tablet 2019 at Unknown time Family Member No Yes   Sig: Take 1 Tab by mouth daily. cephALEXin (KEFLEX) 500 mg capsule 2019 at Unknown time Family Member Yes Yes   Sig: Take 500 mg by mouth four (4) times daily. dorzolamide (TRUSOPT) 2 % ophthalmic solution 2019 at Unknown time Family Member Yes Yes   Sig: Administer 1 Drop to both eyes three (3) times daily. ferrous sulfate 325 mg (65 mg iron) tablet 2019 at Unknown time Family Member No Yes   Sig: Take 1 Tab by mouth daily (with breakfast). glucose 4 gram chewable tablet  Family Member No Yes   Sig: Take 4 Tabs by mouth as needed. insulin glargine (LANTUS SOLOSTAR U-100 INSULIN) 100 unit/mL (3 mL) inpn 2019 at PM Family Member Yes Yes   Si Units by SubCUTAneous route nightly. insulin lispro (HUMALOG) 100 unit/mL injection 8 units@ 1400 Family Member No Yes   Sig: Use as needed before each meals if:    Blood sugar 150-199, give 2 units  Blood sugar 200-249, give 4 units  Blood sugar 250-299, give 6 units  Blood sugar >300, give 8 units   metoprolol tartrate (LOPRESSOR) 50 mg tablet 2019 at Unknown time Family Member Yes Yes   Sig: Take 25 mg by mouth two (2) times a day. oxybutynin (DITROPAN) 5 mg tablet  Family Member Yes No   Sig: Take 5 mg by mouth three (3) times daily. therapeutic multivitamin (THERAGRAN) tablet 2019 at Unknown time Family Member No Yes   Sig: Take 1 Tab by mouth daily. travoprost (TRAVATAN Z) 0.004 % ophthalmic solution 2019 at Unknown time Family Member No Yes   Sig: Administer 1 Drop to both eyes two (2) times a day. valsartan (DIOVAN) 40 mg tablet 2019 at Unknown time Family Member No Yes   Sig: Take 1 Tab by mouth daily.       Facility-Administered Medications: None                    Sherryle Herb, [place]   Contact: [phone]

## 2019-09-10 NOTE — PHYSICIAN ADVISORY
Letter of Status Determination:   Recommend hospitalization status upgraded from   OBSERVATION  to INPATIENT  Status     Pt Name:  Last Scott   MR#   [address] [facility] # [id number] /  [id number]  Payor: Lance White / Plan: [address] / Product Type: Medicare /    [facility]#  [id number]   Room and Hospital  414/01  @ [facility]   Hospitalization date  9/9/2019  6:26 PM   Current Attending Physician  Chago Rey MD   Principal diagnosis  Anemia      Clinicals  Mr. Carolann Null is a 82 y.o. male w/ hx of MGUS, HTN, AF who presents with confusion. Recently discharged from anemia requiring transfusions. For the past few days, increasingly confused and weak. No apparently HA, fevers, CP, SOB. Weak, moderate, worsening, no falls. Has 24-hr care at home.   ED workup showed severe anemia. CXR showed no acute process, Hb trending down 6.2-->5, needing transfusion, needs medical optimization   This patient is at high risk of adverse events and deterioration based on documented clinical data, comorbid conditions and current acute care course. Mr. Last Scott is expected to meet Inpatient Admission status criteria in accordance with CMS regulation Section 43 .3. Specifically, due to medical necessity the patient's stay is expected to exceed Two Midnights. It is our recommendation that this patient's hospitalization status should be upgraded from  OBSERVATION to INPATIENT status. The final decision of the patient's hospitalization status depends on the attending physician's judgment.     [facility] ([facility]) criteria       STATUS DETERMINATION  Inpatient    The final decision of the patient's hospitalization status depends on the attending physician's judgment    Additional comments     Payor: Lance White / Plan: [place] MEDICARE PART A & B / Product Type: Medicare /         Adrianna Syed MD  Cell: [phone]  Physician Advisor

## 2019-09-10 NOTE — ED NOTES
TRANSFER - OUT REPORT:    Verbal report given to LUBA Bullock(name) on [facility]  being transferred to 414(unit) for routine progression of care       Report consisted of patients Situation, Background, Assessment and   Recommendations(SBAR). Information from the following report(s) SBAR, ED Summary, Procedure Summary, MAR, Recent Results and Cardiac Rhythm Afib was reviewed with the receiving nurse. Lines:   Peripheral IV 09/09/19 Right Antecubital (Active)   Site Assessment Clean, dry, & intact 9/9/2019  7:10 PM   Phlebitis Assessment 0 9/9/2019  7:10 PM   Infiltration Assessment 0 9/9/2019  7:10 PM   Dressing Type Tape;Transparent 9/9/2019  7:10 PM   Hub Color/Line Status Pink;Flushed;Patent 9/9/2019  7:10 PM   Action Taken Blood drawn 9/9/2019  7:10 PM        Opportunity for questions and clarification was provided.       Patient transported with:   Monitor  Registered Nurse

## 2019-09-10 NOTE — ACP (ADVANCE CARE PLANNING)
Primary Decision Maker: Kerry Lewis [facility]) - Daughter - [phone]  Advance Care Planning 9/10/2019   Patient's Healthcare Decision Maker is: Named in scanned ACP document   Confirm Advance Directive Yes, on file   Does the patient have other document types MOST/MOLST/POST/POLST;Power of      Pt has AMD on file signed 3/6/19 which appoints dtr Valentine Hodge as sole Medical POA. POST form was completed 6/17/19 with Palliative Medicine team at [facility]; copy has been scanned into medical record.

## 2019-09-10 NOTE — PROGRESS NOTES
9/10/2019  3:14 PM  CM attempted assessment x3. Pt's DTR not available, and pt was asleep. CM will reattempt when DTR is present. CM informed by palliative pt has been established with a home based PCP provider. Oncology is recommending hospice. CM to reattempt assessment with DTR is available.

## 2019-09-10 NOTE — PROGRESS NOTES
Nutrition Assessment:    RECOMMENDATIONS/INTERVENTION(S):   1. Continue CHO consistent diet. 2. Recommend Glucerna BID (440 kcal, 20 gm protein) to promote protein/ PO intake. 3. Encourage/ monitor PO intake. Will add scheduled snack. 4. Monitor BG/FS and adjust insulin regimen as needed to maintain BG <160 mg/dL. 5. Monitor GI function, labs, weight trends, and skin integrity. ASSESSMENT:   9/10: 83 y/o M admitted with severe anemia. Pt screen for low BMI, MD consult for general nutrition, and RN screen for PU. PMH - MGUS, HTN, AF. Spoke with caregivers at bedside, pt ?confused and drowsy. Caregivers report pt generally has a good appetite, consuming x3 meals/d, a snack (generally fruit or peanut butter crackers), and Glucerna x1/d. Caregivers report pt with 100% lunch intake.  lb. BMI 20.3 c/w underweight for age. Estimated nutritional needs +250 kcal to promote healthy weight. Caregivers reports pt weighed 159 lb x2 weeks ago, indicating weight stability, but had previously lost weight while in rehab. No c/s difficulties. Hgb A1c 8.2. BG/, 146, 90, 153; on insulin regimen. No note of GI distress. LBM 9/9. Skin with multiple PI: Stage 2 PI toe dorsal R, Stage 3 PI ankle out R, Stage 2 PI toe dorsal L, Stage 3 PI sacral/ coccyx. Pt with increased protein needs to promote wound healing. Labs - BUN 24 H, Cr 0.54 L, Ca 7.7 L, Phos 2.0 L. Meds - ascorbic acid, calcium carbonate, insulin glargine, insulin lispro, multivitamin. Diet Order: Consistent carb  % Eaten:  No data found. Pertinent Medications: [x] Reviewed    Labs: [x] Reviewed    Anthropometrics: Height:   Weight: 69.9 kg (154 lb)    IBW (%IBW):   ( ) UBW (%UBW):   (  %)      BMI: Body mass index is 20.32 kg/m².     This BMI is indicative of:   [x] Underweight for age    [] Normal    [] Overweight    []  Obesity    []  Extreme Obesity (BMI>40)  Estimated Nutrition Needs (Based on): 2139 Kcals/day(REE 1453 x 1.3 + 250 kcal) , 100 g(91 - 105 gm (1.3 - 1.5 gm/kg)) Protein  Carbohydrate: At Least 130 g/day  Fluids: 2139 mL/day (1 ml/kcal)    Last BM: 9/9   [x]Active     []Hyperactive  []Hypoactive       [] Absent   BS  Skin:    [] Intact   [] Incision  [x] Breakdown: Stage 2 PI toe dorsal R, Stage 3 PI ankle out R, Stage 2 PI toe dorsal L, Stage 3 PI sacral/ coccyx   [] DTI   [] Tears/Excoriation/Abrasion  []Edema [] Other: Wt Readings from Last 30 Encounters:   09/09/19 69.9 kg (154 lb)   08/29/19 69.9 kg (154 lb)   08/07/19 69.9 kg (154 lb)   07/20/19 67.6 kg (149 lb)   06/18/19 79.5 kg (175 lb 3.2 oz)   06/07/19 65.3 kg (144 lb)   05/15/19 69.4 kg (153 lb)   03/27/19 64 kg (141 lb)   03/18/19 61.7 kg (136 lb)   02/13/19 57.8 kg (127 lb 6.4 oz)   12/04/18 60.1 kg (132 lb 6.4 oz)   10/23/18 57.6 kg (127 lb)   07/24/18 64.4 kg (142 lb)   07/17/18 60.3 kg (133 lb)   07/16/18 64.9 kg (143 lb)   04/24/18 63 kg (139 lb)   04/09/18 62.1 kg (137 lb)   01/23/18 65.8 kg (145 lb)   10/24/17 66.7 kg (147 lb)   10/11/17 64.9 kg (143 lb)   09/12/17 66.4 kg (146 lb 6.4 oz)   08/29/17 65.8 kg (145 lb)   04/11/17 72.1 kg (159 lb)   05/05/16 78 kg (172 lb)   04/12/16 81.6 kg (180 lb)   03/28/16 81.6 kg (180 lb)   03/17/16 78 kg (172 lb)   02/19/16 80.7 kg (178 lb)   04/28/15 86.2 kg (190 lb)   02/13/15 85.7 kg (189 lb)      NUTRITION DIAGNOSES:   Problem:  Underweight   Problem 2:  Increased nutrient needs   Etiology: related to decreased ability to consume sufficient energy   Etiology 2: increased demand for protein   Signs/Symptoms: as evidenced by BMI 20.3 c/w underweight for age   Sign/s Symptoms 2: RD eval protein needs 2/2 wound healing     NUTRITION INTERVENTIONS:  Meals/Snacks: General/healthful diet   Supplements: Commercial supplement              GOAL:   Pt will consume >50% meals + ONS within 5-7 days    Cultural, Yazidi, or Ethnic Dietary Needs: None     EDUCATION & DISCHARGE NEEDS:    [x] None Identified   [] Identified and Education Provided/Documented   [] Identified and Pt declined/was not appropriate      [x] Interdisciplinary Care Plan Reviewed/Documented    [x] Discharge Needs:    CHO consistent diet/ ONS BID   [] No Nutrition Related Discharge Needs    NUTRITION RISK:   Pt Is At Nutrition Risk  [x]     No Nutrition Risk Identified  []       PT SEEN FOR:    [x]  MD Consult: []Calorie Count      []Diabetic Diet Education        []Diet Education     []Electrolyte Management     [x]General Nutrition Management and Supplements     []Management of Tube Feeding     []TPN Recommendations    [x]  RN Referral:  []MST score >=2     []Enteral/Parenteral Nutrition PTA     []Pregnant: Gestational DM or Multigestation                 [x] Pressure Ulcer    [x]  Low BMI      []  Length of Stay       [] Dysphagia Diet         [] Ventilator  []  Follow-up     Previous Recommendations:   [] Implemented          [] Not Implemented          [x] Not Applicable    Previous Goal:   [] Met              [] Progressing Towards Goal              [] Not Progressing Towards Goal   [x] Not Applicable            Melba Lot, [address] [facility]  Pager [phone]  Phone [phone]

## 2019-09-11 NOTE — PROGRESS NOTES
9/11/2019  11:52 AM  Reason for Readmission:     Severe Anemia         RRAT Score and Risk Level:     High - 22     Level of Readmission:    Level 1:       8/26/19-8/29/19: Anemia      Care Conference scheduled:   TBD       Resources/supports as identified by patient/family:   Pt's family is primary support. Top Challenges facing patient (as identified by patient/family and CM): Finances/Medication cost?     No concerns reported. Pt is retired, and insured through  [employer]. No difficulty affording medications reported. Transportation      No concerns reported. Pt's family provides transport - primarily pt's DTR. Support system or lack thereof? No concerns reported. Pt receives substantial support from pt's family, care giver, and [facility]. Living arrangements? No concerns reported. Pt lives in a private residence with his DTR, Jesus Alberto Echols, in an apartment. There are 36 stairs to get into the apartment; however, DTR has purchased a stair climber for pt. Self-care/ADLs/Cognition? No concerns reported. PTA, pt was able to ambulate with the use of a RW. Care givers assist pt with completing ADLs. Pt has all required DME needed: hospital bed, shower chair, RW, stair climber, gate belts. Pt suffers from dementia that limits pt's ability to care for himself independently. Pt has 24 hour care from [address] [place] [address], [address] [place] [address], and personal care aide. Pt receives [place] through [facility] - PT/OT, wound care, RN. DTR wishes to resume their services upon discharge. [facility] notified. Pt has been established with home based PCP via palliative. DTR is in agreement, and reported she is supposed to receive p/c from provider today. Current Advanced Directive/Advance Care Plan: On file. Plan for utilizing home health:   PTA             Transition of Care Plan:    Based on readmission, the patient's previous Plan of Care   has been evaluated and/or modified.  The current Transition of Care Plan is: 1. Medical clearance. 2. Transport home via DTR. 3. Resume HH and care giver services. 4. Outpatient follow-up. Chioma Michael MA    Care Management Interventions  PCP Verified by CM: Yes(Home Based PCP. )  Palliative Care Criteria Met (RRAT>21 & CHF Dx)?: No  Mode of Transport at Discharge:  Other (see comment)(DTR)  Transition of Care Consult (CM Consult): Discharge Planning  MyChart Signup: No  Discharge Durable Medical Equipment: No  Physical Therapy Consult: Yes  Occupational Therapy Consult: Yes  Speech Therapy Consult: No  Current Support Network: Relative's Home  Confirm Follow Up Transport: Family  Plan discussed with Pt/Family/Caregiver: Yes  Freedom of Choice Offered: (S) Yes  Discharge Location  Discharge Placement: Home with home health

## 2019-09-11 NOTE — PROGRESS NOTES
[facility] [place] at [address] [facility]  Ekta Hirschon: [phone]  F: [phone]      Reason for Visit:   Nieves Altamirano is a 82 y.o. 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 Hematology / Oncology Treatment History:   Hx obtained from daughter, at bedside. States her father has relocated here from the [facility] on July 16th. Was being followed by a hematologist for many years. Since Dec her dad has required approx 10 transfusions. . Reports recent hospitalizations in Jan for PNA and bladder infection and required blood transfusions; went to rehab x 5 weeks. Then 3-6 weeks later again admitted for bladder infection went to rehab again x 4-5 weeks. Family was not pleased with rehab so daughter decided to bring her father to [address] [facility] to live with her and has hired 2 caregivers to be with him while she is at work. States her father is able to get up with a walker, able to sit up in a chair; cleans his own teeth. Has had open sores on his toes and sacral area since rehab. They are starting to heal. Appetite remains good. In Jan was weighing 129# and now weighs #50 more. Bowels are regular. Has followed Dr Lonny Phalen / Jolene Whitley and PCP / Dr Judge Loaiza in the [facility]. Has dementia and days vary to his degree of orientation. Was on Hydrea and Eliquis for approx 2 weeks when they thought he had had a MI but then he was taken off by the cardiologist.       History of Present Illness:     Nieves Altamirano was admitted from the ED when he presented from home with confusion associated with increased weakness. Hx of dementia. Has 24-hr care at home. ED labs Hgb 6.2 Alb 1.7 SGOT 46. ED CXR no acute process. Therefore he was admitted for further eval and management. Pt unable to provide hx due to dementia. Denies any SOB. Care providers from home at bedside.  States he stopped eating a few days ago. They have him exercising at home. Reports he is up walking at home. Had normal BM yesterday/.  Thought he was weak from just needing blood. Unsure about weight loss. No family at bedside. Interval History:     Pt without complaints; eating. \"Just hungry\". Denies pain or SOB. Daughter at bedside. Shares her dad is better; at home is more alert; active throwing the ball with PT and eating. Only gets into problems when his Hgb drops into the 7s. Deshawn Likes she would like to continue on with transfusions and close monitoring lab work for now.        Past Medical History:   Diagnosis Date    Anemia     Cognitive impairment     Controlled type 2 diabetes mellitus, with long-term current use of insulin ([facility] 6/9/2019    Glaucoma     Hypertension     Malignant neoplasm of prostate ([facility] 1998    MGUS (monoclonal gammopathy of unknown significance)     Mildly underweight adult     Paroxysmal A-fib ([facility]       Past Surgical History:   Procedure Laterality Date    BIOPSY OF PROSTATE,INCISIONAL  4/98    NH EXT BEAM RADIATION AS PRIMARY THERAPY TO PROSTATE ONLY  4/30-6/29/98      Social History     Tobacco Use    Smoking status: Current Some Day Smoker     Packs/day: 0.10     Years: 22.00     Pack years: 2.20     Types: Cigarettes    Smokeless tobacco: Never Used   Substance Use Topics    Alcohol use: No     Alcohol/week: 0.0 standard drinks      Family History   Problem Relation Age of Onset    Diabetes Other     Hypertension Other      Current Facility-Administered Medications   Medication Dose Route Frequency    epoetin jeremy-epbx (RETACRIT) injection 20,000 Units  20,000 Units SubCUTAneous ONCE    0.9% sodium chloride infusion 250 mL  250 mL IntraVENous PRN    0.9% sodium chloride infusion 250 mL  250 mL IntraVENous PRN    sodium chloride (NS) flush 5-40 mL  5-40 mL IntraVENous Q8H    sodium chloride (NS) flush 5-40 mL  5-40 mL IntraVENous PRN    acetaminophen (TYLENOL) tablet 650 mg  650 mg Oral Q6H PRN    naloxone (NARCAN) injection 0.4 mg  0.4 mg IntraVENous PRN    insulin lispro (HUMALOG) injection   SubCUTAneous TID WITH MEALS    glucose chewable tablet 16 g  4 Tab Oral PRN    glucagon (GLUCAGEN) injection 1 mg  1 mg IntraMUSCular PRN    dextrose 10% infusion 0-250 mL  0-250 mL IntraVENous PRN    ascorbic acid (vitamin C) (VITAMIN C) tablet 250 mg  250 mg Oral BID    atorvastatin (LIPITOR) tablet 40 mg  40 mg Oral QHS    calcium carbonate (OS-STACEY) tablet 500 mg [elemental]  500 mg Oral DAILY    dorzolamide (TRUSOPT) 2 % ophthalmic solution 1 Drop  1 Drop Both Eyes TID    therapeutic multivitamin (THERAGRAN) tablet 1 Tab  1 Tab Oral DAILY    latanoprost (XALATAN) 0.005 % ophthalmic solution 1 Drop  1 Drop Both Eyes BID    metoprolol tartrate (LOPRESSOR) tablet 25 mg  25 mg Oral BID    insulin glargine (LANTUS) injection 5 Units  5 Units SubCUTAneous QHS    cephALEXin (KEFLEX) capsule 500 mg  500 mg Oral QID    timolol (TIMOPTIC) 0.5 % ophthalmic solution 1 Drop  1 Drop Both Eyes BID    And    brimonidine (ALPHAGAN) 0.2 % ophthalmic solution 1 Drop  1 Drop Both Eyes BID      Allergies   Allergen Reactions    Iodinated Contrast Media Hives     Faint, shaking        Review of Systems: A complete review of systems was obtained, negative except as described above. Physical Exam:     Visit Vitals  /65 (BP 1 Location: Left arm, BP Patient Position: At rest)   Pulse (!) 113   Temp 99.3 °F (37.4 °C)   Resp 15   Wt 69.9 kg (154 lb)   SpO2 97%   BMI 20.32 kg/m²     ECOG PS: 3-4  General: frail; chronically ill appearing;  No acute distress  Eyes: PERRLA, anicteric sclerae  HENT: Atraumatic with normal appearance of ears and nose; OP clear  Neck: Supple; no thyromegaly   Lymphatic: No cervical, supraclavicular, or axillary adenopathy  Respiratory: anteriorly CTAB, normal respiratory effort  CV: Normal rate, regular rhythm, no murmurs, 1+ LE edema  GI: Soft, nontender, nondistended, no masses, no hepatomegaly, no splenomegaly  MS: Digits without clubbing or cyanosis. Skin: No rashes, ecchymoses, or petechiae. Normal temperature, turgor, and texture. Neuro/Psych: Alert, appropriate affect, normal judgment/insight      Results:     Lab Results   Component Value Date/Time    WBC 11.0 09/11/2019 06:33 AM    HGB 7.1 (L) 09/11/2019 06:33 AM    HCT 22.0 (L) 09/11/2019 06:33 AM    PLATELET 097 (L) 86/91/3109 06:33 AM    .5 (H) 09/11/2019 06:33 AM    ABS. NEUTROPHILS 10.4 (H) 09/11/2019 06:33 AM     Lab Results   Component Value Date/Time    Sodium 138 09/10/2019 05:35 AM    Potassium 3.7 09/10/2019 05:35 AM    Chloride 109 (H) 09/10/2019 05:35 AM    CO2 25 09/10/2019 05:35 AM    Glucose 90 09/10/2019 05:35 AM    BUN 24 (H) 09/10/2019 05:35 AM    Creatinine 0.54 (L) 09/10/2019 05:35 AM    GFR est AA >60 09/10/2019 05:35 AM    GFR est non-AA >60 09/10/2019 05:35 AM    Calcium 7.7 (L) 09/10/2019 05:35 AM    Glucose (POC) 166 (H) 09/11/2019 06:18 AM     Lab Results   Component Value Date/Time    Bilirubin, total 0.5 09/10/2019 05:35 AM    ALT (SGPT) 20 09/10/2019 05:35 AM    AST (SGOT) 43 (H) 09/10/2019 05:35 AM    Alk.  phosphatase 68 09/10/2019 05:35 AM    Protein, total 6.0 (L) 09/10/2019 05:35 AM    Albumin 1.4 (L) 09/10/2019 05:35 AM    Globulin 4.6 (H) 09/10/2019 05:35 AM       Lab Results   Component Value Date/Time    Reticulocyte count 1.5 09/10/2019 05:35 AM    Iron % saturation 22 08/26/2019 01:24 PM    TIBC 140 (L) 08/26/2019 01:24 PM    Ferritin 2,797 (H) 08/26/2019 05:09 PM    Vitamin B12 1,252 (H) 08/26/2019 05:09 PM    Folate 40.1 (H) 08/26/2019 05:09 PM    Haptoglobin 192 08/26/2019 05:09 PM    SIENNA Poly POS 08/26/2019 01:24 PM     (H) 08/26/2019 05:09 PM    Beta-2 Microglobulin, serum 2.5 (H) 09/12/2017 02:21 PM    Sed rate, automated 68 (H) 07/24/2018 10:47 AM    TSH 1.91 08/26/2019 01:24 PM    M-Rigo Not Observed 07/16/2019 10:22 AM    GIRISH, Direct Positive (A) 08/26/2019 05:09 PM     Lab Results   Component Value Date/Time    INR 1.2 06/04/2019 09:20 AM    aPTT 102.5 (H) 06/09/2019 04:12 PM     Lab Results   Component Value Date/Time    Reticulocyte count 1.5 09/10/2019 05:35 AM    Iron % saturation 22 08/26/2019 01:24 PM    TIBC 140 (L) 08/26/2019 01:24 PM    Ferritin 2,797 (H) 08/26/2019 05:09 PM    Vitamin B12 1,252 (H) 08/26/2019 05:09 PM    Folate 40.1 (H) 08/26/2019 05:09 PM    Haptoglobin 192 08/26/2019 05:09 PM    SIENNA Poly POS 08/26/2019 01:24 PM     (H) 08/26/2019 05:09 PM    Beta-2 Microglobulin, serum 2.5 (H) 09/12/2017 02:21 PM    Sed rate, automated 68 (H) 07/24/2018 10:47 AM    TSH 1.91 08/26/2019 01:24 PM    M-Rigo Not Observed 07/16/2019 10:22 AM    GIRISH, Direct Positive (A) 08/26/2019 05:09 PM     Lab Results   Component Value Date/Time    INR 1.2 06/04/2019 09:20 AM    aPTT 102.5 (H) 06/09/2019 04:12 PM     Lab Results   Component Value Date/Time    Prostate Specific Ag 0.0 (L) 08/27/2019 12:30 PM    Prostate Specific Ag 0.2 11/17/2014 04:00 PM     (H) 08/26/2019 05:09 PM     10/11/17 Bone Marrow bx  PERIPHERAL BLOOD SMEAR, BONE MARROW ASPIRATE AND BIOPSY:   MARKEDLY HYPERCELLULAR MARROW WITH MARKED GRANULOCYTIC   HYPERPLASIA AND MILD DYSMEGAKARYOPOIESIS (SEE COMMENT). ANEMIA, NEUTROPHILIA AND THROMBOCYTOSIS. MILD MARROW FIBROSIS. BLASTS ARE NOT INCREASED.   4-5% MONOCLONAL PLASMA CELLS. ADEQUATE IRON STORES.     DIAGNOSIS COMMENT:   Two processes are apparent in the marrow. There is a plasma cell neoplasm with a low percentage of bone marrow plasma cells as noted above. In addition, the marrow is markedly hypercellular (>95%) with marked granulocytic hyperplasia, megakaryocytic atypia, and marrow fibrosis, suggestive of a myeloproliferative neoplasm or possibly a myeloproliferative/myelodysplastic overlap syndrome.  Review of laboratory results reveals that the patient had a previously documented negative study for the JAK2 V617F mutation. Although the peripheral blood smear lacks classic features of chronic myeloid leukemia, a diagnosis of CML cannot be excluded. Correlation with pending cytogenetic studies is recommended. If cytogenetic analysis is normal, consideration could be given to performance of FISH studies for the BCL/ABL translocation and/or to additional molecular genetic studies to exclude the presence of other less common genetic abnormalities associated with myeloproliferative neoplasms. These studies can be performed on peripheral blood. 8/29/2019 BM bx  FINAL PATHOLOGIC DIAGNOSIS   Bone marrow, posterior iliac crest, aspirate, clot section, touch imprint, and decalcified core biopsy:   Hypercellular marrow, 80%, with granulocytic proliferation and erythroid hypoplasia. Monoclonal plasma cells, 2%. Negative for increased blast.   Peripheral blood:   Macrocytic anemia, pancytopenia, granulocytic atypia. Comment   The bone marrow is involved by a plasma cell neoplasm along with a possible second neoplastic process. There is a small population of monoclonal plasma cells (less than 10% of total cells) compatible with the clinical diagnosis of MGUS. In addition, there is peripheral cytopenia in association with a hypercellular marrow with granulocytic proliferation and erythroid hypoplasia with dysplastic features raising the possibility of a myelodysplastic syndrome. Normal cytogenetic studies have been reported. FISH studies are pending for further classification. 9/9/2019 XR CHEST  IMPRESSION:  No acute process on portable chest. No change. 9/9/2019 DUPLEX LE   · No evidence of acute deep vein thrombosis in the right common femoral, superficial femoral, popliteal, posterior tibial, and peroneal veins. The veins were imaged in the transverse and longitudinal planes. The vessels showed normal color filling and compressibility. Doppler interrogation showed phasic and spontaneous flow.   · No evidence of acute deep vein thrombosis in the left common femoral, superficial femoral, popliteal, posterior tibial, and peroneal veins. The veins were imaged in the transverse and longitudinal planes. The vessels showed normal color filling and compressibility. Doppler interrogation showed phasic and spontaneous flow. Assessment and Recommendations:   1. 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 Extensive laboratory evaluation has not revealed another cause. This condition is not curable and management is with palliative intent. It is likely to progress further over time. He is not a candidate for chemotherapy. We could consider growth factor support, along with transfusion support. Though I wonder if he would be best served with hospice, given his significant progressive decline (dementia, malnutrition, debility, etc). -daughter not interested in hospice at this time and would like to try support with growth factor and transfusions  -Hgb 7.1 this am; will transfuse 1 unit today  -will initiate Retacrit 20,000 units today   -Repeat stool blood is pending.  -monitor CBC daily and transfuse for Hgb < 7  - has follow up in the clinic on 9/17; will monitor CBC and continue growth factor in out patient setting    2. Weakness/Debility  Seems out of proportion to what is seen with MDS. Hospice may be appropriate. If family opts not to proceed with hospice, he at least needs to establish care with a PCP on discharge to help manage these issues. -PT/OT damián  -Dr Brown met with daughter this am; she is not interested in hospice at this time      3. Decub/ skin wounds  Toes and sacral area  Wound team consulted    4. LE swelling  Doppler negative for DVT    5. Malnutrition  Dietician following    6. Dementia  Continue supportive care    7. MGUS  No evidence of myeloma on bone marrow biopsy. Consider yearly MGUS labs.     8. Goals of care  Palliative team following    Plan reviewed with  Stephanie      Signed By: Margi Carballo NP

## 2019-09-11 NOTE — PROGRESS NOTES
Problem: Mobility Impaired (Adult and Pediatric)  Goal: *Acute Goals and Plan of Care (Insert Text)  Description  FUNCTIONAL STATUS PRIOR TO ADMISSION: Patient required minimal assistance for basic and instrumental ADLs. HOME SUPPORT PRIOR TO ADMISSION: The patient lives with family and has 24/7 caregivers to provide care and assistance. Physical Therapy Goals  Initiated 9/11/2019  1. Patient will move from supine to sit and sit to supine , scoot up and down and roll side to side in bed with moderate assistance x 1 within 7 day(s). 2.  Patient will transfer from bed to chair and chair to bed with minimal assistance x 1 using the least restrictive device within 7 day(s). 3.  Patient will perform sit <> stand with minimal assistance x 1 within 7 day(s). 4.  Patient will ambulate with minimal assistance x 1  for 25 feet with the least restrictive device within 7 day(s). Note:   PHYSICAL THERAPY EVALUATION  Patient: Kenna Olivera (67 y.o. male)  Date: 9/11/2019  Primary Diagnosis: Severe anemia [D64.9]  Anemia [D64.9]  Severe anemia [D64.9]        Precautions: Fall , Skin      ASSESSMENT  Based on the objective data described below, the patient presents with poor strength throughout, poor endurance, poor command following, flat affect, poor sitting and standing balance. Patient came to ED with weakness and collapse while sitting at kitchen table at home and dgt brought to ED well aware of patient's problem as it is chronic. Patient with PMH of malignant prostate CA and work up in [facility] for bone marrow disease. He has been receiving outpatient blood transfusions as well as readmissions to hospital for the past year for chronic anemia and frequent blood transfusions. This admission Hgb 5.0 yesterday and transfused. Patient at 7.1 today. Dgt arrived and states patient receiving HHPT and HHOT and mobilizing with Min to CG. Reviewed past HHPT documentation.  Patient has now spent last 2.5 days in bed due to illness and is extremely unengaged, does not follow commands, noted to have multiple areas on feet, sacrum , bony prominences that are bandaged with breakdown. Patient thin frail appearance and does not appear to wish to mobilize. Dgt present and based on her behavior and different statements she makes, she does not appear to have realistic expectations for patient or patient's status. Per CM, Dr Shelly Reddy has recommended Hospice for patient and discussed with dgt. who has declined. Dgt. has around the clock care at home as well as [facility] care team for her father and plans to continue to care for him at home. Unclear as to how much patient can participate in decision making related to his care and communication of his wants/needs. Dgt informed Dr. Shelly Reddy her father does not have dementia. Plan appears patient may received another blood transfusion and then discharge back to home. Current Level of Function Impacting Discharge (mobility/balance): Patient appeared confused and/or against participation ; basic bed mobility required mod assist x 2, patient not initiating movement or following verbal,tactile, visual cues    Functional Outcome Measure: The patient scored 5/100 on the Barthel Index outcome measure. Other factors to consider for discharge: prognosis     Patient will benefit from skilled therapy intervention to address the above noted impairments. PLAN :  Recommendations and Planned Interventions: bed mobility training, transfer training, gait training, patient and family training/education and therapeutic activities      Frequency/Duration: Patient will be followed by physical therapy:  3 times a week to address goals.     Recommendation for discharge: (in order for the patient to meet his/her long term goals)  Per Oncology team , dgt plans on resume and continuation of HHPT services    This discharge recommendation:  Has not yet been discussed the attending provider and/or case management    Equipment recommendations for successful discharge (if) home: none new         SUBJECTIVE:   Patient stated john Jin.     OBJECTIVE DATA SUMMARY:   HISTORY:    Past Medical History:   Diagnosis Date    Anemia     Cognitive impairment     Controlled type 2 diabetes mellitus, with long-term current use of insulin ([facility] [address] 6/9/2019    Glaucoma     Hypertension     Malignant neoplasm of prostate ([facility] [address] 1998    MGUS (monoclonal gammopathy of unknown significance)     Mildly underweight adult     Paroxysmal A-fib ([facility] [address]      Past Surgical History:   Procedure Laterality Date    BIOPSY OF PROSTATE,INCISIONAL  4/98    DE EXT BEAM RADIATION AS PRIMARY THERAPY TO PROSTATE ONLY  4/30-6/29/98       Personal factors and/or comorbidities impacting plan of care: diagnosis-prognosis         EXAMINATION/PRESENTATION/DECISION MAKING:   Critical Behavior:  Neurologic State: Confused, Drowsy, Eyes open to stimulus, Lethargic, Stuporous  Orientation Level: Oriented to person, Oriented to place  Cognition: Impaired decision making, No command following  Safety/Judgement: Decreased awareness of environment    Range Of Motion:  AROM: Grossly decreased, non-functional           PROM: Within functional limits           Strength:    Strength: Grossly decreased, non-functional      Coordination:  Coordination: Grossly decreased, non-functional       Functional Mobility:  Bed Mobility:  Rolling: Moderate assistance; Additional time;Assist x2  Supine to Sit: Moderate assistance;Assist x2  Sit to Supine: Maximum assistance;Assist x2  Scooting: Other (comment)(unable)  Transfers:  Sit to Stand: Moderate assistance;Assist x2  Stand to Sit: Moderate assistance;Assist x2  Stand Pivot Transfers: (NT)                    Balance:   Sitting: High guard; With support  Standing: Impaired; With support  Standing - Static: Constant support;Occasional  Standing - Dynamic : Poor  Ambulation/Gait Training:  Distance (ft): 2 Feet (ft)(side step alongs to head of bed)  Ambulation - Level of Assistance: Moderate assistance; Additional time;Assist x2    Functional Measure:  Barthel Index:    Bathin  Bladder: 0  Bowels: 0  Groomin  Dressin  Feedin  Mobility: 0  Stairs: 0  Toilet Use: 0  Transfer (Bed to Chair and Back): 0  Total: 5/100       The Barthel ADL Index: Guidelines  1. The index should be used as a record of what a patient does, not as a record of what a patient could do. 2. The main aim is to establish degree of independence from any help, physical or verbal, however minor and for whatever reason. 3. The need for supervision renders the patient not independent. 4. A patient's performance should be established using the best available evidence. Asking the patient, friends/relatives and nurses are the usual sources, but direct observation and common sense are also important. However direct testing is not needed. 5. Usually the patient's performance over the preceding 24-48 hours is important, but occasionally longer periods will be relevant. 6. Middle categories imply that the patient supplies over 50 per cent of the effort. 7. Use of aids to be independent is allowed. Jorge Horn., Barthel, D.W. ([id number]). Functional evaluation: the Barthel Index. [facility] [address] [facility] (14)2. DALE Larose, Danya Ha., Alicia Charles.[facility], [address] [place] [address] (). Measuring the change indisability after inpatient rehabilitation; comparison of the responsiveness of the Barthel Index and Functional [place] Measure. Journal of Neurology, Neurosurgery, and Psychiatry, 66(4), 645-556. FAUSTO Obrien.LAKESHIA, MAYE Hodgson, & Zulay Vance, M.A. (2004.) Assessment of post-stroke quality of life in cost-effectiveness studies: The usefulness of the Barthel Index and the EuroQoL-5D.  Quality of Life Research, 15, 487-24           Physical Therapy Evaluation Charge Determination   History Examination Presentation Decision-Making   HIGH Complexity :3+ comorbidities / personal factors will impact the outcome/ POC  HIGH Complexity : 4+ Standardized tests and measures addressing body structure, function, activity limitation and / or participation in recreation  HIGH Complexity : Unstable and unpredictable characteristics  Other outcome measures Barthel Index  HIGH       Based on the above components, the patient evaluation is determined to be of the following complexity level: HIGH     Pain Rating:  Not rated    Activity Tolerance:   Poor and requires frequent rest breaks  Please refer to the flowsheet for vital signs taken during this treatment. After treatment patient left in no apparent distress:   Supine in bed, Heels elevated for pressure relief, Call bell within reach, Caregiver / family present and Side rails x 3    COMMUNICATION/EDUCATION:   The patients plan of care was discussed with: Occupational Therapist and Registered Nurse. Patient is unable to participate in goal setting and plan of care.     Thank you for this referral.  Shaka Rodriguez, PT, DPT   Time Calculation: 22 mins

## 2019-09-11 NOTE — PROGRESS NOTES
Bedside and Verbal shift change report given to Peña (oncoming nurse) by Pamella Fuchs (offgoing nurse). Report included the following information SBAR, Kardex, Procedure Summary, Intake/Output and MAR.        Received 1 unit of blood

## 2019-09-11 NOTE — PROGRESS NOTES
Problem: Self Care Deficits Care Plan (Adult)  Goal: *Acute Goals and Plan of Care (Insert Text)  Description    FUNCTIONAL STATUS PRIOR TO ADMISSION: Per daughter, patient was using a Rolling walker for functional mobility. Assistance needed with self-care  Pt was receiving PT/OT at home. Uncertain how much assistance needed for ambulation. Daughter stating pt was walking with RW, but did not say how much help. HOME SUPPORT: The patient lived with daughter, 24/7 care. Occupational Therapy Goals  Initiated 9/11/2019  1. Patient will perform self-feeding with supervision/set-up within 7 day(s). 2.  Patient will perform grooming with supervision/set-up within 7 day(s). 3.  Patient will perform upper body dressing with minimal assistance/contact guard assist within 7 day(s). 4.  Patient will perform toilet transfers with minimal assistance/contact guard assist within 7 day(s). 5.  Patient will perform all aspects of toileting with minimal assistance/contact guard assist within 7 day(s). 6.  Patient will participate in upper extremity therapeutic exercise/activities with minimal assistance/contact guard assist for 10 minutes within 7 day(s). Outcome: Progressing Towards Goal   OCCUPATIONAL THERAPY EVALUATION  Patient: Artem Wilson (11 y.o. male)  Date: 9/11/2019  Primary Diagnosis: Severe anemia [D64.9]  Anemia [D64.9]  Severe anemia [D64.9]        Precautions: Falls       ASSESSMENT  Based on the objective data described below, the patient presents with decrease strength, decrease activity tolerance, increase confusion and decrease functional mobility. Pt required max verbal and tactile cues to initiate and participate with therapy. Pt not safe to sit in chair due to confusion and unstable sit to stand (right LE sliding forward and not bearing full wt). Daughter present in the room. Pt has had 12 blood transfusions since December.   Uncertain if daughter is realistic with her Dad's health. Per daughter, he will be fine once he gets his blood transfusion. Will con't to to follow and address listed goals. Current Level of Function Impacting Discharge (ADLs/self-care): max assist with mobility    Functional Outcome Measure: The patient scored 5/100 on the Barthel outcome measure which is indicative of impairment. Other factors to consider for discharge: Daughter refusing Hospice     Patient will benefit from skilled therapy intervention to address the above noted impairments. PLAN :  Recommendations and Planned Interventions: self care training, functional mobility training, balance training, therapeutic activities, endurance activities, patient education, home safety training and family training/education    Frequency/Duration: Patient will be followed by occupational therapy 3 times a week to address goals. Recommendation for discharge: (in order for the patient to meet his/her long term goals)  Occupational therapy at least 2 days/week in the home     This discharge recommendation:  A follow-up discussion with the attending provider and/or case management is planned    Equipment recommendations for successful discharge (if) home: none       SUBJECTIVE:   Patient stated Harinder Herr.     OBJECTIVE DATA SUMMARY:   HISTORY:   Past Medical History:   Diagnosis Date    Anemia     Cognitive impairment     Controlled type 2 diabetes mellitus, with long-term current use of insulin ([facility] [address] 6/9/2019    Glaucoma     Hypertension     Malignant neoplasm of prostate ([facility] [address] 1998    MGUS (monoclonal gammopathy of unknown significance)     Mildly underweight adult     Paroxysmal A-fib [facility])      Past Surgical History:   Procedure Laterality Date    BIOPSY OF PROSTATE,INCISIONAL  4/98    AL EXT BEAM RADIATION AS PRIMARY THERAPY TO PROSTATE ONLY  4/30-6/29/98       Expanded or extensive additional review of patient history:            EXAMINATION OF PERFORMANCE DEFICITS:  Cognitive/Behavioral Status:  Neurologic State: Drowsy; Confused  Orientation Level: Oriented to situation;Oriented to time  Cognition: Impaired decision making;Decreased command following        Safety/Judgement: Decreased insight into deficits; Decreased awareness of need for safety    Skin: impaired, multiple skin breakdowns on bottom and both feet    Edema: none noted    Hearing:       Vision/Perceptual:                                     Range of Motion:    AROM: Generally decreased, functional  PROM: Within functional limits                      Strength:    Strength: Generally decreased, functional                Coordination:  Coordination: Generally decreased, functional            Tone & Sensation:                              Balance:  Sitting: Intact  Standing: Impaired; With support    Functional Mobility and Transfers for ADLs:  Bed Mobility:  Supine to Sit: Moderate assistance;Assist x2  Sit to Supine: Maximum assistance;Assist x2  Scooting: Other (comment)(unable)    Transfers:  Sit to Stand: Moderate assistance;Assist x2  Stand to Sit: Moderate assistance;Assist x2    ADL Assessment:  Feeding: Maximum assistance                   Lower Body Dressing: Total assistance    Toileting: Total assistance                ADL Intervention and task modifications:              Cognitive Retraining  Safety/Judgement: Decreased insight into deficits; Decreased awareness of need for safety    Therapeutic Exercise:     Functional Measure:  Barthel Index:    Bathin  Bladder: 0  Bowels: 0  Groomin  Dressin  Feedin  Mobility: 0  Stairs: 0  Toilet Use: 0  Transfer (Bed to Chair and Back): 0  Total: 5/100        The Barthel ADL Index: Guidelines  1. The index should be used as a record of what a patient does, not as a record of what a patient could do. 2. The main aim is to establish degree of independence from any help, physical or verbal, however minor and for whatever reason.   3. The need for supervision renders the patient not independent. 4. A patient's performance should be established using the best available evidence. Asking the patient, friends/relatives and nurses are the usual sources, but direct observation and common sense are also important. However direct testing is not needed. 5. Usually the patient's performance over the preceding 24-48 hours is important, but occasionally longer periods will be relevant. 6. Middle categories imply that the patient supplies over 50 per cent of the effort. 7. Use of aids to be independent is allowed. Kim Olivier., Barthel, D.W. ([id number]). Functional evaluation: the Barthel Index. [address] [facility] (14)2. Tal Rodriguez jann NESTOR EstradaF, Rene Riddle., Brenna Lopez., [place], [address] [place] [address] Ave (1999). Measuring the change indisability after inpatient rehabilitation; comparison of the responsiveness of the Barthel Index and Functional [place] Measure. Journal of Neurology, Neurosurgery, and Psychiatry, 66(4), 569-799. Vangie Damico, ELDAJ.A, MAYE Hodgson, & Veronique Cha M.A. (2004.) Assessment of post-stroke quality of life in cost-effectiveness studies: The usefulness of the Barthel Index and the EuroQoL-5D. Quality of Life Research, 15, 130-33        Occupational Therapy Evaluation Charge Determination   History Examination Decision-Making   LOW Complexity : Brief history review  HIGH Complexity : 5 or more performance deficits relating to physical, cognitive , or psychosocial skils that result in activity limitations and / or participation restrictions HIGH Complexity : Patient presents with comorbidities that affect occupational performance.  Signifigant modification of tasks or assistance (eg, physical or verbal) with assessment (s) is necessary to enable patient to complete evaluation       Based on the above components, the patient evaluation is determined to be of the following complexity level: LOW   Pain Rating:  Pt unable to verbalized pain at this time    Activity Tolerance: Poor  Please refer to the flowsheet for vital signs taken during this treatment. After treatment patient left in no apparent distress:    Supine in bed and Caregiver / family present    COMMUNICATION/EDUCATION:   The patients plan of care was discussed with: Physical Therapist and Registered Nurse. Home safety education was provided and the patient/caregiver indicated understanding., Patient/family have participated as able in goal setting and plan of care. and Patient/family agree to work toward stated goals and plan of care. This patients plan of care is appropriate for delegation to \[facility]\"".     Thank you for this referral.  Pk Meza OTR/L  Time Calculation: 23 mins

## 2019-09-11 NOTE — PROGRESS NOTES
Bedside shift change report given to Hammad Alcaraz RN (oncoming nurse) by Esperanza Ridley RN (offgoing nurse). Report included the following information SBAR and Kardex.

## 2019-09-11 NOTE — DIABETES MGMT
[facility]    [facility] Progress Note    Recommendations/ Comments: chart reviewed due to hyperglycemia. Pt has required 7 units of correction insulin over the last 24 hours. If appropriate, please consider increasing Lantus to 12 units. Current hospital DM medication: Lantus 5 units and correction scale Humalog with normal sensitivity     Chart reviewed on [facility]. Patient is a 82 y.o. male with known diabetes on Lantus 5 units and Novolog sliding scale at home. A1c:   Lab Results   Component Value Date/Time    Hemoglobin A1c 8.2 (H) 08/27/2019 05:35 AM    Hemoglobin A1c 5.4 06/09/2019 09:45 AM       Recent Glucose Results:   Lab Results   Component Value Date/Time    GLUCPOC 213 (H) 09/11/2019 12:24 PM    GLUCPOC 166 (H) 09/11/2019 06:18 AM    GLUCPOC 372 (H) 09/10/2019 09:33 PM        Lab Results   Component Value Date/Time    Creatinine 0.54 (L) 09/10/2019 05:35 AM     Estimated Creatinine Clearance: 104.3 mL/min (A) (based on SCr of 0.54 mg/dL (L)). Active Orders   Diet    DIET DIABETIC WITH OPTIONS Consistent Carb 2000kcal; Regular; 2 GM NA (House Low NA)        PO intake:   Patient Vitals for the past 72 hrs:   % Diet Eaten   09/10/19 1230 95 %       Will continue to follow as needed.     Thank you    Vincenzo Moore RD, CDE        Time spent: 5 minutes

## 2019-09-11 NOTE — PROGRESS NOTES
Tino Ramos jyothi [address] [place] [address]  [address] [facility], [address] [place] [address]  [phone]      Medical Progress Note      NAME: Luis Eduardo Ford   :  1935  MRM:  [id number]    Date/Time: 2019  9:28 AM         Subjective:     Chief Complaint:  Weakness    ROS:  (bold if positive, if negative)                        Tolerating Diet          Objective:       Vitals:          Last 24hrs VS reviewed since prior progress note.  Most recent are:    Visit Vitals  /65 (BP 1 Location: Left arm, BP Patient Position: At rest)   Pulse (!) 113   Temp 99.3 °F (37.4 °C)   Resp 15   Wt 69.9 kg (154 lb)   SpO2 97%   BMI 20.32 kg/m²     SpO2 Readings from Last 6 Encounters:   19 97%   19 98%   19 98%   19 96%   19 98%   19 97%            Intake/Output Summary (Last 24 hours) at 2019 0910  Last data filed at 9/10/2019 2205  Gross per 24 hour   Intake 600 ml   Output 250 ml   Net 350 ml          Exam:     Physical Exam:    Gen:  Well-developed, frail, elderly, chronically ill-appearing, in no acute distress  HEENT:  Pink conjunctivae, PERRL, hearing intact to voice, moist mucous membranes  Neck:  Supple, without masses, thyroid non-tender  Resp:  No accessory muscle use, clear breath sounds without wheezes rales or rhonchi  Card:  No murmurs, normal S1, S2 without thrills, bruits; 2+ pitting peripheral edema, 2+ pedal pulses  Abd:  Soft, non-tender, non-distended, normoactive bowel sounds are present, no palpable organomegaly and no detectable hernias  Lymph:  No cervical or inguinal adenopathy  Musc:  No cyanosis or clubbing  Skin:  No rashes or ulcers, skin turgor is good, cap refill <2 sec  Neuro:  Cranial nerves are grossly intact, no focal motor weakness, follows commands appropriately  Psych:  Poor insight, oriented to person, place but not time, alert      Medications Reviewed: (see below)    Lab Data Reviewed: (see below)    ______________________________________________________________________    Medications:     Current Facility-Administered Medications   Medication Dose Route Frequency    epoetin jeremy-epbx (RETACRIT) injection 20,000 Units  20,000 Units SubCUTAneous ONCE    0.9% sodium chloride infusion 250 mL  250 mL IntraVENous PRN    sodium chloride (NS) flush 5-40 mL  5-40 mL IntraVENous Q8H    sodium chloride (NS) flush 5-40 mL  5-40 mL IntraVENous PRN    acetaminophen (TYLENOL) tablet 650 mg  650 mg Oral Q6H PRN    naloxone (NARCAN) injection 0.4 mg  0.4 mg IntraVENous PRN    insulin lispro (HUMALOG) injection   SubCUTAneous TID WITH MEALS    glucose chewable tablet 16 g  4 Tab Oral PRN    glucagon (GLUCAGEN) injection 1 mg  1 mg IntraMUSCular PRN    dextrose 10% infusion 0-250 mL  0-250 mL IntraVENous PRN    ascorbic acid (vitamin C) (VITAMIN C) tablet 250 mg  250 mg Oral BID    atorvastatin (LIPITOR) tablet 40 mg  40 mg Oral QHS    calcium carbonate (OS-STACEY) tablet 500 mg [elemental]  500 mg Oral DAILY    dorzolamide (TRUSOPT) 2 % ophthalmic solution 1 Drop  1 Drop Both Eyes TID    therapeutic multivitamin (THERAGRAN) tablet 1 Tab  1 Tab Oral DAILY    latanoprost (XALATAN) 0.005 % ophthalmic solution 1 Drop  1 Drop Both Eyes BID    metoprolol tartrate (LOPRESSOR) tablet 25 mg  25 mg Oral BID    insulin glargine (LANTUS) injection 5 Units  5 Units SubCUTAneous QHS    cephALEXin (KEFLEX) capsule 500 mg  500 mg Oral QID    timolol (TIMOPTIC) 0.5 % ophthalmic solution 1 Drop  1 Drop Both Eyes BID    And    brimonidine (ALPHAGAN) 0.2 % ophthalmic solution 1 Drop  1 Drop Both Eyes BID            Lab Review:     Recent Labs     09/11/19  0633 09/10/19  1359 09/10/19  0535 09/09/19  1914   WBC 11.0  --  10.0 11.0   HGB 7.1* 8.5* 5.0* 6.2*   HCT 22.0* 27.6* 15.9* 19.7*   *  --  150 179     Recent Labs     09/10/19  0535 09/09/19  1914    136   K 3.7 3.7   * 105   CO2 25 25   GLU 90 133*   BUN 24* 30*   CREA 0.54* 0.81   CA 7.7* 8.2*   MG 1.6 1.7   PHOS 2.0*  --    ALB 1.4* 1.7*   TBILI 0.5 0.6   SGOT 43* 46*   ALT 20 25     Lab Results   Component Value Date/Time    Glucose (POC) 166 (H) 09/11/2019 06:18 AM    Glucose (POC) 372 (H) 09/10/2019 09:33 PM    Glucose (POC) 353 (H) 09/10/2019 04:14 PM    Glucose (POC) 153 (H) 09/10/2019 12:19 PM    Glucose (POC) 146 (H) 09/09/2019 11:17 PM     No results for input(s): PH, PCO2, PO2, HCO3, FIO2 in the last 72 hours. No results for input(s): INR in the last 72 hours.     No lab exists for component: INREXT, INREXT  No results found for: SDES  Lab Results   Component Value Date/Time    Culture result: NO GROWTH 6 DAYS 06/12/2019 11:27 AM    Culture result: AEROBIC AND ANAEROBIC BOTTLES ESCHERICHIA COLI (A) 06/09/2019 04:15 PM    Culture result: (A) 06/09/2019 04:12 PM     AEROBIC BOTTLE ESCHERICHIA COLI For Susceptibility Refer to Culture [id number]            Assessment:     Principal Problem:    Anemia (5/13/2019)    Active Problems:    Malignant neoplasm of prostate ([facility] [address] (11/15/2011)      Moderate protein malnutrition ([address] (2/11/2019)      Controlled type 2 diabetes mellitus, with long-term current use of insulin ([address] (6/9/2019)      Debility (6/12/2019)      Hypertension ()      MGUS (monoclonal gammopathy of unknown significance) ()      Cognitive impairment ()      Paroxysmal A-fib (HCC) ()      Hypoalbuminemia ()      Severe anemia (9/10/2019)      DNR (do not resuscitate) ()           Plan:     Principal Problem:    Anemia (5/13/2019)  transfused, ordered by Dr. Adeline Greene  unclear to me how long we should pursue chronic transfusion, his current plan for outpatient transfusion does not seem to be working   - consult Palliative Care    Active Problems:    Malignant neoplasm of prostate ([facility] [address] (11/15/2011)  - outpatient follow up as appropriate      FTT/Moderate protein malnutrition ([facility] [address] (2/11/2019)/Hypoalbuminemia ()   diagnosed last admission, may be worse now  - Palliative      Controlled type 2 diabetes mellitus, with long-term current use of insulin ([facility] (6/9/2019)  - BS okay on meds as above      Debility (6/12/2019)  - attempt PT/OT, family does not want him to go anywhere but home  - he has multisystem, chronic, incurable disease and is a poor candidate for rehab, in my opinion      Hypertension ()   - BP okay on meds as above      MGUS (monoclonal gammopathy of unknown significance) ()   - with MDS on bone marrow biopsy,       Cognitive impairment ()   - suspect underlying dementia      Paroxysmal A-fib (HCC) ()   - rate controlled, no anticoagulation      Total time spent in patient care: 25 minutes                  Care Plan discussed with: Patient, Care Manager and Nursing Staff    Discussed:  Code Status, Care Plan and D/C Planning     DNR    DVT Prophylaxis:  SCD's    Disposition:   PT, OT, RN           ___________________________________________________    Attending Physician: Ana Boyd MD

## 2019-09-11 NOTE — PROGRESS NOTES
Spiritual Care Assessment/Progress Note  [address]      NAME: Tamika Roman      MRN: [id number]  AGE: 82 y.o. SEX: male  Hinduism Affiliation: Mu-ism   Language: English     9/11/2019     Total Time (in minutes): 5     Spiritual Assessment begun in [facility] POST SURG ORT 1 through conversation with:         []Patient        [] Family    [] Friend(s)        Reason for Consult: Palliative Care, Initial/Spiritual Assessment     Spiritual beliefs: (Please include comment if needed)     [] Identifies with a nathalie tradition:         [] Supported by a nathalie community:            [] Claims no spiritual orientation:           [] Seeking spiritual identity:                [] Adheres to an individual form of spirituality:           [x] Not able to assess:                           Identified resources for coping:      [] Prayer                               [] Music                  [] Guided Imagery     [] Family/friends                 [] Pet visits     [] Devotional reading                         [x] Unknown     [] Other:                                               Interventions offered during this visit: (See comments for more details)                Plan of Care:     [] Support spiritual and/or cultural needs    [] Support AMD and/or advance care planning process      [] Support grieving process   [] Coordinate Rites and/or Rituals    [] Coordination with community clergy   [] No spiritual needs identified at this time   [] Detailed Plan of Care below (See Comments)  [] Make referral to Music Therapy  [] Make referral to Pet Therapy     [] Make referral to Addiction services  [] Make referral to [facility]  [] Make referral to Spiritual Care Partner  [] No future visits requested        [x] Follow up visits as needed     Comments:  visit for initial spiritual assessment. Staff with patient providing care at this time.   Will continue to follow up as needed and upon request as able.    Visited by Rev. Maren Conde MDiv, [facility], [facility] service: [phone]

## 2019-09-11 NOTE — PROGRESS NOTES
Bedside and Verbal shift change report given to Peña (oncoming nurse) by Pamella Fuchs (offgoing nurse). Report included the following information SBAR, Kardex, Intake/Output, MAR and Accordion.

## 2019-09-12 NOTE — PROGRESS NOTES
Tino Ramos [facility] [place] [address]  [address] [facility], [address] [place] [address]  [phone]      Medical Progress Note      NAME: Janet Melendez   :  1935  MRM:  [id number]    Date/Time: 2019  9:28 AM         Subjective:     Chief Complaint:  Weakness    ROS:  (bold if positive, if negative)                        Tolerating Diet          Objective:       Vitals:          Last 24hrs VS reviewed since prior progress note.  Most recent are:    Visit Vitals  /66 (BP 1 Location: Right arm, BP Patient Position: At rest)   Pulse 64   Temp 98.8 °F (37.1 °C)   Resp 16   Wt 69.9 kg (154 lb)   SpO2 93%   BMI 20.32 kg/m²     SpO2 Readings from Last 6 Encounters:   19 93%   19 98%   19 98%   19 96%   19 98%   19 97%            Intake/Output Summary (Last 24 hours) at 2019 0834  Last data filed at 2019 0438  Gross per 24 hour   Intake 1104.5 ml   Output    Net 1104.5 ml          Exam:     Physical Exam:    Gen:  Well-developed, frail, elderly, chronically ill-appearing, in no acute distress  HEENT:  Pink conjunctivae, PERRL, hearing intact to voice, moist mucous membranes  Neck:  Supple, without masses, thyroid non-tender  Resp:  No accessory muscle use, clear breath sounds without wheezes rales or rhonchi  Card:  No murmurs, normal S1, S2 without thrills, bruits; 2+ pitting peripheral edema, 2+ pedal pulses  Abd:  Soft, non-tender, non-distended, normoactive bowel sounds are present, no palpable organomegaly and no detectable hernias  Lymph:  No cervical or inguinal adenopathy  Musc:  No cyanosis or clubbing  Skin:  No rashes or ulcers, skin turgor is good, cap refill <2 sec  Neuro:  Cranial nerves are grossly intact, no focal motor weakness, follows commands appropriately  Psych:  Poor insight, oriented to person, place but not time, alert      Medications Reviewed: (see below)    Lab Data Reviewed: (see below)    ______________________________________________________________________    Medications:     Current Facility-Administered Medications   Medication Dose Route Frequency    0.9% sodium chloride infusion 250 mL  250 mL IntraVENous PRN    0.9% sodium chloride infusion 250 mL  250 mL IntraVENous PRN    sodium chloride (NS) flush 5-40 mL  5-40 mL IntraVENous Q8H    sodium chloride (NS) flush 5-40 mL  5-40 mL IntraVENous PRN    acetaminophen (TYLENOL) tablet 650 mg  650 mg Oral Q6H PRN    naloxone (NARCAN) injection 0.4 mg  0.4 mg IntraVENous PRN    insulin lispro (HUMALOG) injection   SubCUTAneous TID WITH MEALS    glucose chewable tablet 16 g  4 Tab Oral PRN    glucagon (GLUCAGEN) injection 1 mg  1 mg IntraMUSCular PRN    dextrose 10% infusion 0-250 mL  0-250 mL IntraVENous PRN    ascorbic acid (vitamin C) (VITAMIN C) tablet 250 mg  250 mg Oral BID    atorvastatin (LIPITOR) tablet 40 mg  40 mg Oral QHS    calcium carbonate (OS-STACEY) tablet 500 mg [elemental]  500 mg Oral DAILY    dorzolamide (TRUSOPT) 2 % ophthalmic solution 1 Drop  1 Drop Both Eyes TID    therapeutic multivitamin (THERAGRAN) tablet 1 Tab  1 Tab Oral DAILY    latanoprost (XALATAN) 0.005 % ophthalmic solution 1 Drop  1 Drop Both Eyes BID    metoprolol tartrate (LOPRESSOR) tablet 25 mg  25 mg Oral BID    insulin glargine (LANTUS) injection 5 Units  5 Units SubCUTAneous QHS    cephALEXin (KEFLEX) capsule 500 mg  500 mg Oral QID    timolol (TIMOPTIC) 0.5 % ophthalmic solution 1 Drop  1 Drop Both Eyes BID    And    brimonidine (ALPHAGAN) 0.2 % ophthalmic solution 1 Drop  1 Drop Both Eyes BID            Lab Review:     Recent Labs     09/11/19  0633 09/10/19  1359 09/10/19  0535 09/09/19  1914   WBC 11.0  --  10.0 11.0   HGB 7.1* 8.5* 5.0* 6.2*   HCT 22.0* 27.6* 15.9* 19.7*   *  --  150 179     Recent Labs     09/10/19  0535 09/09/19  1914    136   K 3.7 3.7   * 105   CO2 25 25   GLU 90 133*   BUN 24* 30*   CREA 0.54* 0.81   CA 7.7* 8.2*   MG 1.6 1.7   PHOS 2.0*  --    ALB 1.4* 1.7*   TBILI 0.5 0.6   SGOT 43* 46*   ALT 20 25     Lab Results   Component Value Date/Time    Glucose (POC) 112 (H) 09/12/2019 06:32 AM    Glucose (POC) 326 (H) 09/11/2019 09:03 PM    Glucose (POC) 310 (H) 09/11/2019 04:49 PM    Glucose (POC) 213 (H) 09/11/2019 12:24 PM    Glucose (POC) 166 (H) 09/11/2019 06:18 AM     No results for input(s): PH, PCO2, PO2, HCO3, FIO2 in the last 72 hours. No results for input(s): INR in the last 72 hours.     No lab exists for component: INREXT, INREXT  No results found for: SDES  Lab Results   Component Value Date/Time    Culture result: NO GROWTH 1 DAY 09/10/2019 11:16 AM    Culture result: NO GROWTH 6 DAYS 06/12/2019 11:27 AM    Culture result: AEROBIC AND ANAEROBIC BOTTLES ESCHERICHIA COLI (A) 06/09/2019 04:15 PM            Assessment:     Principal Problem:    Anemia (5/13/2019)    Active Problems:    Malignant neoplasm of prostate ([facility] [address] (11/15/2011)      Moderate protein malnutrition ([address] (2/11/2019)      Controlled type 2 diabetes mellitus, with long-term current use of insulin ([address] (6/9/2019)      Debility (6/12/2019)      Hypertension ()      MGUS (monoclonal gammopathy of unknown significance) ()      Cognitive impairment ()      Paroxysmal A-fib (HCC) ()      Hypoalbuminemia ()      Severe anemia (9/10/2019)      DNR (do not resuscitate) ()           Plan:     Principal Problem:    Anemia (5/13/2019)  transfused, ordered by Dr. Beto Long  unclear to me how long we should pursue chronic transfusion, his current plan for outpatient transfusion does not seem to be working   - consult Palliative Care    Active Problems:    Malignant neoplasm of prostate ([address] (11/15/2011)  - outpatient follow up as appropriate      FTT/Moderate protein malnutrition ([address] (2/11/2019)/Hypoalbuminemia ()   diagnosed last admission, may be worse now  - Palliative      Controlled type 2 diabetes mellitus, with long-term current use of insulin ([facility] [address] (6/9/2019)  - BS okay on meds as above      Debility (6/12/2019)  - attempt PT/OT, family does not want him to go anywhere but home  - he has multisystem, chronic, incurable disease and is a poor candidate for rehab, in my opinion      Hypertension ()   - BP okay on meds as above      MGUS (monoclonal gammopathy of unknown significance) ()   - with MDS on bone marrow biopsy,       Cognitive impairment ()   - suspect underlying dementia      Paroxysmal A-fib (HCC) ()   - rate controlled, no anticoagulation      Total time spent in patient care: 25 minutes                  Care Plan discussed with: Patient, Care Manager and Nursing Staff    Discussed:  Code Status, Care Plan and D/C Planning     DNR    DVT Prophylaxis:  SCD's    Disposition:   PT, OT, RN           ___________________________________________________    Attending Physician: Estefany Elizondo MD

## 2019-09-12 NOTE — PROGRESS NOTES
Bedside and Verbal shift change report given to Peña (oncoming nurse) by Danny Arriaga. Shashi Mcqueen (offgoing nurse). Report included the following information SBAR, Kardex, Procedure Summary, Intake/Output and MAR. All wound care done this AM. Dry and intact. If you experience chest pain call 911. Do not drive yourself. I have reviewed discharge instructions with the patient and caregiver. The patient and caregiver verbalized understanding. IV removed. Dipropan 5mg- order called in [facility] notified to [facility].

## 2019-09-12 NOTE — PROGRESS NOTES
Spoke with Dr. Renetta Santos about the blood sugar of 326. He ordered 10 units in addition to basal medications.

## 2019-09-12 NOTE — PROGRESS NOTES
Palliative Medicine      Code Status: DNR    Advance Care Planning:  Advance Care Planning 9/10/2019   Patient's Healthcare Decision Maker is: Named in scanned ACP document   Confirm Advance Directive Yes, on file   Does the patient have other document types MOST/MOLST/POST/POLST;Power of        Patient / Family Encounter Documentation    SW made supportive visit to pt along with Palliative Medicine NP Zayda Art. No family present at time of visit, dtr Hallie Vasquez was in earlier. Pt was resting but roused when greeted, was vague in his responses, could not recall if he had eaten breakfast though RN later shared that pt was able to feed himself this morning. Dtr previously shared that pt enjoys listening to [employer] music at home; playlist was pulled up on computer at bedside for pt's listening pleasure. LOS reached out to Clinical Referral Navigator Maria to relay plan for discharge home and determine if pt will be followed by [facility]  Palliative Home team.  Later learned that pt will be followed by Dr. Matt Waters with Palliative Home. Relayed above to Care Manager. Thank you for including Palliative Medicine in the care of Mr. Morgan Rojas.     Ellie [address] BOZENA Barbour, [facility]-  [phone]

## 2019-09-12 NOTE — PROGRESS NOTES
9/12/2019  11:15 AM  Pt DC noted. Cm reviewed EMR. Pt will be followed by [facility] upon discharge. Pt has 24 hour care givers. Cm attempted p/c to pt's DTR, Marisel, for ETA as DTR will provide transport. Awaiting response. IMM2 letter cannot be obtained at this time as pt is not of capacity. Pt will be followed by home based PCP, and oncology upon discharge. Transition of care plan:   1. Transport provided by DTR. 2. Home with HH and 24 hour caregivers. 3. Outpatient follow-up.

## 2019-09-12 NOTE — PALLIATIVE CARE DISCHARGE
The Palliative Medicine team was consulted as part of your / your loved one's care in the hospital. Our team is a supportive service; we strive to relieve suffering and improve quality of life. You identified the following goal(s) as your main focus for healthcare: Patient/Health Care Proxy Stated Goals: (continue transfusion and pursue possible treatment for cause of anemia)    We reviewed advance care planning information, which includes the following:  Advance Care Planning 9/10/2019   Patient's Healthcare Decision Maker is: Named in scanned ACP document   Confirm Advance Directive Yes, on file   Does the patient have other document types MOST/MOLST/POST/POLST;Power of        We reviewed / discussed your code status as: DNR     Full Code means perform CPR in the event of cardiac arrest     [facility] means do NOT perform CPR in the event of cardiac arrest     Partial Code means you have specific preferences, please discuss with your health care team     No Order means this issue was not addressed / resolved during your stay    You have a Durable Do Not Resuscitate Order in place, which should travel with you. When you are in a facility, this form should be placed on your chart. Once you are home, it is recommended that the [facility] form be placed in a visible location such as on the refrigerator or bedroom door. Because of the importance of this information, we are providing you with a printed copy to share with other healthcare providers after this hospitalization is complete. If any of the above information is incomplete or incorrect, please contact the Palliative Medicine team at [phone].

## 2019-09-12 NOTE — DISCHARGE SUMMARY
Physician Discharge Summary     Patient ID:  Loy Espinoza  [id number]  5 y.o.  1935    Admit date: 9/9/2019    Discharge date and time: 9/12/2019    Admission Diagnoses: Severe anemia [D64.9]  Anemia [D64.9]  Severe anemia [D64.9]    Discharge Diagnoses:    Principal Problem:    Anemia (5/13/2019)    Active Problems:    Malignant neoplasm of prostate ([address] (11/15/2011)      Moderate protein malnutrition ([address] (2/11/2019)      Controlled type 2 diabetes mellitus, with long-term current use of insulin ([address] (6/9/2019)      Debility (6/12/2019)      Hypertension ()      MGUS (monoclonal gammopathy of unknown significance) ()      Cognitive impairment ()      Paroxysmal A-fib (HCC) ()      Hypoalbuminemia ()      Severe anemia (9/10/2019)      DNR (do not resuscitate) ()           Hospital Course:   82 y.o. male with MGUS, HTN, AF who presents with confusion. Recently discharged from anemia requiring transfusions. For the past few days, increasingly confused and weak. Anemia (5/13/2019)  transfused, ordered by Dr. Cyndee Apodaca  unclear to me how long we should pursue chronic transfusion, his current plan for outpatient transfusion does not seem to be working  - consulted Palliative Care, Family not amenable to palliative or hospice care     Active Problems:    Malignant neoplasm of prostate ([address] (11/15/2011)  - outpatient follow up as appropriate       FTT/Moderate protein malnutrition ([address] (2/11/2019)/Hypoalbuminemia ()   diagnosed last admission, may be worse now       Controlled type 2 diabetes mellitus, with long-term current use of insulin ([address] (6/9/2019)       Debility (6/12/2019)  - attempted PT/OT, family does not want him to go anywhere but home    PCP: Fadumo Mendoza MD       Condition of patient at discharge: stable    Discharge Exam:  General:   WD, WN. Alert, cooperative, no acute distress    EENT:      EOMI. Anicteric sclerae. MMM  Resp:       CTA bilaterally, no wheezing or rales.   No accessory muscle use  CV:           Regular  rhythm,  No edema  GI:            Soft, Non distended, Non tender.  +Bowel sounds  Neurologic: Alert and oriented X 3, normal speech,   Psych:       Good insight. Not anxious nor agitated  Skin:          No rashes. No jaundice    Disposition: home    Patient Instructions:   Current Discharge Medication List      CONTINUE these medications which have NOT CHANGED    Details   insulin glargine (LANTUS SOLOSTAR U-100 INSULIN) 100 unit/mL (3 mL) inpn 5 Units by SubCUTAneous route nightly. insulin aspart U-100 (NOVOLOG) 100 unit/mL injection by SubCUTAneous route. Use as needed before each meals if:    Blood sugar 150-199, give 2 units  Blood sugar 200-249, give 4 units  Blood sugar 250-299, give 6 units  Blood sugar >300, give 8 units      cephALEXin (KEFLEX) 500 mg capsule Take 500 mg by mouth four (4) times daily. calcium carbonate (CALCIUM 500) 500 mg calcium (1,250 mg) tablet Take 1 Tab by mouth daily. Qty: 30 Tab, Refills: 3    Associated Diagnoses: Anemia, unspecified type      ferrous sulfate 325 mg (65 mg iron) tablet Take 1 Tab by mouth daily (with breakfast). Qty: 30 Tab, Refills: 3    Associated Diagnoses: Anemia, unspecified type      ascorbic acid, vitamin C, (VITAMIN C) 250 mg tablet Take 1 Tab by mouth two (2) times a day. Qty: 60 Tab, Refills: 3    Associated Diagnoses: Anemia, unspecified type      metoprolol tartrate (LOPRESSOR) 50 mg tablet Take 25 mg by mouth two (2) times a day. dorzolamide (TRUSOPT) 2 % ophthalmic solution Administer 1 Drop to both eyes three (3) times daily. Insulin Needles, Disposable, 31 gauge x 5/16\" ndle Use as needed. Qty: 1 Package, Refills: 11      valsartan (DIOVAN) 40 mg tablet Take 1 Tab by mouth daily. Qty: 30 Tab, Refills: 6    Associated Diagnoses: Essential hypertension      glucose 4 gram chewable tablet Take 4 Tabs by mouth as needed.   Qty: 10 Tab, Refills: 0      therapeutic multivitamin [facility]) tablet Take 1 Tab by mouth daily. Qty: 30 Tab, Refills: 0      travoprost (TRAVATAN Z) 0.004 % ophthalmic solution Administer 1 Drop to both eyes two (2) times a day. Qty: 5 mL, Refills: 0      brinzolamide (AZOPT) 1 % ophthalmic suspension Administer 1 Drop to both eyes two (2) times a day. Qty: 5 mL, Refills: 0      brimonidine-timolol (COMBIGAN) 0.2-0.5 % drop ophthalmic solution Administer 1 Drop to both eyes every twelve (12) hours. Qty: 5 mL, Refills: 0      atorvastatin (LIPITOR) 40 mg tablet Take 1 Tab by mouth nightly. oxybutynin (DITROPAN) 5 mg tablet Take 5 mg by mouth three (3) times daily.          STOP taking these medications       insulin glargine (LANTUS U-100 INSULIN) 100 unit/mL injection Comments:   Reason for Stopping:             Activity: PT/OT per Home Health  Diet: Diabetic Diet    Follow-up with Lulu Hair MD in 1-2weeks  Follow-up tests/labs: cbc in 2wks    Approximate time spent in patient care on day of discharge: Greater than 30mins     Signed:  Gris Locke MD  9/12/2019  11:00 AM

## 2019-09-12 NOTE — PROGRESS NOTES
Palliative Medicine Consult  Hoang: [phone] ([phone])    Patient Name: Maddie Friedman  YOB: 1935    Date of Initial Consult: 09/10/2019  Reason for Consult: Care decisions  Requesting Provider: Lucero Paul MD   Primary Care Physician: Britt Carter MD     SUMMARY:   Maddie Friedman is a 82 y.o. with a past history of afib, HTN, DM, prostate CA, chronic anemia/MGUS, dementia, who was admitted on 9/9/2019 from home with a diagnosis of anemia. Patient presented to the ED with complaints of worsening confusion and fatigue. ED eval revealed hgb 6.2 and patient was admitted for blood transfusion. Patient was admitted to [facility] from 08/26-08/29/19 for anemia and underwent transfusion and bone marrow biopsy with plan to follow-up with Heme Onc. Patient initially diagnosed with anemia/MGUS 2 yeats ago and had a unrevealing bone marrow biopsy performed. In the past 2 years, he has required over a dozen blood transfusions. Current medical issues leading to Palliative Medicine involvement include: care decision. SH: Originally from [place]. , two daughters and one son. Moved from [place], [place] to live with daughter, Aurora Hernandez when he could no longer live alone. Has 24/7 care at home (between Kareen Taylor who works evening shift, granddaughter who also lives there, and a paid caregiver) and requires assistance with ADLs. 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 Not a candidate for chemotherapy but could consider growth factor/transfusion support. Received another unit of blood 9/11 and dose of retacrit. PALLIATIVE DIAGNOSES:   1. Physical debility  2. Hypoalbuminemia  3. Goals of care  4. DNR  5. ACP  6. Anemia/MGUS       PLAN:   1. Met with patient with William Steinberg in follow-up. 2. Patient alert, kept eyes close at times.  Responding to some questions, doesn't remember eating breakfast  (nurse confirms he did) but knows his daughter was here earlier. No family currently at bedside. 3. Goals of care--> Per Heme Onc note, family presented with option of hospice or continued support with growth factors/transfusions and Carol Farah was not ready for hospice, will follow up with Heme Onc on the 17th for labs and transfusion/growth factor if indicated. Plan for discharge home today and [facility] LCSW notified referral navigator of [address] [place] [address] regarding discharge to ensure patient is scheduled for home visit in the very near future. 4. Silvia status--> DNR, POST form completed 06/17/19 and scanned in media; has been printed and will be scanned into ACP. 5. ACP--> Copy of AMD dated 3/6/19 is on file which appoints dtr Cornell Mena as sole Medical POA.  Per POST, limited additional interventions (NO intubation, ok with CPAP/BiPAP, avoid ICU) and NO feeding tube. 6. Initial consult note routed to primary continuity provider and/or primary health care team members  7.  Communicated plan of care with: Palliative [facility] Team     GOALS OF CARE / TREATMENT PREFERENCES:     GOALS OF CARE:  Patient/Health Care Proxy Stated Goals: (continue transfusions/growth factor support for MDS)    TREATMENT PREFERENCES:   Code Status: DNR    Advance Care Planning:  [x] [facility] Interdisciplinary Team has updated the ACP Navigator with Health Care Decision Maker and Patient Capacity      Primary Decision Maker: Donald CHARLES [facility]) - [address] Planning 9/10/2019   Patient's Healthcare Decision Maker is: Named in scanned ACP document   Confirm Advance Directive Yes, on file   Does the patient have other document types MOST/MOLST/POST/POLST;Power of        Medical Interventions: Limited additional interventions     Other Instructions:   Artificially Administered Nutrition: No feeding tube     Other:    As far as possible, the palliative care team has discussed with patient / health care proxy about goals of care / treatment preferences for patient. HISTORY:     History obtained from: chart, daughter    CHIEF COMPLAINT: confusion and fatigue    HPI/SUBJECTIVE:    The patient is:   [] Verbal and participatory  [x] Non-participatory due to: dementia    Patient presented to the ED with complaints of worsening confusion and fatigue. ED eval revealed hgb 6.2 and patient was admitted for blood transfusion. See ESAS    Clinical Pain Assessment (nonverbal scale for severity on nonverbal patients):   Clinical Pain Assessment  Severity: 0          Duration: for how long has pt been experiencing pain (e.g., 2 days, 1 month, years)  Frequency: how often pain is an issue (e.g., several times per day, once every few days, constant)     FUNCTIONAL ASSESSMENT:     Palliative Performance Scale (PPS):  PPS: 40       PSYCHOSOCIAL/SPIRITUAL SCREENING:     Palliative IDT has assessed this patient for cultural preferences / practices and a referral made as appropriate to needs (Cultural Services, Patient Advocacy, Ethics, etc.)    Any spiritual / Christianity concerns:  [] Yes /  [x] No    Caregiver Burnout:  [] Yes /  [x] No /  [] No Caregiver Present      Anticipatory grief assessment:   [x] Normal  / [] Maladaptive       ESAS Anxiety: Anxiety: 0    ESAS Depression:          REVIEW OF SYSTEMS:     Positive and pertinent negative findings in ROS are noted above in HPI. The following systems were [x] reviewed / [] unable to be reviewed as noted in HPI  Other findings are noted below. Systems: constitutional, ears/nose/mouth/throat, respiratory, gastrointestinal, genitourinary, musculoskeletal, integumentary, neurologic, psychiatric, endocrine. Positive findings noted below.   Modified ESAS Completed by: provider   Fatigue: 6 Drowsiness: 0     Pain: 0   Anxiety: 0 Nausea: 0   Anorexia: 2 Dyspnea: 0     Constipation: No     Stool Occurrence(s): 1        PHYSICAL EXAM:     From RN flowsheet:  Wt Readings from Last 3 Encounters:   09/09/19 154 lb (69.9 kg)   08/29/19 154 lb (69.9 kg)   08/07/19 154 lb (69.9 kg)     Blood pressure 115/57, pulse 81, temperature 98.9 °F (37.2 °C), resp. rate 16, weight 154 lb (69.9 kg), SpO2 96 %.     Pain Scale 1: Numeric (0 - 10)  Pain Intensity 1: 1                 Last bowel movement, if known:     Constitutional: frail  Eyes: pupils equal, anicteric  ENMT: no nasal discharge, moist mucous membranes  Cardiovascular: regular rhythm, distal pulses intact, no DOROTHEA  Respiratory: breathing not labored, symmetric  Gastrointestinal: soft non-tender, +bowel sounds  Musculoskeletal: no deformity, no tenderness to palpation  Skin: warm, dry  Neurologic: following commands, moving all extremities  Psychiatric: full affect, no hallucinations  Other:       HISTORY:     Principal Problem:    Anemia (5/13/2019)    Active Problems:    Malignant neoplasm of prostate ([address] (11/15/2011)      Moderate protein malnutrition ([address] (2/11/2019)      Controlled type 2 diabetes mellitus, with long-term current use of insulin ([address] (6/9/2019)      Debility (6/12/2019)      Hypertension ()      MGUS (monoclonal gammopathy of unknown significance) ()      Cognitive impairment ()      Paroxysmal A-fib (HCC) ()      Hypoalbuminemia ()      Severe anemia (9/10/2019)      DNR (do not resuscitate) ()      Past Medical History:   Diagnosis Date    Anemia     Cognitive impairment     Controlled type 2 diabetes mellitus, with long-term current use of insulin ([address] 6/9/2019    Glaucoma     Hypertension     Malignant neoplasm of prostate ([address] 1998    MGUS (monoclonal gammopathy of unknown significance)     Mildly underweight adult     Paroxysmal A-fib ([address]       Past Surgical History:   Procedure Laterality Date    BIOPSY OF PROSTATE,INCISIONAL  4/98    AR EXT BEAM RADIATION AS PRIMARY THERAPY TO PROSTATE ONLY  4/30-6/29/98      Family History   Problem Relation Age of Onset    Diabetes Other     Hypertension Other       History reviewed, no pertinent family history.   Social History     Tobacco Use    Smoking status: Current Some Day Smoker     Packs/day: 0.10     Years: 22.00     Pack years: 2.20     Types: Cigarettes    Smokeless tobacco: Never Used   Substance Use Topics    Alcohol use: No     Alcohol/week: 0.0 standard drinks     Allergies   Allergen Reactions    Iodinated Contrast Media Hives     Faint, shaking      Current Facility-Administered Medications   Medication Dose Route Frequency    insulin glargine (LANTUS) injection 12 Units  12 Units SubCUTAneous QHS    0.9% sodium chloride infusion 250 mL  250 mL IntraVENous PRN    0.9% sodium chloride infusion 250 mL  250 mL IntraVENous PRN    sodium chloride (NS) flush 5-40 mL  5-40 mL IntraVENous Q8H    sodium chloride (NS) flush 5-40 mL  5-40 mL IntraVENous PRN    acetaminophen (TYLENOL) tablet 650 mg  650 mg Oral Q6H PRN    naloxone (NARCAN) injection 0.4 mg  0.4 mg IntraVENous PRN    insulin lispro (HUMALOG) injection   SubCUTAneous TID WITH MEALS    glucose chewable tablet 16 g  4 Tab Oral PRN    glucagon (GLUCAGEN) injection 1 mg  1 mg IntraMUSCular PRN    dextrose 10% infusion 0-250 mL  0-250 mL IntraVENous PRN    ascorbic acid (vitamin C) (VITAMIN C) tablet 250 mg  250 mg Oral BID    atorvastatin (LIPITOR) tablet 40 mg  40 mg Oral QHS    calcium carbonate (OS-STACEY) tablet 500 mg [elemental]  500 mg Oral DAILY    dorzolamide (TRUSOPT) 2 % ophthalmic solution 1 Drop  1 Drop Both Eyes TID    therapeutic multivitamin (THERAGRAN) tablet 1 Tab  1 Tab Oral DAILY    latanoprost (XALATAN) 0.005 % ophthalmic solution 1 Drop  1 Drop Both Eyes BID    metoprolol tartrate (LOPRESSOR) tablet 25 mg  25 mg Oral BID    cephALEXin (KEFLEX) capsule 500 mg  500 mg Oral QID    timolol (TIMOPTIC) 0.5 % ophthalmic solution 1 Drop  1 Drop Both Eyes BID    And    brimonidine (ALPHAGAN) 0.2 % ophthalmic solution 1 Drop  1 Drop Both Eyes BID     Current Outpatient Medications   Medication Sig  insulin glargine (LANTUS SOLOSTAR U-100 INSULIN) 100 unit/mL (3 mL) inpn 5 Units by SubCUTAneous route nightly.  insulin aspart U-100 (NOVOLOG) 100 unit/mL injection by SubCUTAneous route. Use as needed before each meals if:    Blood sugar 150-199, give 2 units  Blood sugar 200-249, give 4 units  Blood sugar 250-299, give 6 units  Blood sugar >300, give 8 units    cephALEXin (KEFLEX) 500 mg capsule Take 500 mg by mouth four (4) times daily.  calcium carbonate (CALCIUM 500) 500 mg calcium (1,250 mg) tablet Take 1 Tab by mouth daily.  ferrous sulfate 325 mg (65 mg iron) tablet Take 1 Tab by mouth daily (with breakfast).  ascorbic acid, vitamin C, (VITAMIN C) 250 mg tablet Take 1 Tab by mouth two (2) times a day.  metoprolol tartrate (LOPRESSOR) 50 mg tablet Take 25 mg by mouth two (2) times a day.  dorzolamide (TRUSOPT) 2 % ophthalmic solution Administer 1 Drop to both eyes three (3) times daily.  Insulin Needles, Disposable, 31 gauge x 5/16\" ndle Use as needed.  valsartan (DIOVAN) 40 mg tablet Take 1 Tab by mouth daily.  glucose 4 gram chewable tablet Take 4 Tabs by mouth as needed.  therapeutic multivitamin (THERAGRAN) tablet Take 1 Tab by mouth daily.  travoprost (TRAVATAN Z) 0.004 % ophthalmic solution Administer 1 Drop to both eyes two (2) times a day.  brinzolamide (AZOPT) 1 % ophthalmic suspension Administer 1 Drop to both eyes two (2) times a day.  brimonidine-timolol (COMBIGAN) 0.2-0.5 % drop ophthalmic solution Administer 1 Drop to both eyes every twelve (12) hours.  atorvastatin (LIPITOR) 40 mg tablet Take 1 Tab by mouth nightly.  oxybutynin (DITROPAN) 5 mg tablet Take 5 mg by mouth three (3) times daily.           LAB AND IMAGING FINDINGS:     Lab Results   Component Value Date/Time    WBC 13.2 (H) 09/12/2019 10:27 AM    HGB 7.9 (L) 09/12/2019 10:27 AM    PLATELET 522 (L) 47/63/1249 10:27 AM     Lab Results   Component Value Date/Time    Sodium 138 09/10/2019 05:35 AM    Potassium 3.7 09/10/2019 05:35 AM    Chloride 109 (H) 09/10/2019 05:35 AM    CO2 25 09/10/2019 05:35 AM    BUN 24 (H) 09/10/2019 05:35 AM    Creatinine 0.54 (L) 09/10/2019 05:35 AM    Calcium 7.7 (L) 09/10/2019 05:35 AM    Magnesium 1.6 09/10/2019 05:35 AM    Phosphorus 2.0 (L) 09/10/2019 05:35 AM      Lab Results   Component Value Date/Time    AST (SGOT) 43 (H) 09/10/2019 05:35 AM    Alk. phosphatase 68 09/10/2019 05:35 AM    Protein, total 6.0 (L) 09/10/2019 05:35 AM    Albumin 1.4 (L) 09/10/2019 05:35 AM    Globulin 4.6 (H) 09/10/2019 05:35 AM     Lab Results   Component Value Date/Time    INR 1.2 06/04/2019 09:20 AM    Prothrombin time 15.1 06/04/2019 09:20 AM    aPTT 102.5 (H) 06/09/2019 04:12 PM      Lab Results   Component Value Date/Time    Iron 31 (L) 08/26/2019 01:24 PM    TIBC 140 (L) 08/26/2019 01:24 PM    Iron % saturation 22 08/26/2019 01:24 PM    Ferritin 2,797 (H) 08/26/2019 05:09 PM      No results found for: PH, PCO2, PO2  No components found for: [place]   Lab Results   Component Value Date/Time     06/09/2019 04:12 PM    CK - MB 1.6 06/09/2019 04:12 PM                Total time:   Counseling / coordination time, spent as noted above:   > 50% counseling / coordination?:     Prolonged service was provided for  []30 min   []75 min in face to face time in the presence of the patient, spent as noted above. Time Start:   Time End:   Note: this can only be billed with 66209 (initial) or 54839 (follow up). If multiple start / stop times, list each separately.

## 2019-09-12 NOTE — PROGRESS NOTES
[facility] at [address] [facility] [address] [facility] [phone]  F: [phone]      Reason for Visit:   Tamika Roman is a 82 y.o. 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 Hematology / Oncology Treatment History:   Hx obtained from daughter, at bedside. States her father has relocated here from the [facility] on . Was being followed by a hematologist for many years. Since Dec her dad has required approx 10 transfusions. . Reports recent hospitalizations in  for PNA and bladder infection and required blood transfusions; went to rehab x 5 weeks. Then 3-6 weeks later again admitted for bladder infection went to rehab again x 4-5 weeks. Family was not pleased with rehab so daughter decided to bring her father to [place] to live with her and has hired 2 caregivers to be with him while she is at work. States her father is able to get up with a walker, able to sit up in a chair; cleans his own teeth. Has had open sores on his toes and sacral area since rehab. They are starting to heal. Appetite remains good. In  was weighing 129# and now weighs #50 more. Bowels are regular. Has followed Dr Sharon Lozano / Maria T Jo and PCP / Dr Les Severance in the [facility]. Has dementia and days vary to his degree of orientation. Was on Hydrea and Eliquis for approx 2 weeks when they thought he had had a MI but then he was taken off by the cardiologist.       History of Present Illness:     Tamika Roman was admitted from the ED when he presented from home with confusion associated with increased weakness. Hx of dementia. Has 24-hr care at home. ED labs Hgb 6.2 Alb 1.7 SGOT 46. ED CXR no acute process. Therefore he was admitted for further eval and management. Pt unable to provide hx due to dementia. Denies any SOB. Care providers from home at bedside.  States he stopped eating a few days ago. They have him exercising at home. Reports he is up walking at home. Had normal BM yesterday/.  Thought he was weak from just needing blood. Unsure about weight loss. No family at bedside. Interval History:     Pt sleeping: awakens easily; no complaints; denies SOB. Unsure if going home today. No family at bedside.          Current Facility-Administered Medications   Medication Dose Route Frequency    insulin glargine (LANTUS) injection 12 Units  12 Units SubCUTAneous QHS    0.9% sodium chloride infusion 250 mL  250 mL IntraVENous PRN    0.9% sodium chloride infusion 250 mL  250 mL IntraVENous PRN    sodium chloride (NS) flush 5-40 mL  5-40 mL IntraVENous Q8H    sodium chloride (NS) flush 5-40 mL  5-40 mL IntraVENous PRN    acetaminophen (TYLENOL) tablet 650 mg  650 mg Oral Q6H PRN    naloxone (NARCAN) injection 0.4 mg  0.4 mg IntraVENous PRN    insulin lispro (HUMALOG) injection   SubCUTAneous TID WITH MEALS    glucose chewable tablet 16 g  4 Tab Oral PRN    glucagon (GLUCAGEN) injection 1 mg  1 mg IntraMUSCular PRN    dextrose 10% infusion 0-250 mL  0-250 mL IntraVENous PRN    ascorbic acid (vitamin C) (VITAMIN C) tablet 250 mg  250 mg Oral BID    atorvastatin (LIPITOR) tablet 40 mg  40 mg Oral QHS    calcium carbonate (OS-STACEY) tablet 500 mg [elemental]  500 mg Oral DAILY    dorzolamide (TRUSOPT) 2 % ophthalmic solution 1 Drop  1 Drop Both Eyes TID    therapeutic multivitamin (THERAGRAN) tablet 1 Tab  1 Tab Oral DAILY    latanoprost (XALATAN) 0.005 % ophthalmic solution 1 Drop  1 Drop Both Eyes BID    metoprolol tartrate (LOPRESSOR) tablet 25 mg  25 mg Oral BID    cephALEXin (KEFLEX) capsule 500 mg  500 mg Oral QID    timolol (TIMOPTIC) 0.5 % ophthalmic solution 1 Drop  1 Drop Both Eyes BID    And    brimonidine (ALPHAGAN) 0.2 % ophthalmic solution 1 Drop  1 Drop Both Eyes BID      Allergies   Allergen Reactions    Iodinated Contrast Media Hives     Faint, shaking Review of Systems: A complete review of systems was obtained, negative except as described above. Physical Exam:     Visit Vitals  /66 (BP 1 Location: Right arm, BP Patient Position: At rest)   Pulse 64   Temp 98.8 °F (37.1 °C)   Resp 16   Wt 69.9 kg (154 lb)   SpO2 93%   BMI 20.32 kg/m²     ECOG PS: 3-4  General: frail; chronically ill appearing; No acute distress  Eyes: PERRLA, anicteric sclerae  HENT: Atraumatic with normal appearance of ears and nose; OP clear  Neck: Supple; no thyromegaly   Lymphatic: No cervical, supraclavicular, or axillary adenopathy  Respiratory: anteriorly CTAB, normal respiratory effort  CV: Normal rate, regular rhythm, no murmurs, 1+ LE edema  GI: Soft, nontender, nondistended, no masses, no hepatomegaly, no splenomegaly  MS: Digits without clubbing or cyanosis. Skin: No rashes, ecchymoses, or petechiae. Normal temperature, turgor, and texture. Neuro/Psych: Alert, appropriate affect, normal judgment/insight      Results:     Lab Results   Component Value Date/Time    WBC 11.0 09/11/2019 06:33 AM    HGB 7.1 (L) 09/11/2019 06:33 AM    HCT 22.0 (L) 09/11/2019 06:33 AM    PLATELET 186 (L) 57/84/2717 06:33 AM    .5 (H) 09/11/2019 06:33 AM    ABS. NEUTROPHILS 10.4 (H) 09/11/2019 06:33 AM     Lab Results   Component Value Date/Time    Sodium 138 09/10/2019 05:35 AM    Potassium 3.7 09/10/2019 05:35 AM    Chloride 109 (H) 09/10/2019 05:35 AM    CO2 25 09/10/2019 05:35 AM    Glucose 90 09/10/2019 05:35 AM    BUN 24 (H) 09/10/2019 05:35 AM    Creatinine 0.54 (L) 09/10/2019 05:35 AM    GFR est AA >60 09/10/2019 05:35 AM    GFR est non-AA >60 09/10/2019 05:35 AM    Calcium 7.7 (L) 09/10/2019 05:35 AM    Glucose (POC) 112 (H) 09/12/2019 06:32 AM     Lab Results   Component Value Date/Time    Bilirubin, total 0.5 09/10/2019 05:35 AM    ALT (SGPT) 20 09/10/2019 05:35 AM    AST (SGOT) 43 (H) 09/10/2019 05:35 AM    Alk.  phosphatase 68 09/10/2019 05:35 AM    Protein, total 6.0 (L) 09/10/2019 05:35 AM    Albumin 1.4 (L) 09/10/2019 05:35 AM    Globulin 4.6 (H) 09/10/2019 05:35 AM       Lab Results   Component Value Date/Time    Reticulocyte count 1.5 09/10/2019 05:35 AM    Iron % saturation 22 08/26/2019 01:24 PM    TIBC 140 (L) 08/26/2019 01:24 PM    Ferritin 2,797 (H) 08/26/2019 05:09 PM    Vitamin B12 1,252 (H) 08/26/2019 05:09 PM    Folate 40.1 (H) 08/26/2019 05:09 PM    Haptoglobin 192 08/26/2019 05:09 PM    SIENNA Poly POS 08/26/2019 01:24 PM     (H) 08/26/2019 05:09 PM    Beta-2 Microglobulin, serum 2.5 (H) 09/12/2017 02:21 PM    Sed rate, automated 68 (H) 07/24/2018 10:47 AM    TSH 1.91 08/26/2019 01:24 PM    M-Rigo Not Observed 07/16/2019 10:22 AM    GIRISH, Direct Positive (A) 08/26/2019 05:09 PM     Lab Results   Component Value Date/Time    INR 1.2 06/04/2019 09:20 AM    aPTT 102.5 (H) 06/09/2019 04:12 PM     Lab Results   Component Value Date/Time    Reticulocyte count 1.5 09/10/2019 05:35 AM    Iron % saturation 22 08/26/2019 01:24 PM    TIBC 140 (L) 08/26/2019 01:24 PM    Ferritin 2,797 (H) 08/26/2019 05:09 PM    Vitamin B12 1,252 (H) 08/26/2019 05:09 PM    Folate 40.1 (H) 08/26/2019 05:09 PM    Haptoglobin 192 08/26/2019 05:09 PM    SIENNA Poly POS 08/26/2019 01:24 PM     (H) 08/26/2019 05:09 PM    Beta-2 Microglobulin, serum 2.5 (H) 09/12/2017 02:21 PM    Sed rate, automated 68 (H) 07/24/2018 10:47 AM    TSH 1.91 08/26/2019 01:24 PM    M-Rigo Not Observed 07/16/2019 10:22 AM    GIRISH, Direct Positive (A) 08/26/2019 05:09 PM     Lab Results   Component Value Date/Time    INR 1.2 06/04/2019 09:20 AM    aPTT 102.5 (H) 06/09/2019 04:12 PM     Lab Results   Component Value Date/Time    Prostate Specific Ag 0.0 (L) 08/27/2019 12:30 PM    Prostate Specific Ag 0.2 11/17/2014 04:00 PM     (H) 08/26/2019 05:09 PM     10/11/17 Bone Marrow bx  PERIPHERAL BLOOD SMEAR, BONE MARROW ASPIRATE AND BIOPSY:   MARKEDLY HYPERCELLULAR MARROW WITH MARKED GRANULOCYTIC   HYPERPLASIA AND MILD DYSMEGAKARYOPOIESIS (SEE COMMENT). ANEMIA, NEUTROPHILIA AND THROMBOCYTOSIS. MILD MARROW FIBROSIS. BLASTS ARE NOT INCREASED.   4-5% MONOCLONAL PLASMA CELLS. ADEQUATE IRON STORES.     DIAGNOSIS COMMENT:   Two processes are apparent in the marrow. There is a plasma cell neoplasm with a low percentage of bone marrow plasma cells as noted above. In addition, the marrow is markedly hypercellular (>95%) with marked granulocytic hyperplasia, megakaryocytic atypia, and marrow fibrosis, suggestive of a myeloproliferative neoplasm or possibly a myeloproliferative/myelodysplastic overlap syndrome. Review of laboratory results reveals that the patient had a previously documented negative study for the JAK2 V617F mutation. Although the peripheral blood smear lacks classic features of chronic myeloid leukemia, a diagnosis of CML cannot be excluded. Correlation with pending cytogenetic studies is recommended. If cytogenetic analysis is normal, consideration could be given to performance of FISH studies for the BCL/ABL translocation and/or to additional molecular genetic studies to exclude the presence of other less common genetic abnormalities associated with myeloproliferative neoplasms. These studies can be performed on peripheral blood. 8/29/2019 BM bx  FINAL PATHOLOGIC DIAGNOSIS   Bone marrow, posterior iliac crest, aspirate, clot section, touch imprint, and decalcified core biopsy:   Hypercellular marrow, 80%, with granulocytic proliferation and erythroid hypoplasia. Monoclonal plasma cells, 2%. Negative for increased blast.   Peripheral blood:   Macrocytic anemia, pancytopenia, granulocytic atypia. Comment   The bone marrow is involved by a plasma cell neoplasm along with a possible second neoplastic process. There is a small population of monoclonal plasma cells (less than 10% of total cells) compatible with the clinical diagnosis of MGUS.  In addition, there is peripheral cytopenia in association with a hypercellular marrow with granulocytic proliferation and erythroid hypoplasia with dysplastic features raising the possibility of a myelodysplastic syndrome. Normal cytogenetic studies have been reported. FISH studies are pending for further classification. 9/9/2019 XR CHEST  IMPRESSION:  No acute process on portable chest. No change. 9/9/2019 DUPLEX LE   · No evidence of acute deep vein thrombosis in the right common femoral, superficial femoral, popliteal, posterior tibial, and peroneal veins. The veins were imaged in the transverse and longitudinal planes. The vessels showed normal color filling and compressibility. Doppler interrogation showed phasic and spontaneous flow. · No evidence of acute deep vein thrombosis in the left common femoral, superficial femoral, popliteal, posterior tibial, and peroneal veins. The veins were imaged in the transverse and longitudinal planes. The vessels showed normal color filling and compressibility. Doppler interrogation showed phasic and spontaneous flow. Assessment and Recommendations:   1. 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 Extensive laboratory evaluation has not revealed another cause. This condition is not curable and management is with palliative intent. It is likely to progress further over time. He is not a candidate for chemotherapy. We could consider growth factor support, along with transfusion support. Though I wonder if he would be best served with hospice, given his significant progressive decline (dementia, malnutrition, debility, etc).    -daughter not interested in hospice at this time and would like to try support with growth factor and transfusions  -Hgb pending this am  -9/11  initiated Retacrit 20,000 units   -Repeat stool blood is negative.  -monitor CBC daily and transfuse with higher threshold due to pt becoming symptomatic Hgb < 7  - has follow up in the clinic on 9/17; will monitor CBC and continue growth factor in out patient setting    2. Weakness/Debility  Seems out of proportion to what is seen with MDS. Hospice may be appropriate. If family opts not to proceed with hospice, he at least needs to establish care with a PCP on discharge to help manage these issues. -PT/OT eval  -Daughter is not interested in hospice at this time      3. Decub/ skin wounds  Toes and sacral area  Wound team consulted    4. LE swelling  Doppler negative for DVT    5. Malnutrition  Dietician following    6. Dementia  Continue supportive care    7. MGUS  No evidence of myeloma on bone marrow biopsy. Consider yearly MGUS labs.     8. Goals of care  Palliative team following    Plan reviewed with Dr Brown      Signed By: Taiwo Momin NP

## 2019-09-13 NOTE — TELEPHONE ENCOUNTER
[address], [address] [facility], [address]  Phone:  [phone]  Fax:  [phone]    Patient:  Renu Matthews  YOB: 1935    Hospital Discharge Transition of Care Note    Date/Time:  9/13/2019 4:22 PM    Patient was discharged from [facility] on 9/11/19. Patient was hospitalized 9/9/19 to 9/11/19 and was treated for anemia due to Myelodysplastic syndrome. RRAT score: 22 High    Medical History:     Past Medical History:   Diagnosis Date    Anemia     Cognitive impairment     Controlled type 2 diabetes mellitus, with long-term current use of insulin ([facility] [address] 6/9/2019    Glaucoma     Hypertension     Malignant neoplasm of prostate ([facility] [address] 1998    MGUS (monoclonal gammopathy of unknown significance)     Mildly underweight adult     Paroxysmal A-fib [facility])        This nurse contacted the patient's daughter by telephone the first day after discharge to perform post hospital discharge assessment (pt has dementia). No answer, nurse left message requesting call back. Referral has been approved for pt to be followed by Palliative Home.     SIDDHARTHA HansenN, RN-BC

## 2019-09-13 NOTE — TELEPHONE ENCOUNTER
[facility] Palliative Medicine Office  Nursing Note  [phone]  Fax [phone]      Name:  Primo Morales  YOB: 1935    Received Home Based Primary Care vs  Palliative Home referral from James Polanco NP (inpatient Palliative Medicine team). Chart  reviewed. Primo Morales is a 82 y.o. male who was hospitalized 9/9/19-9/12/19 at [facility] with anemia, worsening confusion, fatigue. Pt's Hgb was 6.2 on admission. Pt has a past history of afib, HTN, DM, prostate CA, chronic anemia/MGUS, dementia.       Patient was admitted to [facility] previously from 08/26-08/29/19 for anemia and underwent transfusion and bone marrow biopsy which was suggestive of MDS. Extensive laboratory evaluation has not revealed another cause. Patient is not a candidate for chemotherapy. In the past 2 years, he has required over a dozen blood transfusions. In the hospital, it was suggested that patient would be best served with hospice, given his significant progressive decline (dementia, malnutrition, debility, etc). Pt's daughter declined hospice. ACP:   Pt has a POST that was signed on 6/17/19                                                                                                                                                 Nurse discussed referral with Dr. Bridger Baca. Due to pt's debility and prognosis, Dr. Brian Braun will see pt with Palliative Home.   Nurse called pt's daughter, no answer, left message requesting call back.     Bernadine Smiley, SIDDHARTHAN, RN-BC  Clinical Referral Navigator  [phone]

## 2019-09-16 NOTE — PROGRESS NOTES
[facility] [place] [facility]  [address] [facility], [address]  Jasper Sniff: [phone]  F: [phone]     Reason for Visit:   Alejandro Cantor is a 82 y.o. male who is seen for follow up of anemia, MDS. History of Present Illness:   Alejandro Cantor is a pleasant 82 y.o. male who presents today for hospital follow up of MDS. See inpatient consult notes for details. He recently relocated to this area to live with his daughter, so that she could help care for him. He has had a dramatic functional decline over the past year, with numerous hospitalizations both in [address] [place] [address] and here in [place]. We performed a bone marrow biopsy during one of these hospitalizations, and the results were consistent with MDS. Lengthy goals of care discussions were held in the hospital, with hospice recommended but declined by the patient's daughter. He was given a dose of EPO in the hospital, as well as blood transfusions, and this follow up was arranges so that we could continue aranesp as an outpatient. Currently, he remains very weak. Sleeping a lot. Not able to get up and walk, spends most of his time in bed, sometimes in a chair. Not eating well. No pain. No bleeding. He is accompanied by his daughter and a caregiver today.     Past Medical History:   Diagnosis Date    Anemia     Cognitive impairment     Controlled type 2 diabetes mellitus, with long-term current use of insulin ([address] 6/9/2019    Glaucoma     Hypertension     Malignant neoplasm of prostate ([facility] [address] 1998    MGUS (monoclonal gammopathy of unknown significance)     Mildly underweight adult     Paroxysmal A-fib [facility])       Past Surgical History:   Procedure Laterality Date    BIOPSY OF PROSTATE,INCISIONAL  4/98    [place] EXT BEAM RADIATION AS PRIMARY THERAPY TO PROSTATE ONLY  4/30-6/29/98      Social History     Tobacco Use    Smoking status: Current Some Day Smoker     Packs/day: 0.10     Years: 22.00     Pack years: 2.20     Types: Cigarettes    Smokeless tobacco: Never Used   Substance Use Topics    Alcohol use: No     Alcohol/week: 0.0 standard drinks      Family History   Problem Relation Age of Onset    Diabetes Other     Hypertension Other      Current Outpatient Medications   Medication Sig    PRO-STAT AWC  gram-kcal/30 mL liqd TAKE 30 MLS BY MOUTH TWICE DAILY    insulin glargine (LANTUS SOLOSTAR U-100 INSULIN) 100 unit/mL (3 mL) inpn 5 Units by SubCUTAneous route nightly.  insulin aspart U-100 (NOVOLOG) 100 unit/mL injection by SubCUTAneous route. Use as needed before each meals if:    Blood sugar 150-199, give 2 units  Blood sugar 200-249, give 4 units  Blood sugar 250-299, give 6 units  Blood sugar >300, give 8 units    cephALEXin (KEFLEX) 500 mg capsule Take 500 mg by mouth four (4) times daily.  calcium carbonate (CALCIUM 500) 500 mg calcium (1,250 mg) tablet Take 1 Tab by mouth daily.  ferrous sulfate 325 mg (65 mg iron) tablet Take 1 Tab by mouth daily (with breakfast).  ascorbic acid, vitamin C, (VITAMIN C) 250 mg tablet Take 1 Tab by mouth two (2) times a day.  metoprolol tartrate (LOPRESSOR) 50 mg tablet Take 25 mg by mouth two (2) times a day.  oxybutynin (DITROPAN) 5 mg tablet Take 5 mg by mouth three (3) times daily.  dorzolamide (TRUSOPT) 2 % ophthalmic solution Administer 1 Drop to both eyes three (3) times daily.  Insulin Needles, Disposable, 31 gauge x 5/16\" ndle Use as needed.  valsartan (DIOVAN) 40 mg tablet Take 1 Tab by mouth daily.  glucose 4 gram chewable tablet Take 4 Tabs by mouth as needed.  therapeutic multivitamin (THERAGRAN) tablet Take 1 Tab by mouth daily.  travoprost (TRAVATAN Z) 0.004 % ophthalmic solution Administer 1 Drop to both eyes two (2) times a day.  brinzolamide (AZOPT) 1 % ophthalmic suspension Administer 1 Drop to both eyes two (2) times a day.     brimonidine-timolol (COMBIGAN) 0.2-0.5 % drop ophthalmic solution Administer 1 Drop to both eyes every twelve (12) hours.  atorvastatin (LIPITOR) 40 mg tablet Take 1 Tab by mouth nightly. No current facility-administered medications for this visit. Allergies   Allergen Reactions    Iodinated Contrast Media Hives     Faint, shaking        Review of Systems: A complete review of systems was obtained, negative except as described above. Physical Exam:     Visit Vitals  /51 (BP 1 Location: Right arm, BP Patient Position: Sitting)   Pulse 83   Temp 98.3 °F (36.8 °C) (Temporal)   Resp 20     ECOG PS: 4  General: No distress, frail, elderly male with apparent dementia  Eyes: PERRLA, anicteric sclerae  HENT: Atraumatic, OP clear  Neck: Supple  Lymphatic: No cervical, supraclavicular, or inguinal adenopathy  Respiratory: CTAB, normal respiratory effort  CV: Normal rate, regular rhythm, no murmurs, no peripheral edema  GI: Soft, nontender, nondistended, no masses, no hepatomegaly, no splenomegaly  MS: Sitting in wheelchair, very weak. Digits without clubbing or cyanosis. Skin: Bandages on sacral decub and ankle ulcer. Psych: Alert, oriented, appropriate affect, normal judgment/insight    Results:     Lab Results   Component Value Date/Time    WBC 13.2 (H) 09/12/2019 10:27 AM    HGB 7.9 (L) 09/12/2019 10:27 AM    HCT 24.5 (L) 09/12/2019 10:27 AM    PLATELET 515 (L) 17/04/9566 10:27 AM    MCV 99.2 (H) 09/12/2019 10:27 AM    ABS.  NEUTROPHILS 10.4 (H) 09/11/2019 06:33 AM     Lab Results   Component Value Date/Time    Sodium 138 09/10/2019 05:35 AM    Potassium 3.7 09/10/2019 05:35 AM    Chloride 109 (H) 09/10/2019 05:35 AM    CO2 25 09/10/2019 05:35 AM    Glucose 90 09/10/2019 05:35 AM    BUN 24 (H) 09/10/2019 05:35 AM    Creatinine 0.54 (L) 09/10/2019 05:35 AM    GFR est AA >60 09/10/2019 05:35 AM    GFR est non-AA >60 09/10/2019 05:35 AM    Calcium 7.7 (L) 09/10/2019 05:35 AM    Glucose (POC) 239 (H) 09/12/2019 11:09 AM     Lab Results   Component Value Date/Time Bilirubin, total 0.5 09/10/2019 05:35 AM    ALT (SGPT) 20 09/10/2019 05:35 AM    AST (SGOT) 43 (H) 09/10/2019 05:35 AM    Alk. phosphatase 68 09/10/2019 05:35 AM    Protein, total 6.0 (L) 09/10/2019 05:35 AM    Albumin 1.4 (L) 09/10/2019 05:35 AM    Globulin 4.6 (H) 09/10/2019 05:35 AM     Lab Results   Component Value Date/Time    Reticulocyte count 1.5 09/10/2019 05:35 AM    Iron % saturation 22 08/26/2019 01:24 PM    TIBC 140 (L) 08/26/2019 01:24 PM    Ferritin 2,797 (H) 08/26/2019 05:09 PM    Vitamin B12 1,252 (H) 08/26/2019 05:09 PM    Folate 40.1 (H) 08/26/2019 05:09 PM    Haptoglobin 192 08/26/2019 05:09 PM    SIENNA Poly POS 08/26/2019 01:24 PM     (H) 08/26/2019 05:09 PM    Beta-2 Microglobulin, serum 2.5 (H) 09/12/2017 02:21 PM    Sed rate, automated 68 (H) 07/24/2018 10:47 AM    TSH 1.91 08/26/2019 01:24 PM    M-Rigo Not Observed 07/16/2019 10:22 AM    GIRISH, Direct Positive (A) 08/26/2019 05:09 PM     Lab Results   Component Value Date/Time    INR 1.2 06/04/2019 09:20 AM    aPTT 102.5 (H) 06/09/2019 04:12 PM       8/29/2019 Bone marrow biopsy  Hypercellular marrow, 80%, with granulocytic proliferation and erythroid hypoplasia. Monoclonal plasma cells, 2%. Negative for increased blast.   Comment   The bone marrow is involved by a plasma cell neoplasm along with a possible second neoplastic process. There is a small population of monoclonal plasma cells (less than 10% of total cells) compatible with the clinical diagnosis of MGUS. In addition, there is peripheral cytopenia in association with a hypercellular marrow with granulocytic proliferation and erythroid hypoplasia with dysplastic features raising the possibility of a myelodysplastic syndrome. Normal cytogenetic studies have been reported. FISH studies negative      Assessment:   1) MDS  His bone marrow biopsy is somewhat indeterminate, but suggestive of MDS. Certainly this fits with his overall clinical picture.   He is not a candidate for chemotherapy based on his poor performance status. He is currently being managed supportively with epo and transfusions. 2) Anemia, macrocytic  He recently started EPO in the hospital, and we will continue this as an outpatient (Aranesp) with the goal of reducing his transfusion requirements. Monitor HGB and transfuse PRN for HGB <7.5, which seems to be the threshold at which he becomes increasingly symptomatic. 3) Thrombocytopenia  Mild, secondary to #1. Monitor. 4) Debility  Continue PT/OT at home. 5) Malnutrition  Continue nutrition support at home    6) Dementia  Family denies this, but it seems quite evident to me. Monitor, continue supportive care. 7) Sacral Decub  Wound care following at home    8) Goals of care  Hospice had been recommended by myself and others, but declined by his family. His daughter is hopeful that she can nurse him back to health, and she wants to try for a few months, but may be willing to reconsider hospice in the future. Palliative care home visits have been approved.     Plan:     · Labs every 2 weeks: CBC and sample to blood bank  · Aranesp 100mcg every 2 weeks  · Transfuse 1u pRBC for HGB <7.5  · Return to see us in 6 weeks      Signed By: Lillie Taylor MD

## 2019-09-16 NOTE — TELEPHONE ENCOUNTER
[address] [place] [address], [address]  Phone:  [phone]  Fax:  [phone]    Patient:  Brooklynn Zaragoza  YOB: 1935    Hospital Discharge Transition of Care Note    Date/Time:  2019 2:13 PM    Patient was discharged from [facility] on 19. Patient was hospitalized 19 to 19 and was treated for anemia due to Myelodysplastic syndrome. RRAT score: 22 High    Medical History:     Past Medical History:   Diagnosis Date    Anemia     Cognitive impairment     Controlled type 2 diabetes mellitus, with long-term current use of insulin ([facility] [address] 2019    Glaucoma     Hypertension     Malignant neoplasm of prostate ([facility] [address]     MGUS (monoclonal gammopathy of unknown significance)     Mildly underweight adult     Paroxysmal A-fib [facility])      This nurse contacted the patient's daughter by telephone the first day after discharge (19) to perform post hospital discharge assessment (pt has dementia). No answer, nurse left message requesting call back. Dtr did not call back, nurse called today (19) on second business day after hospital discharge and nurse was able to reach dtr. Nurse verified pt's  and address as identifiers.     Diet:   Daughter reports: Diabetic Diet    Activity:    Daughter reports: orders say \"as tolerated\". Daughter says pt is weak and mostly in bed. She says she was able to get pt into chair today by daughter and granddaughter lifting pt. DME:  Daughter states they have the DME that patient needs:  hospital bed, shower chair, RW, stair climber, gait belts    Medication:   Performed medication reconciliation with daughter, and patient verbalizes understanding of administration of home medications. There were no barriers to obtaining medications identified at this time.     Current Outpatient Medications on File Prior to Visit   Medication Sig Dispense Refill    insulin glargine (LANTUS SOLOSTAR U-100 INSULIN) 100 unit/mL (3 mL) inpn 5 Units by SubCUTAneous route nightly.  insulin aspart U-100 (NOVOLOG) 100 unit/mL injection by SubCUTAneous route. Use as needed before each meals if:    Blood sugar 150-199, give 2 units  Blood sugar 200-249, give 4 units  Blood sugar 250-299, give 6 units  Blood sugar >300, give 8 units      cephALEXin (KEFLEX) 500 mg capsule Take 500 mg by mouth four (4) times daily.  calcium carbonate (CALCIUM 500) 500 mg calcium (1,250 mg) tablet Take 1 Tab by mouth daily. 30 Tab 3    ferrous sulfate 325 mg (65 mg iron) tablet Take 1 Tab by mouth daily (with breakfast). 30 Tab 3    ascorbic acid, vitamin C, (VITAMIN C) 250 mg tablet Take 1 Tab by mouth two (2) times a day. 60 Tab 3    metoprolol tartrate (LOPRESSOR) 50 mg tablet Take 25 mg by mouth two (2) times a day.  oxybutynin (DITROPAN) 5 mg tablet Take 5 mg by mouth three (3) times daily.  dorzolamide (TRUSOPT) 2 % ophthalmic solution Administer 1 Drop to both eyes three (3) times daily.  Insulin Needles, Disposable, 31 gauge x 5/16\" ndle Use as needed. 1 Package 11    valsartan (DIOVAN) 40 mg tablet Take 1 Tab by mouth daily. 30 Tab 6    glucose 4 gram chewable tablet Take 4 Tabs by mouth as needed. 10 Tab 0    therapeutic multivitamin (THERAGRAN) tablet Take 1 Tab by mouth daily. 30 Tab 0    travoprost (TRAVATAN Z) 0.004 % ophthalmic solution Administer 1 Drop to both eyes two (2) times a day. 5 mL 0    brinzolamide (AZOPT) 1 % ophthalmic suspension Administer 1 Drop to both eyes two (2) times a day. 5 mL 0    brimonidine-timolol (COMBIGAN) 0.2-0.5 % drop ophthalmic solution Administer 1 Drop to both eyes every twelve (12) hours. 5 mL 0    atorvastatin (LIPITOR) 40 mg tablet Take 1 Tab by mouth nightly. No current facility-administered medications on file prior to visit.       Support system:  Daughter and granddaughter    Discharge Instructions :  Reviewed discharge instructions with patient's daughter. She verbalizes understanding of discharge instructions and follow-up care. Red Flags:  - patient has had a dramatic functional decline over the past year, with numerous hospitalizations both in [address] [place] [address] and in [address] [place] [address] Loop has been recommended but declined by the patient's daughter    Advance Care Planning:   POST that was signed on 6/17/19 is on file    PCP/Specialist follow up:   Patient scheduled to follow up with Dr. Palomares Session on 9/17/19. Palliative Home provider will make a visit within 7-10 days. Case management Impression/ plan:  The pt is likely to remain in fragile health and have ongoing high risks of complications and death. Patient meets criteria for hospice but daughter declines. She told Dr. Madie Perez that she is hopeful that she can nurse him back to health, and she wants to try for a few months, but may be willing to reconsider hospice in the future. Pt's daughter has outpatient Palliative Medicine contact number [phone]  for further questions or concerns.      Emile Méndez, SIDDHARTHAN, RN-BC

## 2019-09-17 NOTE — PROGRESS NOTES
[facility] Progress Note    Date: 2019    Name: dEmundo Gonzalez    MRN: [id number]         : 1935    Mr. Malgorzata Sher Arrived ambulatory and in no distress for Aranesp Injection. Assessment was completed, patient was drowsy and very difficult to get a weight on due to his weakness. Required CNA and daughter to position patient on the scale. no acute issues at this time, no new complaints voiced per daughter. The patient was unable to answer most of the questions. Mr. Jase Olson vitals were reviewed. Visit Vitals  /70   Pulse 98   Temp 96.2 °F (35.7 °C)   Resp 18   Ht 6' 1\" (1.854 m)   Wt 63.7 kg (140 lb 6.4 oz)   SpO2 93%   BMI 18.52 kg/m²     Labs were within parameters for treatment and the patient did not require a blood transfusion  Recent Results (from the past 12 hour(s))   CBC WITH AUTOMATED DIFF    Collection Time: 19 12:47 PM   Result Value Ref Range    WBC 9.0 4.1 - 11.1 K/uL    RBC 2.63 (L) 4.10 - 5.70 M/uL    HGB 8.4 (L) 12.1 - 17.0 g/dL    HCT 27.2 (L) 36.6 - 50.3 %    .4 (H) 80.0 - 99.0 FL    MCH 31.9 26.0 - 34.0 PG    MCHC 30.9 30.0 - 36.5 g/dL    RDW 19.6 (H) 11.5 - 14.5 %    PLATELET 061 (L) 945 - 400 K/uL    MPV 10.3 8.9 - 12.9 FL    NRBC 0.3 (H) 0  WBC    ABSOLUTE NRBC 0.03 (H) 0.00 - 0.01 K/uL    NEUTROPHILS 88 (H) 32 - 75 %    LYMPHOCYTES 4 (L) 12 - 49 %    MONOCYTES 2 (L) 5 - 13 %    EOSINOPHILS 0 0 - 7 %    BASOPHILS 1 0 - 1 %    IMMATURE GRANULOCYTES 5 (H) 0.0 - 0.5 %    ABS. NEUTROPHILS 7.8 1.8 - 8.0 K/UL    ABS. LYMPHOCYTES 0.4 (L) 0.8 - 3.5 K/UL    ABS. MONOCYTES 0.2 0.0 - 1.0 K/UL    ABS. EOSINOPHILS 0.0 0.0 - 0.4 K/UL    ABS. BASOPHILS 0.1 0.0 - 0.1 K/UL    ABS. IMM.  GRANS. 0.5 (H) 0.00 - 0.04 K/UL    DF AUTOMATED      RBC COMMENTS ANISOCYTOSIS  1+        RBC COMMENTS MACROCYTOSIS  1+             Medications:  Medications Administered     darbepoetin jeremy (ARANESP) injection 100 mcg     Admin Date  2019 Action  Given Dose  100 mcg Route  SubCUTAneous Administered By  Nellie Sagastume RN                  Mr. Gloria Clay tolerated treatment well and was discharged from [place] [address] in stable condition.       Jenelle Chowdhury RN  September 17, 2019

## 2019-09-18 NOTE — TELEPHONE ENCOUNTER
Ms. Lisa Lionel dyson/ [facility] is calling to update referral nurse on patient. States he has really declined and family was wondering when when home visit will be. Call transferred to nurse to receive update.

## 2019-09-18 NOTE — TELEPHONE ENCOUNTER
[facility] Palliative Medicine Office  Nursing Note  [phone]  Fax [phone]     Name:  Emely Dietrich  YOB: 1935     Incoming call from Libia Donaldson PT with [address]. She is in [facility]'s home now. Ms. Jojo Friedman states pt is weak and she is unable to get him out of bed. Daughter is asking when Palliative Home will be coming. This nurse is working with the providers to try to schedule Palliative Home visit as soon as possible, earliest availability is probably this Friday Sept. 20.      Nurse explained to Ms. Jojo Friedman that it has been recommended to pt's daughter multiple times that Mr. Bora Blount is appropriate for hospice. Daughter continues to decline hospice services. Nurse will discuss with Palliative Home providers. Nurse discussed above with Dr. Skip Lim. Dr. Rachele Rodríguez will rearrange her schedule for tomorrow so she can see Mr. Bora Blount tomorrow morning. Nurse called pt's daughter to inform her that Dr. Tejas Manning can make a Palliative Home visit tomorrow morning. No answer, left message requesting Ms. Bower call our office to let us know if that will work. Daughter returned call and said that a Palliative Home visit will work with her for tomorrow morning.     Cristine Borja, SIDDHARTHAN, RN  Clinical Referral Navigator

## 2019-09-18 NOTE — TELEPHONE ENCOUNTER
[facility]  Palliative Medicine Social Work  Tye Zimmerman  [phone]    Note:  Called to set up an appointment to review Palliative Home services and have papers signed prior to the Palliative MD home visit on 9/19/19. Because the MD will visit around 10:00am, requested a SW visit around 9:00am.   Requested a call back from daughter.     Denae Nicole, ARSLANW, LMSW, [facility]    Palliative   Respecting Choices ® ACP Facilitator  Palliative Medicine  [phone]

## 2019-09-19 NOTE — PROGRESS NOTES
[facility] Palliative Medicine Home Psychosocial Assessment [phone] Reason for Assessment:   
Initial Palliative Home explanation of services and SW assessment Sources of Information:   
Daughter, medical record Advance Care Planning: 
 
  Primary Decision Maker: Abhilash Melo [employer] [facility]) - Daughter - [phone] Advance Care Planning 9/19/2019 Patient's Healthcare Decision Maker is: Named in scanned ACP document Confirm Advance Directive Yes, on file Does the patient have other document types MOST/MOLST/POST/POLST;Do Not Resuscitate Emergency Action Plan: 
[x]During today's visit, SW reviewed the Emergency Action Plan with patient and family. SW completed the Emergency Numbers, reviewed the content areas of Power Loss, Natural Disasters, Clinical Emergencies, Severe Weather, Safety in the Home, and Infection Control. Patient assessed to have an acuity value of 3 as evidenced by full assistance required. Mental Status:   
[]Alert 
[x]Lethargic []Unresponsive Oriented to:  [x]Person  []Place  []Time  []Situation Barriers to Learning:   
[]Language  []Developmental  [x]Cognitive  []Altered Mental Status  []Forgetfulness []Visual/Hearing Impairment  []Unable to Read/Write  []Motivational   []No Barriers Identified  []Other: 
 
Relationship Status: 
[]Single  []  []Significant Other/Life Partner  []  []  [x] Financial/Legal Concerns:   
[]Uninsured []Limited Income/Resources []Non-Citizen []Utility Issues []Food Insecurity []Transportation Issues [x]No Concerns Identified []Financial POA:   
[]Other: 
 
Living Circumstances: 
[]House [x]Apartment 
[]Assisted Living Facility/Group Home Facility: 
[]Homeless []Incarcerated []Lives Alone []Family/Significant Other in Household List:  Mich Cuellar, daughter; Jaime Rodriguez, granddaughter; 12 y/o and 25 m/o grandchidlren []Roommates List:  
[x]Paid Caregivers List:  Cassie Whitaker [x]Children in the Home - Ages: ages 11 and 21 months [] Issues - Details: Support System [x]Strong  []Fair  []Limited 
[]Community agencies involved Name/Contact: 
[]History with Adult Protective Services []History with  or Psychiatrist;  Name(s): If yes, they be contacted by the Palliative Medicine team? []Yes []No  
 
Care Giver Stress Assessment 1. In caring for a loved one how often do you have feelings of helplessness? - Never, Sometimes, Always 2. In caring for a loved one how often do you experience being utterly drained of feeling? - Never, Sometimes, Always 3. In caring for a loved one how often do you experience the feeling of being burned out?  Never, Sometimes, Always Care Provision Plan in the Event of Patient Decline: 
[x]In place - Details: 24 hour care from a combination of daughter, granddaughter, and hired care []Actively being developed by patient / support system []Ready to begin the process of creating care provision plans []Understands the importance of creating care provision plans, but not ready to begin []Patient and/or support system resisting the creation of care provision plans [x] Receives help with ADLs [x] Yes, Who helps you?  daughter, granddaughter, and hired care - Cassie Whitaker How many hours/days? 24/7 
 [] No, Do you need help? Environment and Safety 
[] Problem with bed bugs, odors, pests, or pets 
[x] Working smoke alarm 
[] Difficulty getting to exits from the home 
[]Emergency Call Device system in place - Which brand?, 
[]Interested in obtaining and subscribing to an Emergency Call Device system 
[] If you have any firearms in the home, they must be stored in a locked container or gun safe or disabled with a gun lock []Substance Use: 
 Tobacco? [] Yes [x] No  
 Alcohol? [] Yes, How many drinks per week: [x] No 
 Drugs? [] Yes, which drugs:   [x] No 
[x] No concerns identified Protestant/Spiritual/Existential: 
[x]Strong Sense of Spirituality  []Involved in [facility] []Request  Visit  []Expressing Spiritual/Existential Angst  [x]No Concerns Identified Sleep Habits:  
[]Poor []Unsatisfactory []Satisfactory []Good [x]Very Good Specific sleep problems:  
 
Coping with Illness:       
 Patient: Family/Caregiver:  
Understanding and Acceptance of Illness/Prognosis  [] [x] Strong Sense of Resilience [] []  
Self Reflection [] [x] Engaged Support System [x] [x] Does not Readily Discuss Illness [] [] Denial of Terminal Status [] [] Anger [] [] Depression [] [] Anxiety/Fear [] [x] Bargaining [] [] Recent Diagnosis/Prognosis [] [] Difficulties with Body Image [] [] Loss of Identity [] [] Excessive Substance Use [] [] Mental Health History [] []  
Enmeshed Relationships [] [] History of Loss [] [] Anticipatory Grief [] [x] Concern for Complicated Grief [] [] Suicidal Ideation or Plan [] [] Caregiver Stress [] [x] Unable to assess [] []  
 
Narrative:  
Mr. Gretta Meyers is an 83 y/o man whose pmh includes: 
Past Medical History:  
Diagnosis Date  Anemia  Cognitive impairment  Controlled type 2 diabetes mellitus, with long-term current use of insulin ([facility] 6/9/2019  Glaucoma  Hypertension  Malignant neoplasm of prostate ([facility] 1998  MGUS (monoclonal gammopathy of unknown significance)  Mildly underweight adult  [facility] He lives with his daughter, Estevan Faustin ([phone]), granddaughter, Supriya Wright, and 2 great grandchildren ages 11 and 21 months. Pt has 3 other children: a son in [place], a daughter in [place], and another daughter in [place]. Wali Faustin said the son takes care of his mother in [place] and made a visit to see pt once he heard of his illness. She said her sisters haven't stepped up, a disappointment to her.   Daughter told of researching family through [email or web address] and learning of cousins in the [facility] area. She reached out to them and was able to learn who to hire for pt's care provision. As stated above, pt is receiving 24 hour care between daughter, granddaughter, and a hired caregiver, who is a private hire. Daughter expressed stress re: observing the decline of her dad. She stated she realizes one day he will need hospice care and she and patient have already decided no more hospitalizations. She had questions about the difference between Palliative Care and Hospice Care. Assessment / Actions: 1. Described Palliative Medicine to pt and family, letting them know Palliative Medicine is an additional layer of care specializing in serious illness for relief of pain, symptoms, stress, is for the patient as well as the family, assists with decisions for Goals of Care, and includes a team approach to improve quality of life for all involved. 2. Reviewed admission papers for Palliative Medicine with daughter, Carol Farah, agreeing to sign for services. 3. Introduced Palliative Social Work as part of the PM supportive team by providing emotional support, resource referrals, advocacy, assistance with AMDs and counseling. 4. New patient assessment of psychosocial needs and social determinants of health completed. 5. Worked to establish rapport, encouraging expression while actively listening. 6. Discussed differences between Palliative and Hospice. Goals/Plan:  
Follow up with daughter to address caregiver stress and anticipatory grief. Ongoing psychosocial support including assessments, links with resources and counseling prn. Length of Visit: 80 min. Social Work Plan of Care 
      
  POC  
? New patient assessment of psychosocial needs and social determinants of health  [x] ? Review Home Palliative Medicine Services  [x] ? Review Emergency Preparedness Plan ? Initiate conversation regarding health care wishes ? Assess current Advance Care Planning documents and make ACP recommendations as appropriate ? Discuss goals of care and treatment preferences ? Engage family in patient health care goals to ensure support for those goals and minimize patient/family conflict  [x] 
[] 
 
 
[] 
 
 
[] 
 
[] ? Assess safety concerns and address as appropriate  [x] ? Assess coping strengths for the patient and caregiver ? Assess for caregiver burden and intervene as psychosocially indicated  [x] [x] 
  
? Assess patient for care provision needs to optimize psychosocial function and safety ? Assess support system for the patient and caregiver, working to enhance support when needed.  [] 
 
 
[x]  
? Assist in optimizing levels of psychosocial function  [] ? Screen for mental health conditions including but not limited to anxiety, depression, and anticipatory grief ? Offer counseling services for mental health conditions including but not limited to anxiety, depression, and anticipatory grief ? Assess cultural needs of patient/family, educate interdisciplinary team, and engage  [] 
 
 
 
[x] 
 
 
[] ? Link patient with appropriate community resources  [x] ? Establish therapeutic relationship through in home visits and telephonic touch points  []  
? SW to remain available for psychosocial support and resources as needed and/or requested  [x] Frequency and Duration of Social Work Visits PRN [] Roshan Hinkle, ZAIN, LMSW, [facility] Palliative  Palliative Medicine 
[phone]

## 2019-09-20 NOTE — TELEPHONE ENCOUNTER
Palliative Medicine  Nursing Note  [phone]  Fax [phone]      Telephone Call  Patient Name: [facility]  YOB: [birth year]        Primary Decision Maker: Jeromy Ruiz [employer] [facility]) - Daughter - [address] Planning 9/19/2019   Patient's [address] is: Named in scanned ACP document   Confirm Advance Directive Yes, on file   Does the patient have other document types MOST/MOLST/POST/POLST;Do Not Resuscitate     6:08-6:13pm-Call returned to Dimitris, patient's daughter. She just wanted to share that the Home Health nurse was out to visit her dad this afternoon and mentioned to her that his heart rate was elevated to 123, he had a sacral wound with a \"black scab\" and an infection could set in if that came off, his left arm/hand was swollen, and maybe she should take him to the ED or start hospice. Dimitris mentioned that the wound and the arm swelling is not new and all of his providers are aware. They have a wound nurse, OT, PT, and Palliative involved at home for patient at this time. In regards to his pulse, Dimitris said his caregiver said he had just had a bowel movement when his pulse was taken. Dimitris was not alarmed or worried at this time with his symptoms, but wanted to inform Palliative of what the nurse had shared with her. She has a blood pressure cuff and said she will check his b/p and pulse once a day. Suggested to call Palliative 24/7 should symptoms change, his blood pressure drop and/or his pulse increase, or for any other questions or concerns. Dimitris verbalized understanding and was appreciative.       Maddy Murguia, RN  Palliative Medicine

## 2019-09-20 NOTE — TELEPHONE ENCOUNTER
Brijesh Chacko is calling to speak to nurse. States that nurse Gavino Camejo with BS states patient's pluse 123 and left arm is swollen. Gavino Camejo was stating that Brijesh Chacko needed to call us to see what to do. Advised nurse would call her back.

## 2019-09-23 NOTE — TELEPHONE ENCOUNTER
Stewart CID [facility] informs she will draw labs on Wednesday when she is scheduled to go back out and see patient and have them STAT the results

## 2019-09-23 NOTE — TELEPHONE ENCOUNTER
Ms. Powell Reasons is calling per MD's instructions:    1)  Patient now has fever of 101  2)  Patient is calling out  \1)  Ms. Bower has given patient 500 mg of Tylenol. 4)  Patient ate breakfast this morning. Advised Ms. Bower that nurse would call her back.

## 2019-09-23 NOTE — TELEPHONE ENCOUNTER
Just started with a fever today of 101 , tylenol given with in the hour per Shireen Dunbar , he started saying things crazy , he is eating with out concern, per Dr Berkley Heredia , CBC,CMP, UA and Culture to be collected by [facility] , order faxed with confirmation , Cathy Hadley to contact our office if symptoms worsen

## 2019-09-23 NOTE — PROGRESS NOTES
Palliative Medicine Outpatient Services [place]: [phone]) Patient Name: Minerva Barrios YOB: 1935 Date of Current Visit: 9/19/2019 Location of Current Visit:   
[] [facility] Office 
[] [facility] Office [] [address] [facility] [x] Home 
[] Other:   
 
Date of Initial Visit: 9/19/2019 Referral from: Dr. Karen Hi: 
Primary Care Physician: Wei Cruz MD 
  
 SUMMARY:  
Minerva Barrios is a 82y.o. 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 Karen Hi for management of end-stage disease and caregiver support. The patients social history includes patient lived in [address] [place] [address] all his life, has 4 children, Dimitris has moved her dad from [place] to her home here to care for him due to his worsening memory and high care needs. Initial Referral Intake note from Wilfrido Locke RN reviewed prior to visit PALLIATIVE DIAGNOSES:  
 
  ICD-10-CM ICD-9-CM 1. Chronic fatigue R53.82 780.79   
2. Chronic anemia D64.9 285.9 3. Myelodysplastic syndrome ([facility] [address] D46.9 238.75   
4. Cognitive impairment R41.89 294.9 5. Caregiver burden Z63.6 V61.49   
6. Easy bruisability R23.8 782.9 PLAN:  
Patient Instructions Dear Minerva Barrios , It was a pleasure seeing you today in your home We will see you again in 1 month If labs or imaging tests have been ordered for you today, please call the office  at [phone] 48 hours after completion to obtain the results. This is the plan we talked about: 1. This is what you have shared with us about Advance Care Planning: 
 
  Primary Decision Maker: Missael Mena [employer] [facility]) - [place] - [phone] Advance Care Planning 9/19/2019 Patient's Healthcare Decision Maker is: Named in scanned ACP document Confirm Advance Directive Yes, on file Does the patient have other document types MOST/MOLST/POST/POLST;Do Not Resuscitate The Palliative Medicine Team is here to support you and your family. Sincerely, 
 
 
Kandy Momin MD and the Palliative Medicine Team 
 
 
 GOALS OF CARE / TREATMENT PREFERENCES:  
[====Goals of Care====] GOALS OF CARE: 
Patient / health care proxy stated goals: See Patient Instructions / Summary TREATMENT PREFERENCES:  
Code Status:  [] Attempt Resuscitation       [x] Do Not Attempt Resuscitation Advance Care Planning: 
[] The [employer] Interdisciplinary Team has updated the ACP Navigator with Decision Maker and Patient Capacity Primary Decision Maker: Marisel Bower Mikael - daughter - [phone] Advance Care Planning 9/19/2019 Patient's Healthcare Decision Maker is: Named in scanned ACP document Confirm Advance Directive Yes, on file Does the patient have other document types MOST/MOLST/POST/POLST;Do Not Resuscitate Other: 
(If patient appropriate for POST, consider using PALLPOST smart phrase here) The palliative care team has discussed with patient / health care proxy about goals of care / treatment preferences for patient. 
[====Goals of Care====] PRESCRIPTIONS GIVEN:  
No orders of the defined types were placed in this encounter. FOLLOW UP: Future Appointments Date Time Provider Baljit Casper 9/25/2019 To Be Determined Day Kruse LPN [address] [facility]  
9/25/2019 To Be Determined Laura Van [facility]  
9/27/2019 To Be Determined Rosita Hung [address]  
9/27/2019 To Be Determined Day Kruse LPN [address] [facility]  
9/30/2019 To Be Determined Day Kruse LPN [address] [facility]  
9/30/2019 To Be Determined Laura [place] [facility]  
10/1/2019 10:00 AM SS INF6 CH4 <1H [facility] 10/2/2019 To Be Determined Harlon Breech, LPN [address] [facility]  
10/2/2019 To Be Determined Harrison Perez [address]  
10/4/2019 To Be Determined Oscar Shirley PT [address]  
10/4/2019 To Be Determined Desean Stage, LUBA Mina  
10/7/2019 To Be Determined Harrison Perez [address]  
10/7/2019 To Be Determined Harlon Breech, LPN [address] [facility]  
10/9/2019 To Be Determined Harrison Perez [address]  
10/9/2019 To Be Determined Harlon Breech, LPN [address] [facility]  
10/11/2019 To Be Determined Desean Stage, RN [address] [facility]  
10/14/2019 To Be Determined Harrison [address]  
10/15/2019 To Be Determined Harlon Breech, LPN [address] [facility]  
10/15/2019 10:00 AM SS INF7 CH3 <1H [facility]  
10/17/2019 To Be Determined Guzman Young [address]  
10/22/2019 To Be Determined Waynemaydan Hannah [facility]  
10/24/2019 To Be Determined Desean Stage, RN [address] [facility]  
10/28/2019 10:30 [place] [address] [facility]  
10/29/2019 To Be Determined Harlon Breech, LPN [address] [facility]  
10/29/2019 10:00 AM SS INF6 CH4 <1H [facility] [place]  
10/29/2019 10:15 AM Marquis Red NP [facility]  
10/31/2019 To Be Determined Harlon Hannah, [facility]  
11/5/2019 To Be Determined Desean Stage, RN [address] [facility]  
11/7/2019 To Be Determined Desean Alexandre, RN [address] [facility]  
11/12/2019 10:00 AM [facility]  
11/26/2019 10:00 AM SS INF7 [facility]H [facility]  
12/2/2019  9:30 AM Radha Tavares NP [facility]  
6/8/2020  9:00 AM Lainey Ho MD [address] [place] [address] PHYSICIANS INVOLVED IN CARE:  
Patient Care Team: 
Patti Mensah MD as PCP - General (General Practice) Wilber Alpers, MD as Physician (Urology) Pipo Rodriguez MD as Physician (Urology)  HISTORY:  
 Reviewed patient-completed ESAS and advance care planning form. Reviewed patient record in prescription monitoring program. 
 
CHIEF COMPLAINT: No chief complaint on file. HPI/SUBJECTIVE: The patient is: [x] Verbal / [] Nonverbal  
 
Patient seen at home along with his daughter Alexander Minaya. He is in a wheelchair with his neck flexed but listening to the television. He does not participate much in history taking but is able to answer direct questions and denies any pain. Daughter Alexander Minaya provides all the history. Up until 6 months ago patient was living by himself in [facility] and was able to keep up with doctor's visits etc.  But daughter was still driving up to spend weekends with him and started noticing that he was not taking the trash out, with bills were not getting paid and he would also hallucinate in the evening times which were all signs of dementia and sundowning. She decided to move that to her home here in [place] after that. His chronic anemia was finally diagnosed as MDS and he has been receiving blood transfusions. He was recently admitted in the hospital and a long conversation took place about the futility of long-term blood transfusions and it was agreed upon that as long as patient has good quality of life that we can work towards keeping his hemoglobin above 7.5 but should he not have a decent quality of life or if he needs more frequent blood transfusions then we will stop. At this point he needs a blood transfusion once every month and a half or so and were able to maintain his hemoglobin at an acceptable level just with adding aspirin injections. Patient is able to recognize his daughter, family and grandchildren. He does walk with a walker, he has a private paid caregiver for several hours every day and he always has someone at home with him. He enjoys sitting out of the balcony and going for stools in the community.   Daughter has a chair master which allows her to bring him from their third floor apartment. Patient loves to listen to Antony and has Antony music playing 24/7. No complaints at this time, daughter considers this being good quality of life. He has a walker and hospital bed at home, he definitely needs a Whitley lift to given his height and weight. DME Recommendations  Today we will place an order for a Whitley lift this will allow your care givers to transfer you from bed, chair or commode, without this lift you would be confined to bed. Clinical Pain Assessment (nonverbal scale for nonverbal patients):  
[++++ Clinical Pain Assessment++++] [++++Pain Severity++++]: Pain: 0 
[++++Pain Character++++]: 
[++++Pain Duration++++]: 
[++++Pain Effect++++]: 
[++++Pain Factors++++]: 
[++++Pain Frequency++++]: 
[++++Pain Location++++]: 
[++++ Clinical Pain Assessment++++] FUNCTIONAL ASSESSMENT:  
 
Palliative Performance Scale (PPS): PPS: 50 PSYCHOSOCIAL/SPIRITUAL SCREENING:  
 
Any spiritual / Zoroastrian concerns: 
[] Yes /  [x] No 
 
Caregiver Burnout: 
[] Yes /  [x] No /  [] No Caregiver Present Anticipatory grief assessment:  
[x] Normal  / [] Maladaptive ESAS Anxiety: Anxiety: 0 
 
ESAS Depression: Depression: 0 REVIEW OF SYSTEMS:  
 
The following systems were [x] reviewed / [] unable to be reviewed Systems: constitutional, ears/nose/mouth/throat, respiratory, gastrointestinal, genitourinary, musculoskeletal, integumentary, neurologic, psychiatric, endocrine. Positive findings noted below. Modified ESAS Completed by: provider Fatigue: 8 Drowsiness: 2 Depression: 0 Pain: 0 Anxiety: 0 Nausea: 0 Anorexia: 0 Dyspnea: 0 Constipation: No  
     
 
 
 PHYSICAL EXAM:  
 
Wt Readings from Last 3 Encounters:  
09/17/19 140 lb 6.4 oz (63.7 kg) 09/09/19 154 lb (69.9 kg) 08/29/19 154 lb (69.9 kg) Blood pressure 100/80, pulse 94, temperature 98 °F (36.7 °C), SpO2 94 %. Last bowel movement: See Nursing Note Constitutional: Alert, oriented to self and person, does not know dates or day. Eyes: pupils equal, anicteric ENMT: no nasal discharge, moist mucous membranes Cardiovascular: regular rhythm, distal pulses intact Respiratory: breathing not labored, symmetric Gastrointestinal: soft non-tender, +bowel sounds Musculoskeletal: no deformity, no tenderness to palpation Skin: warm, dry Neurologic: following commands, moving all extremities Psychiatric: full affect, no hallucinations Other: 
 
 
 HISTORY:  
 
Past Medical History:  
Diagnosis Date  Anemia  Cognitive impairment  Controlled type 2 diabetes mellitus, with long-term current use of insulin ([facility] [address] 6/9/2019  Glaucoma  Hypertension  Malignant neoplasm of prostate ([facility] [address] 1998  MGUS (monoclonal gammopathy of unknown significance)  Mildly underweight adult  Paroxysmal A-fib ([facility] [address] Past Surgical History:  
Procedure Laterality Date  BIOPSY OF PROSTATE,INCISIONAL  4/98  ND EXT BEAM RADIATION AS PRIMARY THERAPY TO PROSTATE ONLY  4/30-6/29/98 Family History Problem Relation Age of Onset  Diabetes Other  Hypertension Other History reviewed, no pertinent family history. Social History Tobacco Use  Smoking status: Current Some Day Smoker Packs/day: 0.10 Years: 22.00 Pack years: 2.20 Types: Cigarettes  Smokeless tobacco: Never Used Substance Use Topics  Alcohol use: No  
  Alcohol/week: 0.0 standard drinks Allergies Allergen Reactions  Iodinated Contrast Media Hives Faint, shaking Current Outpatient Medications Medication Sig  
 PRO-STAT AWC  gram-kcal/30 mL liqd TAKE 30 MLS BY MOUTH TWICE DAILY  insulin glargine (LANTUS SOLOSTAR U-100 INSULIN) 100 unit/mL (3 mL) inpn 5 Units by SubCUTAneous route nightly.   
 insulin aspart U-100 (NOVOLOG) 100 unit/mL injection by SubCUTAneous route. Use as needed before each meals if: 
 
Blood sugar 150-199, give 2 units Blood sugar 200-249, give 4 units Blood sugar 250-299, give 6 units Blood sugar >300, give 8 units  ferrous sulfate 325 mg (65 mg iron) tablet Take 1 Tab by mouth daily (with breakfast).  metoprolol tartrate (LOPRESSOR) 50 mg tablet Take 25 mg by mouth two (2) times a day.  oxybutynin (DITROPAN) 5 mg tablet Take 5 mg by mouth three (3) times daily.  dorzolamide (TRUSOPT) 2 % ophthalmic solution Administer 1 Drop to both eyes three (3) times daily.  Insulin Needles, Disposable, 31 gauge x 5/16\" ndle Use as needed.  valsartan (DIOVAN) 40 mg tablet Take 1 Tab by mouth daily.  glucose 4 gram chewable tablet Take 4 Tabs by mouth as needed.  therapeutic multivitamin (THERAGRAN) tablet Take 1 Tab by mouth daily.  travoprost (TRAVATAN Z) 0.004 % ophthalmic solution Administer 1 Drop to both eyes two (2) times a day.  brinzolamide (AZOPT) 1 % ophthalmic suspension Administer 1 Drop to both eyes two (2) times a day.  brimonidine-timolol (COMBIGAN) 0.2-0.5 % drop ophthalmic solution Administer 1 Drop to both eyes every twelve (12) hours. No current facility-administered medications for this visit. LAB DATA REVIEWED:  
 
Lab Results Component Value Date/Time WBC 9.0 09/17/2019 12:47 PM  
 HGB 8.4 (L) 09/17/2019 12:47 PM  
 PLATELET 542 (L) 46/07/4567 12:47 PM  
 
Lab Results Component Value Date/Time Sodium 138 09/10/2019 05:35 AM  
 Potassium 3.7 09/10/2019 05:35 AM  
 Chloride 109 (H) 09/10/2019 05:35 AM  
 CO2 25 09/10/2019 05:35 AM  
 BUN 24 (H) 09/10/2019 05:35 AM  
 Creatinine 0.54 (L) 09/10/2019 05:35 AM  
 Calcium 7.7 (L) 09/10/2019 05:35 AM  
 Magnesium 1.6 09/10/2019 05:35 AM  
 Phosphorus 2.0 (L) 09/10/2019 05:35 AM  
  
Lab Results Component Value Date/Time AST (SGOT) 43 (H) 09/10/2019 05:35 AM  
 Alk.  phosphatase 68 09/10/2019 05:35 AM  
 Protein, total 6.0 (L) 09/10/2019 05:35 AM  
 Albumin 1.4 (L) 09/10/2019 05:35 AM  
 Globulin 4.6 (H) 09/10/2019 05:35 AM  
 
Lab Results Component Value Date/Time INR 1.2 06/04/2019 09:20 AM  
 Prothrombin time 15.1 06/04/2019 09:20 AM  
 aPTT 102.5 (H) 06/09/2019 04:12 PM  
  
Lab Results Component Value Date/Time Iron 31 (L) 08/26/2019 01:24 PM  
 TIBC 140 (L) 08/26/2019 01:24 PM  
 Iron % saturation 22 08/26/2019 01:24 PM  
 Ferritin 2,797 (H) 08/26/2019 05:09 PM  
  
 
 
 CONTROLLED SUBSTANCES SAFETY ASSESSMENT (IF ON CONTROLLED SUBSTANCES):  
 
Reviewed opioid safety handout:  [] Yes   [] No 
24 hour opioid dose >150mg morphine equivalent/day:  [] Yes   [] No 
Benzodiazepines:  [] Yes   [] No 
Sleep apnea:  [] Yes   [] No 
Urine Toxicology Testing within last 6 months:  [] Yes   [] No 
History of or new aberrant medication taking behaviors:  [] Yes   [] No 
Has Narcan been prescribed [] Yes   [] No 
 
   
 
Total time: 70 minutes Counseling / coordination time:  
> 50% counseling / coordination?:

## 2019-09-23 NOTE — PATIENT INSTRUCTIONS
Dear Jeannie Briceño and Alexander Minaya, It was a pleasure seeing you today in your home We will see you again in 1 month If labs or imaging tests have been ordered for you today, please call the office  at [phone] 48 hours after completion to obtain the results. This is the plan we talked about: 1. Goals of care 
-I am so glad that you have a very good understanding of your father's health and clear goals. I agree he has very good quality of life at this time, he enjoys eating, spending time with family members and his great-grandchildren. He is able to let his needs known and enjoys listening to [employer]. 
-We agreed that should he require more frequent blood transfusions or should he not enjoy his current quality of life then we will discontinue blood transfusions. 
-I am glad you are welcome open conversation about this with Dr. Walter Sarabia or myself. 2.  He has [facility] home health and physical therapy working with him right now. 
-He has a wound in his sacrum and gets wound care through our home health service. He is currently completing antibiotics for the same. 
-I will order a Whitley lift to help him get out of bed. 3.  Medications 
-We went over all his medications today and agreed to stop calcium carbonate, vitamin C and atorvastatin. He will continue with a multivitamin and all other medications. 
-You have a good idea about his insulin needs. 4.  Given his inability to leave the house frequently and that he would rather use his energy to do a social outing as opposed to seeing a doctor, you want palliative medicine at home visits to replace his primary care physician. 
-I will be happy to take care of him at home and discuss with Dr. Walter Sarabia about his future MDS management. 
-I will arrange for him to receive a flu shot at home. 
-I will provide refills for all his medications as needed. 5.  ACP 
-You are his medical power of . 
-We completed a D DNR today This is what you have shared with us about Advance Care Planning: 
 
  Primary Decision Maker: Lakeshia Henson [employer] [facility]) - Daughter - [phone] Advance Care Planning 9/19/2019 Patient's Healthcare Decision Maker is: Named in scanned ACP document Confirm Advance Directive Yes, on file Does the patient have other document types MOST/MOLST/POST/POLST;Do Not Resuscitate The Palliative Medicine Team is here to support you and your family.   
 
 
Sincerely, 
 
 
Anson Rivas MD and the Palliative Medicine Team

## 2019-09-24 NOTE — PROGRESS NOTES
Returned call to Kenrick who reports patient b/p today 130/77 with a pulse of 112, low grade temp still present at 99.3 , fasting blood sugar at 124, urine in bag looking almost orange , apatite is decreasing , he is not complaining of pain but she notices it it in his face when moving , she has given him tylenol, he is calling out and having hallucinations for family members he has not seen in a long time out that have passed , she has concerns as to wether or not it is time to contact hospice, per Dr Chico Roberto awaiting labs results and will proceed accordingly after review of labs and family still wishes to contact hospice

## 2019-09-25 NOTE — TELEPHONE ENCOUNTER
Ning is calling an order/script sent to:    Mirtha Grajeda, phone [phone], fax number:  [phone]    Need to order a no fill air mattress,  Reasons:  Several wounds, has a stage 4 sacral wound. Advised would send to nurse and she would call her back with any questions.

## 2019-09-25 NOTE — TELEPHONE ENCOUNTER
Returned call to Mikayimi Mejia left for return call patient does not have original orders for bed from [facility] reports they can not deliver mattress with out ordering the bed

## 2019-09-25 NOTE — TELEPHONE ENCOUNTER
Aide called to let nurse know that she just dropped labs off at [facility]. Did not ask for a return call.

## 2019-09-25 NOTE — TELEPHONE ENCOUNTER
Dr. Rosa Duque informed that pt's Hgb is 6.2 today. MD advised this nurse to call daughter and have her call Dr. Jennifer Resendez office to arrange for blood transfusion. Nurse called and spoke with pt's dtr Erika Fisher and informed her of the result. She would like for Mr. Erick Saini to receive a blood transfusion. She requests that this nurse call Dr. Julio Osuna office to inform then and ask them to call her. Nurse called Dr. Julio Osuna office and spoke with Treasure Vo and notified her of Hgb of 6.2. She says she will inform Dr. Chloé Ochoa and then call pt's dtr.

## 2019-09-25 NOTE — TELEPHONE ENCOUNTER
Returned call to Lionel Berrios, orders faxed with confirmation to Jeaneth Mitchell and Lionel Berrios reports Patients labs where drawn this morning.

## 2019-09-25 NOTE — TELEPHONE ENCOUNTER
[facility] Palliative Medicine Office  Nursing Note  [phone]  Fax [phone]     Name:  Aramis Cruz  YOB: 1935     Incoming call from Amor Rogerio with [address]. Mr. Mary Ellen Fox Hgb is 6.2. This nurse called the [facility] office where Dr. Viviana Flores is today and informed Yesi Camacho who will have a nurse call me back as soon as possible. This note will be routed to Dr. Viviana Flores.     Kayla Max, SIDDHARTHAN, RN  Clinical Referral Navigator

## 2019-09-26 NOTE — PROGRESS NOTES
[facility] Progress Note Date: 2019 Name: Susan Baxter MRN: [id number] : 1935 Mr. Jaylyn Jones Arrived ambulatory and in no distress for Blood Transfusion. Assessment was completed with assistance of pt caregiver. Patient AMS. Patient arrived in wheelchair with daughter and caregiver, MAX assist to transfer patient from wheelchair to bed. 22G PIV placed in right arm with warm compress and elevation. Signs/symptoms of adverse blood reaction discussed with pt, voiced understanding. Mr. Krystle Webber vitals were reviewed. Visit Vitals /62 (BP 1 Location: Left arm, BP Patient Position: At rest) Pulse 84 Temp 97.5 °F (36.4 °C) Resp 18 SpO2 94% 1st unit PRBCs started and infusing without difficulty, observed x 15 minutes 1st unit completed without adverse reaction noted, NS flushing line. 2nd unit PRBCs started and infusing without difficulty, observed x 15 minutes Medications: 
NS @ [facility] given to [employer] Chinyere Brown RN 2019

## 2019-09-26 NOTE — PROGRESS NOTES
SBAR report received from KRISTINA Murphy RN 
 
Pt and family deny needs or complaints. 1725:  2nd unit completed without adverse reaction noted, NS flushing line. Pt tolerated transfusion  well, no adverse reaction noted. D/C instructions reviewed, copy to pt, voiced understanding. Patient declined 1 hour post transfusion observation. D/Cd from [facility] via DAIJA Torres and in no acute distress. Patient Vitals for the past 12 hrs: 
 Temp Pulse Resp BP SpO2  
09/26/19 1725 99.1 °F (37.3 °C) 90 16 144/67 97 % 09/26/19 1655 97.1 °F (36.2 °C) 96 16 136/67   
09/26/19 1555 96.9 °F (36.1 °C) 83 18 125/68   
09/26/19 1525 97.5 °F (36.4 °C) 84 18 132/62 94 % 09/26/19 1510 97.4 °F (36.3 °C) 88 16 142/66   
09/26/19 1455 97.1 °F (36.2 °C) 79 16 138/65 98 %  
09/26/19 1443 97.6 °F (36.4 °C) 80 18 132/68   
09/26/19 1400 97.6 °F (36.4 °C) 84 18 127/71 97 % 09/26/19 1300 98.3 °F (36.8 °C) 77 18 124/60 96 %  
09/26/19 1230 97.7 °F (36.5 °C) 88 18 116/59 95 % 09/26/19 1215 97.5 °F (36.4 °C) 91 18 105/56 95 % 09/26/19 1200 96.5 °F (35.8 °C) 82 18 112/57 96 %  
09/26/19 1000  (!) 101 20 123/56 92 %

## 2019-09-27 NOTE — TELEPHONE ENCOUNTER
[address]  [phone]  Fax [phone]      Name:  Jesus Coleman  YOB: 1935    Received incoming email from Mauro Vasquez, Cardiology  stating \"I spoke with daughter of Jseus Coleman. She wanted me to let you know that he has a lot of fluid on his left arm. He was on my list for high risk of readmission. I was making my weekly call to see how he is doing. \"    This nurse emailed Mauro Vasquez back inquiring if she has more information such as the presence of pain, redness, is this the arm that pt received the 2 units of blood yesterday? Ms. Cande Ramirez says dtr was at work and all she said was pt had swelling in left arm. This nurse called pt's daughter for more information. No answer, left message requesting call back before 5pm today so nurse can assess. 1:52pm   Daughter returned call. She states the left arm swelling is not new. Pt had the swelling when he was hospitalized 9/9/19-9/12/19. Daughter says the swelling is slowly increasing. Pt now has decreased movement in his left arm due to the edema from his fingers up to his upper arm. Pt has to use his right arm to lift his left arm in order to reposition it. Pt does not complain of pain in arm (pt is stoic and does not complain of pain at baseline). Daughter has not seen any areas of redness. She says they turn pt and he does not spend more time on his left side than his right side. Nurse spoke with Dr. Abby Gonzales who ordered a venous duplex ultrasound of left arm. Order faxed to [facility] at [phone]. Nurse called pt's daughter back and notified her that order has been faxed to [facility]. She voices agreement to this plan.      VANI Sahu, RN-BC  Palliative Medicine Referral Navigator  Phone:  [phone]    Fax:  [phone]  [email or web address]

## 2019-10-01 NOTE — TELEPHONE ENCOUNTER
[address] at [place]  [phone]    10/01/19Alison Dallas with Palliative Medicine called to let us know patient has decided to enroll with hospice. [facility] will go out today or tomorrow to admit him. Dr. Shay Preston notified.

## 2019-10-03 NOTE — TELEPHONE ENCOUNTER
Ms. Milburn Lesch is calling stating that she needs to speak to nurse. States patient is in so much pain that he is screaming out. States sore on back is so she can stick her fingers in through it. Hospice is not coming out til Saturday so patient needs something to hold him over til then. Promised Ms. Bower that nurse would call her back this am.

## 2019-10-03 NOTE — TELEPHONE ENCOUNTER
Rx for patients medication to [facility] pharmacy on file , Nursing supervisor to drip off, Desmond Baez aware to  after work today

## 2019-10-07 ENCOUNTER — HOME CARE VISIT (OUTPATIENT)
Dept: HOSPICE | Facility: HOSPICE | Age: 84
End: 2019-10-07
Payer: MEDICARE

## 2019-10-11 ENCOUNTER — HOME CARE VISIT (OUTPATIENT)
Dept: HOSPICE | Facility: HOSPICE | Age: 84
End: 2019-10-11
Payer: MEDICARE

## 2019-10-12 ENCOUNTER — HOME CARE VISIT (OUTPATIENT)
Dept: HOSPICE | Facility: HOSPICE | Age: 84
End: 2019-10-12
Payer: MEDICARE

## 2019-10-15 ENCOUNTER — APPOINTMENT (OUTPATIENT)
Dept: INFUSION THERAPY | Age: 84
End: 2019-10-15

## 2019-10-15 ENCOUNTER — HOME CARE VISIT (OUTPATIENT)
Dept: HOME HEALTH SERVICES | Facility: HOME HEALTH | Age: 84
End: 2019-10-15
Payer: MEDICARE

## 2019-10-16 ENCOUNTER — HOME CARE VISIT (OUTPATIENT)
Dept: HOSPICE | Facility: HOSPICE | Age: 84
End: 2019-10-16
Payer: MEDICARE

## 2019-10-29 ENCOUNTER — APPOINTMENT (OUTPATIENT)
Dept: INFUSION THERAPY | Age: 84
End: 2019-10-29

## 2019-11-12 ENCOUNTER — APPOINTMENT (OUTPATIENT)
Dept: INFUSION THERAPY | Age: 84
End: 2019-11-12

## 2019-11-26 ENCOUNTER — APPOINTMENT (OUTPATIENT)
Dept: INFUSION THERAPY | Age: 84
End: 2019-11-26

## 2022-02-05 NOTE — PALLIATIVE CARE
Lab Results   Component Value Date    EGFR 46 02/04/2022    EGFR 38 01/28/2022    EGFR 45 01/23/2022    CREATININE 1 41 (H) 02/04/2022    CREATININE 1 64 (H) 01/28/2022    CREATININE 1 42 (H) 01/23/2022     · Creatinine 1 41; appears to be improving from recent DULCE, recent baseline difficult to establish  · Avoid nephrotoxic agents  · Continuous gentle IVF hydration overnight   · AM BMP Thank you for consult Mr Mo He is alert but confused we have left a message for his daughter Madison Jackson to set up a family meeting.

## 2022-04-28 NOTE — PROGRESS NOTES
April 28, 2022      [facility]  [address] [place] [address] [place] [address]  Phone: [phone]  Fax: [phone]       Patient: Mercy Dean   YOB: 2000  Date of Visit: 04/28/2022    To Whom It May Concern:    Damien Dean  was at [facility] on 04/28/2022. The patient may return to work/school on 04/29/22 with no restrictions. If you have any questions or concerns, or if I can be of further assistance, please do not hesitate to contact me.    Sincerely,    Aaliyah Booth PA-C      NUTRITION Nursing Referral: Pressure Injury RECOMMENDATIONS / PLAN:  
 
- Change to soft solid diet with chopped meats.  
- Add supplements: Glucerna Shake TID.  
- SLP consult for swallow evaluation.  
- Continue RD inpatient monitoring and evaluation. NUTRITION INTERVENTIONS & DIAGNOSIS:  
 
[x] Meals/snacks: modify composition 
[x] Medical food supplement therapy: initiate [x] Collaboration and referral of nutrition care: pt discussed with RN, order for SLP consult from Dr Stella Heller Nutrition Diagnosis: Unintended weight loss related to inadequate energy intake, lethargy and poor dentition as evidenced by 7 lb, 4.6% weight loss x month. Increased nutrient (protein) needs related to promotion of wound healing as evidenced by pt with unstageable and stage III pressure injury wounds. ASSESSMENT:  
 
Admitted with hypoglycemia, found down and unresponsive at home by Caregiver. Daughter at bedside states pt eating well PTA and tolerating soft foods, now with fair appetite and difficulty eating 2/2 mentation and limited dexterity per RN. Discussed with daughter the importance of adequate meal intake- especially protein and importance of limiting sodium to prevent fluid accumulation and concentrated sweets- cautioned over restricting due to concern for hypoglycemia. Average po intake adequate to meet patients estimated nutritional needs:   [] Yes     [x] No   [] Unable to determine at this time Diet: DIET DIABETIC CONSISTENT CARB Regular Food Allergies: NKFA Current Appetite:   [] Good     [] Fair     [x] Poor     [x] Other: self feeding difficulty Appetite/meal intake prior to admission:   [x] Good per daughter report    [] Chintan James     [] Poor     [] Other: 
Feeding Limitations:  [] Swallowing difficulty    [x] Chewing difficulty: poor dentition with dentures     [x] Other: limited dexterity and weakness with difficulty feeding himself and resistant to assistance eating from nursing Current Meal Intake:  
Patient Vitals for the past 100 hrs: 
 % Diet Eaten 06/11/19 1015 50 % 06/10/19 1505 80 % 06/10/19 0943 100 % 06/10/19 0100 0 % 06/09/19 1808 25 % 06/09/19 1323 0 % BM: 6/10 Skin Integrity: right ankle and right toe unstageable pressure injuries, sacral stage III pressure injury Edema:   [] No     [x] Yes Pertinent Medications: Reviewed: lantus, SSI, theragran, steroid, 40 mEq KCl x 2 doses, NS at 75 mL/hr Recent Labs  
  06/11/19 
9857 06/10/19 
1592 06/09/19 
1440  142 139  
K 3.2* 3.4* 3.8 * 110* 105 CO2 26 26 28 * 108* 244* BUN 28* 25* 24* CREA 1.07 0.96 1.03  
CA 7.5* 7.7* 7.8*  
MG 2.0 2.2 1.5* PHOS  --  2.4*  --   
ALB  --  1.7* 2.0*  
SGOT  --  71* 52* ALT  --  40 25 Intake/Output Summary (Last 24 hours) at 6/11/2019 1359 Last data filed at 6/11/2019 1015 Gross per 24 hour Intake 830 ml Output 650 ml Net 180 ml Anthropometrics: 
Ht Readings from Last 1 Encounters:  
06/10/19 6' 1\" (1.854 m) Last 3 Recorded Weights in this Encounter 06/09/19 1422 06/10/19 1120 Weight: 66.2 kg (146 lb) 66.2 kg (146 lb) Body mass index is 19.26 kg/m². Weight History: 80 lb weight loss x several years- down to 127 lb then in March 2019 started to gain weight back x several months- up to 153 lb per daughter report; now with weight loss over the past month (7 lb, 4.6% weight loss) down to current weight of 146 lb (may be falsely high d/t edema, fluid accumulation on admission) Weight Metrics 6/10/2019 6/7/2019 5/15/2019 3/27/2019 3/18/2019 2/13/2019 12/4/2018 Weight 146 lb 144 lb 153 lb 141 lb 136 lb 127 lb 6.4 oz 132 lb 6.4 oz BMI 19.26 kg/m2 19 kg/m2 20.19 kg/m2 18.6 kg/m2 17.94 kg/m2 16.81 kg/m2 17.47 kg/m2 Admitting Diagnosis: NSTEMI (non-ST elevated myocardial infarction) ([facility].) [I21.4] Hypoglycemia [E16.2] NSTEMI (non-ST elevated myocardial infarction) ([facility].) [I21.4] Hypoglycemia [E16.2] Pertinent PMHx: DM, HTN, prostate cancer Education Needs:        [x] None identified  [] Identified - Not appropriate at this time  []  Identified and addressed - refer to education log Learning Limitations:   [] None identified  [x] Identified: altered mentation/confusion at times Cultural, Mandaen & ethnic food preferences:  [x] None identified    [] Identified and addressed ESTIMATED NUTRITION NEEDS:  
 
Calories: 5651-0364 kcal (30-35 kcal/kg) based on  [x] Actual BW 66 kg     [] IBW Protein: 79-99 gm (1.2-1.5 gm/kg) based on  [x] Actual BW      [] IBW Fluid: 1 mL/kcal 
  
MONITORING & EVALUATION:  
 
Nutrition Goal(s): 1. Po intake of meals will meet >75% of patient estimated nutritional needs within the next 7 days. Outcome:  [] Met/Ongoing    [] Progressing towards goal    [] Not progressing towards goal    [x] New/Initial goal  
 
Monitoring:   [x] Food and beverage intake   [x] Diet order   [x] Nutrition-focused physical findings   [x] Treatment/therapy   [] Weight   [] Enteral nutrition intake Previous Recommendations (for follow-up assessments only):     []   Implemented       []   Not Implemented (RD to address)      [] No Longer Appropriate     [] No Recommendation Made Discharge Planning: cardiac, diabetic soft solid diet as tolerated   
[x] Participated in care planning, discharge planning, & interdisciplinary rounds as appropriate Archana Fox RD, [facility] Pager: [phone]

## 2023-02-06 NOTE — PROGRESS NOTES
Bedside and Verbal shift change report given to Huma Goode RN (oncoming nurse) by Birgit Da Silva RN (offgoing nurse). Report included the following information SBAR, Kardex, Intake/Output, MAR, Recent Results and Med Rec Status. room air

## 2023-09-16 NOTE — PROGRESS NOTES
[facility] VISIT NOTE    Pt arrived at [facility] ambulatory and in no distress for Labs/Possible Blood Transfusion. Patient Vitals for the past 12 hrs:   Temp Pulse Resp BP   09/06/19 0833 97.8 °F (36.6 °C) 92 18 112/60     Labs drawn from [facility] without difficulty. Recent Results (from the past 12 hour(s))   CBC WITH AUTOMATED DIFF    Collection Time: 09/06/19  8:40 AM   Result Value Ref Range    WBC 12.8 (H) 4.1 - 11.1 K/uL    RBC 2.14 (L) 4.10 - 5.70 M/uL    HGB 7.2 (L) 12.1 - 17.0 g/dL    HCT 23.0 (L) 36.6 - 50.3 %    .5 (H) 80.0 - 99.0 FL    MCH 33.6 26.0 - 34.0 PG    MCHC 31.3 30.0 - 36.5 g/dL    RDW 21.1 (H) 11.5 - 14.5 %    PLATELET 781 783 - 648 K/uL    MPV 9.7 8.9 - 12.9 FL    NRBC 0.5 (H) 0  WBC    ABSOLUTE NRBC 0.06 (H) 0.00 - 0.01 K/uL    NEUTROPHILS PENDING %    LYMPHOCYTES PENDING %    MONOCYTES PENDING %    EOSINOPHILS PENDING %    BASOPHILS PENDING %    IMMATURE GRANULOCYTES PENDING %    ABS. NEUTROPHILS PENDING K/UL    ABS. LYMPHOCYTES PENDING K/UL    ABS. MONOCYTES PENDING K/UL    ABS. EOSINOPHILS PENDING K/UL    ABS. BASOPHILS PENDING K/UL    ABS. IMM. GRANS. PENDING K/UL    DF PENDING      HGB not within treatment parameters. Tolerated treatment well, no adverse reaction noted. D/C'd from [facility] ambulatory and in no distress accompanied by daughter and caregiver. Ashkan Waters Next appointment is not  yet scheduled.
Applied